# Patient Record
Sex: MALE | Race: WHITE | NOT HISPANIC OR LATINO | Employment: FULL TIME | URBAN - METROPOLITAN AREA
[De-identification: names, ages, dates, MRNs, and addresses within clinical notes are randomized per-mention and may not be internally consistent; named-entity substitution may affect disease eponyms.]

---

## 2017-10-09 ENCOUNTER — LAB VISIT (OUTPATIENT)
Dept: LAB | Facility: HOSPITAL | Age: 72
End: 2017-10-09
Attending: INTERNAL MEDICINE
Payer: MEDICARE

## 2017-10-09 ENCOUNTER — OFFICE VISIT (OUTPATIENT)
Dept: INTERNAL MEDICINE | Facility: CLINIC | Age: 72
End: 2017-10-09
Payer: MEDICARE

## 2017-10-09 VITALS
BODY MASS INDEX: 37.89 KG/M2 | HEIGHT: 72 IN | SYSTOLIC BLOOD PRESSURE: 122 MMHG | TEMPERATURE: 98 F | DIASTOLIC BLOOD PRESSURE: 70 MMHG | WEIGHT: 279.75 LBS | HEART RATE: 76 BPM

## 2017-10-09 DIAGNOSIS — E11.22 TYPE 2 DIABETES MELLITUS WITH STAGE 3 CHRONIC KIDNEY DISEASE, WITH LONG-TERM CURRENT USE OF INSULIN: ICD-10-CM

## 2017-10-09 DIAGNOSIS — Z12.5 ENCOUNTER FOR SCREENING FOR MALIGNANT NEOPLASM OF PROSTATE: ICD-10-CM

## 2017-10-09 DIAGNOSIS — I15.2 HYPERTENSION ASSOCIATED WITH DIABETES: ICD-10-CM

## 2017-10-09 DIAGNOSIS — E11.69 HYPERLIPIDEMIA ASSOCIATED WITH TYPE 2 DIABETES MELLITUS: ICD-10-CM

## 2017-10-09 DIAGNOSIS — N18.30 TYPE 2 DIABETES MELLITUS WITH STAGE 3 CHRONIC KIDNEY DISEASE, WITH LONG-TERM CURRENT USE OF INSULIN: ICD-10-CM

## 2017-10-09 DIAGNOSIS — Z12.11 COLON CANCER SCREENING: ICD-10-CM

## 2017-10-09 DIAGNOSIS — Z79.4 TYPE 2 DIABETES MELLITUS WITH STAGE 3 CHRONIC KIDNEY DISEASE, WITH LONG-TERM CURRENT USE OF INSULIN: ICD-10-CM

## 2017-10-09 DIAGNOSIS — E11.59 HYPERTENSION ASSOCIATED WITH DIABETES: ICD-10-CM

## 2017-10-09 DIAGNOSIS — N18.30 STAGE 3 CHRONIC KIDNEY DISEASE: ICD-10-CM

## 2017-10-09 DIAGNOSIS — B02.29 POST HERPETIC NEURALGIA: ICD-10-CM

## 2017-10-09 DIAGNOSIS — E78.5 HYPERLIPIDEMIA ASSOCIATED WITH TYPE 2 DIABETES MELLITUS: ICD-10-CM

## 2017-10-09 LAB
ALBUMIN SERPL BCP-MCNC: 3.5 G/DL
ALP SERPL-CCNC: 119 U/L
ALT SERPL W/O P-5'-P-CCNC: 36 U/L
ANION GAP SERPL CALC-SCNC: 9 MMOL/L
AST SERPL-CCNC: 22 U/L
BASOPHILS # BLD AUTO: 0.02 K/UL
BASOPHILS NFR BLD: 0.3 %
BILIRUB SERPL-MCNC: 0.7 MG/DL
BUN SERPL-MCNC: 26 MG/DL
C PEPTIDE SERPL-MCNC: 1.63 NG/ML
CALCIUM SERPL-MCNC: 9.6 MG/DL
CHLORIDE SERPL-SCNC: 102 MMOL/L
CHOLEST SERPL-MCNC: 126 MG/DL
CHOLEST/HDLC SERPL: 2.7 {RATIO}
CO2 SERPL-SCNC: 27 MMOL/L
COMPLEXED PSA SERPL-MCNC: 2.4 NG/ML
CREAT SERPL-MCNC: 1.8 MG/DL
CREAT UR-MCNC: 158 MG/DL
DIFFERENTIAL METHOD: ABNORMAL
EOSINOPHIL # BLD AUTO: 0.1 K/UL
EOSINOPHIL NFR BLD: 1.9 %
ERYTHROCYTE [DISTWIDTH] IN BLOOD BY AUTOMATED COUNT: 13 %
EST. GFR  (AFRICAN AMERICAN): 42.8 ML/MIN/1.73 M^2
EST. GFR  (NON AFRICAN AMERICAN): 37 ML/MIN/1.73 M^2
GLUCOSE SERPL-MCNC: 212 MG/DL
HCT VFR BLD AUTO: 40.9 %
HDLC SERPL-MCNC: 47 MG/DL
HDLC SERPL: 37.3 %
HGB BLD-MCNC: 13.2 G/DL
LDLC SERPL CALC-MCNC: 61.4 MG/DL
LYMPHOCYTES # BLD AUTO: 1 K/UL
LYMPHOCYTES NFR BLD: 14.9 %
MCH RBC QN AUTO: 29.8 PG
MCHC RBC AUTO-ENTMCNC: 32.3 G/DL
MCV RBC AUTO: 92 FL
MICROALBUMIN UR DL<=1MG/L-MCNC: 331 UG/ML
MICROALBUMIN/CREATININE RATIO: 209.5 UG/MG
MONOCYTES # BLD AUTO: 0.7 K/UL
MONOCYTES NFR BLD: 10 %
NEUTROPHILS # BLD AUTO: 5.1 K/UL
NEUTROPHILS NFR BLD: 72.6 %
NONHDLC SERPL-MCNC: 79 MG/DL
PLATELET # BLD AUTO: 222 K/UL
PMV BLD AUTO: 9.7 FL
POTASSIUM SERPL-SCNC: 4.1 MMOL/L
PROT SERPL-MCNC: 6.9 G/DL
RBC # BLD AUTO: 4.43 M/UL
SODIUM SERPL-SCNC: 138 MMOL/L
TRIGL SERPL-MCNC: 88 MG/DL
WBC # BLD AUTO: 6.99 K/UL

## 2017-10-09 PROCEDURE — 84153 ASSAY OF PSA TOTAL: CPT

## 2017-10-09 PROCEDURE — 99204 OFFICE O/P NEW MOD 45 MIN: CPT | Mod: 25,S$GLB,, | Performed by: INTERNAL MEDICINE

## 2017-10-09 PROCEDURE — 82570 ASSAY OF URINE CREATININE: CPT

## 2017-10-09 PROCEDURE — 36415 COLL VENOUS BLD VENIPUNCTURE: CPT | Mod: PO

## 2017-10-09 PROCEDURE — 80053 COMPREHEN METABOLIC PANEL: CPT

## 2017-10-09 PROCEDURE — 80061 LIPID PANEL: CPT

## 2017-10-09 PROCEDURE — 99999 PR PBB SHADOW E&M-NEW PATIENT-LVL III: CPT | Mod: PBBFAC,,, | Performed by: INTERNAL MEDICINE

## 2017-10-09 PROCEDURE — G0008 ADMIN INFLUENZA VIRUS VAC: HCPCS | Mod: S$GLB,,, | Performed by: INTERNAL MEDICINE

## 2017-10-09 PROCEDURE — 84681 ASSAY OF C-PEPTIDE: CPT

## 2017-10-09 PROCEDURE — 83036 HEMOGLOBIN GLYCOSYLATED A1C: CPT

## 2017-10-09 PROCEDURE — 90662 IIV NO PRSV INCREASED AG IM: CPT | Mod: S$GLB,,, | Performed by: INTERNAL MEDICINE

## 2017-10-09 PROCEDURE — 85025 COMPLETE CBC W/AUTO DIFF WBC: CPT

## 2017-10-09 RX ORDER — TRAMADOL HYDROCHLORIDE AND ACETAMINOPHEN 37.5; 325 MG/1; MG/1
TABLET, FILM COATED ORAL
COMMUNITY
Start: 2017-08-21 | End: 2018-08-08

## 2017-10-09 RX ORDER — INSULIN GLARGINE 100 [IU]/ML
35 INJECTION, SOLUTION SUBCUTANEOUS 2 TIMES DAILY
COMMUNITY
Start: 2017-09-18 | End: 2017-11-09 | Stop reason: SDUPTHER

## 2017-10-09 RX ORDER — TERAZOSIN 10 MG/1
CAPSULE ORAL
COMMUNITY
Start: 2017-08-02 | End: 2017-11-09 | Stop reason: SDUPTHER

## 2017-10-09 RX ORDER — VALSARTAN AND HYDROCHLOROTHIAZIDE 320; 25 MG/1; MG/1
1 TABLET, FILM COATED ORAL
COMMUNITY
End: 2017-11-09 | Stop reason: SDUPTHER

## 2017-10-09 RX ORDER — CLONIDINE HYDROCHLORIDE 0.2 MG/1
0.2 TABLET ORAL
COMMUNITY
End: 2017-10-09 | Stop reason: SDUPTHER

## 2017-10-09 RX ORDER — TESTOSTERONE CYPIONATE 1000 MG/10ML
100 INJECTION, SOLUTION INTRAMUSCULAR
COMMUNITY
End: 2017-11-09

## 2017-10-09 RX ORDER — METOPROLOL SUCCINATE 100 MG/1
TABLET, EXTENDED RELEASE ORAL
COMMUNITY
Start: 2017-08-17 | End: 2017-11-09 | Stop reason: SDUPTHER

## 2017-10-09 RX ORDER — CLONIDINE HYDROCHLORIDE 0.2 MG/1
0.2 TABLET ORAL 2 TIMES DAILY
Qty: 180 TABLET | Refills: 4 | Status: SHIPPED | OUTPATIENT
Start: 2017-10-09 | End: 2018-12-17 | Stop reason: SDUPTHER

## 2017-10-09 RX ORDER — GABAPENTIN 300 MG/1
CAPSULE ORAL
COMMUNITY
Start: 2017-09-09 | End: 2017-12-21 | Stop reason: ALTCHOICE

## 2017-10-09 RX ORDER — INSULIN ASPART 100 [IU]/ML
INJECTION, SOLUTION INTRAVENOUS; SUBCUTANEOUS
COMMUNITY
Start: 2017-07-20 | End: 2017-10-09

## 2017-10-09 RX ORDER — AMLODIPINE BESYLATE 10 MG/1
TABLET ORAL
COMMUNITY
Start: 2017-08-17 | End: 2017-11-09 | Stop reason: SDUPTHER

## 2017-10-09 RX ORDER — VALSARTAN 320 MG/1
TABLET ORAL
COMMUNITY
Start: 2017-09-26 | End: 2017-10-09

## 2017-10-09 RX ORDER — ATORVASTATIN CALCIUM 80 MG/1
TABLET, FILM COATED ORAL
COMMUNITY
Start: 2017-08-17 | End: 2017-11-09 | Stop reason: SDUPTHER

## 2017-10-09 NOTE — PROGRESS NOTES
Subjective:       Patient ID: Sherif Ragland is a 71 y.o. male.    Chief Complaint: Establish Care    HPI  Patient is a 71-year-old male presenting as a new patient to me.  He is previously followed by an outside physician.  He reports with a history of type 2 diabetes with stage III kidney disease, hyperlipidemia, hypertension, postherpetic neuralgia.  He states his been diabetic since roughly 1997.  He had been previously treated with metformin as his only treatment.  He states that his A1c is running in the 9 area and subsequently he was taken off the metformin by his last doctor and placed on insulin.  He is seeing some improvement in his sugars but the regimen he is following is not one that was originally prescribed.  The doctor did put him on 70 units of insulin split twice daily with Lantus.  He was getting hypoglycemic with this regimen so he is switched it to just taking it at nighttime and he takes somewhere between 60 and 65 units at night and he is seen better control the sugars with that.  His fasting sugars are running around 120 and his non-fastings of been typically around 140.  He would like to get off of insulin if possible.  He was not tried on a more aggressive oral therapy.  Were not aware if the previous physician tested him for insulin production.    Patient has stage III kidney disease associated with his diabetes and hypertension.  This is been stable over time.  He is followed by an outside nephrologist.  He reports no problem with his kidneys at this juncture.    He has hypertension and hyperlipidemia associated with his diabetes.  He is on medication for both these issues.  His blood pressure remains under good control he relates good control of his cholesterol as well.    He is under treatment for postherpetic neuralgia.  He has had problems with that for some time.  He notes he did have the shingles shot but at the postherpetic neuralgia nonetheless.  He is on gabapentin and he uses  occasional Ultracet for that.    Patient is also on testosterone replacement.  He is not sure what the dose was.  He was getting shot every 2 weeks.        Review of Systems   Constitutional: Negative for fever and unexpected weight change.   HENT: Negative for hearing loss, postnasal drip and rhinorrhea.    Eyes: Negative for pain and visual disturbance.   Respiratory: Negative for cough, shortness of breath and wheezing.    Cardiovascular: Negative for chest pain and palpitations.   Gastrointestinal: Negative for constipation, diarrhea, nausea and vomiting.   Genitourinary: Negative for dysuria and hematuria.   Musculoskeletal: Negative for arthralgias, back pain, myalgias and neck stiffness.   Skin: Negative for pallor and rash.   Neurological: Negative for seizures, syncope and headaches.   Hematological: Negative for adenopathy.   Psychiatric/Behavioral: Negative for dysphoric mood. The patient is not nervous/anxious.        Objective:   /70   Pulse 76   Temp 98.4 °F (36.9 °C) (Tympanic)   Ht 6' (1.829 m)   Wt 126.9 kg (279 lb 12.2 oz)   BMI 37.94 kg/m²      Physical Exam   Constitutional: He is oriented to person, place, and time. He appears well-developed and well-nourished. No distress.   HENT:   Head: Normocephalic and atraumatic.   Mouth/Throat: Oropharynx is clear and moist.   Eyes: EOM are normal. Pupils are equal, round, and reactive to light.   Neck: Normal range of motion. Neck supple. No JVD present. No thyromegaly present.   Cardiovascular: Normal rate, regular rhythm, normal heart sounds and intact distal pulses.  Exam reveals no gallop and no friction rub.    No murmur heard.  Pulmonary/Chest: Effort normal and breath sounds normal. He has no wheezes. He has no rales.   Abdominal: Soft. Bowel sounds are normal. He exhibits no distension. There is no tenderness. There is no rebound and no guarding.   Musculoskeletal: Normal range of motion. He exhibits no edema.   Lymphadenopathy:     He  has no cervical adenopathy.   Neurological: He is alert and oriented to person, place, and time. He has normal reflexes. No cranial nerve deficit.   Skin: Skin is warm and dry. No rash noted.   Psychiatric: He has a normal mood and affect. Judgment normal.   Vitals reviewed.      No visits with results within 2 Week(s) from this visit.   Latest known visit with results is:   No results found for any previous visit.       Assessment:       1. Type 2 diabetes mellitus with stage 3 chronic kidney disease, with long-term current use of insulin    2. Stage 3 chronic kidney disease    3. Hyperlipidemia associated with type 2 diabetes mellitus    4. Hypertension associated with diabetes    5. Post herpetic neuralgia    6. Encounter for screening for malignant neoplasm of prostate    7. Colon cancer screening        Plan:   Type 2 diabetes mellitus with chronic kidney disease, with long-term current use of insulin  Continue current regimen. Will update labs. Consider changing back to oral agents if possible.    Hypertension associated with diabetes  Blood pressure is under good control.  We will continue the current regimen.  Will work on regular aerobic exercise and a low salt diet.        Hyperlipidemia associated with type 2 diabetes mellitus  Update labs, continue atorvastatin.    Stage 3 chronic kidney disease  Following with Dr. Juan Whyte, will monitor.    Sherif was seen today for establish care.    Diagnoses and all orders for this visit:    Type 2 diabetes mellitus with stage 3 chronic kidney disease, with long-term current use of insulin  -     CBC auto differential; Future  -     Comprehensive metabolic panel; Future  -     Lipid panel; Future  -     Microalbumin/creatinine urine ratio  -     Hemoglobin A1c; Future  -     C-PEPTIDE; Future    Stage 3 chronic kidney disease    Hyperlipidemia associated with type 2 diabetes mellitus    Hypertension associated with diabetes    Post herpetic  neuralgia    Encounter for screening for malignant neoplasm of prostate  -     PSA, Screening; Future    Colon cancer screening  -     Case request GI: COLONOSCOPY    Other orders  -     cloNIDine (CATAPRES) 0.2 MG tablet; Take 1 tablet (0.2 mg total) by mouth 2 (two) times daily.  -     Influenza - High Dose (65+) (PF) (IM)        Return in about 4 weeks (around 11/6/2017).

## 2017-10-10 LAB
ESTIMATED AVG GLUCOSE: 203 MG/DL
HBA1C MFR BLD HPLC: 8.7 %

## 2017-10-11 RX ORDER — POLYETHYLENE GLYCOL 3350, SODIUM SULFATE ANHYDROUS, SODIUM BICARBONATE, SODIUM CHLORIDE, POTASSIUM CHLORIDE 236; 22.74; 6.74; 5.86; 2.97 G/4L; G/4L; G/4L; G/4L; G/4L
4 POWDER, FOR SOLUTION ORAL ONCE
Qty: 4000 ML | Refills: 0 | Status: SHIPPED | OUTPATIENT
Start: 2017-10-11 | End: 2017-10-11

## 2017-11-02 ENCOUNTER — SURGERY (OUTPATIENT)
Age: 72
End: 2017-11-02

## 2017-11-02 ENCOUNTER — ANESTHESIA (OUTPATIENT)
Dept: ENDOSCOPY | Facility: HOSPITAL | Age: 72
End: 2017-11-02
Payer: MEDICARE

## 2017-11-02 ENCOUNTER — ANESTHESIA EVENT (OUTPATIENT)
Dept: ENDOSCOPY | Facility: HOSPITAL | Age: 72
End: 2017-11-02
Payer: MEDICARE

## 2017-11-02 ENCOUNTER — HOSPITAL ENCOUNTER (OUTPATIENT)
Facility: HOSPITAL | Age: 72
Discharge: HOME | End: 2017-11-02
Attending: INTERNAL MEDICINE | Admitting: INTERNAL MEDICINE
Payer: MEDICARE

## 2017-11-02 VITALS
RESPIRATION RATE: 17 BRPM | DIASTOLIC BLOOD PRESSURE: 72 MMHG | HEIGHT: 72 IN | WEIGHT: 280 LBS | BODY MASS INDEX: 37.93 KG/M2 | RESPIRATION RATE: 18 BRPM | OXYGEN SATURATION: 96 % | HEART RATE: 64 BPM | TEMPERATURE: 98 F | SYSTOLIC BLOOD PRESSURE: 153 MMHG

## 2017-11-02 DIAGNOSIS — Z12.11 SCREENING FOR COLON CANCER: ICD-10-CM

## 2017-11-02 LAB — POCT GLUCOSE: 131 MG/DL (ref 70–110)

## 2017-11-02 PROCEDURE — 27201089 HC SNARE, DISP (ANY): Performed by: INTERNAL MEDICINE

## 2017-11-02 PROCEDURE — 45385 COLONOSCOPY W/LESION REMOVAL: CPT | Mod: PT,,, | Performed by: INTERNAL MEDICINE

## 2017-11-02 PROCEDURE — 25000003 PHARM REV CODE 250: Performed by: INTERNAL MEDICINE

## 2017-11-02 PROCEDURE — 45385 COLONOSCOPY W/LESION REMOVAL: CPT | Performed by: INTERNAL MEDICINE

## 2017-11-02 PROCEDURE — 88305 TISSUE EXAM BY PATHOLOGIST: CPT | Mod: 26,,, | Performed by: PATHOLOGY

## 2017-11-02 PROCEDURE — 37000009 HC ANESTHESIA EA ADD 15 MINS: Performed by: INTERNAL MEDICINE

## 2017-11-02 PROCEDURE — 63600175 PHARM REV CODE 636 W HCPCS: Performed by: NURSE ANESTHETIST, CERTIFIED REGISTERED

## 2017-11-02 PROCEDURE — 37000008 HC ANESTHESIA 1ST 15 MINUTES: Performed by: INTERNAL MEDICINE

## 2017-11-02 PROCEDURE — 25000003 PHARM REV CODE 250: Performed by: NURSE ANESTHETIST, CERTIFIED REGISTERED

## 2017-11-02 PROCEDURE — 88305 TISSUE EXAM BY PATHOLOGIST: CPT | Performed by: PATHOLOGY

## 2017-11-02 RX ORDER — PROPOFOL 10 MG/ML
VIAL (ML) INTRAVENOUS
Status: DISCONTINUED | OUTPATIENT
Start: 2017-11-02 | End: 2017-11-02

## 2017-11-02 RX ORDER — SODIUM CHLORIDE, SODIUM LACTATE, POTASSIUM CHLORIDE, CALCIUM CHLORIDE 600; 310; 30; 20 MG/100ML; MG/100ML; MG/100ML; MG/100ML
INJECTION, SOLUTION INTRAVENOUS CONTINUOUS PRN
Status: DISCONTINUED | OUTPATIENT
Start: 2017-11-02 | End: 2017-11-02

## 2017-11-02 RX ORDER — LIDOCAINE HYDROCHLORIDE 20 MG/ML
INJECTION, SOLUTION EPIDURAL; INFILTRATION; INTRACAUDAL; PERINEURAL
Status: DISCONTINUED | OUTPATIENT
Start: 2017-11-02 | End: 2017-11-02

## 2017-11-02 RX ORDER — SODIUM CHLORIDE, SODIUM LACTATE, POTASSIUM CHLORIDE, CALCIUM CHLORIDE 600; 310; 30; 20 MG/100ML; MG/100ML; MG/100ML; MG/100ML
INJECTION, SOLUTION INTRAVENOUS CONTINUOUS
Status: DISCONTINUED | OUTPATIENT
Start: 2017-11-02 | End: 2017-11-02 | Stop reason: HOSPADM

## 2017-11-02 RX ADMIN — PROPOFOL 70 MG: 10 INJECTION, EMULSION INTRAVENOUS at 07:11

## 2017-11-02 RX ADMIN — PROPOFOL 40 MG: 10 INJECTION, EMULSION INTRAVENOUS at 07:11

## 2017-11-02 RX ADMIN — PROPOFOL 30 MG: 10 INJECTION, EMULSION INTRAVENOUS at 07:11

## 2017-11-02 RX ADMIN — SODIUM CHLORIDE, SODIUM LACTATE, POTASSIUM CHLORIDE, AND CALCIUM CHLORIDE: .6; .31; .03; .02 INJECTION, SOLUTION INTRAVENOUS at 06:11

## 2017-11-02 RX ADMIN — LIDOCAINE HYDROCHLORIDE 40 MG: 20 INJECTION, SOLUTION EPIDURAL; INFILTRATION; INTRACAUDAL; PERINEURAL at 07:11

## 2017-11-02 RX ADMIN — SODIUM CHLORIDE, SODIUM LACTATE, POTASSIUM CHLORIDE, AND CALCIUM CHLORIDE: 600; 310; 30; 20 INJECTION, SOLUTION INTRAVENOUS at 07:11

## 2017-11-02 NOTE — DISCHARGE SUMMARY
[facility] -   Brief Operative Note     SUMMARY     Surgery Date: 11/2/2017     Surgeon(s) and Role:     * Alek Francois MD - Primary    Assisting Surgeon: None    Pre-op Diagnosis:  Colon cancer screening [Z12.11]    Post-op Diagnosis:  Post-Op Diagnosis Codes:     * Colon cancer screening [Z12.11]    Procedure(s) (LRB):  COLONOSCOPY (N/A)    Anesthesia: Choice    Description of the findings of the procedure: Procedure completed. See Procedure note for details.     Findings/Key Components: Procedure completed. See Procedure note for details.     Prosthesis/Implants: None    Estimated Blood Loss: less than 10         Specimens:   Specimen (12h ago through future)    Start     Ordered    11/02/17 0725  Specimen to Pathology - Surgery  Once     Comments:  1. Colon polyp      11/02/17 0730          Discharge Note    SUMMARY     Admit Date: 11/2/2017    Discharge Date and Time:  11/02/2017 7:32 AM    Hospital Course (synopsis of major diagnoses, care, treatment, and services provided during the course of the hospital stay): Procedure completed. See Procedure note for details.      Final Diagnosis: Post-Op Diagnosis Codes:     * Colon cancer screening [Z12.11]    Disposition: Home or Self Care    Follow Up/Patient Instructions:     Medications:  Reconciled Home Medications:   Current Discharge Medication List      CONTINUE these medications which have NOT CHANGED    Details   cloNIDine (CATAPRES) 0.2 MG tablet Take 1 tablet (0.2 mg total) by mouth 2 (two) times daily.  Qty: 180 tablet, Refills: 4      amlodipine (NORVASC) 10 MG tablet       atorvastatin (LIPITOR) 80 MG tablet       gabapentin (NEURONTIN) 300 MG capsule       LANTUS SOLOSTAR 100 unit/mL (3 mL) InPn pen Inject 35 Units into the skin 2 (two) times daily.      metoprolol succinate (TOPROL-XL) 100 MG 24 hr tablet       terazosin (HYTRIN) 10 MG capsule       testosterone cypionate (DEPOTESTOTERONE CYPIONATE) 100 mg/mL injection Inject 100  mg into the muscle every 14 (fourteen) days.      tramadol-acetaminophen 37.5-325 mg (ULTRACET) 37.5-325 mg Tab       valsartan-hydrochlorothiazide (DIOVAN-HCT) 320-25 mg per tablet Take 1 tablet by mouth.             Discharge Procedure Orders  Diet general     Activity as tolerated       Follow-up Information     Tobias oFx MD.    Specialty:  Internal Medicine  Contact information:  [id number] [facility] [id number] [phone]

## 2017-11-02 NOTE — PLAN OF CARE
Dr Francois came to bedside and discussed findings. NO N/V,  no abdominal pain, no GI bleeding, and vitals stable.  Pt discharged from unit.

## 2017-11-02 NOTE — ANESTHESIA POSTPROCEDURE EVALUATION
Anesthesia Post Evaluation    Patient: Sherif Ragland    Procedure(s) Performed: Procedure(s) (LRB):  COLONOSCOPY (N/A)    Final Anesthesia Type: MAC  Patient location during evaluation: GI PACU  Patient participation: Yes- Able to Participate  Level of consciousness: awake and alert and oriented  Post-procedure vital signs: reviewed and stable  Pain management: adequate  Airway patency: patent  PONV status at discharge: No PONV  Anesthetic complications: no      Cardiovascular status: hemodynamically stable  Respiratory status: unassisted, spontaneous ventilation and room air  Hydration status: euvolemic  Follow-up not needed.        Visit Vitals  BP (!) 154/68 (BP Location: Left arm, Patient Position: Lying)   Pulse 71   Temp 36.9 °C (98.4 °F) (Oral)   Resp 17   Ht 6' (1.829 m)   Wt 127 kg (280 lb)   SpO2 (!) 94%   BMI 37.97 kg/m²       Pain/Jessie Score: Pain Assessment Performed: Yes (11/2/2017  6:28 AM)  Presence of Pain: denies (11/2/2017  6:28 AM)

## 2017-11-02 NOTE — DISCHARGE INSTRUCTIONS

## 2017-11-02 NOTE — TRANSFER OF CARE
Anesthesia Transfer of Care Note    Patient: Sherif Ragland    Procedure(s) Performed: Procedure(s) (LRB):  COLONOSCOPY (N/A)    Patient location: GI    Anesthesia Type: MAC    Transport from OR: Transported from OR on room air with adequate spontaneous ventilation    Post pain: adequate analgesia    Post assessment: no apparent anesthetic complications and tolerated procedure well    Post vital signs: stable    Level of consciousness: awake, alert and oriented    Nausea/Vomiting: no nausea/vomiting    Complications: none    Transfer of care protocol was followed      Last vitals:   Visit Vitals  BP (!) 154/68 (BP Location: Left arm, Patient Position: Lying)   Pulse 71   Temp 36.9 °C (98.4 °F) (Oral)   Resp 17   Ht 6' (1.829 m)   Wt 127 kg (280 lb)   SpO2 (!) 94%   BMI 37.97 kg/m²

## 2017-11-02 NOTE — H&P
Short Stay Endoscopy History and Physical    PCP - Tobias Fox MD    Procedure - Colonoscopy  ASA - 2  Mallampati - per anesthesia  History of Anesthesia problems - no  Family history Anesthesia problems -  no     HPI:  This is a 72 y.o. male here for evaluation of :     Average Risk Screening: yes  High risk screening: No  History of polyps: No  Anemia: No  Blood in stools: No  Diarrhea: No  Abdominal Pain: No    Review of Systems:  CONSTITUTIONAL: Denies weight change,  fatigue, fevers, chills, night sweats.  CARDIOVASCULAR: Denies chest pain, shortness of breath, orthopnea and edema.  RESPIRATORY: Denies cough, hemoptysis, dyspnea, and wheezing.  GI: See HPI.    Medical History:  Past Medical History:   Diagnosis Date    Diabetes mellitus, type 2 1997    Hyperlipidemia     Hypertension     Renal insufficiency        Surgical History:   Past Surgical History:   Procedure Laterality Date    TIBIA FRACTURE SURGERY      right leg x2     TONSILLECTOMY         Family History:   Family History   Problem Relation Age of Onset    Stroke Mother     Heart disease Father     Stroke Brother        Social History:   Social History   Substance Use Topics    Smoking status: Never Smoker    Smokeless tobacco: Never Used    Alcohol use No       Allergies: Reviewed.    Medications:  No current facility-administered medications on file prior to encounter.      Current Outpatient Prescriptions on File Prior to Encounter   Medication Sig Dispense Refill    cloNIDine (CATAPRES) 0.2 MG tablet Take 1 tablet (0.2 mg total) by mouth 2 (two) times daily. 180 tablet 4    amlodipine (NORVASC) 10 MG tablet       atorvastatin (LIPITOR) 80 MG tablet       gabapentin (NEURONTIN) 300 MG capsule       LANTUS SOLOSTAR 100 unit/mL (3 mL) InPn pen Inject 35 Units into the skin 2 (two) times daily.      metoprolol succinate (TOPROL-XL) 100 MG 24 hr tablet       terazosin (HYTRIN) 10 MG capsule       testosterone cypionate  (DEPOTESTOTERONE CYPIONATE) 100 mg/mL injection Inject 100 mg into the muscle every 14 (fourteen) days.      tramadol-acetaminophen 37.5-325 mg (ULTRACET) 37.5-325 mg Tab       valsartan-hydrochlorothiazide (DIOVAN-HCT) 320-25 mg per tablet Take 1 tablet by mouth.         Physical Exam:  Vital Signs:   Vitals:    11/02/17 0638   BP: 134/62   Pulse: 76   Resp: 16   Temp: 98.4 °F (36.9 °C)     General Appearance: Well appearing in no acute distress  ENT: OP clear  Chest: CTA B  CV: RRR, no m/r/g  Abd: s/nt/nd/nabs  Ext: no edema    Labs:  Reviewed    Impression: Screening    Plan:  I have explained the risks and benefits of colonoscopy to the patient including but not limited to bleeding, perforation, infection, and death. The patient wishes to proceed with colonoscopy.

## 2017-11-02 NOTE — ANESTHESIA PREPROCEDURE EVALUATION
11/02/2017  Sherif Ragland is a 72 y.o., male.    Anesthesia Evaluation    I have reviewed the Patient Summary Reports.    I have reviewed the Nursing Notes.   I have reviewed the Medications.     Review of Systems  Anesthesia Hx:  No problems with previous Anesthesia    Social:  Non-Smoker, No Alcohol Use    Hematology/Oncology:  Hematology Normal   Oncology Normal     EENT/Dental:EENT/Dental Normal   Cardiovascular:   Exercise tolerance: good Hypertension, well controlled hyperlipidemia    Pulmonary:  Pulmonary Normal    Renal/:   Chronic Renal Disease, CRI    Hepatic/GI:  Hepatic/GI Normal    Musculoskeletal:   Arthritis     Neurological:   Tingling right hand   Endocrine:   Diabetes, well controlled, type 2, using insulin    Dermatological:  Skin Normal    Psych:  Psychiatric Normal           Physical Exam  General:  Well nourished, Morbid Obesity    Airway/Jaw/Neck:  Airway Findings: Mouth Opening: Normal Tongue: Normal  General Airway Assessment: Adult  Mallampati: II  Jaw/Neck Findings:  Neck ROM: Normal ROM      Dental:  Dental Findings: In tact   Chest/Lungs:  Chest/Lungs Findings: Clear to auscultation, Normal Respiratory Rate     Heart/Vascular:  Heart Findings: Rate: Normal  Rhythm: Regular Rhythm  Sounds: Normal     Abdomen:  Abdomen Findings:  Normal       Mental Status:  Mental Status Findings:  Cooperative, Alert and Oriented         Anesthesia Plan  Type of Anesthesia, risks & benefits discussed:  Anesthesia Type:  MAC  Patient's Preference:   Intra-op Monitoring Plan: standard ASA monitors  Intra-op Monitoring Plan Comments:   Post Op Pain Control Plan:   Post Op Pain Control Plan Comments:   Induction:   IV  Beta Blocker:  Patient is on a Beta-Blocker and has received one dose within the past 24 hours (No further documentation required).       Informed Consent: Patient understands risks  and agrees with Anesthesia plan.  Questions answered. Anesthesia consent signed with patient representative.  ASA Score: 3     Day of Surgery Review of History & Physical: I have interviewed and examined the patient. I have reviewed the patient's H&P dated: 11/02/2017. There are no significant changes.          Ready For Surgery From Anesthesia Perspective.

## 2017-11-09 ENCOUNTER — OFFICE VISIT (OUTPATIENT)
Dept: INTERNAL MEDICINE | Facility: CLINIC | Age: 72
End: 2017-11-09
Payer: MEDICARE

## 2017-11-09 VITALS
DIASTOLIC BLOOD PRESSURE: 62 MMHG | HEART RATE: 68 BPM | HEIGHT: 72 IN | TEMPERATURE: 98 F | SYSTOLIC BLOOD PRESSURE: 130 MMHG | WEIGHT: 284.38 LBS | BODY MASS INDEX: 38.52 KG/M2

## 2017-11-09 DIAGNOSIS — E78.5 HYPERLIPIDEMIA ASSOCIATED WITH TYPE 2 DIABETES MELLITUS: ICD-10-CM

## 2017-11-09 DIAGNOSIS — E11.59 HYPERTENSION ASSOCIATED WITH DIABETES: ICD-10-CM

## 2017-11-09 DIAGNOSIS — E11.69 HYPERLIPIDEMIA ASSOCIATED WITH TYPE 2 DIABETES MELLITUS: ICD-10-CM

## 2017-11-09 DIAGNOSIS — N18.30 STAGE 3 CHRONIC KIDNEY DISEASE: ICD-10-CM

## 2017-11-09 DIAGNOSIS — D12.6 TUBULAR ADENOMA OF COLON: ICD-10-CM

## 2017-11-09 DIAGNOSIS — N18.30 TYPE 2 DIABETES MELLITUS WITH STAGE 3 CHRONIC KIDNEY DISEASE, WITH LONG-TERM CURRENT USE OF INSULIN: ICD-10-CM

## 2017-11-09 DIAGNOSIS — Z79.4 TYPE 2 DIABETES MELLITUS WITH STAGE 3 CHRONIC KIDNEY DISEASE, WITH LONG-TERM CURRENT USE OF INSULIN: ICD-10-CM

## 2017-11-09 DIAGNOSIS — E11.22 TYPE 2 DIABETES MELLITUS WITH STAGE 3 CHRONIC KIDNEY DISEASE, WITH LONG-TERM CURRENT USE OF INSULIN: ICD-10-CM

## 2017-11-09 DIAGNOSIS — I15.2 HYPERTENSION ASSOCIATED WITH DIABETES: ICD-10-CM

## 2017-11-09 DIAGNOSIS — N52.9 VASCULOGENIC ERECTILE DYSFUNCTION, UNSPECIFIED VASCULOGENIC ERECTILE DYSFUNCTION TYPE: ICD-10-CM

## 2017-11-09 PROCEDURE — 99499 UNLISTED E&M SERVICE: CPT | Mod: S$GLB,,, | Performed by: INTERNAL MEDICINE

## 2017-11-09 PROCEDURE — 99214 OFFICE O/P EST MOD 30 MIN: CPT | Mod: 25,S$GLB,, | Performed by: INTERNAL MEDICINE

## 2017-11-09 PROCEDURE — 99999 PR PBB SHADOW E&M-EST. PATIENT-LVL IV: CPT | Mod: PBBFAC,,, | Performed by: INTERNAL MEDICINE

## 2017-11-09 PROCEDURE — 90670 PCV13 VACCINE IM: CPT | Mod: S$GLB,,, | Performed by: INTERNAL MEDICINE

## 2017-11-09 PROCEDURE — G0009 ADMIN PNEUMOCOCCAL VACCINE: HCPCS | Mod: S$GLB,,, | Performed by: INTERNAL MEDICINE

## 2017-11-09 RX ORDER — METOPROLOL SUCCINATE 100 MG/1
100 TABLET, EXTENDED RELEASE ORAL DAILY
Qty: 90 TABLET | Refills: 4 | Status: SHIPPED | OUTPATIENT
Start: 2017-11-09 | End: 2018-11-13 | Stop reason: SDUPTHER

## 2017-11-09 RX ORDER — INSULIN GLARGINE 100 [IU]/ML
35 INJECTION, SOLUTION SUBCUTANEOUS 2 TIMES DAILY
Qty: 21 ML | Refills: 4 | Status: SHIPPED | OUTPATIENT
Start: 2017-11-09 | End: 2017-11-14 | Stop reason: SDUPTHER

## 2017-11-09 RX ORDER — PIOGLITAZONEHYDROCHLORIDE 15 MG/1
15 TABLET ORAL DAILY
Qty: 90 TABLET | Refills: 3 | Status: SHIPPED | OUTPATIENT
Start: 2017-11-09 | End: 2017-12-22 | Stop reason: SDUPTHER

## 2017-11-09 RX ORDER — TERAZOSIN 10 MG/1
10 CAPSULE ORAL DAILY
Qty: 90 CAPSULE | Refills: 4 | Status: SHIPPED | OUTPATIENT
Start: 2017-11-09 | End: 2019-01-28 | Stop reason: SDUPTHER

## 2017-11-09 RX ORDER — AMLODIPINE BESYLATE 10 MG/1
10 TABLET ORAL DAILY
Qty: 90 TABLET | Refills: 4 | Status: SHIPPED | OUTPATIENT
Start: 2017-11-09 | End: 2017-12-21 | Stop reason: ALTCHOICE

## 2017-11-09 RX ORDER — VALSARTAN AND HYDROCHLOROTHIAZIDE 320; 25 MG/1; MG/1
1 TABLET, FILM COATED ORAL DAILY
Qty: 90 TABLET | Refills: 4 | Status: SHIPPED | OUTPATIENT
Start: 2017-11-09 | End: 2018-07-26 | Stop reason: ALTCHOICE

## 2017-11-09 RX ORDER — ATORVASTATIN CALCIUM 80 MG/1
80 TABLET, FILM COATED ORAL DAILY
Qty: 90 TABLET | Refills: 4 | Status: SHIPPED | OUTPATIENT
Start: 2017-11-09 | End: 2019-01-31 | Stop reason: ALTCHOICE

## 2017-11-09 NOTE — PROGRESS NOTES
Subjective:       Patient ID: Sherif Ragland is a 72 y.o. male.    Chief Complaint: Follow-up    HPI  Patient is a 72-year-old male presenting today following up on his diabetes, hypertension, hyperlipidemia, colonoscopy, kidney disease, ADD.  In regards to his diabetes his numbers are not at goal at this time.  This is probably multifactorial.  He readily admits he is not focused at all on his diet over the last few months.  He is using his insulin but not necessarily as prescribed.  He has some concerns about hyperglycemia associated with insulin use.  I educated him some on his regimen at this time.  We talked about additional medication and our goals.  He does appear to be making insulin although he may not be making quite as much as he should.    His blood pressures been under good control without adverse issues.  He will continue days medication there.  He has no signs or symptoms cardiac decompensation.    Hyperlipidemia stable.  He is taking his medications as prescribed.  No adverse effects are noted.    Colonoscopy revealed tubular adenoma.  I discussed this finding with him today.  We will refer for follow-up colonoscopy in about 3 years.    He has stage III chronic kidney disease.  He has microalbumin.  He is followed by an outside nephrologist.  He continues to see him.  He needs to continue to avoid anti-inflammatories.  This does give us some limitations in to which medications are available to use for his diabetes.    He complains of chronic erectile dysfunction.  He has tried the oral agents without success.  He has had good success in the past with the shots but was unable to continue getting them as the clinic he was seen when out of business.  He would like to pursue resuming that option.    Review of Systems   Constitutional: Negative for fever and unexpected weight change.   Respiratory: Negative for cough, shortness of breath and wheezing.    Cardiovascular: Negative for chest pain and  palpitations.   Gastrointestinal: Negative for constipation, diarrhea, nausea and vomiting.   Genitourinary: Negative for dysuria and hematuria.       Objective:   /62   Pulse 68   Temp 97.5 °F (36.4 °C) (Tympanic)   Ht 6' (1.829 m)   Wt 129 kg (284 lb 6.3 oz)   BMI 38.57 kg/m²      Physical Exam   Constitutional: He appears well-developed and well-nourished.   HENT:   Head: Normocephalic and atraumatic.   Eyes: Pupils are equal, round, and reactive to light.   Neck: Neck supple. No thyromegaly present.   Cardiovascular: Normal rate, regular rhythm and normal heart sounds.  Exam reveals no gallop and no friction rub.    No murmur heard.  Pulmonary/Chest: Breath sounds normal. He has no wheezes. He has no rales.   Abdominal: Soft. Bowel sounds are normal. He exhibits no distension. There is no tenderness.   Vitals reviewed.      Admission on 11/02/2017, Discharged on 11/02/2017   Component Date Value    POCT Glucose 11/02/2017 131*       Assessment:       1. Type 2 diabetes mellitus with stage 3 chronic kidney disease, with long-term current use of insulin    2. Hypertension associated with diabetes    3. Hyperlipidemia associated with type 2 diabetes mellitus    4. Tubular adenoma of colon    5. Stage 3 chronic kidney disease    6. Vasculogenic erectile dysfunction, unspecified vasculogenic erectile dysfunction type        Plan:   Hyperlipidemia associated with type 2 diabetes mellitus  Cholesterol numbers look good.  We will continue the current regimen at this time.  Remain focused on low fat diet and high dietary fiber intake.      Type 2 diabetes mellitus with chronic kidney disease, with long-term current use of insulin  Continue lantus at current dosing.  Add actos 15 mg once daily.  Follow up with a1c in 3 months    Hypertension associated with diabetes  Blood pressure is under good control.  We will continue the current regimen.  Will work on regular aerobic exercise and a low salt diet.         Stage 3 chronic kidney disease  Continue bp control.  No nsaids.    Sherif was seen today for follow-up.    Diagnoses and all orders for this visit:    Type 2 diabetes mellitus with stage 3 chronic kidney disease, with long-term current use of insulin  -     pioglitazone (ACTOS) 15 MG tablet; Take 1 tablet (15 mg total) by mouth once daily.  -     Hemoglobin A1c; Future    Hypertension associated with diabetes    Hyperlipidemia associated with type 2 diabetes mellitus    Tubular adenoma of colon    Stage 3 chronic kidney disease    Vasculogenic erectile dysfunction, unspecified vasculogenic erectile dysfunction type  -     Ambulatory referral to Urology    Other orders  -     amLODIPine (NORVASC) 10 MG tablet; Take 1 tablet (10 mg total) by mouth once daily.  -     atorvastatin (LIPITOR) 80 MG tablet; Take 1 tablet (80 mg total) by mouth once daily.  -     metoprolol succinate (TOPROL-XL) 100 MG 24 hr tablet; Take 1 tablet (100 mg total) by mouth once daily.  -     LANTUS SOLOSTAR 100 unit/mL (3 mL) InPn pen; Inject 35 Units into the skin 2 (two) times daily.  -     valsartan-hydrochlorothiazide (DIOVAN-HCT) 320-25 mg per tablet; Take 1 tablet by mouth once daily.  -     terazosin (HYTRIN) 10 MG capsule; Take 1 capsule (10 mg total) by mouth once daily.  -     blood sugar diagnostic Strp; Use twice daily to test sugars  -     (In Office Administered) Pneumococcal Conjugate Vaccine (13 Valent) (IM)        Return in about 6 weeks (around 12/21/2017) for dm.

## 2017-11-09 NOTE — PROGRESS NOTES
Patient, Sherif Ragland (MRN #[id number]), presented with a recorded BMI of 38.57 kg/m^2 and a documented comorbidity(s):  - Diabetes Mellitus Type 2  - Hypertension  - Hyperlipidemia  to which the severe obesity is a contributing factor. This is consistent with the definition of severe obesity (BMI 35.0-35.9) with comorbidity (ICD-10 E66.01, Z68.35). The patient's severe obesity was monitored, evaluated, addressed and/or treated. This addendum to the medical record is made on 11/09/2017.

## 2017-11-14 RX ORDER — INSULIN GLARGINE 100 [IU]/ML
35 INJECTION, SOLUTION SUBCUTANEOUS 2 TIMES DAILY
Qty: 21 ML | Refills: 4 | Status: SHIPPED | OUTPATIENT
Start: 2017-11-14 | End: 2018-02-13 | Stop reason: CLARIF

## 2017-11-17 RX ORDER — LANCETS
EACH MISCELLANEOUS
Qty: 100 EACH | Refills: 11 | Status: SHIPPED | OUTPATIENT
Start: 2017-11-17 | End: 2018-07-11 | Stop reason: SDUPTHER

## 2017-11-17 RX ORDER — INSULIN PUMP SYRINGE, 3 ML
EACH MISCELLANEOUS
Qty: 1 EACH | Refills: 0 | Status: SHIPPED | OUTPATIENT
Start: 2017-11-17 | End: 2018-07-11 | Stop reason: SDUPTHER

## 2017-12-21 ENCOUNTER — OFFICE VISIT (OUTPATIENT)
Dept: INTERNAL MEDICINE | Facility: CLINIC | Age: 72
End: 2017-12-21
Payer: MEDICARE

## 2017-12-21 ENCOUNTER — LAB VISIT (OUTPATIENT)
Dept: LAB | Facility: HOSPITAL | Age: 72
End: 2017-12-21
Attending: PHYSICIAN ASSISTANT
Payer: MEDICARE

## 2017-12-21 VITALS
WEIGHT: 279.75 LBS | DIASTOLIC BLOOD PRESSURE: 65 MMHG | HEART RATE: 70 BPM | HEIGHT: 72 IN | BODY MASS INDEX: 37.89 KG/M2 | TEMPERATURE: 98 F | SYSTOLIC BLOOD PRESSURE: 122 MMHG

## 2017-12-21 DIAGNOSIS — E11.22 TYPE 2 DIABETES MELLITUS WITH STAGE 3 CHRONIC KIDNEY DISEASE, WITH LONG-TERM CURRENT USE OF INSULIN: ICD-10-CM

## 2017-12-21 DIAGNOSIS — N18.30 TYPE 2 DIABETES MELLITUS WITH STAGE 3 CHRONIC KIDNEY DISEASE, WITH LONG-TERM CURRENT USE OF INSULIN: ICD-10-CM

## 2017-12-21 DIAGNOSIS — N18.30 STAGE 3 CHRONIC KIDNEY DISEASE: ICD-10-CM

## 2017-12-21 DIAGNOSIS — I15.2 HYPERTENSION ASSOCIATED WITH DIABETES: ICD-10-CM

## 2017-12-21 DIAGNOSIS — E11.59 HYPERTENSION ASSOCIATED WITH DIABETES: ICD-10-CM

## 2017-12-21 DIAGNOSIS — Z79.4 TYPE 2 DIABETES MELLITUS WITH STAGE 3 CHRONIC KIDNEY DISEASE, WITH LONG-TERM CURRENT USE OF INSULIN: ICD-10-CM

## 2017-12-21 LAB
ESTIMATED AVG GLUCOSE: 214 MG/DL
HBA1C MFR BLD HPLC: 9.1 %

## 2017-12-21 PROCEDURE — 99999 PR PBB SHADOW E&M-EST. PATIENT-LVL IV: CPT | Mod: PBBFAC,,, | Performed by: PHYSICIAN ASSISTANT

## 2017-12-21 PROCEDURE — 36415 COLL VENOUS BLD VENIPUNCTURE: CPT | Mod: PO

## 2017-12-21 PROCEDURE — 99214 OFFICE O/P EST MOD 30 MIN: CPT | Mod: S$GLB,,, | Performed by: PHYSICIAN ASSISTANT

## 2017-12-21 PROCEDURE — 83036 HEMOGLOBIN GLYCOSYLATED A1C: CPT

## 2017-12-21 NOTE — PROGRESS NOTES
Subjective:       Patient ID: Sherif Ragland is a 72 y.o. male.    Chief Complaint: DM    HPI  Patient comes in today for follow up DM   He is on insulin, A1c has not been very controlled as of late.   He readily admits he is not focused at all on his diet over the last few months.  He is using his insulin but not necessarily as prescribed.  He has some concerns about hyperglycemia associated with insulin use.  I educated him some on his regimen at this time.  We talked about additional medication and our goals.  He does appear to be making insulin although he may not be making quite as much as he should  He has CKD as well so we are limited to which medications we can use     Having low morning readings with some fatigue       Past Medical History:   Diagnosis Date    Diabetes mellitus, type 2 1997    Hyperlipidemia     Hypertension     Hypogonadism in male     Renal insufficiency     Tubular adenoma 10/2017       Current Outpatient Prescriptions   Medication Sig Dispense Refill    atorvastatin (LIPITOR) 80 MG tablet Take 1 tablet (80 mg total) by mouth once daily. 90 tablet 4    blood sugar diagnostic Strp To check BG 2 times daily, to use with insurance preferred meter 100 each 11    blood-glucose meter kit To check BG two times daily, to use with insurance preferred meter 1 each 0    cloNIDine (CATAPRES) 0.2 MG tablet Take 1 tablet (0.2 mg total) by mouth 2 (two) times daily. 180 tablet 4    lancets Misc To check BG 2 times daily, to use with insurance preferred meter 100 each 11    LANTUS SOLOSTAR 100 unit/mL (3 mL) InPn pen Inject 35 Units into the skin 2 (two) times daily. 21 mL 4    metoprolol succinate (TOPROL-XL) 100 MG 24 hr tablet Take 1 tablet (100 mg total) by mouth once daily. 90 tablet 4    terazosin (HYTRIN) 10 MG capsule Take 1 capsule (10 mg total) by mouth once daily. 90 capsule 4    tramadol-acetaminophen 37.5-325 mg (ULTRACET) 37.5-325 mg Tab       valsartan-hydrochlorothiazide  (DIOVAN-HCT) 320-25 mg per tablet Take 1 tablet by mouth once daily. 90 tablet 4    pioglitazone (ACTOS) 30 MG tablet Take 1 tablet (30 mg total) by mouth once daily. 90 tablet 3     No current facility-administered medications for this visit.        Review of Systems   Constitutional: Positive for diaphoresis. Negative for fever and unexpected weight change.   Respiratory: Negative for cough, shortness of breath and wheezing.    Cardiovascular: Negative for chest pain and palpitations.   Gastrointestinal: Negative for constipation, diarrhea, nausea and vomiting.        GERD, indigestion    Endocrine: Positive for heat intolerance.   Genitourinary: Negative for dysuria and hematuria.       Objective:   /65   Pulse 70   Temp 97.8 °F (36.6 °C) (Tympanic)   Ht 6' (1.829 m)   Wt 126.9 kg (279 lb 12.2 oz)   BMI 37.94 kg/m²      Physical Exam   Constitutional: He appears well-developed and well-nourished. No distress.   HENT:   Head: Normocephalic and atraumatic.   Right Ear: External ear normal.   Left Ear: External ear normal.   Mouth/Throat: Oropharynx is clear and moist.   Cardiovascular: Normal rate, regular rhythm and normal heart sounds.    Pulses:       Dorsalis pedis pulses are 2+ on the right side, and 2+ on the left side.        Posterior tibial pulses are 2+ on the right side, and 2+ on the left side.   Pulmonary/Chest: Effort normal and breath sounds normal.   Musculoskeletal:        Right foot: There is normal range of motion and no deformity.        Left foot: There is normal range of motion and no deformity.   Feet:   Right Foot:   Protective Sensation: 5 sites tested. 5 sites sensed.   Skin Integrity: Negative for ulcer, blister, skin breakdown or erythema.   Left Foot:   Protective Sensation: 5 sites tested. 5 sites sensed.   Skin Integrity: Negative for ulcer, blister or skin breakdown.         Lab Results   Component Value Date    WBC 6.99 10/09/2017    HGB 13.2 (L) 10/09/2017    HCT 40.9  10/09/2017     10/09/2017    CHOL 126 10/09/2017    TRIG 88 10/09/2017    HDL 47 10/09/2017    ALT 36 10/09/2017    AST 22 10/09/2017     10/09/2017    K 4.1 10/09/2017     10/09/2017    CREATININE 1.8 (H) 10/09/2017    BUN 26 (H) 10/09/2017    CO2 27 10/09/2017    PSA 2.4 10/09/2017    HGBA1C 9.1 (H) 12/21/2017       Assessment:       1. 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 Stage 3 chronic kidney disease    3. 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 Patient needs focus on weight loss and diet   Stage 3 chronic kidney disease- limited to [facility] we can add due to CKD and DM     Hypertension associated with diabetes- controlled at this imt     having issues with low morning glucose levels so decrease nightly dose to 25 units   Check A1c   Need eye exam   Foot exam done today

## 2017-12-22 ENCOUNTER — TELEPHONE (OUTPATIENT)
Dept: INTERNAL MEDICINE | Facility: CLINIC | Age: 72
End: 2017-12-22

## 2017-12-22 ENCOUNTER — PATIENT MESSAGE (OUTPATIENT)
Dept: INTERNAL MEDICINE | Facility: CLINIC | Age: 72
End: 2017-12-22

## 2017-12-22 DIAGNOSIS — E11.22 TYPE 2 DIABETES MELLITUS WITH STAGE 3 CHRONIC KIDNEY DISEASE, WITH LONG-TERM CURRENT USE OF INSULIN: ICD-10-CM

## 2017-12-22 DIAGNOSIS — N18.30 TYPE 2 DIABETES MELLITUS WITH STAGE 3 CHRONIC KIDNEY DISEASE, WITH LONG-TERM CURRENT USE OF INSULIN: ICD-10-CM

## 2017-12-22 DIAGNOSIS — Z79.4 TYPE 2 DIABETES MELLITUS WITH STAGE 3 CHRONIC KIDNEY DISEASE, WITH LONG-TERM CURRENT USE OF INSULIN: ICD-10-CM

## 2017-12-22 RX ORDER — PIOGLITAZONEHYDROCHLORIDE 30 MG/1
30 TABLET ORAL DAILY
Qty: 90 TABLET | Refills: 3 | Status: SHIPPED | OUTPATIENT
Start: 2017-12-22 | End: 2019-03-05 | Stop reason: SDUPTHER

## 2017-12-22 NOTE — TELEPHONE ENCOUNTER
----- Message from Jenny Kiser sent at 12/22/2017  9:19 AM CST -----  Contact: pt  Pt returning nurse call,, please call pt back at [phone]

## 2017-12-22 NOTE — TELEPHONE ENCOUNTER
Called pt, gave him information on changing the actos. On the lantus though at visit with Yael she made adjustment to it. When you say continue on current dose, do you mean the current dose Yael change him to or what he was on before she changed it?

## 2017-12-22 NOTE — TELEPHONE ENCOUNTER
The dose he is currently taking.  Make sure the medcard reflects the same thing he is currently administering.

## 2017-12-26 NOTE — TELEPHONE ENCOUNTER
Patient is taking Lantus 35 units in the morning and 25 at night.  Per Yael at his last visit.  He will continue on that dose.

## 2018-01-02 ENCOUNTER — OFFICE VISIT (OUTPATIENT)
Dept: UROLOGY | Facility: CLINIC | Age: 73
End: 2018-01-02
Payer: MEDICARE

## 2018-01-02 VITALS
HEIGHT: 72 IN | WEIGHT: 279.31 LBS | BODY MASS INDEX: 37.83 KG/M2 | SYSTOLIC BLOOD PRESSURE: 138 MMHG | DIASTOLIC BLOOD PRESSURE: 80 MMHG | HEART RATE: 94 BPM

## 2018-01-02 DIAGNOSIS — N52.9 ERECTILE DYSFUNCTION, UNSPECIFIED ERECTILE DYSFUNCTION TYPE: Primary | ICD-10-CM

## 2018-01-02 LAB
BILIRUB SERPL-MCNC: NORMAL MG/DL
BLOOD URINE, POC: NORMAL
COLOR, POC UA: YELLOW
GLUCOSE UR QL STRIP: 250
KETONES UR QL STRIP: NORMAL
LEUKOCYTE ESTERASE URINE, POC: NORMAL
NITRITE, POC UA: NORMAL
PH, POC UA: 6
PROTEIN, POC: 30
SPECIFIC GRAVITY, POC UA: 1.01
UROBILINOGEN, POC UA: NORMAL

## 2018-01-02 PROCEDURE — 99999 PR PBB SHADOW E&M-EST. PATIENT-LVL III: CPT | Mod: PBBFAC,,, | Performed by: UROLOGY

## 2018-01-02 PROCEDURE — 81002 URINALYSIS NONAUTO W/O SCOPE: CPT | Mod: S$GLB,,, | Performed by: UROLOGY

## 2018-01-02 PROCEDURE — 99204 OFFICE O/P NEW MOD 45 MIN: CPT | Mod: 25,S$GLB,, | Performed by: UROLOGY

## 2018-01-02 RX ORDER — PAPAVERINE HYDROCHLORIDE 30 MG/ML
INJECTION INTRAMUSCULAR; INTRAVENOUS
Qty: 10 ML | Refills: 5 | Status: SHIPPED | OUTPATIENT
Start: 2018-01-02 | End: 2018-11-07 | Stop reason: SDUPTHER

## 2018-01-02 NOTE — LETTER
January 2, 2018      Tobias Fox MD  [address] [facility]  [address]  [place] [address]           [facility]  [address] [place] [address]  Phone: [phone]  Fax: [phone]          Patient: Sherif Raglnad   MR Number: [id number]   YOB: 1945   Date of Visit: 1/2/2018       Dear Dr. Tobias Fox:    Thank you for referring Sherif Ragland to me for evaluation. Attached you will find relevant portions of my assessment and plan of care.    If you have questions, please do not hesitate to call me. I look forward to following Sherif Ragland along with you.    Sincerely,    Mir Hale IV, MD    Enclosure  CC:  No Recipients    If you would like to receive this communication electronically, please contact [email or web address] or [phone] to request more information on [employer] Link access.    For providers and/or their staff who would like to refer a patient to [facility], please contact us through our one-stop-shop provider referral line, [facility], at [phone].    If you feel you have received this communication in error or would no longer like to receive these types of communications, please e-mail [email or web address]

## 2018-01-02 NOTE — PROGRESS NOTES
Chief Complaint: ED    HPI:   1/2/18: 73 yo man has tried many PDE-5 inhibitors without success.  Then went to [facility] and was given trimix for about 5-6 years.  No abd/pelvic pain and no exac/rel factors.  No hematuria.  No urolithiasis.  No urinary bother.  No  history.  Normal sexual function.    Allergies:  Patient has no known allergies.    Medications:  has a current medication list which includes the following prescription(s): atorvastatin, blood sugar diagnostic, blood-glucose meter, clonidine, lancets, lantus solostar, metoprolol succinate, pioglitazone, terazosin, tramadol-acetaminophen 37.5-325 mg, and valsartan-hydrochlorothiazide.    Review of Systems:  General: No fever, chills, fatigability, or weight loss.  Skin: No rashes, itching, or changes in color or texture of skin.  Chest: Denies HORNE, cyanosis, wheezing, cough, and sputum production.  Abdomen: Appetite fine. No weight loss. Denies diarrhea, abdominal pain, hematemesis, or blood in stool.  Musculoskeletal: No joint stiffness or swelling. Denies back pain.  : As above.  All other review of systems negative.    PMH:   has a past medical history of Diabetes mellitus, type 2 (1997); Hyperlipidemia; Hypertension; Hypogonadism in male; Renal insufficiency; and Tubular adenoma (10/2017).    PSH:   has a past surgical history that includes Tibia fracture surgery; Tonsillectomy; and Colonoscopy (N/A, 11/2/2017).    FamHx: family history includes Heart disease in his father; Stroke in his brother and mother.    SocHx:  reports that he has never smoked. He has never used smokeless tobacco. He reports that he does not drink alcohol or use drugs.      Physical Exam:  Vitals:    01/02/18 1609   BP: 138/80   Pulse: 94     General: A&Ox3, no apparent distress, no deformities  Neck: No masses, normal thyroid  Lungs: normal inspiration, no use of accessory muscles  Heart: normal pulse, no arrhythmias  Abdomen: Soft, NT, ND, no masses, no hernias, no  hepatosplenomegaly  Lymphatic: Neck and groin nodes negative  Skin: The skin is warm and dry. No jaundice.  Ext: No c/c/e.  : Test desc aly, no abnormalities of epididymus. Penis normal, with normal penile and scrotal skin. Meatus normal. Pt declined TARUN.    Labs/Studies:   Urinalysis performed in clinic, summary: UA normal exc 250 glucose, 30 prot  PSA    10/17: 2.4    Impression/Plan:   1. Trimix rx.  RTC 1 year.

## 2018-01-03 ENCOUNTER — OFFICE VISIT (OUTPATIENT)
Dept: OPHTHALMOLOGY | Facility: CLINIC | Age: 73
End: 2018-01-03
Payer: MEDICARE

## 2018-01-03 DIAGNOSIS — H25.13 CATARACT, NUCLEAR SCLEROTIC SENILE, BILATERAL: ICD-10-CM

## 2018-01-03 DIAGNOSIS — H35.30 MACULAR DEGENERATION (SENILE) OF RETINA: ICD-10-CM

## 2018-01-03 DIAGNOSIS — E11.9 DIABETES MELLITUS TYPE 2 WITHOUT RETINOPATHY: Primary | ICD-10-CM

## 2018-01-03 DIAGNOSIS — H52.7 REFRACTIVE ERROR: ICD-10-CM

## 2018-01-03 DIAGNOSIS — H25.013 CATARACT CORTICAL, SENILE, BILATERAL: ICD-10-CM

## 2018-01-03 DIAGNOSIS — Z13.5 GLAUCOMA SCREENING: ICD-10-CM

## 2018-01-03 PROCEDURE — 92004 COMPRE OPH EXAM NEW PT 1/>: CPT | Mod: S$GLB,,, | Performed by: OPTOMETRIST

## 2018-01-03 PROCEDURE — 99999 PR PBB SHADOW E&M-EST. PATIENT-LVL I: CPT | Mod: PBBFAC,,, | Performed by: OPTOMETRIST

## 2018-01-03 PROCEDURE — 92015 DETERMINE REFRACTIVE STATE: CPT | Mod: S$GLB,,, | Performed by: OPTOMETRIST

## 2018-01-03 NOTE — PROGRESS NOTES
HPI     New Patient  Diabetic/IDDM   01/02/2018  Screening for glaucoma  RE  Uses OTC Readers +3.00  Did not bring to exam  Difficulty at near with low light    Last edited by Jorge A Garrett MA on 1/3/2018  8:33 AM. (History)            Assessment /Plan     For exam results, see Encounter Report.    Diabetes mellitus type 2 without retinopathy    Cataract, nuclear sclerotic senile, bilateral    Cataract cortical, senile, bilateral    Glaucoma screening    Refractive error      No diabetic retinopathy OU.  Moderate NS/cortical cataracts OU= symptomatic so I will send to [facility] for CE evaluation.  Mild ARMD OU = will follow.  OH OK OU otherwise.  Spec Rx given.  RTC one year.

## 2018-01-03 NOTE — LETTER
January 3, 2018      BONG Miller  [address] [facility] [place] [address]           [facility]  [address]  [place] [address]  Phone: [phone]  Fax: [phone]          Patient: Sherif Ragland   MR Number: [id number]   YOB: 1945   Date of Visit: 1/3/2018       Dear Yael Youssef:    Thank you for referring Sherif Ragland to me for evaluation. Attached you will find relevant portions of my assessment and plan of care.    If you have questions, please do not hesitate to call me. I look forward to following Sherif Ragland along with you.    Sincerely,    LACEY Martinez, OD    Enclosure  CC:  No Recipients    If you would like to receive this communication electronically, please contact [email or web address] or [phone] to request more information on [employer] Link access.    For providers and/or their staff who would like to refer a patient to [facility], please contact us through our one-stop-shop provider referral line, [facility], at [phone].    If you feel you have received this communication in error or would no longer like to receive these types of communications, please e-mail [email or web address]

## 2018-01-10 ENCOUNTER — OFFICE VISIT (OUTPATIENT)
Dept: OPHTHALMOLOGY | Facility: CLINIC | Age: 73
End: 2018-01-10
Payer: MEDICARE

## 2018-01-10 DIAGNOSIS — H35.30 AMD (AGE-RELATED MACULAR DEGENERATION), BILATERAL: ICD-10-CM

## 2018-01-10 DIAGNOSIS — H25.13 SENILE NUCLEAR CATARACT, BILATERAL: ICD-10-CM

## 2018-01-10 DIAGNOSIS — H52.7 REFRACTIVE ERROR: ICD-10-CM

## 2018-01-10 DIAGNOSIS — E11.9 DIABETES MELLITUS TYPE 2 WITHOUT RETINOPATHY: ICD-10-CM

## 2018-01-10 DIAGNOSIS — H18.529 MAP-DOT-FINGERPRINT CORNEAL DYSTROPHY: Primary | ICD-10-CM

## 2018-01-10 PROCEDURE — 92014 COMPRE OPH EXAM EST PT 1/>: CPT | Mod: S$GLB,,, | Performed by: OPHTHALMOLOGY

## 2018-01-10 PROCEDURE — 99999 PR PBB SHADOW E&M-EST. PATIENT-LVL III: CPT | Mod: PBBFAC,,, | Performed by: OPHTHALMOLOGY

## 2018-01-10 RX ORDER — KETOROLAC TROMETHAMINE 5 MG/ML
1 SOLUTION OPHTHALMIC 4 TIMES DAILY
Qty: 1 BOTTLE | Refills: 2 | Status: SHIPPED | OUTPATIENT
Start: 2018-01-10 | End: 2018-03-06 | Stop reason: ALTCHOICE

## 2018-01-10 RX ORDER — PREDNISOLONE ACETATE 10 MG/ML
1 SUSPENSION/ DROPS OPHTHALMIC 4 TIMES DAILY
Qty: 1 BOTTLE | Refills: 2 | Status: SHIPPED | OUTPATIENT
Start: 2018-01-10 | End: 2018-03-06 | Stop reason: ALTCHOICE

## 2018-01-10 RX ORDER — GATIFLOXACIN 5 MG/ML
1 SOLUTION/ DROPS OPHTHALMIC 2 TIMES DAILY
Qty: 1 BOTTLE | Refills: 2 | Status: SHIPPED | OUTPATIENT
Start: 2018-01-10 | End: 2018-03-06 | Stop reason: ALTCHOICE

## 2018-01-10 NOTE — PROGRESS NOTES
SUBJECTIVE:   Sherif Ragland is a 72 y.o. male   Uncorrected distance visual acuity was 20/60 +1 in the right eye and 20/50 in the left eye.   Chief Complaint   Patient presents with    Cataract     Cataract eval per [facility]        HPI:  HPI     Cataract    Additional comments: Cataract eval per [facility]           Comments   Pt states that he is having some trouble seeing. He says that his far   vision is fine, but his near and intermediate sight is problematic. If the   room he is in does not have a lot of light, then he has a lot of trouble   seeing. Pt denies halos around lights. Pt denies glare problems when   driving at night.     Referred by [facility]  1. DM  2 Cataract OU  3. Mild ARMD       Last edited by Nicholas Brandon, Patient Care Assistant on 1/10/2018  2:42   PM. (History)        Assessment /Plan :  1. Map-dot-fingerprint corneal dystrophy,Bilateral  Educated pt in full detail on Diagnosis and future procedures available,Informed pt that I recommend performing Cataract Surgery first   2. Senile nuclear cataract, bilateral  Visually Significant Cataract OD > OS:   Patient reports decreased vision consistent with the clinical amount of lenticular opacity,  which reaches the level of visual significance and affects activities of daily living such as  read. Risks, benefits, and alternatives to cataract surgery were  discussed.  IOL options were discussed, including Premium IOL'S and the associated  side effects and additional patient cost associated with them as well as patient's visual  goals. The pt expressed a desire to proceed with surgery with the potential for some  reasonable degree of visual improvement and was consented.  Risks of loss of vision and eye reviewed as well as possibility of need for spectacle correction after surgery even with premium implants. Verbal and written preop  instructions were provided to the pt.            Pt is interested in traditional monofocal IOL aiming for:       Distance OU and  understands that  glasses will be generally needed at all times for near vision and often for finer distance correction especially for higher degrees of astigmatism.            Final visual acuity may be limited by AMD, astigmatism, diabetes and cornea disease    Pt wishes to have Phaco/IOL  OD     Requests a Monofocal IOL.    Will aim for distance    Other considerations: CORNEAL PRECAUTIONS       3. Diabetes mellitus type 2 without retinopathy No diabetic retinopathy at this time. Reviewed diabetic eye precautions including avoiding tobacco use,  Good glucose control, and importance of regular follow up.      4. AMD (age-related macular degeneration), bilateral Exam consistent with mild age related macular degeneration OU. Discussed clinical condition and recommend avoidance of tobacco products. Patient advised to call immediately if any visual changes occur. Regular follow up recommended.      5. Refractive error

## 2018-01-22 ENCOUNTER — PATIENT MESSAGE (OUTPATIENT)
Dept: INTERNAL MEDICINE | Facility: CLINIC | Age: 73
End: 2018-01-22

## 2018-02-01 ENCOUNTER — OFFICE VISIT (OUTPATIENT)
Dept: OPHTHALMOLOGY | Facility: CLINIC | Age: 73
End: 2018-02-01
Payer: MEDICARE

## 2018-02-01 DIAGNOSIS — Z98.890 POST-OPERATIVE STATE: Primary | ICD-10-CM

## 2018-02-01 PROCEDURE — 99999 PR PBB SHADOW E&M-EST. PATIENT-LVL II: CPT | Mod: PBBFAC,,, | Performed by: OPHTHALMOLOGY

## 2018-02-01 PROCEDURE — 99024 POSTOP FOLLOW-UP VISIT: CPT | Mod: S$GLB,,, | Performed by: OPHTHALMOLOGY

## 2018-02-01 NOTE — PROGRESS NOTES
SUBJECTIVE:   Sherif Ragland is a 72 y.o. male   Uncorrected distance visual acuity was 20/40 in the right eye and not recorded in the left eye.   Chief Complaint   Patient presents with    Post-op Evaluation     1 day s/p phaco OD. doing well.         HPI:  HPI     Post-op Evaluation    Additional comments: 1 day s/p phaco OD. doing well.            Comments   1. DM  2 PCIOL OD +19.5 SN60WF (distance) 1-31-18  Cataract OS  3. Mild ARMD    OD: pred qid, ket qid, gat bid       Last edited by Bessy Churchill MA on 2/1/2018  7:58 AM. (History)        Assessment /Plan :  1. Post-operative state Exam:  SLE:  S/C: normal  K : trace k fold  AC: trace cell and flare  Iris: normal  Lens: PCIOL    IMP:  PO Day 1 S/P Phaco/IOL OD : Doing well.    Plan:  Continue gtts to operative eye:  Pred 1% QID  Ketorolac QID  Zymaxid BID  Reinstructed in importance of absolute compliance with Post-OP instructions including medications, shield at bedtime, and limitation of activities. Follow up appointments in approximately one and six weeks or call immediately for increased pain, redness or vision loss.

## 2018-02-08 ENCOUNTER — OFFICE VISIT (OUTPATIENT)
Dept: OPHTHALMOLOGY | Facility: CLINIC | Age: 73
End: 2018-02-08
Payer: MEDICARE

## 2018-02-08 DIAGNOSIS — Z98.41 CATARACT EXTRACTION STATUS OF EYE, RIGHT: ICD-10-CM

## 2018-02-08 DIAGNOSIS — H18.529 MAP-DOT-FINGERPRINT CORNEAL DYSTROPHY: ICD-10-CM

## 2018-02-08 DIAGNOSIS — Z98.890 POST-OPERATIVE STATE: Primary | ICD-10-CM

## 2018-02-08 DIAGNOSIS — H04.129 DRY EYE: ICD-10-CM

## 2018-02-08 PROCEDURE — 99999 PR PBB SHADOW E&M-EST. PATIENT-LVL II: CPT | Mod: PBBFAC,,, | Performed by: OPHTHALMOLOGY

## 2018-02-08 PROCEDURE — 99024 POSTOP FOLLOW-UP VISIT: CPT | Mod: S$GLB,,, | Performed by: OPHTHALMOLOGY

## 2018-02-08 NOTE — PROGRESS NOTES
SUBJECTIVE:   Sherif Ragland is a 72 y.o. male   Uncorrected distance visual acuity was 20/60 in the right eye and not recorded in the left eye.   Chief Complaint   Patient presents with    Post-op Evaluation     1 wk s/p phaco OD        HPI:  HPI     Post-op Evaluation    Additional comments: 1 wk s/p phaco OD           Comments   Pt chooses to wait until next visit.     1. DM  2 PCIOL OD +19.5 SN60WF (distance) 1-31-18  Cataract OS  3. Mild ARMD    OD: pred qid, ket qid, gat bid       Last edited by Bessy Churchill MA on 2/8/2018  8:24 AM. (History)        Assessment /Plan :  1. Post-operative state    2. Cataract extraction status of eye, right Slit lamp exam:  L/L: nl  K: 3+ map dot Fingerprint, wound sealed  AC: 0 cell and flare  Iris/Lens: IOL centered and stable      IMP:  PO Week 1 S/P Phaco/ IOL OD : Doing well with no evidence of infection or abnormal inflammation.     Plan:  Pt given and instructed in one week PO instructions. D/C zymaxid and start to taper off Ketorlac and Pred Forte 1% weekly. Can resume normal activitites and d/c eye shield. OTC reading glasses can be used until evaluated for final MR. Follow up in one month or PRN pain, redness, vision loss, or other concerns.     3. Map-dot-fingerprint corneal dystrophy will cont to use tears at this    4. Dry eye as above       RTC 1 month

## 2018-02-13 ENCOUNTER — OFFICE VISIT (OUTPATIENT)
Dept: INTERNAL MEDICINE | Facility: CLINIC | Age: 73
End: 2018-02-13
Payer: MEDICARE

## 2018-02-13 VITALS
SYSTOLIC BLOOD PRESSURE: 132 MMHG | HEART RATE: 70 BPM | BODY MASS INDEX: 38.64 KG/M2 | WEIGHT: 285.25 LBS | HEIGHT: 72 IN | DIASTOLIC BLOOD PRESSURE: 70 MMHG | TEMPERATURE: 98 F

## 2018-02-13 DIAGNOSIS — E11.59 HYPERTENSION ASSOCIATED WITH DIABETES: ICD-10-CM

## 2018-02-13 DIAGNOSIS — Z79.4 TYPE 2 DIABETES MELLITUS WITH STAGE 3 CHRONIC KIDNEY DISEASE, WITH LONG-TERM CURRENT USE OF INSULIN: Primary | ICD-10-CM

## 2018-02-13 DIAGNOSIS — K21.9 GASTROESOPHAGEAL REFLUX DISEASE, ESOPHAGITIS PRESENCE NOT SPECIFIED: ICD-10-CM

## 2018-02-13 DIAGNOSIS — I15.2 HYPERTENSION ASSOCIATED WITH DIABETES: ICD-10-CM

## 2018-02-13 DIAGNOSIS — N18.30 TYPE 2 DIABETES MELLITUS WITH STAGE 3 CHRONIC KIDNEY DISEASE, WITH LONG-TERM CURRENT USE OF INSULIN: Primary | ICD-10-CM

## 2018-02-13 DIAGNOSIS — E11.22 TYPE 2 DIABETES MELLITUS WITH STAGE 3 CHRONIC KIDNEY DISEASE, WITH LONG-TERM CURRENT USE OF INSULIN: Primary | ICD-10-CM

## 2018-02-13 PROBLEM — Z12.11 SCREENING FOR COLON CANCER: Status: RESOLVED | Noted: 2017-11-02 | Resolved: 2018-02-13

## 2018-02-13 PROCEDURE — 99214 OFFICE O/P EST MOD 30 MIN: CPT | Mod: S$GLB,,, | Performed by: INTERNAL MEDICINE

## 2018-02-13 PROCEDURE — 3008F BODY MASS INDEX DOCD: CPT | Mod: S$GLB,,, | Performed by: INTERNAL MEDICINE

## 2018-02-13 PROCEDURE — 99999 PR PBB SHADOW E&M-EST. PATIENT-LVL III: CPT | Mod: PBBFAC,,, | Performed by: INTERNAL MEDICINE

## 2018-02-13 PROCEDURE — 1126F AMNT PAIN NOTED NONE PRSNT: CPT | Mod: S$GLB,,, | Performed by: INTERNAL MEDICINE

## 2018-02-13 PROCEDURE — 1159F MED LIST DOCD IN RCRD: CPT | Mod: S$GLB,,, | Performed by: INTERNAL MEDICINE

## 2018-02-13 NOTE — PROGRESS NOTES
Subjective:       Patient ID: Sherif Ragland is a 72 y.o. male.    Chief Complaint: Follow-up    HPI Patient is a 72-year-old male with history type 2 diabetes with kidney involvement, hypertension and some recent reflux issues.  He comes in today following up on his diabetes.  His last A1c was in the late fall and was poorly controlled.  At that time we added Actos to his regimen.  He indicates at this time that he is seeing fasting numbers trending 90 and 120 typically.  Unfortunately though it sounds like later in the day he is continuing to run high.  He states he commonly sees numbers in the 200 range in the evenings.  He'll be due for an updated A1c in about a month.  He knowledge is not following a diet very closely.    He has history of hypertension associated with his diabetes which is stable at this point.  He is not having problems with any cardiac decompensation.  He notes no chest pain or palpitations.    He's been having some issues with reflux.  He states pre-regular basis now he is getting acid sour taste up into his mouth and burning in his esophagus.  Some foods seem to aggravate it more than others.  He is not taking anything for it at this point.    Review of Systems   Constitutional: Negative for fever and unexpected weight change.   Respiratory: Negative for cough, shortness of breath and wheezing.    Cardiovascular: Negative for chest pain and palpitations.   Gastrointestinal: Negative for constipation, diarrhea, nausea and vomiting.   Genitourinary: Negative for dysuria and hematuria.       Objective:   /70   Pulse 70   Temp 97.6 °F (36.4 °C) (Tympanic)   Ht 6' (1.829 m)   Wt 129.4 kg (285 lb 4.4 oz)   BMI 38.69 kg/m²      Physical Exam   Constitutional: He appears well-developed and well-nourished.   HENT:   Head: Normocephalic and atraumatic.   Eyes: Pupils are equal, round, and reactive to light.   Neck: Neck supple. No thyromegaly present.   Cardiovascular: Normal rate,  regular rhythm and normal heart sounds.  Exam reveals no gallop and no friction rub.    No murmur heard.  Pulmonary/Chest: Breath sounds normal. He has no wheezes. He has no rales.   Abdominal: Soft. Bowel sounds are normal. He exhibits no distension. There is no tenderness.   Vitals reviewed.          Assessment:       1. Type 2 diabetes mellitus with stage 3 chronic kidney disease, with long-term current use of insulin    2. Gastroesophageal reflux disease, esophagitis presence not specified    3. Hypertension associated with diabetes        Plan:   Hypertension associated with diabetes  Blood pressure is under good control.  We will continue the current regimen.  Will work on regular aerobic exercise and a low salt diet.        Sherif was seen today for follow-up.    Diagnoses and all orders for this visit:    Type 2 diabetes mellitus with stage 3 chronic kidney disease, with long-term current use of insulin  Comments:  Continue lantus 35 units twice daily, continue actos. update a1c in March.  Orders:  -     Hepatitis C antibody; Future  -     Hemoglobin A1c; Future  -     insulin detemir (LEVEMIR FLEXTOUCH) 100 unit/mL (3 mL) SubQ InPn pen; Inject 35 Units into the skin 2 (two) times daily.    Gastroesophageal reflux disease, esophagitis presence not specified  Comments:  Start ranitidine 300 mg once daily for 90 days.  If symptoms return after 90 day treatment will need  EGD  Orders:  -     ranitidine (ZANTAC) 300 MG tablet; Take 1 tablet (300 mg total) by mouth once daily.    Hypertension associated with diabetes     patient was impressed upon the importance of following a good diet and working on his diet.  His formulary change with changes Lantus to Levemir is made that change.  He will continue on his Actos and we'll do an A1c next month.  We will see him back after the A1c.  If he is continuing to run high and had to add short acting insulin with meals.  We'll treat them for 90 days with Zantac for his  reflux.  If he is does not have resolution of symptoms he will let me know if he is symptoms return after completing the course of therapy he will let me know.    Follow-up in about 5 weeks (around 3/20/2018).

## 2018-02-13 NOTE — PROGRESS NOTES
Patient, Sherif Ragland (MRN #[id number]), presented with a recorded BMI of 38.69 kg/m^2 and a documented comorbidity(s):  - Diabetes Mellitus Type 2  - Hypertension  to which the severe obesity is a contributing factor. This is consistent with the definition of severe obesity (BMI 35.0-35.9) with comorbidity (ICD-10 E66.01, Z68.35). The patient's severe obesity was monitored, evaluated, addressed and/or treated. This addendum to the medical record is made on 02/13/2018.

## 2018-03-06 ENCOUNTER — OFFICE VISIT (OUTPATIENT)
Dept: OPHTHALMOLOGY | Facility: CLINIC | Age: 73
End: 2018-03-06
Payer: MEDICARE

## 2018-03-06 DIAGNOSIS — Z98.890 POST-OPERATIVE STATE: Primary | ICD-10-CM

## 2018-03-06 PROCEDURE — 99024 POSTOP FOLLOW-UP VISIT: CPT | Mod: S$GLB,,, | Performed by: OPHTHALMOLOGY

## 2018-03-06 PROCEDURE — 99999 PR PBB SHADOW E&M-EST. PATIENT-LVL II: CPT | Mod: PBBFAC,,, | Performed by: OPHTHALMOLOGY

## 2018-03-06 NOTE — PROGRESS NOTES
SUBJECTIVE:   Sherif Ragland is a 72 y.o. male   Uncorrected distance visual acuity was 20/50 in the right eye and 20/40 +2 in the left eye.   Chief Complaint   Patient presents with    Post-op Evaluation     1m PCIOL OD        HPI:  HPI     Post-op Evaluation    Additional comments: 1m PCIOL OD           Comments   Pt states that his right eye is not doing so well. He says that he cannot   see well out of it. No pain or discomfort. 100% compliant with gtts.     Referred by [facility]  1. DM  2 PCIOL OD +19.5 SN60WF (distance) 1-31-18  Cataract OS  3. Mild ARMD       Last edited by Nicholas Brandon, Patient Care Assistant on 3/6/2018  8:35   AM. (History)        Assessment /Plan :  1. Post-operative state   Exam:    Slit lamp exam:  L/L: nl  S/C: quiet and uninflamed  K: clear, wound sealed  AC: no cell or flare  Iris/Lens: IOL centered and stable      Assessment:    PO Month 1 S/P Phaco/IOL OD : Doing Well and completing healing process. Final visual correction options discussed and appropriate prescriptions and/or OTC reading glass recommendations offered to patient. Pt desires OTC Readers. Pt instructed to follow up with Dr. Martinez in 6 months for next eye exam or PRN any pain, redness, vision changes or other concerns.

## 2018-03-08 ENCOUNTER — PATIENT OUTREACH (OUTPATIENT)
Dept: ADMINISTRATIVE | Facility: HOSPITAL | Age: 73
End: 2018-03-08

## 2018-03-12 ENCOUNTER — TELEPHONE (OUTPATIENT)
Dept: INTERNAL MEDICINE | Facility: CLINIC | Age: 73
End: 2018-03-12

## 2018-03-13 ENCOUNTER — LAB VISIT (OUTPATIENT)
Dept: LAB | Facility: HOSPITAL | Age: 73
End: 2018-03-13
Attending: INTERNAL MEDICINE
Payer: MEDICARE

## 2018-03-13 DIAGNOSIS — N18.30 TYPE 2 DIABETES MELLITUS WITH STAGE 3 CHRONIC KIDNEY DISEASE, WITH LONG-TERM CURRENT USE OF INSULIN: ICD-10-CM

## 2018-03-13 DIAGNOSIS — E11.22 TYPE 2 DIABETES MELLITUS WITH STAGE 3 CHRONIC KIDNEY DISEASE, WITH LONG-TERM CURRENT USE OF INSULIN: ICD-10-CM

## 2018-03-13 DIAGNOSIS — Z79.4 TYPE 2 DIABETES MELLITUS WITH STAGE 3 CHRONIC KIDNEY DISEASE, WITH LONG-TERM CURRENT USE OF INSULIN: ICD-10-CM

## 2018-03-13 LAB
ESTIMATED AVG GLUCOSE: 203 MG/DL
HBA1C MFR BLD HPLC: 8.7 %

## 2018-03-13 PROCEDURE — 86803 HEPATITIS C AB TEST: CPT

## 2018-03-13 PROCEDURE — 83036 HEMOGLOBIN GLYCOSYLATED A1C: CPT

## 2018-03-13 PROCEDURE — 36415 COLL VENOUS BLD VENIPUNCTURE: CPT | Mod: PO

## 2018-03-14 LAB — HCV AB SERPL QL IA: NEGATIVE

## 2018-03-14 NOTE — PROGRESS NOTES
Here are the results of your recent labs.  We will review them in detail at your follow up visit.    Sincerely,    Tobias Fox M.D.        If you would like to review your experience with Dr. Fox or [facility], please follow the link below:    [email or web address]

## 2018-03-18 ENCOUNTER — PATIENT MESSAGE (OUTPATIENT)
Dept: ADMINISTRATIVE | Facility: OTHER | Age: 73
End: 2018-03-18

## 2018-03-20 ENCOUNTER — OFFICE VISIT (OUTPATIENT)
Dept: INTERNAL MEDICINE | Facility: CLINIC | Age: 73
End: 2018-03-20
Payer: MEDICARE

## 2018-03-20 VITALS
SYSTOLIC BLOOD PRESSURE: 122 MMHG | HEIGHT: 72 IN | DIASTOLIC BLOOD PRESSURE: 78 MMHG | WEIGHT: 288.56 LBS | HEART RATE: 70 BPM | TEMPERATURE: 98 F | BODY MASS INDEX: 39.08 KG/M2

## 2018-03-20 DIAGNOSIS — K21.9 GASTROESOPHAGEAL REFLUX DISEASE WITHOUT ESOPHAGITIS: ICD-10-CM

## 2018-03-20 DIAGNOSIS — E11.59 HYPERTENSION ASSOCIATED WITH DIABETES: ICD-10-CM

## 2018-03-20 DIAGNOSIS — Z79.4 TYPE 2 DIABETES MELLITUS WITH STAGE 3 CHRONIC KIDNEY DISEASE, WITH LONG-TERM CURRENT USE OF INSULIN: Primary | ICD-10-CM

## 2018-03-20 DIAGNOSIS — E11.22 TYPE 2 DIABETES MELLITUS WITH STAGE 3 CHRONIC KIDNEY DISEASE, WITH LONG-TERM CURRENT USE OF INSULIN: Primary | ICD-10-CM

## 2018-03-20 DIAGNOSIS — I15.2 HYPERTENSION ASSOCIATED WITH DIABETES: ICD-10-CM

## 2018-03-20 DIAGNOSIS — N18.30 TYPE 2 DIABETES MELLITUS WITH STAGE 3 CHRONIC KIDNEY DISEASE, WITH LONG-TERM CURRENT USE OF INSULIN: Primary | ICD-10-CM

## 2018-03-20 PROCEDURE — 3074F SYST BP LT 130 MM HG: CPT | Mod: CPTII,S$GLB,, | Performed by: INTERNAL MEDICINE

## 2018-03-20 PROCEDURE — 99999 PR PBB SHADOW E&M-EST. PATIENT-LVL III: CPT | Mod: PBBFAC,,, | Performed by: INTERNAL MEDICINE

## 2018-03-20 PROCEDURE — 99214 OFFICE O/P EST MOD 30 MIN: CPT | Mod: S$GLB,,, | Performed by: INTERNAL MEDICINE

## 2018-03-20 PROCEDURE — 3078F DIAST BP <80 MM HG: CPT | Mod: CPTII,S$GLB,, | Performed by: INTERNAL MEDICINE

## 2018-03-20 PROCEDURE — 3045F PR MOST RECENT HEMOGLOBIN A1C LEVEL 7.0-9.0%: CPT | Mod: CPTII,S$GLB,, | Performed by: INTERNAL MEDICINE

## 2018-03-20 RX ORDER — PANTOPRAZOLE SODIUM 40 MG/1
40 TABLET, DELAYED RELEASE ORAL DAILY
Qty: 30 TABLET | Refills: 11 | Status: ON HOLD | OUTPATIENT
Start: 2018-03-20 | End: 2018-04-30

## 2018-03-20 NOTE — PROGRESS NOTES
Subjective:       Patient ID: Sherif Ragland is a 72 y.o. male.    Chief Complaint: Follow-up    HPI  Patient is a 72-year-old male presenting today following up on his diabetes.  His A1c is 8.7.  This is down from 9.6 but still clearly above goal.  He indicates that he started this week with a focused diet change.  He is actively working on limiting carbohydrates and eating healthier.  He has a goal of losing around 50 pounds.  He had not been following any sort of diet before he realized that he has to engage in the dietary piece to get success.  We discussed his numbers and medication changes as well as the dietary piece.    His blood pressure remains under good control.  He is tolerating his medications well without any adverse effects.  No issues of concern at this time.    He is continue to struggle some with the reflux.  He is taking the ranitidine 300 mg at nighttime and still having breakthrough symptoms.  His symptoms are most predominant at night.  He is not drinking caffeine.  He does note some reflux when he lays in bed at night.    Review of Systems   Constitutional: Negative for fever and unexpected weight change.   Respiratory: Negative for cough, shortness of breath and wheezing.    Cardiovascular: Negative for chest pain and palpitations.   Gastrointestinal: Negative for constipation, diarrhea, nausea and vomiting.   Genitourinary: Negative for dysuria and hematuria.       Objective:   /78   Pulse 70   Temp 98.1 °F (36.7 °C) (Tympanic)   Ht 6' (1.829 m)   Wt 130.9 kg (288 lb 9.3 oz)   BMI 39.14 kg/m²      Physical Exam   Constitutional: He appears well-developed and well-nourished.   HENT:   Head: Normocephalic and atraumatic.   Eyes: Pupils are equal, round, and reactive to light.   Neck: Neck supple. No thyromegaly present.   Cardiovascular: Normal rate, regular rhythm and normal heart sounds.  Exam reveals no gallop and no friction rub.    No murmur heard.  Pulmonary/Chest: Breath  sounds normal. He has no wheezes. He has no rales.   Abdominal: Soft. Bowel sounds are normal. He exhibits no distension. There is no tenderness.   Vitals reviewed.      Lab Visit on 03/13/2018   Component Date Value    Hepatitis C Ab 03/13/2018 Negative     Hemoglobin A1C 03/13/2018 8.7*    Estimated Avg Glucose 03/13/2018 203*       Assessment:       1. Type 2 diabetes mellitus with stage 3 chronic kidney disease, with long-term current use of insulin    2. Hypertension associated with diabetes    3. Gastroesophageal reflux disease without esophagitis        Plan:   Hypertension associated with diabetes  Blood pressure is under good control.  We will continue the current regimen.  Will work on regular aerobic exercise and a low salt diet.        Sherif was seen today for follow-up.    Diagnoses and all orders for this visit:    Type 2 diabetes mellitus with stage 3 chronic kidney disease, with long-term current use of insulin  Comments:  COntinue current insulin regirmen.  Focus on diet as planned/discussed.  repeat a1c in 3 months  Orders:  -     Hemoglobin A1c; Future    Hypertension associated with diabetes    Gastroesophageal reflux disease without esophagitis  Comments:  Not controlled with ranitidine.  D/c ranitidine.  start pantoprazole 40 mg once daily.  EGD to evaluate for esophagitis.  Orders:  -     pantoprazole (PROTONIX) 40 MG tablet; Take 1 tablet (40 mg total) by mouth once daily.  -     Case request GI: ESOPHAGOGASTRODUODENOSCOPY (EGD)     we will change him from ranitidine to pantoprazole.  We will get an EGD to evaluate for esophagitis or ulcer.  Will also recommend elevating the head of the bed.    Given his newfound focus on diet and being coming more engaged in his diabetes control I'm going to go ahead and continue his current regimen and we are and let him worked his diet piece.  I discussed with him that our next step would be to add more insulin short acting with meals.  His fasting  sugars are running around 100-120 at this point so he is dialed in on his long-acting.  He expresses understanding of the importance of being controlled on the diabetes and he does wish to really attacked this diet issued drip before adding more medication.    Follow-up in about 3 months (around 6/20/2018).

## 2018-03-26 ENCOUNTER — DOCUMENTATION ONLY (OUTPATIENT)
Dept: ENDOSCOPY | Facility: HOSPITAL | Age: 73
End: 2018-03-26

## 2018-04-09 ENCOUNTER — PATIENT MESSAGE (OUTPATIENT)
Dept: INTERNAL MEDICINE | Facility: CLINIC | Age: 73
End: 2018-04-09

## 2018-04-30 ENCOUNTER — ANESTHESIA (OUTPATIENT)
Dept: ENDOSCOPY | Facility: HOSPITAL | Age: 73
End: 2018-04-30
Payer: MEDICARE

## 2018-04-30 ENCOUNTER — SURGERY (OUTPATIENT)
Age: 73
End: 2018-04-30

## 2018-04-30 ENCOUNTER — HOSPITAL ENCOUNTER (OUTPATIENT)
Facility: HOSPITAL | Age: 73
Discharge: HOME | End: 2018-04-30
Attending: INTERNAL MEDICINE | Admitting: INTERNAL MEDICINE
Payer: MEDICARE

## 2018-04-30 ENCOUNTER — ANESTHESIA EVENT (OUTPATIENT)
Dept: ENDOSCOPY | Facility: HOSPITAL | Age: 73
End: 2018-04-30
Payer: MEDICARE

## 2018-04-30 VITALS
RESPIRATION RATE: 18 BRPM | HEART RATE: 68 BPM | HEIGHT: 72 IN | BODY MASS INDEX: 38.6 KG/M2 | OXYGEN SATURATION: 96 % | SYSTOLIC BLOOD PRESSURE: 146 MMHG | WEIGHT: 285 LBS | DIASTOLIC BLOOD PRESSURE: 70 MMHG | TEMPERATURE: 98 F

## 2018-04-30 DIAGNOSIS — K21.9 GERD (GASTROESOPHAGEAL REFLUX DISEASE): ICD-10-CM

## 2018-04-30 DIAGNOSIS — K21.9 GASTROESOPHAGEAL REFLUX DISEASE WITHOUT ESOPHAGITIS: ICD-10-CM

## 2018-04-30 LAB
GLUCOSE SERPL-MCNC: 159 MG/DL (ref 70–110)
POCT GLUCOSE: 159 MG/DL (ref 70–110)

## 2018-04-30 PROCEDURE — 63600175 PHARM REV CODE 636 W HCPCS: Performed by: NURSE ANESTHETIST, CERTIFIED REGISTERED

## 2018-04-30 PROCEDURE — 25000003 PHARM REV CODE 250: Performed by: INTERNAL MEDICINE

## 2018-04-30 PROCEDURE — 37000008 HC ANESTHESIA 1ST 15 MINUTES: Performed by: INTERNAL MEDICINE

## 2018-04-30 PROCEDURE — 37000009 HC ANESTHESIA EA ADD 15 MINS: Performed by: INTERNAL MEDICINE

## 2018-04-30 PROCEDURE — 43239 EGD BIOPSY SINGLE/MULTIPLE: CPT | Mod: ,,, | Performed by: INTERNAL MEDICINE

## 2018-04-30 PROCEDURE — 88305 TISSUE EXAM BY PATHOLOGIST: CPT | Mod: 26,,, | Performed by: PATHOLOGY

## 2018-04-30 PROCEDURE — 88305 TISSUE EXAM BY PATHOLOGIST: CPT | Performed by: PATHOLOGY

## 2018-04-30 PROCEDURE — 82962 GLUCOSE BLOOD TEST: CPT | Performed by: INTERNAL MEDICINE

## 2018-04-30 PROCEDURE — 25000003 PHARM REV CODE 250: Performed by: NURSE ANESTHETIST, CERTIFIED REGISTERED

## 2018-04-30 PROCEDURE — 43239 EGD BIOPSY SINGLE/MULTIPLE: CPT | Performed by: INTERNAL MEDICINE

## 2018-04-30 PROCEDURE — 27201012 HC FORCEPS, HOT/COLD, DISP: Performed by: INTERNAL MEDICINE

## 2018-04-30 RX ORDER — SODIUM CHLORIDE, SODIUM LACTATE, POTASSIUM CHLORIDE, CALCIUM CHLORIDE 600; 310; 30; 20 MG/100ML; MG/100ML; MG/100ML; MG/100ML
INJECTION, SOLUTION INTRAVENOUS CONTINUOUS PRN
Status: DISCONTINUED | OUTPATIENT
Start: 2018-04-30 | End: 2018-04-30

## 2018-04-30 RX ORDER — PROPOFOL 10 MG/ML
INJECTION, EMULSION INTRAVENOUS
Status: DISCONTINUED | OUTPATIENT
Start: 2018-04-30 | End: 2018-04-30

## 2018-04-30 RX ORDER — SODIUM CHLORIDE, SODIUM LACTATE, POTASSIUM CHLORIDE, CALCIUM CHLORIDE 600; 310; 30; 20 MG/100ML; MG/100ML; MG/100ML; MG/100ML
INJECTION, SOLUTION INTRAVENOUS CONTINUOUS
Status: DISCONTINUED | OUTPATIENT
Start: 2018-04-30 | End: 2018-04-30 | Stop reason: HOSPADM

## 2018-04-30 RX ORDER — LIDOCAINE HCL/PF 100 MG/5ML
SYRINGE (ML) INTRAVENOUS
Status: DISCONTINUED | OUTPATIENT
Start: 2018-04-30 | End: 2018-04-30

## 2018-04-30 RX ORDER — PANTOPRAZOLE SODIUM 40 MG/1
40 TABLET, DELAYED RELEASE ORAL
Qty: 60 TABLET | Refills: 2 | Status: SHIPPED | OUTPATIENT
Start: 2018-04-30 | End: 2019-09-19

## 2018-04-30 RX ADMIN — LIDOCAINE HYDROCHLORIDE 100 MG: 20 INJECTION, SOLUTION INTRAVENOUS at 09:04

## 2018-04-30 RX ADMIN — SODIUM CHLORIDE, SODIUM LACTATE, POTASSIUM CHLORIDE, AND CALCIUM CHLORIDE: 600; 310; 30; 20 INJECTION, SOLUTION INTRAVENOUS at 09:04

## 2018-04-30 RX ADMIN — PROPOFOL 40 MG: 10 INJECTION, EMULSION INTRAVENOUS at 09:04

## 2018-04-30 RX ADMIN — PROPOFOL 60 MG: 10 INJECTION, EMULSION INTRAVENOUS at 09:04

## 2018-04-30 RX ADMIN — PROPOFOL 140 MG: 10 INJECTION, EMULSION INTRAVENOUS at 09:04

## 2018-04-30 RX ADMIN — SODIUM CHLORIDE, SODIUM LACTATE, POTASSIUM CHLORIDE, AND CALCIUM CHLORIDE: .6; .31; .03; .02 INJECTION, SOLUTION INTRAVENOUS at 09:04

## 2018-04-30 NOTE — ANESTHESIA PREPROCEDURE EVALUATION
04/30/2018  Sherif Ragland is a 72 y.o., male.    Anesthesia Evaluation    I have reviewed the Patient Summary Reports.    I have reviewed the Nursing Notes.   I have reviewed the Medications.     Review of Systems  Anesthesia Hx:  No problems with previous Anesthesia  History of prior surgery of interest to airway management or planning: Previous anesthesia: General tibia/Cataract/tonsils with general anesthesia.  Denies Family Hx of Anesthesia complications.   Denies Personal Hx of Anesthesia complications.   Social:  Non-Smoker, No Alcohol Use    Hematology/Oncology:  Hematology Normal   Oncology Normal     EENT/Dental:EENT/Dental Normal   Cardiovascular:   Exercise tolerance: good Hypertension, well controlled hyperlipidemia  Hypertension    Pulmonary:  Pulmonary Normal    Renal/:   History of mild kidney dysfunction   Hepatic/GI:   GERD    Endocrine:   Diabetes, type 2  Diabetes, Type 2 Diabetes        Physical Exam  General:  Obesity    Airway/Jaw/Neck:  Airway Findings: Mouth Opening: Normal Tongue: Normal  General Airway Assessment: Adult  Mallampati: II  Improves to II with phonation.  TM Distance: Normal, at least 6 cm  Jaw/Neck Findings:  Neck ROM: Extension Decreased, Mild  Neck Findings:  Pt states that he has chronic neck pain  Does not get treatment  ROM mildly limited with pain      Dental:  Dental Findings: In tact        Mental Status:  Mental Status Findings:  Cooperative         Anesthesia Plan  Type of Anesthesia, risks & benefits discussed:  Anesthesia Type:  MAC  Patient's Preference:   Intra-op Monitoring Plan:   Intra-op Monitoring Plan Comments:   Post Op Pain Control Plan:   Post Op Pain Control Plan Comments:   Induction:   IV  Beta Blocker:  Patient is on a Beta-Blocker and has received one dose within the past 24 hours (No further documentation required).       Informed  Consent: Patient understands risks and agrees with Anesthesia plan.  Questions answered. Anesthesia consent signed with patient.  ASA Score: 2     Day of Surgery Review of History & Physical: I have interviewed and examined the patient. I have reviewed the patient's H&P dated:            Ready For Surgery From Anesthesia Perspective.

## 2018-04-30 NOTE — DISCHARGE INSTRUCTIONS
Gastritis (Adult)    Gastritis is inflammation and irritation of the stomach lining. It can be present for a short time (acute) or be long lasting (chronic). Gastritis is often caused by infection with bacteria called H pylori. More than a third of people in the [place] have this bacteria in their bodies. In many cases, H pylori causes no problems or symptoms. In some people, though, the infection irritates the stomach lining and causes gastritis. Other causes of stomach irritation include drinking alcohol or taking pain-relieving medicines called NSAIDs (such as aspirin or ibuprofen).   Symptoms of gastritis can include:  · Abdominal pain or bloating  · Loss of appetite  · Nausea or vomiting  · Vomiting blood or having black stools  · Feeling more tired than usual  An inflamed and irritated stomach lining is more likely to develop a sore called an ulcer. To help prevent this, gastritis should be treated.  Home care  If needed, medicines may be prescribed. If you have H pylori infection, treating it will likely relieve your symptoms. Other changes can help reduce stomach irritation and help it heal.  · If you have been prescribed medicines for H pylori infection, take them as directed. Take all of the medicine until it is finished or your healthcare provider tells you to stop, even if you feel better.  · Your healthcare provider may recommend avoiding NSAIDs. If you take daily aspirin for your heart or other medical reasons, do not stop without talking to your healthcare provider first.  · Avoid drinking alcohol.  · Stop smoking. Smoking can irritate the stomach and delay healing. As much as possible, stay away from second hand smoke.  Follow-up care  Follow up with your healthcare provider, or as advised by our staff. Testing may be needed to check for inflammation or an ulcer.  When to seek medical advice  Call your healthcare provider for any of the following:  · Stomach pain that gets worse or moves to the lower  right abdomen (appendix area)  · Chest pain that appears or gets worse, or spreads to the back, neck, shoulder, or arm  · Frequent vomiting (cant keep down liquids)  · Blood in the stool or vomit (red or black in color)  · Feeling weak or dizzy  · Fever of 100.4ºF (38ºC) or higher, or as directed by your healthcare provider  Date Last Reviewed: 6/22/2015  © 1243-8524 [employer]. [address] [place] [address]. All rights reserved. This information is not intended as a substitute for professional medical care. Always follow your healthcare professional's instructions.

## 2018-04-30 NOTE — TRANSFER OF CARE
Anesthesia Transfer of Care Note    Patient: Sherif Ragland    Procedure(s) Performed: Procedure(s) (LRB):  ESOPHAGOGASTRODUODENOSCOPY (EGD) (N/A)    Patient location: PACU    Anesthesia Type: MAC    Transport from OR: Transported from OR on room air with adequate spontaneous ventilation    Post pain: adequate analgesia    Post assessment: no apparent anesthetic complications    Post vital signs: stable    Level of consciousness: sedated    Nausea/Vomiting: no nausea/vomiting    Complications: none    Transfer of care protocol was followed      Last vitals:   Visit Vitals  BP (!) 157/69 (BP Location: Left arm, Patient Position: Lying)   Pulse 65   Temp 36.8 °C (98.2 °F) (Oral)   Resp 20   Ht 6' (1.829 m)   Wt 129.3 kg (285 lb)   SpO2 96%   BMI 38.65 kg/m²

## 2018-04-30 NOTE — ANESTHESIA RELEASE NOTE
Anesthesia Release from PACU Note    Patient: Sherif Ragland    Procedure(s) Performed: Procedure(s) (LRB):  ESOPHAGOGASTRODUODENOSCOPY (EGD) (N/A)    Anesthesia type: MAC    Post pain: Adequate analgesia    Post assessment: no apparent anesthetic complications, tolerated procedure well and no evidence of recall    Last Vitals:   Visit Vitals  BP (!) 157/69 (BP Location: Left arm, Patient Position: Lying)   Pulse 65   Temp 36.8 °C (98.2 °F) (Oral)   Resp 20   Ht 6' (1.829 m)   Wt 129.3 kg (285 lb)   SpO2 96%   BMI 38.65 kg/m²       Post vital signs: stable    Level of consciousness: awake    Nausea/Vomiting: no nausea/no vomiting    Complications: none    Airway Patency: patent    Respiratory: unassisted, spontaneous ventilation, room air    Cardiovascular: stable    Hydration: euvolemic

## 2018-04-30 NOTE — H&P
Short Stay Endoscopy History and Physical    PCP - Tobias Fox MD    Procedure - EGD  ASA - II  Mallampati - per anesthesia  History of Anesthesia problems - no  Family history Anesthesia problems -  no     HPI:    Reason for EGD:  Heartburn: yes  regurgitation and belching at night  Dysphagia: No  Follow up of ulcer: No  Anemia - no  GI bleeding - no  Abdominal pain: No  Diarrhea - no    ROS:  CONSTITUTIONAL: Denies weight change,  fatigue, fevers, chills, night sweats.  CARDIOVASCULAR: Denies chest pain, shortness of breath, orthopnea and edema.  RESPIRATORY: Denies cough, hemoptysis, dyspnea, and wheezing.  GI: See HPI.    Medical History:   Past Medical History:   Diagnosis Date    Diabetes mellitus, type 2 1997    Hyperlipidemia     Hypertension     Hypogonadism in male     Renal insufficiency     Tubular adenoma 10/2017       Surgical History:   Past Surgical History:   Procedure Laterality Date    CATARACT EXTRACTION Right 01/31/2018    COLONOSCOPY N/A 11/2/2017    Procedure: COLONOSCOPY;  Surgeon: Alek Francois MD;  Location: [facility];  Service: Endoscopy;  Laterality: N/A;    TIBIA FRACTURE SURGERY      right leg x2     TONSILLECTOMY         Family History:  Family History   Problem Relation Age of Onset    Stroke Mother     Heart disease Father     Stroke Brother        Social History:   Social History   Substance Use Topics    Smoking status: Never Smoker    Smokeless tobacco: Never Used    Alcohol use No       Allergies:   Review of patient's allergies indicates:  No Known Allergies    Medications:   No current facility-administered medications on file prior to encounter.      Current Outpatient Prescriptions on File Prior to Encounter   Medication Sig Dispense Refill    atorvastatin (LIPITOR) 80 MG tablet Take 1 tablet (80 mg total) by mouth once daily. 90 tablet 4    blood sugar diagnostic Strp To check BG 2 times daily, to use with insurance preferred meter 100 each  "11    blood-glucose meter kit To check BG two times daily, to use with insurance preferred meter 1 each 0    cloNIDine (CATAPRES) 0.2 MG tablet Take 1 tablet (0.2 mg total) by mouth 2 (two) times daily. 180 tablet 4    insulin detemir (LEVEMIR FLEXTOUCH) 100 unit/mL (3 mL) SubQ InPn pen Inject 35 Units into the skin 2 (two) times daily. 63 mL 3    lancets Misc To check BG 2 times daily, to use with insurance preferred meter 100 each 11    metoprolol succinate (TOPROL-XL) 100 MG 24 hr tablet Take 1 tablet (100 mg total) by mouth once daily. 90 tablet 4    pantoprazole (PROTONIX) 40 MG tablet Take 1 tablet (40 mg total) by mouth once daily. 30 tablet 11    pioglitazone (ACTOS) 30 MG tablet Take 1 tablet (30 mg total) by mouth once daily. 90 tablet 3    terazosin (HYTRIN) 10 MG capsule Take 1 capsule (10 mg total) by mouth once daily. 90 capsule 4    tramadol-acetaminophen 37.5-325 mg (ULTRACET) 37.5-325 mg Tab       valsartan-hydrochlorothiazide (DIOVAN-HCT) 320-25 mg per tablet Take 1 tablet by mouth once daily. 90 tablet 4    papaverine 30 mg/mL injection Inject 0.3 ml of trimix solution prn ED max once daily  Prepare 10ml of trimix solution containing:    Papaverine 30mg/ml    Phentolamine 1 mg/ml    Alprostadil 10mcg/ml  Dispense 10ml per refill  Qs syringes 1cc/30g/0.5" and alcohol swabs dispense as needed for Intracavernosal injection 10 mL 5       Physical Exam:  Vital Signs:   Vitals:    04/30/18 0908   BP: (!) 152/80   Pulse: 75   Resp: 18   Temp: 98.2 °F (36.8 °C)     General Appearance: Well appearing in no acute distress  ENT: OP clear  Chest: CTA B  CV: RRR, no m/r/g  Abd: s/nt/nd/nabs  Ext: no edema    Labs:  Lab Results   Component Value Date    WBC 6.99 10/09/2017    HGB 13.2 (L) 10/09/2017    HCT 40.9 10/09/2017    MCV 92 10/09/2017     10/09/2017     No results found for: INR, PROTIME  No results found for: IRON, TIBC, FERRITIN, SATURATEDIRO      Assessment:  Patient Active Problem " List   Diagnosis    Type 2 diabetes mellitus with chronic kidney disease, with long-term current use of insulin    Stage 3 chronic kidney disease    Hyperlipidemia associated with type 2 diabetes mellitus    Hypertension associated with diabetes    Post herpetic neuralgia    Tubular adenoma of colon    GERD (gastroesophageal reflux disease)         Plan:  I have explained the risks and benefits of endoscopy procedures to the patient including but not limited to bleeding, perforation, infection, and death. The patient wishes to proceed.

## 2018-04-30 NOTE — PLAN OF CARE
Dr Barreto came to bedside and discussed findings. NO N/V,  no abdominal pain, no GI bleeding, and vitals stable.  Pt discharged from unit.

## 2018-04-30 NOTE — ANESTHESIA POSTPROCEDURE EVALUATION
Anesthesia Post Evaluation    Patient: Sherif Ragland    Procedure(s) Performed: Procedure(s) (LRB):  ESOPHAGOGASTRODUODENOSCOPY (EGD) (N/A)    Final Anesthesia Type: MAC  Patient location during evaluation: PACU  Patient participation: Yes- Able to Participate  Level of consciousness: awake and alert and oriented  Post-procedure vital signs: reviewed and stable  Pain management: adequate  Airway patency: patent  PONV status at discharge: No PONV  Anesthetic complications: no      Cardiovascular status: blood pressure returned to baseline  Respiratory status: room air, spontaneous ventilation and unassisted  Hydration status: euvolemic  Follow-up not needed.        Visit Vitals  BP (!) 157/69 (BP Location: Left arm, Patient Position: Lying)   Pulse 65   Temp 36.8 °C (98.2 °F) (Oral)   Resp 20   Ht 6' (1.829 m)   Wt 129.3 kg (285 lb)   SpO2 96%   BMI 38.65 kg/m²       Pain/Jessie Score: Pain Assessment Performed: Yes (4/30/2018  9:06 AM)  Presence of Pain: denies (4/30/2018  9:06 AM)

## 2018-04-30 NOTE — PROVATION PATIENT INSTRUCTIONS
Discharge Summary/Instructions after an Endoscopic Procedure  Patient Name: Sherif Ragland  Patient MRN: [id number]  Patient YOB: 1945  Monday, April 30, 2018 Kyle Barreto MD  RESTRICTIONS:  During your procedure today, you received medications for sedation.  These   medications may affect your judgment, balance and coordination.  Therefore,   for 24 hours, you have the following restrictions:   - DO NOT drive a car, operate machinery, make legal/financial decisions,   sign important papers or drink alcohol.    ACTIVITY:  The following day: return to full activity including work, except no heavy   lifting, straining or running for 3 days if polyps were removed.  DIET:  Eat and drink normally unless instructed otherwise.     TREATMENT FOR COMMON SIDE EFFECTS:  - Mild abdominal pain, nausea, belching, bloating or excessive gas:  rest,   eat lightly and use a heating pad.  - Sore Throat: treat with throat lozenges and/or gargle with warm salt   water.  - Because air was used during the procedure, expelling large amounts of air   from your rectum or belching is normal.  - If a bowel prep was taken, you may not have a bowel movement for 1-3 days.    This is normal.  SYMPTOMS TO WATCH FOR AND REPORT TO YOUR PHYSICIAN:  1. Abdominal pain or bloating, other than gas cramps.  2. Chest pain.  3. Back pain.  4. Signs of infection such as: chills or fever occurring within 24 hours   after the procedure.  5. Rectal bleeding, which would show as bright red, maroon, or black stools.   (A tablespoon of blood from the rectum is not serious, especially if   hemorrhoids are present.)  6. Vomiting.  7. Weakness or dizziness.  GO DIRECTLY TO THE NEAREST EMERGENCY ROOM IF YOU HAVE ANY OF THE FOLLOWING:      Difficulty breathing              Chills and/or fever over 101 F   Persistent vomiting and/or vomiting blood   Severe abdominal pain   Severe chest pain   Black, tarry stools   Bleeding- more than one  tablespoon   Any other symptom or condition that you feel may need urgent attention  Your doctor recommends these additional instructions:  If any biopsies were taken, your doctors clinic will contact you in 1 to 2   weeks with any results.  - Patient has a contact number available for emergencies.  The signs and   symptoms of potential delayed complications were discussed with the   patient.  Return to normal activities tomorrow.  Written discharge   instructions were provided to the patient.   - Resume previous diet.   - Continue present medications.   - Await pathology results.   - Use Protonix (pantoprazole) 40 mg PO BID for 2 months.   - Follow an antireflux regimen indefinitely.   - Return to primary care physician as previously scheduled.   - Discharge patient to home (with escort).  For questions, problems or results please call your physician Kyle Barreto MD at Work:  [phone]  If you have any questions about the above instructions, call the GI   department at [phone] or call the endoscopy unit at [phone]   from 7am until 3 pm.  [facility] [place], EMERGENCY ROOM PHONE NUMBER:   [phone]  IF A COMPLICATION OR EMERGENCY SITUATION ARISES AND YOU ARE UNABLE TO REACH   YOUR PHYSICIAN - GO DIRECTLY TO THE EMERGENCY ROOM.  I have read or have had read to me these discharge instructions for my   procedure and have received a written copy.  I understand these   instructions and will follow-up with my physician if I have any questions.     __________________________________       _____________________________________  Nurse Signature                                          Patient/Designated   Responsible Party Signature  Kyle Barreto MD  4/30/2018 10:07:37 AM  This report has been verified and signed electronically.

## 2018-05-02 ENCOUNTER — PATIENT MESSAGE (OUTPATIENT)
Dept: INTERNAL MEDICINE | Facility: CLINIC | Age: 73
End: 2018-05-02

## 2018-06-12 ENCOUNTER — PATIENT OUTREACH (OUTPATIENT)
Dept: ADMINISTRATIVE | Facility: HOSPITAL | Age: 73
End: 2018-06-12

## 2018-06-19 ENCOUNTER — LAB VISIT (OUTPATIENT)
Dept: LAB | Facility: HOSPITAL | Age: 73
End: 2018-06-19
Attending: INTERNAL MEDICINE
Payer: MEDICARE

## 2018-06-19 DIAGNOSIS — E11.22 TYPE 2 DIABETES MELLITUS WITH STAGE 3 CHRONIC KIDNEY DISEASE, WITH LONG-TERM CURRENT USE OF INSULIN: ICD-10-CM

## 2018-06-19 DIAGNOSIS — N18.30 TYPE 2 DIABETES MELLITUS WITH STAGE 3 CHRONIC KIDNEY DISEASE, WITH LONG-TERM CURRENT USE OF INSULIN: ICD-10-CM

## 2018-06-19 DIAGNOSIS — Z79.4 TYPE 2 DIABETES MELLITUS WITH STAGE 3 CHRONIC KIDNEY DISEASE, WITH LONG-TERM CURRENT USE OF INSULIN: ICD-10-CM

## 2018-06-19 LAB
ESTIMATED AVG GLUCOSE: 235 MG/DL
HBA1C MFR BLD HPLC: 9.8 %

## 2018-06-19 PROCEDURE — 36415 COLL VENOUS BLD VENIPUNCTURE: CPT | Mod: PO

## 2018-06-19 PROCEDURE — 83036 HEMOGLOBIN GLYCOSYLATED A1C: CPT

## 2018-06-20 NOTE — PROGRESS NOTES
Here are the results of your recent labs.  We will review them in detail at your follow up visit.    Sincerely,    Tobias Fox M.D.        If you would like to review your experience with Dr. Fox or [facility], please follow the link below:    [email or web address]

## 2018-06-25 ENCOUNTER — OFFICE VISIT (OUTPATIENT)
Dept: INTERNAL MEDICINE | Facility: CLINIC | Age: 73
End: 2018-06-25
Payer: MEDICARE

## 2018-06-25 VITALS
WEIGHT: 283.5 LBS | HEIGHT: 72 IN | SYSTOLIC BLOOD PRESSURE: 122 MMHG | TEMPERATURE: 98 F | HEART RATE: 76 BPM | BODY MASS INDEX: 38.4 KG/M2 | DIASTOLIC BLOOD PRESSURE: 80 MMHG

## 2018-06-25 DIAGNOSIS — E11.59 HYPERTENSION ASSOCIATED WITH DIABETES: ICD-10-CM

## 2018-06-25 DIAGNOSIS — Z79.4 TYPE 2 DIABETES MELLITUS WITH STAGE 3 CHRONIC KIDNEY DISEASE, WITH LONG-TERM CURRENT USE OF INSULIN: Primary | ICD-10-CM

## 2018-06-25 DIAGNOSIS — I15.2 HYPERTENSION ASSOCIATED WITH DIABETES: ICD-10-CM

## 2018-06-25 DIAGNOSIS — N18.30 TYPE 2 DIABETES MELLITUS WITH STAGE 3 CHRONIC KIDNEY DISEASE, WITH LONG-TERM CURRENT USE OF INSULIN: Primary | ICD-10-CM

## 2018-06-25 DIAGNOSIS — E11.22 TYPE 2 DIABETES MELLITUS WITH STAGE 3 CHRONIC KIDNEY DISEASE, WITH LONG-TERM CURRENT USE OF INSULIN: Primary | ICD-10-CM

## 2018-06-25 PROCEDURE — 3074F SYST BP LT 130 MM HG: CPT | Mod: CPTII,S$GLB,, | Performed by: INTERNAL MEDICINE

## 2018-06-25 PROCEDURE — 99213 OFFICE O/P EST LOW 20 MIN: CPT | Mod: S$GLB,,, | Performed by: INTERNAL MEDICINE

## 2018-06-25 PROCEDURE — 3046F HEMOGLOBIN A1C LEVEL >9.0%: CPT | Mod: CPTII,S$GLB,, | Performed by: INTERNAL MEDICINE

## 2018-06-25 PROCEDURE — 99999 PR PBB SHADOW E&M-EST. PATIENT-LVL III: CPT | Mod: PBBFAC,,, | Performed by: INTERNAL MEDICINE

## 2018-06-25 PROCEDURE — 3079F DIAST BP 80-89 MM HG: CPT | Mod: CPTII,S$GLB,, | Performed by: INTERNAL MEDICINE

## 2018-06-25 NOTE — PROGRESS NOTES
Subjective:       Patient ID: Sherif Ragland is a 72 y.o. male.    Chief Complaint: Follow-up    HPI  Patient is a 72-year-old male presenting today following up on his diabetes hypertension.  He indicates he has been doing well.  His diabetes is not doing well at this time.  His A1c is increased to 9.8.  He is continuing to take Levemir 35 units twice daily and Actos as well.  He is inconsistent with checking his sugars.  He tried to check it every morning and acknowledges readings between 120 and 150 typically.  He does not check it at other times of the day.  If his that morning numbers are accurate we are likely dealing with stair stepping of the numbers throughout the day.  We talked to the about this today.  I would like to have more data as he may need short-acting product with meals if he is moving up throughout the day.  We talked about continuous glucose monitoring system verses more consistent measuring with his monitor.  He would like to try it with his monitor.    Review of Systems    Objective:   /80   Pulse 76   Temp 98.1 °F (36.7 °C) (Tympanic)   Ht 6' (1.829 m)   Wt 128.6 kg (283 lb 8.2 oz)   BMI 38.45 kg/m²      Physical Exam   Constitutional: He appears well-developed and well-nourished.   HENT:   Head: Normocephalic and atraumatic.   Eyes: Pupils are equal, round, and reactive to light.   Neck: Neck supple. No thyromegaly present.   Cardiovascular: Normal rate, regular rhythm and normal heart sounds.  Exam reveals no gallop and no friction rub.    No murmur heard.  Pulmonary/Chest: Breath sounds normal. He has no wheezes. He has no rales.   Abdominal: Soft. Bowel sounds are normal. He exhibits no distension. There is no tenderness.   Vitals reviewed.      Lab Visit on 06/19/2018   Component Date Value    Hemoglobin A1C 06/19/2018 9.8*    Estimated Avg Glucose 06/19/2018 235*       Assessment:       1. Type 2 diabetes mellitus with stage 3 chronic kidney disease, with long-term  current use of insulin    2. Hypertension associated with diabetes        Plan:   Type 2 diabetes mellitus with chronic kidney disease, with long-term current use of insulin  Diabetes is not under good control at this time.  Most recent a1c is 9.8.      We will make the following adjustments to the medications: Increase levemir to 40 units twice a day.  Consult diabetes for CGMS and review.      We need to see continued focus on low carbohydrate diet and consistent exercise.      Hypertension associated with diabetes  Blood pressure is under good control.  We will continue the current regimen.  Will work on regular aerobic exercise and a low salt diet.        Sherif was seen today for follow-up.    Diagnoses and all orders for this visit:    Type 2 diabetes mellitus with stage 3 chronic kidney disease, with long-term current use of insulin  -     insulin detemir U-100 (LEVEMIR FLEXTOUCH) 100 unit/mL (3 mL) SubQ InPn pen; Inject 40 Units into the skin 2 (two) times daily.  -     Cancel: Ambulatory Referral to Diabetes Education    Hypertension associated with diabetes        Follow-up in about 4 weeks (around 7/23/2018).

## 2018-06-25 NOTE — ASSESSMENT & PLAN NOTE
Diabetes is not under good control at this time.  Most recent a1c is 9.8.      We will make the following adjustments to the medications: Increase levemir to 40 units twice a day.  Consult diabetes for CGMS and review.      We need to see continued focus on low carbohydrate diet and consistent exercise.

## 2018-07-09 ENCOUNTER — PATIENT MESSAGE (OUTPATIENT)
Dept: INTERNAL MEDICINE | Facility: CLINIC | Age: 73
End: 2018-07-09

## 2018-07-11 RX ORDER — LANCETS
EACH MISCELLANEOUS
Qty: 100 EACH | Refills: 11 | Status: SHIPPED | OUTPATIENT
Start: 2018-07-11 | End: 2019-01-17 | Stop reason: SDUPTHER

## 2018-07-11 RX ORDER — INSULIN PUMP SYRINGE, 3 ML
EACH MISCELLANEOUS
Qty: 1 EACH | Refills: 0 | Status: SHIPPED | OUTPATIENT
Start: 2018-07-11 | End: 2020-04-13 | Stop reason: SDUPTHER

## 2018-07-16 ENCOUNTER — PATIENT OUTREACH (OUTPATIENT)
Dept: ADMINISTRATIVE | Facility: HOSPITAL | Age: 73
End: 2018-07-16

## 2018-07-26 ENCOUNTER — PATIENT MESSAGE (OUTPATIENT)
Dept: INTERNAL MEDICINE | Facility: CLINIC | Age: 73
End: 2018-07-26

## 2018-07-26 RX ORDER — LOSARTAN POTASSIUM AND HYDROCHLOROTHIAZIDE 25; 100 MG/1; MG/1
1 TABLET ORAL DAILY
Qty: 90 TABLET | Refills: 3 | Status: SHIPPED | OUTPATIENT
Start: 2018-07-26 | End: 2018-10-26

## 2018-07-31 ENCOUNTER — PATIENT MESSAGE (OUTPATIENT)
Dept: INTERNAL MEDICINE | Facility: CLINIC | Age: 73
End: 2018-07-31

## 2018-08-08 ENCOUNTER — HOSPITAL ENCOUNTER (EMERGENCY)
Facility: HOSPITAL | Age: 73
Discharge: HOME | End: 2018-08-08
Attending: SURGERY
Payer: MEDICARE

## 2018-08-08 VITALS
WEIGHT: 280 LBS | SYSTOLIC BLOOD PRESSURE: 154 MMHG | OXYGEN SATURATION: 97 % | HEART RATE: 73 BPM | DIASTOLIC BLOOD PRESSURE: 71 MMHG | RESPIRATION RATE: 20 BRPM | HEIGHT: 72 IN | BODY MASS INDEX: 37.93 KG/M2 | TEMPERATURE: 98 F

## 2018-08-08 DIAGNOSIS — R07.9 CHEST PAIN: ICD-10-CM
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
## 2018-08-08 LAB — POCT GLUCOSE: 177 MG/DL (ref 70–110)

## 2018-08-08 PROCEDURE — 99900035 HC TECH TIME PER 15 MIN (STAT)

## 2018-08-08 PROCEDURE — 94799 UNLISTED PULMONARY SVC/PX: CPT

## 2018-08-08 PROCEDURE — 99285 EMERGENCY DEPT VISIT HI MDM: CPT | Mod: 25

## 2018-08-08 PROCEDURE — 82962 GLUCOSE BLOOD TEST: CPT

## 2018-08-08 PROCEDURE — 93010 ELECTROCARDIOGRAM REPORT: CPT | Mod: ,,, | Performed by: INTERNAL MEDICINE

## 2018-08-08 PROCEDURE — 25000003 PHARM REV CODE 250: Performed by: PHYSICIAN ASSISTANT

## 2018-08-08 PROCEDURE — 93005 ELECTROCARDIOGRAM TRACING: CPT

## 2018-08-08 RX ORDER — TRIPROLIDINE/PSEUDOEPHEDRINE 2.5MG-60MG
200 TABLET ORAL
Status: DISCONTINUED | OUTPATIENT
Start: 2018-08-08 | End: 2018-08-08

## 2018-08-08 RX ORDER — OXYCODONE AND ACETAMINOPHEN 5; 325 MG/1; MG/1
2 TABLET ORAL
Status: COMPLETED | OUTPATIENT
Start: 2018-08-08 | End: 2018-08-08

## 2018-08-08 RX ORDER — CYCLOBENZAPRINE HCL 10 MG
10 TABLET ORAL 3 TIMES DAILY PRN
Qty: 15 TABLET | Refills: 0 | Status: SHIPPED | OUTPATIENT
Start: 2018-08-08 | End: 2018-08-13

## 2018-08-08 RX ORDER — ONDANSETRON 4 MG/1
4 TABLET, ORALLY DISINTEGRATING ORAL
Status: COMPLETED | OUTPATIENT
Start: 2018-08-08 | End: 2018-08-08

## 2018-08-08 RX ORDER — OXYCODONE AND ACETAMINOPHEN 5; 325 MG/1; MG/1
1 TABLET ORAL EVERY 8 HOURS PRN
Qty: 10 TABLET | Refills: 0 | Status: SHIPPED | OUTPATIENT
Start: 2018-08-08 | End: 2018-08-10

## 2018-08-08 RX ADMIN — ONDANSETRON 4 MG: 4 TABLET, ORALLY DISINTEGRATING ORAL at 02:08

## 2018-08-08 RX ADMIN — OXYCODONE HYDROCHLORIDE AND ACETAMINOPHEN 2 TABLET: 5; 325 TABLET ORAL at 02:08
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
## 2018-08-08 NOTE — DISCHARGE INSTRUCTIONS
The CT of your head, cervical spine and chest does not  reveal any evidence of acute fracture or hemorrhage. You are advised to follow up with your primary care provider for re-evaluation within 3 days.  You are instructed to return to the emergency department immediately for any new or worsening symptoms.

## 2018-08-09 ENCOUNTER — PATIENT MESSAGE (OUTPATIENT)
Dept: INTERNAL MEDICINE | Facility: CLINIC | Age: 73
End: 2018-08-09

## 2018-08-10 ENCOUNTER — OFFICE VISIT (OUTPATIENT)
Dept: INTERNAL MEDICINE | Facility: CLINIC | Age: 73
End: 2018-08-10
Payer: MEDICARE

## 2018-08-10 ENCOUNTER — HOSPITAL ENCOUNTER (OUTPATIENT)
Dept: RADIOLOGY | Facility: HOSPITAL | Age: 73
Discharge: HOME | End: 2018-08-10
Attending: PHYSICIAN ASSISTANT
Payer: MEDICARE

## 2018-08-10 VITALS
HEART RATE: 92 BPM | WEIGHT: 290.81 LBS | DIASTOLIC BLOOD PRESSURE: 80 MMHG | TEMPERATURE: 99 F | BODY MASS INDEX: 39.39 KG/M2 | SYSTOLIC BLOOD PRESSURE: 140 MMHG | HEIGHT: 72 IN

## 2018-08-10 DIAGNOSIS — T85.848A PAIN FROM IMPLANTED HARDWARE, INITIAL ENCOUNTER: ICD-10-CM

## 2018-08-10 DIAGNOSIS — Z79.4 TYPE 2 DIABETES MELLITUS WITH STAGE 3 CHRONIC KIDNEY DISEASE, WITH LONG-TERM CURRENT USE OF INSULIN: ICD-10-CM

## 2018-08-10 DIAGNOSIS — E11.22 TYPE 2 DIABETES MELLITUS WITH STAGE 3 CHRONIC KIDNEY DISEASE, WITH LONG-TERM CURRENT USE OF INSULIN: ICD-10-CM

## 2018-08-10 DIAGNOSIS — N18.30 TYPE 2 DIABETES MELLITUS WITH STAGE 3 CHRONIC KIDNEY DISEASE, WITH LONG-TERM CURRENT USE OF INSULIN: ICD-10-CM
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
## 2018-08-10 DIAGNOSIS — S20.211A CONTUSION OF RIGHT CHEST WALL, INITIAL ENCOUNTER: ICD-10-CM
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
## 2018-08-10 PROCEDURE — 99214 OFFICE O/P EST MOD 30 MIN: CPT | Mod: S$GLB,,, | Performed by: PHYSICIAN ASSISTANT

## 2018-08-10 PROCEDURE — 73590 X-RAY EXAM OF LOWER LEG: CPT | Mod: 26,RT,, | Performed by: RADIOLOGY

## 2018-08-10 PROCEDURE — 3046F HEMOGLOBIN A1C LEVEL >9.0%: CPT | Mod: CPTII,S$GLB,, | Performed by: PHYSICIAN ASSISTANT

## 2018-08-10 PROCEDURE — 99999 PR PBB SHADOW E&M-EST. PATIENT-LVL IV: CPT | Mod: PBBFAC,,, | Performed by: PHYSICIAN ASSISTANT

## 2018-08-10 PROCEDURE — 3079F DIAST BP 80-89 MM HG: CPT | Mod: CPTII,S$GLB,, | Performed by: PHYSICIAN ASSISTANT

## 2018-08-10 PROCEDURE — 73590 X-RAY EXAM OF LOWER LEG: CPT | Mod: TC,FY,PO,RT

## 2018-08-10 PROCEDURE — 3077F SYST BP >= 140 MM HG: CPT | Mod: CPTII,S$GLB,, | Performed by: PHYSICIAN ASSISTANT

## 2018-08-10 RX ORDER — HYDROCODONE BITARTRATE AND ACETAMINOPHEN 10; 325 MG/1; MG/1
1 TABLET ORAL EVERY 6 HOURS PRN
Qty: 15 TABLET | Refills: 0 | Status: SHIPPED | OUTPATIENT
Start: 2018-08-10 | End: 2018-08-15
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
## 2018-08-13 ENCOUNTER — PATIENT MESSAGE (OUTPATIENT)
Dept: INTERNAL MEDICINE | Facility: CLINIC | Age: 73
End: 2018-08-13

## 2018-09-05 ENCOUNTER — LAB VISIT (OUTPATIENT)
Dept: LAB | Facility: HOSPITAL | Age: 73
End: 2018-09-05
Attending: UROLOGY
Payer: MEDICARE

## 2018-09-05 ENCOUNTER — OFFICE VISIT (OUTPATIENT)
Dept: UROLOGY | Facility: CLINIC | Age: 73
End: 2018-09-05
Payer: MEDICARE

## 2018-09-05 VITALS
SYSTOLIC BLOOD PRESSURE: 138 MMHG | DIASTOLIC BLOOD PRESSURE: 72 MMHG | BODY MASS INDEX: 38.68 KG/M2 | WEIGHT: 285.19 LBS

## 2018-09-05 DIAGNOSIS — R97.20 ELEVATED PSA: ICD-10-CM

## 2018-09-05 DIAGNOSIS — E29.1 HYPOGONADISM IN MALE: ICD-10-CM

## 2018-09-05 DIAGNOSIS — Z12.5 ENCOUNTER FOR SCREENING FOR MALIGNANT NEOPLASM OF PROSTATE: ICD-10-CM

## 2018-09-05 DIAGNOSIS — E29.1 HYPOGONADISM IN MALE: Primary | ICD-10-CM

## 2018-09-05 LAB — COMPLEXED PSA SERPL-MCNC: 3.4 NG/ML

## 2018-09-05 PROCEDURE — 3075F SYST BP GE 130 - 139MM HG: CPT | Mod: CPTII,,, | Performed by: UROLOGY

## 2018-09-05 PROCEDURE — 36415 COLL VENOUS BLD VENIPUNCTURE: CPT

## 2018-09-05 PROCEDURE — 1101F PT FALLS ASSESS-DOCD LE1/YR: CPT | Mod: CPTII,,, | Performed by: UROLOGY

## 2018-09-05 PROCEDURE — 84153 ASSAY OF PSA TOTAL: CPT

## 2018-09-05 PROCEDURE — 99213 OFFICE O/P EST LOW 20 MIN: CPT | Mod: PBBFAC | Performed by: UROLOGY

## 2018-09-05 PROCEDURE — 99999 PR PBB SHADOW E&M-EST. PATIENT-LVL III: CPT | Mod: PBBFAC,,, | Performed by: UROLOGY

## 2018-09-05 PROCEDURE — 3078F DIAST BP <80 MM HG: CPT | Mod: CPTII,,, | Performed by: UROLOGY

## 2018-09-05 PROCEDURE — 99214 OFFICE O/P EST MOD 30 MIN: CPT | Mod: S$PBB,,, | Performed by: UROLOGY
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
## 2018-09-19 ENCOUNTER — LAB VISIT (OUTPATIENT)
Dept: LAB | Facility: HOSPITAL | Age: 73
End: 2018-09-19
Attending: UROLOGY
Payer: MEDICARE

## 2018-09-19 DIAGNOSIS — R97.20 ELEVATED PSA: ICD-10-CM

## 2018-09-19 DIAGNOSIS — E29.1 HYPOGONADISM IN MALE: ICD-10-CM

## 2018-09-19 DIAGNOSIS — Z12.5 ENCOUNTER FOR SCREENING FOR MALIGNANT NEOPLASM OF PROSTATE: ICD-10-CM

## 2018-09-19 LAB
COMPLEXED PSA SERPL-MCNC: 3.2 NG/ML
FSH SERPL-ACNC: 40.8 MIU/ML
LH SERPL-ACNC: 23.1 MIU/ML
PROLACTIN SERPL IA-MCNC: 5.7 NG/ML

## 2018-09-19 PROCEDURE — 84146 ASSAY OF PROLACTIN: CPT

## 2018-09-19 PROCEDURE — 84153 ASSAY OF PSA TOTAL: CPT

## 2018-09-19 PROCEDURE — 84270 ASSAY OF SEX HORMONE GLOBUL: CPT

## 2018-09-19 PROCEDURE — 36415 COLL VENOUS BLD VENIPUNCTURE: CPT | Mod: PO

## 2018-09-19 PROCEDURE — 83001 ASSAY OF GONADOTROPIN (FSH): CPT

## 2018-09-19 PROCEDURE — 83002 ASSAY OF GONADOTROPIN (LH): CPT

## 2018-09-23 LAB
ALBUMIN SERPL-MCNC: 3.9 G/DL (ref 3.6–5.1)
SHBG SERPL-SCNC: 35 NMOL/L (ref 22–77)
TESTOST FREE SERPL-MCNC: 17.1 PG/ML (ref 6–73)
TESTOST SERPL-MCNC: 144 NG/DL (ref 250–1100)
TESTOSTERONE.FREE+WB SERPL-MCNC: 30.7 NG/DL (ref 15–150)

## 2018-10-08 ENCOUNTER — OFFICE VISIT (OUTPATIENT)
Dept: UROLOGY | Facility: CLINIC | Age: 73
End: 2018-10-08
Payer: MEDICARE

## 2018-10-08 VITALS
BODY MASS INDEX: 39.12 KG/M2 | DIASTOLIC BLOOD PRESSURE: 72 MMHG | HEART RATE: 75 BPM | HEIGHT: 72 IN | WEIGHT: 288.81 LBS | SYSTOLIC BLOOD PRESSURE: 168 MMHG

## 2018-10-08 DIAGNOSIS — E29.1 HYPOGONADISM IN MALE: Primary | ICD-10-CM

## 2018-10-08 LAB
BILIRUB SERPL-MCNC: NORMAL MG/DL
BLOOD URINE, POC: NORMAL
COLOR, POC UA: YELLOW
GLUCOSE UR QL STRIP: NORMAL
KETONES UR QL STRIP: NORMAL
LEUKOCYTE ESTERASE URINE, POC: NORMAL
NITRITE, POC UA: NORMAL
PH, POC UA: 7
PROTEIN, POC: NORMAL
SPECIFIC GRAVITY, POC UA: 1.01
UROBILINOGEN, POC UA: NORMAL

## 2018-10-08 PROCEDURE — 3077F SYST BP >= 140 MM HG: CPT | Mod: CPTII,,, | Performed by: UROLOGY

## 2018-10-08 PROCEDURE — 96372 THER/PROPH/DIAG INJ SC/IM: CPT | Mod: PBBFAC

## 2018-10-08 PROCEDURE — 81002 URINALYSIS NONAUTO W/O SCOPE: CPT | Mod: PBBFAC | Performed by: UROLOGY

## 2018-10-08 PROCEDURE — 1101F PT FALLS ASSESS-DOCD LE1/YR: CPT | Mod: CPTII,,, | Performed by: UROLOGY

## 2018-10-08 PROCEDURE — 99213 OFFICE O/P EST LOW 20 MIN: CPT | Mod: PBBFAC | Performed by: UROLOGY

## 2018-10-08 PROCEDURE — 99999 PR PBB SHADOW E&M-EST. PATIENT-LVL III: CPT | Mod: PBBFAC,,, | Performed by: UROLOGY

## 2018-10-08 PROCEDURE — 99214 OFFICE O/P EST MOD 30 MIN: CPT | Mod: S$PBB,,, | Performed by: UROLOGY

## 2018-10-08 PROCEDURE — 3078F DIAST BP <80 MM HG: CPT | Mod: CPTII,,, | Performed by: UROLOGY

## 2018-10-08 RX ORDER — TESTOSTERONE CYPIONATE 200 MG/ML
200 INJECTION, SOLUTION INTRAMUSCULAR
Status: COMPLETED | OUTPATIENT
Start: 2018-10-08 | End: 2018-10-08

## 2018-10-08 RX ADMIN — TESTOSTERONE CYPIONATE 200 MG: 200 INJECTION, SOLUTION INTRAMUSCULAR at 08:10

## 2018-10-08 NOTE — PROGRESS NOTES
..explained to pt how to self administer depo testosterone by first gathering supplies, wash hands, clean cap of medicine vial, draw up medication, using aseptic technique administer medicine to post anterior thigh. Return demonstration noted. Advised pt to remain in clinic x 15 min to monitor for adverse reaction.
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
## 2018-10-17 ENCOUNTER — OFFICE VISIT (OUTPATIENT)
Dept: INTERNAL MEDICINE | Facility: CLINIC | Age: 73
End: 2018-10-17
Payer: MEDICARE

## 2018-10-17 VITALS
RESPIRATION RATE: 18 BRPM | TEMPERATURE: 97 F | BODY MASS INDEX: 39.59 KG/M2 | DIASTOLIC BLOOD PRESSURE: 66 MMHG | WEIGHT: 292.31 LBS | HEART RATE: 88 BPM | SYSTOLIC BLOOD PRESSURE: 138 MMHG | HEIGHT: 72 IN

## 2018-10-17 DIAGNOSIS — N18.30 STAGE 3 CHRONIC KIDNEY DISEASE: ICD-10-CM

## 2018-10-17 DIAGNOSIS — E11.22 TYPE 2 DIABETES MELLITUS WITH STAGE 3 CHRONIC KIDNEY DISEASE, WITH LONG-TERM CURRENT USE OF INSULIN: ICD-10-CM

## 2018-10-17 DIAGNOSIS — Z79.4 TYPE 2 DIABETES MELLITUS WITH STAGE 3 CHRONIC KIDNEY DISEASE, WITH LONG-TERM CURRENT USE OF INSULIN: ICD-10-CM

## 2018-10-17 DIAGNOSIS — E78.5 HYPERLIPIDEMIA ASSOCIATED WITH TYPE 2 DIABETES MELLITUS: ICD-10-CM

## 2018-10-17 DIAGNOSIS — G45.9 TIA (TRANSIENT ISCHEMIC ATTACK): Primary | ICD-10-CM

## 2018-10-17 DIAGNOSIS — I15.2 HYPERTENSION ASSOCIATED WITH DIABETES: ICD-10-CM

## 2018-10-17 DIAGNOSIS — E11.69 HYPERLIPIDEMIA ASSOCIATED WITH TYPE 2 DIABETES MELLITUS: ICD-10-CM

## 2018-10-17 DIAGNOSIS — E11.59 HYPERTENSION ASSOCIATED WITH DIABETES: ICD-10-CM

## 2018-10-17 DIAGNOSIS — N18.30 TYPE 2 DIABETES MELLITUS WITH STAGE 3 CHRONIC KIDNEY DISEASE, WITH LONG-TERM CURRENT USE OF INSULIN: ICD-10-CM

## 2018-10-17 LAB — GLUCOSE SERPL-MCNC: 148 MG/DL (ref 70–110)

## 2018-10-17 PROCEDURE — 3046F HEMOGLOBIN A1C LEVEL >9.0%: CPT | Mod: CPTII,,, | Performed by: NURSE PRACTITIONER

## 2018-10-17 PROCEDURE — 3075F SYST BP GE 130 - 139MM HG: CPT | Mod: CPTII,,, | Performed by: NURSE PRACTITIONER

## 2018-10-17 PROCEDURE — 3078F DIAST BP <80 MM HG: CPT | Mod: CPTII,,, | Performed by: NURSE PRACTITIONER

## 2018-10-17 PROCEDURE — 82043 UR ALBUMIN QUANTITATIVE: CPT

## 2018-10-17 PROCEDURE — 90662 IIV NO PRSV INCREASED AG IM: CPT | Mod: PBBFAC,PO

## 2018-10-17 PROCEDURE — 99214 OFFICE O/P EST MOD 30 MIN: CPT | Mod: S$PBB,25,, | Performed by: NURSE PRACTITIONER

## 2018-10-17 PROCEDURE — 99214 OFFICE O/P EST MOD 30 MIN: CPT | Mod: PBBFAC,PO | Performed by: NURSE PRACTITIONER

## 2018-10-17 PROCEDURE — 1101F PT FALLS ASSESS-DOCD LE1/YR: CPT | Mod: CPTII,,, | Performed by: NURSE PRACTITIONER

## 2018-10-17 PROCEDURE — 82948 REAGENT STRIP/BLOOD GLUCOSE: CPT | Mod: PBBFAC,PO | Performed by: NURSE PRACTITIONER

## 2018-10-17 PROCEDURE — 99999 PR PBB SHADOW E&M-EST. PATIENT-LVL IV: CPT | Mod: PBBFAC,,, | Performed by: NURSE PRACTITIONER

## 2018-10-17 RX ORDER — ASPIRIN 325 MG
325 TABLET, DELAYED RELEASE (ENTERIC COATED) ORAL DAILY
Refills: 0 | COMMUNITY
Start: 2018-10-17 | End: 2019-03-21

## 2018-10-17 NOTE — PROGRESS NOTES
Subjective:       Patient ID: Sherif Ragland is a 72 y.o. male.    Chief Complaint: Hypertension    Patient comes in today for further evaluation and management of feeling that his blood pressure is elevated at home.  He states that when he leans forward or stands up he gets a lot of head pressure.  He denies sinus or upper respiratory symptoms.  He does not have a blood pressure meter at home.  Last Saturday in [place], he had a mild headache and then had a 10 minute period where he tried to form words but couldn't. There was no unilateral weakness.  He continues to feelings that his blood pressure is running too high.  He admits to having pressure in his head.  Today, blood pressure is 138/66.  He has been compliant with blood pressure medications as prescribed.  He does admit that he is not taking the clonidine in the morning but is taking it at night.        /66 (BP Location: Right arm, Patient Position: Sitting, BP Method: Medium (Automatic))   Pulse 88   Temp 96.8 °F (36 °C) (Tympanic)   Resp 18   Ht 6' (1.829 m)   Wt 132.6 kg (292 lb 5.3 oz)   BMI 39.65 kg/m²     Review of Systems   Constitutional: Positive for activity change. Negative for appetite change, chills, diaphoresis, fatigue, fever and unexpected weight change.   HENT: Negative for congestion, ear pain, nosebleeds, postnasal drip, rhinorrhea, sinus pressure, sneezing, sore throat and trouble swallowing.    Eyes: Negative for photophobia, pain and visual disturbance.   Respiratory: Negative for apnea, cough, choking, chest tightness, shortness of breath and wheezing.    Cardiovascular: Negative for chest pain, palpitations and leg swelling.   Gastrointestinal: Negative for abdominal pain, blood in stool, constipation, diarrhea, nausea and vomiting.   Genitourinary: Negative for decreased urine volume, difficulty urinating, dysuria, hematuria and urgency.   Musculoskeletal: Negative for arthralgias, gait problem, joint swelling and  myalgias.   Skin: Negative for rash.   Neurological: Positive for dizziness, speech difficulty (last saturday, 10 min, resolved) and headaches. Negative for tremors, seizures, syncope, facial asymmetry, weakness, light-headedness and numbness.   Psychiatric/Behavioral: Negative for agitation, confusion, decreased concentration, hallucinations and sleep disturbance. The patient is not nervous/anxious.        Objective:      Physical Exam   Constitutional: He is oriented to person, place, and time. He appears well-developed and well-nourished. He is cooperative. No distress.   HENT:   Head: Normocephalic and atraumatic.   Eyes: Conjunctivae are normal. Right eye exhibits no discharge. Left eye exhibits no discharge.   Cardiovascular: Normal rate, regular rhythm and normal heart sounds.   No murmur heard.  Pulmonary/Chest: Effort normal and breath sounds normal. No respiratory distress. He has no wheezes. He has no rales. He exhibits no tenderness.   Abdominal: Soft. He exhibits no distension.   Musculoskeletal: Normal range of motion.   Neurological: He is alert and oriented to person, place, and time. He has normal strength. He is not disoriented. He displays no atrophy and no tremor. No cranial nerve deficit or sensory deficit. He exhibits normal muscle tone. He displays no seizure activity. Coordination and gait normal.   Skin: Skin is warm and dry. No rash noted. He is not diaphoretic.   Psychiatric: He has a normal mood and affect. His behavior is normal. Judgment and thought content normal.   Nursing note and vitals reviewed.      Assessment:       1. TIA (transient ischemic attack)    2. Hyperlipidemia associated with type 2 diabetes mellitus    3. Hypertension associated with diabetes    4. Stage 3 chronic kidney disease    5. Type 2 diabetes mellitus with stage 3 chronic kidney disease, with long-term current use of insulin        Plan:       Sherif was seen today for hypertension.    Diagnoses and all orders  for this visit:    TIA (transient ischemic attack)  Start  qd  He may take his clinodine 0.5 tab in AM to help bring systolic down a little better since he is not taking BID as prescribed  Labs today   poct glucose was 148 today  Consulted with Dr. Fox, who agrees this sounds like a TIA. Start ASA. No head imaging today.  Follow up with primary care team in 1 week for follow up  Emergency Room if any recurrence of symptoms.     Hyperlipidemia associated with type 2 diabetes mellitus  -     CBC auto differential; Future  -     Comprehensive metabolic panel; Future  -     Hemoglobin A1c; Future  -     TSH; Future  -     Lipid panel; Future  -     MICROALBUMIN / CREATININE RATIO URINE    Hypertension associated with diabetes  -     CBC auto differential; Future  -     Comprehensive metabolic panel; Future  -     Hemoglobin A1c; Future  -     TSH; Future  -     Lipid panel; Future  -     MICROALBUMIN / CREATININE RATIO URINE    Stage 3 chronic kidney disease  -     CBC auto differential; Future  -     Comprehensive metabolic panel; Future  -     Hemoglobin A1c; Future  -     TSH; Future  -     Lipid panel; Future  -     MICROALBUMIN / CREATININE RATIO URINE    Type 2 diabetes mellitus with stage 3 chronic kidney disease, with long-term current use of insulin  -     POCT glucose  -     CBC auto differential; Future  -     Comprehensive metabolic panel; Future  -     Hemoglobin A1c; Future  -     TSH; Future  -     Lipid panel; Future  -     MICROALBUMIN / CREATININE RATIO URINE    Other orders  -     Influenza - High Dose (65+) (PF) (IM)      Patient Instructions   Start aspirin 325 mg with food daily      Transient Ischemic Attack (TIA)     Your symptoms were caused by a TIA, or mini-stroke. Even though your symptoms have gone away, this condition is as serious as a full stroke. It means you are more likely to have a full stroke. About 1 in 3 people who have a TIA go on to have a full stroke. And 4% to 10% of  those people will have the stroke within 2 days.  A TIA is caused when something decreases or blocks blood flow to a part of your brain. A TIA often happens when a blood clot travels to a blood vessel in the brain. The clot reduces or blocks blood flow. This causes the symptoms you had. After a short while, the clot dissolves. Blood flows again, and the symptoms go away. People with hardening of the arteries (atherosclerosis) are at higher risk for a TIA. So are people who have an irregular heartbeat called atrial fibrillation.  A TIA causes symptoms similar to a stroke, but they last less than 24 hours. A full stroke causes symptoms that last more than 24 hours and may be permanent. But even if your symptoms only lasted a short time, the TIA may have damaged your brain tissue. Once you have had a TIA, you are at risk of having a full stroke. You will need tests to look at the blood flow to your brain. The tests can also rule out other causes of your symptoms. The tests may include an ultrasound of the arteries in your neck and an evaluation of your heart. They may also include a CT scan of your brain, an MRI scan of your brain, or both. If your healthcare provider finds problems, he or she will recommend treatment with medicines, procedures, or both.  Your provider may prescribe medicines to reduce your chance of having another TIA and stroke. These may include medicines that prevent blood clots, such as antiplatelet medicines and blood thinners (anticoagulants). Your doctor may recommend other treatment. This may include a procedure to open up a blocked artery in your neck.  Home care  The following guidelines will help you take care of yourself at home:  · Take any medicines your doctor has prescribed as directed. These may include antiplatelet medicines or medicines for other conditions, such as high blood pressure or high cholesterol.  · A TIA is a serious event that puts you at risk of having a full stroke.  Because of this, it is important to take steps to help prevent a stroke from happening. Your doctor will look at all of your risk factors when deciding on what other treatment you may need.  Ways to reduce your risk for stroke  High blood pressure, diabetes, high cholesterol, heavy drinking, and smoking are risk factors for stroke and heart disease. You can control these by taking medicines and making diet and lifestyle changes. One way to help prevent a stroke is to take aspirin or a similar medicine every day. But don't take daily aspirin unless your healthcare provider tells you to.  Your provider will work with you to make lifestyle changes to help prevent a stroke.  Diet  Your healthcare provider will give you information about changes you may need to make to your diet. You may need to see a registered dietitian for help with diet changes. Changes may include:  · Eating less fat and cholesterol  · Eating less salt (sodium). This is especially important if you have high blood pressure.  · Eating more fresh fruits and vegetables  · Eating lean proteins, such as fish, poultry, beans, and peas  · Eating less red meat and processed meats  · Using low-fat dairy products  · Using vegetable and nut oils in limited amounts  · Limiting how many sweets and processed foods such as chips, cookies, and baked goods you eat  · Limiting how much alcohol you drink  Physical activity  Your healthcare provider may recommend that you get more exercise if you have not been as active as possible. He or she may suggest that you get 40 minutes of moderate to vigorous physical activity each day. You should do this at least 3 to 4 days a week. A few examples of moderate to vigorous exercise are:  · Walking at a brisk pace, about 3 to 4 miles per hour  · Jogging or running  · Swimming or water aerobics  · Hiking  · Dancing  · Martial arts  · Tennis  · Riding a bike  Other ways to reduce your risk  · Weight management. If you are  overweight or obese, your healthcare provider will work with you to lose weight and lower your body mass index (BMI) to a normal or near-normal level. Making diet changes and increasing physical activity can help.  · Smoking. If you smoke, break the habit. Enroll in a stop smoking program to improve your chances of success.  · Stress. Learn how to manage your stress. This will help you deal with stress at home and at work.  Follow-up care  Call your doctor for an appointment in the next few days for another evaluation, or as advised. This is to make a plan for preventing another TIA or stroke. You may need to see a neurologist to follow up on your TIA. A neurologist is a doctor who specializes in treating brain and nervous system problems. You may need other tests or procedures.  If you had an X-ray, CT scan, MRI scan, or ECG (electrocardiogram), a specialist will review it. Youll be told of any new findings that will affect your care.  Call 911  Call 911 if any of these occur:  · Any of your TIA symptoms return  · New problems with speech, vision, walking, or weakness or numbness of the face or on one side of the body  · Severe headache, fainting spell, dizziness, or seizure  Date Last Reviewed: 10/1/2016  © 0972-6864 [employer]. [address] [place] [address], [place] [address]. All rights reserved. This information is not intended as a substitute for professional medical care. Always follow your healthcare professional's instructions.

## 2018-10-17 NOTE — PATIENT INSTRUCTIONS
Start aspirin 325 mg with food daily      Transient Ischemic Attack (TIA)     Your symptoms were caused by a TIA, or mini-stroke. Even though your symptoms have gone away, this condition is as serious as a full stroke. It means you are more likely to have a full stroke. About 1 in 3 people who have a TIA go on to have a full stroke. And 4% to 10% of those people will have the stroke within 2 days.  A TIA is caused when something decreases or blocks blood flow to a part of your brain. A TIA often happens when a blood clot travels to a blood vessel in the brain. The clot reduces or blocks blood flow. This causes the symptoms you had. After a short while, the clot dissolves. Blood flows again, and the symptoms go away. People with hardening of the arteries (atherosclerosis) are at higher risk for a TIA. So are people who have an irregular heartbeat called atrial fibrillation.  A TIA causes symptoms similar to a stroke, but they last less than 24 hours. A full stroke causes symptoms that last more than 24 hours and may be permanent. But even if your symptoms only lasted a short time, the TIA may have damaged your brain tissue. Once you have had a TIA, you are at risk of having a full stroke. You will need tests to look at the blood flow to your brain. The tests can also rule out other causes of your symptoms. The tests may include an ultrasound of the arteries in your neck and an evaluation of your heart. They may also include a CT scan of your brain, an MRI scan of your brain, or both. If your healthcare provider finds problems, he or she will recommend treatment with medicines, procedures, or both.  Your provider may prescribe medicines to reduce your chance of having another TIA and stroke. These may include medicines that prevent blood clots, such as antiplatelet medicines and blood thinners (anticoagulants). Your doctor may recommend other treatment. This may include a procedure to open up a blocked artery in your  neck.  Home care  The following guidelines will help you take care of yourself at home:  · Take any medicines your doctor has prescribed as directed. These may include antiplatelet medicines or medicines for other conditions, such as high blood pressure or high cholesterol.  · A TIA is a serious event that puts you at risk of having a full stroke. Because of this, it is important to take steps to help prevent a stroke from happening. Your doctor will look at all of your risk factors when deciding on what other treatment you may need.  Ways to reduce your risk for stroke  High blood pressure, diabetes, high cholesterol, heavy drinking, and smoking are risk factors for stroke and heart disease. You can control these by taking medicines and making diet and lifestyle changes. One way to help prevent a stroke is to take aspirin or a similar medicine every day. But don't take daily aspirin unless your healthcare provider tells you to.  Your provider will work with you to make lifestyle changes to help prevent a stroke.  Diet  Your healthcare provider will give you information about changes you may need to make to your diet. You may need to see a registered dietitian for help with diet changes. Changes may include:  · Eating less fat and cholesterol  · Eating less salt (sodium). This is especially important if you have high blood pressure.  · Eating more fresh fruits and vegetables  · Eating lean proteins, such as fish, poultry, beans, and peas  · Eating less red meat and processed meats  · Using low-fat dairy products  · Using vegetable and nut oils in limited amounts  · Limiting how many sweets and processed foods such as chips, cookies, and baked goods you eat  · Limiting how much alcohol you drink  Physical activity  Your healthcare provider may recommend that you get more exercise if you have not been as active as possible. He or she may suggest that you get 40 minutes of moderate to vigorous physical activity each  day. You should do this at least 3 to 4 days a week. A few examples of moderate to vigorous exercise are:  · Walking at a brisk pace, about 3 to 4 miles per hour  · Jogging or running  · Swimming or water aerobics  · Hiking  · Dancing  · Martial arts  · Tennis  · Riding a bike  Other ways to reduce your risk  · Weight management. If you are overweight or obese, your healthcare provider will work with you to lose weight and lower your body mass index (BMI) to a normal or near-normal level. Making diet changes and increasing physical activity can help.  · Smoking. If you smoke, break the habit. Enroll in a stop smoking program to improve your chances of success.  · Stress. Learn how to manage your stress. This will help you deal with stress at home and at work.  Follow-up care  Call your doctor for an appointment in the next few days for another evaluation, or as advised. This is to make a plan for preventing another TIA or stroke. You may need to see a neurologist to follow up on your TIA. A neurologist is a doctor who specializes in treating brain and nervous system problems. You may need other tests or procedures.  If you had an X-ray, CT scan, MRI scan, or ECG (electrocardiogram), a specialist will review it. Youll be told of any new findings that will affect your care.  Call 911  Call 911 if any of these occur:  · Any of your TIA symptoms return  · New problems with speech, vision, walking, or weakness or numbness of the face or on one side of the body  · Severe headache, fainting spell, dizziness, or seizure  Date Last Reviewed: 10/1/2016  © 7660-3994 [employer]. [address] [place] [address], [place] [address]. All rights reserved. This information is not intended as a substitute for professional medical care. Always follow your healthcare professional's instructions.

## 2018-10-18 ENCOUNTER — LAB VISIT (OUTPATIENT)
Dept: LAB | Facility: HOSPITAL | Age: 73
End: 2018-10-18
Attending: NURSE PRACTITIONER
Payer: MEDICARE

## 2018-10-18 DIAGNOSIS — Z79.4 TYPE 2 DIABETES MELLITUS WITH STAGE 3 CHRONIC KIDNEY DISEASE, WITH LONG-TERM CURRENT USE OF INSULIN: ICD-10-CM

## 2018-10-18 DIAGNOSIS — N18.30 TYPE 2 DIABETES MELLITUS WITH STAGE 3 CHRONIC KIDNEY DISEASE, WITH LONG-TERM CURRENT USE OF INSULIN: ICD-10-CM

## 2018-10-18 DIAGNOSIS — N18.30 STAGE 3 CHRONIC KIDNEY DISEASE: ICD-10-CM

## 2018-10-18 DIAGNOSIS — I15.2 HYPERTENSION ASSOCIATED WITH DIABETES: ICD-10-CM

## 2018-10-18 DIAGNOSIS — E78.5 HYPERLIPIDEMIA ASSOCIATED WITH TYPE 2 DIABETES MELLITUS: ICD-10-CM

## 2018-10-18 DIAGNOSIS — E11.59 HYPERTENSION ASSOCIATED WITH DIABETES: ICD-10-CM

## 2018-10-18 DIAGNOSIS — E11.22 TYPE 2 DIABETES MELLITUS WITH STAGE 3 CHRONIC KIDNEY DISEASE, WITH LONG-TERM CURRENT USE OF INSULIN: ICD-10-CM

## 2018-10-18 DIAGNOSIS — E11.69 HYPERLIPIDEMIA ASSOCIATED WITH TYPE 2 DIABETES MELLITUS: ICD-10-CM

## 2018-10-18 LAB
ALBUMIN SERPL BCP-MCNC: 3.2 G/DL
ALBUMIN/CREAT UR: 80.8 UG/MG
ALP SERPL-CCNC: 117 U/L
ALT SERPL W/O P-5'-P-CCNC: 21 U/L
ANION GAP SERPL CALC-SCNC: 6 MMOL/L
AST SERPL-CCNC: 21 U/L
BASOPHILS # BLD AUTO: 0.03 K/UL
BASOPHILS NFR BLD: 0.5 %
BILIRUB SERPL-MCNC: 0.6 MG/DL
BUN SERPL-MCNC: 24 MG/DL
CALCIUM SERPL-MCNC: 9.3 MG/DL
CHLORIDE SERPL-SCNC: 102 MMOL/L
CHOLEST SERPL-MCNC: 115 MG/DL
CHOLEST/HDLC SERPL: 2.8 {RATIO}
CO2 SERPL-SCNC: 33 MMOL/L
CREAT SERPL-MCNC: 1.7 MG/DL
CREAT UR-MCNC: 125 MG/DL
DIFFERENTIAL METHOD: ABNORMAL
EOSINOPHIL # BLD AUTO: 0.2 K/UL
EOSINOPHIL NFR BLD: 2.3 %
ERYTHROCYTE [DISTWIDTH] IN BLOOD BY AUTOMATED COUNT: 15.7 %
EST. GFR  (AFRICAN AMERICAN): 45.6 ML/MIN/1.73 M^2
EST. GFR  (NON AFRICAN AMERICAN): 39.4 ML/MIN/1.73 M^2
ESTIMATED AVG GLUCOSE: 197 MG/DL
GLUCOSE SERPL-MCNC: 172 MG/DL
HBA1C MFR BLD HPLC: 8.5 %
HCT VFR BLD AUTO: 39.1 %
HDLC SERPL-MCNC: 41 MG/DL
HDLC SERPL: 35.7 %
HGB BLD-MCNC: 11.8 G/DL
IMM GRANULOCYTES # BLD AUTO: 0.02 K/UL
IMM GRANULOCYTES NFR BLD AUTO: 0.3 %
LDLC SERPL CALC-MCNC: 55 MG/DL
LYMPHOCYTES # BLD AUTO: 0.8 K/UL
LYMPHOCYTES NFR BLD: 12.6 %
MCH RBC QN AUTO: 29 PG
MCHC RBC AUTO-ENTMCNC: 30.2 G/DL
MCV RBC AUTO: 96 FL
MICROALBUMIN UR DL<=1MG/L-MCNC: 101 UG/ML
MONOCYTES # BLD AUTO: 1 K/UL
MONOCYTES NFR BLD: 14.6 %
NEUTROPHILS # BLD AUTO: 4.6 K/UL
NEUTROPHILS NFR BLD: 69.7 %
NONHDLC SERPL-MCNC: 74 MG/DL
NRBC BLD-RTO: 0 /100 WBC
PLATELET # BLD AUTO: 209 K/UL
PMV BLD AUTO: 9.7 FL
POTASSIUM SERPL-SCNC: 4 MMOL/L
PROT SERPL-MCNC: 6.4 G/DL
RBC # BLD AUTO: 4.07 M/UL
SODIUM SERPL-SCNC: 141 MMOL/L
TRIGL SERPL-MCNC: 95 MG/DL
TSH SERPL DL<=0.005 MIU/L-ACNC: 2.59 UIU/ML
WBC # BLD AUTO: 6.58 K/UL

## 2018-10-18 PROCEDURE — 84443 ASSAY THYROID STIM HORMONE: CPT

## 2018-10-18 PROCEDURE — 85025 COMPLETE CBC W/AUTO DIFF WBC: CPT

## 2018-10-18 PROCEDURE — 80061 LIPID PANEL: CPT

## 2018-10-18 PROCEDURE — 80053 COMPREHEN METABOLIC PANEL: CPT

## 2018-10-18 PROCEDURE — 83036 HEMOGLOBIN GLYCOSYLATED A1C: CPT

## 2018-10-18 PROCEDURE — 36415 COLL VENOUS BLD VENIPUNCTURE: CPT | Mod: PO

## 2018-10-26 ENCOUNTER — OFFICE VISIT (OUTPATIENT)
Dept: INTERNAL MEDICINE | Facility: CLINIC | Age: 73
End: 2018-10-26
Payer: MEDICARE

## 2018-10-26 VITALS
BODY MASS INDEX: 40.28 KG/M2 | HEIGHT: 72 IN | TEMPERATURE: 98 F | WEIGHT: 297.38 LBS | SYSTOLIC BLOOD PRESSURE: 142 MMHG | HEART RATE: 80 BPM | DIASTOLIC BLOOD PRESSURE: 92 MMHG

## 2018-10-26 DIAGNOSIS — N18.30 TYPE 2 DIABETES MELLITUS WITH STAGE 3 CHRONIC KIDNEY DISEASE, WITH LONG-TERM CURRENT USE OF INSULIN: ICD-10-CM

## 2018-10-26 DIAGNOSIS — I15.2 HYPERTENSION ASSOCIATED WITH DIABETES: Primary | ICD-10-CM

## 2018-10-26 DIAGNOSIS — Z79.4 TYPE 2 DIABETES MELLITUS WITH STAGE 3 CHRONIC KIDNEY DISEASE, WITH LONG-TERM CURRENT USE OF INSULIN: ICD-10-CM

## 2018-10-26 DIAGNOSIS — R29.818 FOCAL NEUROLOGICAL DEFICIT: ICD-10-CM

## 2018-10-26 DIAGNOSIS — E11.59 HYPERTENSION ASSOCIATED WITH DIABETES: Primary | ICD-10-CM

## 2018-10-26 DIAGNOSIS — E11.22 TYPE 2 DIABETES MELLITUS WITH STAGE 3 CHRONIC KIDNEY DISEASE, WITH LONG-TERM CURRENT USE OF INSULIN: ICD-10-CM

## 2018-10-26 PROCEDURE — 1101F PT FALLS ASSESS-DOCD LE1/YR: CPT | Mod: CPTII,,, | Performed by: PHYSICIAN ASSISTANT

## 2018-10-26 PROCEDURE — 3080F DIAST BP >= 90 MM HG: CPT | Mod: CPTII,,, | Performed by: PHYSICIAN ASSISTANT

## 2018-10-26 PROCEDURE — 99214 OFFICE O/P EST MOD 30 MIN: CPT | Mod: S$PBB,,, | Performed by: PHYSICIAN ASSISTANT

## 2018-10-26 PROCEDURE — 3077F SYST BP >= 140 MM HG: CPT | Mod: CPTII,,, | Performed by: PHYSICIAN ASSISTANT

## 2018-10-26 PROCEDURE — 99999 PR PBB SHADOW E&M-EST. PATIENT-LVL IV: CPT | Mod: PBBFAC,,, | Performed by: PHYSICIAN ASSISTANT

## 2018-10-26 PROCEDURE — 3045F PR MOST RECENT HEMOGLOBIN A1C LEVEL 7.0-9.0%: CPT | Mod: CPTII,,, | Performed by: PHYSICIAN ASSISTANT

## 2018-10-26 PROCEDURE — 99214 OFFICE O/P EST MOD 30 MIN: CPT | Mod: PBBFAC,PO | Performed by: PHYSICIAN ASSISTANT

## 2018-10-26 RX ORDER — OLMESARTAN MEDOXOMIL AND HYDROCHLOROTHIAZIDE 40/25 40; 25 MG/1; MG/1
1 TABLET ORAL DAILY
Qty: 30 TABLET | Refills: 3 | Status: SHIPPED | OUTPATIENT
Start: 2018-10-26 | End: 2018-12-20 | Stop reason: ALTCHOICE

## 2018-10-26 NOTE — PROGRESS NOTES
Subjective:       Patient ID: Sherif Ragland is a 73 y.o. male.    Chief Complaint: Follow-up    HPI   Patient comes in today for follow up TIA like episode   Patient was at [employer] and he could not make works to speak to    This happened for a ccouple of minutes then resolved.   Patient and wife went on about their night with no episodes   He then followed up in clinc.  NP started patient on ASA which he is taking     Has not had further episodes but does complain of some pressure in head and he is worried about his BP       Health Maintenance Due   Topic Date Due    TETANUS VACCINE  10/28/1963    Foot Exam  12/26/2018       Past Medical History:   Diagnosis Date    Diabetes mellitus, type 2 1997    Hyperlipidemia     Hypertension     Hypogonadism in male     Renal insufficiency     Tubular adenoma 10/2017       Current Outpatient Medications   Medication Sig Dispense Refill    aspirin (ECOTRIN) 325 MG EC tablet Take 1 tablet (325 mg total) by mouth once daily.  0    atorvastatin (LIPITOR) 80 MG tablet Take 1 tablet (80 mg total) by mouth once daily. 90 tablet 4    blood sugar diagnostic Strp To check BG 2 times daily, to use with insurance preferred meter 100 each 11    blood-glucose meter kit To check BG two times daily, to use with insurance preferred meter 1 each 0    cloNIDine (CATAPRES) 0.2 MG tablet Take 1 tablet (0.2 mg total) by mouth 2 (two) times daily. (Patient taking differently: Take 0.2 mg by mouth 2 (two) times daily. ) 180 tablet 4    FREESTYLE LAURENCE SENSOR Kit USE AS DIRECTED FOR 1 DOSE 1 kit 0    insulin detemir U-100 (LEVEMIR FLEXTOUCH) 100 unit/mL (3 mL) SubQ InPn pen Inject 40 Units into the skin 2 (two) times daily. 72 mL 3    lancets Misc To check BG 2 times daily, to use with insurance preferred meter 100 each 11    metoprolol succinate (TOPROL-XL) 100 MG 24 hr tablet Take 1 tablet (100 mg total) by mouth once daily. 90 tablet 4    pantoprazole (PROTONIX) 40  "MG tablet Take 1 tablet (40 mg total) by mouth 2 (two) times daily before meals. 60 tablet 2    papaverine 30 mg/mL injection Inject 0.3 ml of trimix solution prn ED max once daily  Prepare 10ml of trimix solution containing:    Papaverine 30mg/ml    Phentolamine 1 mg/ml    Alprostadil 10mcg/ml  Dispense 10ml per refill  Qs syringes 1cc/30g/0.5" and alcohol swabs dispense as needed for Intracavernosal injection 10 mL 5    pioglitazone (ACTOS) 30 MG tablet Take 1 tablet (30 mg total) by mouth once daily. 90 tablet 3    terazosin (HYTRIN) 10 MG capsule Take 1 capsule (10 mg total) by mouth once daily. (Patient taking differently: Take 20 mg by mouth once daily. ) 90 capsule 4    olmesartan-hydrochlorothiazide (BENICAR HCT) 40-25 mg per tablet Take 1 tablet by mouth once daily. 30 tablet 3    ranitidine (ZANTAC) 150 MG tablet Take 1 tablet (150 mg total) by mouth 2 (two) times daily. 60 tablet 0     No current facility-administered medications for this visit.        Review of Systems   Constitutional: Positive for activity change. Negative for appetite change, chills, diaphoresis, fatigue, fever and unexpected weight change.   HENT: Negative for congestion, ear pain, nosebleeds, postnasal drip, rhinorrhea, sinus pressure, sneezing, sore throat and trouble swallowing.    Eyes: Negative for photophobia, pain and visual disturbance.   Respiratory: Negative for apnea, cough, choking, chest tightness, shortness of breath and wheezing.    Cardiovascular: Negative for chest pain, palpitations and leg swelling.   Gastrointestinal: Negative for abdominal pain, blood in stool, constipation, diarrhea, nausea and vomiting.   Genitourinary: Negative for decreased urine volume, difficulty urinating, dysuria, hematuria and urgency.   Musculoskeletal: Negative for arthralgias, gait problem, joint swelling and myalgias.   Skin: Negative for rash.   Neurological: Positive for dizziness, speech difficulty (last saturday, 10 min, " resolved) and headaches. Negative for tremors, seizures, syncope, facial asymmetry, weakness, light-headedness and numbness.   Psychiatric/Behavioral: Negative for agitation, confusion, decreased concentration, hallucinations and sleep disturbance. The patient is not nervous/anxious.        Objective:   BP (!) 142/92   Pulse 80   Temp 98.2 °F (36.8 °C) (Tympanic)   Ht 6' (1.829 m)   Wt 134.9 kg (297 lb 6.4 oz)   BMI 40.33 kg/m²      Physical Exam   Constitutional: He is oriented to person, place, and time. He appears well-developed and well-nourished. He is cooperative. No distress.   HENT:   Head: Normocephalic and atraumatic.   Eyes: Conjunctivae are normal. Right eye exhibits no discharge. Left eye exhibits no discharge.   Cardiovascular: Normal rate, regular rhythm and normal heart sounds.   No murmur heard.  Pulmonary/Chest: Effort normal and breath sounds normal. No respiratory distress. He has no wheezes. He has no rales. He exhibits no tenderness.   Abdominal: Soft. He exhibits no distension.   Musculoskeletal: Normal range of motion.   Neurological: He is alert and oriented to person, place, and time. He has normal strength. He is not disoriented. He displays no atrophy and no tremor. No cranial nerve deficit or sensory deficit. He exhibits normal muscle tone. He displays no seizure activity. Coordination and gait normal.   Skin: Skin is warm and dry. No rash noted. He is not diaphoretic.   Psychiatric: He has a normal mood and affect. His behavior is normal. Judgment and thought content normal.   Nursing note and vitals reviewed.        Lab Results   Component Value Date    WBC 6.58 10/18/2018    HGB 11.8 (L) 10/18/2018    HCT 39.1 (L) 10/18/2018     10/18/2018    CHOL 115 (L) 10/18/2018    TRIG 95 10/18/2018    HDL 41 10/18/2018    ALT 21 10/18/2018    AST 21 10/18/2018     10/18/2018    K 4.0 10/18/2018     10/18/2018    CREATININE 1.7 (H) 10/18/2018    BUN 24 (H) 10/18/2018     CO2 33 (H) 10/18/2018    TSH 2.593 10/18/2018    PSA 3.2 09/19/2018    HGBA1C 8.5 (H) 10/18/2018       Assessment:       1. Hypertension associated with diabetes    2. Focal neurological deficit    3. Type 2 diabetes mellitus with stage 3 chronic kidney disease, with long-term current use of insulin        Plan:       Sherif was seen today for follow-up.    Diagnoses and all orders for this visit:    Hypertension associated with diabetes  -     olmesartan-hydrochlorothiazide (BENICAR HCT) 40-25 mg per tablet; Take 1 tablet by mouth once daily.  -     Hypertension Digital Medicine (HDMP) Enrollment Order  -     Hypertension Digital Medicine ([facility]): Assign Onboarding Questionnaires    Focal neurological deficit  -     CT Head Without Contrast; Future    Type 2 diabetes mellitus with stage 3 chronic kidney disease, with long-term current use of insulin    Other orders  -     ranitidine (ZANTAC) 150 MG tablet; Take 1 tablet (150 mg total) by mouth 2 (two) times daily.      Will get CT as episdoe did seem to be a TIA   If negative, start plavix with ASA  Patient to start digital hypertension program     Will follow up in clinic after CT   No Follow-up on file.

## 2018-10-27 ENCOUNTER — PATIENT MESSAGE (OUTPATIENT)
Dept: ADMINISTRATIVE | Facility: OTHER | Age: 73
End: 2018-10-27

## 2018-10-31 ENCOUNTER — HOSPITAL ENCOUNTER (OUTPATIENT)
Dept: RADIOLOGY | Facility: HOSPITAL | Age: 73
Discharge: HOME | End: 2018-10-31
Attending: PHYSICIAN ASSISTANT
Payer: MEDICARE

## 2018-10-31 DIAGNOSIS — R29.818 FOCAL NEUROLOGICAL DEFICIT: ICD-10-CM

## 2018-10-31 PROCEDURE — 70450 CT HEAD/BRAIN W/O DYE: CPT | Mod: 26,,, | Performed by: RADIOLOGY

## 2018-10-31 PROCEDURE — 70450 CT HEAD/BRAIN W/O DYE: CPT | Mod: TC,PO

## 2018-11-01 ENCOUNTER — PATIENT MESSAGE (OUTPATIENT)
Dept: INTERNAL MEDICINE | Facility: CLINIC | Age: 73
End: 2018-11-01

## 2018-11-03 ENCOUNTER — PATIENT MESSAGE (OUTPATIENT)
Dept: INTERNAL MEDICINE | Facility: CLINIC | Age: 73
End: 2018-11-03

## 2018-11-05 ENCOUNTER — TELEPHONE (OUTPATIENT)
Dept: INTERNAL MEDICINE | Facility: CLINIC | Age: 73
End: 2018-11-05

## 2018-11-05 NOTE — TELEPHONE ENCOUNTER
Contact patietn regarding any symptoms.  Chest pain, vision changes, headache, stroke like symptoms?

## 2018-11-05 NOTE — TELEPHONE ENCOUNTER
Spoke with pt regarding elevated BP. Per pt the only symptom he is having is a headache and he is fatigue. Instructed pt to report to UC if he starts to have any symptoms such as blurred vision, chest pain, lightheadedness, or dizziness. Pt verbalized understanding after stating that he will keep his appt on tomorrow.

## 2018-11-05 NOTE — TELEPHONE ENCOUNTER
Thank you.  Please call him in the morning and see how he is doing.  We can move him up to a morning slot if we need to.

## 2018-11-06 ENCOUNTER — OFFICE VISIT (OUTPATIENT)
Dept: INTERNAL MEDICINE | Facility: CLINIC | Age: 73
End: 2018-11-06
Payer: MEDICARE

## 2018-11-06 ENCOUNTER — HOSPITAL ENCOUNTER (OUTPATIENT)
Dept: RADIOLOGY | Facility: HOSPITAL | Age: 73
Discharge: HOME | End: 2018-11-06
Attending: PHYSICIAN ASSISTANT
Payer: MEDICARE

## 2018-11-06 VITALS
HEART RATE: 70 BPM | SYSTOLIC BLOOD PRESSURE: 158 MMHG | WEIGHT: 294.75 LBS | BODY MASS INDEX: 39.92 KG/M2 | TEMPERATURE: 98 F | HEIGHT: 72 IN | DIASTOLIC BLOOD PRESSURE: 70 MMHG

## 2018-11-06 DIAGNOSIS — I15.2 HYPERTENSION ASSOCIATED WITH DIABETES: ICD-10-CM

## 2018-11-06 DIAGNOSIS — M75.42 SHOULDER IMPINGEMENT SYNDROME, LEFT: Primary | ICD-10-CM

## 2018-11-06 DIAGNOSIS — N18.30 STAGE 3 CHRONIC KIDNEY DISEASE: ICD-10-CM

## 2018-11-06 DIAGNOSIS — E11.59 HYPERTENSION ASSOCIATED WITH DIABETES: ICD-10-CM

## 2018-11-06 DIAGNOSIS — M75.42 SHOULDER IMPINGEMENT SYNDROME, LEFT: ICD-10-CM

## 2018-11-06 PROCEDURE — 3078F DIAST BP <80 MM HG: CPT | Mod: CPTII,S$GLB,, | Performed by: PHYSICIAN ASSISTANT

## 2018-11-06 PROCEDURE — 1101F PT FALLS ASSESS-DOCD LE1/YR: CPT | Mod: CPTII,S$GLB,, | Performed by: PHYSICIAN ASSISTANT

## 2018-11-06 PROCEDURE — 99999 PR PBB SHADOW E&M-EST. PATIENT-LVL V: CPT | Mod: PBBFAC,,, | Performed by: PHYSICIAN ASSISTANT

## 2018-11-06 PROCEDURE — 73030 X-RAY EXAM OF SHOULDER: CPT | Mod: TC,FY,PO,LT

## 2018-11-06 PROCEDURE — 99214 OFFICE O/P EST MOD 30 MIN: CPT | Mod: S$GLB,,, | Performed by: PHYSICIAN ASSISTANT

## 2018-11-06 PROCEDURE — 3045F PR MOST RECENT HEMOGLOBIN A1C LEVEL 7.0-9.0%: CPT | Mod: CPTII,S$GLB,, | Performed by: PHYSICIAN ASSISTANT

## 2018-11-06 PROCEDURE — 73030 X-RAY EXAM OF SHOULDER: CPT | Mod: 26,LT,, | Performed by: RADIOLOGY

## 2018-11-06 PROCEDURE — 3077F SYST BP >= 140 MM HG: CPT | Mod: CPTII,S$GLB,, | Performed by: PHYSICIAN ASSISTANT
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
## 2018-11-06 NOTE — PROGRESS NOTES
X-ray does not show any fractures but does show some loose bone fragments that could be from arthritis this possibly could be causing pain so if pain persist and is not improving with suggest to see 1 of our orthopedics

## 2018-11-07 ENCOUNTER — TELEPHONE (OUTPATIENT)
Dept: INTERNAL MEDICINE | Facility: CLINIC | Age: 73
End: 2018-11-07

## 2018-11-07 ENCOUNTER — OFFICE VISIT (OUTPATIENT)
Dept: UROLOGY | Facility: CLINIC | Age: 73
End: 2018-11-07
Payer: MEDICARE

## 2018-11-07 VITALS
DIASTOLIC BLOOD PRESSURE: 64 MMHG | HEIGHT: 72 IN | BODY MASS INDEX: 39.71 KG/M2 | HEART RATE: 70 BPM | WEIGHT: 293.19 LBS | SYSTOLIC BLOOD PRESSURE: 162 MMHG

## 2018-11-07 DIAGNOSIS — E29.1 HYPOGONADISM IN MALE: Primary | ICD-10-CM

## 2018-11-07 DIAGNOSIS — N52.9 ERECTILE DYSFUNCTION, UNSPECIFIED ERECTILE DYSFUNCTION TYPE: ICD-10-CM

## 2018-11-07 LAB
BILIRUB SERPL-MCNC: NORMAL MG/DL
BLOOD URINE, POC: NORMAL
COLOR, POC UA: NORMAL
GLUCOSE UR QL STRIP: NORMAL
KETONES UR QL STRIP: NORMAL
LEUKOCYTE ESTERASE URINE, POC: NORMAL
NITRITE, POC UA: NORMAL
PH, POC UA: 6
PROTEIN, POC: NORMAL
SPECIFIC GRAVITY, POC UA: 1
UROBILINOGEN, POC UA: NORMAL

## 2018-11-07 PROCEDURE — 1101F PT FALLS ASSESS-DOCD LE1/YR: CPT | Mod: CPTII,S$GLB,, | Performed by: UROLOGY

## 2018-11-07 PROCEDURE — 81002 URINALYSIS NONAUTO W/O SCOPE: CPT | Mod: S$GLB,,, | Performed by: UROLOGY

## 2018-11-07 PROCEDURE — 99999 PR PBB SHADOW E&M-EST. PATIENT-LVL III: CPT | Mod: PBBFAC,,, | Performed by: UROLOGY

## 2018-11-07 PROCEDURE — 3077F SYST BP >= 140 MM HG: CPT | Mod: CPTII,S$GLB,, | Performed by: UROLOGY

## 2018-11-07 PROCEDURE — 96372 THER/PROPH/DIAG INJ SC/IM: CPT | Mod: S$GLB,,, | Performed by: UROLOGY

## 2018-11-07 PROCEDURE — 99214 OFFICE O/P EST MOD 30 MIN: CPT | Mod: 25,S$GLB,, | Performed by: UROLOGY

## 2018-11-07 PROCEDURE — 3078F DIAST BP <80 MM HG: CPT | Mod: CPTII,S$GLB,, | Performed by: UROLOGY

## 2018-11-07 RX ORDER — PAPAVERINE HYDROCHLORIDE 30 MG/ML
INJECTION INTRAMUSCULAR; INTRAVENOUS
Qty: 10 ML | Refills: 5 | Status: SHIPPED | OUTPATIENT
Start: 2018-11-07 | End: 2019-03-28 | Stop reason: SDUPTHER

## 2018-11-07 RX ORDER — TESTOSTERONE CYPIONATE 200 MG/ML
200 INJECTION, SOLUTION INTRAMUSCULAR
Status: COMPLETED | OUTPATIENT
Start: 2018-11-07 | End: 2018-11-07

## 2018-11-07 RX ORDER — TESTOSTERONE CYPIONATE 200 MG/ML
200 INJECTION, SOLUTION INTRAMUSCULAR
Qty: 10 ML | Refills: 1 | Status: ON HOLD | OUTPATIENT
Start: 2018-11-07 | End: 2019-01-05 | Stop reason: HOSPADM

## 2018-11-07 RX ADMIN — TESTOSTERONE CYPIONATE 200 MG: 200 INJECTION, SOLUTION INTRAMUSCULAR at 09:11

## 2018-11-07 NOTE — TELEPHONE ENCOUNTER
----- Message from Nancie Singh sent at 11/7/2018 11:16 AM CST -----  Pt at [phone]//states he is returning your call//please call again//conchita/renan

## 2018-11-07 NOTE — PROGRESS NOTES
Taught patient to self-administer injection. 200mg Depo-Testosterone self administered IM via left anterior thigh. Aseptic technique maintained. Asked patient to remain in room for 15 mins. No adverse signs noted. Patient tolerated well.
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
## 2018-11-12 ENCOUNTER — PATIENT MESSAGE (OUTPATIENT)
Dept: INTERNAL MEDICINE | Facility: CLINIC | Age: 73
End: 2018-11-12

## 2018-11-13 ENCOUNTER — PATIENT MESSAGE (OUTPATIENT)
Dept: INTERNAL MEDICINE | Facility: CLINIC | Age: 73
End: 2018-11-13

## 2018-11-14 RX ORDER — METOPROLOL SUCCINATE 100 MG/1
TABLET, EXTENDED RELEASE ORAL
Qty: 90 TABLET | Refills: 4 | Status: SHIPPED | OUTPATIENT
Start: 2018-11-14 | End: 2018-11-14 | Stop reason: SDUPTHER

## 2018-11-14 RX ORDER — METOPROLOL SUCCINATE 100 MG/1
100 TABLET, EXTENDED RELEASE ORAL DAILY
Qty: 90 TABLET | Refills: 4 | Status: SHIPPED | OUTPATIENT
Start: 2018-11-14 | End: 2018-12-20

## 2018-11-15 ENCOUNTER — PATIENT MESSAGE (OUTPATIENT)
Dept: INTERNAL MEDICINE | Facility: CLINIC | Age: 73
End: 2018-11-15

## 2018-11-16 ENCOUNTER — PATIENT MESSAGE (OUTPATIENT)
Dept: UROLOGY | Facility: CLINIC | Age: 73
End: 2018-11-16

## 2018-11-16 ENCOUNTER — PATIENT MESSAGE (OUTPATIENT)
Dept: INTERNAL MEDICINE | Facility: CLINIC | Age: 73
End: 2018-11-16

## 2018-11-16 ENCOUNTER — PATIENT MESSAGE (OUTPATIENT)
Dept: OPHTHALMOLOGY | Facility: CLINIC | Age: 73
End: 2018-11-16

## 2018-11-17 ENCOUNTER — OFFICE VISIT (OUTPATIENT)
Dept: URGENT CARE | Facility: CLINIC | Age: 73
End: 2018-11-17
Payer: MEDICARE

## 2018-11-17 VITALS
DIASTOLIC BLOOD PRESSURE: 70 MMHG | OXYGEN SATURATION: 95 % | RESPIRATION RATE: 17 BRPM | HEIGHT: 72 IN | BODY MASS INDEX: 39.71 KG/M2 | TEMPERATURE: 96 F | SYSTOLIC BLOOD PRESSURE: 180 MMHG | WEIGHT: 293.19 LBS | HEART RATE: 65 BPM

## 2018-11-17 DIAGNOSIS — I15.2 HYPERTENSION ASSOCIATED WITH DIABETES: ICD-10-CM

## 2018-11-17 DIAGNOSIS — Z79.4 TYPE 2 DIABETES MELLITUS WITH STAGE 3 CHRONIC KIDNEY DISEASE, WITH LONG-TERM CURRENT USE OF INSULIN: ICD-10-CM

## 2018-11-17 DIAGNOSIS — E11.22 TYPE 2 DIABETES MELLITUS WITH STAGE 3 CHRONIC KIDNEY DISEASE, WITH LONG-TERM CURRENT USE OF INSULIN: ICD-10-CM

## 2018-11-17 DIAGNOSIS — E78.5 HYPERLIPIDEMIA ASSOCIATED WITH TYPE 2 DIABETES MELLITUS: ICD-10-CM

## 2018-11-17 DIAGNOSIS — N18.30 TYPE 2 DIABETES MELLITUS WITH STAGE 3 CHRONIC KIDNEY DISEASE, WITH LONG-TERM CURRENT USE OF INSULIN: ICD-10-CM

## 2018-11-17 DIAGNOSIS — E11.59 HYPERTENSION ASSOCIATED WITH DIABETES: ICD-10-CM

## 2018-11-17 DIAGNOSIS — I10 HYPERTENSION, UNCONTROLLED: Primary | ICD-10-CM

## 2018-11-17 DIAGNOSIS — E11.69 HYPERLIPIDEMIA ASSOCIATED WITH TYPE 2 DIABETES MELLITUS: ICD-10-CM

## 2018-11-17 PROCEDURE — 3078F DIAST BP <80 MM HG: CPT | Mod: CPTII,S$GLB,, | Performed by: NURSE PRACTITIONER

## 2018-11-17 PROCEDURE — 3077F SYST BP >= 140 MM HG: CPT | Mod: CPTII,S$GLB,, | Performed by: NURSE PRACTITIONER

## 2018-11-17 PROCEDURE — 99214 OFFICE O/P EST MOD 30 MIN: CPT | Mod: S$GLB,,, | Performed by: NURSE PRACTITIONER

## 2018-11-17 PROCEDURE — 1101F PT FALLS ASSESS-DOCD LE1/YR: CPT | Mod: CPTII,S$GLB,, | Performed by: NURSE PRACTITIONER

## 2018-11-17 PROCEDURE — 3045F PR MOST RECENT HEMOGLOBIN A1C LEVEL 7.0-9.0%: CPT | Mod: CPTII,S$GLB,, | Performed by: NURSE PRACTITIONER

## 2018-11-17 PROCEDURE — 99999 PR PBB SHADOW E&M-EST. PATIENT-LVL IV: CPT | Mod: PBBFAC,,, | Performed by: NURSE PRACTITIONER

## 2018-11-17 NOTE — PROGRESS NOTES
Subjective:       Patient ID: Sherif Ragland is a 73 y.o. male.    Chief Complaint: Hypertension    Hypertension   This is a chronic problem. The problem is unchanged. The problem is uncontrolled. Associated symptoms include anxiety. Pertinent negatives include no blurred vision, chest pain, headaches, malaise/fatigue, neck pain, orthopnea, palpitations, peripheral edema, PND, shortness of breath or sweats. There are no associated agents to hypertension. Risk factors for coronary artery disease include sedentary lifestyle, obesity, male gender and family history. Treatments tried: as per medication list. reports compliant with medication regimen, except clonidine 0.2 mg twice a day, since some sleepiness. Improvement on treatment: remains uncontrolled. Compliance problems include diet, exercise and psychosocial issues.  Hypertensive end-organ damage includes kidney disease.     Review of Systems   Constitutional: Negative for activity change, appetite change and malaise/fatigue.   HENT: Negative.    Eyes: Negative.  Negative for blurred vision.   Respiratory: Negative for shortness of breath.    Cardiovascular: Negative for chest pain, palpitations, orthopnea and PND.   Gastrointestinal: Negative.    Endocrine: Negative.    Genitourinary: Negative.    Musculoskeletal: Negative for neck pain.   Skin: Negative for color change.   Allergic/Immunologic: Negative for environmental allergies and food allergies.   Neurological: Negative for headaches.   Hematological: Negative for adenopathy.   Psychiatric/Behavioral: Negative for behavioral problems. The patient is nervous/anxious.    All other systems reviewed and are negative.      Objective:      Physical Exam   Constitutional: He appears well-developed and well-nourished. He is uncooperative.  Non-toxic appearance. He does not have a sickly appearance. He does not appear ill. No distress.   HENT:   Right Ear: External ear normal.   Left Ear: External ear normal.    Nose: Nose normal.   Eyes: Conjunctivae are normal.   Pulmonary/Chest: Effort normal.   Abdominal: Soft.   Skin: Skin is warm and dry. No erythema.   Psychiatric:   agitated mood    Vitals reviewed.      Assessment:       1. Hypertension, uncontrolled    2. Hypertension associated with diabetes    3. Type 2 diabetes mellitus with stage 3 chronic kidney disease, with long-term current use of insulin    4. Hyperlipidemia associated with type 2 diabetes mellitus        Plan:       Hypertension, uncontrolled    Hypertension associated with diabetes    Type 2 diabetes mellitus with stage 3 chronic kidney disease, with long-term current use of insulin    Hyperlipidemia associated with type 2 diabetes mellitus    Plan summary:  - As he had discussed with Bere WOODY, managing his blood pressure, increase Toprol XL to 200 mg daily. If pulse is below 55 then resume Toprol  mg and contact Bere WOODY for continued advice.   - He has clonidine 0.2 mg advised to take twice daily which he is not compliant with related to feeling sleep. Advised trial since while he is concerned about BP being unstable and he is afraid of stroke.   - Discussed underlying causes of uncontrolled HTN of obesity, improper diet, strict low sodium diet, lack of exercise, sleep apnea.  - Patient was very agitated and argumentative in clinic, when told bp of 180/70 no additional treatment in clinic, he stood up suddenly and said he was leaving and going to ER. He calmed and began talking loud about his fear of stroke with uncontrolled BP.  - When attempting to discuss other controls of diet with examples, stated not eating like that and he hardly eats and is leaving.   - When discussed possible at risk for sleep apnea with large neck, near morbid obesity, age over 50 , being a male, he stated chances of him having sleep apnea were near none.   - He is willing for trial of increased Toprol XL to 200mg daily and taking Clonidine for readings over  200 mg.     Cc: Bere WOODY

## 2018-11-19 ENCOUNTER — TELEPHONE (OUTPATIENT)
Dept: INTERNAL MEDICINE | Facility: CLINIC | Age: 73
End: 2018-11-19

## 2018-11-21 ENCOUNTER — OFFICE VISIT (OUTPATIENT)
Dept: INTERNAL MEDICINE | Facility: CLINIC | Age: 73
End: 2018-11-21
Payer: MEDICARE

## 2018-11-21 VITALS
HEART RATE: 72 BPM | SYSTOLIC BLOOD PRESSURE: 138 MMHG | DIASTOLIC BLOOD PRESSURE: 66 MMHG | TEMPERATURE: 97 F | RESPIRATION RATE: 16 BRPM | BODY MASS INDEX: 39.84 KG/M2 | WEIGHT: 294.13 LBS | HEIGHT: 72 IN

## 2018-11-21 DIAGNOSIS — E11.59 HYPERTENSION ASSOCIATED WITH DIABETES: Primary | ICD-10-CM

## 2018-11-21 DIAGNOSIS — I15.2 HYPERTENSION ASSOCIATED WITH DIABETES: Primary | ICD-10-CM

## 2018-11-21 PROCEDURE — 1101F PT FALLS ASSESS-DOCD LE1/YR: CPT | Mod: CPTII,S$GLB,, | Performed by: INTERNAL MEDICINE

## 2018-11-21 PROCEDURE — 3075F SYST BP GE 130 - 139MM HG: CPT | Mod: CPTII,S$GLB,, | Performed by: INTERNAL MEDICINE

## 2018-11-21 PROCEDURE — 99213 OFFICE O/P EST LOW 20 MIN: CPT | Mod: S$GLB,,, | Performed by: INTERNAL MEDICINE

## 2018-11-21 PROCEDURE — 3045F PR MOST RECENT HEMOGLOBIN A1C LEVEL 7.0-9.0%: CPT | Mod: CPTII,S$GLB,, | Performed by: INTERNAL MEDICINE

## 2018-11-21 PROCEDURE — 3078F DIAST BP <80 MM HG: CPT | Mod: CPTII,S$GLB,, | Performed by: INTERNAL MEDICINE

## 2018-11-21 PROCEDURE — 99999 PR PBB SHADOW E&M-EST. PATIENT-LVL III: CPT | Mod: PBBFAC,,, | Performed by: INTERNAL MEDICINE

## 2018-11-21 NOTE — PROGRESS NOTES
Subjective:       Patient ID: Sherif Ragland is a 73 y.o. male.    Chief Complaint: Follow-up    HPI  Patient is a 73-year-old male presenting today following up on his high blood pressure.  He has been having some recent issues with blood pressure readings and has been working with ELY Jeronimo to improve his numbers.  He has been on his current regimen for a couple weeks now.  His numbers seem to be settling down.  He is starting his enrollment in the digital hypertension program.  He is not fully enrolled at this time.  I discussed with him today the program and how it works and how he can move forward on his enrollment.  He is tolerating his medications well without any adverse effects.    Review of Systems    Objective:   /66 (BP Location: Left arm, Patient Position: Sitting, BP Method: Medium (Automatic))   Pulse 72   Temp 97 °F (36.1 °C) (Tympanic)   Resp 16   Ht 6' (1.829 m)   Wt 133.4 kg (294 lb 1.5 oz)   BMI 39.89 kg/m²      Physical Exam   Constitutional: He appears well-developed and well-nourished.   HENT:   Head: Normocephalic and atraumatic.   Eyes: Pupils are equal, round, and reactive to light.   Neck: Neck supple. No thyromegaly present.   Cardiovascular: Normal rate, regular rhythm and normal heart sounds. Exam reveals no gallop and no friction rub.   No murmur heard.  Pulmonary/Chest: Breath sounds normal. He has no wheezes. He has no rales.   Abdominal: Soft. Bowel sounds are normal. He exhibits no distension. There is no tenderness.   Vitals reviewed.          Assessment:       1. Hypertension associated with diabetes        Plan:   No problem-specific Assessment & Plan notes found for this encounter.    Sherif was seen today for follow-up.    Diagnoses and all orders for this visit:    Hypertension associated with diabetes  Comments:  Continue meds, monitor at home. Follow up in January          Follow-up in about 7 weeks (around 1/9/2019).

## 2018-12-05 ENCOUNTER — PATIENT MESSAGE (OUTPATIENT)
Dept: UROLOGY | Facility: CLINIC | Age: 73
End: 2018-12-05

## 2018-12-17 ENCOUNTER — PATIENT MESSAGE (OUTPATIENT)
Dept: UROLOGY | Facility: CLINIC | Age: 73
End: 2018-12-17

## 2018-12-18 RX ORDER — CLONIDINE HYDROCHLORIDE 0.2 MG/1
TABLET ORAL
Qty: 180 TABLET | Refills: 4 | Status: SHIPPED | OUTPATIENT
Start: 2018-12-18 | End: 2018-12-20 | Stop reason: ALTCHOICE

## 2018-12-19 ENCOUNTER — PATIENT MESSAGE (OUTPATIENT)
Dept: INTERNAL MEDICINE | Facility: CLINIC | Age: 73
End: 2018-12-19

## 2018-12-20 ENCOUNTER — LAB VISIT (OUTPATIENT)
Dept: LAB | Facility: HOSPITAL | Age: 73
End: 2018-12-20
Payer: MEDICARE

## 2018-12-20 ENCOUNTER — OFFICE VISIT (OUTPATIENT)
Dept: INTERNAL MEDICINE | Facility: CLINIC | Age: 73
End: 2018-12-20
Payer: MEDICARE

## 2018-12-20 ENCOUNTER — PATIENT MESSAGE (OUTPATIENT)
Dept: INTERNAL MEDICINE | Facility: CLINIC | Age: 73
End: 2018-12-20

## 2018-12-20 VITALS
BODY MASS INDEX: 41.8 KG/M2 | SYSTOLIC BLOOD PRESSURE: 160 MMHG | WEIGHT: 308.63 LBS | HEART RATE: 74 BPM | HEIGHT: 72 IN | DIASTOLIC BLOOD PRESSURE: 78 MMHG | TEMPERATURE: 97 F

## 2018-12-20 DIAGNOSIS — E11.59 HYPERTENSION ASSOCIATED WITH DIABETES: ICD-10-CM

## 2018-12-20 DIAGNOSIS — I15.2 HYPERTENSION ASSOCIATED WITH DIABETES: ICD-10-CM

## 2018-12-20 DIAGNOSIS — N18.30 TYPE 2 DIABETES MELLITUS WITH STAGE 3 CHRONIC KIDNEY DISEASE, WITH LONG-TERM CURRENT USE OF INSULIN: ICD-10-CM

## 2018-12-20 DIAGNOSIS — E11.22 TYPE 2 DIABETES MELLITUS WITH STAGE 3 CHRONIC KIDNEY DISEASE, WITH LONG-TERM CURRENT USE OF INSULIN: ICD-10-CM

## 2018-12-20 DIAGNOSIS — Z79.4 TYPE 2 DIABETES MELLITUS WITH STAGE 3 CHRONIC KIDNEY DISEASE, WITH LONG-TERM CURRENT USE OF INSULIN: ICD-10-CM

## 2018-12-20 LAB
ESTIMATED AVG GLUCOSE: 166 MG/DL
HBA1C MFR BLD HPLC: 7.4 %

## 2018-12-20 PROCEDURE — 99999 PR PBB SHADOW E&M-EST. PATIENT-LVL III: CPT | Mod: PBBFAC,,, | Performed by: INTERNAL MEDICINE

## 2018-12-20 PROCEDURE — 83036 HEMOGLOBIN GLYCOSYLATED A1C: CPT

## 2018-12-20 PROCEDURE — 3045F PR MOST RECENT HEMOGLOBIN A1C LEVEL 7.0-9.0%: CPT | Mod: CPTII,S$GLB,, | Performed by: INTERNAL MEDICINE

## 2018-12-20 PROCEDURE — 3077F SYST BP >= 140 MM HG: CPT | Mod: CPTII,S$GLB,, | Performed by: INTERNAL MEDICINE

## 2018-12-20 PROCEDURE — 99499 UNLISTED E&M SERVICE: CPT | Mod: S$GLB,,, | Performed by: INTERNAL MEDICINE

## 2018-12-20 PROCEDURE — 99213 OFFICE O/P EST LOW 20 MIN: CPT | Mod: S$GLB,,, | Performed by: INTERNAL MEDICINE

## 2018-12-20 PROCEDURE — 36415 COLL VENOUS BLD VENIPUNCTURE: CPT | Mod: PO

## 2018-12-20 PROCEDURE — 3078F DIAST BP <80 MM HG: CPT | Mod: CPTII,S$GLB,, | Performed by: INTERNAL MEDICINE

## 2018-12-20 PROCEDURE — 1101F PT FALLS ASSESS-DOCD LE1/YR: CPT | Mod: CPTII,S$GLB,, | Performed by: INTERNAL MEDICINE

## 2018-12-20 RX ORDER — AMLODIPINE BESYLATE 5 MG/1
10 TABLET ORAL DAILY
Qty: 60 TABLET | Refills: 11 | Status: SHIPPED | OUTPATIENT
Start: 2018-12-20 | End: 2019-01-10 | Stop reason: DRUGHIGH

## 2018-12-20 RX ORDER — METOPROLOL SUCCINATE 100 MG/1
100 TABLET, EXTENDED RELEASE ORAL 2 TIMES DAILY
Qty: 60 TABLET | Refills: 11 | Status: SHIPPED | OUTPATIENT
Start: 2018-12-20 | End: 2019-03-06 | Stop reason: SDUPTHER

## 2018-12-20 RX ORDER — VALSARTAN AND HYDROCHLOROTHIAZIDE 320; 25 MG/1; MG/1
1 TABLET, FILM COATED ORAL DAILY
Qty: 90 TABLET | Refills: 3 | Status: SHIPPED | OUTPATIENT
Start: 2018-12-20 | End: 2019-03-19 | Stop reason: ALTCHOICE

## 2018-12-20 NOTE — PROGRESS NOTES
Subjective:       Patient ID: Sherif Ragland is a 73 y.o. male.    Chief Complaint: Hypertension    HPI Patient is a 73-year-old male presenting today following up on his blood pressure.  He continues to be struggling with his blood pressure control over time.  He is now taking Benicar HCT 40/25 daily, metoprolol 100 mg twice daily.  He is also taking clonidine 0.2 mg b.i.d. and Hytrin.  He is not liking the clonidine.  It is making him feel very fatigued and tired.  Likely has too much alpha blockade with the combination of the Hytrin and the clonidine.  His blood pressure numbers continue to run high at this time.  He is little bit anxious about this issue at this time as well.  He is not having any chest pain or palpitations.    Review of Systems    Objective:   BP (!) 160/78   Pulse 74   Temp 97.2 °F (36.2 °C) (Tympanic)   Ht 6' (1.829 m)   Wt (!) 140 kg (308 lb 10.3 oz)   BMI 41.86 kg/m²      Physical Exam   Constitutional: He appears well-developed and well-nourished.   HENT:   Head: Normocephalic and atraumatic.   Eyes: Pupils are equal, round, and reactive to light.   Neck: Neck supple. No thyromegaly present.   Cardiovascular: Normal rate, regular rhythm and normal heart sounds. Exam reveals no gallop and no friction rub.   No murmur heard.  Pulmonary/Chest: Breath sounds normal. He has no wheezes. He has no rales.   Abdominal: Soft. Bowel sounds are normal. He exhibits no distension. There is no tenderness.   Vitals reviewed.          Assessment:       1. Hypertension associated with diabetes    2. Type 2 diabetes mellitus with stage 3 chronic kidney disease, with long-term current use of insulin        Plan:   No problem-specific Assessment & Plan notes found for this encounter.    Sherif was seen today for hypertension.    Diagnoses and all orders for this visit:    Hypertension associated with diabetes  Comments:  continue metoprolol and benicar hct.  stop clonidine. start amlodipine 10 mg once  daily.  Orders:  -     Hemoglobin A1c; Future  -     metoprolol succinate (TOPROL-XL) 100 MG 24 hr tablet; Take 1 tablet (100 mg total) by mouth 2 (two) times daily.    Type 2 diabetes mellitus with stage 3 chronic kidney disease, with long-term current use of insulin  -     amLODIPine (NORVASC) 5 MG tablet; Take 2 tablets (10 mg total) by mouth once daily.     We will discontinue the clonidine at this time.  Initiate amlodipine 10 mg daily.  Continue the metoprolol the Benicar.  We will update an A1c today.  We wanted to short-term follow-up on the blood pressure see how he is responding.  I will be out of the office next week.  He would like to come in regardless and see ELY Jeronimo to double check the blood pressures at that time.      Follow-up in about 7 days (around 12/27/2018) for HTN, with Yael Youssef PA-C.

## 2018-12-20 NOTE — PROGRESS NOTES
Patient, Sherif Ragland (MRN #[id number]), presented with a recorded BMI of 41.86 kg/m^2 consistent with the definition of morbid obesity (ICD-10 E66.01). The patient's morbid obesity was monitored, evaluated, addressed and/or treated. This addendum to the medical record is made on 12/20/2018.

## 2018-12-21 ENCOUNTER — PATIENT OUTREACH (OUTPATIENT)
Dept: OTHER | Facility: OTHER | Age: 73
End: 2018-12-21

## 2018-12-21 NOTE — PROGRESS NOTES
"Digital Medicine Enrollment Call    Introduced Mr. Sherif Ragland to Digital Medicine.     Discussed program expectations and requirements.    Introduced digital medicine care team.     Reviewed the importance of self-monitoring for digital medicine participation.    Discussed proper blood pressure technique.      Reviewed that the Digital Medicine team is not available for emergencies and instructed the patient to call 911 or [facility] ([phone] or [phone]) if one arises.    Pt reported BP was once controlled until his BP medication Valsartan was recalled and pt was instructed to stop taking it. Pt stated he now feels "it would be a miracle" to get his BP controlled.             Last 5 Patient Entered Readings                                      Current 30 Day Average: 162/86     Recent Readings 12/21/2018 12/20/2018    SBP (mmHg) 168 155    DBP (mmHg) 81 91    Pulse 71 61            "

## 2018-12-21 NOTE — LETTER
Isabell Jennings, PharmD  [address] [place] [address]     Dear Sherif Ragland,    Welcome to the [facility]!           My name is Isabell Jennings PharmD and I am your dedicated Digital Medicine clinician.  As an expert in medication management, I will help ensure that the medications you are taking continue to provide you with the intended benefits.        I am Ele Fisher and I will be your health  for the duration of the program.  My  job is to help you identify lifestyle changes to improve your blood pressure control.  We will talk about nutrition, exercise, and other ways that you may be able to adjust your current habits to better your health. Together, we will work to improve your overall health and encourage you to meet your goals for a healthier lifestyle.    What we expect from YOU:    You will need to take blood pressure readings multiple times a week and no less than one reading per week.   It is important that you take your measurements at different times during the day, when possible.     What you should expect from your Digital Medicine Care Team:   We will provide you with education about high blood pressure, including lifestyle changes that could help you to control your blood pressure.   We will review your weekly readings and provide you with monthly blood pressure progress reports after you have been in the program for more than 30 days.   We will send monthly progress reports on your blood pressure control to your physician so they can follow along with your progress as well.    You will be able to reach me by phone at [phone] or through your [facility] account by clicking my name under Care Team on the right side of the home screen.    I look forward to working with you to achieve your blood pressure goals!    Sincerely,  Isabell Jennings PharmD  Your personal clinician    Please visit  [email or web address] to learn more about high blood pressure and what you can do lower your blood pressure.                                                                                           Sherif Ragland  [id number] [id number]

## 2018-12-27 ENCOUNTER — OFFICE VISIT (OUTPATIENT)
Dept: INTERNAL MEDICINE | Facility: CLINIC | Age: 73
End: 2018-12-27
Payer: MEDICARE

## 2018-12-27 VITALS
SYSTOLIC BLOOD PRESSURE: 178 MMHG | BODY MASS INDEX: 41.17 KG/M2 | HEART RATE: 88 BPM | WEIGHT: 304 LBS | HEIGHT: 72 IN | TEMPERATURE: 98 F | DIASTOLIC BLOOD PRESSURE: 70 MMHG

## 2018-12-27 DIAGNOSIS — N18.30 STAGE 3 CHRONIC KIDNEY DISEASE: ICD-10-CM

## 2018-12-27 DIAGNOSIS — G47.00 INSOMNIA, UNSPECIFIED TYPE: ICD-10-CM

## 2018-12-27 DIAGNOSIS — E66.01 MORBID OBESITY DUE TO EXCESS CALORIES: ICD-10-CM

## 2018-12-27 DIAGNOSIS — I10 ESSENTIAL HYPERTENSION: Primary | ICD-10-CM

## 2018-12-27 PROCEDURE — 99999 PR PBB SHADOW E&M-EST. PATIENT-LVL IV: CPT | Mod: PBBFAC,,, | Performed by: PHYSICIAN ASSISTANT

## 2018-12-27 PROCEDURE — 3078F DIAST BP <80 MM HG: CPT | Mod: CPTII,S$GLB,, | Performed by: PHYSICIAN ASSISTANT

## 2018-12-27 PROCEDURE — 3077F SYST BP >= 140 MM HG: CPT | Mod: CPTII,S$GLB,, | Performed by: PHYSICIAN ASSISTANT

## 2018-12-27 PROCEDURE — 99213 OFFICE O/P EST LOW 20 MIN: CPT | Mod: S$GLB,,, | Performed by: PHYSICIAN ASSISTANT

## 2018-12-27 PROCEDURE — 1101F PT FALLS ASSESS-DOCD LE1/YR: CPT | Mod: CPTII,S$GLB,, | Performed by: PHYSICIAN ASSISTANT

## 2018-12-27 RX ORDER — TRAZODONE HYDROCHLORIDE 50 MG/1
25 TABLET ORAL NIGHTLY PRN
Qty: 30 TABLET | Refills: 3 | Status: SHIPPED | OUTPATIENT
Start: 2018-12-27 | End: 2019-09-19

## 2018-12-27 NOTE — PROGRESS NOTES
Subjective:       Patient ID: Sherif Ragland is a 73 y.o. male.    Chief Complaint: Hypertension    HPI  BP uncontrolled, Patient currently on 3 different agents which are all max ed out dosing     Diastolic remains in normal range.      Patient does admit to not sleeping well at all. Denies large amounts of stress.     Wt is up, currently over 300lbs     Patient also seeing outside Hospitals in [place], not managing BP   Patient ok with switching to internal nephrologist for further BP management     Health Maintenance Due   Topic Date Due    TETANUS VACCINE  10[birth year]28/1963    Foot Exam  12/26/2018    Eye Exam  01/10/2019       Past Medical History:   Diagnosis Date    Diabetes mellitus, type 2 1997    Hyperlipidemia     Hypertension     Hypogonadism in male     Renal insufficiency     Tubular adenoma 10/2017       Current Outpatient Medications   Medication Sig Dispense Refill    amLODIPine (NORVASC) 5 MG tablet Take 2 tablets (10 mg total) by mouth once daily. 60 tablet 11    aspirin (ECOTRIN) 325 MG EC tablet Take 1 tablet (325 mg total) by mouth once daily.  0    atorvastatin (LIPITOR) 80 MG tablet Take 1 tablet (80 mg total) by mouth once daily. 90 tablet 4    blood sugar diagnostic Strp To check BG 2 times daily, to use with insurance preferred meter 100 each 11    blood-glucose meter kit To check BG two times daily, to use with insurance preferred meter 1 each 0    FREESTYLE LAURENCE SENSOR Kit USE AS DIRECTED FOR 1 DOSE 1 kit 0    insulin detemir U-100 (LEVEMIR FLEXTOUCH) 100 unit/mL (3 mL) SubQ InPn pen Inject 40 Units into the skin 2 (two) times daily. 72 mL 3    lancets Misc To check BG 2 times daily, to use with insurance preferred meter 100 each 11    metoprolol succinate (TOPROL-XL) 100 MG 24 hr tablet Take 1 tablet (100 mg total) by mouth 2 (two) times daily. 60 tablet 11    papaverine 30 mg/mL injection Inject 0.3 ml of trimix solution prn ED max once daily  Prepare 10ml of trimix solution  "containing:    Papaverine 30mg/ml    Phentolamine 1 mg/ml    Alprostadil 10mcg/ml  Dispense 10ml per refill  Qs syringes 1cc/30g/0.5" and alcohol swabs dispense as needed for Intracavernosal injection 10 mL 5    ranitidine (ZANTAC) 150 MG tablet TAKE 1 TABLET BY MOUTH TWICE DAILY 60 tablet 0    terazosin (HYTRIN) 10 MG capsule Take 1 capsule (10 mg total) by mouth once daily. (Patient taking differently: Take 20 mg by mouth once daily. ) 90 capsule 4    testosterone cypionate (DEPOTESTOTERONE CYPIONATE) 200 mg/mL injection Inject 1 mL (200 mg total) into the muscle every 14 (fourteen) days. for 20 doses 10 mL 1    valsartan-hydrochlorothiazide (DIOVAN-HCT) 320-25 mg per tablet Take 1 tablet by mouth once daily. 90 tablet 3    pantoprazole (PROTONIX) 40 MG tablet Take 1 tablet (40 mg total) by mouth 2 (two) times daily before meals. 60 tablet 2    pioglitazone (ACTOS) 30 MG tablet Take 1 tablet (30 mg total) by mouth once daily. 90 tablet 3    traZODone (DESYREL) 50 MG tablet Take 0.5 tablets (25 mg total) by mouth nightly as needed for Insomnia. 30 tablet 3     No current facility-administered medications for this visit.        Review of Systems   Constitutional: Negative for activity change, fatigue, fever and unexpected weight change.   HENT: Negative for trouble swallowing and voice change.    Eyes: Negative for visual disturbance.   Respiratory: Negative for cough and shortness of breath.    Cardiovascular: Negative for chest pain and leg swelling.   Gastrointestinal: Negative for abdominal distention, abdominal pain and blood in stool.   Endocrine: Negative for polyuria.   Genitourinary: Negative for difficulty urinating and penile pain.   Musculoskeletal: Negative for arthralgias and back pain.   Skin: Negative for color change and rash.   Allergic/Immunologic: Negative.  Negative for immunocompromised state.   Neurological: Negative for dizziness and speech difficulty.   Hematological: Negative for " adenopathy. Does not bruise/bleed easily.   Psychiatric/Behavioral: Positive for sleep disturbance. Negative for agitation and suicidal ideas.       Objective:   BP (!) 178/70   Pulse 88   Temp 97.8 °F (36.6 °C) (Tympanic)   Ht 6' (1.829 m)   Wt (!) 137.9 kg (304 lb 0.2 oz)   BMI 41.23 kg/m²      Physical Exam   Constitutional: He is oriented to person, place, and time. He appears well-developed. No distress.   Obese    HENT:   Head: Normocephalic and atraumatic.   Eyes: EOM are normal. Pupils are equal, round, and reactive to light.   Neck: Normal range of motion. Neck supple.   Cardiovascular: Normal rate and regular rhythm.   Pulmonary/Chest: Effort normal.   Abdominal: Soft.   Musculoskeletal: He exhibits no edema.   Neurological: He is alert and oriented to person, place, and time.   Skin: Capillary refill takes less than 2 seconds.   Psychiatric: He has a normal mood and affect. His behavior is normal.         Lab Results   Component Value Date    WBC 6.58 10/18/2018    HGB 11.8 (L) 10/18/2018    HCT 39.1 (L) 10/18/2018     10/18/2018    CHOL 115 (L) 10/18/2018    TRIG 95 10/18/2018    HDL 41 10/18/2018    ALT 21 10/18/2018    AST 21 10/18/2018     10/18/2018    K 4.0 10/18/2018     10/18/2018    CREATININE 1.7 (H) 10/18/2018    BUN 24 (H) 10/18/2018    CO2 33 (H) 10/18/2018    TSH 2.593 10/18/2018    PSA 3.2 09/19/2018    HGBA1C 7.4 (H) 12/20/2018       Assessment:       1. Essential hypertension    2. Stage 3 chronic kidney disease    3. Insomnia, unspecified type    4. Morbid obesity due to excess calories        Plan:   Essential hypertension  -     Cancel: Renal function panel; Future; Expected date: 12/27/2018  -     Ambulatory referral to Nephrology    Stage 3 chronic kidney disease  -     Ambulatory referral to Nephrology    insomnia   -     traZODone (DESYREL) 50 MG tablet; Take 0.5 tablets (25 mg total) by mouth nightly as needed for Insomnia.  Dispense: 30 tablet; Refill:  3    Morbid obesity due to excess calories   Work on weight loss     Follow up with nephro

## 2019-01-04 ENCOUNTER — HOSPITAL ENCOUNTER (OUTPATIENT)
Facility: HOSPITAL | Age: 74
Discharge: HOME | End: 2019-01-05
Attending: EMERGENCY MEDICINE | Admitting: INTERNAL MEDICINE
Payer: MEDICARE

## 2019-01-04 ENCOUNTER — NURSE TRIAGE (OUTPATIENT)
Dept: ADMINISTRATIVE | Facility: CLINIC | Age: 74
End: 2019-01-04

## 2019-01-04 DIAGNOSIS — R55 NEAR SYNCOPE: ICD-10-CM

## 2019-01-04 DIAGNOSIS — E11.22 TYPE 2 DIABETES MELLITUS WITH STAGE 3 CHRONIC KIDNEY DISEASE, WITH LONG-TERM CURRENT USE OF INSULIN: ICD-10-CM

## 2019-01-04 DIAGNOSIS — B34.9 ACUTE VIRAL SYNDROME: ICD-10-CM

## 2019-01-04 DIAGNOSIS — T68.XXXA HYPOTHERMIA, INITIAL ENCOUNTER: Primary | ICD-10-CM

## 2019-01-04 DIAGNOSIS — E11.59 HYPERTENSION ASSOCIATED WITH DIABETES: ICD-10-CM

## 2019-01-04 DIAGNOSIS — I15.2 HYPERTENSION ASSOCIATED WITH DIABETES: ICD-10-CM

## 2019-01-04 DIAGNOSIS — N18.30 TYPE 2 DIABETES MELLITUS WITH STAGE 3 CHRONIC KIDNEY DISEASE, WITH LONG-TERM CURRENT USE OF INSULIN: ICD-10-CM

## 2019-01-04 DIAGNOSIS — R06.01 ORTHOPNEA: ICD-10-CM

## 2019-01-04 DIAGNOSIS — Z79.4 TYPE 2 DIABETES MELLITUS WITH STAGE 3 CHRONIC KIDNEY DISEASE, WITH LONG-TERM CURRENT USE OF INSULIN: ICD-10-CM

## 2019-01-04 LAB
ALBUMIN SERPL BCP-MCNC: 3.6 G/DL
ALP SERPL-CCNC: 102 U/L
ALT SERPL W/O P-5'-P-CCNC: 26 U/L
ANION GAP SERPL CALC-SCNC: 11 MMOL/L
APTT BLDCRRT: 29.9 SEC
AST SERPL-CCNC: 23 U/L
BASOPHILS # BLD AUTO: 0.01 K/UL
BASOPHILS NFR BLD: 0.1 %
BILIRUB SERPL-MCNC: 0.7 MG/DL
BILIRUB UR QL STRIP: NEGATIVE
BNP SERPL-MCNC: 121 PG/ML
BUN SERPL-MCNC: 39 MG/DL
CALCIUM SERPL-MCNC: 9.2 MG/DL
CHLORIDE SERPL-SCNC: 102 MMOL/L
CLARITY UR: CLEAR
CO2 SERPL-SCNC: 28 MMOL/L
COLOR UR: YELLOW
CREAT SERPL-MCNC: 2 MG/DL
DIFFERENTIAL METHOD: ABNORMAL
EOSINOPHIL # BLD AUTO: 0.1 K/UL
EOSINOPHIL NFR BLD: 0.7 %
ERYTHROCYTE [DISTWIDTH] IN BLOOD BY AUTOMATED COUNT: 14.7 %
EST. GFR  (AFRICAN AMERICAN): 37 ML/MIN/1.73 M^2
EST. GFR  (NON AFRICAN AMERICAN): 32 ML/MIN/1.73 M^2
GLUCOSE SERPL-MCNC: 120 MG/DL
GLUCOSE UR QL STRIP: NEGATIVE
HCT VFR BLD AUTO: 38.3 %
HGB BLD-MCNC: 12 G/DL
HGB UR QL STRIP: NEGATIVE
INFLUENZA A, MOLECULAR: NEGATIVE
INFLUENZA B, MOLECULAR: NEGATIVE
INR PPP: 1
KETONES UR QL STRIP: NEGATIVE
LACTATE SERPL-SCNC: 0.8 MMOL/L
LACTATE SERPL-SCNC: 1 MMOL/L
LEUKOCYTE ESTERASE UR QL STRIP: NEGATIVE
LYMPHOCYTES # BLD AUTO: 0.6 K/UL
LYMPHOCYTES NFR BLD: 9.1 %
MCH RBC QN AUTO: 30.3 PG
MCHC RBC AUTO-ENTMCNC: 31.3 G/DL
MCV RBC AUTO: 97 FL
MONOCYTES # BLD AUTO: 0.7 K/UL
MONOCYTES NFR BLD: 11.1 %
NEUTROPHILS # BLD AUTO: 5.3 K/UL
NEUTROPHILS NFR BLD: 79 %
NITRITE UR QL STRIP: NEGATIVE
PH UR STRIP: 6 [PH] (ref 5–8)
PLATELET # BLD AUTO: 206 K/UL
PMV BLD AUTO: 9.3 FL
POCT GLUCOSE: 110 MG/DL (ref 70–110)
POCT GLUCOSE: 197 MG/DL (ref 70–110)
POTASSIUM SERPL-SCNC: 4.4 MMOL/L
PROCALCITONIN SERPL IA-MCNC: 0.06 NG/ML
PROT SERPL-MCNC: 6.8 G/DL
PROT UR QL STRIP: NEGATIVE
PROTHROMBIN TIME: 10.6 SEC
RBC # BLD AUTO: 3.96 M/UL
SODIUM SERPL-SCNC: 141 MMOL/L
SP GR UR STRIP: 1.02 (ref 1–1.03)
SPECIMEN SOURCE: NORMAL
TROPONIN I SERPL DL<=0.01 NG/ML-MCNC: 0.01 NG/ML
TROPONIN I SERPL DL<=0.01 NG/ML-MCNC: <0.006 NG/ML
TSH SERPL DL<=0.005 MIU/L-ACNC: 2.3 UIU/ML
URN SPEC COLLECT METH UR: NORMAL
UROBILINOGEN UR STRIP-ACNC: NEGATIVE EU/DL
WBC # BLD AUTO: 6.67 K/UL

## 2019-01-04 PROCEDURE — 83880 ASSAY OF NATRIURETIC PEPTIDE: CPT

## 2019-01-04 PROCEDURE — 87040 BLOOD CULTURE FOR BACTERIA: CPT | Mod: 59

## 2019-01-04 PROCEDURE — 83605 ASSAY OF LACTIC ACID: CPT | Mod: 91

## 2019-01-04 PROCEDURE — 63600175 PHARM REV CODE 636 W HCPCS: Performed by: NURSE PRACTITIONER

## 2019-01-04 PROCEDURE — 84484 ASSAY OF TROPONIN QUANT: CPT | Mod: 91

## 2019-01-04 PROCEDURE — 96360 HYDRATION IV INFUSION INIT: CPT

## 2019-01-04 PROCEDURE — 85025 COMPLETE CBC W/AUTO DIFF WBC: CPT

## 2019-01-04 PROCEDURE — G0378 HOSPITAL OBSERVATION PER HR: HCPCS

## 2019-01-04 PROCEDURE — 96372 THER/PROPH/DIAG INJ SC/IM: CPT | Mod: 59

## 2019-01-04 PROCEDURE — 87502 INFLUENZA DNA AMP PROBE: CPT

## 2019-01-04 PROCEDURE — 25000003 PHARM REV CODE 250: Performed by: EMERGENCY MEDICINE

## 2019-01-04 PROCEDURE — 84443 ASSAY THYROID STIM HORMONE: CPT

## 2019-01-04 PROCEDURE — 93010 EKG 12-LEAD: ICD-10-PCS | Mod: ,,, | Performed by: INTERNAL MEDICINE

## 2019-01-04 PROCEDURE — 84145 PROCALCITONIN (PCT): CPT

## 2019-01-04 PROCEDURE — 84484 ASSAY OF TROPONIN QUANT: CPT

## 2019-01-04 PROCEDURE — S5571 INSULIN DISPOS PEN 3 ML: HCPCS | Performed by: NURSE PRACTITIONER

## 2019-01-04 PROCEDURE — 81003 URINALYSIS AUTO W/O SCOPE: CPT

## 2019-01-04 PROCEDURE — 82962 GLUCOSE BLOOD TEST: CPT

## 2019-01-04 PROCEDURE — 93005 ELECTROCARDIOGRAM TRACING: CPT

## 2019-01-04 PROCEDURE — 99285 EMERGENCY DEPT VISIT HI MDM: CPT | Mod: 25

## 2019-01-04 PROCEDURE — 96361 HYDRATE IV INFUSION ADD-ON: CPT

## 2019-01-04 PROCEDURE — 80053 COMPREHEN METABOLIC PANEL: CPT

## 2019-01-04 PROCEDURE — 85730 THROMBOPLASTIN TIME PARTIAL: CPT

## 2019-01-04 PROCEDURE — 36415 COLL VENOUS BLD VENIPUNCTURE: CPT

## 2019-01-04 PROCEDURE — 85610 PROTHROMBIN TIME: CPT

## 2019-01-04 PROCEDURE — 93010 ELECTROCARDIOGRAM REPORT: CPT | Mod: ,,, | Performed by: INTERNAL MEDICINE

## 2019-01-04 RX ORDER — CANDESARTAN 16 MG/1
16 TABLET ORAL 2 TIMES DAILY
Status: DISCONTINUED | OUTPATIENT
Start: 2019-01-04 | End: 2019-01-05 | Stop reason: HOSPADM

## 2019-01-04 RX ORDER — IBUPROFEN 200 MG
24 TABLET ORAL
Status: DISCONTINUED | OUTPATIENT
Start: 2019-01-04 | End: 2019-01-05 | Stop reason: HOSPADM

## 2019-01-04 RX ORDER — INSULIN ASPART 100 [IU]/ML
0-5 INJECTION, SOLUTION INTRAVENOUS; SUBCUTANEOUS
Status: DISCONTINUED | OUTPATIENT
Start: 2019-01-04 | End: 2019-01-05 | Stop reason: HOSPADM

## 2019-01-04 RX ORDER — TERAZOSIN 5 MG/1
20 CAPSULE ORAL DAILY
Status: CANCELLED | OUTPATIENT
Start: 2019-01-05

## 2019-01-04 RX ORDER — ASPIRIN 325 MG
325 TABLET, DELAYED RELEASE (ENTERIC COATED) ORAL DAILY
Status: CANCELLED | OUTPATIENT
Start: 2019-01-05

## 2019-01-04 RX ORDER — IBUPROFEN 200 MG
24 TABLET ORAL
Status: CANCELLED | OUTPATIENT
Start: 2019-01-04

## 2019-01-04 RX ORDER — IBUPROFEN 200 MG
16 TABLET ORAL
Status: CANCELLED | OUTPATIENT
Start: 2019-01-04

## 2019-01-04 RX ORDER — FAMOTIDINE 20 MG/1
20 TABLET, FILM COATED ORAL DAILY
Status: DISCONTINUED | OUTPATIENT
Start: 2019-01-05 | End: 2019-01-05 | Stop reason: HOSPADM

## 2019-01-04 RX ORDER — ACETAMINOPHEN 325 MG/1
650 TABLET ORAL EVERY 8 HOURS PRN
Status: CANCELLED | OUTPATIENT
Start: 2019-01-04

## 2019-01-04 RX ORDER — ENOXAPARIN SODIUM 100 MG/ML
40 INJECTION SUBCUTANEOUS EVERY 24 HOURS
Status: DISCONTINUED | OUTPATIENT
Start: 2019-01-04 | End: 2019-01-05 | Stop reason: HOSPADM

## 2019-01-04 RX ORDER — GLUCAGON 1 MG
1 KIT INJECTION
Status: DISCONTINUED | OUTPATIENT
Start: 2019-01-04 | End: 2019-01-05 | Stop reason: HOSPADM

## 2019-01-04 RX ORDER — HYDROCHLOROTHIAZIDE 25 MG/1
25 TABLET ORAL DAILY
Status: DISCONTINUED | OUTPATIENT
Start: 2019-01-05 | End: 2019-01-05 | Stop reason: HOSPADM

## 2019-01-04 RX ORDER — ONDANSETRON 2 MG/ML
4 INJECTION INTRAMUSCULAR; INTRAVENOUS EVERY 8 HOURS PRN
Status: CANCELLED | OUTPATIENT
Start: 2019-01-04

## 2019-01-04 RX ORDER — AMLODIPINE BESYLATE 5 MG/1
10 TABLET ORAL DAILY
Status: CANCELLED | OUTPATIENT
Start: 2019-01-05

## 2019-01-04 RX ORDER — METOPROLOL SUCCINATE 50 MG/1
100 TABLET, EXTENDED RELEASE ORAL 2 TIMES DAILY
Status: CANCELLED | OUTPATIENT
Start: 2019-01-04

## 2019-01-04 RX ORDER — ATORVASTATIN CALCIUM 40 MG/1
80 TABLET, FILM COATED ORAL DAILY
Status: CANCELLED | OUTPATIENT
Start: 2019-01-05

## 2019-01-04 RX ORDER — PANTOPRAZOLE SODIUM 40 MG/1
40 TABLET, DELAYED RELEASE ORAL
Status: CANCELLED | OUTPATIENT
Start: 2019-01-04

## 2019-01-04 RX ORDER — GLUCAGON 1 MG
1 KIT INJECTION
Status: CANCELLED | OUTPATIENT
Start: 2019-01-04

## 2019-01-04 RX ORDER — VALSARTAN AND HYDROCHLOROTHIAZIDE 320; 25 MG/1; MG/1
1 TABLET, FILM COATED ORAL DAILY
Status: DISCONTINUED | OUTPATIENT
Start: 2019-01-05 | End: 2019-01-04 | Stop reason: CLARIF

## 2019-01-04 RX ORDER — SODIUM CHLORIDE 0.9 % (FLUSH) 0.9 %
5 SYRINGE (ML) INJECTION
Status: CANCELLED | OUTPATIENT
Start: 2019-01-04

## 2019-01-04 RX ORDER — IBUPROFEN 200 MG
16 TABLET ORAL
Status: DISCONTINUED | OUTPATIENT
Start: 2019-01-04 | End: 2019-01-05 | Stop reason: HOSPADM

## 2019-01-04 RX ORDER — AMLODIPINE BESYLATE 5 MG/1
5 TABLET ORAL NIGHTLY
Status: DISCONTINUED | OUTPATIENT
Start: 2019-01-04 | End: 2019-01-04

## 2019-01-04 RX ORDER — INSULIN ASPART 100 [IU]/ML
1-10 INJECTION, SOLUTION INTRAVENOUS; SUBCUTANEOUS
Status: CANCELLED | OUTPATIENT
Start: 2019-01-04

## 2019-01-04 RX ADMIN — ENOXAPARIN SODIUM 40 MG: 100 INJECTION SUBCUTANEOUS at 07:01

## 2019-01-04 RX ADMIN — SODIUM CHLORIDE 4110 ML: 0.9 SOLUTION INTRAVENOUS at 02:01

## 2019-01-04 RX ADMIN — INSULIN DETEMIR 6 UNITS: 100 INJECTION, SOLUTION SUBCUTANEOUS at 10:01

## 2019-01-04 NOTE — PLAN OF CARE
01/04/19 163   GUERRERO Message   Medicare Outpatient and Observation Notification regarding financial responsibility Given to patient/caregiver;Explained to patient/caregiver;Signed/date by patient/caregiver   Date GUERRERO was signed 01/04/19   Time GUERRERO was signed [id number]

## 2019-01-04 NOTE — HPI
The pt is a 72 yo male with DM, HTN, HLD, CKD3 who presented to ED with near syncope. Pt reports he was driving his car and felt acute onset of tunnel vision, dizziness, numbness to mouth, severe fatigue. Pt reports he drove home because he felt like he was having a stroke. On arrival to home, his family reports slurred speech, slow to respond to questions, generalized weakness, and severe fatigue. The pt reports taking all his medications this am including his insulin. He ate a biscuit for breakfast. After arriving home, his wife gave him M&Ms just in case his blood sugar was low. His glucometer is not working. He was unable to check a blood sugar.   On arrival to ED, temp 94.5F, Glucose 110. WBC normal. Trop <0.06. EKG showed NSR- no acute ST or T wave abnormalities. CXR showed streaky opacities in the base of the right lung characteristic of subsegmental atelectasis or scarring.   Symptoms resolved except + fatigue. Observation for near syncope.

## 2019-01-04 NOTE — ED NOTES
Juan Miguel varela applied for hypothermia.  Pt notified of plan to re-eval temp q1hr until normalized.  NS 1L #1 and #2 up for bolus A/O.

## 2019-01-04 NOTE — ED PROVIDER NOTES
SCRIBE #1 NOTE: I, Corinne Mack, am scribing for, and in the presence of, Filipe Ewing MD. I have scribed the entire note.      History      Chief Complaint   Patient presents with    Fatigue     chills  in triage       Review of patient's allergies indicates:  No Known Allergies     HPI   HPI    1/4/2019, 1:20 PM   History obtained from the patient      History of Present Illness: Sherif Ragland is a 73 y.o. male patient with PMHx of DM, HTN, and CKD who presents to the Emergency Department for fatigue which onset gradually today. Pt states he felt weak this morning. He had an appointment in [place], [place] today, but was unable to make it there because he felt like he was going to pass out while getting lunch at [facility]. Symptoms are constant and moderate in severity. No mitigating or exacerbating factors reported. Associated sxs include chills, blurred vision, and generalized weakness. Patient denies any fever, congestion, rhinorrhea, CP, SOB, N/V/D, abd pain, back pain, neck pain, HA, dizziness, and all other sxs at this time. No prior Tx reported. No further complaints or concerns at this time.         Arrival mode: Personal vehicle    PCP: Tobias Fox MD       Past Medical History:  Past Medical History:   Diagnosis Date    Diabetes mellitus, type 2 1997    Hyperlipidemia     Hypertension     Hypogonadism in male     Renal insufficiency     Tubular adenoma 10/2017       Past Surgical History:  Past Surgical History:   Procedure Laterality Date    CATARACT EXTRACTION Right 01/31/2018    COLONOSCOPY N/A 11/2/2017    Performed [facility] at [facility]    ESOPHAGOGASTRODUODENOSCOPY (EGD) N/A 4/30/2018    Performed by Kyle Barreto MD at [facility] END[facility]    TIBIA FRACTURE SURGERY      right leg x2     TONSILLECTOMY           Family History:  Family History   Problem Relation Age of Onset    Stroke Mother     Heart disease Father     Stroke Brother        Social  History:  Social History     Tobacco Use    Smoking status: Never Smoker    Smokeless tobacco: Never Used   Substance and Sexual Activity    Alcohol use: No    Drug use: No    Sexual activity: Yes     Partners: Female       ROS   Review of Systems   Constitutional: Positive for chills and fatigue. Negative for fever.   HENT: Negative for congestion, rhinorrhea and sore throat.    Eyes: Positive for visual disturbance. Negative for photophobia.   Respiratory: Negative for cough and shortness of breath.    Cardiovascular: Negative for chest pain and leg swelling.   Gastrointestinal: Negative for abdominal pain, diarrhea, nausea and vomiting.   Musculoskeletal: Negative for back pain, neck pain and neck stiffness.   Skin: Negative for rash and wound.   Neurological: Positive for syncope (near) and weakness (generalized). Negative for dizziness, light-headedness, numbness and headaches.   All other systems reviewed and are negative.    Physical Exam      Initial Vitals [01/04/19 1256]   BP Pulse Resp Temp SpO2   (!) 154/69 87 18 (!) 94.5 °F (34.7 °C) 98 %      MAP       --          Physical Exam  Nursing Notes and Vital Signs Reviewed.  Constitutional: Patient is in no acute distress. Well-developed and well-nourished.  Head: Atraumatic. Normocephalic.  Eyes: PERRL. EOM intact. Conjunctivae are not pale. No scleral icterus.  ENT: Mucous membranes are moist. Oropharynx is clear and symmetric.    Neck: Supple. Full ROM. No lymphadenopathy.  Cardiovascular: Regular rate. Regular rhythm. No murmurs, rubs, or gallops. Distal pulses are 2+ and symmetric.  Pulmonary/Chest: No respiratory distress. Clear to auscultation bilaterally. No wheezing or rales.  Abdominal: Soft and non-distended.  There is no tenderness.  No rebound, guarding, or rigidity. Good bowel sounds.  Musculoskeletal: Moves all extremities. No obvious deformities. Trace BLE edema; R greater than L. No calf tenderness.  Skin: Warm and dry.  Neurological:   Alert, awake, and appropriate.  Normal speech.  Slight generalized tremor.  Psychiatric: Normal affect. Good eye contact. Appropriate in content.    ED Course    Procedures  ED Vital Signs:  Vitals:    01/04/19 1256 01/04/19 1320 01/04/19 1350 01/04/19 1430   BP: (!) 154/69   130/60   Pulse: 87 64 63 64   Resp: 18  (!) 21 16   Temp: (!) 94.5 °F (34.7 °C)  (!) 95.3 °F (35.2 °C)    TempSrc: Oral  Rectal    SpO2: 98%  97% 96%   Weight: (!) 137 kg (302 lb)      Height: 6' (1.829 m)          Abnormal Lab Results:  Labs Reviewed   CBC W/ AUTO DIFFERENTIAL - Abnormal; Notable for the following components:       Result Value    RBC 3.96 (*)     Hemoglobin 12.0 (*)     Hematocrit 38.3 (*)     MCHC 31.3 (*)     RDW 14.7 (*)     Lymph # 0.6 (*)     Gran% 79.0 (*)     Lymph% 9.1 (*)     All other components within normal limits   COMPREHENSIVE METABOLIC PANEL - Abnormal; Notable for the following components:    Glucose 120 (*)     BUN, Bld 39 (*)     Creatinine 2.0 (*)     eGFR if  37 (*)     eGFR if non  32 (*)     All other components within normal limits   B-TYPE NATRIURETIC PEPTIDE - Abnormal; Notable for the following components:     (*)     All other components within normal limits   INFLUENZA A & B BY MOLECULAR   CULTURE, BLOOD   CULTURE, BLOOD   LACTIC ACID, PLASMA   APTT   PROTIME-INR   TROPONIN I   PROCALCITONIN   TSH   URINALYSIS, REFLEX TO URINE CULTURE   POCT GLUCOSE        All Lab Results:  Results for orders placed or performed during the hospital encounter of 01/04/19   Influenza A & B by Molecular   Result Value Ref Range    Influenza A, Molecular Negative Negative    Influenza B, Molecular Negative Negative    Flu A & B Source NP    CBC auto differential   Result Value Ref Range    WBC 6.67 3.90 - 12.70 K/uL    RBC 3.96 (L) 4.60 - 6.20 M/uL    Hemoglobin 12.0 (L) 14.0 - 18.0 g/dL    Hematocrit 38.3 (L) 40.0 - 54.0 %    MCV 97 82 - 98 fL    MCH 30.3 27.0 - 31.0 pg    MCHC  31.3 (L) 32.0 - 36.0 g/dL    RDW 14.7 (H) 11.5 - 14.5 %    Platelets 206 150 - 350 K/uL    MPV 9.3 9.2 - 12.9 fL    Gran # (ANC) 5.3 1.8 - 7.7 K/uL    Lymph # 0.6 (L) 1.0 - 4.8 K/uL    Mono # 0.7 0.3 - 1.0 K/uL    Eos # 0.1 0.0 - 0.5 K/uL    Baso # 0.01 0.00 - 0.20 K/uL    Gran% 79.0 (H) 38.0 - 73.0 %    Lymph% 9.1 (L) 18.0 - 48.0 %    Mono% 11.1 4.0 - 15.0 %    Eosinophil% 0.7 0.0 - 8.0 %    Basophil% 0.1 0.0 - 1.9 %    Differential Method Automated    Comprehensive metabolic panel   Result Value Ref Range    Sodium 141 136 - 145 mmol/L    Potassium 4.4 3.5 - 5.1 mmol/L    Chloride 102 95 - 110 mmol/L    CO2 28 23 - 29 mmol/L    Glucose 120 (H) 70 - 110 mg/dL    BUN, Bld 39 (H) 8 - 23 mg/dL    Creatinine 2.0 (H) 0.5 - 1.4 mg/dL    Calcium 9.2 8.7 - 10.5 mg/dL    Total Protein 6.8 6.0 - 8.4 g/dL    Albumin 3.6 3.5 - 5.2 g/dL    Total Bilirubin 0.7 0.1 - 1.0 mg/dL    Alkaline Phosphatase 102 55 - 135 U/L    AST 23 10 - 40 U/L    ALT 26 10 - 44 U/L    Anion Gap 11 8 - 16 mmol/L    eGFR if African American 37 (A) >60 mL/min/1.73 m^2    eGFR if non African American 32 (A) >60 mL/min/1.73 m^2   Lactic acid, plasma #1   Result Value Ref Range    Lactate (Lactic Acid) 1.0 0.5 - 2.2 mmol/L   APTT   Result Value Ref Range    aPTT 29.9 21.0 - 32.0 sec   Protime-INR   Result Value Ref Range    Prothrombin Time 10.6 9.0 - 12.5 sec    INR 1.0 0.8 - 1.2   Brain natriuretic peptide   Result Value Ref Range     (H) 0 - 99 pg/mL   Troponin I   Result Value Ref Range    Troponin I <0.006 0.000 - 0.026 ng/mL   Procalcitonin   Result Value Ref Range    Procalcitonin 0.06 <0.25 ng/mL   TSH   Result Value Ref Range    TSH 2.302 0.400 - 4.000 uIU/mL   POCT glucose   Result Value Ref Range    POCT Glucose 110 70 - 110 mg/dL       Imaging Results:  Imaging Results          X-Ray Chest AP Portable (Final result)  Result time 01/04/19 14:09:47    Final result by YONAS Cooper Sr., MD (01/04/19 14:09:47)                 Impression:       1. There are streaky opacities in the base of the right lung.  This is characteristic of subsegmental atelectasis or scarring.  2. The size of the heart is prominent.  This may be secondary to magnification.  .      Electronically signed by: Juan Cooper MD  Date:    01/04/2019  Time:    14:09             Narrative:    EXAMINATION:  XR CHEST AP PORTABLE    CLINICAL HISTORY:  Sepsis;    COMPARISON:  None    FINDINGS:  The size of the heart is prominent.  There are streaky opacities in the base of the right lung.  The left lung is clear.  There is no pneumothorax.  The costophrenic angles are sharp.                                 The EKG was ordered, reviewed, and independently interpreted by the ED provider.  Interpretation time: 1334  Rate: 62 BPM  Rhythm: normal sinus rhythm  Interpretation: Baseline artifact. Poor R wave progression. Nonspecific ST and T wave abnormalities. No STEMI.           The Emergency Provider reviewed the vital signs and test results, which are outlined above.    ED Discussion     2:51 PM: Discussed case with BONG Uribe ([facility]). Dr. Gaitan agrees with current care and management of pt and accepts admission.   Admitting Service: Hospital medicine   Admitting Physician: Arnie  Admit to: Obs Tele    3:03 PM: Re-evaluated pt. I have discussed test results, shared treatment plan, and the need for admission with patient and family at bedside. Pt and family express understanding at this time and agree with all information. All questions answered. Pt and family have no further questions or concerns at this time. Pt is ready for admit.      ED Medication(s):  Medications   sodium chloride 0.9% bolus 4,110 mL (4,110 mLs Intravenous New Bag 1/4/19 8497)          Medication List      ASK your doctor about these medications    amLODIPine 5 MG tablet  Commonly known as:  NORVASC  Take 2 tablets (10 mg total) by mouth once daily.     aspirin 325 MG EC tablet  Commonly  "known as:  ECOTRIN  Take 1 tablet (325 mg total) by mouth once daily.     atorvastatin 80 MG tablet  Commonly known as:  LIPITOR  Take 1 tablet (80 mg total) by mouth once daily.     blood sugar diagnostic Strp  To check BG 2 times daily, to use with insurance preferred meter     blood-glucose meter kit  To check BG two times daily, to use with insurance preferred meter     FREESTYLE LAUERNCE 10 DAY SENSOR Kit  Generic drug:  flash glucose sensor  USE AS DIRECTED FOR 1 DOSE     insulin detemir U-100 100 unit/mL (3 mL) Inpn pen  Commonly known as:  LEVEMIR FLEXTOUCH  Inject 40 Units into the skin 2 (two) times daily.     lancets Misc  To check BG 2 times daily, to use with insurance preferred meter     metoprolol succinate 100 MG 24 hr tablet  Commonly known as:  TOPROL-XL  Take 1 tablet (100 mg total) by mouth 2 (two) times daily.     pantoprazole 40 MG tablet  Commonly known as:  PROTONIX  Take 1 tablet (40 mg total) by mouth 2 (two) times daily before meals.     papaverine 30 mg/mL injection  Inject 0.3 ml of trimix solution prn ED max once daily  Prepare 10ml of trimix solution containing:    Papaverine 30mg/ml    Phentolamine 1 mg/ml    Alprostadil 10mcg/ml  Dispense 10ml per refill  Qs syringes 1cc/30g/0.5" and alcohol swabs dispense as needed for Intracavernosal injection     pioglitazone 30 MG tablet  Commonly known as:  ACTOS  Take 1 tablet (30 mg total) by mouth once daily.     ranitidine 150 MG tablet  Commonly known as:  ZANTAC  TAKE 1 TABLET BY MOUTH TWICE DAILY     terazosin 10 MG capsule  Commonly known as:  HYTRIN  Take 1 capsule (10 mg total) by mouth once daily.     testosterone cypionate 200 mg/mL injection  Commonly known as:  DEPOTESTOTERONE CYPIONATE  Inject 1 mL (200 mg total) into the muscle every 14 (fourteen) days. for 20 doses     traZODone 50 MG tablet  Commonly known as:  DESYREL  Take 0.5 tablets (25 mg total) by mouth nightly as needed for Insomnia.     valsartan-hydrochlorothiazide 320-25 " mg per tablet  Commonly known as:  DIOVAN-HCT  Take 1 tablet by mouth once daily.                  Medical Decision Making    Medical Decision Making:   Clinical Tests:   Lab Tests: Ordered and Reviewed  Radiological Study: Ordered and Reviewed  Medical Tests: Ordered and Reviewed           Scribe Attestation:   Scribe #1: I performed the above scribed service and the documentation accurately describes the services I performed. I attest to the accuracy of the note 01/04/2019 3:03 PM.    Attending:   Physician Attestation Statement for Scribe #1: I, Filipe Ewing MD, personally performed the services described in this documentation, as scribed by Corinne Mack, in my presence, and it is both accurate and complete.          Clinical Impression       ICD-10-CM ICD-9-CM   1. Near syncope R55 780.2       ICD-10-CM ICD-9-CM   1. Hypothermia, initial encounter T68.XXXA 991.6   2. Near syncope R55 780.2   3. Orthopnea R06.01 786.02   4. Acute viral syndrome B34.9 079.99   5. Type 2 diabetes mellitus with stage 3 chronic kidney disease, with long-term current use of insulin E11.22 250.40    N18.3 585.3    Z79.4 V58.67   6. Hypertension associated with diabetes E11.59 250.80    I10 401.9       Disposition:   Disposition: Placed in Observation  Condition: Fair           Filipe Ewing MD  01/07/19 1932

## 2019-01-04 NOTE — SUBJECTIVE & OBJECTIVE
Past Medical History:   Diagnosis Date    Diabetes mellitus, type 2 1997    Hyperlipidemia     Hypertension     Hypogonadism in male     Renal insufficiency     Tubular adenoma 10/2017       Past Surgical History:   Procedure Laterality Date    CATARACT EXTRACTION Right 01/31/2018    COLONOSCOPY N/A 11/2/2017    [facility]formed by [facility] ENDO    ESOPHAGOGASTRODUODENOSCOPY (EGD) N/A 4/30/2018    Performed by [facility]reto MD at [facility] ENDO    TIBIA FRACTURE SURGERY      right leg x2     TONSILLECTOMY         Review of patient's allergies indicates:  No Known Allergies    No current facility-administered medications on file prior to encounter.      Current Outpatient Medications on File Prior to Encounter   Medication Sig    amLODIPine (NORVASC) 5 MG tablet Take 2 tablets (10 mg total) by mouth once daily.    aspirin (ECOTRIN) 325 MG EC tablet Take 1 tablet (325 mg total) by mouth once daily.    atorvastatin (LIPITOR) 80 MG tablet Take 1 tablet (80 mg total) by mouth once daily.    insulin detemir U-100 (LEVEMIR FLEXTOUCH) 100 unit/mL (3 mL) SubQ InPn pen Inject 40 Units into the skin 2 (two) times daily.    metoprolol succinate (TOPROL-XL) 100 MG 24 hr tablet Take 1 tablet (100 mg total) by mouth 2 (two) times daily.    ranitidine (ZANTAC) 150 MG tablet TAKE 1 TABLET BY MOUTH TWICE DAILY    terazosin (HYTRIN) 10 MG capsule Take 1 capsule (10 mg total) by mouth once daily. (Patient taking differently: Take 20 mg by mouth once daily. )    traZODone (DESYREL) 50 MG tablet Take 0.5 tablets (25 mg total) by mouth nightly as needed for Insomnia.    valsartan-hydrochlorothiazide (DIOVAN-HCT) 320-25 mg per tablet Take 1 tablet by mouth once daily.    blood sugar diagnostic Strp To check BG 2 times daily, to use with insurance preferred meter    blood-glucose meter kit To check BG two times daily, to use with insurance preferred meter    FREESTYLE LAURENCE SENSOR Kit USE AS DIRECTED  "FOR 1 DOSE    lancets [facility] To check BG 2 times daily, to use with insurance preferred meter    pantoprazole (PROTONIX) 40 MG tablet Take 1 tablet (40 mg total) by mouth 2 (two) times daily before meals.    papaverine 30 mg/mL injection Inject 0.3 ml of trimix solution prn ED max once daily  Prepare 10ml of trimix solution containing:    Papaverine 30mg/ml    Phentolamine 1 mg/ml    Alprostadil 10mcg/ml  Dispense 10ml per refill  Qs syringes 1cc/30g/0.5" and alcohol swabs dispense as needed for Intracavernosal injection    pioglitazone (ACTOS) 30 MG tablet Take 1 tablet (30 mg total) by mouth once daily.    testosterone cypionate (DEPOTESTOTERONE CYPIONATE) 200 mg/mL injection Inject 1 mL (200 mg total) into the muscle every 14 (fourteen) days. for 20 doses     Family History     Problem Relation (Age of Onset)    Heart disease Father    Stroke Mother, Brother        Tobacco Use    Smoking status: Never Smoker    Smokeless tobacco: Never Used   Substance and Sexual Activity    Alcohol use: No    Drug use: No    Sexual activity: Yes     Partners: Female     Review of Systems   Constitutional: Positive for activity change and fatigue. Negative for appetite change, chills, diaphoresis and fever.   HENT: Negative for congestion, nosebleeds, sore throat and trouble swallowing.    Eyes: Negative for pain, discharge and visual disturbance.   Respiratory: Negative for apnea, cough, chest tightness, shortness of breath, wheezing and stridor.    Cardiovascular: Negative for chest pain, palpitations and leg swelling.   Gastrointestinal: Negative for abdominal distention, abdominal pain, blood in stool, constipation, diarrhea, nausea and vomiting.   Endocrine: Negative for cold intolerance and heat intolerance.   Genitourinary: Negative for difficulty urinating, dysuria, flank pain, frequency and urgency.   Musculoskeletal: Negative for arthralgias, back pain, joint swelling, myalgias, neck pain and neck stiffness. "   Skin: Negative for rash and wound.   Allergic/Immunologic: Negative for food allergies and immunocompromised state.   Neurological: Positive for dizziness, syncope (near), weakness and numbness. Negative for seizures, facial asymmetry, light-headedness and headaches.   Hematological: Negative for adenopathy.   Psychiatric/Behavioral: Negative for agitation, behavioral problems and confusion. The patient is not nervous/anxious.      Objective:     Vital Signs (Most Recent):  Temp: 98.6 °F (37 °C) (01/04/19 1704)  Pulse: 67 (01/04/19 1704)  Resp: 20 (01/04/19 1704)  BP: (!) 175/76 (01/04/19 1704)  SpO2: 98 % (01/04/19 1704) Vital Signs (24h Range):  Temp:  [94.5 °F (34.7 °C)-98.6 °F (37 °C)] 98.6 °F (37 °C)  Pulse:  [53-87] 67  Resp:  [15-23] 20  SpO2:  [93 %-98 %] 98 %  BP: (130-175)/(60-76) 175/76     Weight: (!) 137 kg (302 lb)  Body mass index is 40.96 kg/m².    Physical Exam   Constitutional: He is oriented to person, place, and time. He appears well-developed and well-nourished. No distress.   HENT:   Head: Normocephalic and atraumatic.   Nose: Nose normal.   Mouth/Throat: Oropharynx is clear and moist.   Eyes: Conjunctivae and EOM are normal. No scleral icterus.   Neck: Normal range of motion. Neck supple.   Cardiovascular: Normal rate, regular rhythm, normal heart sounds and intact distal pulses. Exam reveals no gallop and no friction rub.   No murmur heard.  Pulmonary/Chest: Effort normal and breath sounds normal. No stridor. No respiratory distress. He has no wheezes. He has no rales. He exhibits no tenderness.   Abdominal: Soft. Bowel sounds are normal. He exhibits no distension. There is no tenderness. There is no rebound and no guarding.   Musculoskeletal: Normal range of motion. He exhibits no edema, tenderness or deformity.   Neurological: He is alert and oriented to person, place, and time. He has normal reflexes. No cranial nerve deficit. He exhibits normal muscle tone. Coordination normal.   Skin:  Skin is warm and dry. No rash noted. He is not diaphoretic. No erythema. No pallor.   Psychiatric: He has a normal mood and affect. His behavior is normal. Thought content normal.   Nursing note and vitals reviewed.        CRANIAL NERVES     CN III, IV, VI   Extraocular motions are normal.        Significant Labs:   BMP:   Recent Labs   Lab 01/04/19  1340   *      K 4.4      CO2 28   BUN 39*   CREATININE 2.0*   CALCIUM 9.2     CBC:   Recent Labs   Lab 01/04/19  1340   WBC 6.67   HGB 12.0*   HCT 38.3*        CMP:   Recent Labs   Lab 01/04/19  1340      K 4.4      CO2 28   *   BUN 39*   CREATININE 2.0*   CALCIUM 9.2   PROT 6.8   ALBUMIN 3.6   BILITOT 0.7   ALKPHOS 102   AST 23   ALT 26   ANIONGAP 11   EGFRNONAA 32*       Significant Imaging:  Imaging Results          X-Ray Chest AP Portable (Final result)  Result time 01/04/19 14:09:47    Final result by YONAS Cooper Sr., MD (01/04/19 14:09:47)                 Impression:      1. There are streaky opacities in the base of the right lung.  This is characteristic of subsegmental atelectasis or scarring.  2. The size of the heart is prominent.  This may be secondary to magnification.  .      Electronically signed by: Juan Cooper MD  Date:    01/04/2019  Time:    14:09             Narrative:    EXAMINATION:  XR CHEST AP PORTABLE    CLINICAL HISTORY:  Sepsis;    COMPARISON:  None    FINDINGS:  The size of the heart is prominent.  There are streaky opacities in the base of the right lung.  The left lung is clear.  There is no pneumothorax.  The costophrenic angles are sharp.

## 2019-01-04 NOTE — H&P
[facility] Hospital Medicine  History & Physical    Patient Name: Sherif Ragland  MRN: [id number]  Admission Date: 1/4/2019  Attending Physician: Dr. Gaitan   Primary Care Provider: Tobias Fox MD         Patient information was obtained from patient and ER records.     Subjective:     Principal Problem:Near syncope    Chief Complaint:   Chief Complaint   Patient presents with    Fatigue     chills  in triage        HPI: The pt is a 72 yo male with DM, HTN, HLD, CKD3 who presented to ED with near syncope. Pt reports he was driving his car and felt acute onset of tunnel vision, dizziness, numbness to mouth, severe fatigue. Pt reports he drove home because he felt like he was having a stroke. On arrival to home, his family reports slurred speech, slow to respond to questions, generalized weakness, and severe fatigue. The pt reports taking all his medications this am including his insulin. He ate a biscuit for breakfast. After arriving home, his wife gave him M&Ms just in case his blood sugar was low. His glucometer is not working. He was unable to check a blood sugar.   On arrival to ED, temp 94.5F, Glucose 110. WBC normal. Trop <0.06. EKG showed NSR- no acute ST or T wave abnormalities. CXR showed streaky opacities in the base of the right lung characteristic of subsegmental atelectasis or scarring.   Symptoms resolved except + fatigue     Past Medical History:   Diagnosis Date    Diabetes mellitus, type 2 1997    Hyperlipidemia     Hypertension     Hypogonadism in male     Renal insufficiency     Tubular adenoma 10/2017       Past Surgical History:   Procedure Laterality Date    CATARACT EXTRACTION Right 01/31/2018    COLONOSCOPY N/A 11/2/2017    Performed by [facility]k Francois [facility] at [facility]    ESOPHAGOGASTRODUODENOSCOPY (EGD) N/A 4/30/2018    Performed by Kyle Barreto MD at [facility] END[facility]    TIBIA FRACTURE SURGERY      right leg x2     TONSILLECTOMY         Review of  "patient's allergies indicates:  No Known Allergies    No current facility-administered medications on file prior to encounter.      Current Outpatient Medications on File Prior to Encounter   Medication Sig    amLODIPine (NORVASC) 5 MG tablet Take 2 tablets (10 mg total) by mouth once daily.    aspirin (ECOTRIN) 325 MG EC tablet Take 1 tablet (325 mg total) by mouth once daily.    atorvastatin (LIPITOR) 80 MG tablet Take 1 tablet (80 mg total) by mouth once daily.    insulin detemir U-100 (LEVEMIR FLEXTOUCH) 100 unit/mL (3 mL) SubQ InPn pen Inject 40 Units into the skin 2 (two) times daily.    metoprolol succinate (TOPROL-XL) 100 MG 24 hr tablet Take 1 tablet (100 mg total) by mouth 2 (two) times daily.    ranitidine (ZANTAC) 150 MG tablet TAKE 1 TABLET BY MOUTH TWICE DAILY    terazosin (HYTRIN) 10 MG capsule Take 1 capsule (10 mg total) by mouth once daily. (Patient taking differently: Take 20 mg by mouth once daily. )    traZODone (DESYREL) 50 MG tablet Take 0.5 tablets (25 mg total) by mouth nightly as needed for Insomnia.    valsartan-hydrochlorothiazide (DIOVAN-HCT) 320-25 mg per tablet Take 1 tablet by mouth once daily.    blood sugar diagnostic Strp To check BG 2 times daily, to use with insurance preferred meter    blood-glucose meter kit To check BG two times daily, to use with insurance preferred meter    FREESTYLE LAURENCE SENSOR Kit USE AS DIRECTED FOR 1 DOSE    lancets [facility] To check BG 2 times daily, to use with insurance preferred meter    pantoprazole (PROTONIX) 40 MG tablet Take 1 tablet (40 mg total) by mouth 2 (two) times daily before meals.    papaverine 30 mg/mL injection Inject 0.3 ml of trimix solution prn ED max once daily  Prepare 10ml of trimix solution containing:    Papaverine 30mg/ml    Phentolamine 1 mg/ml    Alprostadil 10mcg/ml  Dispense 10ml per refill  Qs syringes 1cc/30g/0.5" and alcohol swabs dispense as needed for Intracavernosal injection    pioglitazone (ACTOS) 30 " MG tablet Take 1 tablet (30 mg total) by mouth once daily.    testosterone cypionate (DEPOTESTOTERONE CYPIONATE) 200 mg/mL injection Inject 1 mL (200 mg total) into the muscle every 14 (fourteen) days. for 20 doses     Family History     Problem Relation (Age of Onset)    Heart disease Father    Stroke Mother, Brother        Tobacco Use    Smoking status: Never Smoker    Smokeless tobacco: Never Used   Substance and Sexual Activity    Alcohol use: No    Drug use: No    Sexual activity: Yes     Partners: Female     Review of Systems   Constitutional: Positive for activity change and fatigue. Negative for appetite change, chills, diaphoresis and fever.   HENT: Negative for congestion, nosebleeds, sore throat and trouble swallowing.    Eyes: Negative for pain, discharge and visual disturbance.   Respiratory: Negative for apnea, cough, chest tightness, shortness of breath, wheezing and stridor.    Cardiovascular: Negative for chest pain, palpitations and leg swelling.   Gastrointestinal: Negative for abdominal distention, abdominal pain, blood in stool, constipation, diarrhea, nausea and vomiting.   Endocrine: Negative for cold intolerance and heat intolerance.   Genitourinary: Negative for difficulty urinating, dysuria, flank pain, frequency and urgency.   Musculoskeletal: Negative for arthralgias, back pain, joint swelling, myalgias, neck pain and neck stiffness.   Skin: Negative for rash and wound.   Allergic/Immunologic: Negative for food allergies and immunocompromised state.   Neurological: Positive for dizziness, syncope (near), weakness and numbness. Negative for seizures, facial asymmetry, light-headedness and headaches.   Hematological: Negative for adenopathy.   Psychiatric/Behavioral: Negative for agitation, behavioral problems and confusion. The patient is not nervous/anxious.      Objective:     Vital Signs (Most Recent):  Temp: 98.6 °F (37 °C) (01/04/19 1704)  Pulse: 67 (01/04/19 1704)  Resp: 20  (01/04/19 1704)  BP: (!) 175/76 (01/04/19 1704)  SpO2: 98 % (01/04/19 1704) Vital Signs (24h Range):  Temp:  [94.5 °F (34.7 °C)-98.6 °F (37 °C)] 98.6 °F (37 °C)  Pulse:  [53-87] 67  Resp:  [15-23] 20  SpO2:  [93 %-98 %] 98 %  BP: (130-175)/(60-76) 175/76     Weight: (!) 137 kg (302 lb)  Body mass index is 40.96 kg/m².    Physical Exam   Constitutional: He is oriented to person, place, and time. He appears well-developed and well-nourished. No distress.   HENT:   Head: Normocephalic and atraumatic.   Nose: Nose normal.   Mouth/Throat: Oropharynx is clear and moist.   Eyes: Conjunctivae and EOM are normal. No scleral icterus.   Neck: Normal range of motion. Neck supple.   Cardiovascular: Normal rate, regular rhythm, normal heart sounds and intact distal pulses. Exam reveals no gallop and no friction rub.   No murmur heard.  Pulmonary/Chest: Effort normal and breath sounds normal. No stridor. No respiratory distress. He has no wheezes. He has no rales. He exhibits no tenderness.   Abdominal: Soft. Bowel sounds are normal. He exhibits no distension. There is no tenderness. There is no rebound and no guarding.   Musculoskeletal: Normal range of motion. He exhibits no edema, tenderness or deformity.   Neurological: He is alert and oriented to person, place, and time. He has normal reflexes. No cranial nerve deficit. He exhibits normal muscle tone. Coordination normal.   Skin: Skin is warm and dry. No rash noted. He is not diaphoretic. No erythema. No pallor.   Psychiatric: He has a normal mood and affect. His behavior is normal. Thought content normal.   Nursing note and vitals reviewed.        CRANIAL NERVES     CN III, IV, VI   Extraocular motions are normal.        Significant Labs:   BMP:   Recent Labs   Lab 01/04/19  1340   *      K 4.4      CO2 28   BUN 39*   CREATININE 2.0*   CALCIUM 9.2     CBC:   Recent Labs   Lab 01/04/19  1340   WBC 6.67   HGB 12.0*   HCT 38.3*        CMP:   Recent  Labs   Lab 01/04/19  1340      K 4.4      CO2 28   *   BUN 39*   CREATININE 2.0*   CALCIUM 9.2   PROT 6.8   ALBUMIN 3.6   BILITOT 0.7   ALKPHOS 102   AST 23   ALT 26   ANIONGAP 11   EGFRNONAA 32*       Significant Imaging:  Imaging Results          X-Ray Chest AP Portable (Final result)  Result time 01/04/19 14:09:47    Final result by YONAS Cooper Sr., MD (01/04/19 14:09:47)                 Impression:      1. There are streaky opacities in the base of the right lung.  This is characteristic of subsegmental atelectasis or scarring.  2. The size of the heart is prominent.  This may be secondary to magnification.  .      Electronically signed by: Juan Cooper MD  Date:    01/04/2019  Time:    14:09             Narrative:    EXAMINATION:  XR CHEST AP PORTABLE    CLINICAL HISTORY:  Sepsis;    COMPARISON:  None    FINDINGS:  The size of the heart is prominent.  There are streaky opacities in the base of the right lung.  The left lung is clear.  There is no pneumothorax.  The costophrenic angles are sharp.                              Assessment/Plan:     * Near syncope    CT head  Serial troponin  Cardiac echo  Carotid U/S  Orthostatics  Neuro checks  Pt also felt like he was having a stroke - likely a episode of hypoglycemia since glucose 110 on arrival after eating M&Ms but will obtain MRI brain since family concerned about a stroke        Hypothermia    etiology unclear   Blood sugar 110 on arrival, TSh normal   Monitor      Hypertension associated with diabetes    Cont home meds       Stage 3 chronic kidney disease    Monitor        Type 2 diabetes mellitus with chronic kidney disease, with long-term current use of insulin    Hold insulin  NISS prn          VTE Risk Mitigation (From admission, onward)    None             Mahnaz Brandon NP  Department of Hospital Medicine   [facility]

## 2019-01-04 NOTE — ASSESSMENT & PLAN NOTE
CT head  Serial troponin  Cardiac echo  Carotid U/S  Orthostatics  Neuro checks  Pt also felt like he was having a stroke - likely a episode of hypoglycemia since glucose 110 on arrival after eating M&Ms but will obtain MRI brain since family concerned about a stroke

## 2019-01-04 NOTE — TELEPHONE ENCOUNTER
Reason for Disposition   Nursing judgment, per information in Reference    Protocols used: ST NO GUIDELINE AVAILABLE - SICK ADULT CALL-A-AH   rec'd call form  re Change in vision. Pt left house , felt pale shaky, ate M&Ms , feels exhausted. Hx HBP. ? BS=  ? rec EMS. Lives 7 mins from after care. Reiterate EMS. Spoke with pt cant explain what happened with vision. Unable to check BS.  Family states that they will discuss what to do next. Call back with questions.

## 2019-01-05 VITALS
BODY MASS INDEX: 40.9 KG/M2 | HEIGHT: 72 IN | DIASTOLIC BLOOD PRESSURE: 66 MMHG | OXYGEN SATURATION: 97 % | WEIGHT: 302 LBS | HEART RATE: 70 BPM | RESPIRATION RATE: 17 BRPM | SYSTOLIC BLOOD PRESSURE: 142 MMHG | TEMPERATURE: 98 F

## 2019-01-05 LAB
DIASTOLIC DYSFUNCTION: NO
POCT GLUCOSE: 183 MG/DL (ref 70–110)
POCT GLUCOSE: 186 MG/DL (ref 70–110)
POCT GLUCOSE: 242 MG/DL (ref 70–110)
RETIRED EF AND QEF - SEE NOTES: 60 (ref 55–65)
TROPONIN I SERPL DL<=0.01 NG/ML-MCNC: 0.01 NG/ML
TROPONIN I SERPL DL<=0.01 NG/ML-MCNC: <0.006 NG/ML
TROPONIN I SERPL DL<=0.01 NG/ML-MCNC: <0.006 NG/ML

## 2019-01-05 PROCEDURE — 93307 TTE W/O DOPPLER COMPLETE: CPT

## 2019-01-05 PROCEDURE — 84484 ASSAY OF TROPONIN QUANT: CPT | Mod: 91

## 2019-01-05 PROCEDURE — 36415 COLL VENOUS BLD VENIPUNCTURE: CPT

## 2019-01-05 PROCEDURE — 25000003 PHARM REV CODE 250: Performed by: INTERNAL MEDICINE

## 2019-01-05 PROCEDURE — 25000003 PHARM REV CODE 250: Performed by: NURSE PRACTITIONER

## 2019-01-05 PROCEDURE — G0378 HOSPITAL OBSERVATION PER HR: HCPCS

## 2019-01-05 PROCEDURE — 96372 THER/PROPH/DIAG INJ SC/IM: CPT

## 2019-01-05 PROCEDURE — 93307 TTE W/O DOPPLER COMPLETE: CPT | Mod: 26,,, | Performed by: INTERNAL MEDICINE

## 2019-01-05 PROCEDURE — 93307 2D ECHO ONLY: ICD-10-PCS | Mod: 26,,, | Performed by: INTERNAL MEDICINE

## 2019-01-05 RX ADMIN — INSULIN DETEMIR 6 UNITS: 100 INJECTION, SOLUTION SUBCUTANEOUS at 09:01

## 2019-01-05 RX ADMIN — FAMOTIDINE 20 MG: 20 TABLET ORAL at 09:01

## 2019-01-05 RX ADMIN — CANDESARTAN CILEXETIL 16 MG: 16 TABLET ORAL at 09:01

## 2019-01-05 RX ADMIN — HYDROCHLOROTHIAZIDE 25 MG: 25 TABLET ORAL at 09:01

## 2019-01-05 NOTE — PROGRESS NOTES
"Patient informed on diet order per request and stated, "Oh, hell no! I am not eating that; I will get my wife to bring me something to eat! I eat whatever I want. I won't be restricted." his daughter at bedside shaking her head "yes" and stated, "Trust me, he is telling the truth."  "

## 2019-01-05 NOTE — SIGNIFICANT EVENT
Notified by nurse patient requesting to start BP and BS medications as taken at home. Reviewed HPI Hypertension problem by day team stated to resume home meds and eitology of presenting symptoms likley hypoglycemia.  Patient at home on significant dose of levimir BID.   BS tonight 190s. Will start reduced dose of levimir back, just at 6units bid for now and continue to monitor may need to adjust pending course.  Restarted norvasc but with further review of chart and orders MRI pending as concern of stroke. Will hold BP meds and allow for permissive HTN for now until MRI.

## 2019-01-05 NOTE — PLAN OF CARE
Discharge Planning:    Discharge orders reviewed by CM.  Patient ready for discharge from CM standpoint.      Patient discharged home with no needs

## 2019-01-05 NOTE — PLAN OF CARE
Problem: Adult Inpatient Plan of Care  Goal: Plan of Care Review  Outcome: Ongoing (interventions implemented as appropriate)  POC reviewed, verbalized understanding. Pt remained free from falls, fall precautions in place. Pt is SR  on monitor, . VSS. B/p a little elevated permissive htn until mri results are back levermir 6 units given patient concern about home meds being restarted educated pt on why he is complaint will follow up with dr in am. PIV intact. Call bell and personal belongings within reach. Hourly rounding complete. Reminded to call for assistance. Will continue to monitor.

## 2019-01-06 NOTE — HOSPITAL COURSE
"Patient admits to Not checking his blood sugars in over a month. He is very fatigued and doesn't want to walk or move around. He has been concentrating on B/P monitoring at home. He admits to gaining over 30# past year and over 56 after starting insulin. He reports being on Lantus 40 units BID. Yesterday, when he felt weak, he admits to eating a bag of M&M's. In ER, his glucose was 110. Nurse reports he said, He is sitting in High Fowlers position in bed, b/c he can't breathe to lay flat.  "Diabetic diet? Hell No! I eat what I want, when I want at home. My daughter can bring whatever food I want to the hospital." ECHO nml, MRI brain showed chronic microvascular ischemic changes without evidence of acute infarct. Counseled on needing to lose 50# which will help his sugar and B/P. Instructed to start checking blood sugars QID and reducing lantus to 20 units until seen by PCP 1/7/19. His wife is concerned about sleep apnea, b/c he snores and can't lie flat. He needs sleep study to r/o GERALD. Patient seen and examined and deemed stable for d/c.      "

## 2019-01-06 NOTE — DISCHARGE SUMMARY
"[facility] Hospital Medicine  Discharge Summary      Patient Name: Sherif Ragland  MRN: [id number]  Admission Date: 1/4/2019  Hospital Length of Stay: 0 days  Discharge Date and Time:  01/05/2019 6:24 PM  Attending Physician: Romel Weaver MD   Discharging Provider: Estelita Jung NP  Primary Care Provider: Tobias Fox MD    HPI:   The pt is a 74 yo male with DM, HTN, HLD, CKD3 who presented to ED with near syncope. Pt reports he was driving his car and felt acute onset of tunnel vision, dizziness, numbness to mouth, severe fatigue. Pt reports he drove home because he felt like he was having a stroke. On arrival to home, his family reports slurred speech, slow to respond to questions, generalized weakness, and severe fatigue. The pt reports taking all his medications this am including his insulin. He ate a biscuit for breakfast. After arriving home, his wife gave him M&Ms just in case his blood sugar was low. His glucometer is not working. He was unable to check a blood sugar.   On arrival to ED, temp 94.5F, Glucose 110. WBC normal. Trop <0.06. EKG showed NSR- no acute ST or T wave abnormalities. CXR showed streaky opacities in the base of the right lung characteristic of subsegmental atelectasis or scarring.   Symptoms resolved except + fatigue. Observation for near syncope.      * No surgery found *      Hospital Course:   Patient admits to Not checking his blood sugars in over a month. He is very fatigued and doesn't want to walk or move around. He has been concentrating on B/P monitoring at home. He admits to gaining over 30# past year and over 56 after starting insulin. He reports being on Lantus 40 units BID. Yesterday, when he felt weak, he admits to eating a bag of M&M's. In ER, his glucose was 110. Nurse reports he said, He is sitting in High Fowlers position in bed, b/c he can't breathe to lay flat.  "Diabetic diet? Hell No! I eat what I want, when I want at home. My daughter can " "bring whatever food I want to the hospital." ECHO nml, MRI brain showed chronic microvascular ischemic changes without evidence of acute infarct. Counseled on needing to lose 50# which will help his sugar and B/P. Instructed to start checking blood sugars QID and reducing lantus to 20 units until seen by PCP 1/7/19. His wife is concerned about sleep apnea, b/c he snores and can't lie flat. He needs sleep study to r/o GERALD. Patient seen and examined and deemed stable for d/c.           Consults:   Consults (From admission, onward)        Status Ordering Provider     Inpatient consult to Hospitalist  Once     Provider:  (Not yet assigned)    Acknowledged VARSHA AYALA          No new Assessment & Plan notes have been filed under this hospital service since the last note was generated.  Service: Hospital Medicine    Final Active Diagnoses:    Diagnosis Date Noted POA    PRINCIPAL PROBLEM:  Near syncope [R55] 01/04/2019 Yes    Hypothermia [T68.XXXA] 01/04/2019 Yes    Hypertension associated with diabetes [E11.59, I10] 10/09/2017 Yes    Stage 3 chronic kidney disease [N18.3] 10/09/2017 Yes    Type 2 diabetes mellitus with chronic kidney disease, with long-term current use of insulin [E11.22, Z79.4] 10/09/2017 Not Applicable      Problems Resolved During this Admission:       Discharged Condition: stable    Disposition: Home or Self Care    Follow Up:  Follow-up Information     Tobias Fox MD In 3 days.    Specialty:  Internal Medicine  Contact information:  [address] [facility]  [facility] [facility] [place] [address] [phone]             Schedule an appointment as soon as possible for a visit with Ramiro Victoria MD.    Specialty:  Pulmonary Disease  Why:  needs sleep study for GERALD  Contact information:  [address]  [place] [address] [phone]                 Patient Instructions:      Diet diabetic   Scheduling Instructions: Calorie 1800 calories     Activity as tolerated       Significant " Diagnostic Studies: Labs:   CMP   Recent Labs   Lab 01/04/19  1340      K 4.4      CO2 28   *   BUN 39*   CREATININE 2.0*   CALCIUM 9.2   PROT 6.8   ALBUMIN 3.6   BILITOT 0.7   ALKPHOS 102   AST 23   ALT 26   ANIONGAP 11   ESTGFRAFRICA 37*   EGFRNONAA 32*   , CBC   Recent Labs   Lab 01/04/19  1340   WBC 6.67   HGB 12.0*   HCT 38.3*      , INR   Lab Results   Component Value Date    INR 1.0 01/04/2019   , Lipid Panel   Lab Results   Component Value Date    CHOL 115 (L) 10/18/2018    HDL 41 10/18/2018    LDLCALC 55.0 (L) 10/18/2018    TRIG 95 10/18/2018    CHOLHDL 35.7 10/18/2018   , Troponin   Recent Labs   Lab 01/05/19  1130   TROPONINI 0.008    and A1C:   Recent Labs   Lab 10/18/18  0813 12/20/18  1050   HGBA1C 8.5* 7.4*       ECHO  CONCLUSIONS     1 - Concentric hypertrophy.     2 - No wall motion abnormalities.     3 - Normal left ventricular systolic function (EF 60-65%).     4 - Normal left ventricular diastolic function.     5 - Normal right ventricular systolic function .       Imaging Results          US Carotid Bilateral (Final result)  Result time 01/04/19 19:06:41    Final result by Gabriel Ovalles MD (01/04/19 19:06:41)                 Impression:      No hemodynamically significant stenosis.  Bilateral plaque formation is present.    Validated velocity measurements are extrapolated from diameter data as defined by the Society of Radiologists in Ultrasound Conference Radiology 2003; 229;340-346.      Electronically signed by: Diomedes Ovalles MD  Date:    01/04/2019  Time:    19:06             Narrative:    EXAMINATION:  US CAROTID BILATERAL    CLINICAL HISTORY:  syncope;    FINDINGS:  The right common carotid artery bifurcation reveals moderate calcified plaque in the external carotid artery and carotid bulb.  The right common carotid artery peak systolic velocity is 66.4cm/sec and the right internal carotid artery peak systolic velocity is 47.4cm/sec.  The right internal to  common carotid artery ratio is 0.9.    The left common carotid artery bifurcation reveals moderate calcified plaque formation greatest at the carotid bulb.  The left common carotid artery velocity is 93.5cm/sec and the left internal carotid artery velocity is 57.3cm/sec.  The left internal to common carotid artery ratio is 1.2.    There is normal antegrade vertebral artery flow bilaterally.                               CT Head Without Contrast (Final result)  Result time 01/04/19 18:38:17    Final result by Jesus Honeycutt Jr., MD (01/04/19 18:38:17)                 Impression:      No acute intracranial findings evident. No significant changes.    All CT scans at this facility are performed  using dose modulation techniques as appropriate to performed exam including the following:  automated exposure control; adjustment of mA and/or kV according to the patients size (this includes techniques or standardized protocols for targeted exams where dose is matched to indication/reason for exam: i.e. extremities or head);  iterative reconstruction technique.      Electronically signed by: Jesus Honeycutt MD  Date:    01/04/2019  Time:    18:38             Narrative:    EXAMINATION:  CT HEAD WITHOUT CONTRAST    CLINICAL HISTORY:  Dizziness;Syncope/fainting;    TECHNIQUE:  Noncontrast axial images of the head were obtained.    COMPARISON:  10/31/2018    FINDINGS:  Bilateral symmetric involutional changes.  Bilateral microvascular ischemic change.  Old lacunar infarct in the left basal ganglia, similar to before.    Ventricular system is normal.  No hydrocephalus.  No midline shift.  No abnormal density to indicate acute major vascular distribution ischemic infarction or hemorrhage.  No mass effect.  No extra-axial fluid collections.    Visualized paranasal sinuses and mastoid air cells appear clear.    No calvarial fracture.    Vascular calcifications.                               X-Ray Shoulder 2 or More Views Left  (Final result)  Result time 01/04/19 18:21:39    Final result by Jesus Honeycutt Jr., MD (01/04/19 18:21:39)                 Impression:      No acute findings.  Chronic bone and soft tissue findings as above      Electronically signed by: Jesus Honeycutt MD  Date:    01/04/2019  Time:    18:21             Narrative:    EXAMINATION:  XR SHOULDER COMPLETE 2 OR MORE VIEWS LEFT    CLINICAL HISTORY:  pt requests for left shoulder pain;    COMPARISON:  11/06/2018.    FINDINGS:  Bone alignment is satisfactory.  No fracture or dislocation.  Soft tissue calcifications at the level of the greater tuberosity, suggesting some calcific tendinitis, bursitis, loose bodies.  No detrimental change in these findings.  Moderate osteoarthritic changes at the acromioclavicular joint                               X-Ray Chest AP Portable (Final result)  Result time 01/04/19 14:09:47    Final result by YONAS Cooper Sr., MD (01/04/19 14:09:47)                 Impression:      1. There are streaky opacities in the base of the right lung.  This is characteristic of subsegmental atelectasis or scarring.  2. The size of the heart is prominent.  This may be secondary to magnification.  .      Electronically signed by: Juan Cooper MD  Date:    01/04/2019  Time:    14:09             Narrative:    EXAMINATION:  XR CHEST AP PORTABLE    CLINICAL HISTORY:  Sepsis;    COMPARISON:  None    FINDINGS:  The size of the heart is prominent.  There are streaky opacities in the base of the right lung.  The left lung is clear.  There is no pneumothorax.  The costophrenic angles are sharp.                                  Pending Diagnostic Studies:     None         Medications:  Reconciled Home Medications:      Medication List      CHANGE how you take these medications    insulin detemir U-100 100 unit/mL (3 mL) Inpn pen  Commonly known as:  LEVEMIR FLEXTOUCH  Inject 20 Units into the skin 2 (two) times daily.  What changed:  how much to take    "  terazosin 10 MG capsule  Commonly known as:  HYTRIN  Take 1 capsule (10 mg total) by mouth once daily.  What changed:  how much to take        CONTINUE taking these medications    amLODIPine 5 MG tablet  Commonly known as:  NORVASC  Take 2 tablets (10 mg total) by mouth once daily.     aspirin 325 MG EC tablet  Commonly known as:  ECOTRIN  Take 1 tablet (325 mg total) by mouth once daily.     atorvastatin 80 MG tablet  Commonly known as:  LIPITOR  Take 1 tablet (80 mg total) by mouth once daily.     blood sugar diagnostic Strp  To check BG 2 times daily, to use with insurance preferred meter     blood-glucose meter kit  To check BG two times daily, to use with insurance preferred meter     FREESTYLE LAURENCE 10 DAY SENSOR Kit  Generic drug:  flash glucose sensor  USE AS DIRECTED FOR 1 DOSE     lancets [facility]  To check BG 2 times daily, to use with insurance preferred meter     metoprolol succinate 100 MG 24 hr tablet  Commonly known as:  TOPROL-XL  Take 1 tablet (100 mg total) by mouth 2 (two) times daily.     pantoprazole 40 MG tablet  Commonly known as:  PROTONIX  Take 1 tablet (40 mg total) by mouth 2 (two) times daily before meals.     papaverine 30 mg/mL injection  Inject 0.3 ml of trimix solution prn ED max once daily  Prepare 10ml of trimix solution containing:    Papaverine 30mg/ml    Phentolamine 1 mg/ml    Alprostadil 10mcg/ml  Dispense 10ml per refill  Qs syringes 1cc/30g/0.5" and alcohol swabs dispense as needed for Intracavernosal injection     pioglitazone 30 MG tablet  Commonly known as:  ACTOS  Take 1 tablet (30 mg total) by mouth once daily.     ranitidine 150 MG tablet  Commonly known as:  ZANTAC  TAKE 1 TABLET BY MOUTH TWICE DAILY     traZODone 50 MG tablet  Commonly known as:  DESYREL  Take 0.5 tablets (25 mg total) by mouth nightly as needed for Insomnia.     valsartan-hydrochlorothiazide 320-25 mg per tablet  Commonly known as:  DIOVAN-HCT  Take 1 tablet by mouth once daily.        STOP taking " these medications    testosterone cypionate 200 mg/mL injection  Commonly known as:  DEPOTESTOTERONE CYPIONATE        Indwelling Lines/Drains at time of discharge:   Lines/Drains/Airways          None      Time spent on the discharge of patient: 45 minutes  Patient was seen and examined on the date of discharge and determined to be suitable for discharge.    Estelita Jung NP  Department of Hospital Medicine  [facility]

## 2019-01-07 ENCOUNTER — PATIENT MESSAGE (OUTPATIENT)
Dept: INTERNAL MEDICINE | Facility: CLINIC | Age: 74
End: 2019-01-07

## 2019-01-07 ENCOUNTER — PATIENT OUTREACH (OUTPATIENT)
Dept: OTHER | Facility: OTHER | Age: 74
End: 2019-01-07

## 2019-01-07 DIAGNOSIS — E11.59 HYPERTENSION ASSOCIATED WITH DIABETES: Primary | ICD-10-CM

## 2019-01-07 DIAGNOSIS — I15.2 HYPERTENSION ASSOCIATED WITH DIABETES: Primary | ICD-10-CM

## 2019-01-07 LAB
BACTERIA BLD CULT: NORMAL

## 2019-01-07 NOTE — PROGRESS NOTES
Last 5 Patient Entered Readings                                      Current 30 Day Average: 163/75     Recent Readings 1/7/2019 1/7/2019 1/6/2019 1/6/2019 1/6/2019    SBP (mmHg) 157 174 158 165 163    DBP (mmHg) 66 76 67 76 70    Pulse 74 70 74 75 72      1/7: JANES to do his initial pharmacist call.     1/10:   Mr. Sherif Ragland is a 73 y.o. male who is newly enrolled in the [facility].   Past Medical History:   Diagnosis Date    Diabetes mellitus, type 2 1997    Hyperlipidemia     Hypertension     Hypogonadism in male     Renal insufficiency     Tubular adenoma 10/2017     The following information was reviewed/updated:    Review of patient's allergies indicates:  No Known Allergies  Current Outpatient Medications on File Prior to Visit   Medication Sig Dispense Refill    amLODIPine (NORVASC) 5 MG tablet Take 2 tablets (10 mg total) by mouth once daily. 60 tablet 11    aspirin (ECOTRIN) 325 MG EC tablet Take 1 tablet (325 mg total) by mouth once daily.  0    atorvastatin (LIPITOR) 80 MG tablet Take 1 tablet (80 mg total) by mouth once daily. 90 tablet 4    blood sugar diagnostic Strp To check BG 2 times daily, to use with insurance preferred meter 100 each 11    blood-glucose meter kit To check BG two times daily, to use with insurance preferred meter 1 each 0    FREESTYLE LAURENCE SENSOR Kit USE AS DIRECTED FOR 1 DOSE 1 kit 0    insulin detemir U-100 (LEVEMIR FLEXTOUCH) 100 unit/mL (3 mL) SubQ InPn pen Inject 20 Units into the skin 2 (two) times daily. 72 mL 3    lancets Misc To check BG 2 times daily, to use with insurance preferred meter 100 each 11    metoprolol succinate (TOPROL-XL) 100 MG 24 hr tablet Take 1 tablet (100 mg total) by mouth 2 (two) times daily. 60 tablet 11    pantoprazole (PROTONIX) 40 MG tablet Take 1 tablet (40 mg total) by mouth 2 (two) times daily before meals. 60 tablet 2    papaverine 30 mg/mL injection Inject 0.3 ml of trimix solution prn ED  "max once daily  Prepare 10ml of trimix solution containing:    Papaverine 30mg/ml    Phentolamine 1 mg/ml    Alprostadil 10mcg/ml  Dispense 10ml per refill  Qs syringes 1cc/30g/0.5" and alcohol swabs dispense as needed for Intracavernosal injection 10 mL 5    pioglitazone (ACTOS) 30 MG tablet Take 1 tablet (30 mg total) by mouth once daily. 90 tablet 3    ranitidine (ZANTAC) 150 MG tablet TAKE 1 TABLET BY MOUTH TWICE DAILY 60 tablet 0    terazosin (HYTRIN) 10 MG capsule Take 1 capsule (10 mg total) by mouth once daily. (Patient taking differently: Take 20 mg by mouth once daily. ) 90 capsule 4    traZODone (DESYREL) 50 MG tablet Take 0.5 tablets (25 mg total) by mouth nightly as needed for Insomnia. 30 tablet 3    valsartan-hydrochlorothiazide (DIOVAN-HCT) 320-25 mg per tablet Take 1 tablet by mouth once daily. 90 tablet 3     No current facility-administered medications on file prior to visit.      Depression and GERALD screening isn't completed with his onboarding questionnaires.     HYPERTENSION  Reviewed non-pharmacologic therapies and impact on BP:  (Defer to HC)  1. Low-sodium diet- 11 mmHg reduction 2-4 weeks. I have reviewed [facility].A[facility] diet sodium restrictions (<2000mg/day)  2. Exercise- 7 mmHg reduction 4-12 weeks. I have recommended patient engage in exercise as tolerated at least 30 minutes 5x per week to improve cardiovascular health.    3. Alcohol intake- 3 mmHg reduction 4-12 weeks. I have discussed with patient the maximum recommended number of 2 drink(s) per day for men/women.    Explained that we expect patient to obtain several blood pressures per week at random times of day.   Our goal is to get  BP to consistently below 130/80mmHg and make the process convenient so patient can avoid extra trips to the office. Getting your blood pressure below 130/80mmHg (definition of control) will reduce your risk for heart attack, kidney failure, stroke and death (as well as kidney failure, eye disease, & " dementia).     Patient is not meeting the goal already.     Discussed appropriate BP measuring technique: (Defer to HC)  Before taking your blood pressure  Find a quiet place. You will need to listen for your heartbeat.  Roll up the sleeve on your left arm or remove any tight-sleeved clothing, if needed. (It's best to take your blood pressure from your left arm if you are right-handed.You can use the other arm if you have been told by your health care provider to do so.)  Rest in a chair next to a table for 5 to 10 minutes. (Your left arm should rest comfortably at heart level.)  Sit up straight with your back against the chair, legs uncrossed and on the ground.  Rest your forearm on the table with the palm of your hand facing up.  You should not talk, read the newspaper, or watch television during this process.    Last 5 Patient Entered Readings                                      Current 30 Day Average: 163/75     Recent Readings 1/10/2019 1/9/2019 1/8/2019 1/8/2019 1/7/2019    SBP (mmHg) 167 159 159 161 169    DBP (mmHg) 82 74 70 73 76    Pulse 74 72 74 71 77        Patient complained of fluid retention in his ankles and feet. This is most likely related to amlodipine. He is most concerned about his blood pressure. He will adjust amlodipine 10mg daily to nifedipine 60mg daily for better blood pressure control. If this doesn't reduce his blood pressure, we discussed changing metoprolol to carvedilol. His kidney function is declining and slightly above 30. If his kidney function declines any further, I will adjust HCTZ to a loop diuretic.    Instructed patient not to allow anyone else to use phone and BP cuff.   I'm not available for emergencies. Patient will call [facility] on-call ([phone] or [phone]) or [phone] if needed.     Patient and I agreed that he will continue to monitor blood pressure and sodium intake and continue to remain adherent to medications.     I will plan to follow-up with the patient in  2 weeks.   Emailed patient link to [email or web address] webpage and my contact information in case he has any questions.

## 2019-01-08 ENCOUNTER — PATIENT MESSAGE (OUTPATIENT)
Dept: INTERNAL MEDICINE | Facility: CLINIC | Age: 74
End: 2019-01-08

## 2019-01-08 DIAGNOSIS — M25.519 ACUTE SHOULDER PAIN, UNSPECIFIED LATERALITY: Primary | ICD-10-CM

## 2019-01-09 ENCOUNTER — PATIENT MESSAGE (OUTPATIENT)
Dept: INTERNAL MEDICINE | Facility: CLINIC | Age: 74
End: 2019-01-09

## 2019-01-09 LAB — BACTERIA BLD CULT: NORMAL

## 2019-01-10 RX ORDER — NIFEDIPINE 60 MG/1
60 TABLET, EXTENDED RELEASE ORAL DAILY
Qty: 30 TABLET | Refills: 3 | Status: SHIPPED | OUTPATIENT
Start: 2019-01-10 | End: 2019-03-26

## 2019-01-11 RX ORDER — FUROSEMIDE 20 MG/1
20 TABLET ORAL DAILY
Qty: 3 TABLET | Refills: 0 | Status: SHIPPED | OUTPATIENT
Start: 2019-01-11 | End: 2019-03-26 | Stop reason: ALTCHOICE

## 2019-01-11 NOTE — TELEPHONE ENCOUNTER
Try lasix once daily for 3 days.  rx sent to pharmacy.  Will need to follow up with Yael or I tomorrow.

## 2019-01-14 ENCOUNTER — PATIENT MESSAGE (OUTPATIENT)
Dept: INTERNAL MEDICINE | Facility: CLINIC | Age: 74
End: 2019-01-14

## 2019-01-16 ENCOUNTER — PATIENT MESSAGE (OUTPATIENT)
Dept: INTERNAL MEDICINE | Facility: CLINIC | Age: 74
End: 2019-01-16

## 2019-01-16 ENCOUNTER — OFFICE VISIT (OUTPATIENT)
Dept: ORTHOPEDICS | Facility: CLINIC | Age: 74
End: 2019-01-16
Payer: MEDICARE

## 2019-01-16 VITALS — BODY MASS INDEX: 40.9 KG/M2 | WEIGHT: 302 LBS | HEIGHT: 72 IN | RESPIRATION RATE: 18 BRPM

## 2019-01-16 DIAGNOSIS — M25.512 LEFT SHOULDER PAIN, UNSPECIFIED CHRONICITY: Primary | ICD-10-CM

## 2019-01-16 DIAGNOSIS — N18.30 TYPE 2 DIABETES MELLITUS WITH STAGE 3 CHRONIC KIDNEY DISEASE, WITH LONG-TERM CURRENT USE OF INSULIN: ICD-10-CM

## 2019-01-16 DIAGNOSIS — K21.9 GASTROESOPHAGEAL REFLUX DISEASE, ESOPHAGITIS PRESENCE NOT SPECIFIED: ICD-10-CM

## 2019-01-16 DIAGNOSIS — E11.22 TYPE 2 DIABETES MELLITUS WITH STAGE 3 CHRONIC KIDNEY DISEASE, WITH LONG-TERM CURRENT USE OF INSULIN: ICD-10-CM

## 2019-01-16 DIAGNOSIS — Z79.4 TYPE 2 DIABETES MELLITUS WITH STAGE 3 CHRONIC KIDNEY DISEASE, WITH LONG-TERM CURRENT USE OF INSULIN: ICD-10-CM

## 2019-01-16 PROCEDURE — 99999 PR PBB SHADOW E&M-EST. PATIENT-LVL III: ICD-10-PCS | Mod: PBBFAC,,, | Performed by: FAMILY MEDICINE

## 2019-01-16 PROCEDURE — 3045F PR MOST RECENT HEMOGLOBIN A1C LEVEL 7.0-9.0%: ICD-10-PCS | Mod: CPTII,S$GLB,, | Performed by: FAMILY MEDICINE

## 2019-01-16 PROCEDURE — 99999 PR PBB SHADOW E&M-EST. PATIENT-LVL III: CPT | Mod: PBBFAC,,, | Performed by: FAMILY MEDICINE

## 2019-01-16 PROCEDURE — 1101F PR PT FALLS ASSESS DOC 0-1 FALLS W/OUT INJ PAST YR: ICD-10-PCS | Mod: CPTII,S$GLB,, | Performed by: FAMILY MEDICINE

## 2019-01-16 PROCEDURE — 99213 OFFICE O/P EST LOW 20 MIN: CPT | Mod: S$GLB,,, | Performed by: FAMILY MEDICINE

## 2019-01-16 PROCEDURE — 3045F PR MOST RECENT HEMOGLOBIN A1C LEVEL 7.0-9.0%: CPT | Mod: CPTII,S$GLB,, | Performed by: FAMILY MEDICINE

## 2019-01-16 PROCEDURE — 99213 PR OFFICE/OUTPT VISIT, EST, LEVL III, 20-29 MIN: ICD-10-PCS | Mod: S$GLB,,, | Performed by: FAMILY MEDICINE

## 2019-01-16 PROCEDURE — 99499 UNLISTED E&M SERVICE: CPT | Mod: S$GLB,,, | Performed by: FAMILY MEDICINE

## 2019-01-16 PROCEDURE — 1101F PT FALLS ASSESS-DOCD LE1/YR: CPT | Mod: CPTII,S$GLB,, | Performed by: FAMILY MEDICINE

## 2019-01-16 PROCEDURE — 99499 RISK ADDL DX/OHS AUDIT: ICD-10-PCS | Mod: S$GLB,,, | Performed by: FAMILY MEDICINE

## 2019-01-16 RX ORDER — BETAMETHASONE SODIUM PHOSPHATE AND BETAMETHASONE ACETATE 3; 3 MG/ML; MG/ML
6 INJECTION, SUSPENSION INTRA-ARTICULAR; INTRALESIONAL; INTRAMUSCULAR; SOFT TISSUE
Status: DISCONTINUED | OUTPATIENT
Start: 2019-01-16 | End: 2019-06-24

## 2019-01-16 RX ORDER — KETOROLAC TROMETHAMINE 30 MG/ML
15 INJECTION, SOLUTION INTRAMUSCULAR; INTRAVENOUS
Status: ACTIVE | OUTPATIENT
Start: 2019-01-16 | End: 2019-01-19

## 2019-01-16 NOTE — LETTER
January 16, 2019      BONG Miller  [address] [facility]  [place] [address]           [facility]  [id number] [place] [address]  [place] [address]  Phone: [phone]  Fax: [phone]          Patient: Sherif Ragland   MR Number: [id number]   YOB: 1945   Date of Visit: 1/16/2019       Dear Yael Youssef:    Thank you for referring Sherif Ragland to me for evaluation. Attached you will find relevant portions of my assessment and plan of care.    If you have questions, please do not hesitate to call me. I look forward to following Sherif Ragland along with you.    Sincerely,    Gabriel Paul MD    Enclosure  CC:  No Recipients    If you would like to receive this communication electronically, please contact [email or web address] or [phone] to request more information on [facility] access.    For providers and/or their staff who would like to refer a patient to [facility], please contact us through our one-stop-shop provider referral line, [facility], at [phone].    If you feel you have received this communication in error or would no longer like to receive these types of communications, please e-mail [email or web address]
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
## 2019-01-17 ENCOUNTER — PATIENT OUTREACH (OUTPATIENT)
Dept: OTHER | Facility: OTHER | Age: 74
End: 2019-01-17

## 2019-01-17 RX ORDER — INSULIN PUMP SYRINGE, 3 ML
EACH MISCELLANEOUS
Qty: 1 EACH | Refills: 0 | Status: CANCELLED | OUTPATIENT
Start: 2019-01-17 | End: 2020-01-16

## 2019-01-17 RX ORDER — LANCETS
EACH MISCELLANEOUS
Qty: 100 EACH | Refills: 11 | Status: SHIPPED | OUTPATIENT
Start: 2019-01-17 | End: 2019-05-21 | Stop reason: SDUPTHER

## 2019-01-17 NOTE — PROGRESS NOTES
"Last 5 Patient Entered Readings                                      Current 30 Day Average: 158/73     Recent Readings 1/17/2019 1/16/2019 1/16/2019 1/16/2019 1/15/2019    SBP (mmHg) 159 141 149 132 155    DBP (mmHg) 72 64 66 81 76    Pulse 79 86 83 73 70        Digital Medicine: Health  Introduction    Introduced Mr. Sherif Ragland to Digital Medicine. Discussed health  role and recommended lifestyle modifications.    Lifestyle Assessment:  Current Dietary Habits(i.e. low sodium, food labels, dining out): Pt reports reducing portions and has been "eating healthier". Pt sates that wife does all the cooking and that she uses mostly fresh ingredients and that he does not ad salt at the table, but that wife does cook with sea salt. Discussed AHA sodium recs and importance of decreased sodium intake on BP control and recommended use of salt free/salt alternative seasonings in cooking. Pt was agreeable to this and would like suggestions sent to email so that he can share them with his wife.    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     Alcohol/Tobacco: Pt denies tobacco use and reports drinking 1-2 drinks monthly at most.     Medication Adherence: has been compliant with the medicaiton regimen     Reviewed AHA/AACE recommendations:  Limit sodium intake to <2000mg/day  Recommended CHO intake, 45-65% of daily caloric intake  Perform 150 minutes of physical activity per week    Reviewed the importance of self-monitoring, medication adherence, and that the health  can be used as a resource for lifestyle modifications to help reduce or maintain a healthy lifestyle.  Reviewed that the Digital Medicine team is not available for emergencies and instructed the patient to call 911 or [facility] ([phone] or [phone]) if one arises.    "

## 2019-01-18 NOTE — PROGRESS NOTES
Patient, Sherif Ragland (MRN #[id number]), presented with a recorded BMI of 40.96 kg/m^2 consistent with the definition of morbid obesity (ICD-10 E66.01). The patient's morbid obesity was monitored, evaluated, addressed and/or treated. This addendum to the medical record is made on 01/18/2019.

## 2019-01-24 ENCOUNTER — PATIENT OUTREACH (OUTPATIENT)
Dept: OTHER | Facility: OTHER | Age: 74
End: 2019-01-24

## 2019-01-24 DIAGNOSIS — I15.2 HYPERTENSION ASSOCIATED WITH DIABETES: Primary | ICD-10-CM

## 2019-01-24 DIAGNOSIS — E11.59 HYPERTENSION ASSOCIATED WITH DIABETES: Primary | ICD-10-CM

## 2019-01-24 NOTE — LETTER
March 12, 2019     Sherif Ragland  [address]  [place] [address]       Dear Sherif,    Thank you for enrolling in [facility]. To participate, we ask that you submit information at least once weekly through your [facility] account and maintain regular contact with your Care Team. We have not received any data or heard from you in some time.     The Digital Medicine Care Team has attempted to reach you on multiple occasions to determine if you would like to continue participating in the program. While we encourage you to continue participating fully, we understand that circumstances may change.     To continue participating in the program, please contact me at [phone]. If we do not hear back, you will be un-enrolled, and your physician will be notified of your decision.    If you have submitted data and believe you are receiving this letter in error, please call the Digital Medicine Patient Support Line at [phone] for troubleshooting.      We look forward to hearing from you soon.    Sincerely,     Ele Fisher  [facility]

## 2019-01-24 NOTE — PROGRESS NOTES
Last 5 Patient Entered Readings                                      Current 30 Day Average: 155/70     Recent Readings 1/22/2019 1/21/2019 1/18/2019 1/17/2019 1/16/2019    SBP (mmHg) 155 150 135 159 141    DBP (mmHg) 67 71 62 72 64    Pulse 66 67 63 79 86      LMFCB to discuss his elevated BP and increase nifedipine to 90mg daily if he is tolerating 60mg daily.    1/31: Patient will be seen by Dr. Fox today. He is experiencing edema from nifedipine 60mg daily. I would not recommend increasing to 90mg daily. With his latest GFR, I would recommend changing valsartan-HCTZ to valsartan only and increasing furosemide from 20mg daily to 20mg BID. I would also recommend changing metoprolol to carvedilol. I will follow-up after Dr. Fox's appointment today.     2/1: LMFCB to discuss his appointment with Dr. Fox and his at goal reading in the office.    2/8: Patient is waiting to see Dr. Fox 2/21 and is having a stress test next week. He will compare is cuff against the readings at these two appointments.    2/22: Patient went to Dr. Fox's appointment yesterday and had an at goal reading. Dr. Fox recommended that he bring the cuff back to the [facility]. He plans to go today.    2/25: LMFCB to assess if he went to the [facility] for a new cuff.    2/26: Patient hasn't had the opportunity to go to the [facility]. He plans to go today.    3/12: Patient answered and said he is quite busy then hung up. Sending non-compliance letter today.

## 2019-01-28 RX ORDER — TERAZOSIN 10 MG/1
10 CAPSULE ORAL DAILY
Qty: 90 CAPSULE | Refills: 4 | Status: SHIPPED | OUTPATIENT
Start: 2019-01-28 | End: 2019-06-24 | Stop reason: SDUPTHER

## 2019-01-31 ENCOUNTER — LAB VISIT (OUTPATIENT)
Dept: LAB | Facility: HOSPITAL | Age: 74
End: 2019-01-31
Attending: INTERNAL MEDICINE
Payer: MEDICARE

## 2019-01-31 ENCOUNTER — TELEPHONE (OUTPATIENT)
Dept: OPHTHALMOLOGY | Facility: CLINIC | Age: 74
End: 2019-01-31

## 2019-01-31 ENCOUNTER — OFFICE VISIT (OUTPATIENT)
Dept: INTERNAL MEDICINE | Facility: CLINIC | Age: 74
End: 2019-01-31
Payer: MEDICARE

## 2019-01-31 VITALS
SYSTOLIC BLOOD PRESSURE: 130 MMHG | HEIGHT: 72 IN | BODY MASS INDEX: 40.67 KG/M2 | WEIGHT: 300.25 LBS | HEART RATE: 74 BPM | DIASTOLIC BLOOD PRESSURE: 70 MMHG | TEMPERATURE: 98 F

## 2019-01-31 DIAGNOSIS — N18.30 TYPE 2 DIABETES MELLITUS WITH STAGE 3 CHRONIC KIDNEY DISEASE, WITH LONG-TERM CURRENT USE OF INSULIN: Primary | ICD-10-CM

## 2019-01-31 DIAGNOSIS — I15.2 HYPERTENSION ASSOCIATED WITH DIABETES: ICD-10-CM

## 2019-01-31 DIAGNOSIS — M79.10 MYALGIA: ICD-10-CM

## 2019-01-31 DIAGNOSIS — E11.59 HYPERTENSION ASSOCIATED WITH DIABETES: ICD-10-CM

## 2019-01-31 DIAGNOSIS — E11.22 TYPE 2 DIABETES MELLITUS WITH STAGE 3 CHRONIC KIDNEY DISEASE, WITH LONG-TERM CURRENT USE OF INSULIN: Primary | ICD-10-CM

## 2019-01-31 DIAGNOSIS — Z79.4 TYPE 2 DIABETES MELLITUS WITH STAGE 3 CHRONIC KIDNEY DISEASE, WITH LONG-TERM CURRENT USE OF INSULIN: Primary | ICD-10-CM

## 2019-01-31 LAB — CK SERPL-CCNC: 65 U/L

## 2019-01-31 PROCEDURE — 99214 OFFICE O/P EST MOD 30 MIN: CPT | Mod: S$GLB,,, | Performed by: INTERNAL MEDICINE

## 2019-01-31 PROCEDURE — 99499 RISK ADDL DX/OHS AUDIT: ICD-10-PCS | Mod: S$GLB,,, | Performed by: INTERNAL MEDICINE

## 2019-01-31 PROCEDURE — 82085 ASSAY OF ALDOLASE: CPT

## 2019-01-31 PROCEDURE — 99499 UNLISTED E&M SERVICE: CPT | Mod: S$GLB,,, | Performed by: INTERNAL MEDICINE

## 2019-01-31 PROCEDURE — 82550 ASSAY OF CK (CPK): CPT

## 2019-01-31 PROCEDURE — 3075F PR MOST RECENT SYSTOLIC BLOOD PRESS GE 130-139MM HG: ICD-10-PCS | Mod: CPTII,S$GLB,, | Performed by: INTERNAL MEDICINE

## 2019-01-31 PROCEDURE — 3078F PR MOST RECENT DIASTOLIC BLOOD PRESSURE < 80 MM HG: ICD-10-PCS | Mod: CPTII,S$GLB,, | Performed by: INTERNAL MEDICINE

## 2019-01-31 PROCEDURE — 99214 PR OFFICE/OUTPT VISIT, EST, LEVL IV, 30-39 MIN: ICD-10-PCS | Mod: S$GLB,,, | Performed by: INTERNAL MEDICINE

## 2019-01-31 PROCEDURE — 99999 PR PBB SHADOW E&M-EST. PATIENT-LVL III: ICD-10-PCS | Mod: PBBFAC,,, | Performed by: INTERNAL MEDICINE

## 2019-01-31 PROCEDURE — 3045F PR MOST RECENT HEMOGLOBIN A1C LEVEL 7.0-9.0%: ICD-10-PCS | Mod: CPTII,S$GLB,, | Performed by: INTERNAL MEDICINE

## 2019-01-31 PROCEDURE — 3078F DIAST BP <80 MM HG: CPT | Mod: CPTII,S$GLB,, | Performed by: INTERNAL MEDICINE

## 2019-01-31 PROCEDURE — 36415 COLL VENOUS BLD VENIPUNCTURE: CPT | Mod: PO

## 2019-01-31 PROCEDURE — 3045F PR MOST RECENT HEMOGLOBIN A1C LEVEL 7.0-9.0%: CPT | Mod: CPTII,S$GLB,, | Performed by: INTERNAL MEDICINE

## 2019-01-31 PROCEDURE — 1101F PR PT FALLS ASSESS DOC 0-1 FALLS W/OUT INJ PAST YR: ICD-10-PCS | Mod: CPTII,S$GLB,, | Performed by: INTERNAL MEDICINE

## 2019-01-31 PROCEDURE — 3075F SYST BP GE 130 - 139MM HG: CPT | Mod: CPTII,S$GLB,, | Performed by: INTERNAL MEDICINE

## 2019-01-31 PROCEDURE — 99999 PR PBB SHADOW E&M-EST. PATIENT-LVL III: CPT | Mod: PBBFAC,,, | Performed by: INTERNAL MEDICINE

## 2019-01-31 PROCEDURE — 1101F PT FALLS ASSESS-DOCD LE1/YR: CPT | Mod: CPTII,S$GLB,, | Performed by: INTERNAL MEDICINE

## 2019-01-31 NOTE — TELEPHONE ENCOUNTER
Attempted to call pt. to [facility]. an appt. no answer so a V/M was left  ----- Message from Ml Oswald sent at 1/31/2019  2:01 PM CST -----  Contact: Pt   Please call patient to schedule..[phone] (home)

## 2019-01-31 NOTE — PROGRESS NOTES
Subjective:       Patient ID: Sherif Ragland is a 73 y.o. male.    Chief Complaint: No chief complaint on file.    HPI Patient is a 73-year-old gentleman presenting today following up on his a recent hospital visit and is diabetes and hypertension.  He states he recently went to the ER with feelings of fatigue and muscle pain and near syncope could feeling.  He was evaluated in the hospital he was not shown to not have ACS.  He had negative workup overnight and was feeling better the next day.  He subsequently was discharged home.  A    He describes that his symptoms are best her describe his myalgias.  He states when he walks any significant distance or even climb stairs he has had muscles feel extremely fatigued.  He relates it to the feeling of running when sprints when he was in high school playing football.  He does not feel short of breath.  He does not have any chest pain.  There are no palpitations.  There is no syncopal type action.  It is mainly at just a feeling of her redness tiredness in the legs and then muscles.  He does not describe claudication type symptoms.    Patient's diabetes has been under control.  He is tolerating his medications well he is not having any problems with it at this point.  No hypoglycemia.    Blood pressure also remains under good control at this time.  He has been having some trouble getting the blood pressure down and but is now looking good at this point.    Review of Systems   Constitutional: Negative for fever and unexpected weight change.   Respiratory: Negative for cough, shortness of breath and wheezing.    Cardiovascular: Negative for chest pain and palpitations.   Gastrointestinal: Negative for constipation, diarrhea, nausea and vomiting.   Genitourinary: Negative for dysuria and hematuria.   Musculoskeletal: Positive for myalgias.       Objective:   /70   Pulse 74   Temp 97.6 °F (36.4 °C) (Tympanic)   Ht 6' (1.829 m)   Wt (!) 136.2 kg (300 lb 4.3 oz)    BMI 40.72 kg/m²      Physical Exam   Constitutional: He appears well-developed and well-nourished.   HENT:   Head: Normocephalic and atraumatic.   Eyes: Pupils are equal, round, and reactive to light.   Neck: Neck supple. No thyromegaly present.   Cardiovascular: Normal rate, regular rhythm and normal heart sounds. Exam reveals no gallop and no friction rub.   No murmur heard.  Pulmonary/Chest: Breath sounds normal. He has no wheezes. He has no rales.   Abdominal: Soft. Bowel sounds are normal. He exhibits no distension. There is no tenderness.   Vitals reviewed.          Assessment:       1. Type 2 diabetes mellitus with stage 3 chronic kidney disease, with long-term current use of insulin    2. Myalgia    3. Hypertension associated with diabetes        Plan:   Hypertension associated with diabetes  Blood pressure is under good control.  We will continue the current regimen.  Will work on regular aerobic exercise and a low salt diet.        Type 2 diabetes mellitus with stage 3 chronic kidney disease, with long-term current use of insulin  Comments:  COntinue current meds, update a1c in March  Orders:  -     Hemoglobin A1c; Future; Expected date: 03/17/2019  -     Ambulatory referral to Optometry    Myalgia  Comments:  stop lipitor.  check cpk, aldolase  Orders:  -     CK; Future; Expected date: 01/31/2019  -     Aldolase; Future; Expected date: 01/31/2019    Hypertension associated with diabetes     Follow-up as previously planned in a couple weeks.      No Follow-up on file.

## 2019-02-01 LAB — ALDOLASE SERPL-CCNC: 2.1 U/L

## 2019-02-05 ENCOUNTER — TELEPHONE (OUTPATIENT)
Dept: OPHTHALMOLOGY | Facility: CLINIC | Age: 74
End: 2019-02-05

## 2019-02-05 ENCOUNTER — TELEPHONE (OUTPATIENT)
Dept: INTERNAL MEDICINE | Facility: CLINIC | Age: 74
End: 2019-02-05

## 2019-02-07 ENCOUNTER — PATIENT MESSAGE (OUTPATIENT)
Dept: INTERNAL MEDICINE | Facility: CLINIC | Age: 74
End: 2019-02-07

## 2019-02-07 DIAGNOSIS — R06.09 DOE (DYSPNEA ON EXERTION): Primary | ICD-10-CM

## 2019-02-11 ENCOUNTER — PATIENT MESSAGE (OUTPATIENT)
Dept: INTERNAL MEDICINE | Facility: CLINIC | Age: 74
End: 2019-02-11

## 2019-02-12 ENCOUNTER — TELEPHONE (OUTPATIENT)
Dept: CARDIOLOGY | Facility: CLINIC | Age: 74
End: 2019-02-12

## 2019-02-12 DIAGNOSIS — R07.9 CHEST PAIN, UNSPECIFIED TYPE: Primary | ICD-10-CM

## 2019-02-12 NOTE — TELEPHONE ENCOUNTER
----- Message from JANI Brooks sent at 2/12/2019  9:42 AM CST -----  Contact: pt       ----- Message -----  From: Mirna Garrett  Sent: 2/11/2019   9:59 AM  To: [facility] Proc Clinical Support    Call pt regarding stress test that he has schedule for tomorrow. Pt states that he don't think he can do the test and want to speak to nurse.    [phone] (home)

## 2019-02-12 NOTE — TELEPHONE ENCOUNTER
Spoke to patient regarding Stress echo.  He stated that he felt like he could not walk on the treadmill.  He stated that he just had an echo while he was in the hospital and did not want to spend money on a test that may not be valid due to his exercise.  He requested that he have a nuc.  I explained that I could not just order that for him but I would let his ordering DrAtif Know.  Will CC Dr. Fox.

## 2019-02-12 NOTE — TELEPHONE ENCOUNTER
Patient was informed of the new orders Dr. Fox placed.  Patient was scheduled for a Nuclear medicine stress test.

## 2019-02-14 NOTE — PROGRESS NOTES
Last 5 Patient Entered Readings                                      Current 30 Day Average: 148/68     Recent Readings 2/8/2019 2/7/2019 2/6/2019 2/1/2019 1/26/2019    SBP (mmHg) 136 154 142 152 154    DBP (mmHg) 57 68 68 69 66    Pulse 75 68 66 67 70          PharmD attempting to reach pt. Plan to f/u once PharmD completes f/u.

## 2019-02-15 DIAGNOSIS — Z79.4 TYPE 2 DIABETES MELLITUS WITH STAGE 3 CHRONIC KIDNEY DISEASE, WITH LONG-TERM CURRENT USE OF INSULIN: ICD-10-CM

## 2019-02-15 DIAGNOSIS — N18.30 TYPE 2 DIABETES MELLITUS WITH STAGE 3 CHRONIC KIDNEY DISEASE, WITH LONG-TERM CURRENT USE OF INSULIN: ICD-10-CM

## 2019-02-15 DIAGNOSIS — E11.22 TYPE 2 DIABETES MELLITUS WITH STAGE 3 CHRONIC KIDNEY DISEASE, WITH LONG-TERM CURRENT USE OF INSULIN: ICD-10-CM

## 2019-02-15 RX ORDER — INSULIN DETEMIR 100 [IU]/ML
INJECTION, SOLUTION SUBCUTANEOUS
Qty: 6 BOX | Refills: 11 | Status: SHIPPED | OUTPATIENT
Start: 2019-02-15 | End: 2019-02-25 | Stop reason: DRUGHIGH

## 2019-02-19 ENCOUNTER — HOSPITAL ENCOUNTER (OUTPATIENT)
Dept: RADIOLOGY | Facility: HOSPITAL | Age: 74
Discharge: HOME | End: 2019-02-19
Attending: INTERNAL MEDICINE
Payer: MEDICARE

## 2019-02-19 ENCOUNTER — CLINICAL SUPPORT (OUTPATIENT)
Dept: CARDIOLOGY | Facility: CLINIC | Age: 74
End: 2019-02-19
Attending: INTERNAL MEDICINE
Payer: MEDICARE

## 2019-02-19 DIAGNOSIS — R07.9 CHEST PAIN, UNSPECIFIED TYPE: ICD-10-CM

## 2019-02-19 LAB — DIASTOLIC DYSFUNCTION: NO

## 2019-02-19 PROCEDURE — 78452 HT MUSCLE IMAGE SPECT MULT: CPT | Mod: 26,,, | Performed by: INTERNAL MEDICINE

## 2019-02-19 PROCEDURE — 78452 NM MULTI PHARM STRESS CARDIAC COMPONENT: ICD-10-PCS | Mod: 26,,, | Performed by: INTERNAL MEDICINE

## 2019-02-19 PROCEDURE — 93015 NM MULTI PHARM STRESS CARDIAC COMPONENT: ICD-10-PCS | Mod: S$GLB,,, | Performed by: INTERNAL MEDICINE

## 2019-02-19 PROCEDURE — A9502 TC99M TETROFOSMIN: HCPCS

## 2019-02-19 PROCEDURE — 93015 CV STRESS TEST SUPVJ I&R: CPT | Mod: S$GLB,,, | Performed by: INTERNAL MEDICINE

## 2019-02-21 ENCOUNTER — OFFICE VISIT (OUTPATIENT)
Dept: INTERNAL MEDICINE | Facility: CLINIC | Age: 74
End: 2019-02-21
Payer: MEDICARE

## 2019-02-21 VITALS
SYSTOLIC BLOOD PRESSURE: 120 MMHG | WEIGHT: 302.94 LBS | BODY MASS INDEX: 41.03 KG/M2 | HEART RATE: 70 BPM | HEIGHT: 72 IN | DIASTOLIC BLOOD PRESSURE: 62 MMHG | TEMPERATURE: 98 F

## 2019-02-21 DIAGNOSIS — M79.10 MYALGIA: Primary | ICD-10-CM

## 2019-02-21 DIAGNOSIS — N18.30 TYPE 2 DIABETES MELLITUS WITH STAGE 3 CHRONIC KIDNEY DISEASE, WITH LONG-TERM CURRENT USE OF INSULIN: ICD-10-CM

## 2019-02-21 DIAGNOSIS — E11.22 TYPE 2 DIABETES MELLITUS WITH STAGE 3 CHRONIC KIDNEY DISEASE, WITH LONG-TERM CURRENT USE OF INSULIN: ICD-10-CM

## 2019-02-21 DIAGNOSIS — Z79.4 TYPE 2 DIABETES MELLITUS WITH STAGE 3 CHRONIC KIDNEY DISEASE, WITH LONG-TERM CURRENT USE OF INSULIN: ICD-10-CM

## 2019-02-21 PROCEDURE — 99213 PR OFFICE/OUTPT VISIT, EST, LEVL III, 20-29 MIN: ICD-10-PCS | Mod: S$GLB,,, | Performed by: INTERNAL MEDICINE

## 2019-02-21 PROCEDURE — 1101F PR PT FALLS ASSESS DOC 0-1 FALLS W/OUT INJ PAST YR: ICD-10-PCS | Mod: CPTII,S$GLB,, | Performed by: INTERNAL MEDICINE

## 2019-02-21 PROCEDURE — 99999 PR PBB SHADOW E&M-EST. PATIENT-LVL III: CPT | Mod: PBBFAC,,, | Performed by: INTERNAL MEDICINE

## 2019-02-21 PROCEDURE — 99213 OFFICE O/P EST LOW 20 MIN: CPT | Mod: S$GLB,,, | Performed by: INTERNAL MEDICINE

## 2019-02-21 PROCEDURE — 99499 RISK ADDL DX/OHS AUDIT: ICD-10-PCS | Mod: S$GLB,,, | Performed by: INTERNAL MEDICINE

## 2019-02-21 PROCEDURE — 3045F PR MOST RECENT HEMOGLOBIN A1C LEVEL 7.0-9.0%: ICD-10-PCS | Mod: CPTII,S$GLB,, | Performed by: INTERNAL MEDICINE

## 2019-02-21 PROCEDURE — 99999 PR PBB SHADOW E&M-EST. PATIENT-LVL III: ICD-10-PCS | Mod: PBBFAC,,, | Performed by: INTERNAL MEDICINE

## 2019-02-21 PROCEDURE — 3074F PR MOST RECENT SYSTOLIC BLOOD PRESSURE < 130 MM HG: ICD-10-PCS | Mod: CPTII,S$GLB,, | Performed by: INTERNAL MEDICINE

## 2019-02-21 PROCEDURE — 3045F PR MOST RECENT HEMOGLOBIN A1C LEVEL 7.0-9.0%: CPT | Mod: CPTII,S$GLB,, | Performed by: INTERNAL MEDICINE

## 2019-02-21 PROCEDURE — 3078F DIAST BP <80 MM HG: CPT | Mod: CPTII,S$GLB,, | Performed by: INTERNAL MEDICINE

## 2019-02-21 PROCEDURE — 1101F PT FALLS ASSESS-DOCD LE1/YR: CPT | Mod: CPTII,S$GLB,, | Performed by: INTERNAL MEDICINE

## 2019-02-21 PROCEDURE — 3074F SYST BP LT 130 MM HG: CPT | Mod: CPTII,S$GLB,, | Performed by: INTERNAL MEDICINE

## 2019-02-21 PROCEDURE — 99499 UNLISTED E&M SERVICE: CPT | Mod: S$GLB,,, | Performed by: INTERNAL MEDICINE

## 2019-02-21 PROCEDURE — 3078F PR MOST RECENT DIASTOLIC BLOOD PRESSURE < 80 MM HG: ICD-10-PCS | Mod: CPTII,S$GLB,, | Performed by: INTERNAL MEDICINE

## 2019-02-21 RX ORDER — ROSUVASTATIN CALCIUM 5 MG/1
5 TABLET, COATED ORAL DAILY
Qty: 90 TABLET | Refills: 3 | Status: SHIPPED | OUTPATIENT
Start: 2019-02-21 | End: 2019-05-08 | Stop reason: ALTCHOICE

## 2019-02-22 NOTE — PROGRESS NOTES
Subjective:       Patient ID: Sherif Ragland is a 73 y.o. male.    Chief Complaint: Follow-up    HPI  Patient is a 73-year-old male coming in today following up on his myalgias and weakness symptoms.  Since his last visit we did get his stress test done which was normal.  He indicates that since coming off the Lipitor he has seen improvement in the myalgias weakness in the generally feeling a whole lot better.  It would appear at this time that he has had a adverse response to the Lipitor.  Talked about this as a side effect of the medications.  We also talked about the possibility of trying another statin in its place to see if that might be tolerated better and he has expressed understanding.    He has history of type 2 diabetes and he is having been having good control.  He will need to if we can get back on a statin medication due to his increased cardiac risk associated with diabetes.    Review of Systems    Objective:   /62   Pulse 70   Temp 98 °F (36.7 °C) (Tympanic)   Ht 6' (1.829 m)   Wt (!) 137.4 kg (302 lb 14.6 oz)   BMI 41.08 kg/m²      Physical Exam   Constitutional: He appears well-developed and well-nourished.   HENT:   Head: Normocephalic and atraumatic.   Eyes: Pupils are equal, round, and reactive to light.   Neck: Neck supple. No thyromegaly present.   Cardiovascular: Normal rate, regular rhythm and normal heart sounds. Exam reveals no gallop and no friction rub.   No murmur heard.  Pulmonary/Chest: Breath sounds normal. He has no wheezes. He has no rales.   Abdominal: Soft. Bowel sounds are normal. He exhibits no distension. There is no tenderness.   Vitals reviewed.      Clinical Support on 02/19/2019   Component Date Value    Diastolic Dysfunction 02/19/2019 No        Assessment:       1. Myalgia    2. Type 2 diabetes mellitus with stage 3 chronic kidney disease, with long-term current use of insulin        Plan:   No problem-specific Assessment & Plan notes found for this  encounter.    Myalgia  Comments:  Improved with discontinuation of the statin.  Stay off statin for next month then start crestor 5 mg once daily  Orders:  -     rosuvastatin (CRESTOR) 5 MG tablet; Take 1 tablet (5 mg total) by mouth once daily.  Dispense: 90 tablet; Refill: 3    Type 2 diabetes mellitus with stage 3 chronic kidney disease, with long-term current use of insulin  Comments:  continue current regimen.  follow up in June.  Orders:  -     Hemoglobin A1c; Future; Expected date: 06/21/2019  -     Lipid panel; Future; Expected date: 06/21/2019          Follow-up in about 6 days (around 2/27/2019) for DM, HTN, HLP, with Yael Youssef PA-C.

## 2019-02-25 ENCOUNTER — OFFICE VISIT (OUTPATIENT)
Dept: OPHTHALMOLOGY | Facility: CLINIC | Age: 74
End: 2019-02-25
Payer: MEDICARE

## 2019-02-25 DIAGNOSIS — H18.529 MAP-DOT-FINGERPRINT CORNEAL DYSTROPHY: ICD-10-CM

## 2019-02-25 DIAGNOSIS — Z98.41 CATARACT EXTRACTION STATUS OF EYE, RIGHT: ICD-10-CM

## 2019-02-25 DIAGNOSIS — H25.12 SENILE NUCLEAR SCLEROSIS, LEFT: Primary | ICD-10-CM

## 2019-02-25 DIAGNOSIS — H43.22 ASTEROID HYALOSIS OF LEFT EYE: ICD-10-CM

## 2019-02-25 PROCEDURE — 99999 PR PBB SHADOW E&M-EST. PATIENT-LVL II: ICD-10-PCS | Mod: PBBFAC,,, | Performed by: OPHTHALMOLOGY

## 2019-02-25 PROCEDURE — 92014 COMPRE OPH EXAM EST PT 1/>: CPT | Mod: S$GLB,,, | Performed by: OPHTHALMOLOGY

## 2019-02-25 PROCEDURE — 92014 PR EYE EXAM, EST PATIENT,COMPREHESV: ICD-10-PCS | Mod: S$GLB,,, | Performed by: OPHTHALMOLOGY

## 2019-02-25 PROCEDURE — 92136 OPHTHALMIC BIOMETRY: CPT | Mod: LT,S$GLB,, | Performed by: OPHTHALMOLOGY

## 2019-02-25 PROCEDURE — 99999 PR PBB SHADOW E&M-EST. PATIENT-LVL II: CPT | Mod: PBBFAC,,, | Performed by: OPHTHALMOLOGY

## 2019-02-25 PROCEDURE — 92136 IOL MASTER - OS - LEFT EYE: ICD-10-PCS | Mod: LT,S$GLB,, | Performed by: OPHTHALMOLOGY

## 2019-02-25 RX ORDER — KETOROLAC TROMETHAMINE 5 MG/ML
1 SOLUTION OPHTHALMIC 4 TIMES DAILY
Qty: 1 BOTTLE | Refills: 2 | Status: SHIPPED | OUTPATIENT
Start: 2019-02-25 | End: 2019-05-01 | Stop reason: ALTCHOICE

## 2019-02-25 RX ORDER — PREDNISOLONE ACETATE 10 MG/ML
1 SUSPENSION/ DROPS OPHTHALMIC 4 TIMES DAILY
Qty: 1 BOTTLE | Refills: 2 | Status: SHIPPED | OUTPATIENT
Start: 2019-02-25 | End: 2019-05-01 | Stop reason: ALTCHOICE

## 2019-02-25 RX ORDER — GATIFLOXACIN 5 MG/ML
1 SOLUTION/ DROPS OPHTHALMIC 2 TIMES DAILY
Qty: 1 BOTTLE | Refills: 2 | Status: SHIPPED | OUTPATIENT
Start: 2019-02-25 | End: 2019-05-01 | Stop reason: ALTCHOICE

## 2019-02-25 NOTE — LETTER
February 25, 2019      Tobias Fox MD  [address]  [address]  [place] [address]           [facility]  [address] [address]  Phone: [phone]  Fax: [phone]          Patient: Sherif Ragland   MR Number: [id number]   YOB: 1945   Date of Visit: 2/25/2019       Dear Dr. Tobias Fox:    Thank you for referring Sherif Ragland to me for evaluation. Attached you will find relevant portions of my assessment and plan of care.    If you have questions, please do not hesitate to call me. I look forward to following Sherif Ragland along with you.    Sincerely,    Denys Ramirez MD    Enclosure  CC:  No Recipients    If you would like to receive this communication electronically, please contact [email or web address] or [phone] to request more information on [employer] access.    For providers and/or their staff who would like to refer a patient to [facility], please contact us through our one-stop-shop provider referral line, [facility], at [phone].    If you feel you have received this communication in error or would no longer like to receive these types of communications, please e-mail [email or web address]

## 2019-02-25 NOTE — PROGRESS NOTES
SUBJECTIVE:   Sherif Ragland is a 73 y.o. male   Uncorrected distance visual acuity was 20/40 +1 in the right eye and 20/30 -2 in the left eye.   Chief Complaint   Patient presents with    Cataract     recheck cataract left eye        HPI:  HPI     Cataract      Additional comments: recheck cataract left eye              Comments     Pt states since his last visit he's had problems with his left eye with   glare. Hard to see to drive at night. Has glasses bur rarely use them. Not   putting any drops. No pain or irritation.     Referred by [facility]  1. DM  2 PCIOL OD +19.5 SN60WF (distance) 1-31-18  Cataract OS  3. Mild ARMD          Last edited by Yoni Barreto on 2/25/2019  2:47 PM. (History)        Assessment /Plan :  1. Senile nuclear sclerosis, left - Visually Significant Cataract OS:   Patient reports decreased vision consistent with the clinical amount of lenticular opacity,  which reaches the level of visual significance and affects activities of daily living such as  drive. Risks, benefits, and alternatives to cataract surgery were  discussed.  IOL options were discussed, including Premium IOL'S and the associated  side effects and additional patient cost associated with them as well as patient's visual  goals. The pt expressed a desire to proceed with surgery with the potential for some  reasonable degree of visual improvement and was consented.  Risks of loss of vision and eye reviewed as well as possibility of need for spectacle correction after surgery even with premium implants. Verbal and written preop  instructions were provided to the patient.       Pt is interested in traditional monofocal IOL aiming for:    Distance OU and understands that glasses will be generally needed at all times for near vision and often for finer distance correction especially for higher degrees of astigmatism.       Final visual acuity may be limited by AMD and corneal disease    Pt wishes to have Phaco/IOL  OS     Requests  a Monofocal IOL.    Will aim for distance    Other considerations: Corneal Precautions                 2. Cataract extraction status of eye, right -stable    3. Map-dot-fingerprint corneal dystrophy -stable, follow    4. Asteroid hyalosis of left eye -stable, follow

## 2019-03-01 NOTE — PROGRESS NOTES
Last 5 Patient Entered Readings                                      Current 30 Day Average: 154/68     Recent Readings 2/20/2019 2/19/2019 2/18/2019 2/8/2019 2/7/2019    SBP (mmHg) 155 175 166 136 154    DBP (mmHg) 74 74 64 57 68    Pulse 62 69 72 75 68          PharmD has not been able to reach pt to discuss med adjustment. Pt last reported needing to go to the [facility] to have cuff accuracy assessed. Still plan to f/u once PharmD has completed med adjustment.

## 2019-03-05 DIAGNOSIS — E11.59 HYPERTENSION ASSOCIATED WITH DIABETES: ICD-10-CM

## 2019-03-05 DIAGNOSIS — Z79.4 TYPE 2 DIABETES MELLITUS WITH STAGE 3 CHRONIC KIDNEY DISEASE, WITH LONG-TERM CURRENT USE OF INSULIN: ICD-10-CM

## 2019-03-05 DIAGNOSIS — E11.22 TYPE 2 DIABETES MELLITUS WITH STAGE 3 CHRONIC KIDNEY DISEASE, WITH LONG-TERM CURRENT USE OF INSULIN: ICD-10-CM

## 2019-03-05 DIAGNOSIS — N18.30 TYPE 2 DIABETES MELLITUS WITH STAGE 3 CHRONIC KIDNEY DISEASE, WITH LONG-TERM CURRENT USE OF INSULIN: ICD-10-CM

## 2019-03-05 DIAGNOSIS — I15.2 HYPERTENSION ASSOCIATED WITH DIABETES: ICD-10-CM

## 2019-03-05 RX ORDER — PIOGLITAZONEHYDROCHLORIDE 30 MG/1
TABLET ORAL
Qty: 90 TABLET | Refills: 3 | Status: SHIPPED | OUTPATIENT
Start: 2019-03-05 | End: 2019-03-06 | Stop reason: SDUPTHER

## 2019-03-06 RX ORDER — METOPROLOL SUCCINATE 100 MG/1
100 TABLET, EXTENDED RELEASE ORAL 2 TIMES DAILY
Qty: 60 TABLET | Refills: 11 | Status: SHIPPED | OUTPATIENT
Start: 2019-03-06 | End: 2020-03-06

## 2019-03-06 RX ORDER — PIOGLITAZONEHYDROCHLORIDE 30 MG/1
30 TABLET ORAL DAILY
Qty: 90 TABLET | Refills: 3 | Status: SHIPPED | OUTPATIENT
Start: 2019-03-06 | End: 2019-05-16 | Stop reason: ALTCHOICE

## 2019-03-11 DIAGNOSIS — E11.22 TYPE 2 DIABETES MELLITUS WITH STAGE 3 CHRONIC KIDNEY DISEASE, WITH LONG-TERM CURRENT USE OF INSULIN: ICD-10-CM

## 2019-03-11 DIAGNOSIS — N18.30 TYPE 2 DIABETES MELLITUS WITH STAGE 3 CHRONIC KIDNEY DISEASE, WITH LONG-TERM CURRENT USE OF INSULIN: ICD-10-CM

## 2019-03-11 DIAGNOSIS — Z79.4 TYPE 2 DIABETES MELLITUS WITH STAGE 3 CHRONIC KIDNEY DISEASE, WITH LONG-TERM CURRENT USE OF INSULIN: ICD-10-CM

## 2019-03-11 RX ORDER — INSULIN DETEMIR 100 [IU]/ML
INJECTION, SOLUTION SUBCUTANEOUS
Qty: 6 BOX | Refills: 11 | Status: SHIPPED | OUTPATIENT
Start: 2019-03-11 | End: 2019-03-19

## 2019-03-13 ENCOUNTER — TELEPHONE (OUTPATIENT)
Dept: OPHTHALMOLOGY | Facility: CLINIC | Age: 74
End: 2019-03-13

## 2019-03-13 ENCOUNTER — OUTSIDE PLACE OF SERVICE (OUTPATIENT)
Dept: ADMINISTRATIVE | Facility: OTHER | Age: 74
End: 2019-03-13
Payer: MEDICARE

## 2019-03-13 PROCEDURE — 66984 XCAPSL CTRC RMVL W/O ECP: CPT | Mod: LT,,, | Performed by: OPHTHALMOLOGY

## 2019-03-13 PROCEDURE — 66984 PR REMOVAL, CATARACT, W/INSRT INTRAOC LENS, W/O ENDO CYCLO: ICD-10-PCS | Mod: LT,,, | Performed by: OPHTHALMOLOGY

## 2019-03-13 NOTE — TELEPHONE ENCOUNTER
Spoke with pt.  He has a bad headache in the back of the head.  Reports no eye pain.  Eye does feel a little scratchy.  Advised not likely to be eye-related.  Offered appt today with Dr. Ramirez, but pt declined.

## 2019-03-13 NOTE — TELEPHONE ENCOUNTER
----- Message from Caitlyn Whitley sent at 3/13/2019  1:16 PM CDT -----  Contact: Patients wife  Type:  Needs Medical Advice    Who Called: Wife  Symptoms (please be specific): headach   How long has patient had these symptoms:  Since surgery  Pharmacy name and phone #:    Would the patient rather a call back or a response via [facility]? Call [facility]  Best Call Back Number: [phone]  Additional Information: states that patient has had a headache ever since surgery, patient would like to be advised.

## 2019-03-14 ENCOUNTER — OFFICE VISIT (OUTPATIENT)
Dept: OPHTHALMOLOGY | Facility: CLINIC | Age: 74
End: 2019-03-14
Payer: MEDICARE

## 2019-03-14 DIAGNOSIS — Z98.890 POST-OPERATIVE STATE: Primary | ICD-10-CM

## 2019-03-14 PROCEDURE — 99024 PR POST-OP FOLLOW-UP VISIT: ICD-10-PCS | Mod: S$GLB,,, | Performed by: OPHTHALMOLOGY

## 2019-03-14 PROCEDURE — 99999 PR PBB SHADOW E&M-EST. PATIENT-LVL II: CPT | Mod: PBBFAC,,, | Performed by: OPHTHALMOLOGY

## 2019-03-14 PROCEDURE — 99999 PR PBB SHADOW E&M-EST. PATIENT-LVL II: ICD-10-PCS | Mod: PBBFAC,,, | Performed by: OPHTHALMOLOGY

## 2019-03-14 PROCEDURE — 99024 POSTOP FOLLOW-UP VISIT: CPT | Mod: S$GLB,,, | Performed by: OPHTHALMOLOGY

## 2019-03-14 NOTE — PROGRESS NOTES
SUBJECTIVE:   Sherif Ragland is a 73 y.o. male   Uncorrected distance visual acuity was not recorded in the right eye and 20/50 in the left eye.   Chief Complaint   Patient presents with    Post-op Evaluation     1 day s/p phaco OS. headache yesterday, but has cleared         HPI:  HPI     Post-op Evaluation      Additional comments: 1 day s/p phaco OS. headache yesterday, but has   cleared               Comments     1. DM  2 PCIOL OD +19.5 SN60WF (distance) 1-31-18  PCIOL OS +20.0 SN60WF (distance) 3-13-19  3. Mild ARMD    OS: gat bid, pred qid, ket qid          Last edited by Bessy Churchill MA on 3/14/2019  7:54 AM. (History)        Assessment /Plan :  1. Post-operative state Exam:  SLE:  S/C: normal  K : trace k fold  AC: trace cell and flare  Iris: normal  Lens: PCIOL    IMP:  PO Day 1 S/P Phaco/IOL OS : Doing well.    Plan:  Continue gtts to operative eye:  Pred 1% QID  Ketorolac QID  Zymaxid BID  Reinstructed in importance of absolute compliance with Post-OP instructions including medications, shield at bedtime, and limitation of activities. Follow up appointments in approximately one and six weeks or call immediately for increased pain, redness or vision loss.

## 2019-03-15 ENCOUNTER — LAB VISIT (OUTPATIENT)
Dept: LAB | Facility: HOSPITAL | Age: 74
End: 2019-03-15
Attending: INTERNAL MEDICINE
Payer: MEDICARE

## 2019-03-15 DIAGNOSIS — N18.30 TYPE 2 DIABETES MELLITUS WITH STAGE 3 CHRONIC KIDNEY DISEASE, WITH LONG-TERM CURRENT USE OF INSULIN: ICD-10-CM

## 2019-03-15 DIAGNOSIS — E11.22 TYPE 2 DIABETES MELLITUS WITH STAGE 3 CHRONIC KIDNEY DISEASE, WITH LONG-TERM CURRENT USE OF INSULIN: ICD-10-CM

## 2019-03-15 DIAGNOSIS — Z79.4 TYPE 2 DIABETES MELLITUS WITH STAGE 3 CHRONIC KIDNEY DISEASE, WITH LONG-TERM CURRENT USE OF INSULIN: ICD-10-CM

## 2019-03-15 LAB
ESTIMATED AVG GLUCOSE: 186 MG/DL
HBA1C MFR BLD HPLC: 8.1 %

## 2019-03-15 PROCEDURE — 36415 COLL VENOUS BLD VENIPUNCTURE: CPT | Mod: PO

## 2019-03-15 PROCEDURE — 83036 HEMOGLOBIN GLYCOSYLATED A1C: CPT

## 2019-03-18 ENCOUNTER — PATIENT MESSAGE (OUTPATIENT)
Dept: INTERNAL MEDICINE | Facility: CLINIC | Age: 74
End: 2019-03-18

## 2019-03-19 ENCOUNTER — PATIENT MESSAGE (OUTPATIENT)
Dept: INTERNAL MEDICINE | Facility: CLINIC | Age: 74
End: 2019-03-19

## 2019-03-19 DIAGNOSIS — Z79.4 TYPE 2 DIABETES MELLITUS WITH STAGE 3 CHRONIC KIDNEY DISEASE, WITH LONG-TERM CURRENT USE OF INSULIN: ICD-10-CM

## 2019-03-19 DIAGNOSIS — N18.30 TYPE 2 DIABETES MELLITUS WITH STAGE 3 CHRONIC KIDNEY DISEASE, WITH LONG-TERM CURRENT USE OF INSULIN: ICD-10-CM

## 2019-03-19 DIAGNOSIS — E11.22 TYPE 2 DIABETES MELLITUS WITH STAGE 3 CHRONIC KIDNEY DISEASE, WITH LONG-TERM CURRENT USE OF INSULIN: ICD-10-CM

## 2019-03-19 RX ORDER — TELMISARTAN AND HYDROCHLORTHIAZIDE 80; 25 MG/1; MG/1
1 TABLET ORAL DAILY
Qty: 90 TABLET | Refills: 3 | Status: SHIPPED | OUTPATIENT
Start: 2019-03-19 | End: 2019-03-26 | Stop reason: DRUGHIGH

## 2019-03-19 NOTE — PROGRESS NOTES
Last 5 Patient Entered Readings                                      Current 30 Day Average: 161/69     Recent Readings 3/19/2019 2/20/2019 2/19/2019 2/18/2019 2/8/2019    SBP (mmHg) 146 155 175 166 136    DBP (mmHg) 64 74 74 64 57    Pulse 69 62 69 72 75          PharmD started non-compliance on 3/12. Will f/u with pt if he remains in the program.

## 2019-03-21 ENCOUNTER — OFFICE VISIT (OUTPATIENT)
Dept: OPHTHALMOLOGY | Facility: CLINIC | Age: 74
End: 2019-03-21
Payer: MEDICARE

## 2019-03-21 DIAGNOSIS — Z98.890 POST-OPERATIVE STATE: Primary | ICD-10-CM

## 2019-03-21 PROCEDURE — 99999 PR PBB SHADOW E&M-EST. PATIENT-LVL II: ICD-10-PCS | Mod: PBBFAC,,, | Performed by: OPHTHALMOLOGY

## 2019-03-21 PROCEDURE — 99024 PR POST-OP FOLLOW-UP VISIT: ICD-10-PCS | Mod: S$GLB,,, | Performed by: OPHTHALMOLOGY

## 2019-03-21 PROCEDURE — 99999 PR PBB SHADOW E&M-EST. PATIENT-LVL II: CPT | Mod: PBBFAC,,, | Performed by: OPHTHALMOLOGY

## 2019-03-21 PROCEDURE — 99024 POSTOP FOLLOW-UP VISIT: CPT | Mod: S$GLB,,, | Performed by: OPHTHALMOLOGY

## 2019-03-21 NOTE — PROGRESS NOTES
SUBJECTIVE:   Sherif Ragland is a 73 y.o. male   Uncorrected distance visual acuity was not recorded in the right eye and 20/30 -2 in the left eye.   Chief Complaint   Patient presents with    Post-op Evaluation     1 wk s/p phaco OS.         HPI:  HPI     Post-op Evaluation      Additional comments: 1 wk s/p phaco OS.               Comments     1. DM  2 PCIOL OD +19.5 SN60WF (distance) 1-31-18  PCIOL OS +20.0 SN60WF (distance) 3-13-19  3. Mild ARMD    OS: gat bid, pred qid, ket qid          Last edited by Bessy Churchill MA on 3/21/2019  8:46 AM. (History)        Assessment /Plan :  1. Post-operative state Slit lamp exam:  L/L: nl  K: clear, wound sealed  AC: 0 cell and flare  Iris/Lens: IOL centered and stable      IMP:  PO Week 1 S/P Phaco/ IOL OS : Doing well with no evidence of infection or abnormal inflammation.     Plan:  Pt given and instructed in one week PO instructions. D/C zymaxid and start to taper off Ketorlac and Pred Forte 1% weekly. Can resume normal activitites and d/c eye shield. OTC reading glasses can be used until evaluated for final MR. Follow up in one month or PRN pain, redness, vision loss, or other concerns.

## 2019-03-26 RX ORDER — AMLODIPINE BESYLATE 5 MG/1
5 TABLET ORAL 2 TIMES DAILY
COMMUNITY
End: 2019-12-30 | Stop reason: SDUPTHER

## 2019-03-26 RX ORDER — VALSARTAN AND HYDROCHLOROTHIAZIDE 320; 25 MG/1; MG/1
1 TABLET, FILM COATED ORAL DAILY
Qty: 90 TABLET | Refills: 3 | Status: SHIPPED | OUTPATIENT
Start: 2019-03-26 | End: 2019-09-19

## 2019-03-26 NOTE — PROGRESS NOTES
Last 5 Patient Entered Readings                                      Current 30 Day Average: 151/66     Recent Readings 3/25/2019 3/24/2019 3/24/2019 3/23/2019 3/23/2019    SBP (mmHg) 156 149 143 152 158    DBP (mmHg) 70 64 65 62 72    Pulse 68 79 68 68 69        HPI:  Called patient to follow up about remaining within the program. He began retaking his BP readings and stated he would like to remain within the program.    Patient stopped nifedipine over muscle cramps. Patient isn't tolerating telmisartan-HCTZ and requested to resume valsartan-HCTZ.    Patient denies hypotensive s/sx (lightheadedness, dizziness, nausea, fatigue); patient denies hypertensive s/sx (SOB, CP, severe headaches, changes in vision). Instructed patient to seek medical care if BP > 180/110 and is accompanied by hypertensive s/sx associated, patient confirms understanding.     Assessment:  Reviewed recent readings. Per 2017 ACC/ AHA HTN guidelines (goal of BP < 130/80), current 30-day average needs to be addressed more thoroughly today.     Plan:  Resume amlodipine 5mg twice daily. Stop telmisartan-HCTZ and resume valsartan-HCTZ 320-25mg daily. [facility] not [facility] Patient notified to  the prescription from [facility].  I will continue to monitor regularly and will follow-up in 2 to 3 weeks, sooner if blood pressure begins to trend upward or downward.     Current medication regimen:  Hypertension Medications             furosemide (LASIX) 20 MG tablet Take 1 tablet (20 mg total) by mouth once daily. for 3 days    metoprolol succinate (TOPROL-XL) 100 MG 24 hr tablet Take 1 tablet (100 mg total) by mouth 2 (two) times daily.    NIFEdipine (PROCARDIA-XL) 60 MG (OSM) 24 hr tablet Take 1 tablet (60 mg total) by mouth once daily.    telmisartan-hydrochlorothiazide (MICARDIS HCT) 80-25 mg per tablet Take 1 tablet by mouth once daily.    terazosin (HYTRIN) 10 MG capsule Take 1 capsule (10 mg total)  by mouth once daily.          Patient denies having questions or concerns. Patient has my contact information and knows to call with any concerns or clinical changes.    3:33PM: Patient called back to verify his medications.

## 2019-03-28 RX ORDER — PAPAVERINE HYDROCHLORIDE 30 MG/ML
INJECTION INTRAMUSCULAR; INTRAVENOUS
Qty: 10 ML | Refills: 5 | Status: SHIPPED | OUTPATIENT
Start: 2019-03-28 | End: 2019-08-13 | Stop reason: SDUPTHER

## 2019-04-09 ENCOUNTER — PATIENT OUTREACH (OUTPATIENT)
Dept: OTHER | Facility: OTHER | Age: 74
End: 2019-04-09

## 2019-04-09 NOTE — PROGRESS NOTES
"Last 5 Patient Entered Readings                                      Current 30 Day Average: 152/71     Recent Readings 4/8/2019 4/7/2019 4/5/2019 4/3/2019 4/3/2019    SBP (mmHg) 143 154 145 168 161    DBP (mmHg) 60 71 72 74 72    Pulse 62 70 66 66 66          Digital Medicine: Health  Follow Up    Lifestyle Modifications:    1.Dietary Modifications (Sodium intake <2,000mg/day, food labels, dining out): Pt states that his diet has not changed. His wife does all of the cooking and she is "health conscious". She cooks with sea salt, but uses mostly fresh ingredients and they do not add salt at the table. Reviewed AHA sodium recs and amount of sodium in sea salt and recommended use of salt free/salt alternative seasonings to further help reduce sodium intake. Pt was agreeable with this and would for me to send some resources to his email.    2.Physical Activity: Pt states that he remains active with his job and does not see how he would be able to incorporate anything else at this time. Encouraged to continue.    3.Medication Therapy: Patient has been compliant with the medication regimen.    4.Patient has the following medication side effects/concerns: None.  (Frequency/Alleviating factors/Precipitating factors, etc.)     Follow up with Mr. Sherif BECKHAM Ellyn completed. No further questions or concerns. Will continue to follow up to achieve health goals.  "

## 2019-04-10 ENCOUNTER — PATIENT OUTREACH (OUTPATIENT)
Dept: OTHER | Facility: OTHER | Age: 74
End: 2019-04-10

## 2019-04-10 NOTE — PROGRESS NOTES
Last 5 Patient Entered Readings                                      Current 30 Day Average: 153/71     Recent Readings 4/10/2019 4/9/2019 4/8/2019 4/7/2019 4/5/2019    SBP (mmHg) 159 158 143 154 145    DBP (mmHg) 76 73 60 71 72    Pulse 61 63 62 70 66      HPI:  Called patient to follow up since resuming amlodipine 5mg twice daily and changing from olmesartan/HCTZ to valsartan/HCTZ.   Patient endorses adherence to medication regimen and is tolerating these changes with difficulty.  Patient denies hypotensive s/sx (lightheadedness, dizziness, nausea, fatigue); patient denies hypertensive s/sx (SOB, CP, severe headaches, changes in vision). Instructed patient to seek medical care if BP > 180/110 and is accompanied by hypertensive s/sx associated, patient confirms understanding.     Assessment:  Reviewed recent readings. Per 2017 ACC/ AHA HTN guidelines (goal of BP < 130/80), current 30-day average needs to be addressed more thoroughly today.     Plan:  Continue current medication regimen.  Patient will be going to the [facility] since he feels something is wrong with his cuff. He does not feel these readings are accurate.  I will continue to monitor regularly and will follow-up in 2 to 3 weeks, sooner if blood pressure begins to trend upward or downward.     Current medication regimen:  Hypertension Medications             amLODIPine (NORVASC) 5 MG tablet Take 5 mg by mouth 2 (two) times daily.    metoprolol succinate (TOPROL-XL) 100 MG 24 hr tablet Take 1 tablet (100 mg total) by mouth 2 (two) times daily.    terazosin (HYTRIN) 10 MG capsule Take 1 capsule (10 mg total) by mouth once daily.    valsartan-hydrochlorothiazide (DIOVAN-HCT) 320-25 mg per tablet Take 1 tablet by mouth once daily.          Patient denies having questions or concerns. Patient has my contact information and knows to call with any concerns or clinical changes.

## 2019-04-24 ENCOUNTER — OFFICE VISIT (OUTPATIENT)
Dept: OPHTHALMOLOGY | Facility: CLINIC | Age: 74
End: 2019-04-24
Payer: MEDICARE

## 2019-04-24 ENCOUNTER — PATIENT OUTREACH (OUTPATIENT)
Dept: OTHER | Facility: OTHER | Age: 74
End: 2019-04-24

## 2019-04-24 ENCOUNTER — PATIENT MESSAGE (OUTPATIENT)
Dept: INTERNAL MEDICINE | Facility: CLINIC | Age: 74
End: 2019-04-24

## 2019-04-24 DIAGNOSIS — Z98.890 POST-OPERATIVE STATE: Primary | ICD-10-CM

## 2019-04-24 DIAGNOSIS — Z98.41 CATARACT EXTRACTION STATUS OF EYE, RIGHT: ICD-10-CM

## 2019-04-24 PROCEDURE — 99999 PR PBB SHADOW E&M-EST. PATIENT-LVL II: ICD-10-PCS | Mod: PBBFAC,,, | Performed by: OPTOMETRIST

## 2019-04-24 PROCEDURE — 99024 POSTOP FOLLOW-UP VISIT: CPT | Mod: S$GLB,,, | Performed by: OPTOMETRIST

## 2019-04-24 PROCEDURE — 99999 PR PBB SHADOW E&M-EST. PATIENT-LVL II: CPT | Mod: PBBFAC,,, | Performed by: OPTOMETRIST

## 2019-04-24 PROCEDURE — 99024 PR POST-OP FOLLOW-UP VISIT: ICD-10-PCS | Mod: S$GLB,,, | Performed by: OPTOMETRIST

## 2019-04-24 NOTE — PROGRESS NOTES
HPI     Last CPG exam 03/21/2019  5 week post op   PCIOL OD 01/31/2018  PCIOL OS 03/13/2019  Mild ARMD   No visual complaints       Last edited by Jorge A Garrett MA on 4/24/2019  8:26 AM. (History)            Assessment /Plan     For exam results, see Encounter Report.    Post-operative state    Cataract extraction status of eye, right      Stable PIOL OU.  Distance specs given.  RTC 6 months.

## 2019-04-24 NOTE — PROGRESS NOTES
Last 5 Patient Entered Readings                                      Current 30 Day Average: 153/72     Recent Readings 4/22/2019 4/22/2019 4/18/2019 4/18/2019 4/17/2019    SBP (mmHg) 127 137 159 167 152    DBP (mmHg) 57 78 69 78 69    Pulse 62 65 67 65 62        Not available to speak today to discuss if he was able to go to the [facility] since his readings are coming down.

## 2019-05-01 ENCOUNTER — LAB VISIT (OUTPATIENT)
Dept: LAB | Facility: HOSPITAL | Age: 74
End: 2019-05-01
Attending: INTERNAL MEDICINE
Payer: MEDICARE

## 2019-05-01 ENCOUNTER — OFFICE VISIT (OUTPATIENT)
Dept: INTERNAL MEDICINE | Facility: CLINIC | Age: 74
End: 2019-05-01
Payer: MEDICARE

## 2019-05-01 VITALS
DIASTOLIC BLOOD PRESSURE: 50 MMHG | BODY MASS INDEX: 41.95 KG/M2 | TEMPERATURE: 98 F | SYSTOLIC BLOOD PRESSURE: 130 MMHG | HEIGHT: 72 IN | HEART RATE: 62 BPM | WEIGHT: 309.75 LBS

## 2019-05-01 DIAGNOSIS — R60.0 PERIPHERAL EDEMA: ICD-10-CM

## 2019-05-01 DIAGNOSIS — R60.0 PERIPHERAL EDEMA: Primary | ICD-10-CM

## 2019-05-01 DIAGNOSIS — R60.9 FLUID RETENTION: ICD-10-CM

## 2019-05-01 LAB
ANION GAP SERPL CALC-SCNC: 8 MMOL/L (ref 8–16)
BUN SERPL-MCNC: 33 MG/DL (ref 8–23)
CALCIUM SERPL-MCNC: 9.7 MG/DL (ref 8.7–10.5)
CHLORIDE SERPL-SCNC: 101 MMOL/L (ref 95–110)
CO2 SERPL-SCNC: 31 MMOL/L (ref 23–29)
CREAT SERPL-MCNC: 1.7 MG/DL (ref 0.5–1.4)
EST. GFR  (AFRICAN AMERICAN): 45.2 ML/MIN/1.73 M^2
EST. GFR  (NON AFRICAN AMERICAN): 39.1 ML/MIN/1.73 M^2
GLUCOSE SERPL-MCNC: 141 MG/DL (ref 70–110)
POTASSIUM SERPL-SCNC: 4 MMOL/L (ref 3.5–5.1)
SODIUM SERPL-SCNC: 140 MMOL/L (ref 136–145)

## 2019-05-01 PROCEDURE — 3078F DIAST BP <80 MM HG: CPT | Mod: CPTII,S$GLB,, | Performed by: INTERNAL MEDICINE

## 2019-05-01 PROCEDURE — 3078F PR MOST RECENT DIASTOLIC BLOOD PRESSURE < 80 MM HG: ICD-10-PCS | Mod: CPTII,S$GLB,, | Performed by: INTERNAL MEDICINE

## 2019-05-01 PROCEDURE — 1101F PR PT FALLS ASSESS DOC 0-1 FALLS W/OUT INJ PAST YR: ICD-10-PCS | Mod: CPTII,S$GLB,, | Performed by: INTERNAL MEDICINE

## 2019-05-01 PROCEDURE — 3075F SYST BP GE 130 - 139MM HG: CPT | Mod: CPTII,S$GLB,, | Performed by: INTERNAL MEDICINE

## 2019-05-01 PROCEDURE — 99213 PR OFFICE/OUTPT VISIT, EST, LEVL III, 20-29 MIN: ICD-10-PCS | Mod: S$GLB,,, | Performed by: INTERNAL MEDICINE

## 2019-05-01 PROCEDURE — 3075F PR MOST RECENT SYSTOLIC BLOOD PRESS GE 130-139MM HG: ICD-10-PCS | Mod: CPTII,S$GLB,, | Performed by: INTERNAL MEDICINE

## 2019-05-01 PROCEDURE — 99213 OFFICE O/P EST LOW 20 MIN: CPT | Mod: S$GLB,,, | Performed by: INTERNAL MEDICINE

## 2019-05-01 PROCEDURE — 36415 COLL VENOUS BLD VENIPUNCTURE: CPT | Mod: PO

## 2019-05-01 PROCEDURE — 1101F PT FALLS ASSESS-DOCD LE1/YR: CPT | Mod: CPTII,S$GLB,, | Performed by: INTERNAL MEDICINE

## 2019-05-01 PROCEDURE — 99999 PR PBB SHADOW E&M-EST. PATIENT-LVL III: CPT | Mod: PBBFAC,,, | Performed by: INTERNAL MEDICINE

## 2019-05-01 PROCEDURE — 80048 BASIC METABOLIC PNL TOTAL CA: CPT

## 2019-05-01 PROCEDURE — 99999 PR PBB SHADOW E&M-EST. PATIENT-LVL III: ICD-10-PCS | Mod: PBBFAC,,, | Performed by: INTERNAL MEDICINE

## 2019-05-01 RX ORDER — FUROSEMIDE 40 MG/1
40 TABLET ORAL DAILY
Qty: 30 TABLET | Refills: 11 | Status: SHIPPED | OUTPATIENT
Start: 2019-05-01 | End: 2019-05-08

## 2019-05-01 RX ORDER — POTASSIUM CHLORIDE 20 MEQ/1
20 TABLET, EXTENDED RELEASE ORAL 2 TIMES DAILY
Qty: 30 TABLET | Refills: 11 | Status: SHIPPED | OUTPATIENT
Start: 2019-05-01 | End: 2020-09-03 | Stop reason: SDUPTHER

## 2019-05-01 NOTE — PROGRESS NOTES
Subjective:       Patient ID: Sherif Ragland is a 73 y.o. male.    Chief Complaint: Edema and Weight Gain    HPI Patient is a 73-year-old male presenting today with concerns about peripheral edema and fluid retention.  He states over the last few weeks he has been holding on to fluid.  He states he estimates he has gained about 10-15 lb of fluid in the last few weeks.  He is having some exertional dyspnea associated with it.  He does not describe orthopnea.  He had a rather extensive cardiac workup in February this year which showed normal pump function.  He denies any chest pain.  He denies any sudden change in functional status.  He does note that he has decreased exercise tolerance at this point.  He is not experiencing other cardiac symptoms at this juncture.  He has been limiting salt but that has not been making much impact.    Review of Systems   Constitutional: Negative for fever and unexpected weight change.   Respiratory: Negative for cough, shortness of breath and wheezing.    Cardiovascular: Negative for chest pain and palpitations.   Gastrointestinal: Negative for constipation, diarrhea, nausea and vomiting.   Genitourinary: Negative for dysuria and hematuria.       Objective:   BP (!) 130/50 (BP Location: Left arm, Patient Position: Sitting, BP Method: X-Large (Manual))   Pulse 62   Temp 97.5 °F (36.4 °C) (Oral)   Ht 6' (1.829 m)   Wt (!) 140.5 kg (309 lb 11.9 oz)   BMI 42.01 kg/m²      Physical Exam   Constitutional: He appears well-developed and well-nourished.   HENT:   Head: Normocephalic and atraumatic.   Eyes: Pupils are equal, round, and reactive to light.   Neck: Neck supple. No thyromegaly present.   Cardiovascular: Normal rate, regular rhythm and normal heart sounds. Exam reveals no gallop and no friction rub.   No murmur heard.  Pulmonary/Chest: Effort normal and breath sounds normal. No respiratory distress. He has no wheezes. He has no rales.   Abdominal: Soft. Bowel sounds are  normal. He exhibits no distension. There is no tenderness.   Musculoskeletal:   2+ edema to the midcalf bilaterally.   Vitals reviewed.      No visits with results within 2 Week(s) from this visit.   Latest known visit with results is:   Lab Visit on 03/15/2019   Component Date Value    Hemoglobin A1C 03/15/2019 8.1*    Estimated Avg Glucose 03/15/2019 186*       Assessment:       1. Peripheral edema    2. Fluid retention        Plan:   No problem-specific Assessment & Plan notes found for this encounter.    Peripheral edema  Comments:  lasix 40 mg once daily, potassium 20 meq daily, follow up in 7 days  Orders:  -     furosemide (LASIX) 40 MG tablet; Take 1 tablet (40 mg total) by mouth once daily.  Dispense: 30 tablet; Refill: 11  -     potassium chloride SA (K-DUR,KLOR-CON) 20 MEQ tablet; Take 1 tablet (20 mEq total) by mouth 2 (two) times daily.  Dispense: 30 tablet; Refill: 11  -     Basic metabolic panel; Future; Expected date: 05/01/2019    Fluid retention  -     furosemide (LASIX) 40 MG tablet; Take 1 tablet (40 mg total) by mouth once daily.  Dispense: 30 tablet; Refill: 11  -     potassium chloride SA (K-DUR,KLOR-CON) 20 MEQ tablet; Take 1 tablet (20 mEq total) by mouth 2 (two) times daily.  Dispense: 30 tablet; Refill: 11  -     Basic metabolic panel; Future; Expected date: 05/01/2019       We will initiate some diuresis with Lasix 40 mg once daily.  Lopid once potassium while he is on that.  We will see him back in a week to reassess.  He has had significant diuresis and improvement of symptoms in that time frame we will likely continue some level of diuretic and also pursue some reasoning behind the fluid retention.  If he has not had any improvement in symptoms we will pursue further workup at that time.    Follow up in about 1 week (around 5/8/2019).

## 2019-05-08 ENCOUNTER — OFFICE VISIT (OUTPATIENT)
Dept: INTERNAL MEDICINE | Facility: CLINIC | Age: 74
End: 2019-05-08
Payer: MEDICARE

## 2019-05-08 VITALS
DIASTOLIC BLOOD PRESSURE: 54 MMHG | BODY MASS INDEX: 41.35 KG/M2 | WEIGHT: 305.31 LBS | SYSTOLIC BLOOD PRESSURE: 122 MMHG | TEMPERATURE: 98 F | HEIGHT: 72 IN | HEART RATE: 80 BPM

## 2019-05-08 DIAGNOSIS — M79.10 MYALGIA: ICD-10-CM

## 2019-05-08 DIAGNOSIS — R60.0 PERIPHERAL EDEMA: Primary | ICD-10-CM

## 2019-05-08 PROCEDURE — 99214 PR OFFICE/OUTPT VISIT, EST, LEVL IV, 30-39 MIN: ICD-10-PCS | Mod: S$GLB,,, | Performed by: INTERNAL MEDICINE

## 2019-05-08 PROCEDURE — 99999 PR PBB SHADOW E&M-EST. PATIENT-LVL III: ICD-10-PCS | Mod: PBBFAC,,, | Performed by: INTERNAL MEDICINE

## 2019-05-08 PROCEDURE — 99499 RISK ADDL DX/OHS AUDIT: ICD-10-PCS | Mod: S$GLB,,, | Performed by: INTERNAL MEDICINE

## 2019-05-08 PROCEDURE — 3078F PR MOST RECENT DIASTOLIC BLOOD PRESSURE < 80 MM HG: ICD-10-PCS | Mod: CPTII,S$GLB,, | Performed by: INTERNAL MEDICINE

## 2019-05-08 PROCEDURE — 3078F DIAST BP <80 MM HG: CPT | Mod: CPTII,S$GLB,, | Performed by: INTERNAL MEDICINE

## 2019-05-08 PROCEDURE — 3074F SYST BP LT 130 MM HG: CPT | Mod: CPTII,S$GLB,, | Performed by: INTERNAL MEDICINE

## 2019-05-08 PROCEDURE — 99214 OFFICE O/P EST MOD 30 MIN: CPT | Mod: S$GLB,,, | Performed by: INTERNAL MEDICINE

## 2019-05-08 PROCEDURE — 3074F PR MOST RECENT SYSTOLIC BLOOD PRESSURE < 130 MM HG: ICD-10-PCS | Mod: CPTII,S$GLB,, | Performed by: INTERNAL MEDICINE

## 2019-05-08 PROCEDURE — 1101F PR PT FALLS ASSESS DOC 0-1 FALLS W/OUT INJ PAST YR: ICD-10-PCS | Mod: CPTII,S$GLB,, | Performed by: INTERNAL MEDICINE

## 2019-05-08 PROCEDURE — 99499 UNLISTED E&M SERVICE: CPT | Mod: S$GLB,,, | Performed by: INTERNAL MEDICINE

## 2019-05-08 PROCEDURE — 99999 PR PBB SHADOW E&M-EST. PATIENT-LVL III: CPT | Mod: PBBFAC,,, | Performed by: INTERNAL MEDICINE

## 2019-05-08 PROCEDURE — 1101F PT FALLS ASSESS-DOCD LE1/YR: CPT | Mod: CPTII,S$GLB,, | Performed by: INTERNAL MEDICINE

## 2019-05-08 RX ORDER — FUROSEMIDE 40 MG/1
80 TABLET ORAL DAILY
Qty: 60 TABLET | Refills: 11 | Status: SHIPPED | OUTPATIENT
Start: 2019-05-08 | End: 2019-05-16 | Stop reason: ALTCHOICE

## 2019-05-08 NOTE — PROGRESS NOTES
Subjective:       Patient ID: Sherif Ragland is a 73 y.o. male.    Chief Complaint: Follow-up (1 week )    HPI Patient is a 73-year-old male presenting today following up on his peripheral edema and other complaints at this time.  We saw him about a week ago and he was complaining of some fluid retention and weight gain.  We tried some Lasix 40 mg daily and we did not see significant diuresis he did see some improvement with some diuresis initially but it did not continuing did not remain affective.  He continues to feel like he is holding on to a lot of fluid.  He states his weight is higher than it has ever been in his life it is not due to changes in diet or activity.    He has also had complaints of muscle weakness and muscle pain.  This has been going on for several months as well.  He is no longer taking his hyperlipidemia medication he was on Crestor but he is no longer taking it.  That does not seem to have had any impact on the symptoms.  He describes feelings of muscle weakness and fatigue with activity.  He states he climbs a flight of stairs he feels exhausted at the top.  He denies chest pain or shortness of breath but feels muscle weakness and muscle fatigue.  He states that it is even a struggle to climb into his truck.    Patient has had an extensive cardiac workup in the beginning of this year which include echocardiogram which was normal as well as a nuclear stress test which was normal.  He relates no issues with any chest pain or sudden change in exercise capacity or activity level.  He describes symptoms which could be consistent with congestive heart failure although there is no evidence of that by his prior workup and no occur evidence of an event which would of change that.  He does have some fluid retention but it is not dramatic and did not respond well to low-dose Lasix.  He is followed by outside Nephrology.  He had a recent appointment which for reasons that were little bit cloudy did  not take place.  His creatinine has been stable around 1.7 with a GFR around 40 for some time.  He seems to have tolerated the Lasix well.  His mild renal insufficiency does not seem to be reasonable explanation for his feelings of swelling.    Review of Systems   Constitutional: Negative for fever and unexpected weight change.   Respiratory: Negative for cough, shortness of breath and wheezing.    Cardiovascular: Positive for leg swelling. Negative for chest pain and palpitations.   Gastrointestinal: Negative for constipation, diarrhea, nausea and vomiting.   Genitourinary: Negative for dysuria and hematuria.   Musculoskeletal: Positive for myalgias.       Objective:   BP (!) 122/54 (BP Location: Left arm, Patient Position: Sitting, BP Method: X-Large (Manual))   Pulse 80   Temp 97.7 °F (36.5 °C) (Oral)   Ht 6' (1.829 m)   Wt (!) 138.5 kg (305 lb 5.4 oz)   BMI 41.41 kg/m²      Physical Exam   Constitutional: He appears well-developed and well-nourished.   HENT:   Head: Normocephalic and atraumatic.   Eyes: Pupils are equal, round, and reactive to light.   Neck: Neck supple. No thyromegaly present.   Cardiovascular: Normal rate, regular rhythm and normal heart sounds. Exam reveals no gallop and no friction rub.   No murmur heard.  Pulmonary/Chest: Breath sounds normal. He has no wheezes. He has no rales.   Abdominal: Soft. Bowel sounds are normal. He exhibits no distension. There is no tenderness.   Musculoskeletal:   1+ edema at the ankles   Vitals reviewed.      Lab Visit on 05/01/2019   Component Date Value    Sodium 05/01/2019 140     Potassium 05/01/2019 4.0     Chloride 05/01/2019 101     CO2 05/01/2019 31*    Glucose 05/01/2019 141*    BUN, Bld 05/01/2019 33*    Creatinine 05/01/2019 1.7*    Calcium 05/01/2019 9.7     Anion Gap 05/01/2019 8     eGFR if  05/01/2019 45.2*    eGFR if non  Amer* 05/01/2019 39.1*       Assessment:       1. Peripheral edema    2. Myalgia         Plan:   No problem-specific Assessment & Plan notes found for this encounter.    Peripheral edema  Comments:  increase lasix to 80 mg daily. bmp in 10 days.  Echocardiogram  Orders:  -     2D Echo w/ Color Flow Doppler; Future  -     furosemide (LASIX) 40 MG tablet; Take 2 tablets (80 mg total) by mouth once daily.  Dispense: 60 tablet; Refill: 11  -     Basic metabolic panel; Future; Expected date: 05/15/2019  -     X-Ray Chest PA And Lateral; Future; Expected date: 05/08/2019    Myalgia  Comments:  stop crestor.  check esr, aldolase  Orders:  -     Sedimentation rate; Future; Expected date: 05/08/2019  -     Aldolase; Future; Expected date: 05/08/2019  -     Rheumatoid factor; Future; Expected date: 05/08/2019  -     JENNY Screen w/Reflex; Future; Expected date: 05/08/2019     At this time it is not really clear exactly what the definitive diagnosis is or dealing with here.  He does finds issues with some edema and weight gain but also has these myalgias.  The edema does not seem as pronounced as on exam as he feels like it is historically.  He had really poor response to low-dose Lasix.  We will try slightly higher dose of Lasix to see if we get a better diuresis out of him.  I am also going to go ahead and repeat the echocardiogram to see if there is any evidence of a change from the 1 earlier this year.  Certainly it is possible he had a silent events and he has now got some heart failure that has gone undiagnosed.  He needs to continue to follow up with Nephrology as well.  We will get a chest x-ray today an updated BMP in a week to make sure that his kidneys are tolerating the higher dose of the Lasix.    In regards to the myalgias he is off the statin and none of the other medications he is on her jumped off the pages possible causes for this.  Theoretically there is the possibility of polymyalgia rheumatica or polymyositis.  He does sort of described proximal muscle issues but he has just generalized muscle  aches.  We will get a sedimentation rate and how to lays rheumatoid factor and JENNY to see if we can't find any red flags here.  He will follow up in about a week to assess his response to the Lasix as well as to review the labs      Follow up in about 8 days (around 5/16/2019) for Edema, myalgia, with Yael Youssef PA-C.

## 2019-05-13 ENCOUNTER — TELEPHONE (OUTPATIENT)
Dept: INTERNAL MEDICINE | Facility: CLINIC | Age: 74
End: 2019-05-13

## 2019-05-13 NOTE — TELEPHONE ENCOUNTER
----- Message from Jessica Wagoner sent at 5/13/2019 11:10 AM CDT -----  Contact: pt   Pt needs new orders to reschedule appointment that were for 05/10 he missed them due to power being out on 05/10, he can be reached at  [phone]

## 2019-05-14 ENCOUNTER — HOSPITAL ENCOUNTER (OUTPATIENT)
Dept: RADIOLOGY | Facility: HOSPITAL | Age: 74
Discharge: HOME | End: 2019-05-14
Attending: INTERNAL MEDICINE
Payer: MEDICARE

## 2019-05-14 DIAGNOSIS — R60.0 PERIPHERAL EDEMA: ICD-10-CM

## 2019-05-14 PROCEDURE — 71046 XR CHEST PA AND LATERAL: ICD-10-PCS | Mod: 26,,, | Performed by: RADIOLOGY

## 2019-05-14 PROCEDURE — 71046 X-RAY EXAM CHEST 2 VIEWS: CPT | Mod: 26,,, | Performed by: RADIOLOGY

## 2019-05-14 PROCEDURE — 71046 X-RAY EXAM CHEST 2 VIEWS: CPT | Mod: TC,FY,PO

## 2019-05-15 ENCOUNTER — PATIENT MESSAGE (OUTPATIENT)
Dept: INTERNAL MEDICINE | Facility: CLINIC | Age: 74
End: 2019-05-15

## 2019-05-15 DIAGNOSIS — R60.9 FLUID RETENTION: ICD-10-CM

## 2019-05-15 DIAGNOSIS — M79.10 MYALGIA: Primary | ICD-10-CM

## 2019-05-15 NOTE — PROGRESS NOTES
Last 5 Patient Entered Readings                                      Current 30 Day Average: 148/69     Recent Readings 5/6/2019 5/2/2019 5/1/2019 4/22/2019 4/22/2019    SBP (mmHg) 146 149 156 127 137    DBP (mmHg) 68 66 66 57 78    Pulse 68 67 74 62 65        HPI:  Called patient to follow up on his labs showing a worsened kidney function and need to stop HCTZ.   Patient endorses adherence to medication regimen.   Patient denies hypotensive s/sx (lightheadedness, dizziness, nausea, fatigue); patient denies hypertensive s/sx (SOB, CP, severe headaches, changes in vision). Instructed patient to seek medical care if BP > 180/110 and is accompanied by hypertensive s/sx associated, patient confirms understanding.     Assessment:  Reviewed recent readings. Per 2017 ACC/ AHA HTN guidelines (goal of BP < 130/80), current 30-day average needs to be addressed more thoroughly today.     Plan:  Continue current medication regimen since he has an appointment with nephrology tomorrow. I would recommend stopping HCTZ since his GFR is now <30. I will continue to monitor regularly and will follow-up in 2 days after his nephrology appointment.    Current medication regimen:  Hypertension Medications             amLODIPine (NORVASC) 5 MG tablet Take 5 mg by mouth 2 (two) times daily.    furosemide (LASIX) 40 MG tablet Take 2 tablets (80 mg total) by mouth once daily.    metoprolol succinate (TOPROL-XL) 100 MG 24 hr tablet Take 1 tablet (100 mg total) by mouth 2 (two) times daily.    terazosin (HYTRIN) 10 MG capsule Take 1 capsule (10 mg total) by mouth once daily.    valsartan-hydrochlorothiazide (DIOVAN-HCT) 320-25 mg per tablet Take 1 tablet by mouth once daily.          Patient denies having questions or concerns. Patient has my contact information and knows to call with any concerns or clinical changes.

## 2019-05-15 NOTE — TELEPHONE ENCOUNTER
His kidneys are not happy with the higher dose of lasix.  Decrease lasix back to 40 mg daily.  Consult nephro.  Repeat bmp in one week.    Sed rate is elevated. I am not sure if it is high enough to explain his muscle symptoms.  Consult rheumatology

## 2019-05-16 ENCOUNTER — PATIENT MESSAGE (OUTPATIENT)
Dept: INTERNAL MEDICINE | Facility: CLINIC | Age: 74
End: 2019-05-16

## 2019-05-16 ENCOUNTER — PATIENT OUTREACH (OUTPATIENT)
Dept: ADMINISTRATIVE | Facility: HOSPITAL | Age: 74
End: 2019-05-16

## 2019-05-16 DIAGNOSIS — R60.0 PERIPHERAL EDEMA: ICD-10-CM

## 2019-05-16 RX ORDER — FUROSEMIDE 40 MG/1
40 TABLET ORAL DAILY
Qty: 60 TABLET | Refills: 11
Start: 2019-05-16 | End: 2020-05-15 | Stop reason: SDUPTHER

## 2019-05-16 NOTE — PROGRESS NOTES
Last 5 Patient Entered Readings                                      Current 30 Day Average: 148/69     Recent Readings 5/6/2019 5/2/2019 5/1/2019 4/22/2019 4/22/2019    SBP (mmHg) 146 149 156 127 137    DBP (mmHg) 68 66 66 57 78    Pulse 68 67 74 62 65        Patient seen by nephrology today who stated to stop Actos causing his DEEJAY with repeated labs in one week. If his labs improve in one week, I won't change HCTZ.

## 2019-05-16 NOTE — TELEPHONE ENCOUNTER
Stop actos as recommended.    Increase lantus to 30 units twice daily.  Will need to monitor sugars and increase the lantus insulin dose by 5 units twice daily every 2-3 days until fasting sugars are less than 130

## 2019-05-21 RX ORDER — LANCETS
EACH MISCELLANEOUS
Qty: 100 EACH | Refills: 11 | Status: SHIPPED | OUTPATIENT
Start: 2019-05-21 | End: 2020-09-08

## 2019-05-23 ENCOUNTER — PATIENT OUTREACH (OUTPATIENT)
Dept: OTHER | Facility: OTHER | Age: 74
End: 2019-05-23

## 2019-05-23 NOTE — PROGRESS NOTES
Last 5 Patient Entered Readings                                      Current 30 Day Average: 154/67     Recent Readings 5/23/2019 5/22/2019 5/17/2019 5/17/2019 5/6/2019    SBP (mmHg) 148 164 162 162 146    DBP (mmHg) 52 77 72 68 68    Pulse 66 66 71 71 68          Digital Medicine: Health  Follow Up    Pt reports no change in diet and exercise. Wife continues to prepare all meals and pt feels he stays active enough with his job. Pt did note a significant difference between digital readings and clinic readings. He has not charged his device since he purchased it 5 months ago. Instructed to charge cuff overnight and to check BP tomorrow. Recommended to bring device to the [facility] if readings are in the same range as they have been. Also instructed to charge device at least once a month. Pt verbalized understanding.    Follow up with Mr. Sherif Ragland completed. No further questions or concerns. Will continue to follow up to achieve health goals.

## 2019-05-23 NOTE — PROGRESS NOTES
Last 5 Patient Entered Readings                                      Current 30 Day Average: 154/67     Recent Readings 5/23/2019 5/22/2019 5/17/2019 5/17/2019 5/6/2019    SBP (mmHg) 148 164 162 162 146    DBP (mmHg) 52 77 72 68 68    Pulse 66 66 71 71 68        Patient instructed to charge cuff overnight per his healthcoach. I will follow-up after the weekend to assess if his readings remain elevated. He did not go for another renal panel this week therefore I will need to change HCTZ to a loop.

## 2019-05-27 ENCOUNTER — PATIENT OUTREACH (OUTPATIENT)
Dept: OTHER | Facility: OTHER | Age: 74
End: 2019-05-27

## 2019-05-27 NOTE — PROGRESS NOTES
Last 5 Patient Entered Readings                                      Current 30 Day Average: 156/68     Recent Readings 5/25/2019 5/23/2019 5/22/2019 5/17/2019 5/17/2019    SBP (mmHg) 165 148 164 162 162    DBP (mmHg) 75 52 77 72 68    Pulse 66 66 66 71 71      [facility] to fill out his on-boarding questionnaires and recommend changing metoprolol to carvedilol.

## 2019-05-30 NOTE — PROGRESS NOTES
Last 5 Patient Entered Readings                                      Current 30 Day Average: 156/68     Recent Readings 5/25/2019 5/23/2019 5/22/2019 5/17/2019 5/17/2019    SBP (mmHg) 165 148 164 162 162    DBP (mmHg) 75 52 77 72 68    Pulse 66 66 66 71 71        HPI:  Called patient to follow up on his upward trend and consider changing metoprolol to carvedilol.   Patient endorses adherence to medication regimen.   Patient denies hypotensive s/sx (lightheadedness, dizziness, nausea, fatigue); patient denies hypertensive s/sx (SOB, CP, severe headaches, changes in vision). Instructed patient to seek medical care if BP > 180/110 and is accompanied by hypertensive s/sx associated, patient confirms understanding.     Assessment:  Reviewed recent readings. Per 2017 ACC/ AHA HTN guidelines (goal of BP < 130/80), current 30-day average needs to be addressed more thoroughly today.   His readings in the MD office are low and high with his machine. He is frustrated over this.    Plan:  Continue current medication regimen.  Charge your BP cuff.   Bring your BP cuff to your next appointment to assess accuracy of the machine.  I will continue to monitor regularly and will follow-up in 2 to 3 weeks, sooner if blood pressure begins to trend upward or downward.     Current medication regimen:  Hypertension Medications             amLODIPine (NORVASC) 5 MG tablet Take 5 mg by mouth 2 (two) times daily.    furosemide (LASIX) 40 MG tablet Take 1 tablet (40 mg total) by mouth once daily.    metoprolol succinate (TOPROL-XL) 100 MG 24 hr tablet Take 1 tablet (100 mg total) by mouth 2 (two) times daily.    terazosin (HYTRIN) 10 MG capsule Take 1 capsule (10 mg total) by mouth once daily.    valsartan-hydrochlorothiazide (DIOVAN-HCT) 320-25 mg per tablet Take 1 tablet by mouth once daily.          Patient denies having questions or concerns. Patient has my contact information and knows to call with any concerns or clinical changes.

## 2019-06-04 ENCOUNTER — TELEPHONE (OUTPATIENT)
Dept: ORTHOPEDICS | Facility: CLINIC | Age: 74
End: 2019-06-04

## 2019-06-13 ENCOUNTER — INITIAL CONSULT (OUTPATIENT)
Dept: RHEUMATOLOGY | Facility: CLINIC | Age: 74
End: 2019-06-13
Payer: MEDICARE

## 2019-06-13 VITALS
WEIGHT: 299.19 LBS | HEART RATE: 65 BPM | SYSTOLIC BLOOD PRESSURE: 126 MMHG | DIASTOLIC BLOOD PRESSURE: 65 MMHG | BODY MASS INDEX: 40.57 KG/M2

## 2019-06-13 DIAGNOSIS — M81.0 OSTEOPOROSIS WITHOUT CURRENT PATHOLOGICAL FRACTURE, UNSPECIFIED OSTEOPOROSIS TYPE: ICD-10-CM

## 2019-06-13 DIAGNOSIS — R53.83 FATIGUE, UNSPECIFIED TYPE: ICD-10-CM

## 2019-06-13 DIAGNOSIS — M35.3 PMR (POLYMYALGIA RHEUMATICA): Primary | ICD-10-CM

## 2019-06-13 PROCEDURE — 1101F PT FALLS ASSESS-DOCD LE1/YR: CPT | Mod: CPTII,S$GLB,, | Performed by: INTERNAL MEDICINE

## 2019-06-13 PROCEDURE — 3078F DIAST BP <80 MM HG: CPT | Mod: CPTII,S$GLB,, | Performed by: INTERNAL MEDICINE

## 2019-06-13 PROCEDURE — 3078F PR MOST RECENT DIASTOLIC BLOOD PRESSURE < 80 MM HG: ICD-10-PCS | Mod: CPTII,S$GLB,, | Performed by: INTERNAL MEDICINE

## 2019-06-13 PROCEDURE — 99999 PR PBB SHADOW E&M-EST. PATIENT-LVL III: ICD-10-PCS | Mod: PBBFAC,,, | Performed by: INTERNAL MEDICINE

## 2019-06-13 PROCEDURE — 1101F PR PT FALLS ASSESS DOC 0-1 FALLS W/OUT INJ PAST YR: ICD-10-PCS | Mod: CPTII,S$GLB,, | Performed by: INTERNAL MEDICINE

## 2019-06-13 PROCEDURE — 99999 PR PBB SHADOW E&M-EST. PATIENT-LVL III: CPT | Mod: PBBFAC,,, | Performed by: INTERNAL MEDICINE

## 2019-06-13 PROCEDURE — 99499 UNLISTED E&M SERVICE: CPT | Mod: S$GLB,,, | Performed by: INTERNAL MEDICINE

## 2019-06-13 PROCEDURE — 3074F PR MOST RECENT SYSTOLIC BLOOD PRESSURE < 130 MM HG: ICD-10-PCS | Mod: CPTII,S$GLB,, | Performed by: INTERNAL MEDICINE

## 2019-06-13 PROCEDURE — 99204 OFFICE O/P NEW MOD 45 MIN: CPT | Mod: S$GLB,,, | Performed by: INTERNAL MEDICINE

## 2019-06-13 PROCEDURE — 99204 PR OFFICE/OUTPT VISIT, NEW, LEVL IV, 45-59 MIN: ICD-10-PCS | Mod: S$GLB,,, | Performed by: INTERNAL MEDICINE

## 2019-06-13 PROCEDURE — 3074F SYST BP LT 130 MM HG: CPT | Mod: CPTII,S$GLB,, | Performed by: INTERNAL MEDICINE

## 2019-06-13 PROCEDURE — 99499 RISK ADDL DX/OHS AUDIT: ICD-10-PCS | Mod: S$GLB,,, | Performed by: INTERNAL MEDICINE

## 2019-06-13 RX ORDER — PREDNISONE 5 MG/1
10 TABLET ORAL DAILY
Qty: 50 TABLET | Refills: 1 | Status: SHIPPED | OUTPATIENT
Start: 2019-06-13 | End: 2019-06-24 | Stop reason: SDUPTHER
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
## 2019-06-13 NOTE — LETTER
June 13, 2019      Tobias Fox MD  [address]  [address]  [place] [address]           [facility]  [address] [address]  Phone: [phone]  Fax: [phone]          Patient: Sherif Ragland   MR Number: [id number]   YOB: 1945   Date of Visit: 6/13/2019       Dear Dr. Tobias Fox:    Thank you for referring Sherif Ragland to me for evaluation. Attached you will find relevant portions of my assessment and plan of care.    If you have questions, please do not hesitate to call me. I look forward to following Sherif Ragland along with you.    Sincerely,    Farrah Obrien MD    Enclosure  CC:  No Recipients    If you would like to receive this communication electronically, please contact [email or web address] or [phone] to request more information on [employer] access.    For providers and/or their staff who would like to refer a patient to [facility], please contact us through our one-stop-shop provider referral line, [facility], at [phone].    If you feel you have received this communication in error or would no longer like to receive these types of communications, please e-mail [email or web address]

## 2019-06-14 ENCOUNTER — PATIENT OUTREACH (OUTPATIENT)
Dept: ADMINISTRATIVE | Facility: HOSPITAL | Age: 74
End: 2019-06-14

## 2019-06-17 ENCOUNTER — PATIENT OUTREACH (OUTPATIENT)
Dept: OTHER | Facility: OTHER | Age: 74
End: 2019-06-17

## 2019-06-17 ENCOUNTER — CLINICAL SUPPORT (OUTPATIENT)
Dept: CARDIOLOGY | Facility: CLINIC | Age: 74
End: 2019-06-17
Attending: INTERNAL MEDICINE
Payer: MEDICARE

## 2019-06-17 DIAGNOSIS — R60.0 PERIPHERAL EDEMA: ICD-10-CM

## 2019-06-17 LAB — RETIRED EF AND QEF - SEE NOTES: 60 (ref 55–65)

## 2019-06-17 PROCEDURE — 93306 TTE W/DOPPLER COMPLETE: CPT | Mod: S$GLB,,, | Performed by: NUCLEAR MEDICINE

## 2019-06-17 PROCEDURE — 93306 2D ECHO WITH COLOR FLOW DOPPLER: ICD-10-PCS | Mod: S$GLB,,, | Performed by: NUCLEAR MEDICINE

## 2019-06-17 NOTE — PROGRESS NOTES
Last 5 Patient Entered Readings                                      Current 30 Day Average: 153/70     Recent Readings 6/13/2019 6/6/2019 6/5/2019 5/31/2019 5/25/2019    SBP (mmHg) 162 161 151 121 165    DBP (mmHg) 77 76 69 63 75    Pulse 65 72 73 65 66        LM[facility]B to change amlodipine to nifedipine or change metoprolol to carvedilol.

## 2019-06-21 ENCOUNTER — PATIENT MESSAGE (OUTPATIENT)
Dept: INTERNAL MEDICINE | Facility: CLINIC | Age: 74
End: 2019-06-21

## 2019-06-24 ENCOUNTER — PATIENT MESSAGE (OUTPATIENT)
Dept: INTERNAL MEDICINE | Facility: CLINIC | Age: 74
End: 2019-06-24

## 2019-06-24 ENCOUNTER — APPOINTMENT (OUTPATIENT)
Dept: RADIOLOGY | Facility: HOSPITAL | Age: 74
End: 2019-06-24
Attending: INTERNAL MEDICINE
Payer: MEDICARE

## 2019-06-24 ENCOUNTER — OFFICE VISIT (OUTPATIENT)
Dept: RHEUMATOLOGY | Facility: CLINIC | Age: 74
End: 2019-06-24
Payer: MEDICARE

## 2019-06-24 VITALS
WEIGHT: 291.69 LBS | BODY MASS INDEX: 39.56 KG/M2 | DIASTOLIC BLOOD PRESSURE: 78 MMHG | HEART RATE: 54 BPM | SYSTOLIC BLOOD PRESSURE: 174 MMHG

## 2019-06-24 DIAGNOSIS — M81.0 OSTEOPOROSIS WITHOUT CURRENT PATHOLOGICAL FRACTURE, UNSPECIFIED OSTEOPOROSIS TYPE: ICD-10-CM

## 2019-06-24 DIAGNOSIS — M35.3 PMR (POLYMYALGIA RHEUMATICA): ICD-10-CM

## 2019-06-24 PROCEDURE — 99499 UNLISTED E&M SERVICE: CPT | Mod: S$GLB,,, | Performed by: INTERNAL MEDICINE

## 2019-06-24 PROCEDURE — 99999 PR PBB SHADOW E&M-EST. PATIENT-LVL III: CPT | Mod: PBBFAC,,, | Performed by: INTERNAL MEDICINE

## 2019-06-24 PROCEDURE — 3077F SYST BP >= 140 MM HG: CPT | Mod: CPTII,S$GLB,, | Performed by: INTERNAL MEDICINE

## 2019-06-24 PROCEDURE — 3078F PR MOST RECENT DIASTOLIC BLOOD PRESSURE < 80 MM HG: ICD-10-PCS | Mod: CPTII,S$GLB,, | Performed by: INTERNAL MEDICINE

## 2019-06-24 PROCEDURE — 3078F DIAST BP <80 MM HG: CPT | Mod: CPTII,S$GLB,, | Performed by: INTERNAL MEDICINE

## 2019-06-24 PROCEDURE — 3077F PR MOST RECENT SYSTOLIC BLOOD PRESSURE >= 140 MM HG: ICD-10-PCS | Mod: CPTII,S$GLB,, | Performed by: INTERNAL MEDICINE

## 2019-06-24 PROCEDURE — 99999 PR PBB SHADOW E&M-EST. PATIENT-LVL III: ICD-10-PCS | Mod: PBBFAC,,, | Performed by: INTERNAL MEDICINE

## 2019-06-24 PROCEDURE — 99214 PR OFFICE/OUTPT VISIT, EST, LEVL IV, 30-39 MIN: ICD-10-PCS | Mod: S$GLB,,, | Performed by: INTERNAL MEDICINE

## 2019-06-24 PROCEDURE — 99214 OFFICE O/P EST MOD 30 MIN: CPT | Mod: S$GLB,,, | Performed by: INTERNAL MEDICINE

## 2019-06-24 PROCEDURE — 1101F PR PT FALLS ASSESS DOC 0-1 FALLS W/OUT INJ PAST YR: ICD-10-PCS | Mod: CPTII,S$GLB,, | Performed by: INTERNAL MEDICINE

## 2019-06-24 PROCEDURE — 77080 DEXA BONE DENSITY SPINE HIP: ICD-10-PCS | Mod: 26,,, | Performed by: RADIOLOGY

## 2019-06-24 PROCEDURE — 77080 DXA BONE DENSITY AXIAL: CPT | Mod: 26,,, | Performed by: RADIOLOGY

## 2019-06-24 PROCEDURE — 1101F PT FALLS ASSESS-DOCD LE1/YR: CPT | Mod: CPTII,S$GLB,, | Performed by: INTERNAL MEDICINE

## 2019-06-24 PROCEDURE — 77080 DXA BONE DENSITY AXIAL: CPT | Mod: TC

## 2019-06-24 PROCEDURE — 99499 RISK ADDL DX/OHS AUDIT: ICD-10-PCS | Mod: S$GLB,,, | Performed by: INTERNAL MEDICINE

## 2019-06-24 RX ORDER — PREDNISONE 1 MG/1
4 TABLET ORAL DAILY
Qty: 150 TABLET | Refills: 3 | Status: SHIPPED | OUTPATIENT
Start: 2019-06-24 | End: 2019-09-19

## 2019-06-24 RX ORDER — TERAZOSIN 10 MG/1
10 CAPSULE ORAL DAILY
Qty: 90 CAPSULE | Refills: 4 | Status: SHIPPED | OUTPATIENT
Start: 2019-06-24 | End: 2019-07-06 | Stop reason: SINTOL

## 2019-06-24 RX ORDER — PREDNISONE 5 MG/1
5 TABLET ORAL DAILY
Qty: 100 TABLET | Refills: 1 | Status: SHIPPED | OUTPATIENT
Start: 2019-06-24 | End: 2019-09-19
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
## 2019-06-24 NOTE — PATIENT INSTRUCTIONS
Prednisone 10 mg/ day - month, then 9 mg / day x month , 8 mg / day x month , 7 mg / day x month , until seen

## 2019-06-25 ENCOUNTER — PATIENT MESSAGE (OUTPATIENT)
Dept: RHEUMATOLOGY | Facility: CLINIC | Age: 74
End: 2019-06-25

## 2019-06-25 NOTE — TELEPHONE ENCOUNTER
Mr. Ragland sent Rheumatology a message regarding a reaction to his Terazosin but Dr. Obrien was not the prescriber. Please make Dr. Fox aware of this reaction noted in Mr. Ragland's Patient E-mail/Advice.     Thank you,     Lc MCCORD Supervisor
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
## 2019-07-02 ENCOUNTER — PATIENT OUTREACH (OUTPATIENT)
Dept: OTHER | Facility: OTHER | Age: 74
End: 2019-07-02

## 2019-07-02 NOTE — PROGRESS NOTES
Last 5 Patient Entered Readings                                      Current 30 Day Average: 159/75     Recent Readings 6/26/2019 6/26/2019 6/24/2019 6/13/2019 6/6/2019    SBP (mmHg) 160 170 163 162 161    DBP (mmHg) 72 79 77 77 76    Pulse 61 60 54 65 72          Pt not available to complete HC f/u. States that he will call back later. If no return call in 1 week, WCB.

## 2019-07-03 ENCOUNTER — LAB VISIT (OUTPATIENT)
Dept: LAB | Facility: HOSPITAL | Age: 74
End: 2019-07-03
Attending: INTERNAL MEDICINE
Payer: MEDICARE

## 2019-07-03 DIAGNOSIS — E11.22 TYPE 2 DIABETES MELLITUS WITH STAGE 3 CHRONIC KIDNEY DISEASE, WITH LONG-TERM CURRENT USE OF INSULIN: ICD-10-CM

## 2019-07-03 DIAGNOSIS — N18.30 TYPE 2 DIABETES MELLITUS WITH STAGE 3 CHRONIC KIDNEY DISEASE, WITH LONG-TERM CURRENT USE OF INSULIN: ICD-10-CM

## 2019-07-03 DIAGNOSIS — Z79.4 TYPE 2 DIABETES MELLITUS WITH STAGE 3 CHRONIC KIDNEY DISEASE, WITH LONG-TERM CURRENT USE OF INSULIN: ICD-10-CM

## 2019-07-03 DIAGNOSIS — R60.9 FLUID RETENTION: ICD-10-CM

## 2019-07-03 LAB
ANION GAP SERPL CALC-SCNC: 13 MMOL/L (ref 8–16)
BUN SERPL-MCNC: 46 MG/DL (ref 8–23)
CALCIUM SERPL-MCNC: 10 MG/DL (ref 8.7–10.5)
CHLORIDE SERPL-SCNC: 101 MMOL/L (ref 95–110)
CHOLEST SERPL-MCNC: 219 MG/DL (ref 120–199)
CHOLEST/HDLC SERPL: 4.2 {RATIO} (ref 2–5)
CO2 SERPL-SCNC: 28 MMOL/L (ref 23–29)
CREAT SERPL-MCNC: 1.9 MG/DL (ref 0.5–1.4)
EST. GFR  (AFRICAN AMERICAN): 39.6 ML/MIN/1.73 M^2
EST. GFR  (NON AFRICAN AMERICAN): 34.2 ML/MIN/1.73 M^2
ESTIMATED AVG GLUCOSE: 209 MG/DL (ref 68–131)
GLUCOSE SERPL-MCNC: 108 MG/DL (ref 70–110)
HBA1C MFR BLD HPLC: 8.9 % (ref 4–5.6)
HDLC SERPL-MCNC: 52 MG/DL (ref 40–75)
HDLC SERPL: 23.7 % (ref 20–50)
LDLC SERPL CALC-MCNC: 146 MG/DL (ref 63–159)
NONHDLC SERPL-MCNC: 167 MG/DL
POTASSIUM SERPL-SCNC: 3.8 MMOL/L (ref 3.5–5.1)
SODIUM SERPL-SCNC: 142 MMOL/L (ref 136–145)
TRIGL SERPL-MCNC: 105 MG/DL (ref 30–150)

## 2019-07-03 PROCEDURE — 80061 LIPID PANEL: CPT

## 2019-07-03 PROCEDURE — 36415 COLL VENOUS BLD VENIPUNCTURE: CPT | Mod: PO

## 2019-07-03 PROCEDURE — 83036 HEMOGLOBIN GLYCOSYLATED A1C: CPT

## 2019-07-03 PROCEDURE — 80048 BASIC METABOLIC PNL TOTAL CA: CPT

## 2019-07-05 NOTE — PROGRESS NOTES
Here are the results of your recent labs.  We will review them in detail at your follow up visit.    Sincerely,    Tobias Fox M.D.        If you would like to review your experience with Dr. Fox or [facility], please follow the link below:    [email or web address]

## 2019-07-06 ENCOUNTER — OFFICE VISIT (OUTPATIENT)
Dept: INTERNAL MEDICINE | Facility: CLINIC | Age: 74
End: 2019-07-06
Payer: MEDICARE

## 2019-07-06 VITALS
HEART RATE: 75 BPM | DIASTOLIC BLOOD PRESSURE: 62 MMHG | TEMPERATURE: 97 F | HEIGHT: 72 IN | OXYGEN SATURATION: 96 % | SYSTOLIC BLOOD PRESSURE: 108 MMHG | BODY MASS INDEX: 36.76 KG/M2 | RESPIRATION RATE: 18 BRPM | WEIGHT: 271.38 LBS

## 2019-07-06 DIAGNOSIS — R35.0 BENIGN PROSTATIC HYPERPLASIA WITH URINARY FREQUENCY: ICD-10-CM

## 2019-07-06 DIAGNOSIS — N40.1 BENIGN PROSTATIC HYPERPLASIA WITH URINARY FREQUENCY: ICD-10-CM

## 2019-07-06 DIAGNOSIS — Z79.4 TYPE 2 DIABETES MELLITUS WITH STAGE 3 CHRONIC KIDNEY DISEASE, WITH LONG-TERM CURRENT USE OF INSULIN: ICD-10-CM

## 2019-07-06 DIAGNOSIS — N18.30 TYPE 2 DIABETES MELLITUS WITH STAGE 3 CHRONIC KIDNEY DISEASE, WITH LONG-TERM CURRENT USE OF INSULIN: ICD-10-CM

## 2019-07-06 DIAGNOSIS — M35.3 PMR (POLYMYALGIA RHEUMATICA): Primary | ICD-10-CM

## 2019-07-06 DIAGNOSIS — E11.22 TYPE 2 DIABETES MELLITUS WITH STAGE 3 CHRONIC KIDNEY DISEASE, WITH LONG-TERM CURRENT USE OF INSULIN: ICD-10-CM

## 2019-07-06 PROCEDURE — 99214 PR OFFICE/OUTPT VISIT, EST, LEVL IV, 30-39 MIN: ICD-10-PCS | Mod: S$GLB,,, | Performed by: INTERNAL MEDICINE

## 2019-07-06 PROCEDURE — 3045F PR MOST RECENT HEMOGLOBIN A1C LEVEL 7.0-9.0%: ICD-10-PCS | Mod: CPTII,S$GLB,, | Performed by: INTERNAL MEDICINE

## 2019-07-06 PROCEDURE — 1101F PT FALLS ASSESS-DOCD LE1/YR: CPT | Mod: CPTII,S$GLB,, | Performed by: INTERNAL MEDICINE

## 2019-07-06 PROCEDURE — 99999 PR PBB SHADOW E&M-EST. PATIENT-LVL III: ICD-10-PCS | Mod: PBBFAC,,, | Performed by: INTERNAL MEDICINE

## 2019-07-06 PROCEDURE — 1101F PR PT FALLS ASSESS DOC 0-1 FALLS W/OUT INJ PAST YR: ICD-10-PCS | Mod: CPTII,S$GLB,, | Performed by: INTERNAL MEDICINE

## 2019-07-06 PROCEDURE — 3078F DIAST BP <80 MM HG: CPT | Mod: CPTII,S$GLB,, | Performed by: INTERNAL MEDICINE

## 2019-07-06 PROCEDURE — 99214 OFFICE O/P EST MOD 30 MIN: CPT | Mod: S$GLB,,, | Performed by: INTERNAL MEDICINE

## 2019-07-06 PROCEDURE — 3074F PR MOST RECENT SYSTOLIC BLOOD PRESSURE < 130 MM HG: ICD-10-PCS | Mod: CPTII,S$GLB,, | Performed by: INTERNAL MEDICINE

## 2019-07-06 PROCEDURE — 3045F PR MOST RECENT HEMOGLOBIN A1C LEVEL 7.0-9.0%: CPT | Mod: CPTII,S$GLB,, | Performed by: INTERNAL MEDICINE

## 2019-07-06 PROCEDURE — 3074F SYST BP LT 130 MM HG: CPT | Mod: CPTII,S$GLB,, | Performed by: INTERNAL MEDICINE

## 2019-07-06 PROCEDURE — 3078F PR MOST RECENT DIASTOLIC BLOOD PRESSURE < 80 MM HG: ICD-10-PCS | Mod: CPTII,S$GLB,, | Performed by: INTERNAL MEDICINE

## 2019-07-06 PROCEDURE — 99999 PR PBB SHADOW E&M-EST. PATIENT-LVL III: CPT | Mod: PBBFAC,,, | Performed by: INTERNAL MEDICINE

## 2019-07-06 RX ORDER — TAMSULOSIN HYDROCHLORIDE 0.4 MG/1
0.4 CAPSULE ORAL DAILY
Qty: 30 CAPSULE | Refills: 11 | Status: SHIPPED | OUTPATIENT
Start: 2019-07-06 | End: 2019-09-16

## 2019-07-06 NOTE — PROGRESS NOTES
Subjective:       Patient ID: Sherif Ragland is a 73 y.o. male.    Chief Complaint: No chief complaint on file.    HPI Patient is a 73-year-old male presenting today following up on his polymyalgia rheumatica, diabetes, BPH.  In regards to his PMR he is following with Rheumatology.  His myalgias are markedly improved.  They are working on tapering down his steroids at this time.  He acknowledges that the steroids have given him some problems with sugars in so they are hopeful there real the minimize the steroid possibly switch to another agent as things improve continue to improve.    He is continuing to struggle with issues with BPH.  He has had treatment with terazosin.  The terazosin is getting side effects at this time.  He states just makes him feel bad when he takes it.  He is wanting to see if we can do something different.  We discussed other medications as well as possibility of laser TURP.  He is very interested in proceeding with the fan noted treatment with the TURP at this point.    His diabetes is running a bit high as result of the steroids.  His A1c has gone up to 8.9.  He has increased his Lantus to 40 units twice daily and his fasting sugars are running typically below 130 but he is getting up to around 200 in the evenings.  Discussed the addition of Trulicity and he is interested in giving that a try as well.    Review of Systems   Constitutional: Negative for fever and unexpected weight change.   Respiratory: Negative for cough, shortness of breath and wheezing.    Cardiovascular: Negative for chest pain and palpitations.   Gastrointestinal: Negative for constipation, diarrhea, nausea and vomiting.   Genitourinary: Negative for dysuria and hematuria.   Musculoskeletal: Positive for myalgias.       Objective:   /62 (BP Location: Right arm, Patient Position: Sitting, BP Method: Large (Manual))   Pulse 75   Temp 97.1 °F (36.2 °C) (Tympanic)   Resp 18   Ht 6' (1.829 m)   Wt 123.1 kg (271  lb 6.2 oz)   SpO2 96%   BMI 36.81 kg/m²      Physical Exam   Constitutional: He appears well-developed and well-nourished.   HENT:   Head: Normocephalic and atraumatic.   Eyes: Pupils are equal, round, and reactive to light.   Neck: Neck supple. No thyromegaly present.   Cardiovascular: Normal rate, regular rhythm and normal heart sounds. Exam reveals no gallop and no friction rub.   No murmur heard.  Pulmonary/Chest: Breath sounds normal. He has no wheezes. He has no rales.   Abdominal: Soft. Bowel sounds are normal. He exhibits no distension. There is no tenderness.   Vitals reviewed.      Lab Visit on 07/03/2019   Component Date Value    Hemoglobin A1C 07/03/2019 8.9*    Estimated Avg Glucose 07/03/2019 209*    Cholesterol 07/03/2019 219*    Triglycerides 07/03/2019 105     HDL 07/03/2019 52     LDL Cholesterol 07/03/2019 146.0     Hdl/Cholesterol Ratio 07/03/2019 23.7     Total Cholesterol/HDL Ra* 07/03/2019 4.2     Non-HDL Cholesterol 07/03/2019 167     Sodium 07/03/2019 142     Potassium 07/03/2019 3.8     Chloride 07/03/2019 101     CO2 07/03/2019 28     Glucose 07/03/2019 108     BUN, Bld 07/03/2019 46*    Creatinine 07/03/2019 1.9*    Calcium 07/03/2019 10.0     Anion Gap 07/03/2019 13     eGFR if  07/03/2019 39.6*    eGFR if non African Amer* 07/03/2019 34.2*   Lab Visit on 06/24/2019   Component Date Value    CCP Antibodies 06/24/2019 <0.5     CPK 06/24/2019 36     CRP 06/24/2019 4.3     Sed Rate 06/24/2019 31*    Hepatitis B Surface Ag 06/24/2019 Negative     Hep B C IgM 06/24/2019 Negative     Hep A IgM 06/24/2019 Negative     Hepatitis C Ab 06/24/2019 Negative     Vit D, 25-Hydroxy 06/24/2019 33     HMGCR Antibody 06/24/2019 <3        Assessment:       1. PMR (polymyalgia rheumatica)    2. Benign prostatic hyperplasia with urinary frequency    3. Type 2 diabetes mellitus with stage 3 chronic kidney disease, with long-term current use of insulin         Plan:   No problem-specific Assessment & Plan notes found for this encounter.    PMR (polymyalgia rheumatica)  Comments:  Following with Dr. Obrien.  Myalgias improved.  Continue with Dr. Obrien.  Hopefully, we can decrease the steroid load.    Benign prostatic hyperplasia with urinary frequency  Comments:  Urology consult regarding turp. trial of flomax in place of the terazosin.  Orders:  -     tamsulosin (FLOMAX) 0.4 mg Cap; Take 1 capsule (0.4 mg total) by mouth once daily.  Dispense: 30 capsule; Refill: 11  -     Ambulatory consult to Urology    Type 2 diabetes mellitus with stage 3 chronic kidney disease, with long-term current use of insulin  Comments:  Continue insulin.  add trulicity 0.75mg once weekly. follow up with Yael in 2 weeks.  Orders:  -     insulin detemir U-100 (LEVEMIR FLEXTOUCH U-100 INSULN) 100 unit/mL (3 mL) SubQ InPn pen; Inject 40 Units into the skin 2 (two) times daily.  Dispense: 72 mL; Refill: 3  -     dulaglutide (TRULICITY) 0.75 mg/0.5 mL PnIj; Inject 0.5 mLs (0.75 mg total) into the skin every 7 days.  Dispense: 2 mL; Refill: 11      Patient will be seen in follow-up in 3 weeks to assess how he has responded to the Trulicity to see if he is tolerating the Flomax better.    Follow up in about 3 weeks (around 7/29/2019) for DM, with Yael Youssef PA-C.

## 2019-07-11 NOTE — PROGRESS NOTES
Last 5 Patient Entered Readings                                      Current 30 Day Average: 160/76     Recent Readings 7/9/2019 7/9/2019 6/26/2019 6/26/2019 6/24/2019    SBP (mmHg) 156 156 160 170 163    DBP (mmHg) 71 77 72 79 77    Pulse 76 70 61 60 54            Digital Medicine: Health  Follow Up    Unable to LVM to follow up with  Sherif BECKHAM Ellyn.  Current BP average 160/76 mmHg is not at goal, <130/80 mmHg.  WCB in 1 week.

## 2019-07-18 ENCOUNTER — TELEPHONE (OUTPATIENT)
Dept: UROLOGY | Facility: CLINIC | Age: 74
End: 2019-07-18

## 2019-07-19 NOTE — PROGRESS NOTES
Last 5 Patient Entered Readings                                      Current 30 Day Average: 154/74     Recent Readings 7/16/2019 7/15/2019 7/9/2019 7/9/2019 6/26/2019    SBP (mmHg) 148 142 156 156 160    DBP (mmHg) 70 69 71 77 72    Pulse 81 72 76 70 61          Digital Medicine: Health  Follow Up    Called pt for f/u. Pt reports charging device and continues to note a significant difference in digital readings and clinic readings. Pt states that he has been having other health issues that are causing a significant amount of stress. No change in diet or exercise reported at this time. Recommended that pt bring digital device to next office visit and to [facility] if readings continue to be >10-15 mmHg different. Pt verbalized understanding.     Follow up with Mr. Sherif Ragland completed. No further questions or concerns. Will continue to follow up to achieve health goals.

## 2019-07-26 NOTE — PROGRESS NOTES
Last 5 Patient Entered Readings                                      Current 30 Day Average: 150/75     Recent Readings 7/25/2019 7/25/2019 7/25/2019 7/25/2019 7/25/2019    SBP (mmHg) 143 160 143 160 153    DBP (mmHg) 78 87 59 81 96    Pulse 90 89 79 81 80        HPI:  Called patient to follow up on his elevated BP. Patient does not trust the cuff and feels these readings aren't accurate.  Patient endorses adherence to medication regimen.   Patient denies hypotensive s/sx (lightheadedness, dizziness, nausea, fatigue); patient denies hypertensive s/sx (SOB, CP, severe headaches, changes in vision). Instructed patient to seek medical care if BP > 180/110 and is accompanied by hypertensive s/sx associated, patient confirms understanding.     Assessment:  Reviewed recent readings. Per 2017 ACC/ AHA HTN guidelines (goal of BP < 130/80), current 30-day average needs to be addressed more thoroughly today.     Plan:  Continue current medication regimen.   Patient is going on Tuesday for a BP follow-up appointment and will bring your blood pressure cuff with him for comparison.  I will continue to monitor regularly and will follow-up in 1 week, sooner if blood pressure begins to trend upward or downward.     Current medication regimen:  Hypertension Medications             amLODIPine (NORVASC) 5 MG tablet Take 5 mg by mouth 2 (two) times daily.    furosemide (LASIX) 40 MG tablet Take 1 tablet (40 mg total) by mouth once daily.    metoprolol succinate (TOPROL-XL) 100 MG 24 hr tablet Take 1 tablet (100 mg total) by mouth 2 (two) times daily.    valsartan-hydrochlorothiazide (DIOVAN-HCT) 320-25 mg per tablet Take 1 tablet by mouth once daily.          Patient denies having questions or concerns. Patient has my contact information and knows to call with any concerns or clinical changes.

## 2019-07-30 ENCOUNTER — OFFICE VISIT (OUTPATIENT)
Dept: INTERNAL MEDICINE | Facility: CLINIC | Age: 74
End: 2019-07-30
Payer: MEDICARE

## 2019-07-30 VITALS
BODY MASS INDEX: 39.26 KG/M2 | TEMPERATURE: 98 F | HEART RATE: 76 BPM | SYSTOLIC BLOOD PRESSURE: 128 MMHG | WEIGHT: 289.88 LBS | HEIGHT: 72 IN | DIASTOLIC BLOOD PRESSURE: 78 MMHG

## 2019-07-30 DIAGNOSIS — N18.30 STAGE 3 CHRONIC KIDNEY DISEASE: Primary | ICD-10-CM

## 2019-07-30 DIAGNOSIS — Z79.4 TYPE 2 DIABETES MELLITUS WITH STAGE 3 CHRONIC KIDNEY DISEASE, WITH LONG-TERM CURRENT USE OF INSULIN: ICD-10-CM

## 2019-07-30 DIAGNOSIS — T38.0X5S STEROID SIDE EFFECTS, SEQUELA: ICD-10-CM

## 2019-07-30 DIAGNOSIS — E11.22 TYPE 2 DIABETES MELLITUS WITH STAGE 3 CHRONIC KIDNEY DISEASE, WITH LONG-TERM CURRENT USE OF INSULIN: ICD-10-CM

## 2019-07-30 DIAGNOSIS — N18.30 TYPE 2 DIABETES MELLITUS WITH STAGE 3 CHRONIC KIDNEY DISEASE, WITH LONG-TERM CURRENT USE OF INSULIN: ICD-10-CM

## 2019-07-30 PROCEDURE — 1101F PR PT FALLS ASSESS DOC 0-1 FALLS W/OUT INJ PAST YR: ICD-10-PCS | Mod: CPTII,S$GLB,, | Performed by: PHYSICIAN ASSISTANT

## 2019-07-30 PROCEDURE — 3045F PR MOST RECENT HEMOGLOBIN A1C LEVEL 7.0-9.0%: CPT | Mod: CPTII,S$GLB,, | Performed by: PHYSICIAN ASSISTANT

## 2019-07-30 PROCEDURE — 99999 PR PBB SHADOW E&M-EST. PATIENT-LVL IV: ICD-10-PCS | Mod: PBBFAC,,, | Performed by: PHYSICIAN ASSISTANT

## 2019-07-30 PROCEDURE — 3045F PR MOST RECENT HEMOGLOBIN A1C LEVEL 7.0-9.0%: ICD-10-PCS | Mod: CPTII,S$GLB,, | Performed by: PHYSICIAN ASSISTANT

## 2019-07-30 PROCEDURE — 1101F PT FALLS ASSESS-DOCD LE1/YR: CPT | Mod: CPTII,S$GLB,, | Performed by: PHYSICIAN ASSISTANT

## 2019-07-30 PROCEDURE — 3078F DIAST BP <80 MM HG: CPT | Mod: CPTII,S$GLB,, | Performed by: PHYSICIAN ASSISTANT

## 2019-07-30 PROCEDURE — 3074F PR MOST RECENT SYSTOLIC BLOOD PRESSURE < 130 MM HG: ICD-10-PCS | Mod: CPTII,S$GLB,, | Performed by: PHYSICIAN ASSISTANT

## 2019-07-30 PROCEDURE — 99999 PR PBB SHADOW E&M-EST. PATIENT-LVL IV: CPT | Mod: PBBFAC,,, | Performed by: PHYSICIAN ASSISTANT

## 2019-07-30 PROCEDURE — 99213 OFFICE O/P EST LOW 20 MIN: CPT | Mod: S$GLB,,, | Performed by: PHYSICIAN ASSISTANT

## 2019-07-30 PROCEDURE — 3078F PR MOST RECENT DIASTOLIC BLOOD PRESSURE < 80 MM HG: ICD-10-PCS | Mod: CPTII,S$GLB,, | Performed by: PHYSICIAN ASSISTANT

## 2019-07-30 PROCEDURE — 99213 PR OFFICE/OUTPT VISIT, EST, LEVL III, 20-29 MIN: ICD-10-PCS | Mod: S$GLB,,, | Performed by: PHYSICIAN ASSISTANT

## 2019-07-30 PROCEDURE — 3074F SYST BP LT 130 MM HG: CPT | Mod: CPTII,S$GLB,, | Performed by: PHYSICIAN ASSISTANT

## 2019-07-30 NOTE — PROGRESS NOTES
"Subjective:       Patient ID: Sherif Ragland is a 73 y.o. male.    Chief Complaint: Follow-up    HPI  Patient comes today for follow up on his DM  On steroids, tapering down   Was on for PMR    arthralgias have improved.     Brings sugar numbers and they are somewhat improved since his medication were adjusted       Health Maintenance Due   Topic Date Due    TETANUS VACCINE  10/28/1963    Foot Exam  12/26/2018       Past Medical History:   Diagnosis Date    Diabetes mellitus, type 2 1997    Hyperlipidemia     Hypertension     Hypogonadism in male     Renal insufficiency     Tubular adenoma 10/2017       Current Outpatient Medications   Medication Sig Dispense Refill    amLODIPine (NORVASC) 5 MG tablet Take 5 mg by mouth 2 (two) times daily.      blood sugar diagnostic Strp To check BG 2 times daily, to use with insurance preferred meter 100 each 11    dulaglutide (TRULICITY) 0.75 mg/0.5 mL PnIj Inject 0.5 mLs (0.75 mg total) into the skin every 7 days. 2 mL 11    furosemide (LASIX) 40 MG tablet Take 1 tablet (40 mg total) by mouth once daily. 60 tablet 11    insulin detemir U-100 (LEVEMIR FLEXTOUCH U-100 INSULN) 100 unit/mL (3 mL) SubQ InPn pen Inject 40 Units into the skin 2 (two) times daily. 72 mL 3    lancets Misc To check BG 2 times daily, to use with insurance preferred meter 100 each 11    metoprolol succinate (TOPROL-XL) 100 MG 24 hr tablet Take 1 tablet (100 mg total) by mouth 2 (two) times daily. 60 tablet 11    papaverine 30 mg/mL injection Inject 0.3 ml of trimix solution prn ED max once daily  Prepare 10ml of trimix solution containing:    Papaverine 30mg/ml    Phentolamine 1 mg/ml    Alprostadil 10mcg/ml  Dispense 10ml per refill  Qs syringes 1cc/30g/0.5" and alcohol swabs dispense as needed for Intracavernosal injection 10 mL 5    potassium chloride SA (K-DUR,KLOR-CON) 20 MEQ tablet Take 1 tablet (20 mEq total) by mouth 2 (two) times daily. 30 tablet 11    predniSONE (DELTASONE) 1 " MG tablet Take 4 tablets (4 mg total) by mouth once daily. 150 tablet 3    predniSONE (DELTASONE) 5 MG tablet Take 1 tablet (5 mg total) by mouth once daily. 100 tablet 1    ranitidine (ZANTAC) 150 MG tablet TAKE 1 TABLET BY MOUTH TWICE DAILY 60 tablet 0    tamsulosin (FLOMAX) 0.4 mg Cap Take 1 capsule (0.4 mg total) by mouth once daily. 30 capsule 11    traZODone (DESYREL) 50 MG tablet Take 0.5 tablets (25 mg total) by mouth nightly as needed for Insomnia. 30 tablet 3    valsartan-hydrochlorothiazide (DIOVAN-HCT) 320-25 mg per tablet Take 1 tablet by mouth once daily. 90 tablet 3    blood-glucose meter kit To check BG two times daily, to use with insurance preferred meter 1 each 0    pantoprazole (PROTONIX) 40 MG tablet Take 1 tablet (40 mg total) by mouth 2 (two) times daily before meals. 60 tablet 2    silodosin (RAPAFLO) 8 mg Cap capsule Take 1 capsule (8 mg total) by mouth once daily. 30 capsule 11     No current facility-administered medications for this visit.        Review of Systems   Constitutional: Negative for fatigue, fever and unexpected weight change.   HENT: Negative for sore throat and trouble swallowing.    Respiratory: Negative for cough and shortness of breath.    Cardiovascular: Negative for chest pain.   Gastrointestinal: Negative for abdominal pain.   Musculoskeletal: Positive for arthralgias.   Skin: Negative for color change, pallor and rash.   Hematological: Negative for adenopathy. Does not bruise/bleed easily.       Objective:   /78   Pulse 76   Temp 97.7 °F (36.5 °C) (Oral)   Ht 6' (1.829 m)   Wt 131.5 kg (289 lb 14.5 oz)   BMI 39.32 kg/m²      Physical Exam   Constitutional: He is oriented to person, place, and time. He appears well-developed and well-nourished. No distress.   HENT:   Head: Normocephalic and atraumatic.   Right Ear: External ear normal.   Left Ear: External ear normal.   Pulmonary/Chest: Effort normal and breath sounds normal.   Musculoskeletal:  Normal range of motion.   Neurological: He is alert and oriented to person, place, and time.         Lab Results   Component Value Date    WBC 6.67 01/04/2019    HGB 12.0 (L) 01/04/2019    HCT 35.9 (L) 05/14/2019     01/04/2019    CHOL 219 (H) 07/03/2019    TRIG 105 07/03/2019    HDL 52 07/03/2019    ALT 15 05/14/2019    AST 17 05/14/2019     07/03/2019    K 3.8 07/03/2019     07/03/2019    CREATININE 1.9 (H) 07/03/2019    BUN 46 (H) 07/03/2019    CO2 28 07/03/2019    TSH 2.302 01/04/2019    PSA 3.2 09/19/2018    INR 1.0 01/04/2019    HGBA1C 8.9 (H) 07/03/2019       Assessment:       1. Stage 3 chronic kidney disease    2. Type 2 diabetes mellitus with stage 3 chronic kidney disease, with long-term current use of insulin    3. Steroid side effects, sequela        Plan:   Stage 3 chronic kidney disease  -     Basic metabolic panel; Future; Expected date: 08/30/2019    Type 2 diabetes mellitus with stage 3 chronic kidney disease, with long-term current use of insulin    Steroid side effects, sequela      Recheck renal function 1 month   Sugars improving   Should be off of steroid soon

## 2019-07-31 ENCOUNTER — OFFICE VISIT (OUTPATIENT)
Dept: UROLOGY | Facility: CLINIC | Age: 74
End: 2019-07-31
Payer: MEDICARE

## 2019-07-31 VITALS
HEIGHT: 72 IN | SYSTOLIC BLOOD PRESSURE: 142 MMHG | BODY MASS INDEX: 39.46 KG/M2 | DIASTOLIC BLOOD PRESSURE: 84 MMHG | WEIGHT: 291.31 LBS

## 2019-07-31 DIAGNOSIS — N52.9 ERECTILE DYSFUNCTION, UNSPECIFIED ERECTILE DYSFUNCTION TYPE: ICD-10-CM

## 2019-07-31 DIAGNOSIS — N40.0 BENIGN PROSTATIC HYPERPLASIA, UNSPECIFIED WHETHER LOWER URINARY TRACT SYMPTOMS PRESENT: Primary | ICD-10-CM

## 2019-07-31 LAB
BILIRUB SERPL-MCNC: NORMAL MG/DL
BLOOD URINE, POC: NORMAL
COLOR, POC UA: YELLOW
GLUCOSE UR QL STRIP: NORMAL
KETONES UR QL STRIP: NORMAL
LEUKOCYTE ESTERASE URINE, POC: NORMAL
NITRITE, POC UA: NORMAL
PH, POC UA: 5
PROTEIN, POC: NORMAL
SPECIFIC GRAVITY, POC UA: 1.01
UROBILINOGEN, POC UA: NORMAL

## 2019-07-31 PROCEDURE — 3077F SYST BP >= 140 MM HG: CPT | Mod: CPTII,S$GLB,, | Performed by: UROLOGY

## 2019-07-31 PROCEDURE — 81002 POCT URINE DIPSTICK WITHOUT MICROSCOPE: ICD-10-PCS | Mod: S$GLB,,, | Performed by: UROLOGY

## 2019-07-31 PROCEDURE — 99999 PR PBB SHADOW E&M-EST. PATIENT-LVL III: CPT | Mod: PBBFAC,,, | Performed by: UROLOGY

## 2019-07-31 PROCEDURE — 81002 URINALYSIS NONAUTO W/O SCOPE: CPT | Mod: S$GLB,,, | Performed by: UROLOGY

## 2019-07-31 PROCEDURE — 3079F DIAST BP 80-89 MM HG: CPT | Mod: CPTII,S$GLB,, | Performed by: UROLOGY

## 2019-07-31 PROCEDURE — 99214 PR OFFICE/OUTPT VISIT, EST, LEVL IV, 30-39 MIN: ICD-10-PCS | Mod: 25,S$GLB,, | Performed by: UROLOGY

## 2019-07-31 PROCEDURE — 1101F PT FALLS ASSESS-DOCD LE1/YR: CPT | Mod: CPTII,S$GLB,, | Performed by: UROLOGY

## 2019-07-31 PROCEDURE — 99214 OFFICE O/P EST MOD 30 MIN: CPT | Mod: 25,S$GLB,, | Performed by: UROLOGY

## 2019-07-31 PROCEDURE — 1101F PR PT FALLS ASSESS DOC 0-1 FALLS W/OUT INJ PAST YR: ICD-10-PCS | Mod: CPTII,S$GLB,, | Performed by: UROLOGY

## 2019-07-31 PROCEDURE — 3079F PR MOST RECENT DIASTOLIC BLOOD PRESSURE 80-89 MM HG: ICD-10-PCS | Mod: CPTII,S$GLB,, | Performed by: UROLOGY

## 2019-07-31 PROCEDURE — 3077F PR MOST RECENT SYSTOLIC BLOOD PRESSURE >= 140 MM HG: ICD-10-PCS | Mod: CPTII,S$GLB,, | Performed by: UROLOGY

## 2019-07-31 PROCEDURE — 99999 PR PBB SHADOW E&M-EST. PATIENT-LVL III: ICD-10-PCS | Mod: PBBFAC,,, | Performed by: UROLOGY

## 2019-07-31 RX ORDER — SILODOSIN 8 MG/1
8 CAPSULE ORAL DAILY
Qty: 30 CAPSULE | Refills: 11 | Status: SHIPPED | OUTPATIENT
Start: 2019-07-31 | End: 2019-09-16
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
## 2019-07-31 NOTE — LETTER
July 31, 2019      Tobias Fox MD  [address] [facility]  [address]  [place] [address]           [facility]  [address] [place] [id number]  Phone: [phone]  Fax: [phone]          Patient: Sherif Ragland   MR Number: [id number]   YOB: 1945   Date of Visit: 7/31/2019       Dear Dr. Tobias Fox:    Thank you for referring Sherif Ragland to me for evaluation. Attached you will find relevant portions of my assessment and plan of care.    If you have questions, please do not hesitate to call me. I look forward to following Sherif Ragland along with you.    Sincerely,    Mir Hale IV, MD    Enclosure  CC:  No Recipients    If you would like to receive this communication electronically, please contact [email or web address] or [phone] to request more information on [employer] access.    For providers and/or their staff who would like to refer a patient to [facility], please contact us through our one-stop-shop provider referral line, [facility] [place], at [phone].    If you feel you have received this communication in error or would no longer like to receive these types of communications, please e-mail [email or web address]

## 2019-08-02 ENCOUNTER — PATIENT OUTREACH (OUTPATIENT)
Dept: OTHER | Facility: OTHER | Age: 74
End: 2019-08-02

## 2019-08-02 NOTE — PROGRESS NOTES
Last 5 Patient Entered Readings                                      Current 30 Day Average: 152/76     Recent Readings 7/30/2019 7/29/2019 7/25/2019 7/25/2019 7/25/2019    SBP (mmHg) 164 161 143 160 143    DBP (mmHg) 84 80 78 87 59    Pulse 81 77 90 89 79      LMFCB to discuss his upward trend in BP and need to assess how his BP cuff compared to the office visit. Recent labs showed hypokalemia.

## 2019-08-12 ENCOUNTER — PATIENT MESSAGE (OUTPATIENT)
Dept: UROLOGY | Facility: CLINIC | Age: 74
End: 2019-08-12

## 2019-08-13 RX ORDER — PAPAVERINE HYDROCHLORIDE 30 MG/ML
INJECTION INTRAMUSCULAR; INTRAVENOUS
Qty: 10 ML | Refills: 5 | Status: SHIPPED | OUTPATIENT
Start: 2019-08-13 | End: 2019-08-15 | Stop reason: SDUPTHER

## 2019-08-14 ENCOUNTER — PATIENT MESSAGE (OUTPATIENT)
Dept: UROLOGY | Facility: CLINIC | Age: 74
End: 2019-08-14

## 2019-08-15 RX ORDER — PAPAVERINE HYDROCHLORIDE 30 MG/ML
INJECTION INTRAMUSCULAR; INTRAVENOUS
Qty: 10 ML | Refills: 5 | Status: SHIPPED | OUTPATIENT
Start: 2019-08-15 | End: 2020-06-30

## 2019-08-15 RX ORDER — PAPAVERINE HYDROCHLORIDE 30 MG/ML
INJECTION INTRAMUSCULAR; INTRAVENOUS
Qty: 10 ML | Refills: 5 | Status: CANCELLED | OUTPATIENT
Start: 2019-08-15

## 2019-08-16 ENCOUNTER — PATIENT OUTREACH (OUTPATIENT)
Dept: OTHER | Facility: OTHER | Age: 74
End: 2019-08-16

## 2019-08-16 ENCOUNTER — PATIENT MESSAGE (OUTPATIENT)
Dept: UROLOGY | Facility: CLINIC | Age: 74
End: 2019-08-16

## 2019-08-16 NOTE — PROGRESS NOTES
Last 5 Patient Entered Readings                                      Current 30 Day Average: 153/77     Recent Readings 8/12/2019 8/3/2019 7/30/2019 7/29/2019 7/25/2019    SBP (mmHg) 163 145 164 161 143    DBP (mmHg) 72 76 84 80 78    Pulse 72 76 81 77 90            Digital Medicine: Health  Follow Up    Left voicemail to follow up with Mr. Sherif BHAVANI Ragland.  Current BP average 153/77 mmHg is not at goal, <130/80 mmHg.  WCB in 2 weeks.    9/3 - Pt seen in urgent care today. Plan to f/u later this week.

## 2019-08-27 ENCOUNTER — TELEPHONE (OUTPATIENT)
Dept: UROLOGY | Facility: CLINIC | Age: 74
End: 2019-08-27

## 2019-08-27 ENCOUNTER — PATIENT MESSAGE (OUTPATIENT)
Dept: UROLOGY | Facility: CLINIC | Age: 74
End: 2019-08-27

## 2019-08-27 DIAGNOSIS — N40.0 BENIGN PROSTATIC HYPERPLASIA, UNSPECIFIED WHETHER LOWER URINARY TRACT SYMPTOMS PRESENT: Primary | ICD-10-CM

## 2019-08-27 NOTE — TELEPHONE ENCOUNTER
Received msg from pt regarding sleep study; I reached out to the sleep lab and was told that once preservice receives an authorization, it will then go into a work que and pt will be contacted.  Contacted pt and informed him of such.

## 2019-08-30 ENCOUNTER — LAB VISIT (OUTPATIENT)
Dept: LAB | Facility: HOSPITAL | Age: 74
End: 2019-08-30
Attending: PHYSICIAN ASSISTANT
Payer: MEDICARE

## 2019-08-30 DIAGNOSIS — N18.30 STAGE 3 CHRONIC KIDNEY DISEASE: ICD-10-CM

## 2019-08-30 LAB
ANION GAP SERPL CALC-SCNC: 15 MMOL/L (ref 8–16)
BUN SERPL-MCNC: 31 MG/DL (ref 8–23)
CALCIUM SERPL-MCNC: 10 MG/DL (ref 8.7–10.5)
CHLORIDE SERPL-SCNC: 99 MMOL/L (ref 95–110)
CO2 SERPL-SCNC: 30 MMOL/L (ref 23–29)
CREAT SERPL-MCNC: 1.9 MG/DL (ref 0.5–1.4)
EST. GFR  (AFRICAN AMERICAN): 39.6 ML/MIN/1.73 M^2
EST. GFR  (NON AFRICAN AMERICAN): 34.2 ML/MIN/1.73 M^2
GLUCOSE SERPL-MCNC: 208 MG/DL (ref 70–110)
POTASSIUM SERPL-SCNC: 3.3 MMOL/L (ref 3.5–5.1)
SODIUM SERPL-SCNC: 144 MMOL/L (ref 136–145)

## 2019-08-30 PROCEDURE — 36415 COLL VENOUS BLD VENIPUNCTURE: CPT | Mod: PO

## 2019-08-30 PROCEDURE — 80048 BASIC METABOLIC PNL TOTAL CA: CPT

## 2019-09-03 ENCOUNTER — OFFICE VISIT (OUTPATIENT)
Dept: URGENT CARE | Facility: CLINIC | Age: 74
End: 2019-09-03
Payer: MEDICARE

## 2019-09-03 ENCOUNTER — HOSPITAL ENCOUNTER (OUTPATIENT)
Dept: RADIOLOGY | Facility: HOSPITAL | Age: 74
Discharge: HOME | End: 2019-09-03
Attending: NURSE PRACTITIONER
Payer: MEDICARE

## 2019-09-03 VITALS
OXYGEN SATURATION: 95 % | TEMPERATURE: 99 F | BODY MASS INDEX: 39.56 KG/M2 | DIASTOLIC BLOOD PRESSURE: 60 MMHG | SYSTOLIC BLOOD PRESSURE: 110 MMHG | HEART RATE: 86 BPM | WEIGHT: 291.69 LBS

## 2019-09-03 DIAGNOSIS — M25.512 ACUTE PAIN OF LEFT SHOULDER: ICD-10-CM

## 2019-09-03 DIAGNOSIS — M25.512 ACUTE PAIN OF LEFT SHOULDER: Primary | ICD-10-CM

## 2019-09-03 PROCEDURE — 96372 PR INJECTION,THERAP/PROPH/DIAG2ST, IM OR SUBCUT: ICD-10-PCS | Mod: S$GLB,,, | Performed by: NURSE PRACTITIONER

## 2019-09-03 PROCEDURE — 3074F SYST BP LT 130 MM HG: CPT | Mod: CPTII,S$GLB,, | Performed by: NURSE PRACTITIONER

## 2019-09-03 PROCEDURE — 73030 XR SHOULDER COMPLETE 2 OR MORE VIEWS LEFT: ICD-10-PCS | Mod: 26,LT,, | Performed by: RADIOLOGY

## 2019-09-03 PROCEDURE — 3074F PR MOST RECENT SYSTOLIC BLOOD PRESSURE < 130 MM HG: ICD-10-PCS | Mod: CPTII,S$GLB,, | Performed by: NURSE PRACTITIONER

## 2019-09-03 PROCEDURE — 3078F PR MOST RECENT DIASTOLIC BLOOD PRESSURE < 80 MM HG: ICD-10-PCS | Mod: CPTII,S$GLB,, | Performed by: NURSE PRACTITIONER

## 2019-09-03 PROCEDURE — 99214 PR OFFICE/OUTPT VISIT, EST, LEVL IV, 30-39 MIN: ICD-10-PCS | Mod: 25,S$GLB,, | Performed by: NURSE PRACTITIONER

## 2019-09-03 PROCEDURE — 99214 OFFICE O/P EST MOD 30 MIN: CPT | Mod: 25,S$GLB,, | Performed by: NURSE PRACTITIONER

## 2019-09-03 PROCEDURE — 1101F PT FALLS ASSESS-DOCD LE1/YR: CPT | Mod: CPTII,S$GLB,, | Performed by: NURSE PRACTITIONER

## 2019-09-03 PROCEDURE — 3078F DIAST BP <80 MM HG: CPT | Mod: CPTII,S$GLB,, | Performed by: NURSE PRACTITIONER

## 2019-09-03 PROCEDURE — 73030 X-RAY EXAM OF SHOULDER: CPT | Mod: 26,LT,, | Performed by: RADIOLOGY

## 2019-09-03 PROCEDURE — 99999 PR PBB SHADOW E&M-EST. PATIENT-LVL V: CPT | Mod: PBBFAC,,, | Performed by: NURSE PRACTITIONER

## 2019-09-03 PROCEDURE — 73030 X-RAY EXAM OF SHOULDER: CPT | Mod: TC,FY,PO,LT

## 2019-09-03 PROCEDURE — 96372 THER/PROPH/DIAG INJ SC/IM: CPT | Mod: S$GLB,,, | Performed by: NURSE PRACTITIONER

## 2019-09-03 PROCEDURE — 99999 PR PBB SHADOW E&M-EST. PATIENT-LVL V: ICD-10-PCS | Mod: PBBFAC,,, | Performed by: NURSE PRACTITIONER

## 2019-09-03 PROCEDURE — 1101F PR PT FALLS ASSESS DOC 0-1 FALLS W/OUT INJ PAST YR: ICD-10-PCS | Mod: CPTII,S$GLB,, | Performed by: NURSE PRACTITIONER

## 2019-09-03 RX ORDER — KETOROLAC TROMETHAMINE 30 MG/ML
30 INJECTION, SOLUTION INTRAMUSCULAR; INTRAVENOUS
Status: COMPLETED | OUTPATIENT
Start: 2019-09-03 | End: 2019-09-03

## 2019-09-03 RX ADMIN — KETOROLAC TROMETHAMINE 30 MG: 30 INJECTION, SOLUTION INTRAMUSCULAR; INTRAVENOUS at 10:09

## 2019-09-03 NOTE — PATIENT INSTRUCTIONS
Arthralgia    Arthralgia is the term for pain in or around the joint. It is a symptom, not a disease. This pain may involve one or more joints. In some cases, the pain moves from joint to joint.  There are many causes for joint pain. These include:  · Injury  · Osteoarthritis (wearing out of the joint surface)  · Gout (inflammation of the joint due to crystals in the joint fluid)  · Infection inside the joint    · Bursitis (inflammation of the fluid-filled sacs around the joint)  · Autoimmune disorders such as rheumatoid arthritis or lupus  · Tendonitis (inflammation of chords that attach muscle to bone)  Home care  · Rest the involved joint(s) until your symptoms improve.   · You may be prescribed pain medicine. If none is prescribed, you may use acetaminophen or ibuprofen to control pain and inflammation.  Follow-up care  Follow up with your healthcare provider or as advised.  When to seek medical advice  Contact your healthcare provider right away if any of the following occurs:  · Pain, swelling, or redness of joint increases  · Pain worsens or recurs after a period of improvement  · Pain moves to other joints  · You cannot bear weight on the affected joint   · You cannot move the affected joint  · Joint appears deformed  · New rash appears  · Fever of 100.4ºF (38ºC) or higher, or as directed by your healthcare provider  Date Last Reviewed: 3/1/2017  © 4096-4004 [employer]. [address] [place], [place] [address]. All rights reserved. This information is not intended as a substitute for professional medical care. Always follow your healthcare professional's instructions.        RICE     Rest an injury, elevate it, and use ice and compression as directed.   RICE stands for rest, ice, compression, and elevation. These can limit pain and swelling after an injury. RICE may be recommended to help treat fractures, sprains, strains, and bruises or bumps.   Home care  The following explain the details  of RICE:  · Rest. Limit the use of the injured body part. This helps prevent further damage to the body part and gives it time to heal. In some cases, you may need a sling, brace, splint, or cast to help keep the body part still until it has healed.  · Ice. Applying ice right after an injury helps relieve pain and swelling. Wrap a bag of ice in a thin towel. Then, place it over the injured area. Do this for 10 to 15 minutes every 3 to 4 hours. Continue for the next 1 to 3 days or until your symptoms improve. Never put ice directly on your skin or ice an area longer than 15 minutes at a time.  · Compression. Putting pressure on an injury helps reduce swelling and provides support. Wrap the injured area firmly with an elastic bandage/wrap. Make sure not to wrap the bandage too tightly or you will cut off blood flow to the injured area. If your bandage loosens, rewrap it.  · Elevation. Keeping an injury raised above the level of your heart reduces swelling, pain, and throbbing. For instance, if you have a broken leg, it may help to rest your leg on several pillows when sitting or lying down. Try to keep the injured area elevated for at least 2 to 3 hours per day.  Follow-up care  Follow up with your healthcare provider, or as advised.  When to seek medical advice  Call your healthcare provider right away if any of these occur:  · Fever of 100.4°F (38°C) or higher, or as directed by your healthcare provider  · Increased pain or swelling in the injured body part  · Injured body part becomes cold, blue, numb, or tingly  · Signs of infection. These include warmth in the skin, redness, drainage, or bad smell coming from the injured body part.  Date Last Reviewed: 1/18/2016  © 4224-1999 [employer]. [address] [place] [address], [place] [address]. All rights reserved. This information is not intended as a substitute for professional medical care. Always follow your healthcare professional's  instructions.        Shoulder Pain with Uncertain Cause  Shoulder pain can have many causes. Pain often comes from the structures that surround the shoulder joint. These are the joint capsule, ligaments, tendons, muscles, and bursa. Pain can also come from cartilage in the joint. Cartilage can become worn out or injured. Its important to know whats causing your pain so the healthcare provider can use the correct treatment. But sometimes its difficult to find the exact cause of shoulder pain. You may need to see a specialist (orthopedist). You may also need special tests such as a CT scan or MRI. The provider may need to use special tools to look inside the joint (arthroscopy).  Shoulder pain can be treated with a sling or a device that keeps your shoulder from moving. You can take an anti-inflammatory medicine such as ibuprofen to ease pain. You may need to do special shoulder exercises. Follow up with a specialist if the pain is severe or doesnt go away after a few weeks.  Home care  Follow these tips when caring for yourself at home:  · If a sling was given to you, leave it in place for the time advised by your healthcare provider. If you arent sure how long to wear it, ask for advice. If the sling becomes loose, adjust it so that your forearm is level with the ground. Your shoulder should feel well supported.  · Put an ice pack on the injured area for 20 minutes every 1 to 2 hours the first day. You can make your own ice pack by putting ice cubes in a plastic bag. Wrap the bag in a thin towel. Continue with ice packs 3 to 4 times a day for the next 2 days. Then use the pack as needed to ease pain and swelling.  · You may use acetaminophen or ibuprofen to control pain, unless another pain medicine was prescribed. If you have chronic liver or kidney disease, talk with your healthcare provider before using these medicines. Also talk with your provider if youve ever had a stomach ulcer or GI  bleeding.  · Shoulder pain may seem worse at night, when there is less to distract you from the pain. If you sleep on your side, try to keep weight off your painful shoulder. Propping pillows behind you may stop you from rolling over onto that shoulder during sleep.   · Shoulder and elbow joints can become stiff if left in a sling for too long. You should start range of motion exercises about 7 to 10 days after the injury. Talk with your provider to find out what type of exercises to do and how soon to start.  · You can take the sling off to shower or bathe.  Follow-up care  Follow up with your healthcare provider if you dont start to get better in the next 5 days.  When to seek medical advice  Call your healthcare provider right away if any of these occur:  · Pain or swelling gets worse or continues for more than a few days  · Your hand or fingers become cold, blue, numb, or tingly  · Large amount of bruising on your shoulder or upper arm  · Difficulty moving your hand or fingers  · Weakness in your hand or fingers  · Your shoulder becomes stiff  · It feels like your shoulder is popping out  · You are less able to do your daily activities  Date Last Reviewed: 10/1/2016  © 1026-1140 [employer]. [address] [place] [address], [place] [address]. All rights reserved. This information is not intended as a substitute for professional medical care. Always follow your healthcare professional's instructions.        Preventing Repetitive Motion Injury: Shoulder    Making changes in how you use your shoulder can lessen your chances of repetitive motion injuries (RMIs). Use your shoulder wisely by following the tips below.  Position yourself well  ?Here are suggestions to prevent RMIs:  · Avoid raising your arms when working above shoulder level.  · Use a stool or stepladder when needed to prevent awkward reaching.  · Keep your work within easy reach. This helps you avoid stretching or twisting your arms and  shoulders.  Vary your tasks  Avoid repetition in the following ways:  · Vary your on-the-job activity to help reduce the risk of RMIs.  · Give your shoulder enough time to rest and recover from stressful tasks.  · If one task causes you to feel pain, stop. Rest your shoulder. Go to another task if possible.  Limit force  Here are tips to reduce strain:  · Ask for help when you need it.  · Limit activities that could strain shoulder muscles and tendons. These include heavy lifting, pushing, and pulling especially overhead.. Get help when needed, or use dollies and wheelbarrows.  · Find the best tools for each activity. The best tool lets you use the least force.  · Rest before repeating a task that requires a lot of force. The more frequent the force, the greater the risk of RMIs.  Date Last Reviewed: 10/14/2015  © 4046-3202 [employer]. [address] [place] [address], [place] [address]. All rights reserved. This information is not intended as a substitute for professional medical care. Always follow your healthcare professional's instructions.

## 2019-09-03 NOTE — PROGRESS NOTES
Subjective:       Patient ID: Sherif Ragland is a 73 y.o. male.    Vitals:  weight is 132.3 kg (291 lb 10.7 oz). His oral temperature is 98.5 °F (36.9 °C). His blood pressure is 110/60 and his pulse is 86. His oxygen saturation is 95%.     Chief Complaint: Shoulder Pain    Shoulder Pain    The pain is present in the left shoulder. This is a new problem. Episode onset: 2 days. There has been no history of extremity trauma. The problem occurs constantly. The problem has been unchanged. Quality: excruciating. The pain is at a severity of 2/10 (10/10 with movement). Associated symptoms include a limited range of motion and stiffness. Pertinent negatives include no joint locking, joint swelling, numbness or tingling. Exacerbated by: movement. He has tried nothing for the symptoms. Family history does not include arthritis. His past medical history is significant for diabetes. There is no history of Injuries to Extremity or migraines.       Musculoskeletal: Positive for pain (left shoulder), joint pain (left shoulder) and abnormal ROM of joint. Negative for trauma and joint swelling.   Neurological: Negative for numbness.       Objective:      Physical Exam   Constitutional: He is oriented to person, place, and time. Vital signs are normal. He appears well-developed and well-nourished. He is cooperative. No distress.   HENT:   Head: Normocephalic.   Right Ear: Hearing and external ear normal.   Left Ear: Hearing and external ear normal.   Neck: Neck supple.   Cardiovascular: Normal rate.   Pulmonary/Chest: Effort normal. No respiratory distress.   Musculoskeletal: He exhibits tenderness (left shoulder).        Left shoulder: He exhibits decreased range of motion, tenderness, bony tenderness and pain. He exhibits no swelling, no effusion, no crepitus, no deformity, no laceration, no spasm, normal pulse and normal strength.   Neurological: He is alert and oriented to person, place, and time.   Skin: Skin is warm and dry.  Capillary refill takes less than 2 seconds. No rash noted.   Nursing note and vitals reviewed.      Assessment:       1. Acute pain of left shoulder        Plan:         Acute pain of left shoulder  -     X-Ray Shoulder 2 or More Views Left; Future; Expected date: 09/03/2019  -     ketorolac injection 30 mg  -     Ambulatory Referral to Orthopedics          PRICE (Protect, Rest, Ice, Compression and Elevate) affected area.  X-ray findings, Degenerative changes, no fracture or dislocation  Alternate heat and cold packs   Take medications as directed.  Follow up with Primary Care Provider/Ortho

## 2019-09-04 ENCOUNTER — TELEPHONE (OUTPATIENT)
Dept: ORTHOPEDICS | Facility: CLINIC | Age: 74
End: 2019-09-04

## 2019-09-05 ENCOUNTER — TELEPHONE (OUTPATIENT)
Dept: ORTHOPEDICS | Facility: CLINIC | Age: 74
End: 2019-09-05

## 2019-09-05 NOTE — PROGRESS NOTES
Last 5 Patient Entered Readings                                      Current 30 Day Average: 163/72     Recent Readings 8/12/2019 8/3/2019 7/30/2019 7/29/2019 7/25/2019    SBP (mmHg) 163 145 164 161 143    DBP (mmHg) 72 76 84 80 78    Pulse 72 76 81 77 90            Digital Medicine: Health  Follow Up    Left voicemail to follow up with Mr. Sherif Ragland.  Current BP average 163/72 mmHg is not at goal, <130/80 mmHg.  WCB in 2 weeks.

## 2019-09-05 NOTE — TELEPHONE ENCOUNTER
Spoke with the patient about their appointment tomorrow with Dr. Paul. Informed the patient that we have to reschedule their appointment for another day due to the fact that Dr. Paul will be out of the office on that day. I got the patient reschedule to see Dr. Paul on 9/11/19 at 8:30. Patient verbalized understanding. Patient was thankful for the call.ASTRID

## 2019-09-10 ENCOUNTER — TELEPHONE (OUTPATIENT)
Dept: ORTHOPEDICS | Facility: CLINIC | Age: 74
End: 2019-09-10

## 2019-09-11 ENCOUNTER — PATIENT MESSAGE (OUTPATIENT)
Dept: INTERNAL MEDICINE | Facility: CLINIC | Age: 74
End: 2019-09-11

## 2019-09-11 ENCOUNTER — PATIENT MESSAGE (OUTPATIENT)
Dept: RHEUMATOLOGY | Facility: CLINIC | Age: 74
End: 2019-09-11

## 2019-09-11 ENCOUNTER — PATIENT MESSAGE (OUTPATIENT)
Dept: UROLOGY | Facility: CLINIC | Age: 74
End: 2019-09-11

## 2019-09-12 ENCOUNTER — TELEPHONE (OUTPATIENT)
Dept: UROLOGY | Facility: CLINIC | Age: 74
End: 2019-09-12

## 2019-09-12 NOTE — TELEPHONE ENCOUNTER
Spoke with pt, explained upcoming Cysto procedure this scheduled for 9/16. Pt verbalized understanding.

## 2019-09-12 NOTE — TELEPHONE ENCOUNTER
----- Message from Joe Falcon sent at 9/12/2019  9:32 AM CDT -----  Contact: pt   Pt would like to go over procedure scheduled for 9/16, pls return call.           ..[phone]

## 2019-09-16 ENCOUNTER — OFFICE VISIT (OUTPATIENT)
Dept: UROLOGY | Facility: CLINIC | Age: 74
End: 2019-09-16
Payer: MEDICARE

## 2019-09-16 VITALS
SYSTOLIC BLOOD PRESSURE: 118 MMHG | DIASTOLIC BLOOD PRESSURE: 52 MMHG | HEIGHT: 72 IN | HEART RATE: 88 BPM | WEIGHT: 291.69 LBS | BODY MASS INDEX: 39.51 KG/M2

## 2019-09-16 DIAGNOSIS — N40.0 BENIGN PROSTATIC HYPERPLASIA, UNSPECIFIED WHETHER LOWER URINARY TRACT SYMPTOMS PRESENT: Primary | ICD-10-CM

## 2019-09-16 LAB
BILIRUB SERPL-MCNC: NORMAL MG/DL
BLOOD URINE, POC: NORMAL
COLOR, POC UA: YELLOW
GLUCOSE UR QL STRIP: 500
KETONES UR QL STRIP: NORMAL
LEUKOCYTE ESTERASE URINE, POC: NORMAL
NITRITE, POC UA: NORMAL
PH, POC UA: 5
PROTEIN, POC: NORMAL
SPECIFIC GRAVITY, POC UA: 1.01
UROBILINOGEN, POC UA: NORMAL

## 2019-09-16 PROCEDURE — 99999 PR PBB SHADOW E&M-EST. PATIENT-LVL III: CPT | Mod: PBBFAC,,, | Performed by: UROLOGY

## 2019-09-16 PROCEDURE — 99999 PR PBB SHADOW E&M-EST. PATIENT-LVL III: ICD-10-PCS | Mod: PBBFAC,,, | Performed by: UROLOGY

## 2019-09-16 PROCEDURE — 81002 POCT URINE DIPSTICK WITHOUT MICROSCOPE: ICD-10-PCS | Mod: S$GLB,,, | Performed by: UROLOGY

## 2019-09-16 PROCEDURE — 99499 NO LOS: ICD-10-PCS | Mod: S$GLB,,, | Performed by: UROLOGY

## 2019-09-16 PROCEDURE — 81002 URINALYSIS NONAUTO W/O SCOPE: CPT | Mod: S$GLB,,, | Performed by: UROLOGY

## 2019-09-16 PROCEDURE — 52000 CYSTOURETHROSCOPY: CPT | Mod: S$GLB,,, | Performed by: UROLOGY

## 2019-09-16 PROCEDURE — 52000 PR CYSTOURETHROSCOPY: ICD-10-PCS | Mod: S$GLB,,, | Performed by: UROLOGY

## 2019-09-16 PROCEDURE — 99499 UNLISTED E&M SERVICE: CPT | Mod: S$GLB,,, | Performed by: UROLOGY
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
## 2019-09-19 ENCOUNTER — OFFICE VISIT (OUTPATIENT)
Dept: INTERNAL MEDICINE | Facility: CLINIC | Age: 74
End: 2019-09-19
Payer: MEDICARE

## 2019-09-19 VITALS
WEIGHT: 293.19 LBS | TEMPERATURE: 98 F | BODY MASS INDEX: 39.71 KG/M2 | HEIGHT: 72 IN | HEART RATE: 88 BPM | SYSTOLIC BLOOD PRESSURE: 134 MMHG | DIASTOLIC BLOOD PRESSURE: 64 MMHG

## 2019-09-19 DIAGNOSIS — N18.30 TYPE 2 DIABETES MELLITUS WITH STAGE 3 CHRONIC KIDNEY DISEASE, WITH LONG-TERM CURRENT USE OF INSULIN: ICD-10-CM

## 2019-09-19 DIAGNOSIS — E11.21 TYPE 2 DIABETES MELLITUS WITH DIABETIC NEPHROPATHY: ICD-10-CM

## 2019-09-19 DIAGNOSIS — R53.83 OTHER FATIGUE: Primary | ICD-10-CM

## 2019-09-19 DIAGNOSIS — E11.22 TYPE 2 DIABETES MELLITUS WITH STAGE 3 CHRONIC KIDNEY DISEASE, WITH LONG-TERM CURRENT USE OF INSULIN: ICD-10-CM

## 2019-09-19 DIAGNOSIS — N18.30 STAGE 3 CHRONIC KIDNEY DISEASE: ICD-10-CM

## 2019-09-19 DIAGNOSIS — Z79.4 TYPE 2 DIABETES MELLITUS WITH STAGE 3 CHRONIC KIDNEY DISEASE, WITH LONG-TERM CURRENT USE OF INSULIN: ICD-10-CM

## 2019-09-19 PROCEDURE — 99213 OFFICE O/P EST LOW 20 MIN: CPT | Mod: S$GLB,,, | Performed by: PHYSICIAN ASSISTANT

## 2019-09-19 PROCEDURE — 99999 PR PBB SHADOW E&M-EST. PATIENT-LVL III: ICD-10-PCS | Mod: PBBFAC,,, | Performed by: PHYSICIAN ASSISTANT

## 2019-09-19 PROCEDURE — 99499 UNLISTED E&M SERVICE: CPT | Mod: S$GLB,,, | Performed by: PHYSICIAN ASSISTANT

## 2019-09-19 PROCEDURE — 99499 RISK ADDL DX/OHS AUDIT: ICD-10-PCS | Mod: S$GLB,,, | Performed by: PHYSICIAN ASSISTANT

## 2019-09-19 PROCEDURE — 99999 PR PBB SHADOW E&M-EST. PATIENT-LVL III: CPT | Mod: PBBFAC,,, | Performed by: PHYSICIAN ASSISTANT

## 2019-09-19 PROCEDURE — 99213 PR OFFICE/OUTPT VISIT, EST, LEVL III, 20-29 MIN: ICD-10-PCS | Mod: S$GLB,,, | Performed by: PHYSICIAN ASSISTANT

## 2019-09-19 RX ORDER — VALSARTAN AND HYDROCHLOROTHIAZIDE 320; 12.5 MG/1; MG/1
1 TABLET, FILM COATED ORAL DAILY
Qty: 90 TABLET | Refills: 3 | Status: SHIPPED | OUTPATIENT
Start: 2019-09-19 | End: 2020-03-19 | Stop reason: SDUPTHER

## 2019-09-19 RX ORDER — INSULIN GLARGINE 100 [IU]/ML
25 INJECTION, SOLUTION SUBCUTANEOUS
COMMUNITY
End: 2020-08-13

## 2019-09-19 NOTE — PROGRESS NOTES
Subjective:       Patient ID: Sherif Ragland is a 73 y.o. male.    Chief Complaint: Diabetes    HPI     Patient comes in today for follow up     He has had a rough go recently with his health.   Since the summer, he has had pain and faitgue.  He was dx with PMR    He also has type II DM, his sugars has been raised since he has been on steroids for PMR      He is now off steroids and has more pain and fatigue     This is affecting his job, feeling exhausted and generally not well    He does have a follow up with RHEUM in a few days   Health Maintenance Due   Topic Date Due    TETANUS VACCINE  10/28/1963    Foot Exam  12/26/2018       Past Medical History:   Diagnosis Date    Diabetes mellitus, type 2 1997    Hyperlipidemia     Hypertension     Hypogonadism in male     Renal insufficiency     Tubular adenoma 10/2017       Current Outpatient Medications   Medication Sig Dispense Refill    amLODIPine (NORVASC) 5 MG tablet Take 5 mg by mouth 2 (two) times daily.      blood sugar diagnostic Strp To check BG 2 times daily, to use with insurance preferred meter 100 each 11    dulaglutide (TRULICITY) 0.75 mg/0.5 mL PnIj Inject 0.5 mLs (0.75 mg total) into the skin every 7 days. 2 mL 11    furosemide (LASIX) 40 MG tablet Take 1 tablet (40 mg total) by mouth once daily. 60 tablet 11    insulin (LANTUS SOLOSTAR U-100 INSULIN) glargine 100 units/mL (3mL) SubQ pen Inject 25 Units into the skin.      insulin detemir U-100 (LEVEMIR FLEXTOUCH U-100 INSULN) 100 unit/mL (3 mL) SubQ InPn pen Inject 40 Units into the skin 2 (two) times daily. 72 mL 3    lancets Misc To check BG 2 times daily, to use with insurance preferred meter 100 each 11    metoprolol succinate (TOPROL-XL) 100 MG 24 hr tablet Take 1 tablet (100 mg total) by mouth 2 (two) times daily. 60 tablet 11    papaverine 30 mg/mL injection Inject 0.3 ml of trimix solution prn ED max once daily  Prepare 10ml of trimix solution containing:    Papaverine  "30mg/ml    Phentolamine 1 mg/ml    Alprostadil 10mcg/ml  Dispense 10ml per refill  Qs syringes 1cc/30g/0.5" and alcohol swabs dispense as needed for Intracavernosal injection 10 mL 5    potassium chloride SA (K-DUR,KLOR-CON) 20 MEQ tablet Take 1 tablet (20 mEq total) by mouth 2 (two) times daily. 30 tablet 11    ranitidine (ZANTAC) 150 MG tablet TAKE 1 TABLET BY MOUTH TWICE DAILY 60 tablet 0    blood-glucose meter kit To check BG two times daily, to use with insurance preferred meter 1 each 0    docusate sodium (COLACE) 100 MG capsule Take 1 capsule (100 mg total) by mouth 2 (two) times daily. 60 capsule 0    oxyCODONE-acetaminophen (PERCOCET) 5-325 mg per tablet Take 1-2 tablets by mouth every 4 (four) hours as needed for Pain. 30 tablet 0    valsartan-hydrochlorothiazide (DIOVAN-HCT) 320-12.5 mg per tablet Take 1 tablet by mouth once daily. 90 tablet 3     No current facility-administered medications for this visit.        Review of Systems   Constitutional: Positive for fatigue. Negative for fever and unexpected weight change.   HENT: Negative for sore throat and trouble swallowing.    Respiratory: Negative for cough and shortness of breath.    Cardiovascular: Negative for chest pain.   Gastrointestinal: Negative for abdominal pain.   Musculoskeletal: Positive for arthralgias and myalgias.   Neurological: Positive for weakness.   Hematological: Negative for adenopathy. Does not bruise/bleed easily.   All other systems reviewed and are negative.      Objective:   /64   Pulse 88   Temp 97.8 °F (36.6 °C) (Oral)   Ht 6' (1.829 m)   Wt 133 kg (293 lb 3.4 oz)   BMI 39.77 kg/m²      Physical Exam   Constitutional: He is oriented to person, place, and time. He appears well-developed. No distress.   Obesity    HENT:   Head: Normocephalic and atraumatic.   Eyes: Pupils are equal, round, and reactive to light. EOM are normal.   Neck: Normal range of motion. Neck supple.   Cardiovascular: Normal rate and " regular rhythm.   Pulmonary/Chest: Effort normal.   Abdominal: Soft.   Musculoskeletal: He exhibits no edema.   Neurological: He is alert and oriented to person, place, and time.   Skin: Capillary refill takes less than 2 seconds.   Psychiatric: He has a normal mood and affect. His behavior is normal.         Lab Results   Component Value Date    WBC 8.63 10/02/2019    HGB 12.8 (L) 10/02/2019    HCT 39.2 (L) 10/02/2019     10/02/2019    CHOL 219 (H) 07/03/2019    TRIG 105 07/03/2019    HDL 52 07/03/2019    ALT 15 05/14/2019    AST 17 05/14/2019     10/02/2019    K 3.9 10/02/2019     10/02/2019    CREATININE 1.8 (H) 10/02/2019    BUN 20 10/02/2019    CO2 30 (H) 10/02/2019    TSH 2.302 01/04/2019    PSA 3.2 09/19/2018    INR 1.0 01/04/2019    HGBA1C 8.9 (H) 07/03/2019       Assessment:       1. Other fatigue    2. Type 2 diabetes mellitus with stage 3 chronic kidney disease, with long-term current use of insulin    3. Type 2 diabetes mellitus with diabetic nephropathy     4. Stage 3 chronic kidney disease        Plan:   Other fatigue  -     Hemoglobin A1c; Future; Expected date: 09/19/2019    Type 2 diabetes mellitus with stage 3 chronic kidney disease, with long-term current use of insulin    Type 2 diabetes mellitus with diabetic nephropathy   -     Hemoglobin A1c; Future; Expected date: 09/19/2019    Stage 3 chronic kidney disease    Other orders  -     valsartan-hydrochlorothiazide (DIOVAN-HCT) 320-12.5 mg per tablet; Take 1 tablet by mouth once daily.  Dispense: 90 tablet; Refill: 3        Patient on 2 duiretics   Stop lasix since has CKD     See RHEUM     And follow up in 2 weeks

## 2019-09-26 ENCOUNTER — TELEPHONE (OUTPATIENT)
Dept: PULMONOLOGY | Facility: CLINIC | Age: 74
End: 2019-09-26

## 2019-10-02 ENCOUNTER — CLINICAL SUPPORT (OUTPATIENT)
Dept: CARDIOLOGY | Facility: CLINIC | Age: 74
End: 2019-10-02
Payer: MEDICARE

## 2019-10-02 ENCOUNTER — LAB VISIT (OUTPATIENT)
Dept: LAB | Facility: HOSPITAL | Age: 74
End: 2019-10-02
Attending: UROLOGY
Payer: MEDICARE

## 2019-10-02 ENCOUNTER — OFFICE VISIT (OUTPATIENT)
Dept: UROLOGY | Facility: CLINIC | Age: 74
End: 2019-10-02
Payer: MEDICARE

## 2019-10-02 VITALS — WEIGHT: 293 LBS | BODY MASS INDEX: 39.74 KG/M2

## 2019-10-02 DIAGNOSIS — Z01.818 PREOP EXAMINATION: ICD-10-CM

## 2019-10-02 DIAGNOSIS — N40.0 BENIGN PROSTATIC HYPERPLASIA, UNSPECIFIED WHETHER LOWER URINARY TRACT SYMPTOMS PRESENT: Primary | ICD-10-CM

## 2019-10-02 DIAGNOSIS — N40.0 BENIGN PROSTATIC HYPERPLASIA, UNSPECIFIED WHETHER LOWER URINARY TRACT SYMPTOMS PRESENT: ICD-10-CM

## 2019-10-02 LAB
ANION GAP SERPL CALC-SCNC: 10 MMOL/L (ref 8–16)
BASOPHILS # BLD AUTO: 0.05 K/UL (ref 0–0.2)
BASOPHILS NFR BLD: 0.6 % (ref 0–1.9)
BUN SERPL-MCNC: 20 MG/DL (ref 8–23)
CALCIUM SERPL-MCNC: 9.8 MG/DL (ref 8.7–10.5)
CHLORIDE SERPL-SCNC: 100 MMOL/L (ref 95–110)
CO2 SERPL-SCNC: 30 MMOL/L (ref 23–29)
CREAT SERPL-MCNC: 1.8 MG/DL (ref 0.5–1.4)
DIFFERENTIAL METHOD: ABNORMAL
EOSINOPHIL # BLD AUTO: 0.2 K/UL (ref 0–0.5)
EOSINOPHIL NFR BLD: 2.7 % (ref 0–8)
ERYTHROCYTE [DISTWIDTH] IN BLOOD BY AUTOMATED COUNT: 13.2 % (ref 11.5–14.5)
EST. GFR  (AFRICAN AMERICAN): 42.2 ML/MIN/1.73 M^2
EST. GFR  (NON AFRICAN AMERICAN): 36.5 ML/MIN/1.73 M^2
GLUCOSE SERPL-MCNC: 263 MG/DL (ref 70–110)
HCT VFR BLD AUTO: 39.2 % (ref 40–54)
HGB BLD-MCNC: 12.8 G/DL (ref 14–18)
IMM GRANULOCYTES # BLD AUTO: 0.06 K/UL (ref 0–0.04)
IMM GRANULOCYTES NFR BLD AUTO: 0.7 % (ref 0–0.5)
LYMPHOCYTES # BLD AUTO: 1.1 K/UL (ref 1–4.8)
LYMPHOCYTES NFR BLD: 12.9 % (ref 18–48)
MCH RBC QN AUTO: 30 PG (ref 27–31)
MCHC RBC AUTO-ENTMCNC: 32.7 G/DL (ref 32–36)
MCV RBC AUTO: 92 FL (ref 82–98)
MONOCYTES # BLD AUTO: 0.9 K/UL (ref 0.3–1)
MONOCYTES NFR BLD: 10.4 % (ref 4–15)
NEUTROPHILS # BLD AUTO: 6.3 K/UL (ref 1.8–7.7)
NEUTROPHILS NFR BLD: 72.7 % (ref 38–73)
NRBC BLD-RTO: 0 /100 WBC
PLATELET # BLD AUTO: 261 K/UL (ref 150–350)
PMV BLD AUTO: 9.7 FL (ref 9.2–12.9)
POTASSIUM SERPL-SCNC: 3.9 MMOL/L (ref 3.5–5.1)
RBC # BLD AUTO: 4.26 M/UL (ref 4.6–6.2)
SODIUM SERPL-SCNC: 140 MMOL/L (ref 136–145)
WBC # BLD AUTO: 8.63 K/UL (ref 3.9–12.7)

## 2019-10-02 PROCEDURE — 85025 COMPLETE CBC W/AUTO DIFF WBC: CPT

## 2019-10-02 PROCEDURE — 36415 COLL VENOUS BLD VENIPUNCTURE: CPT

## 2019-10-02 PROCEDURE — 99214 OFFICE O/P EST MOD 30 MIN: CPT | Mod: 57,S$GLB,, | Performed by: UROLOGY

## 2019-10-02 PROCEDURE — 93010 EKG 12-LEAD: ICD-10-PCS | Mod: S$GLB,,, | Performed by: INTERNAL MEDICINE

## 2019-10-02 PROCEDURE — 1101F PR PT FALLS ASSESS DOC 0-1 FALLS W/OUT INJ PAST YR: ICD-10-PCS | Mod: CPTII,S$GLB,, | Performed by: UROLOGY

## 2019-10-02 PROCEDURE — 93005 ELECTROCARDIOGRAM TRACING: CPT | Mod: S$GLB,,, | Performed by: UROLOGY

## 2019-10-02 PROCEDURE — 93005 EKG 12-LEAD: ICD-10-PCS | Mod: S$GLB,,, | Performed by: UROLOGY

## 2019-10-02 PROCEDURE — 99214 PR OFFICE/OUTPT VISIT, EST, LEVL IV, 30-39 MIN: ICD-10-PCS | Mod: 57,S$GLB,, | Performed by: UROLOGY

## 2019-10-02 PROCEDURE — 99999 PR PBB SHADOW E&M-EST. PATIENT-LVL IV: ICD-10-PCS | Mod: PBBFAC,,, | Performed by: UROLOGY

## 2019-10-02 PROCEDURE — 80048 BASIC METABOLIC PNL TOTAL CA: CPT

## 2019-10-02 PROCEDURE — 93010 ELECTROCARDIOGRAM REPORT: CPT | Mod: S$GLB,,, | Performed by: INTERNAL MEDICINE

## 2019-10-02 PROCEDURE — 1101F PT FALLS ASSESS-DOCD LE1/YR: CPT | Mod: CPTII,S$GLB,, | Performed by: UROLOGY

## 2019-10-02 PROCEDURE — 99999 PR PBB SHADOW E&M-EST. PATIENT-LVL IV: CPT | Mod: PBBFAC,,, | Performed by: UROLOGY
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
## 2019-10-03 ENCOUNTER — OFFICE VISIT (OUTPATIENT)
Dept: PULMONOLOGY | Facility: CLINIC | Age: 74
End: 2019-10-03
Payer: MEDICARE

## 2019-10-03 VITALS
DIASTOLIC BLOOD PRESSURE: 71 MMHG | BODY MASS INDEX: 39.96 KG/M2 | SYSTOLIC BLOOD PRESSURE: 130 MMHG | HEART RATE: 83 BPM | HEIGHT: 72 IN | OXYGEN SATURATION: 93 % | RESPIRATION RATE: 18 BRPM | WEIGHT: 295 LBS

## 2019-10-03 DIAGNOSIS — E66.01 MORBID OBESITY WITH BMI OF 40.0-44.9, ADULT: ICD-10-CM

## 2019-10-03 DIAGNOSIS — R35.1 NOCTURIA ASSOCIATED WITH BENIGN PROSTATIC HYPERPLASIA: ICD-10-CM

## 2019-10-03 DIAGNOSIS — G47.33 OSA (OBSTRUCTIVE SLEEP APNEA): Primary | ICD-10-CM

## 2019-10-03 DIAGNOSIS — N40.1 NOCTURIA ASSOCIATED WITH BENIGN PROSTATIC HYPERPLASIA: ICD-10-CM

## 2019-10-03 PROCEDURE — 99214 PR OFFICE/OUTPT VISIT, EST, LEVL IV, 30-39 MIN: ICD-10-PCS | Mod: S$GLB,,, | Performed by: NURSE PRACTITIONER

## 2019-10-03 PROCEDURE — 3075F PR MOST RECENT SYSTOLIC BLOOD PRESS GE 130-139MM HG: ICD-10-PCS | Mod: CPTII,S$GLB,, | Performed by: NURSE PRACTITIONER

## 2019-10-03 PROCEDURE — 99499 UNLISTED E&M SERVICE: CPT | Mod: S$GLB,,, | Performed by: NURSE PRACTITIONER

## 2019-10-03 PROCEDURE — 99214 OFFICE O/P EST MOD 30 MIN: CPT | Mod: S$GLB,,, | Performed by: NURSE PRACTITIONER

## 2019-10-03 PROCEDURE — 3075F SYST BP GE 130 - 139MM HG: CPT | Mod: CPTII,S$GLB,, | Performed by: NURSE PRACTITIONER

## 2019-10-03 PROCEDURE — 99999 PR PBB SHADOW E&M-EST. PATIENT-LVL IV: CPT | Mod: PBBFAC,,, | Performed by: NURSE PRACTITIONER

## 2019-10-03 PROCEDURE — 99999 PR PBB SHADOW E&M-EST. PATIENT-LVL IV: ICD-10-PCS | Mod: PBBFAC,,, | Performed by: NURSE PRACTITIONER

## 2019-10-03 PROCEDURE — 1101F PT FALLS ASSESS-DOCD LE1/YR: CPT | Mod: CPTII,S$GLB,, | Performed by: NURSE PRACTITIONER

## 2019-10-03 PROCEDURE — 3078F PR MOST RECENT DIASTOLIC BLOOD PRESSURE < 80 MM HG: ICD-10-PCS | Mod: CPTII,S$GLB,, | Performed by: NURSE PRACTITIONER

## 2019-10-03 PROCEDURE — 99499 RISK ADDL DX/OHS AUDIT: ICD-10-PCS | Mod: S$GLB,,, | Performed by: NURSE PRACTITIONER

## 2019-10-03 PROCEDURE — 3078F DIAST BP <80 MM HG: CPT | Mod: CPTII,S$GLB,, | Performed by: NURSE PRACTITIONER

## 2019-10-03 PROCEDURE — 1101F PR PT FALLS ASSESS DOC 0-1 FALLS W/OUT INJ PAST YR: ICD-10-PCS | Mod: CPTII,S$GLB,, | Performed by: NURSE PRACTITIONER

## 2019-10-03 NOTE — LETTER
October 3, 2019      Mir Hale IV, MD  [address] [place] [address] [address] [place] [id number]           [facility]  [address] [facility]  [place] [address]  Phone: [phone]  Fax: [phone]          Patient: Sherif Ragland   MR Number: [id number]   YOB: 1945   Date of Visit: 10/3/2019       Dear Dr. Mir Hale IV:    Thank you for referring Sherif Ragland to me for evaluation. Attached you will find relevant portions of my assessment and plan of care.    If you have questions, please do not hesitate to call me. I look forward to following Sherif Ragland along with you.    Sincerely,    Elizabeth Lejeune, STEPHANIE    Enclosure  CC:  No Recipients    If you would like to receive this communication electronically, please contact [email or web address] or [phone] to request more information on [employer] access.    For providers and/or their staff who would like to refer a patient to [facility], please contact us through our one-stop-shop provider referral line, [facility], at [phone].    If you feel you have received this communication in error or would no longer like to receive these types of communications, please e-mail [email or web address]

## 2019-10-03 NOTE — PATIENT INSTRUCTIONS
What Are Snoring and Obstructive Sleep Apnea?  If youve ever had a stuffed-up nose, you know the feeling of trying to breathe through a very narrow passageway. This is what happens in your throat when you snore. While you sleep, structures in your throat partially block your air passage, making the passage narrow and hard to breathe through. If the entire passage becomes blocked and you cant breathe at all, you have sleep apnea.      Snoring Obstructive sleep apnea   Snoring  If your throat structures are too large or the muscles relax too much during sleep, the air passage may be partially blocked. As air from the nose or mouth passes around this blockage, the throat structures vibrate, causing the familiar sound of snoring. At times, this sound can be so loud that snorers wake up others, or even themselves, during the night. Snoring gets worse as more and more of the air passage is blocked.  Obstructive sleep apnea  If the structures completely block the throat, air cant flow to the lungs at all. This is called apnea (meaning no breathing). Since the lungs arent getting fresh air, the brain tells the body to wake up just enough to tighten the muscles and unblock the air passage. With a loud gasp, breathing begins again. This process may be repeated over and over again throughout the night, making your sleep fragmented with a lighter stage of sleep. Even though you do not remember waking up many times during the night to a lighter sleep, you feel tired the next day. The lack of sleep and fresh air can also strain your lungs, heart, and other organs, leading to problems such as high blood pressure, heart attack, or stroke.  Problems in the nose and jaw  Problems in the structure of the nose may obstruct breathing. A crooked (deviated) septum or swollen turbinates can make snoring worse or lead to apnea. Also, a receding jaw may make the tongue sit too far back, so its more likely to block the airway when  youre asleep.        Date Last Reviewed: 7/18/2015  © 7646-1784 [employer]. [address] [place] [address], [place] [address]. All rights reserved. This information is not intended as a substitute for professional medical care. Always follow your healthcare professional's instructions.

## 2019-10-03 NOTE — PROGRESS NOTES
Subjective:      Patient ID: Sherif Ragland is a 73 y.o. male.    Chief Complaint: Sleep Apnea    HPI    Patient presents to the office today for evaluation of sleep apnea.  Patient with hypersomnia. Patient not having problems falling asleep, but wakes up frequently throughout the night. Positive nocturia. He is no longer able to sleep flat in bed, he sleeps in reclining chair. Patient does not wake up feeling refreshed in the morning.  [place] Sleepiness Scale score 15.  Comorbidities include DM, HTN, BMI 40  Bedtime: 11PM  Wake time: 6AM  He does not know if he snores    STOP - BANG Questionnaire:     1. Snoring : Do you snore loudly ?    No    2. Tired : Do you often feel tired, fatigued, or sleepy during daytime?   Yes    3. Observed: Has anyone observed you stop breathing during your sleep?   No    4. Blood pressure : Do you have or are you being treated for high blood pressure?   Yes    5. BMI :BMI more than 35 kg/m2?   Yes    6. Age : Age over 50 yr old?   Yes    7. Neck circumference: Neck circumference greater than 40 cm?   Yes    8. Gender: Gender male?   Yes    High risk of GERALD: Yes 5 - 8  Intermediate risk of GERALD: Yes 3 - 4  Low risk of GERALD: Yes 0 - 2      References:   STOP Questionnaire   A Tool to Screen Patients for Obstructive Sleep Apnea: BONNIE Pearce.C.P.C., JOEL Odom.B.B.S., Devante Long M.D.,Kaylie Zaidi, Ph.D., Timo Narvaez M.B.B.S.,_ JOEL Meier.Sc.,_ Abraham Garcia M.D., Karthik Davidson F.R.C.P.C.; Anesthesiology 2008; 108:812-21 Copyright © 2008, the American Society of Anesthesiologists, Inc. [employer]    Patient Active Problem List   Diagnosis    Type 2 diabetes mellitus with chronic kidney disease, with long-term current use of insulin    Stage 3 chronic kidney disease    Hyperlipidemia associated with type 2 diabetes mellitus    Hypertension associated with diabetes    Post herpetic neuralgia    Tubular adenoma of  colon    GERD (gastroesophageal reflux disease)    Hypothermia    Near syncope         /71   Pulse 83   Resp 18   Ht 6' (1.829 m)   Wt 133.8 kg (294 lb 15.6 oz)   SpO2 (!) 93%   BMI 40.01 kg/m²   Body mass index is 40.01 kg/m².    Review of Systems   Constitutional: Positive for fatigue.   Respiratory: Positive for somnolence.    Genitourinary:        Nocturia   Musculoskeletal: Positive for arthralgias.   Psychiatric/Behavioral: Positive for sleep disturbance.   All other systems reviewed and are negative.        Objective:      Physical Exam   Constitutional: He is oriented to person, place, and time. He appears well-developed and well-nourished.   Obese   HENT:   Head: Normocephalic and atraumatic.   Mouth/Throat: Oropharynx is clear and moist.   Eyes: Pupils are equal, round, and reactive to light. EOM are normal.   Neck: Normal range of motion. Neck supple.   Cardiovascular: Normal rate and regular rhythm. Exam reveals no gallop and no friction rub.   No murmur heard.  Pulmonary/Chest: Effort normal and breath sounds normal.   Abdominal: Soft. Bowel sounds are normal. He exhibits no distension. There is no tenderness.   Musculoskeletal: Normal range of motion.   Neurological: He is alert and oriented to person, place, and time.   Skin: Skin is warm and dry.   Psychiatric: He has a normal mood and affect.         Assessment:     1. GERALD (obstructive sleep apnea)    2. Morbid obesity with BMI of 40.0-44.9, adult    3. Nocturia associated with benign prostatic hyperplasia       Outpatient Encounter Medications as of 10/3/2019   Medication Sig Dispense Refill    amLODIPine (NORVASC) 5 MG tablet Take 5 mg by mouth 2 (two) times daily.      blood sugar diagnostic Strp To check BG 2 times daily, to use with insurance preferred meter 100 each 11    blood-glucose meter kit To check BG two times daily, to use with insurance preferred meter 1 each 0    dulaglutide (TRULICITY) 0.75 mg/0.5 mL PnIj Inject  "0.5 mLs (0.75 mg total) into the skin every 7 days. 2 mL 11    furosemide (LASIX) 40 MG tablet Take 1 tablet (40 mg total) by mouth once daily. 60 tablet 11    insulin (LANTUS SOLOSTAR U-100 INSULIN) glargine 100 units/mL (3mL) SubQ pen Inject 25 Units into the skin.      insulin detemir U-100 (LEVEMIR FLEXTOUCH U-100 INSULN) 100 unit/mL (3 mL) SubQ InPn pen Inject 40 Units into the skin 2 (two) times daily. 72 mL 3    lancets Misc To check BG 2 times daily, to use with insurance preferred meter 100 each 11    metoprolol succinate (TOPROL-XL) 100 MG 24 hr tablet Take 1 tablet (100 mg total) by mouth 2 (two) times daily. 60 tablet 11    papaverine 30 mg/mL injection Inject 0.3 ml of trimix solution prn ED max once daily  Prepare 10ml of trimix solution containing:    Papaverine 30mg/ml    Phentolamine 1 mg/ml    Alprostadil 10mcg/ml  Dispense 10ml per refill  Qs syringes 1cc/30g/0.5" and alcohol swabs dispense as needed for Intracavernosal injection 10 mL 5    potassium chloride SA (K-DUR,KLOR-CON) 20 MEQ tablet Take 1 tablet (20 mEq total) by mouth 2 (two) times daily. 30 tablet 11    ranitidine (ZANTAC) 150 MG tablet TAKE 1 TABLET BY MOUTH TWICE DAILY 60 tablet 0    valsartan-hydrochlorothiazide (DIOVAN-HCT) 320-12.5 mg per tablet Take 1 tablet by mouth once daily. 90 tablet 3     No facility-administered encounter medications on file as of 10/3/2019.      Orders Placed This Encounter   Procedures    Polysomnogram (CPAP will be added if patient meets diagnostic criteria.)     Standing Status:   Future     Standing Expiration Date:   10/2/2020     Plan:     Problem List Items Addressed This Visit     None      Visit Diagnoses     GERALD (obstructive sleep apnea)    -  Primary    Relevant Orders    Polysomnogram (CPAP will be added if patient meets diagnostic criteria.)    Morbid obesity with BMI of 40.0-44.9, adult        Nocturia associated with benign prostatic hyperplasia          Weight loss and exercise " to improve overall health.

## 2019-10-16 ENCOUNTER — TELEPHONE (OUTPATIENT)
Dept: UROLOGY | Facility: CLINIC | Age: 74
End: 2019-10-16

## 2019-10-16 NOTE — TELEPHONE ENCOUNTER
Called all numbers listed in pts chart to notify pt of his surgery arrival time for tomorrow; no answer.  Msgs left x3.

## 2019-10-16 NOTE — TELEPHONE ENCOUNTER
Attempted to contact pt 3 times t to inform him of his 9:30am surgery arrival time for tomorrow. No answer.  Msg left on voice mail.

## 2019-10-17 ENCOUNTER — ANESTHESIA EVENT (OUTPATIENT)
Dept: SURGERY | Facility: HOSPITAL | Age: 74
End: 2019-10-17
Payer: MEDICARE

## 2019-10-17 ENCOUNTER — HOSPITAL ENCOUNTER (OUTPATIENT)
Facility: HOSPITAL | Age: 74
Discharge: HOME | End: 2019-10-17
Attending: UROLOGY | Admitting: UROLOGY
Payer: MEDICARE

## 2019-10-17 ENCOUNTER — ANESTHESIA (OUTPATIENT)
Dept: SURGERY | Facility: HOSPITAL | Age: 74
End: 2019-10-17
Payer: MEDICARE

## 2019-10-17 DIAGNOSIS — N40.0 BENIGN PROSTATIC HYPERPLASIA, UNSPECIFIED WHETHER LOWER URINARY TRACT SYMPTOMS PRESENT: ICD-10-CM

## 2019-10-17 LAB
POCT GLUCOSE: 215 MG/DL (ref 70–110)
POCT GLUCOSE: 215 MG/DL (ref 70–110)

## 2019-10-17 PROCEDURE — 71000015 HC POSTOP RECOV 1ST HR: Performed by: UROLOGY

## 2019-10-17 PROCEDURE — 27200651 HC AIRWAY, LMA: Performed by: NURSE ANESTHETIST, CERTIFIED REGISTERED

## 2019-10-17 PROCEDURE — 36000707: Performed by: UROLOGY

## 2019-10-17 PROCEDURE — 71000033 HC RECOVERY, INTIAL HOUR: Performed by: UROLOGY

## 2019-10-17 PROCEDURE — 37000009 HC ANESTHESIA EA ADD 15 MINS: Performed by: UROLOGY

## 2019-10-17 PROCEDURE — 63600175 PHARM REV CODE 636 W HCPCS: Performed by: UROLOGY

## 2019-10-17 PROCEDURE — 63600175 PHARM REV CODE 636 W HCPCS: Performed by: NURSE ANESTHETIST, CERTIFIED REGISTERED

## 2019-10-17 PROCEDURE — 25000003 PHARM REV CODE 250: Performed by: NURSE ANESTHETIST, CERTIFIED REGISTERED

## 2019-10-17 PROCEDURE — 52648 PR LASER VAPORIZATION SURGERY PROSTATE, COMPLETE: ICD-10-PCS | Mod: ,,, | Performed by: UROLOGY

## 2019-10-17 PROCEDURE — 52648 LASER SURGERY OF PROSTATE: CPT | Mod: ,,, | Performed by: UROLOGY

## 2019-10-17 PROCEDURE — 27201423 OPTIME MED/SURG SUP & DEVICES STERILE SUPPLY: Performed by: UROLOGY

## 2019-10-17 PROCEDURE — 37000008 HC ANESTHESIA 1ST 15 MINUTES: Performed by: UROLOGY

## 2019-10-17 PROCEDURE — 36000706: Performed by: UROLOGY

## 2019-10-17 RX ORDER — FUROSEMIDE 10 MG/ML
INJECTION INTRAMUSCULAR; INTRAVENOUS
Status: DISCONTINUED | OUTPATIENT
Start: 2019-10-17 | End: 2019-10-17

## 2019-10-17 RX ORDER — FENTANYL CITRATE 50 UG/ML
INJECTION, SOLUTION INTRAMUSCULAR; INTRAVENOUS
Status: DISCONTINUED | OUTPATIENT
Start: 2019-10-17 | End: 2019-10-17

## 2019-10-17 RX ORDER — HYDROCODONE BITARTRATE AND ACETAMINOPHEN 5; 325 MG/1; MG/1
1 TABLET ORAL EVERY 4 HOURS PRN
Status: DISCONTINUED | OUTPATIENT
Start: 2019-10-17 | End: 2019-10-17 | Stop reason: HOSPADM

## 2019-10-17 RX ORDER — SODIUM CHLORIDE, SODIUM LACTATE, POTASSIUM CHLORIDE, CALCIUM CHLORIDE 600; 310; 30; 20 MG/100ML; MG/100ML; MG/100ML; MG/100ML
INJECTION, SOLUTION INTRAVENOUS CONTINUOUS PRN
Status: DISCONTINUED | OUTPATIENT
Start: 2019-10-17 | End: 2019-10-17

## 2019-10-17 RX ORDER — KETOROLAC TROMETHAMINE 30 MG/ML
15 INJECTION, SOLUTION INTRAMUSCULAR; INTRAVENOUS EVERY 8 HOURS PRN
Status: DISCONTINUED | OUTPATIENT
Start: 2019-10-17 | End: 2019-10-17 | Stop reason: HOSPADM

## 2019-10-17 RX ORDER — ONDANSETRON 8 MG/1
8 TABLET, ORALLY DISINTEGRATING ORAL EVERY 8 HOURS PRN
Status: DISCONTINUED | OUTPATIENT
Start: 2019-10-17 | End: 2019-10-17 | Stop reason: HOSPADM

## 2019-10-17 RX ORDER — HYDROCODONE BITARTRATE AND ACETAMINOPHEN 10; 325 MG/1; MG/1
1 TABLET ORAL EVERY 4 HOURS PRN
Status: DISCONTINUED | OUTPATIENT
Start: 2019-10-17 | End: 2019-10-17 | Stop reason: HOSPADM

## 2019-10-17 RX ORDER — CEFAZOLIN SODIUM 2 G/50ML
2 SOLUTION INTRAVENOUS
Status: DISCONTINUED | OUTPATIENT
Start: 2019-10-17 | End: 2019-10-17 | Stop reason: HOSPADM

## 2019-10-17 RX ORDER — LIDOCAINE HYDROCHLORIDE 10 MG/ML
INJECTION, SOLUTION EPIDURAL; INFILTRATION; INTRACAUDAL; PERINEURAL
Status: DISCONTINUED | OUTPATIENT
Start: 2019-10-17 | End: 2019-10-17

## 2019-10-17 RX ORDER — DOCUSATE SODIUM 100 MG/1
100 CAPSULE, LIQUID FILLED ORAL 2 TIMES DAILY
Qty: 60 CAPSULE | Refills: 0 | Status: SHIPPED | OUTPATIENT
Start: 2019-10-17 | End: 2020-03-23 | Stop reason: SDUPTHER

## 2019-10-17 RX ORDER — FENTANYL CITRATE 50 UG/ML
25 INJECTION, SOLUTION INTRAMUSCULAR; INTRAVENOUS EVERY 5 MIN PRN
Status: DISCONTINUED | OUTPATIENT
Start: 2019-10-17 | End: 2019-10-17 | Stop reason: HOSPADM

## 2019-10-17 RX ORDER — SODIUM CHLORIDE 0.9 % (FLUSH) 0.9 %
10 SYRINGE (ML) INJECTION
Status: DISCONTINUED | OUTPATIENT
Start: 2019-10-17 | End: 2019-10-17 | Stop reason: HOSPADM

## 2019-10-17 RX ORDER — ONDANSETRON 2 MG/ML
INJECTION INTRAMUSCULAR; INTRAVENOUS
Status: DISCONTINUED | OUTPATIENT
Start: 2019-10-17 | End: 2019-10-17

## 2019-10-17 RX ORDER — PROPOFOL 10 MG/ML
VIAL (ML) INTRAVENOUS
Status: DISCONTINUED | OUTPATIENT
Start: 2019-10-17 | End: 2019-10-17

## 2019-10-17 RX ORDER — OXYCODONE AND ACETAMINOPHEN 5; 325 MG/1; MG/1
1-2 TABLET ORAL EVERY 4 HOURS PRN
Qty: 30 TABLET | Refills: 0 | Status: SHIPPED | OUTPATIENT
Start: 2019-10-17 | End: 2019-11-18

## 2019-10-17 RX ORDER — CIPROFLOXACIN 500 MG/1
500 TABLET ORAL EVERY 12 HOURS
Qty: 6 TABLET | Refills: 0 | Status: SHIPPED | OUTPATIENT
Start: 2019-10-17 | End: 2019-10-20

## 2019-10-17 RX ORDER — ONDANSETRON 2 MG/ML
4 INJECTION INTRAMUSCULAR; INTRAVENOUS DAILY PRN
Status: DISCONTINUED | OUTPATIENT
Start: 2019-10-17 | End: 2019-10-17 | Stop reason: HOSPADM

## 2019-10-17 RX ADMIN — ONDANSETRON 4 MG: 2 INJECTION, SOLUTION INTRAMUSCULAR; INTRAVENOUS at 12:10

## 2019-10-17 RX ADMIN — FENTANYL CITRATE 100 MCG: 50 INJECTION, SOLUTION INTRAMUSCULAR; INTRAVENOUS at 11:10

## 2019-10-17 RX ADMIN — PROPOFOL 150 MG: 10 INJECTION, EMULSION INTRAVENOUS at 11:10

## 2019-10-17 RX ADMIN — LIDOCAINE HYDROCHLORIDE 50 MG: 10 INJECTION, SOLUTION EPIDURAL; INFILTRATION; INTRACAUDAL; PERINEURAL at 11:10

## 2019-10-17 RX ADMIN — CEFAZOLIN SODIUM 2 G: 2 SOLUTION INTRAVENOUS at 11:10

## 2019-10-17 RX ADMIN — SODIUM CHLORIDE, SODIUM LACTATE, POTASSIUM CHLORIDE, AND CALCIUM CHLORIDE: 600; 310; 30; 20 INJECTION, SOLUTION INTRAVENOUS at 11:10

## 2019-10-17 RX ADMIN — FUROSEMIDE 10 MG: 10 INJECTION, SOLUTION INTRAMUSCULAR; INTRAVENOUS at 12:10

## 2019-10-17 NOTE — ANESTHESIA PREPROCEDURE EVALUATION
10/17/2019  Sherif Ragland is a 73 y.o., male.    Anesthesia Evaluation    I have reviewed the Patient Summary Reports.    I have reviewed the Nursing Notes.   I have reviewed the Medications.     Review of Systems  Anesthesia Hx:  No problems with previous Anesthesia    Social:  Non-Smoker, Social Alcohol Use    Hematology/Oncology:  Hematology Normal        Cardiovascular:   Hypertension Denies MI.  Denies CAD.       Pulmonary:  Pulmonary Normal    Renal/:   Chronic Renal Disease, CRI    Hepatic/GI:   GERD, well controlled    Neurological:  Neurology Normal    Endocrine:   Diabetes        Physical Exam  General:  Well nourished    Airway/Jaw/Neck:  Airway Findings: Mouth Opening: Normal Tongue: Normal  General Airway Assessment: Adult  Mallampati: II  TM Distance: 4 - 6 cm  Jaw/Neck Findings:  Neck ROM: Normal ROM      Dental:  Dental Findings: In tact, Upper front caps, Lower front caps, Molar caps   Chest/Lungs:  Chest/Lungs Findings: Clear to auscultation, Normal Respiratory Rate     Heart/Vascular:  Heart Findings: Rate: Normal  Rhythm: Regular Rhythm  Sounds: Normal        Mental Status:  Mental Status Findings:  Cooperative, Alert and Oriented         Anesthesia Plan  Type of Anesthesia, risks & benefits discussed:  Anesthesia Type:  general  Patient's Preference:   Intra-op Monitoring Plan: standard ASA monitors  Intra-op Monitoring Plan Comments:   Post Op Pain Control Plan: multimodal analgesia and per primary service following discharge from PACU  Post Op Pain Control Plan Comments:   Induction:   IV  Beta Blocker:  Patient is on a Beta-Blocker and has received one dose within the past 24 hours (No further documentation required).       Informed Consent: Patient understands risks and agrees with Anesthesia plan.  Questions answered. Anesthesia consent signed with patient.  ASA Score: 2     Day  of Surgery Review of History & Physical:  There are no significant changes.          Ready For Surgery From Anesthesia Perspective.     Patient Active Problem List   Diagnosis    Type 2 diabetes mellitus with chronic kidney disease, with long-term current use of insulin    Stage 3 chronic kidney disease    Hyperlipidemia associated with type 2 diabetes mellitus    Hypertension associated with diabetes    Post herpetic neuralgia    Tubular adenoma of colon    GERD (gastroesophageal reflux disease)    Hypothermia    Near syncope    Benign prostatic hyperplasia       Chemistry        Component Value Date/Time     10/02/2019 1045    K 3.9 10/02/2019 1045     10/02/2019 1045    CO2 30 (H) 10/02/2019 1045    BUN 20 10/02/2019 1045    CREATININE 1.8 (H) 10/02/2019 1045     (H) 10/02/2019 1045        Component Value Date/Time    CALCIUM 9.8 10/02/2019 1045    ALKPHOS 101 05/14/2019 0720    AST 17 05/14/2019 0720    ALT 15 05/14/2019 0720    BILITOT 0.4 05/14/2019 0720    ESTGFRAFRICA 42.2 (A) 10/02/2019 1045    EGFRNONAA 36.5 (A) 10/02/2019 1045        Lab Results   Component Value Date    WBC 8.63 10/02/2019    HGB 12.8 (L) 10/02/2019    HCT 39.2 (L) 10/02/2019    MCV 92 10/02/2019     10/02/2019

## 2019-10-17 NOTE — INTERVAL H&P NOTE
The patient has been examined and the H&P has been reviewed:    I concur with the findings and no changes have occurred since H&P was written.    Anesthesia/Surgery risks, benefits and alternative options discussed and understood by patient/family.          Active Hospital Problems    Diagnosis  POA    Benign prostatic hyperplasia [N40.0]  Yes      Resolved Hospital Problems   No resolved problems to display.

## 2019-10-17 NOTE — OP NOTE
Date of Procedure: 10/17/2019    PREOP DIAGNOSIS:  BPH with obstruction.     POSTOP DIAGNOSIS:  BPH with obstruction.    PROCEDURES:      1. Laser TURP (47570)    SURGEON:   Franki Hale IV, M.D.    Assistants: None    Specimen: None    Prosthetics, Devices, Grafts: None    ANES: General endotracheal.       FINDINGS:  The patient had a prostate that was obstructing urine flow and laser TURP was performed without difficulty.  Ureteral orifices were  preserved and the verumontanum was the distal limit of resection with no  injury to the verumontanum or any distal structures.  .         PROCEDURE IN DETAIL:  After proper consents were obtained, the patient  brought to the Operating Room where he was prepped and draped in normal sterile fashion and provided general endotracheal anesthesia in dorsal  lithotomy position.  Serial dilation to 28Fr was required at the meatus to permit the scope into the urethra, was about 14Fr at start of procedure. The laser resectoscope was assembled and introduced per urethra and the boundaries of resection were appreciated including the lateral sides of the median lobe, ureteral orifices, which were uninvolved in the resection area, and verumontanum.  After appreciation of resection boundaries, resection was commenced first at the 7 o'clock position  laying a groove from the right bladder neck down to the veru.  This was repeated on the left.  Median lobe was thus raised and resected along the base of the prostate.   Resection was then performed at 6 o'clock position followed by the right lateral lobe followed by the left lateral lobe.  After resection with excellent hemostasis, the bladder was rinsed and drained.  The laser was set for cauterizing energy and a short time was spent assuring excellent hemostasis and trimming down some high spots that were observed after draining the bladder. After this was done, there was an excellent channel through the prostate.  The prostate chips  were retrieved from the bladder.  The resectoscope was withdrawn and a good flow of urine was observed at the penis.  A 20-Czech Cheng catheter was then placed in the bladder with 30 mL sterile water in its balloon.  This was sent to gravity drainage and was irrigated and seen to be clear.  The patient tolerated this well and there were no complications. He was then awakened and transferred to Recovery in stable condition.                                                                                    BLOOD LOSS:  None.                                                                                                                                     BLOOD GIVEN:  None.                                                                                                                                    URINE OUTPUT:  None.                                                                                                                                   DRAINS:  20-Czech Cheng catheter.                                                                                                                     DISPOSITION:  Home.                                                                                                                         PLAN:  The patient was provided with pain medicines, stool softeners, prophylactic antibiotics.  He is provided with instructions for care at home and will return in 3 days for removal of his Cheng catheter.   He will call if any difficulties arise.

## 2019-10-17 NOTE — ANESTHESIA POSTPROCEDURE EVALUATION
Anesthesia Post Evaluation    Patient: Sherif Ragland    Procedure(s) Performed: Procedure(s) (LRB):  TURP (TRANSURETHRAL RESECTION OF PROSTATE) (N/A)    Final Anesthesia Type: general  Patient location during evaluation: PACU  Patient participation: Yes- Able to Participate  Level of consciousness: awake and alert  Post-procedure vital signs: reviewed and stable  Pain management: adequate  Airway patency: patent  PONV status at discharge: No PONV  Anesthetic complications: no      Cardiovascular status: hemodynamically stable  Respiratory status: spontaneous ventilation  Hydration status: euvolemic  Follow-up not needed.          Vitals Value Taken Time   /79 10/17/2019  1:01 PM   Temp 36.3 °C (97.4 °F) 10/17/2019 12:25 PM   Pulse 64 10/17/2019  1:02 PM   Resp 18 10/17/2019  1:00 PM   SpO2 92 % 10/17/2019  1:02 PM   Vitals shown include unvalidated device data.      Event Time     Out of Recovery 13:02:29          Pain/Jessie Score: Jessie Score: 10 (10/17/2019  1:00 PM)

## 2019-10-17 NOTE — TRANSFER OF CARE
Anesthesia Transfer of Care Note    Patient: Sherif Ragland    Procedure(s) Performed: Procedure(s) (LRB):  TURP (TRANSURETHRAL RESECTION OF PROSTATE) (N/A)    Patient location: PACU    Anesthesia Type: general    Transport from OR: Transported from OR on room air with adequate spontaneous ventilation    Post pain: adequate analgesia    Post assessment: no apparent anesthetic complications and tolerated procedure well    Post vital signs: stable    Level of consciousness: responds to stimulation    Nausea/Vomiting: no nausea/vomiting    Complications: none    Transfer of care protocol was followed      Last vitals:   Visit Vitals  BP (!) 181/78   Pulse 67   Temp 36.3 °C (97.4 °F) (Temporal)   Resp 18   Ht 6' (1.829 m)   Wt 133.9 kg (295 lb 3.1 oz)   SpO2 100%   BMI 40.04 kg/m²

## 2019-10-17 NOTE — DISCHARGE SUMMARY
Date of Discharge: 10/17/2019     Principle Diagnosis: BPH    Secondary Diagnosis:  has a past medical history of Diabetes mellitus, type 2 (1997), Hyperlipidemia, Hypertension, Hypogonadism in male, Renal insufficiency, and Tubular adenoma (10/2017).    History of Present Illness: Pt was worked up in clinic and today's procedure was scheduled.  Please see H&P for full details.    Hospital Course: Pt presented on the day of surgery and after proper consents were obtained he was brought to the OR where his procedure was performed without difficulty.    Discharge Disposition: Home    Followup Plan:  1. Pt is provided with medications for pain and others as indicated.  2. RTC in 2 weeks

## 2019-10-17 NOTE — PLAN OF CARE
Patient  ready for surgery. Family at bedside. Belongings given to family to secure. Patient instructed on Preop Process verbalizes understanding.

## 2019-10-21 ENCOUNTER — CLINICAL SUPPORT (OUTPATIENT)
Dept: UROLOGY | Facility: CLINIC | Age: 74
End: 2019-10-21
Payer: MEDICARE

## 2019-10-21 VITALS — BODY MASS INDEX: 40.01 KG/M2 | WEIGHT: 295 LBS

## 2019-10-21 DIAGNOSIS — N40.0 BENIGN PROSTATIC HYPERPLASIA, UNSPECIFIED WHETHER LOWER URINARY TRACT SYMPTOMS PRESENT: Primary | ICD-10-CM

## 2019-10-21 PROCEDURE — 99999 PR PBB SHADOW E&M-EST. PATIENT-LVL III: ICD-10-PCS | Mod: PBBFAC,,,

## 2019-10-21 PROCEDURE — 99999 PR PBB SHADOW E&M-EST. PATIENT-LVL III: CPT | Mod: PBBFAC,,,

## 2019-10-21 NOTE — PROGRESS NOTES
..Pt in today to have marie discontinued.  10cc balloon deflated and #20 Fr marie successfully removed.  120 cc dark colored urine noted to leg bag.  Pt tolerated procedure well and left clinic ambulatory per self without difficulty.

## 2019-10-22 ENCOUNTER — HOSPITAL ENCOUNTER (EMERGENCY)
Facility: HOSPITAL | Age: 74
Discharge: HOME | End: 2019-10-22
Attending: EMERGENCY MEDICINE
Payer: MEDICARE

## 2019-10-22 VITALS
OXYGEN SATURATION: 99 % | SYSTOLIC BLOOD PRESSURE: 130 MMHG | WEIGHT: 290.88 LBS | BODY MASS INDEX: 39.4 KG/M2 | RESPIRATION RATE: 18 BRPM | DIASTOLIC BLOOD PRESSURE: 70 MMHG | HEIGHT: 72 IN | TEMPERATURE: 98 F | HEART RATE: 80 BPM

## 2019-10-22 DIAGNOSIS — R33.8 ACUTE URINARY RETENTION: Primary | ICD-10-CM

## 2019-10-22 PROCEDURE — 99281 EMR DPT VST MAYX REQ PHY/QHP: CPT

## 2019-10-22 NOTE — ED PROVIDER NOTES
SCRIBE #1 NOTE: I, Lakeshia Saldana, am scribing for, and in the presence of, Joe Pool MD. I have scribed the entire note.       History     Chief Complaint   Patient presents with    Urinary Retention     Arrives to ER reports urinary retention x1 day, recently had marie removed post prostate surgery. -n/v/d, complaining of pain 8/10. No fever.      Review of patient's allergies indicates:  No Known Allergies      History of Present Illness     HPI    10/22/2019, 4:57 AM  History obtained from the patient      History of Present Illness: Sherif Ragland is a 73 y.o. male patient with a PMHx of DM, HLD, HTN who presents to the Emergency Department for evaluation of difficulty urinating which onset gradually yesterday after getting marie removed. He last urinated at 10 PM. Symptoms are constant and 8/10 in severity.  No mitigating or exacerbating factors reported. Associated sxs include abd discomfort. Patient denies any fever, chills, N/V/D, hematuria, hematochezia, constipation, and all other sxs at this time. No further complaints or concerns at this time.         Arrival mode: Personal vehicle      PCP: Tobias Fox MD        Past Medical History:  Past Medical History:   Diagnosis Date    Diabetes mellitus, type 2 1997    Hyperlipidemia     Hypertension     Hypogonadism in male     Renal insufficiency     Tubular adenoma 10/2017       Past Surgical History:  Past Surgical History:   Procedure Laterality Date    CATARACT EXTRACTION Right 01/31/2018    CATARACT EXTRACTION W/  INTRAOCULAR LENS IMPLANT Left 03/13/2019    COLONOSCOPY N/A 11/2/2017    Procedure: COLONOSCOPY;  Surgeon: Alek Francois MD;  Location: [facility];  Service: Endoscopy;  Laterality: N/A;    TIBIA FRACTURE SURGERY      right leg x2     TONSILLECTOMY      TRANSURETHRAL RESECTION OF PROSTATE N/A 10/17/2019    Procedure: TURP (TRANSURETHRAL RESECTION OF PROSTATE);  Surgeon: Mir Hale IV, MD;  Location: [facility] OR;   Service: Urology;  Laterality: N/A;         Family History:  Family History   Problem Relation Age of Onset    Stroke Mother     Heart disease Father     Stroke Brother        Social History:  Social History     Tobacco Use    Smoking status: Never Smoker    Smokeless tobacco: Never Used   Substance and Sexual Activity    Alcohol use: No    Drug use: No    Sexual activity: Yes     Partners: Female        Review of Systems     Review of Systems   Constitutional: Negative for fever.   HENT: Negative for sore throat.    Respiratory: Negative for shortness of breath.    Cardiovascular: Negative for chest pain.   Gastrointestinal: Positive for abdominal pain (discomfort). Negative for blood in stool, constipation, diarrhea, nausea and vomiting.   Genitourinary: Positive for difficulty urinating. Negative for dysuria and hematuria.   Musculoskeletal: Negative for back pain.   Skin: Negative for rash.   Neurological: Negative for weakness.   Hematological: Does not bruise/bleed easily.   All other systems reviewed and are negative.       Physical Exam     Initial Vitals [10/22/19 0449]   BP Pulse Resp Temp SpO2   (!) 148/66 78 20 98.5 °F (36.9 °C) 95 %      MAP       --          Physical Exam  Nursing Notes and Vital Signs Reviewed.  Constitutional: Patient is in no acute distress. Well-developed and well-nourished.  Head: Atraumatic. Normocephalic.  Eyes: PERRL. EOM intact. Conjunctivae are not pale. No scleral icterus.  ENT: Mucous membranes are moist. Oropharynx is clear and symmetric.    Neck: Supple. Full ROM. No lymphadenopathy.  Cardiovascular: Regular rate. Regular rhythm. No murmurs, rubs, or gallops. Distal pulses are 2+ and symmetric.  Pulmonary/Chest: No respiratory distress. Clear to auscultation bilaterally. No wheezing or rales.  Abdominal: Soft and non-distended.  There is suprapubic tenderness.  No rebound, guarding, or rigidity. Good bowel sounds.  Genitourinary: No CVA  tenderness  Musculoskeletal: Moves all extremities. No obvious deformities. No edema. No calf tenderness.  Skin: Warm and dry.  Neurological:  Alert, awake, and appropriate.  Normal speech.  No acute focal neurological deficits are appreciated.  Psychiatric: Normal affect. Good eye contact. Appropriate in content.     ED Course   Procedures  ED Vital Signs:  Vitals:    10/22/19 0449 10/22/19 0612   BP: (!) 148/66 130/70   Pulse: 78 80   Resp: 20 18   Temp: 98.5 °F (36.9 °C) 98.4 °F (36.9 °C)   TempSrc: Oral Oral   SpO2: 95% 99%   Weight: 131.9 kg (290 lb 14.4 oz)    Height: 6' (1.829 m)        Abnormal Lab Results:  Labs Reviewed - No data to display     All Lab Results:  none    Imaging Results:  Imaging Results    None                 The Emergency Provider reviewed the vital signs and test results, which are outlined above.     ED Discussion       5:41 AM: Reassessed pt at this time. Bladder scan showed 359 cc of urine. Pt states his condition has improved at this time. Discussed with pt all pertinent ED information. Discussed pt dx and plan of tx. Gave pt all f/u and return to the ED instructions. All questions and concerns were addressed at this time. Pt expresses understanding of information and instructions, and is comfortable with plan to discharge. Pt is stable for discharge.    I discussed with patient and/or family/caretaker that evaluation in the ED does not suggest any emergent or life threatening medical conditions requiring immediate intervention beyond what was provided in the ED, and I believe patient is safe for discharge.  Regardless, an unremarkable evaluation in the ED does not preclude the development or presence of a serious of life threatening condition. As such, patient was instructed to return immediately for any worsening or change in current symptoms.                   ED Medication(s):  Medications - No data to display    Discharge Medication List as of 10/22/2019  5:41 AM           Follow-up Information     Mir Hale IV, MD In 2 days.    Specialty:  Urology  Contact information:  [address] [place] [address] [phone]             [facility] - .    Specialty:  Emergency Medicine  Why:  As needed, If symptoms worsen  Contact information:  [address]  [facility] [address] [phone]                     Scribe Attestation:   Scribe #1: I performed the above scribed service and the documentation accurately describes the services I performed. I attest to the accuracy of the note.     Attending:   Physician Attestation Statement for Scribe #1: I, Joe Pool MD, personally performed the services described in this documentation, as scribed by Lakeshia Saldana, in my presence, and it is both accurate and complete.           Clinical Impression       ICD-10-CM ICD-9-CM   1. Acute urinary retention R33.8 788.29       Disposition:   Disposition: Discharged  Condition: Stable         Joe Pool MD  10/23/19 0555

## 2019-10-22 NOTE — ED TRIAGE NOTES
"Arrives to ER complaining of urinary retention, post prostate surgery. States "they took the marie out and I have been dribbling since. Told me if I couldn't pee to come to the ER." Reports last void was yesterday.   "

## 2019-10-22 NOTE — ED NOTES
Pt reports urinary retention since removing catheter 1 day ago post prostate surgery. Pt states he has only been able to dribble and feels pressure.

## 2019-10-22 NOTE — ED NOTES
Pt states he had a BM and now feels relief. Pt feels comfortable going home w/o catheter. Pt states he will return if problems persist. Pt will be d/c at this time.

## 2019-10-23 ENCOUNTER — PATIENT MESSAGE (OUTPATIENT)
Dept: UROLOGY | Facility: CLINIC | Age: 74
End: 2019-10-23

## 2019-10-23 ENCOUNTER — TELEPHONE (OUTPATIENT)
Dept: UROLOGY | Facility: CLINIC | Age: 74
End: 2019-10-23

## 2019-10-23 VITALS
TEMPERATURE: 98 F | RESPIRATION RATE: 18 BRPM | OXYGEN SATURATION: 95 % | HEART RATE: 63 BPM | SYSTOLIC BLOOD PRESSURE: 174 MMHG | HEIGHT: 72 IN | BODY MASS INDEX: 39.98 KG/M2 | WEIGHT: 295.19 LBS | DIASTOLIC BLOOD PRESSURE: 76 MMHG

## 2019-10-23 NOTE — TELEPHONE ENCOUNTER
----- Message from Claude Lackey sent at 10/23/2019  3:10 PM CDT -----  Contact: Pt  Pt called in regards to speaking with the staff in regards to pt not being able to urinate. Pt can be reached at [phone] ([email or web address]).

## 2019-10-23 NOTE — TELEPHONE ENCOUNTER
Pt called in with c/o not being able to empty bladder completely since procedure. Scheduled pt for PVR on tomorrow. He verbalized understanding.

## 2019-10-24 ENCOUNTER — TELEPHONE (OUTPATIENT)
Dept: UROLOGY | Facility: CLINIC | Age: 74
End: 2019-10-24

## 2019-10-24 ENCOUNTER — CLINICAL SUPPORT (OUTPATIENT)
Dept: UROLOGY | Facility: CLINIC | Age: 74
End: 2019-10-24
Payer: MEDICARE

## 2019-10-24 DIAGNOSIS — N40.0 BENIGN PROSTATIC HYPERPLASIA, UNSPECIFIED WHETHER LOWER URINARY TRACT SYMPTOMS PRESENT: Primary | ICD-10-CM

## 2019-10-24 LAB — POC RESIDUAL URINE VOLUME: 213 ML (ref 0–100)

## 2019-10-24 PROCEDURE — 51798 US URINE CAPACITY MEASURE: CPT | Mod: S$GLB,,, | Performed by: UROLOGY

## 2019-10-24 PROCEDURE — 51798 POCT BLADDER SCAN: ICD-10-PCS | Mod: S$GLB,,, | Performed by: UROLOGY

## 2019-10-24 NOTE — PROGRESS NOTES
Pt in for NIF=316 ml. He had TURP 10/07. He states he is getting up frequently to void. Better last night some. States he there is fiery sensation up until he actually voids, sensation goes away when done. He did go to ER on 10/22 for urinary retention, but was released without a catheter. Please advise

## 2019-10-24 NOTE — TELEPHONE ENCOUNTER
Advised pt per Dr. Hale. States he is not taking anything with Benadryl. States he will call back if symptoms worsen or fail to improve.

## 2019-10-28 ENCOUNTER — PATIENT MESSAGE (OUTPATIENT)
Dept: UROLOGY | Facility: CLINIC | Age: 74
End: 2019-10-28

## 2019-10-29 ENCOUNTER — PATIENT OUTREACH (OUTPATIENT)
Dept: OTHER | Facility: OTHER | Age: 74
End: 2019-10-29

## 2019-10-29 RX ORDER — PHENAZOPYRIDINE HYDROCHLORIDE 100 MG/1
100 TABLET, FILM COATED ORAL 3 TIMES DAILY PRN
Qty: 30 TABLET | Refills: 1 | Status: SHIPPED | OUTPATIENT
Start: 2019-10-29 | End: 2019-11-08

## 2019-10-29 NOTE — TELEPHONE ENCOUNTER
Patient requesting a prescription for Pyridium until his appt on 10/31/19 at 2:00 pm due to painful urination.

## 2019-10-31 ENCOUNTER — OFFICE VISIT (OUTPATIENT)
Dept: UROLOGY | Facility: CLINIC | Age: 74
End: 2019-10-31
Payer: MEDICARE

## 2019-10-31 VITALS
DIASTOLIC BLOOD PRESSURE: 68 MMHG | SYSTOLIC BLOOD PRESSURE: 170 MMHG | BODY MASS INDEX: 39.98 KG/M2 | WEIGHT: 294.75 LBS

## 2019-10-31 DIAGNOSIS — N32.81 OAB (OVERACTIVE BLADDER): ICD-10-CM

## 2019-10-31 DIAGNOSIS — N40.0 BENIGN PROSTATIC HYPERPLASIA, UNSPECIFIED WHETHER LOWER URINARY TRACT SYMPTOMS PRESENT: Primary | ICD-10-CM

## 2019-10-31 LAB
BILIRUB SERPL-MCNC: 3 MG/DL
BLOOD URINE, POC: 500
COLOR, POC UA: YELLOW
GLUCOSE UR QL STRIP: 1000
KETONES UR QL STRIP: 1
LEUKOCYTE ESTERASE URINE, POC: NORMAL
NITRITE, POC UA: NORMAL
PH, POC UA: 6
PROTEIN, POC: NORMAL
SPECIFIC GRAVITY, POC UA: 1.01
UROBILINOGEN, POC UA: 8

## 2019-10-31 PROCEDURE — 99999 PR PBB SHADOW E&M-EST. PATIENT-LVL III: CPT | Mod: PBBFAC,,, | Performed by: UROLOGY

## 2019-10-31 PROCEDURE — 99024 PR POST-OP FOLLOW-UP VISIT: ICD-10-PCS | Mod: S$GLB,,, | Performed by: UROLOGY

## 2019-10-31 PROCEDURE — 99999 PR PBB SHADOW E&M-EST. PATIENT-LVL III: ICD-10-PCS | Mod: PBBFAC,,, | Performed by: UROLOGY

## 2019-10-31 PROCEDURE — 81002 POCT URINE DIPSTICK WITHOUT MICROSCOPE: ICD-10-PCS | Mod: S$GLB,,, | Performed by: UROLOGY

## 2019-10-31 PROCEDURE — 81002 URINALYSIS NONAUTO W/O SCOPE: CPT | Mod: S$GLB,,, | Performed by: UROLOGY

## 2019-10-31 PROCEDURE — 99024 POSTOP FOLLOW-UP VISIT: CPT | Mod: S$GLB,,, | Performed by: UROLOGY

## 2019-10-31 RX ORDER — OXYBUTYNIN CHLORIDE 5 MG/1
5 TABLET ORAL 3 TIMES DAILY
Qty: 90 TABLET | Refills: 11 | Status: SHIPPED | OUTPATIENT
Start: 2019-10-31 | End: 2019-11-18
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
## 2019-11-07 ENCOUNTER — OFFICE VISIT (OUTPATIENT)
Dept: UROLOGY | Facility: CLINIC | Age: 74
End: 2019-11-07
Payer: MEDICARE

## 2019-11-07 VITALS
WEIGHT: 294.56 LBS | SYSTOLIC BLOOD PRESSURE: 146 MMHG | DIASTOLIC BLOOD PRESSURE: 70 MMHG | BODY MASS INDEX: 39.95 KG/M2

## 2019-11-07 DIAGNOSIS — N40.0 BENIGN PROSTATIC HYPERPLASIA, UNSPECIFIED WHETHER LOWER URINARY TRACT SYMPTOMS PRESENT: ICD-10-CM

## 2019-11-07 DIAGNOSIS — N32.81 OAB (OVERACTIVE BLADDER): ICD-10-CM

## 2019-11-07 LAB
BACTERIA #/AREA URNS AUTO: ABNORMAL /HPF
BILIRUB SERPL-MCNC: NORMAL MG/DL
BLOOD URINE, POC: 250
COLOR, POC UA: NORMAL
GLUCOSE UR QL STRIP: 1000
KETONES UR QL STRIP: NORMAL
LEUKOCYTE ESTERASE URINE, POC: NORMAL
MICROSCOPIC COMMENT: ABNORMAL
NITRITE, POC UA: NORMAL
PH, POC UA: 5
PROTEIN, POC: NORMAL
RBC #/AREA URNS AUTO: 66 /HPF (ref 0–4)
SPECIFIC GRAVITY, POC UA: 1.01
SQUAMOUS #/AREA URNS AUTO: 1 /HPF
UROBILINOGEN, POC UA: 4
WBC #/AREA URNS AUTO: >100 /HPF (ref 0–5)

## 2019-11-07 PROCEDURE — 87086 URINE CULTURE/COLONY COUNT: CPT

## 2019-11-07 PROCEDURE — 81002 URINALYSIS NONAUTO W/O SCOPE: CPT | Mod: S$GLB,,, | Performed by: UROLOGY

## 2019-11-07 PROCEDURE — 87186 SC STD MICRODIL/AGAR DIL: CPT

## 2019-11-07 PROCEDURE — 99024 PR POST-OP FOLLOW-UP VISIT: ICD-10-PCS | Mod: S$GLB,,, | Performed by: UROLOGY

## 2019-11-07 PROCEDURE — 87088 URINE BACTERIA CULTURE: CPT

## 2019-11-07 PROCEDURE — 99999 PR PBB SHADOW E&M-EST. PATIENT-LVL III: ICD-10-PCS | Mod: PBBFAC,,, | Performed by: UROLOGY

## 2019-11-07 PROCEDURE — 81002 POCT URINE DIPSTICK WITHOUT MICROSCOPE: ICD-10-PCS | Mod: S$GLB,,, | Performed by: UROLOGY

## 2019-11-07 PROCEDURE — 81001 URINALYSIS AUTO W/SCOPE: CPT

## 2019-11-07 PROCEDURE — 99024 POSTOP FOLLOW-UP VISIT: CPT | Mod: S$GLB,,, | Performed by: UROLOGY

## 2019-11-07 PROCEDURE — 87077 CULTURE AEROBIC IDENTIFY: CPT

## 2019-11-07 PROCEDURE — 99999 PR PBB SHADOW E&M-EST. PATIENT-LVL III: CPT | Mod: PBBFAC,,, | Performed by: UROLOGY
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
## 2019-11-10 LAB — BACTERIA UR CULT: ABNORMAL

## 2019-11-11 ENCOUNTER — PATIENT MESSAGE (OUTPATIENT)
Dept: UROLOGY | Facility: CLINIC | Age: 74
End: 2019-11-11

## 2019-11-11 ENCOUNTER — TELEPHONE (OUTPATIENT)
Dept: UROLOGY | Facility: CLINIC | Age: 74
End: 2019-11-11

## 2019-11-11 ENCOUNTER — OFFICE VISIT (OUTPATIENT)
Dept: INTERNAL MEDICINE | Facility: CLINIC | Age: 74
End: 2019-11-11
Payer: MEDICARE

## 2019-11-11 ENCOUNTER — HOSPITAL ENCOUNTER (OUTPATIENT)
Dept: RADIOLOGY | Facility: HOSPITAL | Age: 74
Discharge: HOME | End: 2019-11-11
Attending: PHYSICIAN ASSISTANT
Payer: MEDICARE

## 2019-11-11 VITALS
SYSTOLIC BLOOD PRESSURE: 132 MMHG | OXYGEN SATURATION: 93 % | HEIGHT: 72 IN | DIASTOLIC BLOOD PRESSURE: 81 MMHG | SYSTOLIC BLOOD PRESSURE: 148 MMHG | TEMPERATURE: 98 F | WEIGHT: 295.63 LBS | RESPIRATION RATE: 17 BRPM | BODY MASS INDEX: 40.02 KG/M2 | BODY MASS INDEX: 40.04 KG/M2 | WEIGHT: 295.44 LBS | HEART RATE: 80 BPM | DIASTOLIC BLOOD PRESSURE: 70 MMHG | TEMPERATURE: 98 F | HEIGHT: 72 IN | HEART RATE: 86 BPM | OXYGEN SATURATION: 92 %

## 2019-11-11 DIAGNOSIS — R05.9 COUGH: ICD-10-CM

## 2019-11-11 DIAGNOSIS — J06.9 ACUTE URI: ICD-10-CM

## 2019-11-11 DIAGNOSIS — R09.89 RHONCHI: ICD-10-CM

## 2019-11-11 DIAGNOSIS — R05.9 COUGH: Primary | ICD-10-CM

## 2019-11-11 DIAGNOSIS — E66.01 MORBID OBESITY WITH BMI OF 40.0-44.9, ADULT: ICD-10-CM

## 2019-11-11 LAB
CTP QC/QA: YES
POC MOLECULAR INFLUENZA A AGN: NEGATIVE
POC MOLECULAR INFLUENZA B AGN: NEGATIVE

## 2019-11-11 PROCEDURE — 3075F SYST BP GE 130 - 139MM HG: CPT | Mod: CPTII,S$GLB,, | Performed by: PHYSICIAN ASSISTANT

## 2019-11-11 PROCEDURE — 99283 EMERGENCY DEPT VISIT LOW MDM: CPT | Mod: 25

## 2019-11-11 PROCEDURE — 87502 POCT INFLUENZA A/B MOLECULAR: ICD-10-PCS | Mod: QW,S$GLB,, | Performed by: PHYSICIAN ASSISTANT

## 2019-11-11 PROCEDURE — 1101F PR PT FALLS ASSESS DOC 0-1 FALLS W/OUT INJ PAST YR: ICD-10-PCS | Mod: CPTII,S$GLB,, | Performed by: PHYSICIAN ASSISTANT

## 2019-11-11 PROCEDURE — 99214 PR OFFICE/OUTPT VISIT, EST, LEVL IV, 30-39 MIN: ICD-10-PCS | Mod: 25,S$GLB,, | Performed by: PHYSICIAN ASSISTANT

## 2019-11-11 PROCEDURE — 99214 OFFICE O/P EST MOD 30 MIN: CPT | Mod: 25,S$GLB,, | Performed by: PHYSICIAN ASSISTANT

## 2019-11-11 PROCEDURE — 99999 PR PBB SHADOW E&M-EST. PATIENT-LVL IV: CPT | Mod: PBBFAC,,, | Performed by: PHYSICIAN ASSISTANT

## 2019-11-11 PROCEDURE — 3075F PR MOST RECENT SYSTOLIC BLOOD PRESS GE 130-139MM HG: ICD-10-PCS | Mod: CPTII,S$GLB,, | Performed by: PHYSICIAN ASSISTANT

## 2019-11-11 PROCEDURE — 71046 X-RAY EXAM CHEST 2 VIEWS: CPT | Mod: TC,FY,PO

## 2019-11-11 PROCEDURE — 87502 INFLUENZA DNA AMP PROBE: CPT | Mod: QW,S$GLB,, | Performed by: PHYSICIAN ASSISTANT

## 2019-11-11 PROCEDURE — 71046 XR CHEST PA AND LATERAL: ICD-10-PCS | Mod: 26,,, | Performed by: RADIOLOGY

## 2019-11-11 PROCEDURE — 1101F PT FALLS ASSESS-DOCD LE1/YR: CPT | Mod: CPTII,S$GLB,, | Performed by: PHYSICIAN ASSISTANT

## 2019-11-11 PROCEDURE — 71046 X-RAY EXAM CHEST 2 VIEWS: CPT | Mod: 26,,, | Performed by: RADIOLOGY

## 2019-11-11 PROCEDURE — 3078F PR MOST RECENT DIASTOLIC BLOOD PRESSURE < 80 MM HG: ICD-10-PCS | Mod: CPTII,S$GLB,, | Performed by: PHYSICIAN ASSISTANT

## 2019-11-11 PROCEDURE — 51798 US URINE CAPACITY MEASURE: CPT

## 2019-11-11 PROCEDURE — 99999 PR PBB SHADOW E&M-EST. PATIENT-LVL IV: ICD-10-PCS | Mod: PBBFAC,,, | Performed by: PHYSICIAN ASSISTANT

## 2019-11-11 PROCEDURE — 3078F DIAST BP <80 MM HG: CPT | Mod: CPTII,S$GLB,, | Performed by: PHYSICIAN ASSISTANT

## 2019-11-11 RX ORDER — BENZONATATE 200 MG/1
200 CAPSULE ORAL 3 TIMES DAILY PRN
Qty: 30 CAPSULE | Refills: 0 | Status: SHIPPED | OUTPATIENT
Start: 2019-11-11 | End: 2019-11-21

## 2019-11-11 RX ORDER — AMOXICILLIN AND CLAVULANATE POTASSIUM 875; 125 MG/1; MG/1
1 TABLET, FILM COATED ORAL 2 TIMES DAILY
Qty: 14 TABLET | Refills: 0 | Status: SHIPPED | OUTPATIENT
Start: 2019-11-11 | End: 2019-11-18

## 2019-11-11 NOTE — TELEPHONE ENCOUNTER
Attempted to contact pt to inform him of his UTI and that antibiotics were sent to his pharmacy. No answer; msg left on pts voice mail as well as his wife's voice mail.

## 2019-11-11 NOTE — PROGRESS NOTES
Subjective:       Patient ID: Sherif Ragland is a 74 y.o. male.    Chief Complaint: cough  Cough   This is a new problem. The current episode started yesterday. The problem has been unchanged. The problem occurs every few hours. The cough is productive of sputum. Associated symptoms include chills and myalgias. Pertinent negatives include no chest pain, ear congestion, ear pain, fever, headaches, heartburn, hemoptysis, nasal congestion, postnasal drip, rash, rhinorrhea, sore throat, shortness of breath, sweats, weight loss or wheezing. Associated symptoms comments: Recently had TURP. He has tried nothing for the symptoms. The treatment provided mild relief.     pertinent medical history: GERALD, DM II, hypoxia due to morbid obesity   Health Maintenance Due   Topic Date Due    TETANUS VACCINE  10/28/1963    Foot Exam  12/26/2018       Past Medical History:   Diagnosis Date    Diabetes mellitus, type 2 1997    Hyperlipidemia     Hypertension     Hypogonadism in male     Renal insufficiency     Tubular adenoma 10/2017       Current Outpatient Medications   Medication Sig Dispense Refill    amLODIPine (NORVASC) 5 MG tablet Take 5 mg by mouth 2 (two) times daily.      amoxicillin-clavulanate 875-125mg (AUGMENTIN) 875-125 mg per tablet Take 1 tablet by mouth 2 (two) times daily. for 7 days 14 tablet 0    blood sugar diagnostic Strp To check BG 2 times daily, to use with insurance preferred meter 100 each 11    docusate sodium (COLACE) 100 MG capsule Take 1 capsule (100 mg total) by mouth 2 (two) times daily. 60 capsule 0    dulaglutide (TRULICITY) 0.75 mg/0.5 mL PnIj Inject 0.5 mLs (0.75 mg total) into the skin every 7 days. 2 mL 11    insulin (LANTUS SOLOSTAR U-100 INSULIN) glargine 100 units/mL (3mL) SubQ pen Inject 25 Units into the skin.      insulin detemir U-100 (LEVEMIR FLEXTOUCH U-100 INSULN) 100 unit/mL (3 mL) SubQ InPn pen Inject 40 Units into the skin 2 (two) times daily. 72 mL 3    lancets Misc  "To check BG 2 times daily, to use with insurance preferred meter 100 each 11    metoprolol succinate (TOPROL-XL) 100 MG 24 hr tablet Take 1 tablet (100 mg total) by mouth 2 (two) times daily. 60 tablet 11    mirabegron (MYRBETRIQ) 50 mg Tb24 Take 1 tablet (50 mg total) by mouth once daily. 30 tablet 11    oxybutynin (DITROPAN) 5 MG Tab Take 1 tablet (5 mg total) by mouth 3 (three) times daily. 90 tablet 11    oxyCODONE-acetaminophen (PERCOCET) 5-325 mg per tablet Take 1-2 tablets by mouth every 4 (four) hours as needed for Pain. 30 tablet 0    papaverine 30 mg/mL injection Inject 0.3 ml of trimix solution prn ED max once daily  Prepare 10ml of trimix solution containing:    Papaverine 30mg/ml    Phentolamine 1 mg/ml    Alprostadil 10mcg/ml  Dispense 10ml per refill  Qs syringes 1cc/30g/0.5" and alcohol swabs dispense as needed for Intracavernosal injection 10 mL 5    potassium chloride SA (K-DUR,KLOR-CON) 20 MEQ tablet Take 1 tablet (20 mEq total) by mouth 2 (two) times daily. 30 tablet 11    ranitidine (ZANTAC) 150 MG tablet TAKE 1 TABLET BY MOUTH TWICE DAILY 60 tablet 0    valsartan-hydrochlorothiazide (DIOVAN-HCT) 320-12.5 mg per tablet Take 1 tablet by mouth once daily. 90 tablet 3    benzonatate (TESSALON) 200 MG capsule Take 1 capsule (200 mg total) by mouth 3 (three) times daily as needed. 30 capsule 0    blood-glucose meter kit To check BG two times daily, to use with insurance preferred meter 1 each 0    furosemide (LASIX) 40 MG tablet Take 1 tablet (40 mg total) by mouth once daily. (Patient not taking: Reported on 11/11/2019) 60 tablet 11     No current facility-administered medications for this visit.        Review of Systems   Constitutional: Positive for chills. Negative for fatigue, fever, unexpected weight change and weight loss.   HENT: Negative for ear pain, postnasal drip, rhinorrhea, sore throat and trouble swallowing.    Respiratory: Negative for cough, hemoptysis, shortness of breath " and wheezing.    Cardiovascular: Negative for chest pain.   Gastrointestinal: Negative for abdominal pain and heartburn.   Genitourinary: Positive for decreased urine volume, difficulty urinating and dysuria.   Musculoskeletal: Positive for myalgias.   Skin: Negative for rash.   Neurological: Negative for headaches.   Hematological: Negative for adenopathy. Does not bruise/bleed easily.   All other systems reviewed and are negative.      Objective:   /70   Pulse 80   Temp 98.2 °F (36.8 °C) (Oral)   Ht 6' (1.829 m)   Wt 134.1 kg (295 lb 10.2 oz)   SpO2 (!) 93%   BMI 40.10 kg/m²      Physical Exam   Constitutional: He is oriented to person, place, and time. He appears well-developed. No distress.   Morbid obesity    HENT:   Head: Normocephalic and atraumatic.   Right Ear: External ear normal.   Left Ear: External ear normal.   Mouth/Throat: Oropharynx is clear and moist.   Eyes: Pupils are equal, round, and reactive to light. EOM are normal.   Neck: Normal range of motion. Neck supple.   Cardiovascular: Normal rate, regular rhythm, normal heart sounds and intact distal pulses.   Pulmonary/Chest: Effort normal and breath sounds normal.   Abdominal: Soft.   Musculoskeletal: He exhibits no edema.   Neurological: He is alert and oriented to person, place, and time.   Skin: Capillary refill takes less than 2 seconds.   Psychiatric: He has a normal mood and affect. His behavior is normal.         Lab Results   Component Value Date    WBC 10.97 11/12/2019    HGB 12.8 (L) 11/12/2019    HCT 40.3 11/12/2019     11/12/2019    CHOL 219 (H) 07/03/2019    TRIG 105 07/03/2019    HDL 52 07/03/2019    ALT 15 11/12/2019    AST 15 11/12/2019     11/12/2019    K 4.1 11/12/2019    CL 96 11/12/2019    CREATININE 2.0 (H) 11/12/2019    BUN 29 (H) 11/12/2019    CO2 28 11/12/2019    TSH 2.302 01/04/2019    PSA 3.2 09/19/2018    INR 0.9 11/12/2019    HGBA1C 8.9 (H) 07/03/2019       Assessment:       1. Cough    2. Acute  URI    3. Rhonchi    4. Morbid obesity with BMI of 40.0-44.9, adult        Plan:   Cough  -     POCT Influenza A/B Molecular  -     X-Ray Chest PA And Lateral; Future; Expected date: 11/11/2019    Acute URI  -     POCT Influenza A/B Molecular  -     X-Ray Chest PA And Lateral; Future; Expected date: 11/11/2019    Rhonchi  -     X-Ray Chest PA And Lateral; Future; Expected date: 11/11/2019    Morbid obesity with BMI of 40.0-44.9, adult    Other orders  -     benzonatate (TESSALON) 200 MG capsule; Take 1 capsule (200 mg total) by mouth 3 (three) times daily as needed.  Dispense: 30 capsule; Refill: 0      Xray shows no acute process   Patient has rx from urology of Augmentin due to UTI  Start this abx today   Negative flu     No follow-ups on file.

## 2019-11-12 ENCOUNTER — HOSPITAL ENCOUNTER (EMERGENCY)
Facility: HOSPITAL | Age: 74
Discharge: HOME | End: 2019-11-12
Attending: FAMILY MEDICINE
Payer: MEDICARE

## 2019-11-12 ENCOUNTER — PATIENT MESSAGE (OUTPATIENT)
Dept: UROLOGY | Facility: CLINIC | Age: 74
End: 2019-11-12

## 2019-11-12 ENCOUNTER — TELEPHONE (OUTPATIENT)
Dept: UROLOGY | Facility: CLINIC | Age: 74
End: 2019-11-12

## 2019-11-12 DIAGNOSIS — R33.9 URINARY RETENTION: Primary | ICD-10-CM

## 2019-11-12 PROBLEM — E66.01 MORBID OBESITY WITH BMI OF 40.0-44.9, ADULT: Status: ACTIVE | Noted: 2019-11-12

## 2019-11-12 LAB
ALBUMIN SERPL BCP-MCNC: 3.6 G/DL (ref 3.5–5.2)
ALP SERPL-CCNC: 192 U/L (ref 55–135)
ALT SERPL W/O P-5'-P-CCNC: 15 U/L (ref 10–44)
ANION GAP SERPL CALC-SCNC: 12 MMOL/L (ref 8–16)
AST SERPL-CCNC: 15 U/L (ref 10–40)
BACTERIA #/AREA URNS HPF: ABNORMAL /HPF
BASOPHILS # BLD AUTO: 0.05 K/UL (ref 0–0.2)
BASOPHILS NFR BLD: 0.5 % (ref 0–1.9)
BILIRUB SERPL-MCNC: 0.5 MG/DL (ref 0.1–1)
BILIRUB UR QL STRIP: NEGATIVE
BUN SERPL-MCNC: 29 MG/DL (ref 8–23)
CALCIUM SERPL-MCNC: 10 MG/DL (ref 8.7–10.5)
CHLORIDE SERPL-SCNC: 96 MMOL/L (ref 95–110)
CLARITY UR: CLEAR
CO2 SERPL-SCNC: 28 MMOL/L (ref 23–29)
COLOR UR: YELLOW
CREAT SERPL-MCNC: 2 MG/DL (ref 0.5–1.4)
DIFFERENTIAL METHOD: ABNORMAL
EOSINOPHIL # BLD AUTO: 0.3 K/UL (ref 0–0.5)
EOSINOPHIL NFR BLD: 2.9 % (ref 0–8)
ERYTHROCYTE [DISTWIDTH] IN BLOOD BY AUTOMATED COUNT: 12.3 % (ref 11.5–14.5)
EST. GFR  (AFRICAN AMERICAN): 37 ML/MIN/1.73 M^2
EST. GFR  (NON AFRICAN AMERICAN): 32 ML/MIN/1.73 M^2
GLUCOSE SERPL-MCNC: 364 MG/DL (ref 70–110)
GLUCOSE UR QL STRIP: ABNORMAL
HCT VFR BLD AUTO: 40.3 % (ref 40–54)
HGB BLD-MCNC: 12.8 G/DL (ref 14–18)
HGB UR QL STRIP: ABNORMAL
HYALINE CASTS #/AREA URNS LPF: 0 /LPF
IMM GRANULOCYTES # BLD AUTO: 0.05 K/UL (ref 0–0.04)
IMM GRANULOCYTES NFR BLD AUTO: 0.5 % (ref 0–0.5)
INR PPP: 0.9 (ref 0.8–1.2)
KETONES UR QL STRIP: NEGATIVE
LEUKOCYTE ESTERASE UR QL STRIP: NEGATIVE
LYMPHOCYTES # BLD AUTO: 0.8 K/UL (ref 1–4.8)
LYMPHOCYTES NFR BLD: 7.5 % (ref 18–48)
MCH RBC QN AUTO: 29.4 PG (ref 27–31)
MCHC RBC AUTO-ENTMCNC: 31.8 G/DL (ref 32–36)
MCV RBC AUTO: 93 FL (ref 82–98)
MICROSCOPIC COMMENT: ABNORMAL
MONOCYTES # BLD AUTO: 1.3 K/UL (ref 0.3–1)
MONOCYTES NFR BLD: 11.5 % (ref 4–15)
NEUTROPHILS # BLD AUTO: 8.5 K/UL (ref 1.8–7.7)
NEUTROPHILS NFR BLD: 77.1 % (ref 38–73)
NITRITE UR QL STRIP: NEGATIVE
NRBC BLD-RTO: 0 /100 WBC
PH UR STRIP: 6 [PH] (ref 5–8)
PLATELET # BLD AUTO: 299 K/UL (ref 150–350)
PMV BLD AUTO: 9.2 FL (ref 9.2–12.9)
POTASSIUM SERPL-SCNC: 4.1 MMOL/L (ref 3.5–5.1)
PROT SERPL-MCNC: 7.5 G/DL (ref 6–8.4)
PROT UR QL STRIP: ABNORMAL
PROTHROMBIN TIME: 9.9 SEC (ref 9–12.5)
RBC # BLD AUTO: 4.35 M/UL (ref 4.6–6.2)
RBC #/AREA URNS HPF: 10 /HPF (ref 0–4)
SODIUM SERPL-SCNC: 136 MMOL/L (ref 136–145)
SP GR UR STRIP: 1.02 (ref 1–1.03)
URN SPEC COLLECT METH UR: ABNORMAL
UROBILINOGEN UR STRIP-ACNC: NEGATIVE EU/DL
WBC # BLD AUTO: 10.97 K/UL (ref 3.9–12.7)
WBC #/AREA URNS HPF: 20 /HPF (ref 0–5)
YEAST URNS QL MICRO: ABNORMAL

## 2019-11-12 PROCEDURE — 51798 US URINE CAPACITY MEASURE: CPT

## 2019-11-12 PROCEDURE — 80053 COMPREHEN METABOLIC PANEL: CPT

## 2019-11-12 PROCEDURE — 81000 URINALYSIS NONAUTO W/SCOPE: CPT

## 2019-11-12 PROCEDURE — 85025 COMPLETE CBC W/AUTO DIFF WBC: CPT

## 2019-11-12 PROCEDURE — 36415 COLL VENOUS BLD VENIPUNCTURE: CPT

## 2019-11-12 PROCEDURE — 85610 PROTHROMBIN TIME: CPT

## 2019-11-12 NOTE — ED PROVIDER NOTES
SCRIBE #1 NOTE: I, Jessica Daniels, am scribing for, and in the presence of, Sindi Howard MD. I have scribed the entire note.      History      Chief Complaint   Patient presents with    Urinary Retention     increased urinary retention following prostate sx two weeks ago       Review of patient's allergies indicates:  No Known Allergies     HPI   HPI    11/12/2019, 1:40 AM   History obtained from the patient      History of Present Illness: Sherif Ragland is a 74 y.o. male patient who presents to the Emergency Department for evaluation of Urinary Retention. Patient reports having Prostate Surgery on 10/17 and has had difficulty urinating since initial marie catheter was removed post surgery. Symptoms are worsening and moderate in severity. No mitigating or exacerbating factors reported. No associated sxs. Patient denies any fever, N/V/D, dysuria, hematuria, flank pain, and all other sxs at this time. Patient was started on abx today due to recent UTI diagnosis. No further complaints or concerns at this time.         Arrival mode: Personal vehicle      PCP: Tobias Fox MD       Past Medical History:  Past Medical History:   Diagnosis Date    Diabetes mellitus, type 2 1997    Hyperlipidemia     Hypertension     Hypogonadism in male     Renal insufficiency     Tubular adenoma 10/2017       Past Surgical History:  Past Surgical History:   Procedure Laterality Date    CATARACT EXTRACTION Right 01/31/2018    CATARACT EXTRACTION W/  INTRAOCULAR LENS IMPLANT Left 03/13/2019    COLONOSCOPY N/A 11/2/2017    Procedure: COLONOSCOPY;  Surgeon: Alek Francois MD;  Location: [facility] ENDO;  Service: Endoscopy;  Laterality: N/A;    TIBIA FRACTURE SURGERY      right leg x2     TONSILLECTOMY      TRANSURETHRAL RESECTION OF PROSTATE N/A 10/17/2019    Procedure: TURP (TRANSURETHRAL RESECTION OF PROSTATE);  Surgeon: Mir Hale IV, MD;  Location: [facility] OR;  Service: Urology;  Laterality: N/A;         Family  History:  Family History   Problem Relation Age of Onset    Stroke Mother     Heart disease Father     Stroke Brother        Social History:  Social History     Tobacco Use    Smoking status: Never Smoker    Smokeless tobacco: Never Used   Substance and Sexual Activity    Alcohol use: No    Drug use: No    Sexual activity: Yes     Partners: Female       ROS   Review of Systems   Constitutional: Negative for fever.   HENT: Negative for sore throat.    Respiratory: Negative for shortness of breath.    Cardiovascular: Negative for chest pain.   Gastrointestinal: Negative for diarrhea, nausea and vomiting.   Genitourinary: Positive for difficulty urinating. Negative for dysuria, flank pain and hematuria.   Musculoskeletal: Negative for back pain.   Skin: Negative for rash.   Neurological: Negative for weakness.   Hematological: Does not bruise/bleed easily.   All other systems reviewed and are negative.    Physical Exam      Initial Vitals [11/11/19 2356]   BP Pulse Resp Temp SpO2   (!) 148/81 86 17 98.2 °F (36.8 °C) (!) 92 %      MAP       --          Physical Exam  Nursing Notes and Vital Signs Reviewed.  Constitutional: Patient is in no acute distress. Well-developed and well-nourished.  Head: Atraumatic. Normocephalic.  Eyes: PERRL. EOM intact. Conjunctivae are not pale. No scleral icterus.  ENT: Mucous membranes are moist.  Neck: Supple. Full ROM. No lymphadenopathy.  Cardiovascular: Regular rate. Regular rhythm. No murmurs, rubs, or gallops. Distal pulses are 2+ and symmetric.  Pulmonary/Chest: No respiratory distress. Clear to auscultation bilaterally. No wheezing or rales.  Abdominal: Soft and non-distended.  There is suprapubic tenderness.  Genitourinary: No CVA tenderness.  Musculoskeletal: Moves all extremities. No obvious deformities.  Skin: Warm and dry.  Neurological:  Alert, awake, and appropriate.  Normal speech.  No acute focal neurological deficits are appreciated.  Psychiatric: Normal affect.  Good eye contact. Appropriate in content.    ED Course    Procedures  ED Vital Signs:  Vitals:    11/11/19 2356   BP: (!) 148/81   Pulse: 86   Resp: 17   Temp: 98.2 °F (36.8 °C)   TempSrc: Oral   SpO2: (!) 92%   Weight: 134 kg (295 lb 6.7 oz)   Height: 6' (1.829 m)       Abnormal Lab Results:  Labs Reviewed   CBC W/ AUTO DIFFERENTIAL - Abnormal; Notable for the following components:       Result Value    RBC 4.35 (*)     Hemoglobin 12.8 (*)     Mean Corpuscular Hemoglobin Conc 31.8 (*)     Gran # (ANC) 8.5 (*)     Immature Grans (Abs) 0.05 (*)     Lymph # 0.8 (*)     Mono # 1.3 (*)     Gran% 77.1 (*)     Lymph% 7.5 (*)     All other components within normal limits   COMPREHENSIVE METABOLIC PANEL - Abnormal; Notable for the following components:    Glucose 364 (*)     BUN, Bld 29 (*)     Creatinine 2.0 (*)     Alkaline Phosphatase 192 (*)     eGFR if  37 (*)     eGFR if non  32 (*)     All other components within normal limits   URINALYSIS - Abnormal; Notable for the following components:    Protein, UA 1+ (*)     Glucose, UA 3+ (*)     Occult Blood UA 2+ (*)     All other components within normal limits   URINALYSIS MICROSCOPIC - Abnormal; Notable for the following components:    RBC, UA 10 (*)     WBC, UA 20 (*)     All other components within normal limits   PROTIME-INR        All Lab Results:  Results for orders placed or performed during the hospital encounter of 11/12/19   CBC auto differential   Result Value Ref Range    WBC 10.97 3.90 - 12.70 K/uL    RBC 4.35 (L) 4.60 - 6.20 M/uL    Hemoglobin 12.8 (L) 14.0 - 18.0 g/dL    Hematocrit 40.3 40.0 - 54.0 %    Mean Corpuscular Volume 93 82 - 98 fL    Mean Corpuscular Hemoglobin 29.4 27.0 - 31.0 pg    Mean Corpuscular Hemoglobin Conc 31.8 (L) 32.0 - 36.0 g/dL    RDW 12.3 11.5 - 14.5 %    Platelets 299 150 - 350 K/uL    MPV 9.2 9.2 - 12.9 fL    Immature Granulocytes 0.5 0.0 - 0.5 %    Gran # (ANC) 8.5 (H) 1.8 - 7.7 K/uL    Immature  Grans (Abs) 0.05 (H) 0.00 - 0.04 K/uL    Lymph # 0.8 (L) 1.0 - 4.8 K/uL    Mono # 1.3 (H) 0.3 - 1.0 K/uL    Eos # 0.3 0.0 - 0.5 K/uL    Baso # 0.05 0.00 - 0.20 K/uL    nRBC 0 0 /100 WBC    Gran% 77.1 (H) 38.0 - 73.0 %    Lymph% 7.5 (L) 18.0 - 48.0 %    Mono% 11.5 4.0 - 15.0 %    Eosinophil% 2.9 0.0 - 8.0 %    Basophil% 0.5 0.0 - 1.9 %    Differential Method Automated    Comprehensive metabolic panel   Result Value Ref Range    Sodium 136 136 - 145 mmol/L    Potassium 4.1 3.5 - 5.1 mmol/L    Chloride 96 95 - 110 mmol/L    CO2 28 23 - 29 mmol/L    Glucose 364 (H) 70 - 110 mg/dL    BUN, Bld 29 (H) 8 - 23 mg/dL    Creatinine 2.0 (H) 0.5 - 1.4 mg/dL    Calcium 10.0 8.7 - 10.5 mg/dL    Total Protein 7.5 6.0 - 8.4 g/dL    Albumin 3.6 3.5 - 5.2 g/dL    Total Bilirubin 0.5 0.1 - 1.0 mg/dL    Alkaline Phosphatase 192 (H) 55 - 135 U/L    AST 15 10 - 40 U/L    ALT 15 10 - 44 U/L    Anion Gap 12 8 - 16 mmol/L    eGFR if African American 37 (A) >60 mL/min/1.73 m^2    eGFR if non African American 32 (A) >60 mL/min/1.73 m^2   Urinalysis - Clean Catch   Result Value Ref Range    Specimen UA Urine, Clean Catch     Color, UA Yellow Yellow, Straw, Bea    Appearance, UA Clear Clear    pH, UA 6.0 5.0 - 8.0    Specific Gravity, UA 1.020 1.005 - 1.030    Protein, UA 1+ (A) Negative    Glucose, UA 3+ (A) Negative    Ketones, UA Negative Negative    Bilirubin (UA) Negative Negative    Occult Blood UA 2+ (A) Negative    Nitrite, UA Negative Negative    Urobilinogen, UA Negative <2.0 EU/dL    Leukocytes, UA Negative Negative   Protime-INR   Result Value Ref Range    Prothrombin Time 9.9 9.0 - 12.5 sec    INR 0.9 0.8 - 1.2   Urinalysis Microscopic   Result Value Ref Range    RBC, UA 10 (H) 0 - 4 /hpf    WBC, UA 20 (H) 0 - 5 /hpf    Bacteria Rare None-Occ /hpf    Yeast, UA None None    Hyaline Casts, UA 0 0-1/lpf /lpf    Microscopic Comment SEE COMMENT          Imaging Results:  Imaging Results    None                 The Emergency Provider  reviewed the vital signs and test results, which are outlined above.    ED Discussion     2:05 AM: Reassessed pt at this time.  Pt states his condition has improved at this time. Discussed with pt all pertinent ED information and results. Discussed pt dx and plan of tx. Gave pt all f/u and return to the ED instructions. All questions and concerns were addressed at this time. Pt expresses understanding of information and instructions, and is comfortable with plan to discharge. Pt is stable for discharge.    I discussed with patient and/or family/caretaker that evaluation in the ED does not suggest any emergent or life threatening medical conditions requiring immediate intervention beyond what was provided in the ED, and I believe patient is safe for discharge.  Regardless, an unremarkable evaluation in the ED does not preclude the development or presence of a serious of life threatening condition. As such, patient was instructed to return immediately for any worsening or change in current symptoms.      ED Medication(s):  Medications - No data to display    Current Discharge Medication List          Follow-up Information     Mir Hale IV, MD. Schedule an appointment as soon as possible for a visit in 1 day.    Specialty:  Urology  Contact information:  [address] [place] [address] DR Carole DONALDSON [id number] [phone]                     Medical Decision Making    Medical Decision Making:   Clinical Tests:   Lab Tests: Ordered and Reviewed           Scribe Attestation:   Scribe #1: I performed the above scribed service and the documentation accurately describes the services I performed. I attest to the accuracy of the note.    Attending:   Physician Attestation Statement for Scribe #1: I, Sindi Howard MD, personally performed the services described in this documentation, as scribed by Jessica Daniels, in my presence, and it is both accurate and complete.          Clinical Impression       ICD-10-CM ICD-9-CM   1.  Urinary retention R33.9 788.20       Disposition:   Disposition: Discharged  Condition: Stable         Sindi Howard MD  11/14/19 3957

## 2019-11-12 NOTE — TELEPHONE ENCOUNTER
Spoke to Atif Ellyn and informed him that we received his message this morning, I messaged him back with Dr. Hale's response, but I saw that he hadn't read it. Dr. Hale wants him in the clinic for a Cysto soon, I saw that he already had an appt for 11/21/19, but I moved it up to Monday 11/18/19 at 1:30 pm. He verbally understood and will leave the catheter in until then.

## 2019-11-18 ENCOUNTER — OFFICE VISIT (OUTPATIENT)
Dept: UROLOGY | Facility: CLINIC | Age: 74
End: 2019-11-18
Payer: MEDICARE

## 2019-11-18 VITALS
DIASTOLIC BLOOD PRESSURE: 62 MMHG | HEIGHT: 72 IN | BODY MASS INDEX: 40.02 KG/M2 | WEIGHT: 295.44 LBS | SYSTOLIC BLOOD PRESSURE: 146 MMHG | HEART RATE: 78 BPM

## 2019-11-18 DIAGNOSIS — N40.0 BENIGN PROSTATIC HYPERPLASIA, UNSPECIFIED WHETHER LOWER URINARY TRACT SYMPTOMS PRESENT: Primary | ICD-10-CM

## 2019-11-18 PROCEDURE — 99999 PR PBB SHADOW E&M-EST. PATIENT-LVL III: ICD-10-PCS | Mod: PBBFAC,,, | Performed by: UROLOGY

## 2019-11-18 PROCEDURE — 52000 CYSTOURETHROSCOPY: CPT | Mod: 78,S$GLB,, | Performed by: UROLOGY

## 2019-11-18 PROCEDURE — 96372 PR INJECTION,THERAP/PROPH/DIAG2ST, IM OR SUBCUT: ICD-10-PCS | Mod: 59,S$GLB,, | Performed by: UROLOGY

## 2019-11-18 PROCEDURE — 52000 PR CYSTOURETHROSCOPY: ICD-10-PCS | Mod: 78,S$GLB,, | Performed by: UROLOGY

## 2019-11-18 PROCEDURE — 99999 PR PBB SHADOW E&M-EST. PATIENT-LVL III: CPT | Mod: PBBFAC,,, | Performed by: UROLOGY

## 2019-11-18 PROCEDURE — 99024 POSTOP FOLLOW-UP VISIT: CPT | Mod: S$GLB,,, | Performed by: UROLOGY

## 2019-11-18 PROCEDURE — 96372 THER/PROPH/DIAG INJ SC/IM: CPT | Mod: 59,S$GLB,, | Performed by: UROLOGY

## 2019-11-18 PROCEDURE — 99024 PR POST-OP FOLLOW-UP VISIT: ICD-10-PCS | Mod: S$GLB,,, | Performed by: UROLOGY

## 2019-11-18 RX ORDER — CEFTRIAXONE 1 G/1
1 INJECTION, POWDER, FOR SOLUTION INTRAMUSCULAR; INTRAVENOUS ONCE
Status: COMPLETED | OUTPATIENT
Start: 2019-11-18 | End: 2019-11-18

## 2019-11-18 RX ADMIN — CEFTRIAXONE 1 G: 1 INJECTION, POWDER, FOR SOLUTION INTRAMUSCULAR; INTRAVENOUS at 02:11

## 2019-11-18 NOTE — PROGRESS NOTES
Pt had a 16fr indwelling catheter with 10cc balloon.  Balloon deflated and catheter removed before cysto.

## 2019-11-18 NOTE — PROGRESS NOTES
Catheter insertion ordered per Dr Hale.  Using sterile technique, I inserted a 16fr indwelling marie catheter with 10ml bulb into patient's bladder.  Noticed 215ml of clear, yellow urine return. Inflated 10ml bulb and attached leg bag to patient's leg. Patient tolerated well.  1 gram Rocephin given IM via left ventrogluteal. Aseptic technique maintained. Advised patient to remain in room for 15 minutes. Patient tolerated well with no signs of adverse reactions.
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
## 2019-11-20 ENCOUNTER — CLINICAL SUPPORT (OUTPATIENT)
Dept: UROLOGY | Facility: CLINIC | Age: 74
End: 2019-11-20
Payer: MEDICARE

## 2019-11-20 NOTE — PROGRESS NOTES
Pt here in clinic today for voiding trial.  240cc sterile water inserted into the bladder via 16f indwelling catheter. I then removed the 10cc of water in the balloon of the catheter and removed the catheter.  Pt voided 75cc.  PVR done and pt had 105cc urine in his bladder.  I informed Dr Hale of the results.  Orders to leave catheter out.  Have pt return tomorrow for PVR.  Instructed pt if he was unable to void during the night he would need to go to the ER for evaluation.  Pt voices understanding.  D/C'ed to home.

## 2019-11-20 NOTE — PROGRESS NOTES
Pushing out his call since he has a lack of readings. His recent labs show his hypokalemia has corrected.

## 2019-11-21 ENCOUNTER — PATIENT OUTREACH (OUTPATIENT)
Dept: ADMINISTRATIVE | Facility: HOSPITAL | Age: 74
End: 2019-11-21

## 2019-11-29 ENCOUNTER — PATIENT MESSAGE (OUTPATIENT)
Dept: PULMONOLOGY | Facility: CLINIC | Age: 74
End: 2019-11-29

## 2019-12-28 ENCOUNTER — PATIENT MESSAGE (OUTPATIENT)
Dept: INTERNAL MEDICINE | Facility: CLINIC | Age: 74
End: 2019-12-28

## 2019-12-30 RX ORDER — AMLODIPINE BESYLATE 5 MG/1
5 TABLET ORAL 2 TIMES DAILY
Qty: 60 TABLET | Refills: 11 | Status: SHIPPED | OUTPATIENT
Start: 2019-12-30 | End: 2020-02-17

## 2019-12-30 RX ORDER — AMLODIPINE BESYLATE 5 MG/1
TABLET ORAL
Qty: 60 TABLET | Refills: 11 | Status: SHIPPED | OUTPATIENT
Start: 2019-12-30 | End: 2021-01-07 | Stop reason: SDUPTHER

## 2020-02-10 ENCOUNTER — LAB VISIT (OUTPATIENT)
Dept: LAB | Facility: HOSPITAL | Age: 75
End: 2020-02-10
Attending: UROLOGY
Payer: MEDICARE

## 2020-02-10 ENCOUNTER — OFFICE VISIT (OUTPATIENT)
Dept: UROLOGY | Facility: CLINIC | Age: 75
End: 2020-02-10
Payer: MEDICARE

## 2020-02-10 VITALS
HEART RATE: 60 BPM | SYSTOLIC BLOOD PRESSURE: 138 MMHG | DIASTOLIC BLOOD PRESSURE: 82 MMHG | WEIGHT: 286.63 LBS | HEIGHT: 72 IN | BODY MASS INDEX: 38.82 KG/M2

## 2020-02-10 DIAGNOSIS — E29.1 HYPOGONADISM IN MALE: ICD-10-CM

## 2020-02-10 DIAGNOSIS — Z12.5 PROSTATE CANCER SCREENING: ICD-10-CM

## 2020-02-10 DIAGNOSIS — I10 HYPERTENSION, UNSPECIFIED TYPE: ICD-10-CM

## 2020-02-10 DIAGNOSIS — N40.0 BENIGN PROSTATIC HYPERPLASIA, UNSPECIFIED WHETHER LOWER URINARY TRACT SYMPTOMS PRESENT: ICD-10-CM

## 2020-02-10 DIAGNOSIS — N52.9 ERECTILE DYSFUNCTION, UNSPECIFIED ERECTILE DYSFUNCTION TYPE: ICD-10-CM

## 2020-02-10 DIAGNOSIS — N40.0 BENIGN PROSTATIC HYPERPLASIA, UNSPECIFIED WHETHER LOWER URINARY TRACT SYMPTOMS PRESENT: Primary | ICD-10-CM

## 2020-02-10 LAB
BILIRUB SERPL-MCNC: ABNORMAL MG/DL
BLOOD URINE, POC: 50
COLOR, POC UA: YELLOW
GLUCOSE UR QL STRIP: 500
KETONES UR QL STRIP: ABNORMAL
LEUKOCYTE ESTERASE URINE, POC: ABNORMAL
NITRITE, POC UA: ABNORMAL
PH, POC UA: 6
PROTEIN, POC: ABNORMAL
SPECIFIC GRAVITY, POC UA: 1.01
UROBILINOGEN, POC UA: ABNORMAL

## 2020-02-10 PROCEDURE — 36415 COLL VENOUS BLD VENIPUNCTURE: CPT

## 2020-02-10 PROCEDURE — 81002 POCT URINE DIPSTICK WITHOUT MICROSCOPE: ICD-10-PCS | Mod: S$GLB,,, | Performed by: UROLOGY

## 2020-02-10 PROCEDURE — 81002 URINALYSIS NONAUTO W/O SCOPE: CPT | Mod: S$GLB,,, | Performed by: UROLOGY

## 2020-02-10 PROCEDURE — 3079F DIAST BP 80-89 MM HG: CPT | Mod: CPTII,S$GLB,, | Performed by: UROLOGY

## 2020-02-10 PROCEDURE — 99214 PR OFFICE/OUTPT VISIT, EST, LEVL IV, 30-39 MIN: ICD-10-PCS | Mod: 25,S$GLB,, | Performed by: UROLOGY

## 2020-02-10 PROCEDURE — 1159F MED LIST DOCD IN RCRD: CPT | Mod: S$GLB,,, | Performed by: UROLOGY

## 2020-02-10 PROCEDURE — 99999 PR PBB SHADOW E&M-EST. PATIENT-LVL III: ICD-10-PCS | Mod: PBBFAC,,, | Performed by: UROLOGY

## 2020-02-10 PROCEDURE — 3075F PR MOST RECENT SYSTOLIC BLOOD PRESS GE 130-139MM HG: ICD-10-PCS | Mod: CPTII,S$GLB,, | Performed by: UROLOGY

## 2020-02-10 PROCEDURE — 3075F SYST BP GE 130 - 139MM HG: CPT | Mod: CPTII,S$GLB,, | Performed by: UROLOGY

## 2020-02-10 PROCEDURE — 1125F PR PAIN SEVERITY QUANTIFIED, PAIN PRESENT: ICD-10-PCS | Mod: S$GLB,,, | Performed by: UROLOGY

## 2020-02-10 PROCEDURE — 1101F PR PT FALLS ASSESS DOC 0-1 FALLS W/OUT INJ PAST YR: ICD-10-PCS | Mod: CPTII,S$GLB,, | Performed by: UROLOGY

## 2020-02-10 PROCEDURE — 96372 PR INJECTION,THERAP/PROPH/DIAG2ST, IM OR SUBCUT: ICD-10-PCS | Mod: S$GLB,,, | Performed by: UROLOGY

## 2020-02-10 PROCEDURE — 99214 OFFICE O/P EST MOD 30 MIN: CPT | Mod: 25,S$GLB,, | Performed by: UROLOGY

## 2020-02-10 PROCEDURE — 1101F PT FALLS ASSESS-DOCD LE1/YR: CPT | Mod: CPTII,S$GLB,, | Performed by: UROLOGY

## 2020-02-10 PROCEDURE — 84153 ASSAY OF PSA TOTAL: CPT

## 2020-02-10 PROCEDURE — 3079F PR MOST RECENT DIASTOLIC BLOOD PRESSURE 80-89 MM HG: ICD-10-PCS | Mod: CPTII,S$GLB,, | Performed by: UROLOGY

## 2020-02-10 PROCEDURE — 99999 PR PBB SHADOW E&M-EST. PATIENT-LVL III: CPT | Mod: PBBFAC,,, | Performed by: UROLOGY

## 2020-02-10 PROCEDURE — 1159F PR MEDICATION LIST DOCUMENTED IN MEDICAL RECORD: ICD-10-PCS | Mod: S$GLB,,, | Performed by: UROLOGY

## 2020-02-10 PROCEDURE — 1125F AMNT PAIN NOTED PAIN PRSNT: CPT | Mod: S$GLB,,, | Performed by: UROLOGY

## 2020-02-10 PROCEDURE — 96372 THER/PROPH/DIAG INJ SC/IM: CPT | Mod: S$GLB,,, | Performed by: UROLOGY

## 2020-02-10 RX ORDER — TESTOSTERONE CYPIONATE 200 MG/ML
200 INJECTION, SOLUTION INTRAMUSCULAR
Status: COMPLETED | OUTPATIENT
Start: 2020-02-10 | End: 2020-02-10

## 2020-02-10 RX ADMIN — TESTOSTERONE CYPIONATE 200 MG: 200 INJECTION, SOLUTION INTRAMUSCULAR at 12:02
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
## 2020-02-10 NOTE — PROGRESS NOTES
At 12:06 pm:  Patient in today for Depo injection.  Site cleaned with alcohol wipe, 200mg Depotestosterone adm IM to Right ventrogluteal.  Pt tolerated procedure well.  Pt was asked to remain in room for 15 minutes for observation.

## 2020-02-11 ENCOUNTER — TELEPHONE (OUTPATIENT)
Dept: UROLOGY | Facility: CLINIC | Age: 75
End: 2020-02-11

## 2020-02-11 DIAGNOSIS — R97.20 ELEVATED PSA: Primary | ICD-10-CM

## 2020-02-11 DIAGNOSIS — Z12.5 ENCOUNTER FOR SCREENING FOR MALIGNANT NEOPLASM OF PROSTATE: ICD-10-CM

## 2020-02-11 LAB — COMPLEXED PSA SERPL-MCNC: 24 NG/ML (ref 0–4)

## 2020-02-13 ENCOUNTER — HOSPITAL ENCOUNTER (EMERGENCY)
Facility: HOSPITAL | Age: 75
Discharge: HOME | End: 2020-02-13
Attending: EMERGENCY MEDICINE
Payer: MEDICARE

## 2020-02-13 VITALS
BODY MASS INDEX: 38.98 KG/M2 | OXYGEN SATURATION: 98 % | SYSTOLIC BLOOD PRESSURE: 146 MMHG | HEIGHT: 72 IN | DIASTOLIC BLOOD PRESSURE: 74 MMHG | TEMPERATURE: 99 F | HEART RATE: 87 BPM | WEIGHT: 287.81 LBS | RESPIRATION RATE: 20 BRPM

## 2020-02-13 DIAGNOSIS — R20.0 NUMBNESS OF FACE: ICD-10-CM

## 2020-02-13 DIAGNOSIS — M79.10 MYALGIA: Primary | ICD-10-CM

## 2020-02-13 LAB
ALBUMIN SERPL BCP-MCNC: 3.3 G/DL (ref 3.5–5.2)
ALP SERPL-CCNC: 1447 U/L (ref 55–135)
ALT SERPL W/O P-5'-P-CCNC: 17 U/L (ref 10–44)
ANION GAP SERPL CALC-SCNC: 11 MMOL/L (ref 8–16)
APTT BLDCRRT: 26.6 SEC (ref 21–32)
AST SERPL-CCNC: 31 U/L (ref 10–40)
BACTERIA #/AREA URNS HPF: ABNORMAL /HPF
BASOPHILS # BLD AUTO: 0.04 K/UL (ref 0–0.2)
BASOPHILS NFR BLD: 0.4 % (ref 0–1.9)
BILIRUB SERPL-MCNC: 0.7 MG/DL (ref 0.1–1)
BILIRUB UR QL STRIP: NEGATIVE
BUN SERPL-MCNC: 24 MG/DL (ref 8–23)
CALCIUM SERPL-MCNC: 9.3 MG/DL (ref 8.7–10.5)
CHLORIDE SERPL-SCNC: 98 MMOL/L (ref 95–110)
CK SERPL-CCNC: 111 U/L (ref 20–200)
CLARITY UR: CLEAR
CO2 SERPL-SCNC: 29 MMOL/L (ref 23–29)
COLOR UR: YELLOW
CREAT SERPL-MCNC: 1.7 MG/DL (ref 0.5–1.4)
DIFFERENTIAL METHOD: ABNORMAL
EOSINOPHIL # BLD AUTO: 0.1 K/UL (ref 0–0.5)
EOSINOPHIL NFR BLD: 1.2 % (ref 0–8)
ERYTHROCYTE [DISTWIDTH] IN BLOOD BY AUTOMATED COUNT: 13.5 % (ref 11.5–14.5)
ERYTHROCYTE [SEDIMENTATION RATE] IN BLOOD BY WESTERGREN METHOD: 44 MM/HR (ref 0–10)
EST. GFR  (AFRICAN AMERICAN): 45 ML/MIN/1.73 M^2
EST. GFR  (NON AFRICAN AMERICAN): 39 ML/MIN/1.73 M^2
GLUCOSE SERPL-MCNC: 398 MG/DL (ref 70–110)
GLUCOSE UR QL STRIP: ABNORMAL
HCT VFR BLD AUTO: 41.8 % (ref 40–54)
HGB BLD-MCNC: 13.2 G/DL (ref 14–18)
HGB UR QL STRIP: ABNORMAL
HYALINE CASTS #/AREA URNS LPF: 0 /LPF
IMM GRANULOCYTES # BLD AUTO: 0.12 K/UL (ref 0–0.04)
IMM GRANULOCYTES NFR BLD AUTO: 1.1 % (ref 0–0.5)
INFLUENZA A, MOLECULAR: NEGATIVE
INFLUENZA B, MOLECULAR: NEGATIVE
INR PPP: 1 (ref 0.8–1.2)
KETONES UR QL STRIP: NEGATIVE
LEUKOCYTE ESTERASE UR QL STRIP: NEGATIVE
LYMPHOCYTES # BLD AUTO: 0.8 K/UL (ref 1–4.8)
LYMPHOCYTES NFR BLD: 7.9 % (ref 18–48)
MCH RBC QN AUTO: 28.4 PG (ref 27–31)
MCHC RBC AUTO-ENTMCNC: 31.6 G/DL (ref 32–36)
MCV RBC AUTO: 90 FL (ref 82–98)
MICROSCOPIC COMMENT: ABNORMAL
MONOCYTES # BLD AUTO: 1.1 K/UL (ref 0.3–1)
MONOCYTES NFR BLD: 10.3 % (ref 4–15)
NEUTROPHILS # BLD AUTO: 8.3 K/UL (ref 1.8–7.7)
NEUTROPHILS NFR BLD: 79.1 % (ref 38–73)
NITRITE UR QL STRIP: NEGATIVE
NRBC BLD-RTO: 0 /100 WBC
PH UR STRIP: 7 [PH] (ref 5–8)
PLATELET # BLD AUTO: 280 K/UL (ref 150–350)
PMV BLD AUTO: 9.4 FL (ref 9.2–12.9)
POTASSIUM SERPL-SCNC: 4 MMOL/L (ref 3.5–5.1)
PROT SERPL-MCNC: 7.2 G/DL (ref 6–8.4)
PROT UR QL STRIP: ABNORMAL
PROTHROMBIN TIME: 10.2 SEC (ref 9–12.5)
RBC # BLD AUTO: 4.65 M/UL (ref 4.6–6.2)
RBC #/AREA URNS HPF: 5 /HPF (ref 0–4)
SODIUM SERPL-SCNC: 138 MMOL/L (ref 136–145)
SP GR UR STRIP: 1.02 (ref 1–1.03)
SPECIMEN SOURCE: NORMAL
TROPONIN I SERPL DL<=0.01 NG/ML-MCNC: <0.006 NG/ML (ref 0–0.03)
URN SPEC COLLECT METH UR: ABNORMAL
UROBILINOGEN UR STRIP-ACNC: NEGATIVE EU/DL
WBC # BLD AUTO: 10.47 K/UL (ref 3.9–12.7)
WBC #/AREA URNS HPF: 50 /HPF (ref 0–5)
YEAST URNS QL MICRO: ABNORMAL

## 2020-02-13 PROCEDURE — 87502 INFLUENZA DNA AMP PROBE: CPT

## 2020-02-13 PROCEDURE — 81000 URINALYSIS NONAUTO W/SCOPE: CPT

## 2020-02-13 PROCEDURE — 84153 ASSAY OF PSA TOTAL: CPT

## 2020-02-13 PROCEDURE — 93005 ELECTROCARDIOGRAM TRACING: CPT

## 2020-02-13 PROCEDURE — 80053 COMPREHEN METABOLIC PANEL: CPT

## 2020-02-13 PROCEDURE — 93010 ELECTROCARDIOGRAM REPORT: CPT | Mod: ,,, | Performed by: INTERNAL MEDICINE

## 2020-02-13 PROCEDURE — 63600175 PHARM REV CODE 636 W HCPCS: Performed by: EMERGENCY MEDICINE

## 2020-02-13 PROCEDURE — 96361 HYDRATE IV INFUSION ADD-ON: CPT

## 2020-02-13 PROCEDURE — 85025 COMPLETE CBC W/AUTO DIFF WBC: CPT

## 2020-02-13 PROCEDURE — 25000003 PHARM REV CODE 250: Performed by: EMERGENCY MEDICINE

## 2020-02-13 PROCEDURE — 96360 HYDRATION IV INFUSION INIT: CPT

## 2020-02-13 PROCEDURE — 82550 ASSAY OF CK (CPK): CPT

## 2020-02-13 PROCEDURE — 85610 PROTHROMBIN TIME: CPT

## 2020-02-13 PROCEDURE — 84484 ASSAY OF TROPONIN QUANT: CPT

## 2020-02-13 PROCEDURE — 99285 EMERGENCY DEPT VISIT HI MDM: CPT | Mod: 25

## 2020-02-13 PROCEDURE — 85730 THROMBOPLASTIN TIME PARTIAL: CPT

## 2020-02-13 PROCEDURE — 85651 RBC SED RATE NONAUTOMATED: CPT

## 2020-02-13 PROCEDURE — 87086 URINE CULTURE/COLONY COUNT: CPT

## 2020-02-13 PROCEDURE — 36415 COLL VENOUS BLD VENIPUNCTURE: CPT

## 2020-02-13 PROCEDURE — 93010 EKG 12-LEAD: ICD-10-PCS | Mod: ,,, | Performed by: INTERNAL MEDICINE

## 2020-02-13 RX ORDER — SULFAMETHOXAZOLE AND TRIMETHOPRIM 800; 160 MG/1; MG/1
1 TABLET ORAL 2 TIMES DAILY
Qty: 14 TABLET | Refills: 0 | Status: SHIPPED | OUTPATIENT
Start: 2020-02-13 | End: 2020-02-20

## 2020-02-13 RX ORDER — HYDROCODONE BITARTRATE AND ACETAMINOPHEN 10; 325 MG/1; MG/1
1 TABLET ORAL EVERY 4 HOURS PRN
Qty: 18 TABLET | Refills: 0 | Status: SHIPPED | OUTPATIENT
Start: 2020-02-13 | End: 2020-02-27 | Stop reason: SDUPTHER

## 2020-02-13 RX ORDER — SULFAMETHOXAZOLE AND TRIMETHOPRIM 800; 160 MG/1; MG/1
1 TABLET ORAL 2 TIMES DAILY
Qty: 14 TABLET | Refills: 0 | Status: SHIPPED | OUTPATIENT
Start: 2020-02-13 | End: 2020-02-13 | Stop reason: SDUPTHER

## 2020-02-13 RX ORDER — HYDROCODONE BITARTRATE AND ACETAMINOPHEN 10; 325 MG/1; MG/1
1 TABLET ORAL
Status: COMPLETED | OUTPATIENT
Start: 2020-02-13 | End: 2020-02-13

## 2020-02-13 RX ADMIN — HYDROCODONE BITARTRATE AND ACETAMINOPHEN 1 TABLET: 10; 325 TABLET ORAL at 03:02

## 2020-02-13 RX ADMIN — SODIUM CHLORIDE 1000 ML: 0.9 INJECTION, SOLUTION INTRAVENOUS at 03:02

## 2020-02-13 NOTE — ED NOTES
Patient placed in gown on continuous cardiac monitor, automatic blood pressure cuff and continuous pulse oximeter.

## 2020-02-13 NOTE — ED PROVIDER NOTES
2/13/2020, 1:03 PM   History obtained from the patient      History of Present Illness: Sherif Ragland is a 74 y.o. male patient who presents to the Emergency Department for numbness to face which onset this morning at 3:30 AM.    1:06 PM: Pt understands they will be seen and dispositioned by the next available physician. Pt voices understanding and agrees with plan. Pt also understands the longer than normal wait time. I am removing myself from the care of pt and pt will now be assigned to the next available physician.     LEWIS Prajapati FNP-C    SCRIBE #1 NOTE: I, Burt Lewis, am scribing for, and in the presence of, Shonda Ramirez DO. I have scribed the HPI, ROS, and PEx.    SCRIBE #2 NOTE: I, Irais Rosales, am scribing for, and in the presence of,  Sindi Howard MD. I have scribed the remaining portions of the note not scribed by Scribe #1.      History     Chief Complaint   Patient presents with    Numbness     Numbness to lips and chin beginning at 0330; generalized joint pain beginning 2 days ago     Review of patient's allergies indicates:  No Known Allergies      History of Present Illness     HPI    2/13/2020, 2:48 PM  History obtained from the patient      History of Present Illness: Sherif Ragland is a 74 y.o. male patient with a PMHx of DM2, HLD, HTN, hypogonadism, tubular adenoma, and renal insufficiency who presents to the Emergency Department for evaluation of numbness to chin area which onset suddenly 13 hours ago. Pt had a transurethral resection of of prostate done in October 2019.  Pt reports going for a testosterone shot on Monday. The following day, he received a call about his PSA level being elevated. That day is when pt had a sudden onset of generalized arthralgias and myalgias that have progressively worsened. Symptoms are constant and moderate in severity. No mitigating or exacerbating factors reported. Patient denies any fever, chills, abd pain, n/v/d, dizziness, HA,  weakness, cough, dysuria, sore throat, congestion, eye pain, hematochezia, melena, SOB, CP, and all other sxs at this time. Prior Tx includes Norco with no relief. No further complaints or concerns at this time.       Arrival mode: Personal vehicle     PCP: Tobias Fox MD        Past Medical History:  Past Medical History:   Diagnosis Date    Diabetes mellitus, type 2 1997    Hyperlipidemia     Hypertension     Hypogonadism in male     Renal insufficiency     Tubular adenoma 10/2017       Past Surgical History:  Past Surgical History:   Procedure Laterality Date    CATARACT EXTRACTION Right 01/31/2018    CATARACT EXTRACTION W/  INTRAOCULAR LENS IMPLANT Left 03/13/2019    COLONOSCOPY N/A 11/2/2017    Procedure: COLONOSCOPY;  Surgeon: Alek Francois MD;  Location: [facility] ENDO;  Service: Endoscopy;  Laterality: N/A;    TIBIA FRACTURE SURGERY      right leg x2     TONSILLECTOMY      TRANSURETHRAL RESECTION OF PROSTATE N/A 10/17/2019    Procedure: TURP (TRANSURETHRAL RESECTION OF PROSTATE);  Surgeon: Mir Hale IV, MD;  Location: [facility] OR;  Service: Urology;  Laterality: N/A;         Family History:  Family History   Problem Relation Age of Onset    Stroke Mother     Heart disease Father     Stroke Brother        Social History:  Social History     Tobacco Use    Smoking status: Never Smoker    Smokeless tobacco: Never Used   Substance and Sexual Activity    Alcohol use: No    Drug use: No    Sexual activity: Yes     Partners: Female        Review of Systems     Review of Systems   Constitutional: Negative for chills and fever.   HENT: Negative for congestion and sore throat.    Eyes: Negative for pain.   Respiratory: Negative for cough and shortness of breath.    Cardiovascular: Negative for chest pain.   Gastrointestinal: Negative for abdominal pain, blood in stool, diarrhea, nausea and vomiting.        (-) melena   Genitourinary: Negative for dysuria.   Musculoskeletal: Positive for  arthralgias (generalized) and myalgias (generalized). Negative for back pain.   Skin: Negative for rash.   Neurological: Positive for numbness (chin area). Negative for dizziness, weakness and headaches.   Hematological: Does not bruise/bleed easily.   All other systems reviewed and are negative.     Physical Exam     Initial Vitals [02/13/20 1255]   BP Pulse Resp Temp SpO2   (!) 190/88 79 20 98.5 °F (36.9 °C) 96 %      MAP       --          Physical Exam  Nursing Notes and Vital Signs Reviewed.  Constitutional: Patient is in no apparent distress. Well-developed and well-nourished.  Head: Atraumatic. Normocephalic.  Eyes: PERRL. EOM intact. Conjunctivae are not pale. No scleral icterus.  ENT: Mucous membranes are moist. Oropharynx is clear and symmetric.    Neck: Supple. Full ROM. No lymphadenopathy.  Cardiovascular: Regular rate. Regular rhythm. No murmurs, rubs, or gallops. Distal pulses are 2+ and symmetric.  Pulmonary/Chest: No respiratory distress. Clear to auscultation bilaterally. No wheezing or rales.  Abdominal: Soft and non-distended.  There is no tenderness.  No rebound, guarding, or rigidity. Good bowel sounds.  Musculoskeletal: Moves all extremities. No obvious deformities. No edema.  Skin: Warm and dry.  Neurological:  Alert, awake, and appropriate.  Normal speech.  No acute focal neurological deficits are appreciated.  Psychiatric: Normal affect. Good eye contact. Appropriate in content.     ED Course   Procedures  ED Vital Signs:  Vitals:    02/13/20 1255 02/13/20 1728 02/13/20 1809   BP: (!) 190/88 (!) 146/74 (!) 146/74   Pulse: 79 75 87   Resp: 20 (!) 30 20   Temp: 98.5 °F (36.9 °C)  98.7 °F (37.1 °C)   TempSrc: Oral  Oral   SpO2: 96% (!) 92% 98%   Weight: 130.6 kg (287 lb 13 oz)     Height: 6' (1.829 m)         Abnormal Lab Results:  Labs Reviewed   CBC W/ AUTO DIFFERENTIAL - Abnormal; Notable for the following components:       Result Value    Hemoglobin 13.2 (*)     Mean Corpuscular Hemoglobin  Conc 31.6 (*)     Immature Granulocytes 1.1 (*)     Gran # (ANC) 8.3 (*)     Immature Grans (Abs) 0.12 (*)     Lymph # 0.8 (*)     Mono # 1.1 (*)     Gran% 79.1 (*)     Lymph% 7.9 (*)     All other components within normal limits   COMPREHENSIVE METABOLIC PANEL - Abnormal; Notable for the following components:    Glucose 398 (*)     BUN, Bld 24 (*)     Creatinine 1.7 (*)     Albumin 3.3 (*)     Alkaline Phosphatase 1,447 (*)     eGFR if  45 (*)     eGFR if non  39 (*)     All other components within normal limits   PSA, SCREENING - Abnormal; Notable for the following components:    PSA, SCREEN 28.9 (*)     All other components within normal limits   URINALYSIS, REFLEX TO URINE CULTURE - Abnormal; Notable for the following components:    Protein, UA 2+ (*)     Glucose, UA 3+ (*)     Occult Blood UA 2+ (*)     All other components within normal limits    Narrative:     Preferred Collection Type->Urine, Clean Catch   URINALYSIS MICROSCOPIC - Abnormal; Notable for the following components:    RBC, UA 5 (*)     WBC, UA 50 (*)     All other components within normal limits    Narrative:     Preferred Collection Type->Urine, Clean Catch   SEDIMENTATION RATE - Abnormal; Notable for the following components:    Sed Rate 44 (*)     All other components within normal limits   INFLUENZA A & B BY MOLECULAR   CULTURE, URINE    Narrative:     Preferred Collection Type->Urine, Clean Catch   PROTIME-INR   APTT   TROPONIN I   CK   SEDIMENTATION RATE   CK        All Lab Results:  Results for orders placed or performed during the hospital encounter of 02/13/20   Influenza A & B by Molecular   Result Value Ref Range    Influenza A, Molecular Negative Negative    Influenza B, Molecular Negative Negative    Flu A & B Source Nasal swab    Urine culture   Result Value Ref Range    Urine Culture, Routine No growth    CBC auto differential   Result Value Ref Range    WBC 10.47 3.90 - 12.70 K/uL    RBC 4.65 4.60  - 6.20 M/uL    Hemoglobin 13.2 (L) 14.0 - 18.0 g/dL    Hematocrit 41.8 40.0 - 54.0 %    Mean Corpuscular Volume 90 82 - 98 fL    Mean Corpuscular Hemoglobin 28.4 27.0 - 31.0 pg    Mean Corpuscular Hemoglobin Conc 31.6 (L) 32.0 - 36.0 g/dL    RDW 13.5 11.5 - 14.5 %    Platelets 280 150 - 350 K/uL    MPV 9.4 9.2 - 12.9 fL    Immature Granulocytes 1.1 (H) 0.0 - 0.5 %    Gran # (ANC) 8.3 (H) 1.8 - 7.7 K/uL    Immature Grans (Abs) 0.12 (H) 0.00 - 0.04 K/uL    Lymph # 0.8 (L) 1.0 - 4.8 K/uL    Mono # 1.1 (H) 0.3 - 1.0 K/uL    Eos # 0.1 0.0 - 0.5 K/uL    Baso # 0.04 0.00 - 0.20 K/uL    nRBC 0 0 /100 WBC    Gran% 79.1 (H) 38.0 - 73.0 %    Lymph% 7.9 (L) 18.0 - 48.0 %    Mono% 10.3 4.0 - 15.0 %    Eosinophil% 1.2 0.0 - 8.0 %    Basophil% 0.4 0.0 - 1.9 %    Differential Method Automated    Comprehensive metabolic panel   Result Value Ref Range    Sodium 138 136 - 145 mmol/L    Potassium 4.0 3.5 - 5.1 mmol/L    Chloride 98 95 - 110 mmol/L    CO2 29 23 - 29 mmol/L    Glucose 398 (H) 70 - 110 mg/dL    BUN, Bld 24 (H) 8 - 23 mg/dL    Creatinine 1.7 (H) 0.5 - 1.4 mg/dL    Calcium 9.3 8.7 - 10.5 mg/dL    Total Protein 7.2 6.0 - 8.4 g/dL    Albumin 3.3 (L) 3.5 - 5.2 g/dL    Total Bilirubin 0.7 0.1 - 1.0 mg/dL    Alkaline Phosphatase 1,447 (H) 55 - 135 U/L    AST 31 10 - 40 U/L    ALT 17 10 - 44 U/L    Anion Gap 11 8 - 16 mmol/L    eGFR if African American 45 (A) >60 mL/min/1.73 m^2    eGFR if non African American 39 (A) >60 mL/min/1.73 m^2   Protime-INR   Result Value Ref Range    Prothrombin Time 10.2 9.0 - 12.5 sec    INR 1.0 0.8 - 1.2   APTT   Result Value Ref Range    aPTT 26.6 21.0 - 32.0 sec   Troponin I   Result Value Ref Range    Troponin I <0.006 0.000 - 0.026 ng/mL   PSA, Screening   Result Value Ref Range    PSA, SCREEN 28.9 (H) 0.00 - 4.00 ng/mL   Urinalysis, Reflex to Urine Culture Urine, Clean Catch   Result Value Ref Range    Specimen UA Urine, Clean Catch     Color, UA Yellow Yellow, Straw, Bea    Appearance, UA  Clear Clear    pH, UA 7.0 5.0 - 8.0    Specific Gravity, UA 1.020 1.005 - 1.030    Protein, UA 2+ (A) Negative    Glucose, UA 3+ (A) Negative    Ketones, UA Negative Negative    Bilirubin (UA) Negative Negative    Occult Blood UA 2+ (A) Negative    Nitrite, UA Negative Negative    Urobilinogen, UA Negative <2.0 EU/dL    Leukocytes, UA Negative Negative   Urinalysis Microscopic   Result Value Ref Range    RBC, UA 5 (H) 0 - 4 /hpf    WBC, UA 50 (H) 0 - 5 /hpf    Bacteria Rare None-Occ /hpf    Yeast, UA None None    Hyaline Casts, UA 0 0-1/lpf /lpf    Microscopic Comment SEE COMMENT    Sedimentation rate   Result Value Ref Range    Sed Rate 44 (H) 0 - 10 mm/Hr   CK   Result Value Ref Range     20 - 200 U/L         Imaging Results:  Imaging Results          X-Ray Chest PA And Lateral (Final result)  Result time 02/13/20 15:33:43    Final result by Barry De La Torre Jr., MD (02/13/20 15:33:43)                 Impression:      New infiltrate or atelectasis within the right lower lobe adjacent to an elevated right hemidiaphragm.      Electronically signed by: Barry De La Torre Jr., MD  Date:    02/13/2020  Time:    15:33             Narrative:    EXAMINATION:  XR CHEST PA AND LATERAL    CLINICAL HISTORY:  Myalgia, unspecified site    TECHNIQUE:  PA and lateral views of the chest were performed.    COMPARISON:  Prior radiograph from November 11, 2019.    FINDINGS:  Slightly elevated right hemidiaphragm.There is new overlying bandlike opacity with blunting of the right costophrenic angle.  The remaining lungs are clear.    The cardiac silhouette is mildly enlarged, similar to prior..  The hilar and mediastinal contours are unremarkable.    Bones are intact.                               CT Head Without Contrast (Final result)  Result time 02/13/20 13:30:08    Final result by Barry De La Torre Jr., MD (02/13/20 13:30:08)                 Impression:      1. No acute findings.  2. Chronic lacunar infarct left basal ganglia with  white matter changes consistent with moderate chronic microvascular ischemia.  All CT scans at this facility use dose modulation, iterative reconstruction, and/or weight base dosing when appropriate to reduce radiation dose to as low as reasonably achievable.      Electronically signed by: Barry De La Torre Jr., MD  Date:    02/13/2020  Time:    13:30             Narrative:    EXAMINATION:  CT HEAD WITHOUT CONTRAST    CLINICAL HISTORY:  Stroke;    TECHNIQUE:  Contiguous axial images were obtained from the skull base through the vertex without intravenous contrast.    COMPARISON:  Prior MRI of the brain from January 5, 2019.    FINDINGS:  No intracranial hemorrhage. No mass effect or midline shift. Moderate patchy low-density within the periventricular and deep white matter.  Chronic lacunar infarct within the left lentiform nucleus.  The ventricles and sulci are normal in size and configuration. The paranasal sinuses and mastoid air cells are clear.  No concerning osseous findings.                                 The EKG was ordered, reviewed, and independently interpreted by the ED provider.  Interpretation time: 13:19  Rate: 80 BPM  Rhythm: sinus rhythm with premature supraventricular complexes  Interpretation: Minimal voltage criteria for LVH, may be normal variant. Cannot rule out inferior infarct, age undetermined. Cannot rule out anterior infarct, age undetermined. No STEMI.             The Emergency Provider reviewed the vital signs and test results, which are outlined above.     ED Discussion   4:00 PM: Dr. Ramirez transfers care of pt to Dr. Howard pending lab results.    5:38 PM: Reassessed pt at this time.  Pt states his condition has improved at this time. Discussed with pt all pertinent ED information and results. Discussed pt dx and plan of tx. Gave pt all f/u and return to the ED instructions. All questions and concerns were addressed at this time. Pt expresses understanding of information and  instructions, and is comfortable with plan to discharge. Pt is stable for discharge.    I discussed with patient and/or family/caretaker that evaluation in the ED does not suggest any emergent or life threatening medical conditions requiring immediate intervention beyond what was provided in the ED, and I believe patient is safe for discharge.  Regardless, an unremarkable evaluation in the ED does not preclude the development or presence of a serious of life threatening condition. As such, patient was instructed to return immediately for any worsening or change in current symptoms.         Medical Decision Making:   Clinical Tests:   Lab Tests: Ordered and Reviewed  Radiological Study: Ordered and Reviewed  Medical Tests: Ordered and Reviewed     Additional MDM:     NIH Stroke Scale:   Level of consciousness = 0 - alert  LOC questions = 0 - answers both correctly  LOC commands = 0 - performs both correctly  Best gaze = 0 - normal  Visual = 0 - no visual loss  Facial palsy = 0 - normal  Motor left arm =  0 - no drift  Motor right arm =  0 - no drift  Motor left leg = 0 - no drift  Motor right leg =  0 - no drift  Limb ataxia = 0 - absent  Sensory = 0 - normal  Best language = 0 - no aphasia  Dysarthria = 0 - normal articulation  Extinction and inattention = 0 - no neglect  NIH Stroke Scale Total = 0       ED Medication(s):  Medications   sodium chloride 0.9% bolus 1,000 mL (0 mLs Intravenous Stopped 2/13/20 7463)   HYDROcodone-acetaminophen  mg per tablet 1 tablet (1 tablet Oral Given 2/13/20 3687)       Discharge Medication List as of 2/13/2020  5:39 PM      START taking these medications    Details   HYDROcodone-acetaminophen (NORCO)  mg per tablet Take 1 tablet by mouth every 4 (four) hours as needed., Starting Thu 2/13/2020, Print           Follow-up Information     Tobias Fox MD. Schedule an appointment as soon as possible for a visit in 1 week.    Specialty:  Internal Medicine  Contact  information:  [address]  [address]  [place] [address]  [phone]                   Scribe Attestation:   Scribe #1: I performed the above scribed service and the documentation accurately describes the services I performed. I attest to the accuracy of the note.     Attending:   Physician Attestation Statement for Scribe #1: I, Shonda Ramirez DO, personally performed the services described in this documentation, as scribed by Burt Lewis, in my presence, and it is both accurate and complete.       Scribe Attestation:   Scribe #2: I performed the above scribed service and the documentation accurately describes the services I performed. I attest to the accuracy of the note.    Attending Attestation:           Physician Attestation for Scribe:    Physician Attestation Statement for Scribe #2: I, Sindi Howard MD, reviewed documentation, as scribed by Irais Rosales in my presence, and it is both accurate and complete. I also acknowledge and confirm the content of the note done by Scribe #1.           Clinical Impression       ICD-10-CM ICD-9-CM   1. Myalgia M79.10 729.1   2. Numbness of face R20.0 782.0       Disposition:   Disposition: Discharged  Condition: Stable         Sindi Howard MD  02/16/20 2015

## 2020-02-14 LAB
BACTERIA UR CULT: NO GROWTH
COMPLEXED PSA SERPL-MCNC: 28.9 NG/ML (ref 0–4)

## 2020-02-17 ENCOUNTER — OFFICE VISIT (OUTPATIENT)
Dept: INTERNAL MEDICINE | Facility: CLINIC | Age: 75
End: 2020-02-17
Payer: MEDICARE

## 2020-02-17 ENCOUNTER — LAB VISIT (OUTPATIENT)
Dept: LAB | Facility: HOSPITAL | Age: 75
End: 2020-02-17
Attending: PHYSICIAN ASSISTANT
Payer: MEDICARE

## 2020-02-17 VITALS
TEMPERATURE: 98 F | HEIGHT: 72 IN | HEART RATE: 78 BPM | DIASTOLIC BLOOD PRESSURE: 70 MMHG | WEIGHT: 289 LBS | SYSTOLIC BLOOD PRESSURE: 128 MMHG | BODY MASS INDEX: 39.14 KG/M2

## 2020-02-17 DIAGNOSIS — N13.8 BENIGN PROSTATIC HYPERPLASIA WITH URINARY OBSTRUCTION: ICD-10-CM

## 2020-02-17 DIAGNOSIS — N18.30 STAGE 3 CHRONIC KIDNEY DISEASE: ICD-10-CM

## 2020-02-17 DIAGNOSIS — R74.8 ALKALINE PHOSPHATASE ELEVATION: ICD-10-CM

## 2020-02-17 DIAGNOSIS — N40.1 BENIGN PROSTATIC HYPERPLASIA WITH URINARY OBSTRUCTION: ICD-10-CM

## 2020-02-17 DIAGNOSIS — R53.83 OTHER FATIGUE: ICD-10-CM

## 2020-02-17 DIAGNOSIS — N18.30 STAGE 3 CHRONIC KIDNEY DISEASE: Primary | ICD-10-CM

## 2020-02-17 DIAGNOSIS — M35.3 PMR (POLYMYALGIA RHEUMATICA): ICD-10-CM

## 2020-02-17 LAB
ALBUMIN SERPL BCP-MCNC: 2.9 G/DL (ref 3.5–5.2)
ALBUMIN SERPL BCP-MCNC: 2.9 G/DL (ref 3.5–5.2)
ALP SERPL-CCNC: 943 U/L (ref 55–135)
ALP SERPL-CCNC: 943 U/L (ref 55–135)
ALT SERPL W/O P-5'-P-CCNC: 31 U/L (ref 10–44)
ALT SERPL W/O P-5'-P-CCNC: 31 U/L (ref 10–44)
ANION GAP SERPL CALC-SCNC: 10 MMOL/L (ref 8–16)
AST SERPL-CCNC: 26 U/L (ref 10–40)
AST SERPL-CCNC: 26 U/L (ref 10–40)
BILIRUB DIRECT SERPL-MCNC: 0.2 MG/DL (ref 0.1–0.3)
BILIRUB SERPL-MCNC: 0.4 MG/DL (ref 0.1–1)
BILIRUB SERPL-MCNC: 0.4 MG/DL (ref 0.1–1)
BUN SERPL-MCNC: 21 MG/DL (ref 8–23)
CALCIUM SERPL-MCNC: 9.1 MG/DL (ref 8.7–10.5)
CHLORIDE SERPL-SCNC: 96 MMOL/L (ref 95–110)
CO2 SERPL-SCNC: 28 MMOL/L (ref 23–29)
CREAT SERPL-MCNC: 2 MG/DL (ref 0.5–1.4)
EST. GFR  (AFRICAN AMERICAN): 36.9 ML/MIN/1.73 M^2
EST. GFR  (NON AFRICAN AMERICAN): 31.9 ML/MIN/1.73 M^2
GGT SERPL-CCNC: 38 U/L (ref 8–55)
GLUCOSE SERPL-MCNC: 170 MG/DL (ref 70–110)
LIPASE SERPL-CCNC: 11 U/L (ref 4–60)
POTASSIUM SERPL-SCNC: 3.9 MMOL/L (ref 3.5–5.1)
PROT SERPL-MCNC: 7.3 G/DL (ref 6–8.4)
PROT SERPL-MCNC: 7.3 G/DL (ref 6–8.4)
SODIUM SERPL-SCNC: 134 MMOL/L (ref 136–145)

## 2020-02-17 PROCEDURE — 3078F PR MOST RECENT DIASTOLIC BLOOD PRESSURE < 80 MM HG: ICD-10-PCS | Mod: CPTII,S$GLB,, | Performed by: PHYSICIAN ASSISTANT

## 2020-02-17 PROCEDURE — 1101F PT FALLS ASSESS-DOCD LE1/YR: CPT | Mod: CPTII,S$GLB,, | Performed by: PHYSICIAN ASSISTANT

## 2020-02-17 PROCEDURE — 99214 OFFICE O/P EST MOD 30 MIN: CPT | Mod: S$GLB,,, | Performed by: PHYSICIAN ASSISTANT

## 2020-02-17 PROCEDURE — 3078F DIAST BP <80 MM HG: CPT | Mod: CPTII,S$GLB,, | Performed by: PHYSICIAN ASSISTANT

## 2020-02-17 PROCEDURE — 83690 ASSAY OF LIPASE: CPT

## 2020-02-17 PROCEDURE — 1126F PR PAIN SEVERITY QUANTIFIED, NO PAIN PRESENT: ICD-10-PCS | Mod: S$GLB,,, | Performed by: PHYSICIAN ASSISTANT

## 2020-02-17 PROCEDURE — 99499 RISK ADDL DX/OHS AUDIT: ICD-10-PCS | Mod: S$GLB,,, | Performed by: PHYSICIAN ASSISTANT

## 2020-02-17 PROCEDURE — 99214 PR OFFICE/OUTPT VISIT, EST, LEVL IV, 30-39 MIN: ICD-10-PCS | Mod: S$GLB,,, | Performed by: PHYSICIAN ASSISTANT

## 2020-02-17 PROCEDURE — 80053 COMPREHEN METABOLIC PANEL: CPT

## 2020-02-17 PROCEDURE — 82977 ASSAY OF GGT: CPT

## 2020-02-17 PROCEDURE — 36415 COLL VENOUS BLD VENIPUNCTURE: CPT | Mod: PO

## 2020-02-17 PROCEDURE — 1126F AMNT PAIN NOTED NONE PRSNT: CPT | Mod: S$GLB,,, | Performed by: PHYSICIAN ASSISTANT

## 2020-02-17 PROCEDURE — 3074F PR MOST RECENT SYSTOLIC BLOOD PRESSURE < 130 MM HG: ICD-10-PCS | Mod: CPTII,S$GLB,, | Performed by: PHYSICIAN ASSISTANT

## 2020-02-17 PROCEDURE — 99999 PR PBB SHADOW E&M-EST. PATIENT-LVL IV: CPT | Mod: PBBFAC,,, | Performed by: PHYSICIAN ASSISTANT

## 2020-02-17 PROCEDURE — 1101F PR PT FALLS ASSESS DOC 0-1 FALLS W/OUT INJ PAST YR: ICD-10-PCS | Mod: CPTII,S$GLB,, | Performed by: PHYSICIAN ASSISTANT

## 2020-02-17 PROCEDURE — 99499 UNLISTED E&M SERVICE: CPT | Mod: S$GLB,,, | Performed by: PHYSICIAN ASSISTANT

## 2020-02-17 PROCEDURE — 1159F MED LIST DOCD IN RCRD: CPT | Mod: S$GLB,,, | Performed by: PHYSICIAN ASSISTANT

## 2020-02-17 PROCEDURE — 1159F PR MEDICATION LIST DOCUMENTED IN MEDICAL RECORD: ICD-10-PCS | Mod: S$GLB,,, | Performed by: PHYSICIAN ASSISTANT

## 2020-02-17 PROCEDURE — 3074F SYST BP LT 130 MM HG: CPT | Mod: CPTII,S$GLB,, | Performed by: PHYSICIAN ASSISTANT

## 2020-02-17 PROCEDURE — 99999 PR PBB SHADOW E&M-EST. PATIENT-LVL IV: ICD-10-PCS | Mod: PBBFAC,,, | Performed by: PHYSICIAN ASSISTANT

## 2020-02-17 RX ORDER — CEFUROXIME AXETIL 250 MG/1
250 TABLET ORAL 2 TIMES DAILY
Refills: 0 | COMMUNITY
Start: 2019-11-14 | End: 2020-06-30

## 2020-02-18 ENCOUNTER — TELEPHONE (OUTPATIENT)
Dept: INTERNAL MEDICINE | Facility: CLINIC | Age: 75
End: 2020-02-18

## 2020-02-18 ENCOUNTER — HOSPITAL ENCOUNTER (OUTPATIENT)
Dept: RADIOLOGY | Facility: HOSPITAL | Age: 75
Discharge: HOME | End: 2020-02-18
Attending: PHYSICIAN ASSISTANT
Payer: MEDICARE

## 2020-02-18 DIAGNOSIS — M35.3 PMR (POLYMYALGIA RHEUMATICA): ICD-10-CM

## 2020-02-18 DIAGNOSIS — R74.8 ALKALINE PHOSPHATASE ELEVATION: Primary | ICD-10-CM

## 2020-02-18 DIAGNOSIS — R74.8 ALKALINE PHOSPHATASE ELEVATION: ICD-10-CM

## 2020-02-18 PROCEDURE — 76700 US EXAM ABDOM COMPLETE: CPT | Mod: TC

## 2020-02-18 PROCEDURE — 76700 US ABDOMEN COMPLETE: ICD-10-PCS | Mod: 26,,, | Performed by: RADIOLOGY

## 2020-02-18 PROCEDURE — 76700 US EXAM ABDOM COMPLETE: CPT | Mod: 26,,, | Performed by: RADIOLOGY

## 2020-02-18 NOTE — PROGRESS NOTES
Subjective:       Patient ID: Sherif Ragland is a 74 y.o. male.    Chief Complaint: Hospital Follow Up    HPI  Patient comes in today for hosp follow up     He went to ER for having significant myalgias, dizziness.  CT head and xray chest done   Chest X ray did show PNA so he is on abx     Labs showed several things. First, glucose was very elevated.    Secondly his Alk PHOS was markedly elevated, had no GI symptoms    No abd scans were done     Has been seeing urology for urinary issues and BPH. Recently PSA was drawn and it was in the 20s so this is being followed by urology. Has follow up appt in 1 month     He has no n/v   Besides PMR, myalgias,and hyperglycemia, he has no there complaints today      Health Maintenance Due   Topic Date Due    TETANUS VACCINE  10/28/1963    Foot Exam  12/26/2018    Hemoglobin A1c  01/30/2020    Eye Exam  02/25/2020       Past Medical History:   Diagnosis Date    Diabetes mellitus, type 2 1997    Hyperlipidemia     Hypertension     Hypogonadism in male     Renal insufficiency     Tubular adenoma 10/2017       Current Outpatient Medications   Medication Sig Dispense Refill    amLODIPine (NORVASC) 5 MG tablet TAKE 2 TABLETS BY MOUTH ONCE DAILY 60 tablet 11    cefUROXime (CEFTIN) 250 MG tablet Take 250 mg by mouth 2 (two) times daily.  0    insulin detemir U-100 (LEVEMIR FLEXTOUCH U-100 INSULN) 100 unit/mL (3 mL) SubQ InPn pen Inject 40 Units into the skin 2 (two) times daily. 72 mL 3    valsartan-hydrochlorothiazide (DIOVAN-HCT) 320-12.5 mg per tablet Take 1 tablet by mouth once daily. 90 tablet 3    blood sugar diagnostic Strp To check BG 2 times daily, to use with insurance preferred meter (Patient not taking: Reported on 2/17/2020) 100 each 11    blood-glucose meter kit To check BG two times daily, to use with insurance preferred meter 1 each 0    docusate sodium (COLACE) 100 MG capsule Take 1 capsule (100 mg total) by mouth 2 (two) times daily. (Patient not  "taking: Reported on 2/17/2020) 60 capsule 0    dulaglutide (TRULICITY) 0.75 mg/0.5 mL PnIj Inject 0.5 mLs (0.75 mg total) into the skin every 7 days. (Patient not taking: Reported on 2/17/2020) 2 mL 11    furosemide (LASIX) 40 MG tablet Take 1 tablet (40 mg total) by mouth once daily. (Patient not taking: Reported on 2/17/2020) 60 tablet 11    HYDROcodone-acetaminophen (NORCO)  mg per tablet Take 1 tablet by mouth every 4 (four) hours as needed. (Patient not taking: Reported on 2/17/2020) 18 tablet 0    insulin (LANTUS SOLOSTAR U-100 INSULIN) glargine 100 units/mL (3mL) SubQ pen Inject 25 Units into the skin.      lancets Misc To check BG 2 times daily, to use with insurance preferred meter (Patient not taking: Reported on 2/17/2020) 100 each 11    metoprolol succinate (TOPROL-XL) 100 MG 24 hr tablet Take 1 tablet (100 mg total) by mouth 2 (two) times daily. (Patient not taking: Reported on 2/17/2020) 60 tablet 11    papaverine 30 mg/mL injection Inject 0.3 ml of trimix solution prn ED max once daily  Prepare 10ml of trimix solution containing:    Papaverine 30mg/ml    Phentolamine 1 mg/ml    Alprostadil 10mcg/ml  Dispense 10ml per refill  Qs syringes 1cc/30g/0.5" and alcohol swabs dispense as needed for Intracavernosal injection (Patient not taking: Reported on 2/17/2020) 10 mL 5    potassium chloride SA (K-DUR,KLOR-CON) 20 MEQ tablet Take 1 tablet (20 mEq total) by mouth 2 (two) times daily. (Patient not taking: Reported on 2/17/2020) 30 tablet 11    ranitidine (ZANTAC) 150 MG tablet TAKE 1 TABLET BY MOUTH TWICE DAILY (Patient not taking: Reported on 2/17/2020) 60 tablet 0    sulfamethoxazole-trimethoprim 800-160mg (BACTRIM DS) 800-160 mg Tab Take 1 tablet by mouth 2 (two) times daily. for 7 days (Patient not taking: Reported on 2/17/2020) 14 tablet 0     No current facility-administered medications for this visit.        Review of Systems   Constitutional: Negative for fatigue, fever and " unexpected weight change.   HENT: Negative for sore throat and trouble swallowing.    Respiratory: Positive for cough. Negative for shortness of breath.    Cardiovascular: Negative for chest pain, palpitations and leg swelling.   Gastrointestinal: Negative for abdominal pain.   Genitourinary: Positive for difficulty urinating and urgency. Negative for scrotal swelling.   Musculoskeletal: Positive for arthralgias and myalgias.   Skin: Negative for color change, pallor, rash and wound.   Hematological: Negative for adenopathy. Does not bruise/bleed easily.   All other systems reviewed and are negative.      Objective:   /70 (BP Location: Left arm, Patient Position: Sitting, BP Method: Large (Manual))   Pulse 78   Temp 98.1 °F (36.7 °C) (Oral)   Ht 6' (1.829 m)   Wt 131.1 kg (289 lb 0.4 oz)   BMI 39.20 kg/m²      Physical Exam   Constitutional: He is oriented to person, place, and time. He appears well-developed. No distress.   Obesity    HENT:   Head: Normocephalic and atraumatic.   Eyes: Pupils are equal, round, and reactive to light. EOM are normal.   Neck: Normal range of motion. Neck supple.   Cardiovascular: Normal rate, regular rhythm and normal heart sounds.   Pulmonary/Chest: Effort normal and breath sounds normal.   Abdominal: Soft.   Musculoskeletal: He exhibits no edema.   Neurological: He is alert and oriented to person, place, and time.   Skin: Capillary refill takes less than 2 seconds.   Psychiatric: He has a normal mood and affect. His behavior is normal.         Lab Results   Component Value Date    WBC 10.47 02/13/2020    HGB 13.2 (L) 02/13/2020    HCT 41.8 02/13/2020     02/13/2020    CHOL 219 (H) 07/03/2019    TRIG 105 07/03/2019    HDL 52 07/03/2019    ALT 31 02/17/2020    ALT 31 02/17/2020    AST 26 02/17/2020    AST 26 02/17/2020     (L) 02/17/2020    K 3.9 02/17/2020    CL 96 02/17/2020    CREATININE 2.0 (H) 02/17/2020    BUN 21 02/17/2020    CO2 28 02/17/2020    TSH  2.302 01/04/2019    PSA 28.9 (H) 02/13/2020    INR 1.0 02/13/2020    HGBA1C 8.9 (H) 07/03/2019       Assessment:       1. Stage 3 chronic kidney disease    2. Alkaline phosphatase elevation    3. Benign prostatic hyperplasia with urinary obstruction    4. PMR (polymyalgia rheumatica)    5. Other fatigue        Plan:   Stage 3 chronic kidney disease  -     Hepatic function panel; Future; Expected date: 02/17/2020  -     Comprehensive metabolic panel; Future; Expected date: 02/17/2020    Alkaline phosphatase elevation  -     Hepatic function panel; Future; Expected date: 02/17/2020  -     Comprehensive metabolic panel; Future; Expected date: 02/17/2020  -     Lipase; Future; Expected date: 02/17/2020  -     US Abdomen Complete; Future; Expected date: 02/17/2020  -     Gamma GT; Future; Expected date: 02/17/2020    Benign prostatic hyperplasia with urinary obstruction    PMR (polymyalgia rheumatica)    Other fatigue      Needs further workup   Alk PHOS extremely elevated     Cont abx   Will follow up with results once received

## 2020-02-20 ENCOUNTER — HOSPITAL ENCOUNTER (OUTPATIENT)
Dept: RADIOLOGY | Facility: HOSPITAL | Age: 75
Discharge: HOME | End: 2020-02-20
Attending: PHYSICIAN ASSISTANT
Payer: MEDICARE

## 2020-02-20 DIAGNOSIS — M35.3 PMR (POLYMYALGIA RHEUMATICA): ICD-10-CM

## 2020-02-20 DIAGNOSIS — R74.8 ALKALINE PHOSPHATASE ELEVATION: ICD-10-CM

## 2020-02-20 PROCEDURE — A9503 TC99M MEDRONATE: HCPCS

## 2020-02-20 PROCEDURE — 78306 BONE IMAGING WHOLE BODY: CPT | Mod: 26,,, | Performed by: RADIOLOGY

## 2020-02-20 PROCEDURE — 78306 NM BONE SCAN WHOLE BODY: ICD-10-PCS | Mod: 26,,, | Performed by: RADIOLOGY

## 2020-02-21 ENCOUNTER — TELEPHONE (OUTPATIENT)
Dept: HEMATOLOGY/ONCOLOGY | Facility: CLINIC | Age: 75
End: 2020-02-21

## 2020-02-21 ENCOUNTER — TELEPHONE (OUTPATIENT)
Dept: INTERNAL MEDICINE | Facility: CLINIC | Age: 75
End: 2020-02-21

## 2020-02-21 DIAGNOSIS — C41.9 MALIGNANT NEOPLASM OF BONE AND ARTICULAR CARTILAGE, UNSPECIFIED: ICD-10-CM

## 2020-02-21 DIAGNOSIS — C79.51 SPINE METASTASIS: ICD-10-CM

## 2020-02-21 NOTE — TELEPHONE ENCOUNTER
After leaving several messages pt called me back regarding work que  referral to oncology from Yael Youssef.  I have arranged for pt to see Dr Irvin the same day as his PET scan per pt request, although it will be at two different locations.  Pt stated he definitely wanted to see the MD the same day and did not mind going to two locations.  He expressed concern over finding out what this was going on with him and was reassured that Dr. Irvin would review PET scan and proceed with diagnosis and treatment plan from there.  PET scan prep instructions reieiwed with patient and he stated understanding and repeated correctly back to me.  He has my direct contact number should he have any further concerns.

## 2020-02-21 NOTE — TELEPHONE ENCOUNTER
LM regarding referral to oncology.  My direct contact number left in message for pt to call back to discuss appt. Schedule

## 2020-02-21 NOTE — TELEPHONE ENCOUNTER
PET scan is scheduled , discussed with Yael that we will get him in sooner for Oncology after PET scan results

## 2020-02-21 NOTE — TELEPHONE ENCOUNTER
Called Patient to discuss scan results   dicussed bone scan highly suspicious for cancer in bone, need PET scan and onc appt.   Patient verbalized understanding

## 2020-02-24 ENCOUNTER — TELEPHONE (OUTPATIENT)
Dept: INTERNAL MEDICINE | Facility: CLINIC | Age: 75
End: 2020-02-24

## 2020-02-24 ENCOUNTER — TELEPHONE (OUTPATIENT)
Dept: RADIOLOGY | Facility: HOSPITAL | Age: 75
End: 2020-02-24

## 2020-02-24 NOTE — TELEPHONE ENCOUNTER
Yes it is urgent   Bone cancer is the likely cause of the abnl in the bone density scan there for we need a PET

## 2020-02-24 NOTE — TELEPHONE ENCOUNTER
----- Message from Jenny Kiser RT sent at 2/24/2020  8:55 AM CST -----  Contact: RADIOLOGY  Good Morning, patient is scheduled for a PET Scan, and insurance is still pending approval, and we need to allow time for insurance approval, is the test medically urgent? if not medically urgent we may need to re-schedule to allow Insurance time to approve test. If you have any questions call ext [phone].  Thanks  [facility]

## 2020-02-26 NOTE — PROGRESS NOTES
Digital Medicine: Health  Follow-Up      Follow Up  Follow-up reason(s): reading review      Readings are missing. Pt was recently dx with bone cancer and attributes this to a lack of BP readings. He states that readings are being taken often at clinic visits and that his main focus is his cancer right now.     INTERVENTION(S)  encouragement/support    PLAN  continue monitoring    Provided sympathy and offered hiatus to allow pt time to process his new diagnosis. Pt states that he is going to try to enter clinic readings into [employer] manually and will remain the program at this time. Will space out calls, but encouraged pt to call with any questions or concerns.         Topic    Hemoglobin A1C     Eye Exam        Last 5 Patient Entered Readings                                      Current 30 Day Average:      Recent Readings 8/12/2019 8/3/2019 7/30/2019 7/29/2019 7/25/2019    SBP (mmHg) 163 145 164 161 143    DBP (mmHg) 72 76 84 80 78    Pulse 72 76 81 77 90

## 2020-02-27 ENCOUNTER — LAB VISIT (OUTPATIENT)
Dept: LAB | Facility: HOSPITAL | Age: 75
End: 2020-02-27
Attending: INTERNAL MEDICINE
Payer: MEDICARE

## 2020-02-27 ENCOUNTER — DOCUMENTATION ONLY (OUTPATIENT)
Dept: HEMATOLOGY/ONCOLOGY | Facility: CLINIC | Age: 75
End: 2020-02-27

## 2020-02-27 ENCOUNTER — OFFICE VISIT (OUTPATIENT)
Dept: HEMATOLOGY/ONCOLOGY | Facility: CLINIC | Age: 75
End: 2020-02-27
Payer: MEDICARE

## 2020-02-27 ENCOUNTER — HOSPITAL ENCOUNTER (OUTPATIENT)
Dept: RADIOLOGY | Facility: HOSPITAL | Age: 75
Discharge: HOME | End: 2020-02-27
Attending: PHYSICIAN ASSISTANT
Payer: MEDICARE

## 2020-02-27 VITALS
HEIGHT: 72 IN | OXYGEN SATURATION: 91 % | SYSTOLIC BLOOD PRESSURE: 159 MMHG | TEMPERATURE: 98 F | BODY MASS INDEX: 39.06 KG/M2 | DIASTOLIC BLOOD PRESSURE: 80 MMHG | HEART RATE: 75 BPM | RESPIRATION RATE: 18 BRPM | WEIGHT: 288.38 LBS

## 2020-02-27 DIAGNOSIS — C41.9 MALIGNANT NEOPLASM OF BONE AND ARTICULAR CARTILAGE, UNSPECIFIED: ICD-10-CM

## 2020-02-27 DIAGNOSIS — D49.89 NEOPLASM OF HEAD: ICD-10-CM

## 2020-02-27 DIAGNOSIS — N40.0 BENIGN PROSTATIC HYPERPLASIA WITHOUT LOWER URINARY TRACT SYMPTOMS: ICD-10-CM

## 2020-02-27 DIAGNOSIS — Z71.89 ACP (ADVANCE CARE PLANNING): ICD-10-CM

## 2020-02-27 DIAGNOSIS — N18.30 STAGE 3 CHRONIC KIDNEY DISEASE: Primary | ICD-10-CM

## 2020-02-27 DIAGNOSIS — C79.51 SPINE METASTASIS: ICD-10-CM

## 2020-02-27 PROCEDURE — 85097 PR  BONE MARROW,SMEAR INTERPRETATION: ICD-10-PCS | Mod: ,,, | Performed by: PATHOLOGY

## 2020-02-27 PROCEDURE — 1125F AMNT PAIN NOTED PAIN PRSNT: CPT | Mod: S$GLB,,, | Performed by: INTERNAL MEDICINE

## 2020-02-27 PROCEDURE — 78815 PET IMAGE W/CT SKULL-THIGH: CPT | Mod: 26,PS,, | Performed by: RADIOLOGY

## 2020-02-27 PROCEDURE — 88313 SPECIAL STAINS GROUP 2: CPT | Performed by: PATHOLOGY

## 2020-02-27 PROCEDURE — 84165 PROTEIN E-PHORESIS SERUM: CPT | Mod: 26,,, | Performed by: PATHOLOGY

## 2020-02-27 PROCEDURE — 1101F PR PT FALLS ASSESS DOC 0-1 FALLS W/OUT INJ PAST YR: ICD-10-PCS | Mod: CPTII,S$GLB,, | Performed by: INTERNAL MEDICINE

## 2020-02-27 PROCEDURE — 88313 SPECIAL STAINS GROUP 2: CPT | Mod: 26,,, | Performed by: PATHOLOGY

## 2020-02-27 PROCEDURE — 78815 PET IMAGE W/CT SKULL-THIGH: CPT | Mod: TC

## 2020-02-27 PROCEDURE — 88305 TISSUE EXAM BY PATHOLOGIST: CPT | Mod: 26,,, | Performed by: PATHOLOGY

## 2020-02-27 PROCEDURE — 1159F MED LIST DOCD IN RCRD: CPT | Mod: S$GLB,,, | Performed by: INTERNAL MEDICINE

## 2020-02-27 PROCEDURE — 1101F PT FALLS ASSESS-DOCD LE1/YR: CPT | Mod: CPTII,S$GLB,, | Performed by: INTERNAL MEDICINE

## 2020-02-27 PROCEDURE — 3079F DIAST BP 80-89 MM HG: CPT | Mod: CPTII,S$GLB,, | Performed by: INTERNAL MEDICINE

## 2020-02-27 PROCEDURE — 3077F SYST BP >= 140 MM HG: CPT | Mod: CPTII,S$GLB,, | Performed by: INTERNAL MEDICINE

## 2020-02-27 PROCEDURE — 36415 COLL VENOUS BLD VENIPUNCTURE: CPT

## 2020-02-27 PROCEDURE — 78815 NM PET CT ROUTINE: ICD-10-PCS | Mod: 26,PS,, | Performed by: RADIOLOGY

## 2020-02-27 PROCEDURE — 88341 PR IHC OR ICC EACH ADD'L SINGLE ANTIBODY  STAINPR: ICD-10-PCS | Mod: 26,,, | Performed by: PATHOLOGY

## 2020-02-27 PROCEDURE — 1125F PR PAIN SEVERITY QUANTIFIED, PAIN PRESENT: ICD-10-PCS | Mod: S$GLB,,, | Performed by: INTERNAL MEDICINE

## 2020-02-27 PROCEDURE — 1159F PR MEDICATION LIST DOCUMENTED IN MEDICAL RECORD: ICD-10-PCS | Mod: S$GLB,,, | Performed by: INTERNAL MEDICINE

## 2020-02-27 PROCEDURE — 38221 DX BONE MARROW BIOPSIES: CPT | Mod: LT,S$GLB,, | Performed by: INTERNAL MEDICINE

## 2020-02-27 PROCEDURE — 99499 RISK ADDL DX/OHS AUDIT: ICD-10-PCS | Mod: S$GLB,,, | Performed by: INTERNAL MEDICINE

## 2020-02-27 PROCEDURE — A9552 F18 FDG: HCPCS

## 2020-02-27 PROCEDURE — 88342 CHG IMMUNOCYTOCHEMISTRY: ICD-10-PCS | Mod: 26,,, | Performed by: PATHOLOGY

## 2020-02-27 PROCEDURE — 3077F PR MOST RECENT SYSTOLIC BLOOD PRESSURE >= 140 MM HG: ICD-10-PCS | Mod: CPTII,S$GLB,, | Performed by: INTERNAL MEDICINE

## 2020-02-27 PROCEDURE — 88341 IMHCHEM/IMCYTCHM EA ADD ANTB: CPT | Mod: 26,,, | Performed by: PATHOLOGY

## 2020-02-27 PROCEDURE — 88311 DECALCIFY TISSUE: CPT | Mod: 26,,, | Performed by: PATHOLOGY

## 2020-02-27 PROCEDURE — 88311 PR  DECALCIFY TISSUE: ICD-10-PCS | Mod: 26,,, | Performed by: PATHOLOGY

## 2020-02-27 PROCEDURE — 99205 OFFICE O/P NEW HI 60 MIN: CPT | Mod: 25,S$GLB,, | Performed by: INTERNAL MEDICINE

## 2020-02-27 PROCEDURE — 99499 UNLISTED E&M SERVICE: CPT | Mod: S$GLB,,, | Performed by: INTERNAL MEDICINE

## 2020-02-27 PROCEDURE — 83520 IMMUNOASSAY QUANT NOS NONAB: CPT | Mod: 59

## 2020-02-27 PROCEDURE — 38221 PR BONE MARROW BIOPSY(IES); DIAGNOSTIC: ICD-10-PCS | Mod: LT,S$GLB,, | Performed by: INTERNAL MEDICINE

## 2020-02-27 PROCEDURE — 99205 PR OFFICE/OUTPT VISIT, NEW, LEVL V, 60-74 MIN: ICD-10-PCS | Mod: 25,S$GLB,, | Performed by: INTERNAL MEDICINE

## 2020-02-27 PROCEDURE — 88305 TISSUE EXAM BY PATHOLOGIST: CPT | Performed by: PATHOLOGY

## 2020-02-27 PROCEDURE — 84165 PATHOLOGIST INTERPRETATION SPE: ICD-10-PCS | Mod: 26,,, | Performed by: PATHOLOGY

## 2020-02-27 PROCEDURE — 88342 IMHCHEM/IMCYTCHM 1ST ANTB: CPT | Mod: 26,,, | Performed by: PATHOLOGY

## 2020-02-27 PROCEDURE — 88342 IMHCHEM/IMCYTCHM 1ST ANTB: CPT | Performed by: PATHOLOGY

## 2020-02-27 PROCEDURE — 88341 IMHCHEM/IMCYTCHM EA ADD ANTB: CPT | Performed by: PATHOLOGY

## 2020-02-27 PROCEDURE — 84165 PROTEIN E-PHORESIS SERUM: CPT

## 2020-02-27 PROCEDURE — 85097 BONE MARROW INTERPRETATION: CPT | Mod: ,,, | Performed by: PATHOLOGY

## 2020-02-27 PROCEDURE — 88305 TISSUE EXAM BY PATHOLOGIST: ICD-10-PCS | Mod: 26,,, | Performed by: PATHOLOGY

## 2020-02-27 PROCEDURE — 99999 PR PBB SHADOW E&M-EST. PATIENT-LVL IV: CPT | Mod: PBBFAC,,, | Performed by: INTERNAL MEDICINE

## 2020-02-27 PROCEDURE — 99999 PR PBB SHADOW E&M-EST. PATIENT-LVL IV: ICD-10-PCS | Mod: PBBFAC,,, | Performed by: INTERNAL MEDICINE

## 2020-02-27 PROCEDURE — 88313 PR  SPECIAL STAINS,GROUP II: ICD-10-PCS | Mod: 26,,, | Performed by: PATHOLOGY

## 2020-02-27 PROCEDURE — 3079F PR MOST RECENT DIASTOLIC BLOOD PRESSURE 80-89 MM HG: ICD-10-PCS | Mod: CPTII,S$GLB,, | Performed by: INTERNAL MEDICINE

## 2020-02-27 RX ORDER — HYDROCODONE BITARTRATE AND ACETAMINOPHEN 10; 325 MG/1; MG/1
1 TABLET ORAL EVERY 4 HOURS PRN
Qty: 60 TABLET | Refills: 0 | Status: SHIPPED | OUTPATIENT
Start: 2020-02-27 | End: 2020-03-18 | Stop reason: SDUPTHER

## 2020-02-27 NOTE — LETTER
February 27, 2020      BONG Miller  [address] [facility]  [place] [address]           [facility]  [address] [facility]  [place] [address]  Phone: [phone]  Fax: [phone]          Patient: Sherif Ragland   MR Number: [id number]   YOB: 1945   Date of Visit: 2/27/2020       Dear Yael Youssef:    Thank you for referring Sherif Ragland to me for evaluation. Attached you will find relevant portions of my assessment and plan of care.    If you have questions, please do not hesitate to call me. I look forward to following Sherif Ragland along with you.    Sincerely,    Felix Irvin MD    Enclosure  CC:  No Recipients    If you would like to receive this communication electronically, please contact [email or web address] or [phone] to request more information on [email or web address] Link access.    For providers and/or their staff who would like to refer a patient to [facility], please contact us through our one-stop-shop provider referral line, [facility] , at [phone].    If you feel you have received this communication in error or would no longer like to receive these types of communications, please e-mail [email or web address]

## 2020-02-27 NOTE — PROGRESS NOTES
Subjective:       Patient ID: Sherif Ragland is a 74 y.o. male.    Chief Complaint: Consult; Results; and Cancer    HPI  74-year-old male referred for evaluation of abnormal bone scan after being found to have a markedly elevated alkaline phosphatases PET scan done today was ordered as well.  He is accompanied by his wife and son..  Patient had TURP last year and subsequently was found to have markedly elevated PSA as well as alkaline phosphatases with abnormal bone scan patient is referred for presumptive diagnosis of metastatic cancer ECOG status 1    Past Medical History:   Diagnosis Date    Diabetes mellitus, type 2 1997    Hyperlipidemia     Hypertension     Hypogonadism in male     Renal insufficiency     Tubular adenoma 10/2017     Family History   Problem Relation Age of Onset    Stroke Mother     Heart disease Father     Stroke Brother      Social History     Socioeconomic History    Marital status:      Spouse name: Not on file    Number of children: 4    Years of education: Not on file    Highest education level: Not on file   Occupational History    Occupation: [employer]   Social Needs    Financial resource strain: Not on file    Food insecurity:     Worry: Not on file     Inability: Not on file    Transportation needs:     Medical: Not on file     Non-medical: Not on file   Tobacco Use    Smoking status: Never Smoker    Smokeless tobacco: Never Used   Substance and Sexual Activity    Alcohol use: No    Drug use: No    Sexual activity: Yes     Partners: Female   Lifestyle    Physical activity:     Days per week: Not on file     Minutes per session: Not on file    Stress: Not on file   Relationships    Social connections:     Talks on phone: Not on file     Gets together: Not on file     Attends Sabianism service: Not on file     Active member of club or organization: Not on file     Attends meetings of clubs or organizations: Not on file     Relationship status: Not on  file   Other Topics Concern    Not on file   Social History Narrative    Surrogate decision maker, Wife, Mita Ragland [phone]     Past Surgical History:   Procedure Laterality Date    CATARACT EXTRACTION Right 01/31/2018    CATARACT EXTRACTION W/  INTRAOCULAR LENS IMPLANT Left 03/13/2019    COLONOSCOPY N/A 11/2/2017    Procedure: COLONOSCOPY;  Surgeon: Alek Francois MD;  Location: [facility] ENDO;  Service: Endoscopy;  Laterality: N/A;    TIBIA FRACTURE SURGERY      right leg x2     TONSILLECTOMY      TRANSURETHRAL RESECTION OF PROSTATE N/A 10/17/2019    Procedure: TURP (TRANSURETHRAL RESECTION OF PROSTATE);  Surgeon: Mir Hale IV, MD;  Location: [facility] OR;  Service: Urology;  Laterality: N/A;       Labs:  Lab Results   Component Value Date    WBC 10.47 02/13/2020    HGB 13.2 (L) 02/13/2020    HCT 41.8 02/13/2020    MCV 90 02/13/2020     02/13/2020     BMP  Lab Results   Component Value Date     (L) 02/17/2020    K 3.9 02/17/2020    CL 96 02/17/2020    CO2 28 02/17/2020    BUN 21 02/17/2020    CREATININE 2.0 (H) 02/17/2020    CALCIUM 9.1 02/17/2020    ANIONGAP 10 02/17/2020    ESTGFRAFRICA 36.9 (A) 02/17/2020    EGFRNONAA 31.9 (A) 02/17/2020     Lab Results   Component Value Date    ALT 31 02/17/2020    ALT 31 02/17/2020    AST 26 02/17/2020    AST 26 02/17/2020    GGT 38 02/17/2020    ALKPHOS 943 (H) 02/17/2020    ALKPHOS 943 (H) 02/17/2020    BILITOT 0.4 02/17/2020    BILITOT 0.4 02/17/2020       No results found for: IRON, TIBC, FERRITIN, SATURATEDIRO  No results found for: [id number]  No results found for: FOLATE  Lab Results   Component Value Date    TSH 2.302 01/04/2019         Review of Systems   Constitutional: Positive for activity change, appetite change, fatigue and unexpected weight change. Negative for chills, diaphoresis and fever.   HENT: Negative for congestion, dental problem, drooling, ear discharge, ear pain, facial swelling, hearing loss, mouth sores, nosebleeds,  postnasal drip, rhinorrhea, sinus pressure, sneezing, sore throat, tinnitus, trouble swallowing and voice change.    Eyes: Negative for photophobia, pain, discharge, redness, itching and visual disturbance.   Respiratory: Negative for apnea, cough, choking, chest tightness, shortness of breath, wheezing and stridor.    Cardiovascular: Negative for chest pain, palpitations and leg swelling.   Gastrointestinal: Negative for abdominal distention, abdominal pain, anal bleeding, blood in stool, constipation, diarrhea, nausea, rectal pain and vomiting.   Endocrine: Negative for cold intolerance, heat intolerance, polydipsia, polyphagia and polyuria.   Genitourinary: Negative for decreased urine volume, difficulty urinating, discharge, dysuria, enuresis, flank pain, frequency, genital sores, hematuria, penile pain, penile swelling, scrotal swelling, testicular pain and urgency.   Musculoskeletal: Positive for arthralgias, gait problem and myalgias. Negative for back pain, joint swelling, neck pain and neck stiffness.   Skin: Negative for color change, pallor, rash and wound.   Allergic/Immunologic: Negative for environmental allergies, food allergies and immunocompromised state.   Neurological: Positive for weakness. Negative for dizziness, tremors, seizures, syncope, facial asymmetry, speech difficulty, light-headedness, numbness and headaches.   Hematological: Negative for adenopathy. Does not bruise/bleed easily.   Psychiatric/Behavioral: Positive for dysphoric mood. Negative for agitation, behavioral problems, confusion, decreased concentration, hallucinations, self-injury, sleep disturbance and suicidal ideas. The patient is nervous/anxious. The patient is not hyperactive.        Objective:      Physical Exam   Constitutional: He is oriented to person, place, and time. He appears well-developed and well-nourished. He appears distressed.   HENT:   Head: Normocephalic.   Right Ear: Tympanic membrane and external ear  normal.   Left Ear: Tympanic membrane and external ear normal.   Nose: Nose normal. Right sinus exhibits no maxillary sinus tenderness and no frontal sinus tenderness. Left sinus exhibits no maxillary sinus tenderness and no frontal sinus tenderness.   Mouth/Throat: Oropharynx is clear and moist. No oropharyngeal exudate.   Eyes: Pupils are equal, round, and reactive to light. EOM and lids are normal. Right eye exhibits no discharge. Left eye exhibits no discharge. Right conjunctiva is not injected. Right conjunctiva has no hemorrhage. Left conjunctiva is not injected. Left conjunctiva has no hemorrhage. No scleral icterus. Right eye exhibits normal extraocular motion. Left eye exhibits normal extraocular motion.   Neck: Normal range of motion. Neck supple. No JVD present. No tracheal deviation present. No thyromegaly present.   Cardiovascular: Normal rate, regular rhythm and normal heart sounds.   Pulmonary/Chest: Effort normal and breath sounds normal. No stridor. No respiratory distress.   Abdominal: Soft. Bowel sounds are normal. He exhibits no mass. There is no hepatosplenomegaly, splenomegaly or hepatomegaly. There is no tenderness.   Musculoskeletal: Normal range of motion. He exhibits no edema or tenderness.   Lymphadenopathy:        Head (right side): No posterior auricular and no occipital adenopathy present.        Head (left side): No posterior auricular and no occipital adenopathy present.     He has no cervical adenopathy.        Right cervical: No superficial cervical, no deep cervical and no posterior cervical adenopathy present.       Left cervical: No superficial cervical, no deep cervical and no posterior cervical adenopathy present.     He has no axillary adenopathy.        Right: No supraclavicular adenopathy present.        Left: No supraclavicular adenopathy present.   Neurological: He is alert and oriented to person, place, and time. He has normal strength. No cranial nerve deficit.  Coordination normal.   Skin: Skin is dry. No rash noted. He is not diaphoretic. No cyanosis or erythema. Nails show no clubbing.   Psychiatric: He has a normal mood and affect. His behavior is normal. Judgment and thought content normal. Cognition and memory are normal.   Vitals reviewed.          Assessment:      1. Stage 3 chronic kidney disease    2. 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 Neoplasm of head    4. Benign prostatic hyperplasia without lower urinary tract symptoms    5. ACP (advance care planning)           Plan:     Patient has renal insufficiency.  With subsequent abnormal bone scan at this point will proceed with SPEP free light chain to make sure no evidence of multiple myeloma although pattern is not consistent.  Markedly increase in PSA will proceed with bone marrow aspirate biopsy in the office of unsuccessful ask for CT-directed biopsy results of laboratory studies pending.  Concerned about possibility of small cell carcinoma of prostate. In addition referral has been made to palliative care for advanced care planning. Advance Care Planning  extensive conversation with family over the possibility of metastatic prostate carcinoma article for all today followed to them for their review.  I told him until a tissue diagnosis is obtained I do not exactly know if the patient has a malignancy and what type in terms of prognosis or treatment recommendations although high likelihood with elevation of PSA that this could be the case .  Procedure note after informed consent was obtained patient placed in supine position left iliac crest was prepped with alcohol 1% xylocaine infiltrated bone marrow performed small material obtained.  Very hard bone attempt to aspirate but no material.  He will be scheduled for a bone marrow and bone biopsy on 03/06/2020 hopefully will have results from this specimen prior to that to see whether not to proceed and to expedite workup              Felix Irvin Jr, MD FACP

## 2020-02-27 NOTE — NURSING
Met with patient and family in person today with dr Irvin appt..  Together we scheduled lab for today on his way out following his bone marrow biopsy per dr Irvin today in this appt.    We set up a CT guided bone biopsy with IR dept. For 3/6/2020 which we will cancel if the sample from today's BMBX is sufficient for diagnosis per DR. Irvin request.   MRI of brain will be done 3/3/20 in [place] per pt request as this is first available.   I have sent a referral message to Sybil Hester in palliative care to reach out to pt to set that appt up   .[email or web address] with patient regarding Advanced Care planning and provided him with the written materials regarding living will and medical POA .  He will take the materials home and review and bring back signed and witnessed upon his follow up [email or web address]    I will follow the path report on todays BMBX and will set up follow up with Dr. Irvin for review.  Will cancel CT guided biopsy appt if necessary.    My role as nurse navigator and my direct contact information including after hours # reviewed  'Pt will notify me of any further concerns.   Oncology Navigation   Intake  MD Assigned: luh  Initial Nurse Navigator Contact: 20  Contact Method: In person  Date Worked: 20  Multiple appointments: Yes  Reason if booked > 7 days after scheduling: Waiting for surgery / transplant / biopsy  Reason for Treatment Delay: Further work-up  Further Work-Up: BMBX , lab today 20 MRI BRAin and Ct bone biopsy if BMBX is not sufficient      Treatment               Acuity  Treatment Tolerability: Has not started treatment yet/treatment fully completed and side effects resolved  ECO   Needed: 0  Support: 0  Transportation: 0  Verbalizes the need for more education: 1  Navigation Acuity: 3     Follow Up  Follow up in about 5 days (around 3/3/2020).

## 2020-02-27 NOTE — H&P (VIEW-ONLY)
Subjective:       Patient ID: Sherif Ragland is a 74 y.o. male.    Chief Complaint: Consult; Results; and Cancer    HPI  74-year-old male referred for evaluation of abnormal bone scan after being found to have a markedly elevated alkaline phosphatases PET scan done today was ordered as well.  He is accompanied by his wife and son..  Patient had TURP last year and subsequently was found to have markedly elevated PSA as well as alkaline phosphatases with abnormal bone scan patient is referred for presumptive diagnosis of metastatic cancer ECOG status 1    Past Medical History:   Diagnosis Date    Diabetes mellitus, type 2 1997    Hyperlipidemia     Hypertension     Hypogonadism in male     Renal insufficiency     Tubular adenoma 10/2017     Family History   Problem Relation Age of Onset    Stroke Mother     Heart disease Father     Stroke Brother      Social History     Socioeconomic History    Marital status:      Spouse name: Not on file    Number of children: 4    Years of education: Not on file    Highest education level: Not on file   Occupational History    Occupation: [employer]   Social Needs    Financial resource strain: Not on file    Food insecurity:     Worry: Not on file     Inability: Not on file    Transportation needs:     Medical: Not on file     Non-medical: Not on file   Tobacco Use    Smoking status: Never Smoker    Smokeless tobacco: Never Used   Substance and Sexual Activity    Alcohol use: No    Drug use: No    Sexual activity: Yes     Partners: Female   Lifestyle    Physical activity:     Days per week: Not on file     Minutes per session: Not on file    Stress: Not on file   Relationships    Social connections:     Talks on phone: Not on file     Gets together: Not on file     Attends Scientologist service: Not on file     Active member of club or organization: Not on file     Attends meetings of clubs or organizations: Not on file     Relationship status: Not on  file   Other Topics Concern    Not on file   Social History Narrative    Surrogate decision maker, Wife, Mita Ragland [phone]     Past Surgical History:   Procedure Laterality Date    CATARACT EXTRACTION Right 01/31/2018    CATARACT EXTRACTION W/  INTRAOCULAR LENS IMPLANT Left 03/13/2019    COLONOSCOPY N/A 11/2/2017    Procedure: COLONOSCOPY;  Surgeon: Alek Francois MD;  Location: [facility] ENDO;  Service: Endoscopy;  Laterality: N/A;    TIBIA FRACTURE SURGERY      right leg x2     TONSILLECTOMY      TRANSURETHRAL RESECTION OF PROSTATE N/A 10/17/2019    Procedure: TURP (TRANSURETHRAL RESECTION OF PROSTATE);  Surgeon: Mir Hale IV, MD;  Location: [facility] OR;  Service: Urology;  Laterality: N/A;       Labs:  Lab Results   Component Value Date    WBC 10.47 02/13/2020    HGB 13.2 (L) 02/13/2020    HCT 41.8 02/13/2020    MCV 90 02/13/2020     02/13/2020     BMP  Lab Results   Component Value Date     (L) 02/17/2020    K 3.9 02/17/2020    CL 96 02/17/2020    CO2 28 02/17/2020    BUN 21 02/17/2020    CREATININE 2.0 (H) 02/17/2020    CALCIUM 9.1 02/17/2020    ANIONGAP 10 02/17/2020    ESTGFRAFRICA 36.9 (A) 02/17/2020    EGFRNONAA 31.9 (A) 02/17/2020     Lab Results   Component Value Date    ALT 31 02/17/2020    ALT 31 02/17/2020    AST 26 02/17/2020    AST 26 02/17/2020    GGT 38 02/17/2020    ALKPHOS 943 (H) 02/17/2020    ALKPHOS 943 (H) 02/17/2020    BILITOT 0.4 02/17/2020    BILITOT 0.4 02/17/2020       No results found for: IRON, TIBC, FERRITIN, SATURATEDIRO  No results found for: [id number]  No results found for: FOLATE  Lab Results   Component Value Date    TSH 2.302 01/04/2019         Review of Systems   Constitutional: Positive for activity change, appetite change, fatigue and unexpected weight change. Negative for chills, diaphoresis and fever.   HENT: Negative for congestion, dental problem, drooling, ear discharge, ear pain, facial swelling, hearing loss, mouth sores, nosebleeds,  postnasal drip, rhinorrhea, sinus pressure, sneezing, sore throat, tinnitus, trouble swallowing and voice change.    Eyes: Negative for photophobia, pain, discharge, redness, itching and visual disturbance.   Respiratory: Negative for apnea, cough, choking, chest tightness, shortness of breath, wheezing and stridor.    Cardiovascular: Negative for chest pain, palpitations and leg swelling.   Gastrointestinal: Negative for abdominal distention, abdominal pain, anal bleeding, blood in stool, constipation, diarrhea, nausea, rectal pain and vomiting.   Endocrine: Negative for cold intolerance, heat intolerance, polydipsia, polyphagia and polyuria.   Genitourinary: Negative for decreased urine volume, difficulty urinating, discharge, dysuria, enuresis, flank pain, frequency, genital sores, hematuria, penile pain, penile swelling, scrotal swelling, testicular pain and urgency.   Musculoskeletal: Positive for arthralgias, gait problem and myalgias. Negative for back pain, joint swelling, neck pain and neck stiffness.   Skin: Negative for color change, pallor, rash and wound.   Allergic/Immunologic: Negative for environmental allergies, food allergies and immunocompromised state.   Neurological: Positive for weakness. Negative for dizziness, tremors, seizures, syncope, facial asymmetry, speech difficulty, light-headedness, numbness and headaches.   Hematological: Negative for adenopathy. Does not bruise/bleed easily.   Psychiatric/Behavioral: Positive for dysphoric mood. Negative for agitation, behavioral problems, confusion, decreased concentration, hallucinations, self-injury, sleep disturbance and suicidal ideas. The patient is nervous/anxious. The patient is not hyperactive.        Objective:      Physical Exam   Constitutional: He is oriented to person, place, and time. He appears well-developed and well-nourished. He appears distressed.   HENT:   Head: Normocephalic.   Right Ear: Tympanic membrane and external ear  normal.   Left Ear: Tympanic membrane and external ear normal.   Nose: Nose normal. Right sinus exhibits no maxillary sinus tenderness and no frontal sinus tenderness. Left sinus exhibits no maxillary sinus tenderness and no frontal sinus tenderness.   Mouth/Throat: Oropharynx is clear and moist. No oropharyngeal exudate.   Eyes: Pupils are equal, round, and reactive to light. EOM and lids are normal. Right eye exhibits no discharge. Left eye exhibits no discharge. Right conjunctiva is not injected. Right conjunctiva has no hemorrhage. Left conjunctiva is not injected. Left conjunctiva has no hemorrhage. No scleral icterus. Right eye exhibits normal extraocular motion. Left eye exhibits normal extraocular motion.   Neck: Normal range of motion. Neck supple. No JVD present. No tracheal deviation present. No thyromegaly present.   Cardiovascular: Normal rate, regular rhythm and normal heart sounds.   Pulmonary/Chest: Effort normal and breath sounds normal. No stridor. No respiratory distress.   Abdominal: Soft. Bowel sounds are normal. He exhibits no mass. There is no hepatosplenomegaly, splenomegaly or hepatomegaly. There is no tenderness.   Musculoskeletal: Normal range of motion. He exhibits no edema or tenderness.   Lymphadenopathy:        Head (right side): No posterior auricular and no occipital adenopathy present.        Head (left side): No posterior auricular and no occipital adenopathy present.     He has no cervical adenopathy.        Right cervical: No superficial cervical, no deep cervical and no posterior cervical adenopathy present.       Left cervical: No superficial cervical, no deep cervical and no posterior cervical adenopathy present.     He has no axillary adenopathy.        Right: No supraclavicular adenopathy present.        Left: No supraclavicular adenopathy present.   Neurological: He is alert and oriented to person, place, and time. He has normal strength. No cranial nerve deficit.  Coordination normal.   Skin: Skin is dry. No rash noted. He is not diaphoretic. No cyanosis or erythema. Nails show no clubbing.   Psychiatric: He has a normal mood and affect. His behavior is normal. Judgment and thought content normal. Cognition and memory are normal.   Vitals reviewed.          Assessment:      1. Stage 3 chronic kidney disease    2. 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 Neoplasm of head    4. Benign prostatic hyperplasia without lower urinary tract symptoms    5. ACP (advance care planning)           Plan:     Patient has renal insufficiency.  With subsequent abnormal bone scan at this point will proceed with SPEP free light chain to make sure no evidence of multiple myeloma although pattern is not consistent.  Markedly increase in PSA will proceed with bone marrow aspirate biopsy in the office of unsuccessful ask for CT-directed biopsy results of laboratory studies pending.  Concerned about possibility of small cell carcinoma of prostate. In addition referral has been made to palliative care for advanced care planning. Advance Care Planning  extensive conversation with family over the possibility of metastatic prostate carcinoma article for all today followed to them for their review.  I told him until a tissue diagnosis is obtained I do not exactly know if the patient has a malignancy and what type in terms of prognosis or treatment recommendations although high likelihood with elevation of PSA that this could be the case .  Procedure note after informed consent was obtained patient placed in supine position left iliac crest was prepped with alcohol 1% xylocaine infiltrated bone marrow performed small material obtained.  Very hard bone attempt to aspirate but no material.  He will be scheduled for a bone marrow and bone biopsy on 03/06/2020 hopefully will have results from this specimen prior to that to see whether not to proceed and to expedite workup              Felix Irvin Jr, MD FACP

## 2020-02-28 LAB
ALBUMIN SERPL ELPH-MCNC: 3.67 G/DL (ref 3.35–5.55)
ALPHA1 GLOB SERPL ELPH-MCNC: 0.37 G/DL (ref 0.17–0.41)
ALPHA2 GLOB SERPL ELPH-MCNC: 1.23 G/DL (ref 0.43–0.99)
B-GLOBULIN SERPL ELPH-MCNC: 0.97 G/DL (ref 0.5–1.1)
GAMMA GLOB SERPL ELPH-MCNC: 0.67 G/DL (ref 0.67–1.58)
KAPPA LC SER QL IA: 3.08 MG/DL (ref 0.33–1.94)
KAPPA LC/LAMBDA SER IA: 1.14 (ref 0.26–1.65)
LAMBDA LC SER QL IA: 2.69 MG/DL (ref 0.57–2.63)
PATHOLOGIST INTERPRETATION SPE: NORMAL
PROT SERPL-MCNC: 6.9 G/DL (ref 6–8.4)

## 2020-02-28 NOTE — ACP (ADVANCE CARE PLANNING)
Spoke with patient yesterday regarding ACP . Reviewed paperwork and provided written information. Pt will review at home with family and bring back to next visit.

## 2020-03-03 ENCOUNTER — TELEPHONE (OUTPATIENT)
Dept: PALLIATIVE MEDICINE | Facility: CLINIC | Age: 75
End: 2020-03-03

## 2020-03-03 ENCOUNTER — TELEPHONE (OUTPATIENT)
Dept: HEMATOLOGY/ONCOLOGY | Facility: CLINIC | Age: 75
End: 2020-03-03

## 2020-03-03 ENCOUNTER — PATIENT MESSAGE (OUTPATIENT)
Dept: HEMATOLOGY/ONCOLOGY | Facility: CLINIC | Age: 75
End: 2020-03-03

## 2020-03-03 ENCOUNTER — DOCUMENTATION ONLY (OUTPATIENT)
Dept: HEMATOLOGY/ONCOLOGY | Facility: CLINIC | Age: 75
End: 2020-03-03

## 2020-03-03 ENCOUNTER — HOSPITAL ENCOUNTER (OUTPATIENT)
Dept: RADIOLOGY | Facility: HOSPITAL | Age: 75
Discharge: HOME | End: 2020-03-03
Attending: INTERNAL MEDICINE
Payer: MEDICARE

## 2020-03-03 DIAGNOSIS — C41.9 MALIGNANT NEOPLASM OF BONE AND ARTICULAR CARTILAGE, UNSPECIFIED: ICD-10-CM

## 2020-03-03 DIAGNOSIS — C41.9 MALIGNANT NEOPLASM OF BONE AND ARTICULAR CARTILAGE, UNSPECIFIED: Primary | ICD-10-CM

## 2020-03-03 DIAGNOSIS — D49.89 NEOPLASM OF HEAD: ICD-10-CM

## 2020-03-03 LAB
COMMENT: NORMAL
FINAL PATHOLOGIC DIAGNOSIS: NORMAL
GROSS: NORMAL
MICROSCOPIC EXAM: NORMAL

## 2020-03-03 RX ORDER — DIAZEPAM 10 MG/1
10 TABLET ORAL ONCE
Qty: 1 TABLET | Refills: 0 | Status: SHIPPED | OUTPATIENT
Start: 2020-03-03 | End: 2020-03-12

## 2020-03-03 NOTE — NURSING
"Called pathology [facility] [phone] hot line regarding BMBX done on 20  "[employer]" reports that the case has been distributed for review and will call me back with report . My direct contact number left with her.     The pt also called in with complaint of not being able to complete MRI today due to the "tight fit " of the machine.  He has been rescheduled to the [place] location for tomorrow , but is asking for something for anxiety to take prior to the test tomorrow. I will forward request to DR. Irvin and let pt know. He requested something be sent to [facility] at [address]   I will send message to Dr. Irvin regarding this and get back with the patient as soon as I hear back.   Oncology Navigation   Intake  MD Assigned: luh  Initial Nurse Navigator Contact: 20  Contact Method: In person  Date Worked: 20  Multiple appointments: Yes  Reason if booked > 7 days after scheduling: Waiting for surgery / transplant / biopsy  Reason for Treatment Delay: Further work-up  Further Work-Up: BMBX , lab today 20 MRI BRAin and Ct bone biopsy if BMBX is not sufficient      Treatment                  Acuity  Treatment Tolerability: Has not started treatment yet/treatment fully completed and side effects resolved  ECO   Needed: 0  Support: 0  Transportation: 0  Verbalizes the need for more education: 1  Navigation Acuity: 3     Follow Up  No follow-ups on file.     "

## 2020-03-03 NOTE — NURSING
Rx for Valium sent to requested pharmacy per Dr. Irvin.  Pt notified .  Pt also notified that he will need to keep the CT biopsy appt for this Friday as the BMBX performed in clinic did not give us a diagnosis.  Pt stated understanding and will attend appt as set.  I will set up a follow up appt with Dr. Irvin to review pathology from 3/6/20 biopsy.  Pt request this appt be sent to his portal . He has my direct contact information should he need anything further.   Oncology Navigation   Intake  MD Assigned: luh  Initial Nurse Navigator Contact: 20  Contact Method: In person  Date Worked: 20  Multiple appointments: Yes  Reason if booked > 7 days after scheduling: Waiting for surgery / transplant / biopsy  Reason for Treatment Delay: Further work-up  Further Work-Up: BMBX , lab today 20 MRI BRAin and Ct bone biopsy if BMBX is not sufficient      Treatment                  Acuity  Treatment Tolerability: Has not started treatment yet/treatment fully completed and side effects resolved  ECO   Needed: 0  Support: 0  Transportation: 0  Verbalizes the need for more education: 1  Navigation Acuity: 3     Follow Up  No follow-ups on file.

## 2020-03-03 NOTE — TELEPHONE ENCOUNTER
[facility]  Medical Specialty       Patient Name: Sherif Ragland  MRN: [id number]  Primary Care Physician: Tobias Fox MD    Contacted patient to schedule palliative care appt. Scheduled for 3/12. Also provided support to patient as he expressed this has been a difficult time and copays have been catching up to him. His wife, who is a teacher, has also been taking off work to be at his appointments and she does not have any more sick/vacation days. Encouraged patient to request to speak to hem/onc social workers at next visit to discuss support/assistance that may be available.        Louis Damian, MSW, LCSW  [phone]

## 2020-03-03 NOTE — TELEPHONE ENCOUNTER
SW contacted pt to discuss DS of 8. SW provided brief introduction to oncology social work. Pt explained that his only need was financial assistance. Pt explained that his wife works as a teacher and has been taking off to bring him to the medical appointments Pt stated that he has not worked in the last 6 months which is not helping the bills that are increasing. SW inquired if pt had been screened for Medicaid. Pt stated that he has not. SW explained that he will need to speak with a FC so that he can be screened for financial assistance as well. SW explained that she would have a FC contact pt to go over insurance coverage. SW explained that she would send SW contact information through pt portal.     F/u as treatment is planned.

## 2020-03-04 ENCOUNTER — HOSPITAL ENCOUNTER (OUTPATIENT)
Dept: RADIOLOGY | Facility: HOSPITAL | Age: 75
Discharge: HOME | End: 2020-03-04
Attending: INTERNAL MEDICINE
Payer: MEDICARE

## 2020-03-04 PROCEDURE — 25500020 PHARM REV CODE 255: Performed by: INTERNAL MEDICINE

## 2020-03-04 PROCEDURE — 70553 MRI BRAIN STEM W/O & W/DYE: CPT | Mod: 26,,, | Performed by: RADIOLOGY

## 2020-03-04 PROCEDURE — 70553 MRI BRAIN W WO CONTRAST: ICD-10-PCS | Mod: 26,,, | Performed by: RADIOLOGY

## 2020-03-04 PROCEDURE — A9585 GADOBUTROL INJECTION: HCPCS | Performed by: INTERNAL MEDICINE

## 2020-03-04 PROCEDURE — 70553 MRI BRAIN STEM W/O & W/DYE: CPT | Mod: TC

## 2020-03-04 RX ORDER — GADOBUTROL 604.72 MG/ML
10 INJECTION INTRAVENOUS
Status: COMPLETED | OUTPATIENT
Start: 2020-03-04 | End: 2020-03-04

## 2020-03-04 RX ADMIN — GADOBUTROL 10 ML: 604.72 INJECTION INTRAVENOUS at 09:03

## 2020-03-05 ENCOUNTER — TELEPHONE (OUTPATIENT)
Dept: RADIOLOGY | Facility: HOSPITAL | Age: 75
End: 2020-03-05

## 2020-03-05 NOTE — TELEPHONE ENCOUNTER
Interventional Radiology:  LM confirming patient appt for tomorrow - needs to be here at 0745, NPO after midnight tonight, must have a ride.

## 2020-03-06 ENCOUNTER — HOSPITAL ENCOUNTER (OUTPATIENT)
Dept: RADIOLOGY | Facility: HOSPITAL | Age: 75
Discharge: HOME | End: 2020-03-06
Attending: INTERNAL MEDICINE
Payer: MEDICARE

## 2020-03-06 VITALS
OXYGEN SATURATION: 96 % | HEIGHT: 72 IN | DIASTOLIC BLOOD PRESSURE: 82 MMHG | SYSTOLIC BLOOD PRESSURE: 183 MMHG | WEIGHT: 283 LBS | RESPIRATION RATE: 18 BRPM | BODY MASS INDEX: 38.33 KG/M2 | HEART RATE: 67 BPM

## 2020-03-06 DIAGNOSIS — E11.59 HYPERTENSION ASSOCIATED WITH DIABETES: ICD-10-CM

## 2020-03-06 DIAGNOSIS — C41.9 MALIGNANT NEOPLASM OF BONE AND ARTICULAR CARTILAGE, UNSPECIFIED: ICD-10-CM

## 2020-03-06 DIAGNOSIS — I15.2 HYPERTENSION ASSOCIATED WITH DIABETES: ICD-10-CM

## 2020-03-06 PROCEDURE — 88341 PR IHC OR ICC EACH ADD'L SINGLE ANTIBODY  STAINPR: ICD-10-PCS | Mod: 26,,, | Performed by: PATHOLOGY

## 2020-03-06 PROCEDURE — 77012 CT SCAN FOR NEEDLE BIOPSY: CPT | Mod: TC

## 2020-03-06 PROCEDURE — 63600175 PHARM REV CODE 636 W HCPCS: Performed by: RADIOLOGY

## 2020-03-06 PROCEDURE — 88305 TISSUE EXAM BY PATHOLOGIST: CPT | Mod: 26,,, | Performed by: PATHOLOGY

## 2020-03-06 PROCEDURE — 88305 TISSUE EXAM BY PATHOLOGIST: CPT | Performed by: PATHOLOGY

## 2020-03-06 PROCEDURE — 88305 TISSUE EXAM BY PATHOLOGIST: ICD-10-PCS | Mod: 26,,, | Performed by: PATHOLOGY

## 2020-03-06 PROCEDURE — 88342 CHG IMMUNOCYTOCHEMISTRY: ICD-10-PCS | Mod: 26,,, | Performed by: PATHOLOGY

## 2020-03-06 PROCEDURE — 88342 IMHCHEM/IMCYTCHM 1ST ANTB: CPT | Mod: 26,,, | Performed by: PATHOLOGY

## 2020-03-06 PROCEDURE — 88341 IMHCHEM/IMCYTCHM EA ADD ANTB: CPT | Mod: 26,,, | Performed by: PATHOLOGY

## 2020-03-06 RX ORDER — MIDAZOLAM HYDROCHLORIDE 1 MG/ML
INJECTION INTRAMUSCULAR; INTRAVENOUS CODE/TRAUMA/SEDATION MEDICATION
Status: COMPLETED | OUTPATIENT
Start: 2020-03-06 | End: 2020-03-06

## 2020-03-06 RX ORDER — FENTANYL CITRATE 50 UG/ML
INJECTION, SOLUTION INTRAMUSCULAR; INTRAVENOUS CODE/TRAUMA/SEDATION MEDICATION
Status: COMPLETED | OUTPATIENT
Start: 2020-03-06 | End: 2020-03-06

## 2020-03-06 RX ORDER — METOPROLOL SUCCINATE 100 MG/1
TABLET, EXTENDED RELEASE ORAL
Qty: 60 TABLET | Refills: 11 | Status: SHIPPED | OUTPATIENT
Start: 2020-03-06 | End: 2021-03-14 | Stop reason: SDUPTHER

## 2020-03-06 RX ADMIN — MIDAZOLAM HYDROCHLORIDE 1 MG: 1 INJECTION, SOLUTION INTRAMUSCULAR; INTRAVENOUS at 08:03

## 2020-03-06 RX ADMIN — FENTANYL CITRATE 50 MCG: 50 INJECTION, SOLUTION INTRAMUSCULAR; INTRAVENOUS at 08:03

## 2020-03-06 NOTE — SEDATION DOCUMENTATION
Pt in ct on table with bilateral arms above head, cm in place, vss.  Pt verbalized understanding of procedure.

## 2020-03-06 NOTE — SEDATION DOCUMENTATION
Procedure complete, pt tolerated well.  Vss.  Bone lesion biopsy only performed per Dr. Fernando.  Band aid placed to left iliac crest bone biopsy site.  Site WDL.  Pt denied pain or discomfort, pt awake and alert and able to follow simple commands. Transferred to stretcher supine and transported to  Radiology recovery.

## 2020-03-06 NOTE — PLAN OF CARE
VSS. No acute distress noted. Band aid to left iliac crest biopsy site clean/dry/intact with no drainage/redness/swelling noted to site. D/C instructions given to and reviewed with pt and pt's daughter and both verbalized understanding of all. Also, instructed to follow up with ordering physician for results. Pt discharged to home, taken out via wheelchair and driven home by daughter.

## 2020-03-06 NOTE — DISCHARGE INSTRUCTIONS
Recovery After Procedural Sedation (Adult)  You have been given medicine by vein to make you sleep during your surgery. This may have included both a pain medicine and sleeping medicine. Most of the effects have worn off. But you may still have some drowsiness for the next 6 to 8 hours.  Home care  Follow these guidelines when you get home:  · For the next 8 hours, you should be watched by a responsible adult. This person should make sure your condition is not getting worse.  · Don't drink any alcohol for the next 24 hours.  · Don't drive, operate dangerous machinery, or make important business or personal decisions during the next 24 hours.  Note: Your healthcare provider may tell you not to take any medicine by mouth for pain or sleep in the next 4 hours. These medicines may react with the medicines you were given in the hospital. This could cause a much stronger response than usual.  Follow-up care  Follow up with your healthcare provider if you are not alert and back to your usual level of activity within 12 hours.  When to seek medical advice  Call your healthcare provider right away if any of these occur:  · Drowsiness gets worse  · Weakness or dizziness gets worse  · Repeated vomiting  · You can't be awakened   Date Last Reviewed: 10/18/2016  © 5508-1127 [employer]. [address] [place] [address], [place] [address]. All rights reserved. This information is not intended as a substitute for professional medical care. Always follow your healthcare professional's instructions.  Bone Marrow Aspiration and Biopsy  Does this test have other names?  Bone marrow exam  What is this test?  This is a two-part test that looks at the blood cells in a sample of bone marrow, the spongy tissue within certain bones. This test may help your healthcare provider diagnose or monitor a blood disease or health condition affecting your marrow.  Your bone marrow has a liquid part and a solid part. Aspiration uses a needle to  remove a sample of the liquid part of bone marrow. Biopsy uses a larger needle to remove a small amount of bone with its marrow.  Part of the job of bone marrow is to make blood cells. This test can find out how well your bone marrow is working. This test is also done to find some types of cancer.  Why do I need this test?  You might have this test if your healthcare provider wants to find out the health of your bone marrow or to check on how well your marrow is making blood cells.  You may have an aspiration to check for:  · The health of your bone marrow for a transplant  · Acute leukemia  · Multiple myeloma  In some cases, bone marrow aspiration is used to confirm chromosome disorders in newborns.  You may have an aspiration followed by a biopsy if you could have:  · Bacterial, fungal, or parasitic infection  · Unexplained anemia, leucopenia, or thrombocytopenia  · Metastatic cancer or many other diseases  What other tests might I have along with this test?  Your healthcare provider may also order these tests:  · Complete blood count, or CBC  · Reticulocyte count to find out your red blood cell survival rate  What do my test results mean?  Many things may affect your lab test results. These include the method each lab uses to do the test. Even if your test results are different from the normal value, you may not have a problem. To learn what the results mean for you, talk with your healthcare provider.  The lab will look at different aspects of your bone marrow to help find certain diseases or conditions. These aspects include:  · Type and number of blood cells  · Any abnormalities in the size, shape, or look of cells  · Level of iron in the bone marrow  · Abnormal amount of young white blood cells, called blasts  · Any chromosomal abnormalities  Depending on what is seen, your results may mean you have an infection, a blood disease, leukemia, or cancer that has spread to the bone marrow from another site.  Your  healthcare provider will take your results and combine this information with information from your physical exam, health history, and other types of tests to make a diagnosis.   If your results are negative, your provider may order other tests to diagnose your condition.   How is this test done?  These tests require a sample of bone marrow. A number of sites on your body can be used for marrow aspiration, but the hip bone is a common spot. You will likely lie on your side or stomach on an exam table. Your healthcare provider will numb the area of the test. You may feel a slight prick from the needle that the provider uses to give the numbing agent.  Does this test pose any risks?  It's not possible to numb the bone, so you may feel slight pain during the procedure. But you shouldn't feel any pain afterward. Risks from a bone marrow test are rare, but you could have bleeding or an infection.  What might affect my test results?  Other factors aren't likely to affect your results.  How do I get ready for this test?  Tell your healthcare provider if you take aspirin or have any allergies. Also tell your provider if you are pregnant, take any blood-thinner medicines, or have a history of bleeding problems.  Be sure your provider knows about all other medicines, herbs, vitamins, and supplements you are taking. This includes medicines that don't need a prescription and any illicit drugs you may use.     © 8670-0966 [employer]. [address] [place] [address], [place] [address]. All rights reserved. This information is not intended as a substitute for professional medical care. Always follow your healthcare professional's instructions.

## 2020-03-06 NOTE — DISCHARGE SUMMARY
Pre Op Diagnosis: Bone lesion     Post Op Diagnosis: same     Procedure:  Bone lesion biopsy     Procedure performed by: Kassie SILVEIRA, Lazaro SNOWDEN     Written Informed Consent Obtained: Yes     Specimen Removed:  yes     Estimated Blood Loss:  minimal     Findings: Local anesthesia and moderate sedation were used.     The patient tolerated the procedure well and there were no complications.      Sterile technique was performed in the left posterior iliac, lidocaine was used as a local anesthetic.  Multiple samples taken from the left iliac lesion.  Pt tolerated the procedure well without immediate complications.  Please see radiologist report for details. F/u with PCP and/or ordering physician.

## 2020-03-12 ENCOUNTER — INITIAL CONSULT (OUTPATIENT)
Dept: PALLIATIVE MEDICINE | Facility: CLINIC | Age: 75
End: 2020-03-12
Payer: MEDICARE

## 2020-03-12 ENCOUNTER — DOCUMENTATION ONLY (OUTPATIENT)
Dept: HEMATOLOGY/ONCOLOGY | Facility: CLINIC | Age: 75
End: 2020-03-12

## 2020-03-12 VITALS
HEIGHT: 72 IN | OXYGEN SATURATION: 96 % | HEART RATE: 69 BPM | TEMPERATURE: 98 F | WEIGHT: 283.31 LBS | BODY MASS INDEX: 38.37 KG/M2 | SYSTOLIC BLOOD PRESSURE: 170 MMHG | DIASTOLIC BLOOD PRESSURE: 78 MMHG

## 2020-03-12 DIAGNOSIS — C41.9 MALIGNANT NEOPLASM OF BONE AND ARTICULAR CARTILAGE, UNSPECIFIED: ICD-10-CM

## 2020-03-12 DIAGNOSIS — F41.0 PANIC ATTACK: Primary | ICD-10-CM

## 2020-03-12 DIAGNOSIS — Z71.89 ACP (ADVANCE CARE PLANNING): ICD-10-CM

## 2020-03-12 PROCEDURE — 99999 PR PBB SHADOW E&M-EST. PATIENT-LVL III: CPT | Mod: PBBFAC,,, | Performed by: FAMILY MEDICINE

## 2020-03-12 PROCEDURE — 99214 OFFICE O/P EST MOD 30 MIN: CPT | Mod: S$GLB,,, | Performed by: FAMILY MEDICINE

## 2020-03-12 PROCEDURE — 1159F MED LIST DOCD IN RCRD: CPT | Mod: S$GLB,,, | Performed by: FAMILY MEDICINE

## 2020-03-12 PROCEDURE — 1126F AMNT PAIN NOTED NONE PRSNT: CPT | Mod: S$GLB,,, | Performed by: FAMILY MEDICINE

## 2020-03-12 PROCEDURE — 1126F PR PAIN SEVERITY QUANTIFIED, NO PAIN PRESENT: ICD-10-PCS | Mod: S$GLB,,, | Performed by: FAMILY MEDICINE

## 2020-03-12 PROCEDURE — 99499 RISK ADDL DX/OHS AUDIT: ICD-10-PCS | Mod: S$GLB,,, | Performed by: FAMILY MEDICINE

## 2020-03-12 PROCEDURE — 1101F PR PT FALLS ASSESS DOC 0-1 FALLS W/OUT INJ PAST YR: ICD-10-PCS | Mod: CPTII,S$GLB,, | Performed by: FAMILY MEDICINE

## 2020-03-12 PROCEDURE — 99999 PR PBB SHADOW E&M-EST. PATIENT-LVL III: ICD-10-PCS | Mod: PBBFAC,,, | Performed by: FAMILY MEDICINE

## 2020-03-12 PROCEDURE — 3078F DIAST BP <80 MM HG: CPT | Mod: CPTII,S$GLB,, | Performed by: FAMILY MEDICINE

## 2020-03-12 PROCEDURE — 3078F PR MOST RECENT DIASTOLIC BLOOD PRESSURE < 80 MM HG: ICD-10-PCS | Mod: CPTII,S$GLB,, | Performed by: FAMILY MEDICINE

## 2020-03-12 PROCEDURE — 99214 PR OFFICE/OUTPT VISIT, EST, LEVL IV, 30-39 MIN: ICD-10-PCS | Mod: S$GLB,,, | Performed by: FAMILY MEDICINE

## 2020-03-12 PROCEDURE — 1101F PT FALLS ASSESS-DOCD LE1/YR: CPT | Mod: CPTII,S$GLB,, | Performed by: FAMILY MEDICINE

## 2020-03-12 PROCEDURE — 1159F PR MEDICATION LIST DOCUMENTED IN MEDICAL RECORD: ICD-10-PCS | Mod: S$GLB,,, | Performed by: FAMILY MEDICINE

## 2020-03-12 PROCEDURE — 3077F SYST BP >= 140 MM HG: CPT | Mod: CPTII,S$GLB,, | Performed by: FAMILY MEDICINE

## 2020-03-12 PROCEDURE — 99499 UNLISTED E&M SERVICE: CPT | Mod: S$GLB,,, | Performed by: FAMILY MEDICINE

## 2020-03-12 PROCEDURE — 3077F PR MOST RECENT SYSTOLIC BLOOD PRESSURE >= 140 MM HG: ICD-10-PCS | Mod: CPTII,S$GLB,, | Performed by: FAMILY MEDICINE

## 2020-03-12 RX ORDER — ALPRAZOLAM 0.25 MG/1
.25-.5 TABLET ORAL DAILY PRN
Qty: 30 TABLET | Refills: 1 | Status: SHIPPED | OUTPATIENT
Start: 2020-03-12 | End: 2020-04-27 | Stop reason: SDUPTHER

## 2020-03-12 NOTE — NURSING
After researching with Diann in [facility] , the pts path was sent to reference lab [facility] today and will not be ready for the previously scheduled appt with Dr. Irvin tomorrow.  I have spoken with the patient and rescheduled him to next week 3/18/20 with his agreement.  I have explained to him the need to reschedule this appt so that we could have the necessary information to conduct the appt with results. appt date time and location agreeable to pt.  Dr irvin notified of this via in basket message.   Oncology Navigation   Intake  MD Assigned: luh  Initial Nurse Navigator Contact: 20  Contact Method: In person  Date Worked: 20  Multiple appointments: Yes  Reason if booked > 7 days after scheduling: Waiting for surgery / transplant / biopsy  Reason for Treatment Delay: Further work-up  Further Work-Up: BMBX , lab today 20 MRI BRAin and Ct bone biopsy if BMBX is not sufficient      Treatment                  Acuity  Treatment Tolerability: Has not started treatment yet/treatment fully completed and side effects resolved  ECO   Needed: 0  Support: 0  Transportation: 0  Verbalizes the need for more education: 1  Navigation Acuity: 3     Follow Up  No follow-ups on file.

## 2020-03-12 NOTE — PROGRESS NOTES
Subjective:       Patient ID: Sherif Ragland is a 74 y.o. male.    Chief Complaint: Consult    73 yo male here for palliative medicine consultation. He has had 2 year history of muscle pain and joint pain and has recently been diagnosed with suspected metastatic bone disease, awaiting biopsy results. He has had intense anxiety related to waiting on results and treatment to begin. He is accompanied by his wife today. They would like to complete ACP documents at today's visit. He denies any need for pain medication at this time.     Advance Care Planning    Power of   I initiated the process of advance care planning today and explained the importance of this process to the patient.  I introduced the concept of advance directives to the patient, as well. Then the patient received detailed information about the importance of designating a Health Care Power of  (HCPOA). He was also instructed to communicate with this person about their wishes for future healthcare, should he become sick and lose decision-making capacity. The patient has not previously appointed a HCPOA. After our discussion, the patient has decided to complete a HCPOA and has appointed his wife I spent a total time of 10 minutes discussing this issue with the patient.     Code Status  In light of the patients advanced and life limiting illness,I engaged the the patient in a conversation about the patient's preferences for care  at the very end of life. The patient wishes to have a natural, peaceful death.  Along those lines, the patient does not wish to have CPR or other invasive treatments performed when his heart and/or breathing stops. I communicated to the patient that a LaPOST form was completed to reflect other EOL preferences of the patient such as hopes to be home at EOL; does not want heroic measures of any kind if diagnosis is considered terminal.  I spent a total of 20 minutes engaging the patient in this advance care  planning discussion.                    does not have any pertinent problems on file.  Past Medical History:   Diagnosis Date    Diabetes mellitus, type 2 1997    Hyperlipidemia     Hypertension     Hypogonadism in male     Renal insufficiency     Tubular adenoma 10/2017     Past Surgical History:   Procedure Laterality Date    CATARACT EXTRACTION Right 01/31/2018    CATARACT EXTRACTION W/  INTRAOCULAR LENS IMPLANT Left 03/13/2019    COLONOSCOPY N/A 11/2/2017    Procedure: COLONOSCOPY;  Surgeon: Alek Francois MD;  Location: [facility] ENDO;  Service: Endoscopy;  Laterality: N/A;    TIBIA FRACTURE SURGERY      right leg x2     TONSILLECTOMY      TRANSURETHRAL RESECTION OF PROSTATE N/A 10/17/2019    Procedure: TURP (TRANSURETHRAL RESECTION OF PROSTATE);  Surgeon: Mir Hale IV, MD;  Location: [facility] OR;  Service: Urology;  Laterality: N/A;     Family History   Problem Relation Age of Onset    Stroke Mother     Heart disease Father     Stroke Brother      Social History     Socioeconomic History    Marital status:      Spouse name: Not on file    Number of children: 4    Years of education: Not on file    Highest education level: Not on file   Occupational History    Occupation: [employer]   Social Needs    Financial resource strain: Not on file    Food insecurity:     Worry: Not on file     Inability: Not on file    Transportation needs:     Medical: Not on file     Non-medical: Not on file   Tobacco Use    Smoking status: Never Smoker    Smokeless tobacco: Never Used   Substance and Sexual Activity    Alcohol use: No    Drug use: No    Sexual activity: Yes     Partners: Female   Lifestyle    Physical activity:     Days per week: Not on file     Minutes per session: Not on file    Stress: Not on file   Relationships    Social connections:     Talks on phone: Not on file     Gets together: Not on file     Attends Yarsani service: Not on file     Active member of club or  organization: Not on file     Attends meetings of clubs or organizations: Not on file     Relationship status: Not on file   Other Topics Concern    Not on file   Social History Narrative    Surrogate decision maker, Wife, Mita Ragland [phone]     Review of Systems   A comprehensive 14-point review of systems was reviewed with patient and was negative other than as specified above.     Objective:     Vitals:    03/12/20 1452   BP: (!) 170/78   Pulse: 69   Temp: 97.5 °F (36.4 °C)        Physical Exam   Constitutional: He is oriented to person, place, and time. He appears well-developed and well-nourished.   HENT:   Head: Normocephalic and atraumatic.   Eyes: Pupils are equal, round, and reactive to light. EOM are normal.   Neck: Normal range of motion. Neck supple.   Cardiovascular: Normal rate and regular rhythm.   Pulmonary/Chest: Effort normal and breath sounds normal.   Abdominal: Soft. Bowel sounds are normal.   Musculoskeletal: Normal range of motion. He exhibits no deformity.   Neurological: He is alert and oriented to person, place, and time.   Skin: Skin is warm and dry.   Psychiatric: He has a normal mood and affect. His behavior is normal.   Nursing note and vitals reviewed.      Assessment:       1. Panic attack    2. 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 Wife who is surrogate decision maker present for visit and aware of patient's wishes.            Other Visit Diagnoses     Panic attack    -  Primary    Relevant Medications    ALPRAZolam (XANAX) 0.25 MG tablet

## 2020-03-12 NOTE — LETTER
March 18, 2020      Felix Irvin MD  [id number]  [place] [address]           [facility]  [address]  [place] [address]  Phone: [phone]  Fax: [phone]          Patient: Sherif Ragland   MR Number: [id number]   YOB: 1945   Date of Visit: 3/12/2020       Dear Dr. Felix Irvin:    Thank you for referring Sherif Ragland to me for evaluation. Attached you will find relevant portions of my assessment and plan of care.    If you have questions, please do not hesitate to call me. I look forward to following Sherif Ragland along with you.    Sincerely,    Vianey Garcia MD    Enclosure  CC:  No Recipients    If you would like to receive this communication electronically, please contact [email or web address] or [phone] to request more information on Only[facility]ap[facility] Link access.    For providers and/or their staff who would like to refer a patient to [facility], please contact us through our one-stop-shop provider referral line, [facility], at [phone].    If you feel you have received this communication in error or would no longer like to receive these types of communications, please e-mail [email or web address]

## 2020-03-13 NOTE — PROGRESS NOTES
Patient spoke with his health  and reported a new diagnosis of bone cancer causing his lack of readings. He did not want hiatus and stated he will resume readings. Will not call until readings are entered.

## 2020-03-18 ENCOUNTER — OFFICE VISIT (OUTPATIENT)
Dept: HEMATOLOGY/ONCOLOGY | Facility: CLINIC | Age: 75
End: 2020-03-18
Payer: MEDICARE

## 2020-03-18 ENCOUNTER — PATIENT MESSAGE (OUTPATIENT)
Dept: HEMATOLOGY/ONCOLOGY | Facility: CLINIC | Age: 75
End: 2020-03-18

## 2020-03-18 VITALS
WEIGHT: 288.13 LBS | DIASTOLIC BLOOD PRESSURE: 84 MMHG | BODY MASS INDEX: 39.02 KG/M2 | OXYGEN SATURATION: 93 % | SYSTOLIC BLOOD PRESSURE: 173 MMHG | HEART RATE: 72 BPM | TEMPERATURE: 97 F | HEIGHT: 72 IN

## 2020-03-18 DIAGNOSIS — Z79.4 TYPE 2 DIABETES MELLITUS WITH STAGE 3 CHRONIC KIDNEY DISEASE, WITH LONG-TERM CURRENT USE OF INSULIN: ICD-10-CM

## 2020-03-18 DIAGNOSIS — C61 PROSTATE CANCER METASTATIC TO MULTIPLE SITES: Primary | ICD-10-CM

## 2020-03-18 DIAGNOSIS — E11.22 TYPE 2 DIABETES MELLITUS WITH STAGE 3 CHRONIC KIDNEY DISEASE, WITH LONG-TERM CURRENT USE OF INSULIN: ICD-10-CM

## 2020-03-18 DIAGNOSIS — E66.01 MORBID OBESITY WITH BMI OF 40.0-44.9, ADULT: ICD-10-CM

## 2020-03-18 DIAGNOSIS — N18.30 TYPE 2 DIABETES MELLITUS WITH STAGE 3 CHRONIC KIDNEY DISEASE, WITH LONG-TERM CURRENT USE OF INSULIN: ICD-10-CM

## 2020-03-18 DIAGNOSIS — C41.9 MALIGNANT NEOPLASM OF BONE AND ARTICULAR CARTILAGE, UNSPECIFIED: ICD-10-CM

## 2020-03-18 PROCEDURE — 99499 RISK ADDL DX/OHS AUDIT: ICD-10-PCS | Mod: S$GLB,,, | Performed by: INTERNAL MEDICINE

## 2020-03-18 PROCEDURE — 3079F DIAST BP 80-89 MM HG: CPT | Mod: CPTII,S$GLB,, | Performed by: INTERNAL MEDICINE

## 2020-03-18 PROCEDURE — 3052F PR MOST RECENT HEMOGLOBIN A1C LEVEL 8.0 - < 9.0%: ICD-10-PCS | Mod: CPTII,S$GLB,, | Performed by: INTERNAL MEDICINE

## 2020-03-18 PROCEDURE — 3077F PR MOST RECENT SYSTOLIC BLOOD PRESSURE >= 140 MM HG: ICD-10-PCS | Mod: CPTII,S$GLB,, | Performed by: INTERNAL MEDICINE

## 2020-03-18 PROCEDURE — 99999 PR PBB SHADOW E&M-EST. PATIENT-LVL III: CPT | Mod: PBBFAC,,, | Performed by: INTERNAL MEDICINE

## 2020-03-18 PROCEDURE — 99499 UNLISTED E&M SERVICE: CPT | Mod: S$GLB,,, | Performed by: INTERNAL MEDICINE

## 2020-03-18 PROCEDURE — 1159F PR MEDICATION LIST DOCUMENTED IN MEDICAL RECORD: ICD-10-PCS | Mod: S$GLB,,, | Performed by: INTERNAL MEDICINE

## 2020-03-18 PROCEDURE — 1101F PR PT FALLS ASSESS DOC 0-1 FALLS W/OUT INJ PAST YR: ICD-10-PCS | Mod: CPTII,S$GLB,, | Performed by: INTERNAL MEDICINE

## 2020-03-18 PROCEDURE — 1126F PR PAIN SEVERITY QUANTIFIED, NO PAIN PRESENT: ICD-10-PCS | Mod: S$GLB,,, | Performed by: INTERNAL MEDICINE

## 2020-03-18 PROCEDURE — 1101F PT FALLS ASSESS-DOCD LE1/YR: CPT | Mod: CPTII,S$GLB,, | Performed by: INTERNAL MEDICINE

## 2020-03-18 PROCEDURE — 99999 PR PBB SHADOW E&M-EST. PATIENT-LVL III: ICD-10-PCS | Mod: PBBFAC,,, | Performed by: INTERNAL MEDICINE

## 2020-03-18 PROCEDURE — 1159F MED LIST DOCD IN RCRD: CPT | Mod: S$GLB,,, | Performed by: INTERNAL MEDICINE

## 2020-03-18 PROCEDURE — 3077F SYST BP >= 140 MM HG: CPT | Mod: CPTII,S$GLB,, | Performed by: INTERNAL MEDICINE

## 2020-03-18 PROCEDURE — 1126F AMNT PAIN NOTED NONE PRSNT: CPT | Mod: S$GLB,,, | Performed by: INTERNAL MEDICINE

## 2020-03-18 PROCEDURE — 99215 OFFICE O/P EST HI 40 MIN: CPT | Mod: S$GLB,,, | Performed by: INTERNAL MEDICINE

## 2020-03-18 PROCEDURE — 3052F HG A1C>EQUAL 8.0%<EQUAL 9.0%: CPT | Mod: CPTII,S$GLB,, | Performed by: INTERNAL MEDICINE

## 2020-03-18 PROCEDURE — 99215 PR OFFICE/OUTPT VISIT, EST, LEVL V, 40-54 MIN: ICD-10-PCS | Mod: S$GLB,,, | Performed by: INTERNAL MEDICINE

## 2020-03-18 PROCEDURE — 3079F PR MOST RECENT DIASTOLIC BLOOD PRESSURE 80-89 MM HG: ICD-10-PCS | Mod: CPTII,S$GLB,, | Performed by: INTERNAL MEDICINE

## 2020-03-18 RX ORDER — ABIRATERONE 500 MG/1
500 TABLET ORAL 2 TIMES DAILY
Qty: 60 TABLET | Refills: 11 | Status: SHIPPED | OUTPATIENT
Start: 2020-03-18 | End: 2020-03-20

## 2020-03-18 RX ORDER — PREDNISONE 5 MG/1
5 TABLET ORAL DAILY
Qty: 60 TABLET | Refills: 11 | Status: SHIPPED | OUTPATIENT
Start: 2020-03-18 | End: 2020-12-03

## 2020-03-18 RX ORDER — SODIUM CHLORIDE 0.9 % (FLUSH) 0.9 %
10 SYRINGE (ML) INJECTION
Status: CANCELLED | OUTPATIENT
Start: 2020-03-18

## 2020-03-18 RX ORDER — BICALUTAMIDE 50 MG/1
50 TABLET, FILM COATED ORAL DAILY
Qty: 30 TABLET | Refills: 0 | Status: SHIPPED | OUTPATIENT
Start: 2020-03-18 | End: 2020-04-21 | Stop reason: SDUPTHER

## 2020-03-18 RX ORDER — HEPARIN 100 UNIT/ML
500 SYRINGE INTRAVENOUS
Status: CANCELLED | OUTPATIENT
Start: 2020-03-18

## 2020-03-18 RX ORDER — HYDROCODONE BITARTRATE AND ACETAMINOPHEN 10; 325 MG/1; MG/1
1 TABLET ORAL EVERY 4 HOURS PRN
Qty: 60 TABLET | Refills: 0 | Status: SHIPPED | OUTPATIENT
Start: 2020-03-18 | End: 2020-03-27 | Stop reason: SDUPTHER

## 2020-03-18 NOTE — PROGRESS NOTES
Subjective:       Patient ID: Sherif Ragland is a 74 y.o. male.    Chief Complaint: Results and Prostate Cancer    HPI  74-year-old male history elevated alkaline phosphatases.  Found on imaging studies to have widespread presumptive metastatic disease in bone.  CT-directed bone biopsy returns today consistent with adenocarcinoma of the prostate patient is here with wife for review ECOG status 1    Past Medical History:   Diagnosis Date    Diabetes mellitus, type 2 1997    Hyperlipidemia     Hypertension     Hypogonadism in male     Renal insufficiency     Tubular adenoma 10/2017     Family History   Problem Relation Age of Onset    Stroke Mother     Heart disease Father     Stroke Brother      Social History     Socioeconomic History    Marital status:      Spouse name: Not on file    Number of children: 4    Years of education: Not on file    Highest education level: Not on file   Occupational History    Occupation: [employer]   Social Needs    Financial resource strain: Not on file    Food insecurity:     Worry: Not on file     Inability: Not on file    Transportation needs:     Medical: Not on file     Non-medical: Not on file   Tobacco Use    Smoking status: Never Smoker    Smokeless tobacco: Never Used   Substance and Sexual Activity    Alcohol use: No    Drug use: No    Sexual activity: Yes     Partners: Female   Lifestyle    Physical activity:     Days per week: Not on file     Minutes per session: Not on file    Stress: Not on file   Relationships    Social connections:     Talks on phone: Not on file     Gets together: Not on file     Attends Worship service: Not on file     Active member of club or organization: Not on file     Attends meetings of clubs or organizations: Not on file     Relationship status: Not on file   Other Topics Concern    Not on file   Social History Narrative    Surrogate decision maker, Wife, Mita Ragland [phone]     Past Surgical  History:   Procedure Laterality Date    CATARACT EXTRACTION Right 01/31/2018    CATARACT EXTRACTION W/  INTRAOCULAR LENS IMPLANT Left 03/13/2019    COLONOSCOPY N/A 11/2/2017    Procedure: COLONOSCOPY;  Surgeon: Alek Francois MD;  Location: [facility];  Service: Endoscopy;  Laterality: N/A;    TIBIA FRACTURE SURGERY      right leg x2     TONSILLECTOMY      TRANSURETHRAL RESECTION OF PROSTATE N/A 10/17/2019    Procedure: TURP (TRANSURETHRAL RESECTION OF PROSTATE);  Surgeon: Mir Hale IV, MD;  Location: [facility] OR;  Service: Urology;  Laterality: N/A;       Labs:  Lab Results   Component Value Date    WBC 8.14 03/04/2020    HGB 13.6 (L) 03/04/2020    HCT 42.8 03/04/2020    MCV 90 03/04/2020     03/04/2020     BMP  Lab Results   Component Value Date     (L) 02/17/2020    K 3.9 02/17/2020    CL 96 02/17/2020    CO2 28 02/17/2020    BUN 21 02/17/2020    CREATININE 1.8 (H) 03/04/2020    CALCIUM 9.1 02/17/2020    ANIONGAP 10 02/17/2020    ESTGFRAFRICA 42 (A) 03/04/2020    EGFRNONAA 36 (A) 03/04/2020     Lab Results   Component Value Date    ALT 31 02/17/2020    ALT 31 02/17/2020    AST 26 02/17/2020    AST 26 02/17/2020    GGT 38 02/17/2020    ALKPHOS 943 (H) 02/17/2020    ALKPHOS 943 (H) 02/17/2020    BILITOT 0.4 02/17/2020    BILITOT 0.4 02/17/2020       No results found for: IRON, TIBC, FERRITIN, SATURATEDIRO  No results found for: PDMKKLBU22  No results found for: FOLATE  Lab Results   Component Value Date    TSH 2.302 01/04/2019         Review of Systems   Constitutional: Negative for activity change, appetite change, chills, diaphoresis, fatigue, fever and unexpected weight change.   HENT: Negative for congestion, dental problem, drooling, ear discharge, ear pain, facial swelling, hearing loss, mouth sores, nosebleeds, postnasal drip, rhinorrhea, sinus pressure, sneezing, sore throat, tinnitus, trouble swallowing and voice change.    Eyes: Negative for photophobia, pain, discharge,  redness, itching and visual disturbance.   Respiratory: Negative for apnea, cough, choking, chest tightness, shortness of breath, wheezing and stridor.    Cardiovascular: Negative for chest pain, palpitations and leg swelling.   Gastrointestinal: Negative for abdominal distention, abdominal pain, anal bleeding, blood in stool, constipation, diarrhea, nausea, rectal pain and vomiting.   Endocrine: Negative for cold intolerance, heat intolerance, polydipsia, polyphagia and polyuria.   Genitourinary: Negative for decreased urine volume, difficulty urinating, discharge, dysuria, enuresis, flank pain, frequency, genital sores, hematuria, penile pain, penile swelling, scrotal swelling, testicular pain and urgency.   Musculoskeletal: Negative for arthralgias, back pain, gait problem, joint swelling, myalgias, neck pain and neck stiffness.   Skin: Negative for color change, pallor, rash and wound.   Allergic/Immunologic: Negative for environmental allergies, food allergies and immunocompromised state.   Neurological: Negative for dizziness, tremors, seizures, syncope, facial asymmetry, speech difficulty, weakness, light-headedness, numbness and headaches.   Hematological: Negative for adenopathy. Does not bruise/bleed easily.   Psychiatric/Behavioral: Positive for dysphoric mood. Negative for agitation, behavioral problems, confusion, decreased concentration, hallucinations, self-injury, sleep disturbance and suicidal ideas. The patient is nervous/anxious. The patient is not hyperactive.        Objective:      Physical Exam   Constitutional: He is oriented to person, place, and time. He appears well-developed and well-nourished. He appears distressed.   HENT:   Head: Normocephalic.   Right Ear: External ear normal.   Left Ear: External ear normal.   Nose: Nose normal. Right sinus exhibits no maxillary sinus tenderness and no frontal sinus tenderness. Left sinus exhibits no maxillary sinus tenderness and no frontal sinus  tenderness.   Mouth/Throat: Oropharynx is clear and moist. No oropharyngeal exudate.   Eyes: Pupils are equal, round, and reactive to light. EOM and lids are normal. Right eye exhibits no discharge. Left eye exhibits no discharge. Right conjunctiva is not injected. Right conjunctiva has no hemorrhage. Left conjunctiva is not injected. Left conjunctiva has no hemorrhage. No scleral icterus. Right eye exhibits normal extraocular motion. Left eye exhibits normal extraocular motion.   Neck: Normal range of motion. Neck supple. No JVD present. No tracheal deviation present. No thyromegaly present.   Cardiovascular: Normal rate and regular rhythm.   Pulmonary/Chest: Effort normal. No stridor. No respiratory distress.   Abdominal: Soft. He exhibits no mass. There is no hepatosplenomegaly, splenomegaly or hepatomegaly. There is no tenderness.   Musculoskeletal: Normal range of motion. He exhibits no edema or tenderness.   Lymphadenopathy:        Head (right side): No posterior auricular and no occipital adenopathy present.        Head (left side): No posterior auricular and no occipital adenopathy present.     He has no cervical adenopathy.        Right cervical: No superficial cervical, no deep cervical and no posterior cervical adenopathy present.       Left cervical: No superficial cervical, no deep cervical and no posterior cervical adenopathy present.     He has no axillary adenopathy.        Right: No supraclavicular adenopathy present.        Left: No supraclavicular adenopathy present.   Neurological: He is alert and oriented to person, place, and time. He has normal strength. No cranial nerve deficit. Coordination normal.   Skin: Skin is dry. No rash noted. He is not diaphoretic. No cyanosis or erythema. Nails show no clubbing.   Psychiatric: His behavior is normal. Judgment and thought content normal. His mood appears anxious. Cognition and memory are normal. He exhibits a depressed mood.   Vitals reviewed.           Assessment:      1. Prostate cancer metastatic to multiple sites    2. 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 Morbid obesity with BMI of 40.0-44.9, adult    4. Type 2 diabetes mellitus with stage 3 chronic kidney disease, with long-term current use of insulin           Plan:     Results of CT-directed biopsy is consistent with metastatic adenocarcinoma the prostate.  I have talked to the patient concerning this is a treatable but not curable malignancy.  80% response rates.  Will start patient on Lupron Zytiga prednisone.  Article up-to-date followed to them case is been placed through the Oncology pathways concurring with treatment recommendations.  There is no life-threatening visceral of involvement bone only disease from recent PET scan.  Will also use Zometa at reduced dose for bone strengthening.  No dental work done recently.  Family drawn by phone to concurrent ask questions consent for treatment site 40 min face-to-face time coordination of care greater than 50% time face-to-face with patient and family returns 6 weeks with laboratory studies patient has been given Casodex for the 1st 4 weeks to start prior to injection to prevent bone flare        Felix Irvin Jr, MD FACP

## 2020-03-18 NOTE — PLAN OF CARE
START ON PATHWAY REGIMEN - Prostate    POS86        Leuprolide acetate depot (Lupron(R))     **Always confirm dose/schedule in your pharmacy ordering system**        Abiraterone acetate (Zytiga(R))       Prednisone           Additional Orders: For baseline moderate hepatic impairment reduce   abiraterone dose to 250 mg once daily. Ref: Mir VOGEL et al. N Engl J Med   2017;377:338-51(STAMPEDE), Luigi FLORES et al. N Engl J Med 2017;377:352-60   (LATITUDE).    **Always confirm dose/schedule in your pharmacy ordering system**    Patient Characteristics:  Adenocarcinoma, Distant Metastases, Hormone Naive  Histology: Adenocarcinoma  Therapeutic Status: Distant Metastases    Intent of Therapy:  Non-Curative / Palliative Intent, Discussed with Patient

## 2020-03-19 ENCOUNTER — TELEPHONE (OUTPATIENT)
Dept: HEMATOLOGY/ONCOLOGY | Facility: CLINIC | Age: 75
End: 2020-03-19

## 2020-03-19 ENCOUNTER — DOCUMENTATION ONLY (OUTPATIENT)
Dept: HEMATOLOGY/ONCOLOGY | Facility: CLINIC | Age: 75
End: 2020-03-19

## 2020-03-19 ENCOUNTER — TELEPHONE (OUTPATIENT)
Dept: PHARMACY | Facility: CLINIC | Age: 75
End: 2020-03-19

## 2020-03-19 LAB
FINAL PATHOLOGIC DIAGNOSIS: ABNORMAL
GROSS: ABNORMAL

## 2020-03-19 RX ORDER — VALSARTAN AND HYDROCHLOROTHIAZIDE 320; 12.5 MG/1; MG/1
1 TABLET, FILM COATED ORAL DAILY
Qty: 90 TABLET | Refills: 3 | Status: SHIPPED | OUTPATIENT
Start: 2020-03-19 | End: 2020-03-26 | Stop reason: SDUPTHER

## 2020-03-19 NOTE — TELEPHONE ENCOUNTER
LM for pt to call so that we can set up appts for chemo education and lab md and Zofran and Lupron injection.  We will also need to discuss oral chemo process with specialty pharmacy information. Direct number left in message.

## 2020-03-19 NOTE — TELEPHONE ENCOUNTER
Informed Patient  that [facility] received prescription for Zytiga & Prednisone and benefits investigation is required.  [facility] will be back in touch once insurance determination is received.

## 2020-03-19 NOTE — NURSING
Spoke with patient and wife over the phone together today.  Explained the need for chemotherapy education to be performed by NP.  The pt has chosen video visit for this education session. I helped with CoderBuddyt jenn instructions to be prepared for the video appt. We will prepare a chemo binder to give to the pt upon first treatment day since he will not be in the clinic for his teaching appt.  Informed consent was obtained per Dr. Irvin and scanned in per Mirna Lizarraga RN on 3/18/20. Rx for Zytiga sent to [facility] 3/18/20 as well and I have explained the [facility] process to the pt and he will be expecting a call from [facility] to review financial and shipment of medication. Spreadsheet for orals updated. The patient expresses financial concerns and I have informed him that [facility] will work with him on this as well as I had Ruth Mendiola our  reach out to pt by phone today to discuss his concerns. Social work has also been included to help pt with this issue. He also stated that he wishes to follow dr irvin for his care. Together we set up lab, MD and chemo infusion appt for Lupron and Zometa . He is to start his CAsodex the day before his lupron injection , but will wait for my instruction to make sure he has insurance approval for lupron prior to taking Casodex. He states understanding.  I will follow ins. Auth. I have also explained the no visitor policy that is now in effect due to covid19 virus. The patient will let me know when he receives his Zytiga also. He has my direct number should he have any questions or concerns going forward.   Oncology Navigation   Intake  Cancer Type:  (metastatic prostate cancer)  MD Assigned: luh  Initial Nurse Navigator Contact: 02/27/20  Contact Method: In person  Date Worked: 03/19/20  First Appointment Available: 03/27/20  Multiple appointments: Yes  Reason if booked > 7 days after scheduling: Other  Other reason booked > 7 days: waiting for zytiga to arrive and ins  approval  Start of Treatment: 20 (lupron and zometa )  Reason for Treatment Delay: Further work-up  Further Work-Up: BMBX , lab today 20 MRI BRAin and Ct bone biopsy if BMBX is not sufficient      Treatment  Treatment Type(s): Chemotherapy;Oral therapy;Other (Comment)  Other Treatment: Zytiga, Casodex, Lupron and Zometa   Chemotherapy Regimen: Zytiga  Lupron Zometa  Oral Therapy: Casodex   Start Date: 20          General Referrals: Social work    Support Systems: Spouse/significant other  Barriers of Care: Financial concerns  Financial Concerns: Financial hardship;Other  Concerns: 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

## 2020-03-20 DIAGNOSIS — C61 PROSTATE CANCER METASTATIC TO MULTIPLE SITES: Primary | ICD-10-CM

## 2020-03-20 RX ORDER — ABIRATERONE 500 MG/1
1000 TABLET ORAL DAILY
Qty: 120 TABLET | Refills: 11 | Status: SHIPPED | OUTPATIENT
Start: 2020-03-20 | End: 2020-03-24

## 2020-03-23 ENCOUNTER — OFFICE VISIT (OUTPATIENT)
Dept: HEMATOLOGY/ONCOLOGY | Facility: CLINIC | Age: 75
End: 2020-03-23
Payer: MEDICARE

## 2020-03-23 ENCOUNTER — PATIENT OUTREACH (OUTPATIENT)
Dept: OTHER | Facility: OTHER | Age: 75
End: 2020-03-23

## 2020-03-23 ENCOUNTER — TELEPHONE (OUTPATIENT)
Dept: HEMATOLOGY/ONCOLOGY | Facility: CLINIC | Age: 75
End: 2020-03-23

## 2020-03-23 ENCOUNTER — DOCUMENTATION ONLY (OUTPATIENT)
Dept: PHARMACY | Facility: HOSPITAL | Age: 75
End: 2020-03-23

## 2020-03-23 DIAGNOSIS — C61 PROSTATE CANCER METASTATIC TO MULTIPLE SITES: ICD-10-CM

## 2020-03-23 DIAGNOSIS — C41.9 MALIGNANT NEOPLASM OF BONE AND ARTICULAR CARTILAGE, UNSPECIFIED: ICD-10-CM

## 2020-03-23 DIAGNOSIS — K59.00 CONSTIPATION, UNSPECIFIED CONSTIPATION TYPE: ICD-10-CM

## 2020-03-23 DIAGNOSIS — R11.0 CHEMOTHERAPY-INDUCED NAUSEA: Primary | ICD-10-CM

## 2020-03-23 DIAGNOSIS — T45.1X5A CHEMOTHERAPY-INDUCED NAUSEA: Primary | ICD-10-CM

## 2020-03-23 DIAGNOSIS — Z71.9 ENCOUNTER FOR EDUCATION: ICD-10-CM

## 2020-03-23 PROCEDURE — 1101F PT FALLS ASSESS-DOCD LE1/YR: CPT | Mod: CPTII,,, | Performed by: NURSE PRACTITIONER

## 2020-03-23 PROCEDURE — 1101F PR PT FALLS ASSESS DOC 0-1 FALLS W/OUT INJ PAST YR: ICD-10-PCS | Mod: CPTII,,, | Performed by: NURSE PRACTITIONER

## 2020-03-23 PROCEDURE — 1159F MED LIST DOCD IN RCRD: CPT | Mod: ,,, | Performed by: NURSE PRACTITIONER

## 2020-03-23 PROCEDURE — 99215 OFFICE O/P EST HI 40 MIN: CPT | Mod: 95,,, | Performed by: NURSE PRACTITIONER

## 2020-03-23 PROCEDURE — 99215 PR OFFICE/OUTPT VISIT, EST, LEVL V, 40-54 MIN: ICD-10-PCS | Mod: 95,,, | Performed by: NURSE PRACTITIONER

## 2020-03-23 PROCEDURE — 1159F PR MEDICATION LIST DOCUMENTED IN MEDICAL RECORD: ICD-10-PCS | Mod: ,,, | Performed by: NURSE PRACTITIONER

## 2020-03-23 RX ORDER — DOCUSATE SODIUM 100 MG/1
100 CAPSULE, LIQUID FILLED ORAL 2 TIMES DAILY
Qty: 60 CAPSULE | Refills: 0 | Status: SHIPPED | OUTPATIENT
Start: 2020-03-23 | End: 2020-04-29 | Stop reason: SDUPTHER

## 2020-03-23 RX ORDER — ONDANSETRON HYDROCHLORIDE 8 MG/1
8 TABLET, FILM COATED ORAL EVERY 12 HOURS PRN
Qty: 30 TABLET | Refills: 2 | Status: SHIPPED | OUTPATIENT
Start: 2020-03-23 | End: 2020-12-03

## 2020-03-23 NOTE — TELEPHONE ENCOUNTER
Pt called in needing assistance with video visit. I have helped him get connected with the NP for this visit.

## 2020-03-24 ENCOUNTER — DOCUMENTATION ONLY (OUTPATIENT)
Dept: HEMATOLOGY/ONCOLOGY | Facility: CLINIC | Age: 75
End: 2020-03-24

## 2020-03-24 DIAGNOSIS — C61 PROSTATE CANCER METASTATIC TO MULTIPLE SITES: Primary | ICD-10-CM

## 2020-03-24 RX ORDER — ABIRATERONE 500 MG/1
1000 TABLET ORAL DAILY
Qty: 120 TABLET | Refills: 11 | Status: SHIPPED | OUTPATIENT
Start: 2020-03-24 | End: 2020-03-26

## 2020-03-24 NOTE — PROGRESS NOTES
The patient location is: Home  The chief complaint leading to consultation is: Chemo education  Visit type: Virtual visit with synchronous audio and video  Total time spent with patient: 45 minutes  Each patient to whom he or she provides medical services by telemedicine is:  (1) informed of the relationship between the physician and patient and the respective role of any other health care provider with respect to management of the patient; and (2) notified that he or she may decline to receive medical services by telemedicine and may withdraw from such care at any time.    75 y/o male for chemotherapy teaching. Patient given the Navigation Notebook. Explained the contents of the chemotherapy binder. Discussed with patient the rationale for chemotherapy, how it works, the process of treatment, potential side effects and symptoms to report.  Patient accompanied by wife at home who has been instructed on how to find his chemo drugs through paymio site so that she and her  can review as we go over the medication.  Chemo binder will be provided at his first infusion.     Reviewed the specific medication and gave them a handout describing the potential side effects of Zytiga and prednisone, Casodex, Lupron, and Zometa.  Discussed proper storage and handling of oral drugs as well as proper administration.       Discussed potential side effects such as:  Fatigue  Peripheral edema  Hot flashes  Constipation  Breast tenderness or increased breast tissue  Changes in mood - depression  Weakening of the bones  Electrolyte abnormalities  High blood pressure and associated s/s   Liver toxicity  Adrenocortical insufficiency  Tumor flare  Muscle, back or joint pain/aches  Injection site reaction  Abnormal heart rhythm  Heart problems, heart attack, stroke  Seizures  Ureteral Obstruction  Spinal cord compression  No dental procedure three months prior to or three months after Zometa; increased risk of osteonecrosis of  jaw  Anemia  Hypocalcemia  Acute kidney issues and associated symptoms  Breathing difficulties  Constipation  Nausea and vomitting  Importance of monitoring blood sugars if diabetic and reporting elevations or lows  Sexual and reproductive concerns - loss of sex drive    Phone numbers to contact healthcare team provided to patient.  Patient verbalized understanding plan of care and how to contact healthcare team if needed.

## 2020-03-24 NOTE — NURSING
"Spoke with patient and wife over the phone today regarding auth for lupron and zometa which is approved for this Friday's appt.  Pt and wife stated they "were not really impressed with the video teaching" that took place  yesterday and that they did not have any questions, but just felt like they were being" read to" .  The pt wife stated that she is teacher and "did not appreciate being read to off of a web site , a task I could have done myself" I expressed understanding and offered support for their feelings.  The pt will be presented a binder at his infusion appt. I  explained due to the covid19 pandemic the process which we follow for chemo education has changed and therefore the pt had a video education session and was not given a binder prior to the appt. As processes have changed since he was last in the clinic. I explained this to them and they stated understanding and will be looking forward to receiving their binder at the next in person appt for infusion.     Pt will begin his Casodex Rx on 3/26/20 prior to his lupron and Zometa as per Dr Irvin instructions at his last visit.     We discussed the [facility] Zytiga Rx and the patient is awaiting a call from [facility] to discuss financial status and will await shipment to start this medication.     The pt stated he is going to be positive going forward with this treatment and verified intent to call me with any further concerns.   Oncology Navigation   Intake  Cancer Type:  (metastatic prostate cancer)  MD Assigned: luh  Initial Nurse Navigator Contact: 02/27/20  Contact Method: In person  Date Worked: 03/24/20  First Appointment Available: 03/27/20  Multiple appointments: Yes  Reason if booked > 7 days after scheduling: Other  Other reason booked > 7 days: waiting for zytiga to arrive and ins approval  Start of Treatment: 03/27/20 (lupron and zometa )  Reason for Treatment Delay: Further work-up  Further Work-Up: BMBX , lab today 2/27/20 MRI BRAin and Ct bone " biopsy if BMBX is not sufficient      Treatment  Treatment Type(s): Chemotherapy;Oral therapy;Other (Comment)  Other Treatment: Zytiga, Casodex, Lupron and Zometa   Chemotherapy Regimen: Zytiga  Lupron Zometa  Oral Therapy: Casodex   Start Date: 20          General Referrals: Social work    Support Systems: Spouse/significant other  Barriers of Care: Financial concerns  Financial Concerns: Financial hardship;Other  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

## 2020-03-26 ENCOUNTER — DOCUMENTATION ONLY (OUTPATIENT)
Dept: HEMATOLOGY/ONCOLOGY | Facility: CLINIC | Age: 75
End: 2020-03-26

## 2020-03-26 DIAGNOSIS — C61 PROSTATE CANCER METASTATIC TO MULTIPLE SITES: Primary | ICD-10-CM

## 2020-03-26 RX ORDER — VALSARTAN AND HYDROCHLOROTHIAZIDE 320; 12.5 MG/1; MG/1
1 TABLET, FILM COATED ORAL DAILY
Qty: 90 TABLET | Refills: 3 | Status: SHIPPED | OUTPATIENT
Start: 2020-03-26 | End: 2020-03-27 | Stop reason: SDUPTHER

## 2020-03-26 RX ORDER — ABIRATERONE 500 MG/1
1000 TABLET ORAL DAILY
Qty: 120 TABLET | Refills: 11 | Status: SHIPPED | OUTPATIENT
Start: 2020-03-26 | End: 2020-03-30

## 2020-03-26 NOTE — NURSING
Pt called in today with questions regarding Casodex administration.  Instructions to take on an empty bladder, with or without food, and at the same time ever day reviewed with pt and wife.  They stated understanding. He will begin this today and attend appt tomorrow for Lupron and Zometa. He is still awaiting call from [facility] regarding Zytiga shipping. Insurance pending with [facility] .   Pt is to attend MD appt where he will bring in forms for NP to complete.   I have asked Susu Wu RN in the chemo infusion room to provide a chemotherapy binder to the pt when he arrives for his zometa infusion, as he had video teaching and did not receive a binder. Pt wife states she already printed off drug information sheets for zytiga, Casodex, Lupron and Zometa so they do not need those.    Pt has my direct number and will call with any further concerns.   Oncology Navigation   Intake  Cancer Type:  (metastatic prostate cancer)  MD Assigned: luh  Initial Nurse Navigator Contact: 02/27/20  Contact Method: In person  Date Worked: 03/26/20  First Appointment Available: 03/27/20  Multiple appointments: Yes  Reason if booked > 7 days after scheduling: Other  Other reason booked > 7 days: waiting for zytiga to arrive and ins approval  Start of Treatment: 03/27/20 (lupron and zometa )  Reason for Treatment Delay: Further work-up  Further Work-Up: BMBX , lab today 2/27/20 MRI BRAin and Ct bone biopsy if BMBX is not sufficient      Treatment  Treatment Type(s): Chemotherapy;Oral therapy;Other (Comment)  Other Treatment: Zytiga, Casodex, Lupron and Zometa   Chemotherapy Regimen: Zytiga  Lupron Zometa  Oral Therapy: Casodex   Start Date: 03/26/20          General Referrals: Social work    Support Systems: Spouse/significant other  Barriers of Care: Financial concerns  Financial Concerns: Financial hardship;Other  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

## 2020-03-27 ENCOUNTER — SOCIAL WORK (OUTPATIENT)
Dept: HEMATOLOGY/ONCOLOGY | Facility: CLINIC | Age: 75
End: 2020-03-27

## 2020-03-27 ENCOUNTER — OFFICE VISIT (OUTPATIENT)
Dept: HEMATOLOGY/ONCOLOGY | Facility: CLINIC | Age: 75
End: 2020-03-27
Payer: MEDICARE

## 2020-03-27 ENCOUNTER — TELEPHONE (OUTPATIENT)
Dept: HEMATOLOGY/ONCOLOGY | Facility: CLINIC | Age: 75
End: 2020-03-27

## 2020-03-27 ENCOUNTER — INFUSION (OUTPATIENT)
Dept: INFUSION THERAPY | Facility: HOSPITAL | Age: 75
End: 2020-03-27
Attending: INTERNAL MEDICINE
Payer: MEDICARE

## 2020-03-27 VITALS
RESPIRATION RATE: 18 BRPM | BODY MASS INDEX: 38.91 KG/M2 | TEMPERATURE: 99 F | WEIGHT: 287 LBS | HEART RATE: 78 BPM | SYSTOLIC BLOOD PRESSURE: 135 MMHG | DIASTOLIC BLOOD PRESSURE: 70 MMHG | WEIGHT: 287.25 LBS | HEIGHT: 72 IN | OXYGEN SATURATION: 91 % | BODY MASS INDEX: 38.87 KG/M2 | HEIGHT: 72 IN

## 2020-03-27 DIAGNOSIS — C41.9 MALIGNANT NEOPLASM OF BONE AND ARTICULAR CARTILAGE, UNSPECIFIED: ICD-10-CM

## 2020-03-27 DIAGNOSIS — Z71.89 ACP (ADVANCE CARE PLANNING): ICD-10-CM

## 2020-03-27 DIAGNOSIS — C61 PROSTATE CANCER METASTATIC TO MULTIPLE SITES: Primary | ICD-10-CM

## 2020-03-27 DIAGNOSIS — E66.01 MORBID OBESITY WITH BMI OF 40.0-44.9, ADULT: ICD-10-CM

## 2020-03-27 DIAGNOSIS — C41.9 MALIGNANT NEOPLASM OF BONE AND ARTICULAR CARTILAGE, UNSPECIFIED: Primary | ICD-10-CM

## 2020-03-27 DIAGNOSIS — C61 PROSTATE CANCER METASTATIC TO MULTIPLE SITES: ICD-10-CM

## 2020-03-27 PROCEDURE — 1101F PT FALLS ASSESS-DOCD LE1/YR: CPT | Mod: CPTII,S$GLB,, | Performed by: INTERNAL MEDICINE

## 2020-03-27 PROCEDURE — 1125F PR PAIN SEVERITY QUANTIFIED, PAIN PRESENT: ICD-10-PCS | Mod: S$GLB,,, | Performed by: INTERNAL MEDICINE

## 2020-03-27 PROCEDURE — 99999 PR PBB SHADOW E&M-EST. PATIENT-LVL III: ICD-10-PCS | Mod: PBBFAC,,, | Performed by: INTERNAL MEDICINE

## 2020-03-27 PROCEDURE — 3078F DIAST BP <80 MM HG: CPT | Mod: CPTII,S$GLB,, | Performed by: INTERNAL MEDICINE

## 2020-03-27 PROCEDURE — 1101F PR PT FALLS ASSESS DOC 0-1 FALLS W/OUT INJ PAST YR: ICD-10-PCS | Mod: CPTII,S$GLB,, | Performed by: INTERNAL MEDICINE

## 2020-03-27 PROCEDURE — 99999 PR PBB SHADOW E&M-EST. PATIENT-LVL III: CPT | Mod: PBBFAC,,, | Performed by: INTERNAL MEDICINE

## 2020-03-27 PROCEDURE — 99215 PR OFFICE/OUTPT VISIT, EST, LEVL V, 40-54 MIN: ICD-10-PCS | Mod: S$GLB,,, | Performed by: INTERNAL MEDICINE

## 2020-03-27 PROCEDURE — 3075F PR MOST RECENT SYSTOLIC BLOOD PRESS GE 130-139MM HG: ICD-10-PCS | Mod: CPTII,S$GLB,, | Performed by: INTERNAL MEDICINE

## 2020-03-27 PROCEDURE — 96365 THER/PROPH/DIAG IV INF INIT: CPT

## 2020-03-27 PROCEDURE — 1159F PR MEDICATION LIST DOCUMENTED IN MEDICAL RECORD: ICD-10-PCS | Mod: S$GLB,,, | Performed by: INTERNAL MEDICINE

## 2020-03-27 PROCEDURE — 63600175 PHARM REV CODE 636 W HCPCS: Mod: JG | Performed by: INTERNAL MEDICINE

## 2020-03-27 PROCEDURE — 99499 RISK ADDL DX/OHS AUDIT: ICD-10-PCS | Mod: S$GLB,,, | Performed by: INTERNAL MEDICINE

## 2020-03-27 PROCEDURE — 96402 CHEMO HORMON ANTINEOPL SQ/IM: CPT

## 2020-03-27 PROCEDURE — 1159F MED LIST DOCD IN RCRD: CPT | Mod: S$GLB,,, | Performed by: INTERNAL MEDICINE

## 2020-03-27 PROCEDURE — 1125F AMNT PAIN NOTED PAIN PRSNT: CPT | Mod: S$GLB,,, | Performed by: INTERNAL MEDICINE

## 2020-03-27 PROCEDURE — 3075F SYST BP GE 130 - 139MM HG: CPT | Mod: CPTII,S$GLB,, | Performed by: INTERNAL MEDICINE

## 2020-03-27 PROCEDURE — 3078F PR MOST RECENT DIASTOLIC BLOOD PRESSURE < 80 MM HG: ICD-10-PCS | Mod: CPTII,S$GLB,, | Performed by: INTERNAL MEDICINE

## 2020-03-27 PROCEDURE — 99215 OFFICE O/P EST HI 40 MIN: CPT | Mod: S$GLB,,, | Performed by: INTERNAL MEDICINE

## 2020-03-27 PROCEDURE — 99499 UNLISTED E&M SERVICE: CPT | Mod: S$GLB,,, | Performed by: INTERNAL MEDICINE

## 2020-03-27 RX ORDER — HYDROCODONE BITARTRATE AND ACETAMINOPHEN 10; 325 MG/1; MG/1
1 TABLET ORAL EVERY 4 HOURS PRN
Qty: 60 TABLET | Refills: 0 | Status: SHIPPED | OUTPATIENT
Start: 2020-03-27 | End: 2020-05-04 | Stop reason: SDUPTHER

## 2020-03-27 RX ORDER — VALSARTAN AND HYDROCHLOROTHIAZIDE 320; 12.5 MG/1; MG/1
1 TABLET, FILM COATED ORAL DAILY
Qty: 90 TABLET | Refills: 3 | Status: SHIPPED | OUTPATIENT
Start: 2020-03-27 | End: 2020-09-09

## 2020-03-27 RX ADMIN — LEUPROLIDE ACETATE 22.5 MG: KIT at 02:03

## 2020-03-27 RX ADMIN — ZOLEDRONIC ACID 3 MG: 4 INJECTION, SOLUTION, CONCENTRATE INTRAVENOUS at 02:03

## 2020-03-27 NOTE — DISCHARGE INSTRUCTIONS
.[facility]  [address] [address]  [address] [address]  [phone] phone     [phone] fax  Hours of Operation: Monday- Friday 8:00am- 5:00pm  After hours phone  [phone]  Hematology / Oncology Physicians on call      Dr. Albaro Alaniz, STEPHANIE Vance NP Tyesha Taylor, NP    Please call with any concerns regarding your appointment today.  .FALL PREVENTION   Falls often occur due to slipping, tripping or losing your balance. Here are ways to reduce your risk of falling again.   Was there anything that caused your fall that can be fixed, removed or replaced?   Make your home safe by keeping walkways clear of objects you may trip over.   Use non-slip pads under rugs.   Do not walk in poorly lit areas.   Do not stand on chairs or wobbly ladders.   Use caution when reaching overhead or looking upward. This position can cause a loss of balance.   Be sure your shoes fit properly, have non-slip bottoms and are in good condition.   Be cautious when going up and down stairs, curbs, and when walking on uneven sidewalks.   If your balance is poor, consider using a cane or walker.   If your fall was related to alcohol use, stop or limit alcohol intake.   If your fall was related to use of sleeping medicines, talk to your doctor about this. You may need to reduce your dosage at bedtime if you awaken during the night to go to the bathroom.   To reduce the need for nighttime bathroom trips:   Avoid drinking fluids for several hours before going to bed   Empty your bladder before going to bed   Men can keep a urinal at the bedside   © 7765-9108 [facility], [address] [place] [address], [place] [address]. All rights reserved. This information is not intended as a substitute for professional medical care. Always follow your healthcare professional's instructions.    .WAYS TO HELP PREVENT  INFECTION         WASH YOUR HANDS OFTEN DURING THE DAY, ESPECIALLY BEFORE YOU EAT, AFTER USING THE BATHROOM, AND AFTER TOUCHING ANIMALS     STAY AWAY FROM PEOPLE WHO HAVE ILLNESSES YOU CAN CATCH; SUCH AS COLDS, FLU, CHICKEN POX     TRY TO AVOID CROWDS     STAY AWAY FROM CHILDREN WHO RECENTLY HAVE RECEIVED LIVE VIRUS VACCINES     MAINTAIN GOOD MOUTH CARE     DO NOT SQUEEZE OR SCRATCH PIMPLES     CLEAN CUTS & SCRAPES RIGHT AWAY AND DAILY UNTIL HEALED WITH WARM WATER, SOAP & AN ANTISEPTIC     AVOID CONTACT WITH LITTER BOXES, BIRD CAGES, & FISH TANKS     AVOID STANDING WATER, IE., BIRD BATHS, FLOWER POTS/VASES, OR HUMIDIFIERS     WEAR GLOVES WHEN GARDENING OR CLEANING UP AFTER OTHERS, ESPECIALLY BABIES & SMALL CHILDREN     DO NOT EAT RAW FISH, SEAFOOD, MEAT, OR EGGS  .HOME CARE AFTER CHEMOTHERAPY   Meals   Many patients feel sick and lose their appetites during treatment. Eat small meals several times a day. Choose bland foods with little taste or smell if you have problems with nausea. Be sure to cook all food thoroughly. This kills bacteria and helps you avoid intestinal infection. Soft foods are easier to swallow and digest.   Activity   Exercise keeps you strong and keeps your heart and lungs active. Talk to your doctor about an appropriate exercise program for you.   Skin Care   To prevent a skin infection, bathe or shower once a day. Use a moisturizing soap and wash with warm water. Avoid very hot or cold water. Chemotherapy can make your skin dry . Apply moisturizing lotion to help relieve dry skin. Some drugs used in high doses can cause slight burns to appear (like sunburn). Ask for a special cream to help relieve the burn and protect your skin.   Prevent Mouth Sores   During chemotherapy, many people get mouth sores. Do the following to help prevent mouth sores or to ease discomfort.   Brush your teeth with a soft-bristle toothbrush after every meal.  Don't use dental floss if your platelet count  is below 50,000. Your doctor or nurse will tell you if this is the case.  Use an oral swab or special soft toothbrush if your gums bleed during regular brushing.  Use mouthwash as directed. If you can't tolerate commercial mouthwash, use salt and baking soda to clean your mouth. Mix 1 teaspoon of salt and 1 teaspoon of baking soda into a glass of water. Swish and spit.  Call your doctor or return to this facility if you develop any of the following:   Sore throat   White patches in the mouth or throat   Fever of 100.4ºF (38ºC) or higher, or as directed by your healthcare provider  © 6919-3198 [facility], [address] [place] [address], [place] [address]. All rights reserved. This information is not intended as a substitute for professional medical care. Always follow your healthcare professional's

## 2020-03-27 NOTE — PROGRESS NOTES
Subjective:       Patient ID: Sherif Ragland is a 74 y.o. male.    Chief Complaint: Follow-up; Results; Chemotherapy; and Prostate Cancer    HPI 74-year-old male history of widespread metastatic prostate carcinoma patient is to start the sole med as well as Lupron today awaiting approval by his insurance company of Zytiga patient is also started yesterday Casodex in preparation potential bone flare ECOG status 1 patient seen during the Coronavirus event    Past Medical History:   Diagnosis Date    Diabetes mellitus, type 2 1997    Hyperlipidemia     Hypertension     Hypogonadism in male     Renal insufficiency     Tubular adenoma 10/2017     Family History   Problem Relation Age of Onset    Stroke Mother     Heart disease Father     Stroke Brother      Social History     Socioeconomic History    Marital status:      Spouse name: Not on file    Number of children: 4    Years of education: Not on file    Highest education level: Not on file   Occupational History    Occupation: [employer]   Social Needs    Financial resource strain: Not on file    Food insecurity:     Worry: Not on file     Inability: Not on file    Transportation needs:     Medical: Not on file     Non-medical: Not on file   Tobacco Use    Smoking status: Never Smoker    Smokeless tobacco: Never Used   Substance and Sexual Activity    Alcohol use: No    Drug use: No    Sexual activity: Yes     Partners: Female   Lifestyle    Physical activity:     Days per week: Not on file     Minutes per session: Not on file    Stress: Not on file   Relationships    Social connections:     Talks on phone: Not on file     Gets together: Not on file     Attends Christian service: Not on file     Active member of club or organization: Not on file     Attends meetings of clubs or organizations: Not on file     Relationship status: Not on file   Other Topics Concern    Not on file   Social History Narrative    Surrogate decision  maker, Wife, Mita Ragland [phone]     Past Surgical History:   Procedure Laterality Date    CATARACT EXTRACTION Right 01/31/2018    CATARACT EXTRACTION W/  INTRAOCULAR LENS IMPLANT Left 03/13/2019    COLONOSCOPY N/A 11/2/2017    Procedure: COLONOSCOPY;  Surgeon: Alek Francois MD;  Location: [facility] ENDO;  Service: Endoscopy;  Laterality: N/A;    TIBIA FRACTURE SURGERY      right leg x2     TONSILLECTOMY      TRANSURETHRAL RESECTION OF PROSTATE N/A 10/17/2019    Procedure: TURP (TRANSURETHRAL RESECTION OF PROSTATE);  Surgeon: Mir Hale IV, MD;  Location: [facility] OR;  Service: Urology;  Laterality: N/A;       Labs:  Lab Results   Component Value Date    WBC 8.14 03/04/2020    HGB 13.6 (L) 03/04/2020    HCT 42.8 03/04/2020    MCV 90 03/04/2020     03/04/2020     BMP  Lab Results   Component Value Date     (L) 02/17/2020    K 3.9 02/17/2020    CL 96 02/17/2020    CO2 28 02/17/2020    BUN 21 02/17/2020    CREATININE 1.8 (H) 03/04/2020    CALCIUM 9.1 02/17/2020    ANIONGAP 10 02/17/2020    ESTGFRAFRICA 42 (A) 03/04/2020    EGFRNONAA 36 (A) 03/04/2020     Lab Results   Component Value Date    ALT 31 02/17/2020    ALT 31 02/17/2020    AST 26 02/17/2020    AST 26 02/17/2020    GGT 38 02/17/2020    ALKPHOS 943 (H) 02/17/2020    ALKPHOS 943 (H) 02/17/2020    BILITOT 0.4 02/17/2020    BILITOT 0.4 02/17/2020       No results found for: IRON, TIBC, FERRITIN, SATURATEDIRO  No results found for: NKGEIONX40  No results found for: FOLATE  Lab Results   Component Value Date    TSH 2.302 01/04/2019         Review of Systems   Constitutional: Negative for activity change, appetite change, chills, diaphoresis, fatigue, fever and unexpected weight change.   HENT: Negative for congestion, dental problem, drooling, ear discharge, ear pain, facial swelling, hearing loss, mouth sores, nosebleeds, postnasal drip, rhinorrhea, sinus pressure, sneezing, sore throat, tinnitus, trouble swallowing and voice change.     Eyes: Negative for photophobia, pain, discharge, redness, itching and visual disturbance.   Respiratory: Negative for apnea, cough, choking, chest tightness, shortness of breath, wheezing and stridor.    Cardiovascular: Negative for chest pain, palpitations and leg swelling.   Gastrointestinal: Negative for abdominal distention, abdominal pain, anal bleeding, blood in stool, constipation, diarrhea, nausea, rectal pain and vomiting.   Endocrine: Negative for cold intolerance, heat intolerance, polydipsia, polyphagia and polyuria.   Genitourinary: Negative for decreased urine volume, difficulty urinating, discharge, dysuria, enuresis, flank pain, frequency, genital sores, hematuria, penile pain, penile swelling, scrotal swelling, testicular pain and urgency.   Musculoskeletal: Negative for arthralgias, back pain, gait problem, joint swelling, myalgias, neck pain and neck stiffness.   Skin: Negative for color change, pallor, rash and wound.   Allergic/Immunologic: Negative for environmental allergies, food allergies and immunocompromised state.   Neurological: Negative for dizziness, tremors, seizures, syncope, facial asymmetry, speech difficulty, weakness, light-headedness, numbness and headaches.   Hematological: Negative for adenopathy. Does not bruise/bleed easily.   Psychiatric/Behavioral: Positive for dysphoric mood. Negative for agitation, behavioral problems, confusion, decreased concentration, hallucinations, self-injury, sleep disturbance and suicidal ideas. The patient is nervous/anxious. The patient is not hyperactive.        Objective:      Physical Exam   Constitutional: He is oriented to person, place, and time. He appears well-developed and well-nourished. He appears distressed.   HENT:   Head: Normocephalic.   Right Ear: External ear normal.   Left Ear: External ear normal.   Nose: Nose normal. Right sinus exhibits no maxillary sinus tenderness and no frontal sinus tenderness. Left sinus exhibits no  maxillary sinus tenderness and no frontal sinus tenderness.   Mouth/Throat: Oropharynx is clear and moist. No oropharyngeal exudate.   Eyes: Pupils are equal, round, and reactive to light. EOM and lids are normal. Right eye exhibits no discharge. Left eye exhibits no discharge. Right conjunctiva is not injected. Right conjunctiva has no hemorrhage. Left conjunctiva is not injected. Left conjunctiva has no hemorrhage. No scleral icterus. Right eye exhibits normal extraocular motion. Left eye exhibits normal extraocular motion.   Neck: Normal range of motion. Neck supple. No JVD present. No tracheal deviation present. No thyromegaly present.   Cardiovascular: Normal rate and regular rhythm.   Pulmonary/Chest: Effort normal. No stridor. No respiratory distress.   Abdominal: Soft. He exhibits no mass. There is no hepatosplenomegaly, splenomegaly or hepatomegaly. There is no tenderness.   Musculoskeletal: Normal range of motion. He exhibits no edema or tenderness.   Lymphadenopathy:        Head (right side): No posterior auricular and no occipital adenopathy present.        Head (left side): No posterior auricular and no occipital adenopathy present.     He has no cervical adenopathy.        Right cervical: No superficial cervical, no deep cervical and no posterior cervical adenopathy present.       Left cervical: No superficial cervical, no deep cervical and no posterior cervical adenopathy present.     He has no axillary adenopathy.        Right: No supraclavicular adenopathy present.        Left: No supraclavicular adenopathy present.   Neurological: He is alert and oriented to person, place, and time. He has normal strength. No cranial nerve deficit. Coordination normal.   Skin: Skin is dry. No rash noted. He is not diaphoretic. No cyanosis or erythema. Nails show no clubbing.   Psychiatric: He has a normal mood and affect. His behavior is normal. Judgment and thought content normal. Cognition and memory are normal.    Vitals reviewed.          Assessment:      1. 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 Prostate cancer metastatic to multiple sites    3. Morbid obesity with BMI of 40.0-44.9, adult    4. ACP (advance care planning)           Plan:     Extensive conversation consent form signed.  Will initiate treatment with primary hormonal deprivation with Lupron Zometa.  Awaiting Zytiga.  Casodex given for bone flap patient is return for video visit in 6 weeks with laboratory studies prior to video visit.  Answered questions.  Patient seen during the Coronavirus event.   to see and discuss disability form which I filled out for him 40 min face-to-face time coordination care greater 50% time face-to-face with patient      Felix Irvin Jr, MD FACP

## 2020-03-27 NOTE — TELEPHONE ENCOUNTER
Spoke with Soraya at [facility] to verify that Zytiga Rx benefits investigation is still pending.  Will follow closely for pt to achieve approval .  Dr. Irvin informed.

## 2020-03-30 ENCOUNTER — DOCUMENTATION ONLY (OUTPATIENT)
Dept: HEMATOLOGY/ONCOLOGY | Facility: CLINIC | Age: 75
End: 2020-03-30

## 2020-03-30 DIAGNOSIS — C61 PROSTATE CANCER METASTATIC TO MULTIPLE SITES: Primary | ICD-10-CM

## 2020-03-30 RX ORDER — ABIRATERONE ACETATE 250 MG/1
1000 TABLET ORAL DAILY
Qty: 120 TABLET | Refills: 11 | Status: SHIPPED | OUTPATIENT
Start: 2020-03-30 | End: 2020-06-15 | Stop reason: SDUPTHER

## 2020-03-30 RX ORDER — PREDNISONE 5 MG/1
5 TABLET ORAL DAILY
Qty: 60 TABLET | Refills: 11 | Status: SHIPPED | OUTPATIENT
Start: 2020-03-30 | End: 2020-12-03

## 2020-03-30 NOTE — TELEPHONE ENCOUNTER
DOCUMENTATION ONLY:  Abiraterone 250 mg Tablet #120/30 does not require a prior authorization through the patient's insurance.   Case ID: [id number]    Co-pay: $722.36    Patient Assistance IS required. Sending to the financial assistance team to investigate assistance options. Paulino MCDONALD

## 2020-03-30 NOTE — NURSING
"Pt returned my call from this am and states understanding of generic Zytiga rx being sent in today.  He will await to hear from [facility].  pts wife got on the phone call as we were talking and stated that the pt had a rough weekend from the Zometa infusion on Friday pm.  She states that his anxiety was really increased to the point that he became "paranoid at one point about his urination"  And that he took his BP medication twice because he could not remember if he had taken it or not.   She has since placed all of his medication in a daily dose container  out so that he can know what he has taken. She also stated that he did run a fever of 100.2 F on Saturday.  She stated he had itching at one point over the weekend but all above s/s have resolved as of yesterday afternoon.  She stated that she just wanted to give us an FYI , but that the pt is doing well today with no s/s.  He did begin his anxiety medication over the weekend as well.  They tried to call the after hours number [phone] at 455am Sunday morning but that number was not answered.  I have reported this to my supervisor for hem/onc. The pt refused to go to the ER over the weekend for his s/s due to the covid 19 outbreak.  He is doing well this am with no c/o or any s/s that he had over the weekend. They did not want to come in for a visit or have a video visit for today.   Oncology Navigation   Intake  Cancer Type:  (metastatic prostate cancer)  MD Assigned: luh  Initial Nurse Navigator Contact: 02/27/20  Contact Method: In person  Date Worked: 03/30/20  First Appointment Available: 03/27/20  Multiple appointments: Yes  Reason if booked > 7 days after scheduling: Other  Other reason booked > 7 days: waiting for zytiga to arrive and ins approval  Start of Treatment: 03/27/20 (lupron and zometa )  Reason for Treatment Delay: Further work-up  Further Work-Up: BMBX , lab today 2/27/20 MRI BRAin and Ct bone biopsy if BMBX is not sufficient    "   Treatment  Treatment Type(s): Chemotherapy;Oral therapy;Other (Comment)  Other Treatment: Zytiga, Casodex, Lupron and Zometa   Chemotherapy Regimen: Zytiga  Lupron Zometa  Oral Therapy: Casodex   Start Date: 20          General Referrals: Social work    Support Systems: Spouse/significant other  Barriers of Care: Financial concerns  Financial Concerns: Financial hardship;Other  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   They have my direct number should he have any further concerns.

## 2020-03-30 NOTE — TELEPHONE ENCOUNTER
Abiraterone is approved by the patient's insurance withb $0.00. We will reach out to the patient to notify of the approval, offer consultation, and schedule a delivery of the medication. Sending a staff message to Dr. Irvin regarding Abiraterone approval.

## 2020-03-30 NOTE — NURSING
LM for pt regarding the need to have generic Zytiga filled per [facility].  Explained that [facility] will be contacting him regarding this and to fill rx and set up shipment.  My direct number left in message   Oncology Navigation   Intake  Cancer Type:  (metastatic prostate cancer)  MD Assigned: luh Cabrera Nurse Navigator Contact: 20  Contact Method: In person  Date Worked: 20  First Appointment Available: 20  Multiple appointments: Yes  Reason if booked > 7 days after scheduling: Other  Other reason booked > 7 days: waiting for zytiga to arrive and ins approval  Start of Treatment: 20 (lupron and zometa )  Reason for Treatment Delay: Further work-up  Further Work-Up: BMBX , lab today 20 MRI BRAin and Ct bone biopsy if BMBX is not sufficient      Treatment  Treatment Type(s): Chemotherapy;Oral therapy;Other (Comment)  Other Treatment: Zytiga, Casodex, Lupron and Zometa   Chemotherapy Regimen: Zytiga  Lupron Zometa  Oral Therapy: Casodex   Start Date: 20          General Referrals: Social work    Support Systems: Spouse/significant other  Barriers of Care: Financial concerns  Financial Concerns: Financial hardship;Other  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

## 2020-03-30 NOTE — TELEPHONE ENCOUNTER
FOR DOCUMENTATION ONLY:  Financial Assistance for Abiraterone is approved from 12/31/19 to 03/29/21  Source: [employer]  BIN: [id number]  FRANKIE: KALE  Id: [id number]  GRP: [id number]  Assistance Amount: $7300

## 2020-03-31 ENCOUNTER — DOCUMENTATION ONLY (OUTPATIENT)
Dept: HEMATOLOGY/ONCOLOGY | Facility: CLINIC | Age: 75
End: 2020-03-31

## 2020-03-31 ENCOUNTER — TELEPHONE (OUTPATIENT)
Dept: PHARMACY | Facility: CLINIC | Age: 75
End: 2020-03-31

## 2020-03-31 NOTE — TELEPHONE ENCOUNTER
Initial Zytiga consult completed on 3/31 . Zytiga (abiraterone acetate) will be shipped on  to arrive at patient's home on  via [employer]. $ 0.00 copay. Patient will start Zytiga on . Address confirmed. Confirmed 2 patient identifiers - name and . Therapy Appropriate.     Patient was counseled on the administration directions:  Zytiga  -Zytiga 250mg -Take 4 tablets (1000mg) by mouth once daily. Take on an empty stomach. Take 1 hour before or 2 hours after meals. Take with a full glass of water.  -Do not chew, crush, or break the tablets.   If possible, patient was instructed tip the tablets from the RX bottle to the cap, and take directly from the cap to the mouth.  Patient may handle the medication with their hands if they wear with a latex or nitrile glove and wash their hands before and after handling the tablets.    Prednisone  -Take 1 tablet (5mg) by mouth once daily, with food.    Patient was counseled on the following possible side effects, which include, but are not limited to:  Edema, hypertension, fatigue, hyperglycemia, constipation, diarrhea, joint swelling, and myalgia.     DDIs: medication list reviewed  -  Cat D: Abiraterone Acetate may increase the serum concentration of CYP2D6 Substrates (metoprolol). Recommendations: Avoid concurrent use of abiraterone with CYP2D6 substrates that have a narrow therapeutic index whenever possible. When concurrent use is not avoidable, monitor patients closely for signs/symptoms of toxicity    Patient was given 2 patient education handouts on how to handle oral chemotherapy and specific recommendations- do's and don'ts. Instructed the patient that if they have any remaining oral chemotherapy, not to flush down the toilet or throw away in the trash; The patient or caregiver should return the unused oral chemotherapy to either the clinic or to myself in the Pharmacy where the oral chemotherapy can be disposed of properly.     Patient confirmed understanding.  Patient did not have additional questions. Consultation included: indication; goals of treatment; administration; storage and handling; side effects; how to handle side effects; the importance of compliance; how to handle missed doses; the importance of laboratory monitoring; the importance of keeping all follow up appointments.  Patient understands to report any medication changes to [facility] and provider. All questions answered and addressed to patients satisfaction. I will f/u with her in 1 week from start, [facility] to contact patient in 3 weeks for refills.

## 2020-03-31 NOTE — TELEPHONE ENCOUNTER
Went to reach out for initial consultation for Zytiga and PA again required. Having team call insurance company to resolve issue.

## 2020-03-31 NOTE — NURSING
"Pt called in today stating that he heard from [facility] and that there is a financial hank investigation ongoing for him now and that [facility] will be in touch with him once this is all settled to arrange a shipment date for his Zytiga poss generic form.      He also stated that he "has been smelling fresh cut grass" since he started his Casodex, Lupron and Zometa.  I explained to him that it may be the taste and smell changes that can occur with his medication.  I also passed this information along to DR. Irvin in an inCoty message.  Will await any further advice to share with pt.  Pt and wife stated understanding and I will call them with any further advice from Dr. Irvin.   Pt states is feeling fine otherwise. They will call with any further concerns     After review with dr irvin I called the pt back to make sure he is not having any further symptoms such as fever or cough.  The pt denies any other symptoms except for anxiety and feeling very nervous "like I'm on speed"  He is only taking one Xanax at this time twice daily.  He will try two tablets as directed on Rx and see if that helps his anxiety.  He knows to call back with any further symptoms.   Oncology Navigation   Intake  Cancer Type:  (metastatic prostate cancer)  MD Assigned: luh  Initial Nurse Navigator Contact: 02/27/20  Contact Method: In person  Date Worked: 03/31/20  First Appointment Available: 03/27/20  Multiple appointments: Yes  Reason if booked > 7 days after scheduling: Other  Other reason booked > 7 days: waiting for zytiga to arrive and ins approval  Start of Treatment: 03/27/20 (lupron and zometa )  Reason for Treatment Delay: Further work-up  Further Work-Up: BMBX , lab today 2/27/20 MRI BRAin and Ct bone biopsy if BMBX is not sufficient      Treatment  Treatment Type(s): Chemotherapy;Oral therapy;Other (Comment)  Other Treatment: Zytiga, Casodex, Lupron and Zometa   Chemotherapy Regimen: Zytiga  Lupron Zometa  Oral Therapy: Casodex "   Start Date: 20          General Referrals: Social work    Support Systems: Spouse/significant other  Barriers of Care: Financial concerns  Financial Concerns: Financial hardship;Other  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

## 2020-03-31 NOTE — PROGRESS NOTES
3/27/2020    SW met with pt because of it being pt's first chemotherapy day. SW provided pt with SW supportive care package. Pt explained that he would review everything over with his wife. Pt provided Sw with unemployment letter requesting information. SW completed the document, made a copy, and delivered the original to pt. Pt explained his biggest issue was financial because he is unable to work and his oral chemo, Zytiga, is expensive. Sw explained that [facility] will be handling the cost of the chemo drug but SW could reach out to make sure his portion is affordable.    F/u as treatment continues.

## 2020-04-03 ENCOUNTER — DOCUMENTATION ONLY (OUTPATIENT)
Dept: HEMATOLOGY/ONCOLOGY | Facility: CLINIC | Age: 75
End: 2020-04-03

## 2020-04-03 NOTE — NURSING
Pt called in today stating that he received his Zytiga.  He had questions about how to take it .  I reviewed instructions in the Rx with pt to take on an empty stomach .  He asked whether he could take his other medication at the same time.  I instructed him to take the Zytiga on an empty stomach and take his other medications as directed as some of them needed to be taken with food.,  He should wait one hour after taking  Zytiga on an empty stomach to eat. He stated understanding and repeated back to me correctly.  He will call  Oncology Navigation   Intake  Cancer Type:  (metastatic prostate cancer)  MD Assigned: luh  Initial Nurse Navigator Contact: 20  Contact Method: In person  Date Worked: 20  First Appointment Available: 20  Multiple appointments: Yes  Reason if booked > 7 days after scheduling: Other  Other reason booked > 7 days: waiting for zytiga to arrive and ins approval  Start of Treatment: 20 (lupron and zometa )  Reason for Treatment Delay: Further work-up  Further Work-Up: BMBX , lab today 20 MRI BRAin and Ct bone biopsy if BMBX is not sufficient      Treatment  Treatment Type(s): Chemotherapy;Oral therapy;Other (Comment)  Other Treatment: Zytiga, Casodex, Lupron and Zometa   Chemotherapy Regimen: Zytiga  Lupron Zometa  Oral Therapy: Casodex   Start Date: 20          General Referrals: Social work    Support Systems: Spouse/significant other  Barriers of Care: Financial concerns  Financial Concerns: Financial hardship;Other  Concerns: 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    with any further concerns. He also reviewed special handling information regarding Zytiga , sandra,.

## 2020-04-13 RX ORDER — INSULIN PUMP SYRINGE, 3 ML
EACH MISCELLANEOUS
Qty: 1 EACH | Refills: 0 | Status: SHIPPED | OUTPATIENT
Start: 2020-04-13 | End: 2021-01-01

## 2020-04-13 NOTE — TELEPHONE ENCOUNTER
Started on 4/3/20  Dosing, how taking Zytiga: Takes 4 tablets (1000mg) by mouth every morning. Takes prednisone an hour later with food. Denies any missed doses.   Storage: room temperature  Handling: aware of proper handling precautions and does not touch tablets directly   Side effects: no side effects. BP has been normal.   No questions or concerns at this time. Aware to reach out to [facility] if needed.

## 2020-04-14 ENCOUNTER — DOCUMENTATION ONLY (OUTPATIENT)
Dept: HEMATOLOGY/ONCOLOGY | Facility: CLINIC | Age: 75
End: 2020-04-14

## 2020-04-14 DIAGNOSIS — F51.01 PRIMARY INSOMNIA: Primary | ICD-10-CM

## 2020-04-14 RX ORDER — ZOLPIDEM TARTRATE 5 MG/1
5 TABLET ORAL NIGHTLY PRN
Qty: 30 TABLET | Refills: 3 | Status: SHIPPED | OUTPATIENT
Start: 2020-04-14 | End: 2020-09-15 | Stop reason: SDUPTHER

## 2020-04-14 NOTE — NURSING
Pt call in today stating that he was feeling good except for not being able to sleep at night.  Dr. Irvin notified and Rx for Ambien was sent per DR. Irvin to pts requested pharmacy.  Pt notified and will  rx today. He has my direct number should he need anything further.   Oncology Navigation   Intake  Cancer Type:  (metastatic prostate cancer)  MD Assigned: luh  Initial Nurse Navigator Contact: 20  Contact Method: In person  Date Worked: 20  First Appointment Available: 20  Multiple appointments: Yes  Reason if booked > 7 days after scheduling: Other  Other reason booked > 7 days: waiting for zytiga to arrive and ins approval  Start of Treatment: 20 (lupron and zometa )  Reason for Treatment Delay: Further work-up  Further Work-Up: BMBX , lab today 20 MRI BRAin and Ct bone biopsy if BMBX is not sufficient      Treatment  Treatment Type(s): Chemotherapy;Oral therapy;Other (Comment)  Other Treatment: Zytiga, Casodex, Lupron and Zometa   Chemotherapy Regimen: Zytiga  Lupron Zometa  Oral Therapy: Casodex   Start Date: 20          General Referrals: Social work    Support Systems: Spouse/significant other  Barriers of Care: Financial concerns  Financial Concerns: Financial hardship;Other  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

## 2020-04-21 ENCOUNTER — PATIENT MESSAGE (OUTPATIENT)
Dept: HEMATOLOGY/ONCOLOGY | Facility: CLINIC | Age: 75
End: 2020-04-21

## 2020-04-21 DIAGNOSIS — C61 PROSTATE CANCER METASTATIC TO MULTIPLE SITES: ICD-10-CM

## 2020-04-21 RX ORDER — BICALUTAMIDE 50 MG/1
50 TABLET, FILM COATED ORAL DAILY
Qty: 30 TABLET | Refills: 0 | Status: SHIPPED | OUTPATIENT
Start: 2020-04-21 | End: 2020-05-21

## 2020-04-23 ENCOUNTER — PATIENT MESSAGE (OUTPATIENT)
Dept: PALLIATIVE MEDICINE | Facility: CLINIC | Age: 75
End: 2020-04-23

## 2020-04-24 ENCOUNTER — TELEPHONE (OUTPATIENT)
Dept: PHARMACY | Facility: CLINIC | Age: 75
End: 2020-04-24

## 2020-04-24 ENCOUNTER — PATIENT MESSAGE (OUTPATIENT)
Dept: PALLIATIVE MEDICINE | Facility: CLINIC | Age: 75
End: 2020-04-24

## 2020-04-24 DIAGNOSIS — F41.0 PANIC ATTACK: ICD-10-CM

## 2020-04-24 NOTE — TELEPHONE ENCOUNTER
Refill call regarding Zytiga and Prednisone at [facility]. Will prepare for shipment with consent of patient on  to arrive . Copay 0.00. Patient has not started any new medications including OTC drugs. Patient has not had any medication/ dose or instruction changes. No new allergies or side effects reported with this shipment. Medication is being taken as prescribed by physician and properly stored. Two patient identifiers:  and Address verified. Patient has 7 days on hand.

## 2020-04-26 ENCOUNTER — PATIENT MESSAGE (OUTPATIENT)
Dept: PALLIATIVE MEDICINE | Facility: CLINIC | Age: 75
End: 2020-04-26

## 2020-04-26 DIAGNOSIS — F41.0 PANIC ATTACK: ICD-10-CM

## 2020-04-27 ENCOUNTER — DOCUMENTATION ONLY (OUTPATIENT)
Dept: HEMATOLOGY/ONCOLOGY | Facility: CLINIC | Age: 75
End: 2020-04-27

## 2020-04-27 RX ORDER — ALPRAZOLAM 0.25 MG/1
TABLET ORAL
Qty: 30 TABLET | Refills: 0 | OUTPATIENT
Start: 2020-04-27

## 2020-04-27 RX ORDER — ALPRAZOLAM 0.25 MG/1
.25-.5 TABLET ORAL DAILY PRN
Qty: 30 TABLET | Refills: 3 | Status: SHIPPED | OUTPATIENT
Start: 2020-04-27 | End: 2020-07-04

## 2020-04-27 NOTE — NURSING
Pt wife left two voicemails over the weekend stating that the pt needed a refill on his anxiety medication .  And also to report that when she tried to call the after hours number of [phone] all it did was ring and ring or give a phone tree of messages that were never answered.  I have reported the after hours number complaint to my supervisor and Dr. Garcia has already refilled the pts request. They have my direct number to contact me for anything further.   Oncology Navigation   Intake  Cancer Type:  (metastatic prostate cancer)  MD Assigned: luh  Initial Nurse Navigator Contact: 20  Contact Method: In person  Date Worked: 20  First Appointment Available: 20  Multiple appointments: Yes  Reason if booked > 7 days after scheduling: Other  Other reason booked > 7 days: waiting for zytiga to arrive and ins approval  Start of Treatment: 20 (lupron and zometa )  Reason for Treatment Delay: Further work-up  Further Work-Up: BMBX , lab today 20 MRI BRAin and Ct bone biopsy if BMBX is not sufficient      Treatment  Treatment Type(s): Chemotherapy;Oral therapy;Other (Comment)  Other Treatment: Zytiga, Casodex, Lupron and Zometa   Chemotherapy Regimen: Zytiga  Lupron Zometa  Oral Therapy: Casodex   Start Date: 20          General Referrals: Social work    Support Systems: Spouse/significant other  Barriers of Care: Financial concerns  Financial Concerns: Financial hardship;Other  Concerns: 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

## 2020-04-28 ENCOUNTER — PATIENT OUTREACH (OUTPATIENT)
Dept: OTHER | Facility: OTHER | Age: 75
End: 2020-04-28

## 2020-04-29 ENCOUNTER — PATIENT MESSAGE (OUTPATIENT)
Dept: HEMATOLOGY/ONCOLOGY | Facility: CLINIC | Age: 75
End: 2020-04-29

## 2020-04-29 DIAGNOSIS — K59.00 CONSTIPATION, UNSPECIFIED CONSTIPATION TYPE: ICD-10-CM

## 2020-04-29 RX ORDER — DOCUSATE SODIUM 100 MG/1
100 CAPSULE, LIQUID FILLED ORAL 2 TIMES DAILY
Qty: 60 CAPSULE | Refills: 0 | Status: SHIPPED | OUTPATIENT
Start: 2020-04-29 | End: 2020-12-03

## 2020-04-29 NOTE — PROGRESS NOTES
Digital Medicine: Health  Follow-Up      Follow Up  Follow-up reason(s): reading review and goal follow-up    Pt continues to undergo treatment for bone cancer and reports increased stress and discomfort. He was not willing to discuss his health and wellness goals at this time. He has no questions or concerns. He plans to take a BP reading this evening.     INTERVENTION(S)  encouragement/support    PLAN  continue monitoring    Offered sympathy and encouraged weekly BP monitoring.         Topic    Hemoglobin A1C     Eye Exam        Last 5 Patient Entered Readings                                      Current 30 Day Average: 166/87     Recent Readings 4/15/2020 3/29/2020 3/29/2020 3/29/2020 3/29/2020    SBP (mmHg) 166 149 165 163 159    DBP (mmHg) 87 74 82 77 69    Pulse 65 71 72 68 68        SDOH: Deferred.

## 2020-05-04 ENCOUNTER — PATIENT MESSAGE (OUTPATIENT)
Dept: HEMATOLOGY/ONCOLOGY | Facility: CLINIC | Age: 75
End: 2020-05-04

## 2020-05-04 ENCOUNTER — TELEPHONE (OUTPATIENT)
Dept: HEMATOLOGY/ONCOLOGY | Facility: CLINIC | Age: 75
End: 2020-05-04

## 2020-05-04 DIAGNOSIS — C41.9 MALIGNANT NEOPLASM OF BONE AND ARTICULAR CARTILAGE, UNSPECIFIED: ICD-10-CM

## 2020-05-04 RX ORDER — HYDROCODONE BITARTRATE AND ACETAMINOPHEN 10; 325 MG/1; MG/1
1 TABLET ORAL EVERY 4 HOURS PRN
Qty: 60 TABLET | Refills: 0 | Status: SHIPPED | OUTPATIENT
Start: 2020-05-04 | End: 2020-06-19 | Stop reason: SDUPTHER

## 2020-05-04 NOTE — TELEPHONE ENCOUNTER
Called patient back, instructed to take 2 dulcolax tonight and call us in the morning if no improvement. Spelled brand and generic name of medication for patient and wife, explained they can get it at any drug store. Verbalized understanding.

## 2020-05-04 NOTE — TELEPHONE ENCOUNTER
----- Message from Felix Irvin MD sent at 5/4/2020  2:55 PM CDT -----  Regarding: RE: constipation recommendation  Take 2 ducolax   ----- Message -----  From: Corrine Cornell RN  Sent: 5/4/2020   2:36 PM CDT  To: Felix Irvin MD  Subject: constipation recommendation                      Patient reports no bowel movement in 4 days, taking Colace as prescribed but no relief. What should I advise him for next steps? [place]ot protocol?

## 2020-05-06 ENCOUNTER — LAB VISIT (OUTPATIENT)
Dept: LAB | Facility: HOSPITAL | Age: 75
End: 2020-05-06
Attending: INTERNAL MEDICINE
Payer: MEDICARE

## 2020-05-06 DIAGNOSIS — C61 PROSTATE CANCER METASTATIC TO MULTIPLE SITES: ICD-10-CM

## 2020-05-06 LAB
ALBUMIN SERPL BCP-MCNC: 3.3 G/DL (ref 3.5–5.2)
ALP SERPL-CCNC: 1337 U/L (ref 55–135)
ALT SERPL W/O P-5'-P-CCNC: 15 U/L (ref 10–44)
ANION GAP SERPL CALC-SCNC: 14 MMOL/L (ref 8–16)
AST SERPL-CCNC: 13 U/L (ref 10–40)
BASOPHILS # BLD AUTO: 0.05 K/UL (ref 0–0.2)
BASOPHILS NFR BLD: 0.7 % (ref 0–1.9)
BILIRUB SERPL-MCNC: 0.4 MG/DL (ref 0.1–1)
BUN SERPL-MCNC: 29 MG/DL (ref 8–23)
CALCIUM SERPL-MCNC: 8.3 MG/DL (ref 8.7–10.5)
CHLORIDE SERPL-SCNC: 98 MMOL/L (ref 95–110)
CO2 SERPL-SCNC: 28 MMOL/L (ref 23–29)
COMPLEXED PSA SERPL-MCNC: 0.46 NG/ML (ref 0–4)
CREAT SERPL-MCNC: 1.7 MG/DL (ref 0.5–1.4)
DIFFERENTIAL METHOD: ABNORMAL
EOSINOPHIL # BLD AUTO: 0.2 K/UL (ref 0–0.5)
EOSINOPHIL NFR BLD: 3 % (ref 0–8)
ERYTHROCYTE [DISTWIDTH] IN BLOOD BY AUTOMATED COUNT: 13.7 % (ref 11.5–14.5)
EST. GFR  (AFRICAN AMERICAN): 45 ML/MIN/1.73 M^2
EST. GFR  (NON AFRICAN AMERICAN): 39 ML/MIN/1.73 M^2
GLUCOSE SERPL-MCNC: 167 MG/DL (ref 70–110)
HCT VFR BLD AUTO: 41.7 % (ref 40–54)
HGB BLD-MCNC: 13 G/DL (ref 14–18)
IMM GRANULOCYTES # BLD AUTO: 0.04 K/UL (ref 0–0.04)
IMM GRANULOCYTES NFR BLD AUTO: 0.5 % (ref 0–0.5)
LYMPHOCYTES # BLD AUTO: 1.2 K/UL (ref 1–4.8)
LYMPHOCYTES NFR BLD: 15.4 % (ref 18–48)
MCH RBC QN AUTO: 28 PG (ref 27–31)
MCHC RBC AUTO-ENTMCNC: 31.2 G/DL (ref 32–36)
MCV RBC AUTO: 90 FL (ref 82–98)
MONOCYTES # BLD AUTO: 1 K/UL (ref 0.3–1)
MONOCYTES NFR BLD: 13.2 % (ref 4–15)
NEUTROPHILS # BLD AUTO: 5.2 K/UL (ref 1.8–7.7)
NEUTROPHILS NFR BLD: 67.2 % (ref 38–73)
NRBC BLD-RTO: 0 /100 WBC
PLATELET # BLD AUTO: 285 K/UL (ref 150–350)
PMV BLD AUTO: 9.1 FL (ref 9.2–12.9)
POTASSIUM SERPL-SCNC: 3.3 MMOL/L (ref 3.5–5.1)
PROT SERPL-MCNC: 7 G/DL (ref 6–8.4)
RBC # BLD AUTO: 4.64 M/UL (ref 4.6–6.2)
SODIUM SERPL-SCNC: 140 MMOL/L (ref 136–145)
TESTOST SERPL-MCNC: <4 NG/DL (ref 304–1227)
WBC # BLD AUTO: 7.67 K/UL (ref 3.9–12.7)

## 2020-05-06 PROCEDURE — 85025 COMPLETE CBC W/AUTO DIFF WBC: CPT

## 2020-05-06 PROCEDURE — 36415 COLL VENOUS BLD VENIPUNCTURE: CPT | Mod: PO

## 2020-05-06 PROCEDURE — 84403 ASSAY OF TOTAL TESTOSTERONE: CPT

## 2020-05-06 PROCEDURE — 80053 COMPREHEN METABOLIC PANEL: CPT

## 2020-05-06 PROCEDURE — 84153 ASSAY OF PSA TOTAL: CPT

## 2020-05-08 ENCOUNTER — OFFICE VISIT (OUTPATIENT)
Dept: HEMATOLOGY/ONCOLOGY | Facility: CLINIC | Age: 75
End: 2020-05-08
Payer: MEDICARE

## 2020-05-08 DIAGNOSIS — C61 PROSTATE CANCER METASTATIC TO MULTIPLE SITES: Primary | ICD-10-CM

## 2020-05-08 DIAGNOSIS — Z51.11 ENCOUNTER FOR ANTINEOPLASTIC CHEMOTHERAPY: ICD-10-CM

## 2020-05-08 DIAGNOSIS — C41.9 MALIGNANT NEOPLASM OF BONE AND ARTICULAR CARTILAGE, UNSPECIFIED: ICD-10-CM

## 2020-05-08 DIAGNOSIS — C79.51 SECONDARY CANCER OF BONE: ICD-10-CM

## 2020-05-08 DIAGNOSIS — E66.01 MORBID OBESITY WITH BMI OF 40.0-44.9, ADULT: ICD-10-CM

## 2020-05-08 PROCEDURE — 1159F PR MEDICATION LIST DOCUMENTED IN MEDICAL RECORD: ICD-10-PCS | Mod: 95,,, | Performed by: INTERNAL MEDICINE

## 2020-05-08 PROCEDURE — 1101F PR PT FALLS ASSESS DOC 0-1 FALLS W/OUT INJ PAST YR: ICD-10-PCS | Mod: CPTII,95,, | Performed by: INTERNAL MEDICINE

## 2020-05-08 PROCEDURE — 99499 RISK ADDL DX/OHS AUDIT: ICD-10-PCS | Mod: 95,,, | Performed by: INTERNAL MEDICINE

## 2020-05-08 PROCEDURE — 99213 PR OFFICE/OUTPT VISIT, EST, LEVL III, 20-29 MIN: ICD-10-PCS | Mod: 95,,, | Performed by: INTERNAL MEDICINE

## 2020-05-08 PROCEDURE — 1101F PT FALLS ASSESS-DOCD LE1/YR: CPT | Mod: CPTII,95,, | Performed by: INTERNAL MEDICINE

## 2020-05-08 PROCEDURE — 99499 UNLISTED E&M SERVICE: CPT | Mod: 95,,, | Performed by: INTERNAL MEDICINE

## 2020-05-08 PROCEDURE — 99213 OFFICE O/P EST LOW 20 MIN: CPT | Mod: 95,,, | Performed by: INTERNAL MEDICINE

## 2020-05-08 PROCEDURE — 1159F MED LIST DOCD IN RCRD: CPT | Mod: 95,,, | Performed by: INTERNAL MEDICINE

## 2020-05-08 NOTE — PROGRESS NOTES
Subjective:       Patient ID: Sherif Ragland is a 74 y.o. male.    Chief Complaint: Results and Prostate Cancer    HPI 74-year-old male history of metastatic prostate carcinoma receiving Zytiga 1 g per day Lupron.  Patient returns for interim review un response to therapy ECOG status 1 video visit    Past Medical History:   Diagnosis Date    Diabetes mellitus, type 2 1997    Hyperlipidemia     Hypertension     Hypogonadism in male     Renal insufficiency     Tubular adenoma 10/2017     Family History   Problem Relation Age of Onset    Stroke Mother     Heart disease Father     Stroke Brother      Social History     Socioeconomic History    Marital status:      Spouse name: Not on file    Number of children: 4    Years of education: Not on file    Highest education level: Not on file   Occupational History    Occupation: [employer]   Social Needs    Financial resource strain: Not on file    Food insecurity:     Worry: Not on file     Inability: Not on file    Transportation needs:     Medical: Not on file     Non-medical: Not on file   Tobacco Use    Smoking status: Never Smoker    Smokeless tobacco: Never Used   Substance and Sexual Activity    Alcohol use: No    Drug use: No    Sexual activity: Yes     Partners: Female   Lifestyle    Physical activity:     Days per week: Not on file     Minutes per session: Not on file    Stress: Not on file   Relationships    Social connections:     Talks on phone: Not on file     Gets together: Not on file     Attends Synagogue service: Not on file     Active member of club or organization: Not on file     Attends meetings of clubs or organizations: Not on file     Relationship status: Not on file   Other Topics Concern    Not on file   Social History Narrative    Surrogate decision maker, Wife, Mita Ragland [phone]     Past Surgical History:   Procedure Laterality Date    CATARACT EXTRACTION Right 01/31/2018    CATARACT EXTRACTION W/   INTRAOCULAR LENS IMPLANT Left 03/13/2019    COLONOSCOPY N/A 11/2/2017    Procedure: COLONOSCOPY;  Surgeon: Alek Francois MD;  Location: [facility];  Service: Endoscopy;  Laterality: N/A;    TIBIA FRACTURE SURGERY      right leg x2     TONSILLECTOMY      TRANSURETHRAL RESECTION OF PROSTATE N/A 10/17/2019    Procedure: TURP (TRANSURETHRAL RESECTION OF PROSTATE);  Surgeon: Mir Hale IV, MD;  Location[facility];  Service: Urology;  Laterality: N/A;       Labs:  Lab Results   Component Value Date    WBC 7.67 05/06/2020    HGB 13.0 (L) 05/06/2020    HCT 41.7 05/06/2020    MCV 90 05/06/2020     05/06/2020     BMP  Lab Results   Component Value Date     05/06/2020    K 3.3 (L) 05/06/2020    CL 98 05/06/2020    CO2 28 05/06/2020    BUN 29 (H) 05/06/2020    CREATININE 1.7 (H) 05/06/2020    CALCIUM 8.3 (L) 05/06/2020    ANIONGAP 14 05/06/2020    ESTGFRAFRICA 45 (A) 05/06/2020    EGFRNONAA 39 (A) 05/06/2020     Lab Results   Component Value Date    ALT 15 05/06/2020    AST 13 05/06/2020    GGT 38 02/17/2020    ALKPHOS 1,337 (H) 05/06/2020    BILITOT 0.4 05/06/2020       No results found for: IRON, TIBC, FERRITIN, SATURATEDIRO  No results found for: EYBXYTID17  No results found for: FOLATE  Lab Results   Component Value Date    TSH 2.302 01/04/2019         Review of Systems   Psychiatric/Behavioral: Positive for dysphoric mood. The patient is nervous/anxious.        Objective:      Physical Exam   Constitutional: He appears distressed.   Psychiatric: His mood appears anxious.           Assessment:      1. Prostate cancer metastatic to multiple sites    2. 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 Morbid obesity with BMI of 40.0-44.9, adult    4. Encounter for antineoplastic chemotherapy    5. Secondary cancer of bone           Plan:     The patient location is: HOME  The chief complaint leading to consultation is:  Metastatic prostate cancer  Visit type:  Synchronous  audio and video  Total time spent with patient: 15 mins  Each patient to whom he or she provides medical services by telemedicine is:  (1) informed of the relationship between the physician and patient and the respective role of any other health care provider with respect to management of the patient; and (2) notified that he or she may decline to receive medical services by telemedicine and may withdraw from such care at any time.    Notes: History of metastatic prostate carcinoma to bone with excellent response with falling PSA.  Test testosterone undetectable continue with current treatment recommendations will follow-up in 6 weeks for next dosing of Lupron Zometa.  Answered questions        Felix Irvin Jr, MD FACP

## 2020-05-11 NOTE — PROGRESS NOTES
Digital Medicine: Clinician Follow-Up    I spoke with the patient today to f/u on his elevated BP and hypokalemia. 5/6 lab revealed potasium 3.3.  Two months prior the potassium was normal. He is undergoing chemo for metastatic prostate cancer which may be contributing to this finding. He says he is experiencing swelling in multiple body parts. He says that Dr. Irvin did not mention his low potassium level at his recent appointment.    He does not think he charged his BP device prior to starting to take readings.   His last reading on file was from 4/15/2020.    The history is provided by the patient. No  was used.     Follow Up  Follow-up reason(s): reading review      Readings are trending up       INTERVENTION(S)  reviewed appropriate dose schedule and encouragement/support    PLAN  patient amenable to changes, additional monitoring needed and await MD intervention    Avr /87    I am sending Dr. Fox and Dr Irvin a message to approve changing to HCTZ-triamterene to increase his potassium level. Will have him charge his device and take additional readings          Topic    Hemoglobin A1C     Eye Exam        Last 5 Patient Entered Readings                                      Current 30 Day Average: 166/87     Recent Readings 4/15/2020 3/29/2020 3/29/2020 3/29/2020 3/29/2020    SBP (mmHg) 166 149 165 163 159    DBP (mmHg) 87 74 82 77 69    Pulse 65 71 72 68 68             Hypertension Medications             amLODIPine (NORVASC) 5 MG tablet TAKE 2 TABLETS BY MOUTH ONCE DAILY    furosemide (LASIX) 40 MG tablet Take 1 tablet (40 mg total) by mouth once daily.    metoprolol succinate (TOPROL-XL) 100 MG 24 hr tablet Take 1 tablet by mouth twice daily    valsartan-hydrochlorothiazide (DIOVAN-HCT) 320-12.5 mg per tablet Take 1 tablet by mouth once daily.                 Screenings

## 2020-05-21 ENCOUNTER — PATIENT MESSAGE (OUTPATIENT)
Dept: HEMATOLOGY/ONCOLOGY | Facility: CLINIC | Age: 75
End: 2020-05-21

## 2020-05-21 DIAGNOSIS — C61 PROSTATE CANCER METASTATIC TO MULTIPLE SITES: ICD-10-CM

## 2020-05-21 RX ORDER — BICALUTAMIDE 50 MG/1
TABLET, FILM COATED ORAL
Qty: 30 TABLET | Refills: 0 | Status: SHIPPED | OUTPATIENT
Start: 2020-05-21 | End: 2020-05-21 | Stop reason: SDUPTHER

## 2020-05-21 RX ORDER — BICALUTAMIDE 50 MG/1
50 TABLET, FILM COATED ORAL DAILY
Qty: 30 TABLET | Refills: 0 | Status: SHIPPED | OUTPATIENT
Start: 2020-05-21 | End: 2020-06-19 | Stop reason: SDUPTHER

## 2020-05-22 ENCOUNTER — TELEPHONE (OUTPATIENT)
Dept: PHARMACY | Facility: CLINIC | Age: 75
End: 2020-05-22

## 2020-05-22 NOTE — TELEPHONE ENCOUNTER
Refill call regarding Zytiga and predisone at [facility]. Patient is in need of a refill, will ship  to arrive  with patient consent. atient has not had any medication/ dose or instruction changes. No new allergies or side effects reported with this shipment. Medication is being taken as prescribed by physician and properly stored. Two patient identifiers:  and Address verified. Patient spoke to pharmacist regarding new furosemide prescription. Patient has 6 days on hand. ALFREDO

## 2020-05-22 NOTE — TELEPHONE ENCOUNTER
Patient transferred to me regarding a new medication started while on Zytiga. Patient states he was started on furosemide due to swelling in hands that made it difficulty to wear his rings - no DDI expected with Zytiga. No other changes to medications. Patient has no questions or concerns at this time. He is aware to contact [facility] if he needs anything.

## 2020-05-28 ENCOUNTER — PATIENT OUTREACH (OUTPATIENT)
Dept: OTHER | Facility: OTHER | Age: 75
End: 2020-05-28

## 2020-06-01 ENCOUNTER — DOCUMENTATION ONLY (OUTPATIENT)
Dept: HEMATOLOGY/ONCOLOGY | Facility: CLINIC | Age: 75
End: 2020-06-01

## 2020-06-01 NOTE — NURSING
Pt called with question regarding his hydrocodone.  He is asking whether he needs to keep taking it every 4 hours or can he slack off?  He is reporting less pain and sometimes even no pain.  I reviewed the rx instructions for his hydrocodone which involves taking one tablet every 4 hours as needed for pain.  The pt states understanding and will not take every 4 hours around the clock unless he needs to do so for pain going forward. He stated understanding and will call with any further questions.   Oncology Navigation   Intake  Cancer Type:  (metastatic prostate cancer)  MD Assigned: luh  Initial Nurse Navigator Contact: 20  Contact Method: In person  Date Worked: 20  First Appointment Available: 20  Multiple appointments: Yes  Reason if booked > 7 days after scheduling: Other  Other reason booked > 7 days: waiting for zytiga to arrive and ins approval  Start of Treatment: 20 (lupron and zometa )  Reason for Treatment Delay: Further work-up  Further Work-Up: BMBX , lab today 20 MRI BRAin and Ct bone biopsy if BMBX is not sufficient      Treatment  Treatment Type(s): Chemotherapy;Oral therapy;Other (Comment)  Other Treatment: Zytiga, Casodex, Lupron and Zometa   Chemotherapy Regimen: Zytiga  Lupron Zometa  Oral Therapy: Casodex   Start Date: 20          General Referrals: Social work    Support Systems: Spouse/significant other  Barriers of Care: Financial concerns  Financial Concerns: Financial hardship;Other  Concerns: 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

## 2020-06-15 ENCOUNTER — OFFICE VISIT (OUTPATIENT)
Dept: HEMATOLOGY/ONCOLOGY | Facility: CLINIC | Age: 75
End: 2020-06-15
Payer: MEDICARE

## 2020-06-15 ENCOUNTER — INFUSION (OUTPATIENT)
Dept: INFUSION THERAPY | Facility: HOSPITAL | Age: 75
End: 2020-06-15
Attending: INTERNAL MEDICINE
Payer: MEDICARE

## 2020-06-15 VITALS
BODY MASS INDEX: 38.74 KG/M2 | HEIGHT: 72 IN | SYSTOLIC BLOOD PRESSURE: 187 MMHG | WEIGHT: 286 LBS | HEIGHT: 72 IN | HEART RATE: 65 BPM | WEIGHT: 286.81 LBS | BODY MASS INDEX: 38.85 KG/M2 | DIASTOLIC BLOOD PRESSURE: 87 MMHG | OXYGEN SATURATION: 95 % | RESPIRATION RATE: 18 BRPM | TEMPERATURE: 99 F

## 2020-06-15 DIAGNOSIS — Z71.89 ACP (ADVANCE CARE PLANNING): ICD-10-CM

## 2020-06-15 DIAGNOSIS — C41.9 MALIGNANT NEOPLASM OF BONE AND ARTICULAR CARTILAGE, UNSPECIFIED: ICD-10-CM

## 2020-06-15 DIAGNOSIS — C61 PROSTATE CANCER METASTATIC TO MULTIPLE SITES: Primary | ICD-10-CM

## 2020-06-15 DIAGNOSIS — C61 PROSTATE CANCER METASTATIC TO MULTIPLE SITES: ICD-10-CM

## 2020-06-15 DIAGNOSIS — E66.01 MORBID OBESITY WITH BMI OF 40.0-44.9, ADULT: ICD-10-CM

## 2020-06-15 DIAGNOSIS — C41.9 MALIGNANT NEOPLASM OF BONE AND ARTICULAR CARTILAGE, UNSPECIFIED: Primary | ICD-10-CM

## 2020-06-15 DIAGNOSIS — C79.51 SECONDARY CANCER OF BONE: ICD-10-CM

## 2020-06-15 PROCEDURE — 3079F PR MOST RECENT DIASTOLIC BLOOD PRESSURE 80-89 MM HG: ICD-10-PCS | Mod: CPTII,S$GLB,, | Performed by: INTERNAL MEDICINE

## 2020-06-15 PROCEDURE — 25000003 PHARM REV CODE 250: Performed by: INTERNAL MEDICINE

## 2020-06-15 PROCEDURE — 99215 PR OFFICE/OUTPT VISIT, EST, LEVL V, 40-54 MIN: ICD-10-PCS | Mod: 25,S$GLB,, | Performed by: INTERNAL MEDICINE

## 2020-06-15 PROCEDURE — 1159F MED LIST DOCD IN RCRD: CPT | Mod: S$GLB,,, | Performed by: INTERNAL MEDICINE

## 2020-06-15 PROCEDURE — 99999 PR PBB SHADOW E&M-EST. PATIENT-LVL V: ICD-10-PCS | Mod: PBBFAC,,, | Performed by: INTERNAL MEDICINE

## 2020-06-15 PROCEDURE — 3077F PR MOST RECENT SYSTOLIC BLOOD PRESSURE >= 140 MM HG: ICD-10-PCS | Mod: CPTII,S$GLB,, | Performed by: INTERNAL MEDICINE

## 2020-06-15 PROCEDURE — 63600175 PHARM REV CODE 636 W HCPCS: Mod: JG | Performed by: INTERNAL MEDICINE

## 2020-06-15 PROCEDURE — 99215 OFFICE O/P EST HI 40 MIN: CPT | Mod: 25,S$GLB,, | Performed by: INTERNAL MEDICINE

## 2020-06-15 PROCEDURE — 96365 THER/PROPH/DIAG IV INF INIT: CPT

## 2020-06-15 PROCEDURE — 1159F PR MEDICATION LIST DOCUMENTED IN MEDICAL RECORD: ICD-10-PCS | Mod: S$GLB,,, | Performed by: INTERNAL MEDICINE

## 2020-06-15 PROCEDURE — 99999 PR PBB SHADOW E&M-EST. PATIENT-LVL V: CPT | Mod: PBBFAC,,, | Performed by: INTERNAL MEDICINE

## 2020-06-15 PROCEDURE — 3077F SYST BP >= 140 MM HG: CPT | Mod: CPTII,S$GLB,, | Performed by: INTERNAL MEDICINE

## 2020-06-15 PROCEDURE — 1125F PR PAIN SEVERITY QUANTIFIED, PAIN PRESENT: ICD-10-PCS | Mod: S$GLB,,, | Performed by: INTERNAL MEDICINE

## 2020-06-15 PROCEDURE — 96402 CHEMO HORMON ANTINEOPL SQ/IM: CPT

## 2020-06-15 PROCEDURE — 3079F DIAST BP 80-89 MM HG: CPT | Mod: CPTII,S$GLB,, | Performed by: INTERNAL MEDICINE

## 2020-06-15 PROCEDURE — 1101F PT FALLS ASSESS-DOCD LE1/YR: CPT | Mod: CPTII,S$GLB,, | Performed by: INTERNAL MEDICINE

## 2020-06-15 PROCEDURE — 1125F AMNT PAIN NOTED PAIN PRSNT: CPT | Mod: S$GLB,,, | Performed by: INTERNAL MEDICINE

## 2020-06-15 PROCEDURE — 1101F PR PT FALLS ASSESS DOC 0-1 FALLS W/OUT INJ PAST YR: ICD-10-PCS | Mod: CPTII,S$GLB,, | Performed by: INTERNAL MEDICINE

## 2020-06-15 RX ORDER — ABIRATERONE ACETATE 250 MG/1
1000 TABLET ORAL DAILY
Qty: 120 TABLET | Refills: 11 | Status: SHIPPED | OUTPATIENT
Start: 2020-06-15 | End: 2020-11-13

## 2020-06-15 RX ORDER — SODIUM CHLORIDE 0.9 % (FLUSH) 0.9 %
10 SYRINGE (ML) INJECTION
Status: CANCELLED | OUTPATIENT
Start: 2020-06-15

## 2020-06-15 RX ORDER — HEPARIN 100 UNIT/ML
500 SYRINGE INTRAVENOUS
Status: CANCELLED | OUTPATIENT
Start: 2020-06-15

## 2020-06-15 RX ADMIN — ZOLEDRONIC ACID 3 MG: 4 INJECTION, SOLUTION, CONCENTRATE INTRAVENOUS at 09:06

## 2020-06-15 RX ADMIN — LEUPROLIDE ACETATE 22.5 MG: KIT at 08:06

## 2020-06-15 NOTE — PROGRESS NOTES
Subjective:       Patient ID: Sherif Ragland is a 74 y.o. male.    Chief Complaint: Follow-up, Results, and Prostate Cancer    HPI 74-year-old male metastatic prostate carcinoma currently on Lupron Zytiga and Zometa patient tolerating therapy well no nausea vomiting fevers chills night sweats patient is considering going back to work does report some increased fatigue ECOG status 1    Past Medical History:   Diagnosis Date    Diabetes mellitus, type 2 1997    Hyperlipidemia     Hypertension     Hypogonadism in male     Renal insufficiency     Tubular adenoma 10/2017     Family History   Problem Relation Age of Onset    Stroke Mother     Heart disease Father     Stroke Brother      Social History     Socioeconomic History    Marital status:      Spouse name: Not on file    Number of children: 4    Years of education: Not on file    Highest education level: Not on file   Occupational History    Occupation: [employer]   Social Needs    Financial resource strain: Not on file    Food insecurity     Worry: Not on file     Inability: Not on file    Transportation needs     Medical: Not on file     Non-medical: Not on file   Tobacco Use    Smoking status: Never Smoker    Smokeless tobacco: Never Used   Substance and Sexual Activity    Alcohol use: No    Drug use: No    Sexual activity: Yes     Partners: Female   Lifestyle    Physical activity     Days per week: Not on file     Minutes per session: Not on file    Stress: Not on file   Relationships    Social connections     Talks on phone: Not on file     Gets together: Not on file     Attends Mu-ism service: Not on file     Active member of club or organization: Not on file     Attends meetings of clubs or organizations: Not on file     Relationship status: Not on file   Other Topics Concern    Not on file   Social History Narrative    Surrogate decision maker, Wife, Mita Ragland [phone]     Past Surgical History:   Procedure  Laterality Date    CATARACT EXTRACTION Right 01/31/2018    CATARACT EXTRACTION W/  INTRAOCULAR LENS IMPLANT Left 03/13/2019    COLONOSCOPY N/A 11/2/2017    Procedure: COLONOSCOPY;  Surgeon: Alek Francois MD;  Location: [facility] ENDO;  Service: Endoscopy;  Laterality: N/A;    TIBIA FRACTURE SURGERY      right leg x2     TONSILLECTOMY      TRANSURETHRAL RESECTION OF PROSTATE N/A 10/17/2019    Procedure: TURP (TRANSURETHRAL RESECTION OF PROSTATE);  Surgeon: Mir Hale IV, MD;  Location: [facility] OR;  Service: Urology;  Laterality: N/A;       Labs:  Lab Results   Component Value Date    WBC 7.68 06/15/2020    HGB 12.8 (L) 06/15/2020    HCT 39.0 (L) 06/15/2020    MCV 88 06/15/2020     06/15/2020     BMP  Lab Results   Component Value Date     05/06/2020    K 3.3 (L) 05/06/2020    CL 98 05/06/2020    CO2 28 05/06/2020    BUN 29 (H) 05/06/2020    CREATININE 1.7 (H) 05/06/2020    CALCIUM 8.3 (L) 05/06/2020    ANIONGAP 14 05/06/2020    ESTGFRAFRICA 45 (A) 05/06/2020    EGFRNONAA 39 (A) 05/06/2020     Lab Results   Component Value Date    ALT 15 05/06/2020    AST 13 05/06/2020    GGT 38 02/17/2020    ALKPHOS 1,337 (H) 05/06/2020    BILITOT 0.4 05/06/2020       No results found for: IRON, TIBC, FERRITIN, SATURATEDIRO  No results found for: CXIJONHZ63  No results found for: FOLATE  Lab Results   Component Value Date    TSH 2.302 01/04/2019         Review of Systems   Constitutional: Positive for fatigue. Negative for activity change, appetite change, chills, diaphoresis, fever and unexpected weight change.   HENT: Negative for congestion, dental problem, drooling, ear discharge, ear pain, facial swelling, hearing loss, mouth sores, nosebleeds, postnasal drip, rhinorrhea, sinus pressure, sneezing, sore throat, tinnitus, trouble swallowing and voice change.    Eyes: Negative for photophobia, pain, discharge, redness, itching and visual disturbance.   Respiratory: Negative for apnea, cough, choking, chest  tightness, shortness of breath, wheezing and stridor.    Cardiovascular: Negative for chest pain, palpitations and leg swelling.   Gastrointestinal: Negative for abdominal distention, abdominal pain, anal bleeding, blood in stool, constipation, diarrhea, nausea, rectal pain and vomiting.   Endocrine: Negative for cold intolerance, heat intolerance, polydipsia, polyphagia and polyuria.   Genitourinary: Negative for decreased urine volume, difficulty urinating, discharge, dysuria, enuresis, flank pain, frequency, genital sores, hematuria, penile pain, penile swelling, scrotal swelling, testicular pain and urgency.   Musculoskeletal: Negative for arthralgias, back pain, gait problem, joint swelling, myalgias, neck pain and neck stiffness.   Skin: Negative for color change, pallor, rash and wound.   Allergic/Immunologic: Negative for environmental allergies, food allergies and immunocompromised state.   Neurological: Positive for weakness. Negative for dizziness, tremors, seizures, syncope, facial asymmetry, speech difficulty, light-headedness, numbness and headaches.   Hematological: Negative for adenopathy. Does not bruise/bleed easily.   Psychiatric/Behavioral: Positive for dysphoric mood. Negative for agitation, behavioral problems, confusion, decreased concentration, hallucinations, self-injury, sleep disturbance and suicidal ideas. The patient is nervous/anxious. The patient is not hyperactive.        Objective:      Physical Exam  Vitals signs reviewed.   Constitutional:       General: He is not in acute distress.     Appearance: He is well-developed. He is not diaphoretic.   HENT:      Head: Normocephalic.      Right Ear: External ear normal.      Left Ear: External ear normal.      Nose: Nose normal.      Right Sinus: No maxillary sinus tenderness or frontal sinus tenderness.      Left Sinus: No maxillary sinus tenderness or frontal sinus tenderness.      Mouth/Throat:      Pharynx: No oropharyngeal exudate.    Eyes:      General: Lids are normal. No scleral icterus.        Right eye: No discharge.         Left eye: No discharge.      Extraocular Movements:      Right eye: Normal extraocular motion.      Left eye: Normal extraocular motion.      Conjunctiva/sclera:      Right eye: Right conjunctiva is not injected. No hemorrhage.     Left eye: Left conjunctiva is not injected. No hemorrhage.     Pupils: Pupils are equal, round, and reactive to light.   Neck:      Musculoskeletal: Normal range of motion and neck supple.      Thyroid: No thyromegaly.      Vascular: No JVD.      Trachea: No tracheal deviation.   Cardiovascular:      Rate and Rhythm: Normal rate and regular rhythm.   Pulmonary:      Effort: Pulmonary effort is normal. No respiratory distress.      Breath sounds: No stridor.   Abdominal:      Palpations: Abdomen is soft. There is no hepatomegaly, splenomegaly or mass.      Tenderness: There is no abdominal tenderness.   Musculoskeletal: Normal range of motion.         General: No tenderness.   Lymphadenopathy:      Head:      Right side of head: No posterior auricular or occipital adenopathy.      Left side of head: No posterior auricular or occipital adenopathy.      Cervical: No cervical adenopathy.      Right cervical: No superficial, deep or posterior cervical adenopathy.     Left cervical: No superficial, deep or posterior cervical adenopathy.      Upper Body:      Right upper body: No supraclavicular adenopathy.      Left upper body: No supraclavicular adenopathy.   Skin:     General: Skin is dry.      Findings: No erythema or rash.      Nails: There is no clubbing.     Neurological:      Mental Status: He is alert and oriented to person, place, and time.      Cranial Nerves: No cranial nerve deficit.      Coordination: Coordination normal.   Psychiatric:         Behavior: Behavior normal.         Thought Content: Thought content normal.         Judgment: Judgment normal.             Assessment:      1.  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 Prostate cancer metastatic to multiple sites    3. Secondary cancer of bone    4. Morbid obesity with BMI of 40.0-44.9, adult    5. ACP (advance care planning)           Plan:     Patient is doing remarkably well continuing with current dosing his last PSA a month ago demonstrates dramatic fall from initial.  Did not get labs done prior will return in 3 months.  Dosing with Lupron Zometa today patient will return in 3 months with labs prior continue with Lupron as well as Zytiga discussed implications with patient fatigue is associated with decreased testosterone most patients become more accustomed to it I have told him is for as I am concerned he can return to work discussed implications with him orders written reviewed        Felix Irvin Jr, MD FACP

## 2020-06-15 NOTE — NURSING
Dr. Irvin gave okay to proceed with Lupron and Zometa today with dosing being 2 weeks earlier than scheduled dose.

## 2020-06-15 NOTE — DISCHARGE INSTRUCTIONS
.[facility]  [address] [address]  [address]  [phone] phone     [phone] fax  Hours of Operation: Monday- Friday 8:00am- 5:00pm  After hours phone  [phone]  Hematology / Oncology Physicians on call      STEPHANIE Olivo Dr., Dr., Dr., Dr., NP Sydney Prescott, NP Tyesha Taylor, NP    Please call with any concerns regarding your appointment today.

## 2020-06-17 DIAGNOSIS — C61 PROSTATE CANCER METASTATIC TO MULTIPLE SITES: ICD-10-CM

## 2020-06-17 RX ORDER — BICALUTAMIDE 50 MG/1
50 TABLET, FILM COATED ORAL DAILY
Qty: 30 TABLET | Refills: 0 | OUTPATIENT
Start: 2020-06-17

## 2020-06-19 ENCOUNTER — TELEPHONE (OUTPATIENT)
Dept: PHARMACY | Facility: CLINIC | Age: 75
End: 2020-06-19

## 2020-06-19 NOTE — TELEPHONE ENCOUNTER
Refill call regarding Zytiga at [facility].  Will prepare for shipment with patient consent on  to arrive .  Copay 0.00.  Patient states he has about one week on hand.     Patient has not started any new medications including OTC drugs. Patient has not had any medication/ dose or instruction changes. No new allergies or side effects reported with this shipment. Medication is being taken as prescribed by physician and properly stored. Two patient identifiers:  and Address verified. Patient has no questions or concerns for [facility].

## 2020-06-24 ENCOUNTER — PATIENT OUTREACH (OUTPATIENT)
Dept: OTHER | Facility: OTHER | Age: 75
End: 2020-06-24

## 2020-06-30 ENCOUNTER — TELEPHONE (OUTPATIENT)
Dept: PHARMACY | Facility: CLINIC | Age: 75
End: 2020-06-30

## 2020-06-30 RX ORDER — BISACODYL 5 MG
5 TABLET, DELAYED RELEASE (ENTERIC COATED) ORAL DAILY PRN
COMMUNITY

## 2020-06-30 NOTE — TELEPHONE ENCOUNTER
"Called patient for initial clinical follow up for Zytiga for prostate cancer. Name/ verified. Patient started therapy 4/3/20, along with prednisone 5 mg daily. He is also receiving Lupron and Zometa.     Dosing, how taking Zytiga: Takes 4 tablets (1000 mg) every morning, 1 hour before breakfast. No missed doses.  Storage: inside a dresser next to his chair in the living room. He has pets at home but they are unable to reach the dresser.   Handling: pours into bottle cap  Side effects: fatigue, constipation (4 bowel movements/day compared to 7 bowel movements/week) He uses colace and dulcolax and states that they help. When he takes them, he has to stay close to the restroom because he knows they are effective. Patient continues checking his BP daily. His reading yesterday was ~ 140/60 and he takes his HTN meds daily.   Recent infections: no  Pain: 0/10. He takes Norco BID to prevent any pain.   Appetite: Not what it used to be. Eats 3 meals/day still, but no longer craves foods. He used to be able to eat 12 inch sandwich [employer] and now is full after eating 3 inches. Discussed trying to eat smaller meals more frequently or using nutritional supplements such as boost or ensure to confirm that patient is receiving all the required nutrients and he verbalized understanding  Energy, fatigue: "one day, I'm exhausted, tired, and have no strength, and the next day I'm fine." He is currently not working and states that he would be unable to return to work with this level of fatigue. 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 He naps 30 mins-1 hour daily but is still tired when he wakes up. He drinks 5 bottles of water/day and reports walking 20-25 mins daily. His walks help but he feels exhausted after them. Encouraged patient to reduce the amount that he's walking and try to walk 10-15 mins/day when the sun is not out. Also discussed taking rest breaks in between " activities.  Health, mood, QOL: Has anxiety and takes xanax bid. Reports improvement. He states it helps to hear from us as well and it makes him feel better when we review the medication in detail.  ED/UC visits: no  Next clinical follow up: due around 9/15. Last office visit was 6/15. Provider notes patient is doing remarkably well on Zytiga with dramatic fall in PSA. Provider also discussed fatigue with patient and that it is associated with decreased testosterone.     Medication list reviewed and updated in EPIC. No new allergies or health conditions. Patient previously counseled on Cat D: Abiraterone Acetate may increase the serum concentration of CYP2D6 Substrates (metoprolol). Recommendations: Avoid concurrent use of abiraterone with CYP2D6 substrates that have a narrow therapeutic index whenever possible. When concurrent use is not avoidable, monitor patients closely for signs/symptoms of toxicity. No signs of toxicity present.    Labs reviewed from 6/15/20: CBC, CMP stable. PSA 0.14. Testosterone < 4 ng/dL.     Charlotte Lopez, Pharm.D.  Pharmacy Resident, PGY-1   [facility]  [phone]

## 2020-07-06 DIAGNOSIS — F41.0 PANIC ATTACK: ICD-10-CM

## 2020-07-06 RX ORDER — ALPRAZOLAM 0.25 MG/1
TABLET ORAL
Qty: 60 TABLET | Refills: 1 | Status: SHIPPED | OUTPATIENT
Start: 2020-07-06 | End: 2020-07-29 | Stop reason: SDUPTHER

## 2020-07-08 NOTE — PROGRESS NOTES
"Digital Medicine: Health  Follow-Up    The history is provided by the patient.   Follow Up  Follow-up reason(s): reading review and goal follow-up    Pt continues to undergo tx for bone cancer. He states that "things are going in the right direction" with regards to this, but that medications have many unpleasant s/e. He attributes his lack of BP readings to stress, but states that he is going to start checking more often. Pt was not willing to discuss his health and wellness goals at this time.    INTERVENTION(S)  encouragement/support    PLAN  continue monitoring        Topic    Hemoglobin A1C     Eye Exam     Lipid (Cholesterol) Test        Last 5 Patient Entered Readings                                      Current 30 Day Average:      Recent Readings 6/2/2020 6/1/2020 6/1/2020 6/1/2020 4/15/2020    SBP (mmHg) 178 192 175 183 166    DBP (mmHg) 83 85 77 85 87    Pulse 71 66 67 66 65        Screenings: Deferred.     SDOH: Deferred.  "

## 2020-07-12 DIAGNOSIS — N18.30 TYPE 2 DIABETES MELLITUS WITH STAGE 3 CHRONIC KIDNEY DISEASE, WITH LONG-TERM CURRENT USE OF INSULIN: ICD-10-CM

## 2020-07-12 DIAGNOSIS — E11.22 TYPE 2 DIABETES MELLITUS WITH STAGE 3 CHRONIC KIDNEY DISEASE, WITH LONG-TERM CURRENT USE OF INSULIN: ICD-10-CM

## 2020-07-12 DIAGNOSIS — Z79.4 TYPE 2 DIABETES MELLITUS WITH STAGE 3 CHRONIC KIDNEY DISEASE, WITH LONG-TERM CURRENT USE OF INSULIN: ICD-10-CM

## 2020-07-13 DIAGNOSIS — N18.30 TYPE 2 DIABETES MELLITUS WITH STAGE 3 CHRONIC KIDNEY DISEASE, WITH LONG-TERM CURRENT USE OF INSULIN: ICD-10-CM

## 2020-07-13 DIAGNOSIS — E11.22 TYPE 2 DIABETES MELLITUS WITH STAGE 3 CHRONIC KIDNEY DISEASE, WITH LONG-TERM CURRENT USE OF INSULIN: ICD-10-CM

## 2020-07-13 DIAGNOSIS — Z79.4 TYPE 2 DIABETES MELLITUS WITH STAGE 3 CHRONIC KIDNEY DISEASE, WITH LONG-TERM CURRENT USE OF INSULIN: ICD-10-CM

## 2020-07-13 RX ORDER — INSULIN DETEMIR 100 [IU]/ML
INJECTION, SOLUTION SUBCUTANEOUS
Qty: 15 ML | Refills: 0 | Status: SHIPPED | OUTPATIENT
Start: 2020-07-13 | End: 2020-07-23 | Stop reason: SDUPTHER

## 2020-07-13 RX ORDER — INSULIN DETEMIR 100 [IU]/ML
40 INJECTION, SOLUTION SUBCUTANEOUS 2 TIMES DAILY
Qty: 72 ML | Refills: 0 | Status: SHIPPED | OUTPATIENT
Start: 2020-07-13 | End: 2020-08-12

## 2020-07-13 NOTE — TELEPHONE ENCOUNTER
Patient overdue for follow up.  Refill is given but the patient needs an appointment with Dr. Fox or Yael Youssef for follow up.

## 2020-07-14 ENCOUNTER — TELEPHONE (OUTPATIENT)
Dept: PHARMACY | Facility: CLINIC | Age: 75
End: 2020-07-14

## 2020-07-14 ENCOUNTER — HOSPITAL ENCOUNTER (OUTPATIENT)
Dept: RADIOLOGY | Facility: HOSPITAL | Age: 75
Discharge: HOME | End: 2020-07-14
Attending: FAMILY MEDICINE
Payer: MEDICARE

## 2020-07-14 ENCOUNTER — OFFICE VISIT (OUTPATIENT)
Dept: ORTHOPEDICS | Facility: CLINIC | Age: 75
End: 2020-07-14
Payer: MEDICARE

## 2020-07-14 VITALS
DIASTOLIC BLOOD PRESSURE: 74 MMHG | HEART RATE: 72 BPM | WEIGHT: 285.94 LBS | SYSTOLIC BLOOD PRESSURE: 154 MMHG | BODY MASS INDEX: 38.73 KG/M2 | HEIGHT: 72 IN

## 2020-07-14 DIAGNOSIS — S80.02XA CONTUSION OF LEFT KNEE, INITIAL ENCOUNTER: ICD-10-CM

## 2020-07-14 DIAGNOSIS — E11.59 HYPERTENSION ASSOCIATED WITH DIABETES: Primary | ICD-10-CM

## 2020-07-14 DIAGNOSIS — S83.412A TEAR OF MCL (MEDIAL COLLATERAL LIGAMENT) OF KNEE, LEFT, INITIAL ENCOUNTER: ICD-10-CM

## 2020-07-14 DIAGNOSIS — M25.562 LEFT KNEE PAIN, UNSPECIFIED CHRONICITY: ICD-10-CM

## 2020-07-14 DIAGNOSIS — M25.562 LEFT KNEE PAIN, UNSPECIFIED CHRONICITY: Primary | ICD-10-CM

## 2020-07-14 DIAGNOSIS — I15.2 HYPERTENSION ASSOCIATED WITH DIABETES: Primary | ICD-10-CM

## 2020-07-14 PROCEDURE — 3077F SYST BP >= 140 MM HG: CPT | Mod: CPTII,S$GLB,, | Performed by: FAMILY MEDICINE

## 2020-07-14 PROCEDURE — 3078F PR MOST RECENT DIASTOLIC BLOOD PRESSURE < 80 MM HG: ICD-10-PCS | Mod: CPTII,S$GLB,, | Performed by: FAMILY MEDICINE

## 2020-07-14 PROCEDURE — 3078F DIAST BP <80 MM HG: CPT | Mod: CPTII,S$GLB,, | Performed by: FAMILY MEDICINE

## 2020-07-14 PROCEDURE — 3008F PR BODY MASS INDEX (BMI) DOCUMENTED: ICD-10-PCS | Mod: CPTII,S$GLB,, | Performed by: FAMILY MEDICINE

## 2020-07-14 PROCEDURE — 3077F PR MOST RECENT SYSTOLIC BLOOD PRESSURE >= 140 MM HG: ICD-10-PCS | Mod: CPTII,S$GLB,, | Performed by: FAMILY MEDICINE

## 2020-07-14 PROCEDURE — 99214 OFFICE O/P EST MOD 30 MIN: CPT | Mod: S$GLB,,, | Performed by: FAMILY MEDICINE

## 2020-07-14 PROCEDURE — 1125F AMNT PAIN NOTED PAIN PRSNT: CPT | Mod: S$GLB,,, | Performed by: FAMILY MEDICINE

## 2020-07-14 PROCEDURE — 73562 X-RAY EXAM OF KNEE 3: CPT | Mod: TC,59,RT

## 2020-07-14 PROCEDURE — 73564 XR KNEE ORTHO LEFT WITH FLEXION: ICD-10-PCS | Mod: 26,LT,, | Performed by: RADIOLOGY

## 2020-07-14 PROCEDURE — 1125F PR PAIN SEVERITY QUANTIFIED, PAIN PRESENT: ICD-10-PCS | Mod: S$GLB,,, | Performed by: FAMILY MEDICINE

## 2020-07-14 PROCEDURE — 1100F PR PT FALLS ASSESS DOC 2+ FALLS/FALL W/INJURY/YR: ICD-10-PCS | Mod: CPTII,S$GLB,, | Performed by: FAMILY MEDICINE

## 2020-07-14 PROCEDURE — 73562 XR KNEE ORTHO LEFT WITH FLEXION: ICD-10-PCS | Mod: 26,RT,, | Performed by: RADIOLOGY

## 2020-07-14 PROCEDURE — 1159F PR MEDICATION LIST DOCUMENTED IN MEDICAL RECORD: ICD-10-PCS | Mod: S$GLB,,, | Performed by: FAMILY MEDICINE

## 2020-07-14 PROCEDURE — 99999 PR PBB SHADOW E&M-EST. PATIENT-LVL V: ICD-10-PCS | Mod: PBBFAC,,, | Performed by: FAMILY MEDICINE

## 2020-07-14 PROCEDURE — 3288F PR FALLS RISK ASSESSMENT DOCUMENTED: ICD-10-PCS | Mod: CPTII,S$GLB,, | Performed by: FAMILY MEDICINE

## 2020-07-14 PROCEDURE — 3008F BODY MASS INDEX DOCD: CPT | Mod: CPTII,S$GLB,, | Performed by: FAMILY MEDICINE

## 2020-07-14 PROCEDURE — 3288F FALL RISK ASSESSMENT DOCD: CPT | Mod: CPTII,S$GLB,, | Performed by: FAMILY MEDICINE

## 2020-07-14 PROCEDURE — 73564 X-RAY EXAM KNEE 4 OR MORE: CPT | Mod: 26,LT,, | Performed by: RADIOLOGY

## 2020-07-14 PROCEDURE — 99214 PR OFFICE/OUTPT VISIT, EST, LEVL IV, 30-39 MIN: ICD-10-PCS | Mod: S$GLB,,, | Performed by: FAMILY MEDICINE

## 2020-07-14 PROCEDURE — 1100F PTFALLS ASSESS-DOCD GE2>/YR: CPT | Mod: CPTII,S$GLB,, | Performed by: FAMILY MEDICINE

## 2020-07-14 PROCEDURE — 1159F MED LIST DOCD IN RCRD: CPT | Mod: S$GLB,,, | Performed by: FAMILY MEDICINE

## 2020-07-14 PROCEDURE — 99999 PR PBB SHADOW E&M-EST. PATIENT-LVL V: CPT | Mod: PBBFAC,,, | Performed by: FAMILY MEDICINE

## 2020-07-14 PROCEDURE — 73562 X-RAY EXAM OF KNEE 3: CPT | Mod: 26,RT,, | Performed by: RADIOLOGY

## 2020-07-14 NOTE — PATIENT INSTRUCTIONS
Ice knee 3 to 4 times a day for 15 min    Voltaren gel 3 times a day for 1 month    Knee brace    Recheck here 3-4 weeks

## 2020-07-14 NOTE — PROGRESS NOTES
Subjective:     Patient ID: Sherif Ragland is a 74 y.o. male.    Chief Complaint: Pain of the Left Knee      HPI:  This gentleman who takes chemotherapy for metastatic prostate cancer states that he slipped on a slippery surface last night and jammed his left knee into the door frame.  Since that time he has had pain and limping in the left leg.  No visible swelling and no bruising evident.  It does not lock in place or totally give out on him.  He does not feel that it shifts out of place when he is walking.  He did wear brace today which he states helped a little which was actually 1 his wife had used previously.    He states his chemotherapy seems to be working well for the prostate cancer but makes him feel bad part of the day.    No recent illness such is fever shortness of breath chest pain.    Past Medical History:   Diagnosis Date    Diabetes mellitus, type 2 1997    Hyperlipidemia     Hypertension     Hypogonadism in male     Renal insufficiency     Tubular adenoma 10/2017     Past Surgical History:   Procedure Laterality Date    CATARACT EXTRACTION Right 01/31/2018    CATARACT EXTRACTION W/  INTRAOCULAR LENS IMPLANT Left 03/13/2019    COLONOSCOPY N/A 11/2/2017    Procedure: COLONOSCOPY;  Surgeon: Alek Francois MD;  Location: [facility] ENDO;  Service: Endoscopy;  Laterality: N/A;    TIBIA FRACTURE SURGERY      right leg x2     TONSILLECTOMY      TRANSURETHRAL RESECTION OF PROSTATE N/A 10/17/2019    Procedure: TURP (TRANSURETHRAL RESECTION OF PROSTATE);  Surgeon: Mir Hale IV, MD;  Location: [facility] OR;  Service: Urology;  Laterality: N/A;     Family History   Problem Relation Age of Onset    Stroke Mother     Heart disease Father     Stroke Brother      Social History     Socioeconomic History    Marital status:      Spouse name: Not on file    Number of children: 4    Years of education: Not on file    Highest education level: Not on file   Occupational History     Occupation: [employer]   Social Needs    Financial resource strain: Not on file    Food insecurity     Worry: Not on file     Inability: Not on file    Transportation needs     Medical: Not on file     Non-medical: Not on file   Tobacco Use    Smoking status: Never Smoker    Smokeless tobacco: Never Used   Substance and Sexual Activity    Alcohol use: No    Drug use: No    Sexual activity: Yes     Partners: Female   Lifestyle    Physical activity     Days per week: Not on file     Minutes per session: Not on file    Stress: Not on file   Relationships    Social connections     Talks on phone: Not on file     Gets together: Not on file     Attends [employer]ism service: Not on file     Active member of club or organization: Not on file     Attends meetings of clubs or organizations: Not on file     Relationship status: Not on file   Other Topics Concern    Not on file   Social History Narrative    Surrogate decision maker, Wife, Mita Ragland [phone]       Current Outpatient Medications:     abiraterone (ZYTIGA) 250 mg Tab, Take 4 tablets (1,000 mg total) by mouth once daily., Disp: 120 tablet, Rfl: 11    ALPRAZolam (XANAX) 0.25 MG tablet, TAKE 1 TO 2 TABLETS BY MOUTH ONCE DAILY AS NEEDED FOR ANXIETY, Disp: 60 tablet, Rfl: 1    amLODIPine (NORVASC) 5 MG tablet, TAKE 2 TABLETS BY MOUTH ONCE DAILY, Disp: 60 tablet, Rfl: 11    bicalutamide (CASODEX) 50 MG Tab, Take 1 tablet (50 mg total) by mouth once daily., Disp: 30 tablet, Rfl: 0    bisacodyL (DULCOLAX) 5 mg EC tablet, Take 5 mg by mouth daily as needed., Disp: , Rfl:     blood sugar diagnostic Strp, To check BG 2 times daily, to use with insurance preferred meter, Disp: 100 each, Rfl: 11    blood-glucose meter kit, To check BG two times daily, to use with insurance preferred meter, Disp: 1 each, Rfl: 0    docusate sodium (COLACE) 100 MG capsule, Take 1 capsule (100 mg total) by mouth 2 (two) times daily., Disp: 60 capsule, Rfl: 0     dulaglutide (TRULICITY) 0.75 mg/0.5 mL PnIj, Inject 0.5 mLs (0.75 mg total) into the skin every 7 days., Disp: 2 mL, Rfl: 11    furosemide (LASIX) 40 MG tablet, Take 1 tablet (40 mg total) by mouth once daily., Disp: 60 tablet, Rfl: 11    HYDROcodone-acetaminophen (NORCO)  mg per tablet, Take 1 tablet by mouth every 4 (four) hours as needed., Disp: 60 tablet, Rfl: 0    insulin (LANTUS SOLOSTAR U-100 INSULIN) glargine 100 units/mL (3mL) SubQ pen, Inject 25 Units into the skin., Disp: , Rfl:     insulin detemir U-100 (LEVEMIR FLEXTOUCH U-100 INSULN) 100 unit/mL (3 mL) InPn pen, Inject 40 Units into the skin 2 (two) times daily., Disp: 72 mL, Rfl: 0    lancets Misc, To check BG 2 times daily, to use with insurance preferred meter, Disp: 100 each, Rfl: 11    LEVEMIR FLEXTOUCH U-100 INSULN 100 unit/mL (3 mL) InPn pen, INJECT 40 UNITS SUBCUTANEOUSLY TWICE DAILY, Disp: 15 mL, Rfl: 0    metoprolol succinate (TOPROL-XL) 100 MG 24 hr tablet, Take 1 tablet by mouth twice daily, Disp: 60 tablet, Rfl: 11    ondansetron (ZOFRAN) 8 MG tablet, Take 1 tablet (8 mg total) by mouth every 12 (twelve) hours as needed for Nausea., Disp: 30 tablet, Rfl: 2    potassium chloride SA (K-DUR,KLOR-CON) 20 MEQ tablet, Take 1 tablet (20 mEq total) by mouth 2 (two) times daily., Disp: 30 tablet, Rfl: 11    predniSONE (DELTASONE) 5 MG tablet, Take 1 tablet (5 mg total) by mouth once daily., Disp: 60 tablet, Rfl: 11    predniSONE (DELTASONE) 5 MG tablet, Take 1 tablet (5 mg total) by mouth once daily., Disp: 60 tablet, Rfl: 11    valsartan-hydrochlorothiazide (DIOVAN-HCT) 320-12.5 mg per tablet, Take 1 tablet by mouth once daily., Disp: 90 tablet, Rfl: 3    zolpidem (AMBIEN) 5 MG Tab, Take 1 tablet (5 mg total) by mouth nightly as needed., Disp: 30 tablet, Rfl: 3  Review of patient's allergies indicates:  No Known Allergies  Review of Systems   Constitutional: Negative for chills, fever and weight loss.   Respiratory: Negative for  shortness of breath.    Cardiovascular: Negative for chest pain and palpitations.   Musculoskeletal: Positive for joint pain.       Objective:   Body mass index is 38.78 kg/m².  Vitals:    07/14/20 1407   BP: (!) 154/74   Pulse: 72           Ortho/SPM Exam-alert and oriented well-nourished well-developed ambulatory with antalgic gait in no acute distress    Respiratory effort is normal    Left knee exam no acute deformity noted    Tenderness to palpation along the medial aspect of the joint    Negative Lachman's and varus testing but patient opens up 3+ on valgus testing and has increased pain along the medial aspect of the joint.    Strength is 4/5    Neurovascular intact    Psychiatric good affect and cognition    IMAGING: X-ray Knee Ortho Left with Flexion  Narrative: EXAMINATION:  XR KNEE ORTHO LEFT WITH FLEXION    CLINICAL HISTORY:  Pain in left knee    TECHNIQUE:  AP standing views of both knees, AP flexion views of both knees, lateral view of the left knee and Merchant views of both knees    COMPARISON:  None    FINDINGS:  There is mild joint space narrowing seen involving the medial compartment of the right knee.  Minimal degenerative change involving the left patellofemoral compartment.  No joint effusion.  No acute fracture or dislocation.  Vascular calcifications are noted.  Impression: 1.  As above    Electronically signed by: Juice Honeycutt DO  Date:    07/14/2020  Time:    13:49       Radiographs / Imaging : Relevant imaging results reviewed by me and interpreted by me, discussed with the patient and / or family -radiographs reviewed with the patient that there are mild degenerative changes noted but no acute fracture or dislocation.    Discussion patient understands to apply ice to decrease swelling and to use the brace that we gave him until his recheck visit and to expect that does injury of the medial collateral ligament may take 4-6 weeks for healing.  Also he was advise that we will recheck for  any deeper injury once his MCL is healing.      Assessment:     Encounter Diagnoses   Name Primary?    Tear of MCL (medial collateral ligament) of knee, left, initial encounter     Contusion of left knee, initial encounter     Hypertension associated with diabetes Yes        Plan:   Hypertension associated with diabetes    Tear of MCL (medial collateral ligament) of knee, left, initial encounter    Contusion of left knee, initial encounter        The patient verbalized good understanding of the medical issues discussed today and expressed appreciation for the care provided.  Patient was given the opportunity to ask questions and be an active participant in their medical care. Patient had no further questions or concerns at this time.     The patient was encouraged to participate in appropriate physical activity.      Gabriel Paul M.D.  [facility]        This note was dictated using voice recognition software and may have sound a like errors.

## 2020-07-23 DIAGNOSIS — Z79.4 TYPE 2 DIABETES MELLITUS WITH STAGE 3 CHRONIC KIDNEY DISEASE, WITH LONG-TERM CURRENT USE OF INSULIN: ICD-10-CM

## 2020-07-23 DIAGNOSIS — E11.22 TYPE 2 DIABETES MELLITUS WITH STAGE 3 CHRONIC KIDNEY DISEASE, WITH LONG-TERM CURRENT USE OF INSULIN: ICD-10-CM

## 2020-07-23 DIAGNOSIS — N18.30 TYPE 2 DIABETES MELLITUS WITH STAGE 3 CHRONIC KIDNEY DISEASE, WITH LONG-TERM CURRENT USE OF INSULIN: ICD-10-CM

## 2020-07-24 RX ORDER — INSULIN DETEMIR 100 [IU]/ML
40 INJECTION, SOLUTION SUBCUTANEOUS 2 TIMES DAILY
Qty: 15 ML | Refills: 0 | Status: SHIPPED | OUTPATIENT
Start: 2020-07-24 | End: 2020-08-13

## 2020-08-01 NOTE — ASSESSMENT & PLAN NOTE
Blood pressure is under good control.  We will continue the current regimen.  Will work on regular aerobic exercise and a low salt diet.      
Continue current regimen. Will update labs. Consider changing back to oral agents if possible.  
Following with Dr. Juan Whyte, will monitor.  
Update labs, continue atorvastatin.  
Increase OOB.  Regular diet.  PO Pain protocol.  Routine Postpartum Care.

## 2020-08-05 ENCOUNTER — PATIENT OUTREACH (OUTPATIENT)
Dept: ADMINISTRATIVE | Facility: OTHER | Age: 75
End: 2020-08-05

## 2020-08-11 ENCOUNTER — DOCUMENTATION ONLY (OUTPATIENT)
Dept: HEMATOLOGY/ONCOLOGY | Facility: CLINIC | Age: 75
End: 2020-08-11

## 2020-08-11 NOTE — NURSING
Pt called in today with complaints of increased weakness and constipation which is sometimes relieved by stool softeners.  He denies any fever.  Pt states he would like to come in to see Dr. Irvin sooner that 20 as set when I offered this.  He will see Dr. Irvin tomorrow at the [place] location. Pt is in agreement with this appt and will attend.  He will wait to see dr irvin before getting any labs drawn per his wishes.   Oncology Navigation   Intake  Date Worked: 20     Treatment                  Acuity  Systemic Treatment - predicted or initiated: Chemotherapy regimen with multiple drugs (+1)  Treatment Tolerability: 1  ECO  Comorbidities in Medical History: 2   Needed: 0  Support: 0  Transportation: 0  Psychological Factors (+1 each): QOL issues  Navigation Acuity: 9     Follow Up  No follow-ups on file.

## 2020-08-12 ENCOUNTER — OFFICE VISIT (OUTPATIENT)
Dept: INTERNAL MEDICINE | Facility: CLINIC | Age: 75
End: 2020-08-12
Payer: MEDICARE

## 2020-08-12 ENCOUNTER — LAB VISIT (OUTPATIENT)
Dept: LAB | Facility: HOSPITAL | Age: 75
End: 2020-08-12
Attending: INTERNAL MEDICINE
Payer: MEDICARE

## 2020-08-12 ENCOUNTER — OFFICE VISIT (OUTPATIENT)
Dept: HEMATOLOGY/ONCOLOGY | Facility: CLINIC | Age: 75
End: 2020-08-12
Payer: MEDICARE

## 2020-08-12 VITALS
TEMPERATURE: 98 F | HEIGHT: 72 IN | HEART RATE: 67 BPM | WEIGHT: 282.88 LBS | OXYGEN SATURATION: 96 % | DIASTOLIC BLOOD PRESSURE: 75 MMHG | BODY MASS INDEX: 38.31 KG/M2 | SYSTOLIC BLOOD PRESSURE: 149 MMHG

## 2020-08-12 VITALS
WEIGHT: 283.75 LBS | DIASTOLIC BLOOD PRESSURE: 65 MMHG | TEMPERATURE: 99 F | SYSTOLIC BLOOD PRESSURE: 130 MMHG | HEART RATE: 68 BPM | HEIGHT: 72 IN | BODY MASS INDEX: 38.43 KG/M2

## 2020-08-12 DIAGNOSIS — C61 PROSTATE CANCER METASTATIC TO MULTIPLE SITES: ICD-10-CM

## 2020-08-12 DIAGNOSIS — Z51.11 ENCOUNTER FOR ANTINEOPLASTIC CHEMOTHERAPY: ICD-10-CM

## 2020-08-12 DIAGNOSIS — E11.22 TYPE 2 DIABETES MELLITUS WITH STAGE 3 CHRONIC KIDNEY DISEASE, WITH LONG-TERM CURRENT USE OF INSULIN: Primary | ICD-10-CM

## 2020-08-12 DIAGNOSIS — K59.04 CHRONIC IDIOPATHIC CONSTIPATION: Primary | ICD-10-CM

## 2020-08-12 DIAGNOSIS — C41.9 MALIGNANT NEOPLASM OF BONE AND ARTICULAR CARTILAGE, UNSPECIFIED: ICD-10-CM

## 2020-08-12 DIAGNOSIS — Z79.4 TYPE 2 DIABETES MELLITUS WITH STAGE 3 CHRONIC KIDNEY DISEASE, WITH LONG-TERM CURRENT USE OF INSULIN: Primary | ICD-10-CM

## 2020-08-12 DIAGNOSIS — K59.04 CHRONIC IDIOPATHIC CONSTIPATION: ICD-10-CM

## 2020-08-12 DIAGNOSIS — R53.1 WEAKNESS: ICD-10-CM

## 2020-08-12 DIAGNOSIS — N18.30 TYPE 2 DIABETES MELLITUS WITH STAGE 3 CHRONIC KIDNEY DISEASE, WITH LONG-TERM CURRENT USE OF INSULIN: Primary | ICD-10-CM

## 2020-08-12 DIAGNOSIS — C79.51 SECONDARY CANCER OF BONE: ICD-10-CM

## 2020-08-12 LAB
ALBUMIN SERPL BCP-MCNC: 3.2 G/DL (ref 3.5–5.2)
ALP SERPL-CCNC: 144 U/L (ref 55–135)
ALT SERPL W/O P-5'-P-CCNC: 12 U/L (ref 10–44)
ANION GAP SERPL CALC-SCNC: 13 MMOL/L (ref 8–16)
AST SERPL-CCNC: 13 U/L (ref 10–40)
BASOPHILS # BLD AUTO: 0.03 K/UL (ref 0–0.2)
BASOPHILS NFR BLD: 0.3 % (ref 0–1.9)
BILIRUB SERPL-MCNC: 0.6 MG/DL (ref 0.1–1)
BUN SERPL-MCNC: 18 MG/DL (ref 8–23)
CALCIUM SERPL-MCNC: 9 MG/DL (ref 8.7–10.5)
CHLORIDE SERPL-SCNC: 98 MMOL/L (ref 95–110)
CO2 SERPL-SCNC: 29 MMOL/L (ref 23–29)
CREAT SERPL-MCNC: 1.8 MG/DL (ref 0.5–1.4)
DIFFERENTIAL METHOD: ABNORMAL
EOSINOPHIL # BLD AUTO: 0.2 K/UL (ref 0–0.5)
EOSINOPHIL NFR BLD: 1.9 % (ref 0–8)
ERYTHROCYTE [DISTWIDTH] IN BLOOD BY AUTOMATED COUNT: 12.6 % (ref 11.5–14.5)
EST. GFR  (AFRICAN AMERICAN): 42 ML/MIN/1.73 M^2
EST. GFR  (NON AFRICAN AMERICAN): 36 ML/MIN/1.73 M^2
FERRITIN SERPL-MCNC: 164 NG/ML (ref 20–300)
GLUCOSE SERPL-MCNC: 259 MG/DL (ref 70–110)
HCT VFR BLD AUTO: 39.2 % (ref 40–54)
HGB BLD-MCNC: 13 G/DL (ref 14–18)
IMM GRANULOCYTES # BLD AUTO: 0.07 K/UL (ref 0–0.04)
IMM GRANULOCYTES NFR BLD AUTO: 0.8 % (ref 0–0.5)
IRON SERPL-MCNC: 86 UG/DL (ref 45–160)
LDH SERPL L TO P-CCNC: 210 U/L (ref 110–260)
LYMPHOCYTES # BLD AUTO: 0.8 K/UL (ref 1–4.8)
LYMPHOCYTES NFR BLD: 9.3 % (ref 18–48)
MCH RBC QN AUTO: 29 PG (ref 27–31)
MCHC RBC AUTO-ENTMCNC: 33.2 G/DL (ref 32–36)
MCV RBC AUTO: 87 FL (ref 82–98)
MONOCYTES # BLD AUTO: 1 K/UL (ref 0.3–1)
MONOCYTES NFR BLD: 10.6 % (ref 4–15)
NEUTROPHILS # BLD AUTO: 7 K/UL (ref 1.8–7.7)
NEUTROPHILS NFR BLD: 77.9 % (ref 38–73)
NRBC BLD-RTO: 0 /100 WBC
PLATELET # BLD AUTO: 295 K/UL (ref 150–350)
PMV BLD AUTO: 8.8 FL (ref 9.2–12.9)
POTASSIUM SERPL-SCNC: 3.4 MMOL/L (ref 3.5–5.1)
PROT SERPL-MCNC: 6.8 G/DL (ref 6–8.4)
RBC # BLD AUTO: 4.49 M/UL (ref 4.6–6.2)
SATURATED IRON: 24 % (ref 20–50)
SODIUM SERPL-SCNC: 140 MMOL/L (ref 136–145)
TOTAL IRON BINDING CAPACITY: 358 UG/DL (ref 250–450)
TRANSFERRIN SERPL-MCNC: 242 MG/DL (ref 200–375)
WBC # BLD AUTO: 9.02 K/UL (ref 3.9–12.7)

## 2020-08-12 PROCEDURE — 99999 PR PBB SHADOW E&M-EST. PATIENT-LVL IV: ICD-10-PCS | Mod: PBBFAC,,, | Performed by: PHYSICIAN ASSISTANT

## 2020-08-12 PROCEDURE — 85025 COMPLETE CBC W/AUTO DIFF WBC: CPT

## 2020-08-12 PROCEDURE — 1159F MED LIST DOCD IN RCRD: CPT | Mod: S$GLB,,, | Performed by: INTERNAL MEDICINE

## 2020-08-12 PROCEDURE — 99214 OFFICE O/P EST MOD 30 MIN: CPT | Mod: S$GLB,,, | Performed by: INTERNAL MEDICINE

## 2020-08-12 PROCEDURE — 3008F BODY MASS INDEX DOCD: CPT | Mod: CPTII,S$GLB,, | Performed by: INTERNAL MEDICINE

## 2020-08-12 PROCEDURE — 3077F SYST BP >= 140 MM HG: CPT | Mod: CPTII,S$GLB,, | Performed by: INTERNAL MEDICINE

## 2020-08-12 PROCEDURE — 3077F PR MOST RECENT SYSTOLIC BLOOD PRESSURE >= 140 MM HG: ICD-10-PCS | Mod: CPTII,S$GLB,, | Performed by: INTERNAL MEDICINE

## 2020-08-12 PROCEDURE — 99999 PR PBB SHADOW E&M-EST. PATIENT-LVL V: CPT | Mod: PBBFAC,,, | Performed by: INTERNAL MEDICINE

## 2020-08-12 PROCEDURE — 1101F PR PT FALLS ASSESS DOC 0-1 FALLS W/OUT INJ PAST YR: ICD-10-PCS | Mod: CPTII,S$GLB,, | Performed by: PHYSICIAN ASSISTANT

## 2020-08-12 PROCEDURE — 3008F BODY MASS INDEX DOCD: CPT | Mod: CPTII,S$GLB,, | Performed by: PHYSICIAN ASSISTANT

## 2020-08-12 PROCEDURE — 3078F PR MOST RECENT DIASTOLIC BLOOD PRESSURE < 80 MM HG: ICD-10-PCS | Mod: CPTII,S$GLB,, | Performed by: PHYSICIAN ASSISTANT

## 2020-08-12 PROCEDURE — 3008F PR BODY MASS INDEX (BMI) DOCUMENTED: ICD-10-PCS | Mod: CPTII,S$GLB,, | Performed by: INTERNAL MEDICINE

## 2020-08-12 PROCEDURE — 84153 ASSAY OF PSA TOTAL: CPT

## 2020-08-12 PROCEDURE — 80053 COMPREHEN METABOLIC PANEL: CPT

## 2020-08-12 PROCEDURE — 99214 PR OFFICE/OUTPT VISIT, EST, LEVL IV, 30-39 MIN: ICD-10-PCS | Mod: S$GLB,,, | Performed by: INTERNAL MEDICINE

## 2020-08-12 PROCEDURE — 3075F PR MOST RECENT SYSTOLIC BLOOD PRESS GE 130-139MM HG: ICD-10-PCS | Mod: CPTII,S$GLB,, | Performed by: PHYSICIAN ASSISTANT

## 2020-08-12 PROCEDURE — 1101F PT FALLS ASSESS-DOCD LE1/YR: CPT | Mod: CPTII,S$GLB,, | Performed by: PHYSICIAN ASSISTANT

## 2020-08-12 PROCEDURE — 1126F AMNT PAIN NOTED NONE PRSNT: CPT | Mod: S$GLB,,, | Performed by: INTERNAL MEDICINE

## 2020-08-12 PROCEDURE — 99999 PR PBB SHADOW E&M-EST. PATIENT-LVL V: ICD-10-PCS | Mod: PBBFAC,,, | Performed by: INTERNAL MEDICINE

## 2020-08-12 PROCEDURE — 1100F PTFALLS ASSESS-DOCD GE2>/YR: CPT | Mod: CPTII,S$GLB,, | Performed by: INTERNAL MEDICINE

## 2020-08-12 PROCEDURE — 3078F DIAST BP <80 MM HG: CPT | Mod: CPTII,S$GLB,, | Performed by: INTERNAL MEDICINE

## 2020-08-12 PROCEDURE — 1100F PR PT FALLS ASSESS DOC 2+ FALLS/FALL W/INJURY/YR: ICD-10-PCS | Mod: CPTII,S$GLB,, | Performed by: INTERNAL MEDICINE

## 2020-08-12 PROCEDURE — 3078F PR MOST RECENT DIASTOLIC BLOOD PRESSURE < 80 MM HG: ICD-10-PCS | Mod: CPTII,S$GLB,, | Performed by: INTERNAL MEDICINE

## 2020-08-12 PROCEDURE — 3288F PR FALLS RISK ASSESSMENT DOCUMENTED: ICD-10-PCS | Mod: CPTII,S$GLB,, | Performed by: INTERNAL MEDICINE

## 2020-08-12 PROCEDURE — 3008F PR BODY MASS INDEX (BMI) DOCUMENTED: ICD-10-PCS | Mod: CPTII,S$GLB,, | Performed by: PHYSICIAN ASSISTANT

## 2020-08-12 PROCEDURE — 83540 ASSAY OF IRON: CPT

## 2020-08-12 PROCEDURE — 1126F PR PAIN SEVERITY QUANTIFIED, NO PAIN PRESENT: ICD-10-PCS | Mod: S$GLB,,, | Performed by: INTERNAL MEDICINE

## 2020-08-12 PROCEDURE — 3075F SYST BP GE 130 - 139MM HG: CPT | Mod: CPTII,S$GLB,, | Performed by: PHYSICIAN ASSISTANT

## 2020-08-12 PROCEDURE — 1159F MED LIST DOCD IN RCRD: CPT | Mod: S$GLB,,, | Performed by: PHYSICIAN ASSISTANT

## 2020-08-12 PROCEDURE — 99214 OFFICE O/P EST MOD 30 MIN: CPT | Mod: S$GLB,,, | Performed by: PHYSICIAN ASSISTANT

## 2020-08-12 PROCEDURE — 36415 COLL VENOUS BLD VENIPUNCTURE: CPT

## 2020-08-12 PROCEDURE — 3288F FALL RISK ASSESSMENT DOCD: CPT | Mod: CPTII,S$GLB,, | Performed by: INTERNAL MEDICINE

## 2020-08-12 PROCEDURE — 1159F PR MEDICATION LIST DOCUMENTED IN MEDICAL RECORD: ICD-10-PCS | Mod: S$GLB,,, | Performed by: PHYSICIAN ASSISTANT

## 2020-08-12 PROCEDURE — 1126F PR PAIN SEVERITY QUANTIFIED, NO PAIN PRESENT: ICD-10-PCS | Mod: S$GLB,,, | Performed by: PHYSICIAN ASSISTANT

## 2020-08-12 PROCEDURE — 99214 PR OFFICE/OUTPT VISIT, EST, LEVL IV, 30-39 MIN: ICD-10-PCS | Mod: S$GLB,,, | Performed by: PHYSICIAN ASSISTANT

## 2020-08-12 PROCEDURE — 84403 ASSAY OF TOTAL TESTOSTERONE: CPT

## 2020-08-12 PROCEDURE — 3078F DIAST BP <80 MM HG: CPT | Mod: CPTII,S$GLB,, | Performed by: PHYSICIAN ASSISTANT

## 2020-08-12 PROCEDURE — 1126F AMNT PAIN NOTED NONE PRSNT: CPT | Mod: S$GLB,,, | Performed by: PHYSICIAN ASSISTANT

## 2020-08-12 PROCEDURE — 99999 PR PBB SHADOW E&M-EST. PATIENT-LVL IV: CPT | Mod: PBBFAC,,, | Performed by: PHYSICIAN ASSISTANT

## 2020-08-12 PROCEDURE — 83615 LACTATE (LD) (LDH) ENZYME: CPT

## 2020-08-12 PROCEDURE — 1159F PR MEDICATION LIST DOCUMENTED IN MEDICAL RECORD: ICD-10-PCS | Mod: S$GLB,,, | Performed by: INTERNAL MEDICINE

## 2020-08-12 PROCEDURE — 82728 ASSAY OF FERRITIN: CPT

## 2020-08-12 RX ORDER — LACTULOSE 10 G/15ML
10 SOLUTION ORAL; RECTAL 2 TIMES DAILY
Qty: 473 ML | Refills: 11 | Status: SHIPPED | OUTPATIENT
Start: 2020-08-12

## 2020-08-12 NOTE — PROGRESS NOTES
Subjective:       Patient ID: Sherif Ragland is a 74 y.o. male.    Chief Complaint: Results, Chemotherapy, and Prostate Cancer    HPI 74-year-old male history of metastatic prostate carcinoma currently on Zytiga/ Lupron with excellent response patient states that he is having some bowel issues as colonoscopy 2017 he states that he has difficulty in terms of movement of his bowels periodically recent medial meniscus tear from fall at home ECOG status 2    Past Medical History:   Diagnosis Date    Diabetes mellitus, type 2 1997    Hyperlipidemia     Hypertension     Hypogonadism in male     Renal insufficiency     Tubular adenoma 10/2017     Family History   Problem Relation Age of Onset    Stroke Mother     Heart disease Father     Stroke Brother      Social History     Socioeconomic History    Marital status:      Spouse name: Not on file    Number of children: 4    Years of education: Not on file    Highest education level: Not on file   Occupational History    Occupation: [employer]   Social Needs    Financial resource strain: Not on file    Food insecurity     Worry: Not on file     Inability: Not on file    Transportation needs     Medical: Not on file     Non-medical: Not on file   Tobacco Use    Smoking status: Never Smoker    Smokeless tobacco: Never Used   Substance and Sexual Activity    Alcohol use: No    Drug use: No    Sexual activity: Yes     Partners: Female   Lifestyle    Physical activity     Days per week: Not on file     Minutes per session: Not on file    Stress: Not on file   Relationships    Social connections     Talks on phone: Not on file     Gets together: Not on file     Attends [employer] service: Not on file     Active member of club or organization: Not on file     Attends meetings of clubs or organizations: Not on file     Relationship status: Not on file   Other Topics Concern    Not on file   Social History Narrative    Surrogate decision maker,  Wife, Mita Ragland [phone]     Past Surgical History:   Procedure Laterality Date    CATARACT EXTRACTION Right 01/31/2018    CATARACT EXTRACTION W/  INTRAOCULAR LENS IMPLANT Left 03/13/2019    COLONOSCOPY N/A 11/2/2017    Procedure: COLONOSCOPY;  Surgeon: Alek Francois MD;  Location: [facility];  Service: Endoscopy;  Laterality: N/A;    TIBIA FRACTURE SURGERY      right leg x2     TONSILLECTOMY      TRANSURETHRAL RESECTION OF PROSTATE N/A 10/17/2019    Procedure: TURP (TRANSURETHRAL RESECTION OF PROSTATE);  Surgeon: Mir Hale IV, MD;  Location: [facility] OR;  Service: Urology;  Laterality: N/A;       Labs:  Lab Results   Component Value Date    WBC 9.02 08/12/2020    HGB 13.0 (L) 08/12/2020    HCT 39.2 (L) 08/12/2020    MCV 87 08/12/2020     08/12/2020     BMP  Lab Results   Component Value Date     06/15/2020    K 3.3 (L) 06/15/2020    CL 97 06/15/2020    CO2 31 (H) 06/15/2020    BUN 25 (H) 06/15/2020    CREATININE 1.7 (H) 06/15/2020    CALCIUM 9.1 06/15/2020    ANIONGAP 11 06/15/2020    ESTGFRAFRICA 45 (A) 06/15/2020    EGFRNONAA 39 (A) 06/15/2020     Lab Results   Component Value Date    ALT 10 06/15/2020    AST 12 06/15/2020    GGT 38 02/17/2020    ALKPHOS 419 (H) 06/15/2020    BILITOT 0.5 06/15/2020       No results found for: IRON, TIBC, FERRITIN, SATURATEDIRO  No results found for: [id number]  No results found for: FOLATE  Lab Results   Component Value Date    TSH 2.302 01/04/2019         Review of Systems   Constitutional: Negative for activity change, appetite change, chills, diaphoresis, fatigue, fever and unexpected weight change.   HENT: Negative for congestion, dental problem, drooling, ear discharge, ear pain, facial swelling, hearing loss, mouth sores, nosebleeds, postnasal drip, rhinorrhea, sinus pressure, sneezing, sore throat, tinnitus, trouble swallowing and voice change.    Eyes: Negative for photophobia, pain, discharge, redness, itching and visual disturbance.    Respiratory: Negative for apnea, cough, choking, chest tightness, shortness of breath, wheezing and stridor.    Cardiovascular: Negative for chest pain, palpitations and leg swelling.   Gastrointestinal: Negative for abdominal distention, abdominal pain, anal bleeding, blood in stool, constipation, diarrhea, nausea, rectal pain and vomiting.   Endocrine: Negative for cold intolerance, heat intolerance, polydipsia, polyphagia and polyuria.   Genitourinary: Negative for decreased urine volume, difficulty urinating, discharge, dysuria, enuresis, flank pain, frequency, genital sores, hematuria, penile pain, penile swelling, scrotal swelling, testicular pain and urgency.   Musculoskeletal: Positive for arthralgias and gait problem. Negative for back pain, joint swelling, myalgias, neck pain and neck stiffness.   Skin: Negative for color change, pallor, rash and wound.   Allergic/Immunologic: Negative for environmental allergies, food allergies and immunocompromised state.   Neurological: Negative for dizziness, tremors, seizures, syncope, facial asymmetry, speech difficulty, weakness, light-headedness, numbness and headaches.   Hematological: Negative for adenopathy. Does not bruise/bleed easily.   Psychiatric/Behavioral: Positive for dysphoric mood. Negative for agitation, behavioral problems, confusion, decreased concentration, hallucinations, self-injury, sleep disturbance and suicidal ideas. The patient is nervous/anxious. The patient is not hyperactive.        Objective:      Physical Exam  Vitals signs reviewed.   Constitutional:       General: He is not in acute distress.     Appearance: He is well-developed. He is obese. He is not diaphoretic.   HENT:      Head: Normocephalic.      Right Ear: External ear normal.      Left Ear: External ear normal.      Nose: Nose normal.      Right Sinus: No maxillary sinus tenderness or frontal sinus tenderness.      Left Sinus: No maxillary sinus tenderness or frontal sinus  tenderness.      Mouth/Throat:      Pharynx: No oropharyngeal exudate.   Eyes:      General: Lids are normal. No scleral icterus.        Right eye: No discharge.         Left eye: No discharge.      Extraocular Movements:      Right eye: Normal extraocular motion.      Left eye: Normal extraocular motion.      Conjunctiva/sclera:      Right eye: Right conjunctiva is not injected. No hemorrhage.     Left eye: Left conjunctiva is not injected. No hemorrhage.     Pupils: Pupils are equal, round, and reactive to light.   Neck:      Musculoskeletal: Normal range of motion and neck supple.      Thyroid: No thyromegaly.      Vascular: No JVD.      Trachea: No tracheal deviation.   Cardiovascular:      Rate and Rhythm: Normal rate.   Pulmonary:      Effort: Pulmonary effort is normal. No respiratory distress.      Breath sounds: No stridor.   Abdominal:      General: Bowel sounds are normal.      Palpations: Abdomen is soft. There is no hepatomegaly, splenomegaly or mass.      Tenderness: There is no abdominal tenderness.   Genitourinary:     Rectum: Normal.   Musculoskeletal: Normal range of motion.         General: No tenderness.   Lymphadenopathy:      Head:      Right side of head: No posterior auricular or occipital adenopathy.      Left side of head: No posterior auricular or occipital adenopathy.      Cervical: No cervical adenopathy.      Right cervical: No superficial, deep or posterior cervical adenopathy.     Left cervical: No superficial, deep or posterior cervical adenopathy.      Upper Body:      Right upper body: No supraclavicular adenopathy.      Left upper body: No supraclavicular adenopathy.   Skin:     General: Skin is dry.      Findings: No erythema or rash.      Nails: There is no clubbing.     Neurological:      Mental Status: He is alert and oriented to person, place, and time.      Cranial Nerves: No cranial nerve deficit.      Coordination: Coordination abnormal.   Psychiatric:         Behavior:  Behavior normal.         Thought Content: Thought content normal.         Judgment: Judgment normal.             Assessment:      1. Chronic idiopathic constipation    2. Prostate cancer metastatic to multiple sites    3. 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 Secondary cancer of bone    5. Encounter for antineoplastic chemotherapy           Plan:     Rectal exam normal no impaction patient will be treated with chronic.  Laboratory studies ordered today return approximately 1 month with repeat labs for continuation of current treatment recommendations will review followed results to him        Felix Irvin Jr, MD FACP

## 2020-08-13 ENCOUNTER — TELEPHONE (OUTPATIENT)
Dept: INTERNAL MEDICINE | Facility: CLINIC | Age: 75
End: 2020-08-13

## 2020-08-13 DIAGNOSIS — E11.22 TYPE 2 DIABETES MELLITUS WITH STAGE 3 CHRONIC KIDNEY DISEASE, WITH LONG-TERM CURRENT USE OF INSULIN: ICD-10-CM

## 2020-08-13 DIAGNOSIS — Z79.4 TYPE 2 DIABETES MELLITUS WITH STAGE 3 CHRONIC KIDNEY DISEASE, WITH LONG-TERM CURRENT USE OF INSULIN: ICD-10-CM

## 2020-08-13 DIAGNOSIS — N18.30 TYPE 2 DIABETES MELLITUS WITH STAGE 3 CHRONIC KIDNEY DISEASE, WITH LONG-TERM CURRENT USE OF INSULIN: ICD-10-CM

## 2020-08-13 LAB
COMPLEXED PSA SERPL-MCNC: 0.2 NG/ML (ref 0–4)
TESTOST SERPL-MCNC: <4 NG/DL (ref 304–1227)

## 2020-08-13 RX ORDER — REPAGLINIDE 0.5 MG/1
0.5 TABLET ORAL
Qty: 270 TABLET | Refills: 3 | Status: SHIPPED | OUTPATIENT
Start: 2020-08-13 | End: 2022-01-01 | Stop reason: SDUPTHER

## 2020-08-13 RX ORDER — INSULIN DETEMIR 100 [IU]/ML
50 INJECTION, SOLUTION SUBCUTANEOUS 2 TIMES DAILY
Qty: 20 ML | Refills: 0 | Status: SHIPPED | OUTPATIENT
Start: 2020-08-13 | End: 2020-09-07 | Stop reason: SDUPTHER

## 2020-08-13 NOTE — PROGRESS NOTES
Subjective:       Patient ID: Sherif Ragland is a 74 y.o. male.    Chief Complaint: Diabetes    HPI  Patient comes in today for follow up DM   a1c has not been checked in a while     He is mostly seeing oncology team for his metastatic prostate CA  He has been feeling very weak as of late   Saw oncologist today, labs drawn  Health Maintenance Due   Topic Date Due    TETANUS VACCINE  10/28/1963    Shingles Vaccine (2 of 3) 04/19/2014    Foot Exam  12/26/2018    Hemoglobin A1c  01/30/2020    Eye Exam  02/25/2020    Lipid Panel  07/03/2020       Past Medical History:   Diagnosis Date    Diabetes mellitus, type 2 1997    Hyperlipidemia     Hypertension     Hypogonadism in male     Renal insufficiency     Tubular adenoma 10/2017       Current Outpatient Medications   Medication Sig Dispense Refill    abiraterone (ZYTIGA) 250 mg Tab Take 4 tablets (1,000 mg total) by mouth once daily. 120 tablet 11    ALPRAZolam (XANAX) 0.25 MG tablet TAKE 1 TO 2 TABLETS BY MOUTH ONCE DAILY AS NEEDED FOR ANXIETY 60 tablet 1    amLODIPine (NORVASC) 5 MG tablet TAKE 2 TABLETS BY MOUTH ONCE DAILY 60 tablet 11    bicalutamide (CASODEX) 50 MG Tab Take 1 tablet (50 mg total) by mouth once daily. 30 tablet 0    bisacodyL (DULCOLAX) 5 mg EC tablet Take 5 mg by mouth daily as needed.      blood sugar diagnostic Strp To check BG 2 times daily, to use with insurance preferred meter 100 each 11    blood-glucose meter kit To check BG two times daily, to use with insurance preferred meter 1 each 0    docusate sodium (COLACE) 100 MG capsule Take 1 capsule (100 mg total) by mouth 2 (two) times daily. 60 capsule 0    dulaglutide (TRULICITY) 0.75 mg/0.5 mL PnIj Inject 0.5 mLs (0.75 mg total) into the skin every 7 days. 2 mL 11    furosemide (LASIX) 40 MG tablet Take 1 tablet (40 mg total) by mouth once daily. 60 tablet 11    HYDROcodone-acetaminophen (NORCO)  mg per tablet Take 1 tablet by mouth every 4 (four) hours as needed.  60 tablet 0    insulin (LANTUS SOLOSTAR U-100 INSULIN) glargine 100 units/mL (3mL) SubQ pen Inject 25 Units into the skin.      insulin detemir U-100 (LEVEMIR FLEXTOUCH U-100 INSULN) 100 unit/mL (3 mL) InPn pen Inject 40 Units into the skin 2 (two) times daily. 15 mL 0    lactulose (CHRONULAC) 10 gram/15 mL solution Take 15 mLs (10 g total) by mouth 2 (two) times daily. 473 mL 11    lancets Misc To check BG 2 times daily, to use with insurance preferred meter 100 each 11    metoprolol succinate (TOPROL-XL) 100 MG 24 hr tablet Take 1 tablet by mouth twice daily 60 tablet 11    ondansetron (ZOFRAN) 8 MG tablet Take 1 tablet (8 mg total) by mouth every 12 (twelve) hours as needed for Nausea. 30 tablet 2    potassium chloride SA (K-DUR,KLOR-CON) 20 MEQ tablet Take 1 tablet (20 mEq total) by mouth 2 (two) times daily. 30 tablet 11    predniSONE (DELTASONE) 5 MG tablet Take 1 tablet (5 mg total) by mouth once daily. 60 tablet 11    predniSONE (DELTASONE) 5 MG tablet Take 1 tablet (5 mg total) by mouth once daily. 60 tablet 11    valsartan-hydrochlorothiazide (DIOVAN-HCT) 320-12.5 mg per tablet Take 1 tablet by mouth once daily. 90 tablet 3    zolpidem (AMBIEN) 5 MG Tab Take 1 tablet (5 mg total) by mouth nightly as needed. 30 tablet 3     No current facility-administered medications for this visit.        Review of Systems   Constitutional: Positive for activity change, appetite change and fatigue. Negative for fever and unexpected weight change.   HENT: Negative for sore throat and trouble swallowing.    Respiratory: Positive for shortness of breath. Negative for cough.    Cardiovascular: Negative for chest pain.   Gastrointestinal: Negative for abdominal pain.   Neurological: Positive for weakness.   Hematological: Negative for adenopathy. Does not bruise/bleed easily.   All other systems reviewed and are negative.      Objective:   /65   Pulse 68   Temp 99 °F (37.2 °C) (Temporal)   Ht 6' (1.829 m)    Wt 128.7 kg (283 lb 11.7 oz)   BMI 38.48 kg/m²      Physical Exam  Constitutional:       Appearance: Normal appearance. He is obese.   HENT:      Head: Normocephalic and atraumatic.      Right Ear: Tympanic membrane normal.      Left Ear: Tympanic membrane normal.   Neck:      Musculoskeletal: Normal range of motion.   Cardiovascular:      Rate and Rhythm: Normal rate and regular rhythm.      Pulses: Normal pulses.      Heart sounds: Normal heart sounds.   Pulmonary:      Effort: Pulmonary effort is normal.      Breath sounds: Normal breath sounds.   Musculoskeletal:      Right lower leg: No edema.      Left lower leg: No edema.   Neurological:      Mental Status: He is alert.           Lab Results   Component Value Date    WBC 9.02 08/12/2020    HGB 13.0 (L) 08/12/2020    HCT 39.2 (L) 08/12/2020     08/12/2020    CHOL 219 (H) 07/03/2019    TRIG 105 07/03/2019    HDL 52 07/03/2019    ALT 12 08/12/2020    AST 13 08/12/2020     08/12/2020    K 3.4 (L) 08/12/2020    CL 98 08/12/2020    CREATININE 1.8 (H) 08/12/2020    BUN 18 08/12/2020    CO2 29 08/12/2020    TSH 2.302 01/04/2019    PSA 28.9 (H) 02/13/2020    INR 1.0 02/13/2020    HGBA1C 8.9 (H) 07/03/2019       Assessment:       1. Type 2 diabetes mellitus with stage 3 chronic kidney disease, with long-term current use of insulin    2. Weakness    3. Prostate cancer metastatic to multiple sites        Plan:   Type 2 diabetes mellitus with stage 3 chronic kidney disease, with long-term current use of insulin  Comments:  needing to check a1c to see where we stand and where we go next with treatment   Orders:  -     Hemoglobin A1C; Future; Expected date: 08/12/2020  -     Brain Natriuretic Peptide; Future; Expected date: 08/12/2020    Weakness  Comments:  gets more winded easier, will check BNP. no hx of CHF    Prostate cancer metastatic to multiple sites  Comments:  on chemo, likely adding to weak feeling         No follow-ups on file.

## 2020-08-14 ENCOUNTER — DOCUMENTATION ONLY (OUTPATIENT)
Dept: HEMATOLOGY/ONCOLOGY | Facility: CLINIC | Age: 75
End: 2020-08-14

## 2020-08-15 NOTE — NURSING
Pt called in today with questions about his rx that dr arvizu supposedly sent in this am. He was unsure which pharmacy it was sent to. Pt left voice mail as I was not in the office at the time of the call.  I returned his call after researching his medication refills and it was noted that the rx in question was sent to the [facility] in [place]. When I returned the pt call he stated that he figured out which pharmacy the rx had been sent to and that he did pick this rx up and that he no longer needed my help  He does have my direct contact number to call with any further concerns.   Oncology Navigation   Intake  Date Worked: 20     Treatment                  Acuity  Systemic Treatment - predicted or initiated: Chemotherapy regimen with multiple drugs (+1)  Treatment Tolerability: 1  ECO  Comorbidities in Medical History: 2   Needed: 0  Support: 0  Transportation: 0  Psychological Factors (+1 each): QOL issues  Navigation Acuity: 9     Follow Up  No follow-ups on file.

## 2020-08-17 DIAGNOSIS — C61 PROSTATE CANCER METASTATIC TO MULTIPLE SITES: ICD-10-CM

## 2020-08-17 RX ORDER — BICALUTAMIDE 50 MG/1
50 TABLET, FILM COATED ORAL DAILY
Qty: 30 TABLET | Refills: 0 | OUTPATIENT
Start: 2020-08-17

## 2020-08-18 ENCOUNTER — TELEPHONE (OUTPATIENT)
Dept: PHARMACY | Facility: CLINIC | Age: 75
End: 2020-08-18

## 2020-08-19 ENCOUNTER — TELEPHONE (OUTPATIENT)
Dept: HEMATOLOGY/ONCOLOGY | Facility: CLINIC | Age: 75
End: 2020-08-19

## 2020-08-19 ENCOUNTER — PATIENT OUTREACH (OUTPATIENT)
Dept: OTHER | Facility: OTHER | Age: 75
End: 2020-08-19

## 2020-08-19 DIAGNOSIS — C61 PROSTATE CANCER METASTATIC TO MULTIPLE SITES: ICD-10-CM

## 2020-08-19 RX ORDER — BICALUTAMIDE 50 MG/1
50 TABLET, FILM COATED ORAL DAILY
Qty: 30 TABLET | Refills: 0 | OUTPATIENT
Start: 2020-08-19

## 2020-08-19 NOTE — TELEPHONE ENCOUNTER
Attempted to reach the patient in regards to Dr. Irvin denying his refill request for Casodex per Dr. Irvin patient no longer needs to be on this medication. I left a voicemail asking that the patient return my call.

## 2020-08-19 NOTE — TELEPHONE ENCOUNTER
Patient returned my call confirmed that he will no longer need the medication. Patient verbalized his understanding an MA did aswell.

## 2020-09-02 ENCOUNTER — LAB VISIT (OUTPATIENT)
Dept: LAB | Facility: HOSPITAL | Age: 75
End: 2020-09-02
Attending: INTERNAL MEDICINE
Payer: MEDICARE

## 2020-09-02 DIAGNOSIS — C41.9 MALIGNANT NEOPLASM OF BONE AND ARTICULAR CARTILAGE, UNSPECIFIED: ICD-10-CM

## 2020-09-02 DIAGNOSIS — C79.51 SECONDARY CANCER OF BONE: ICD-10-CM

## 2020-09-02 DIAGNOSIS — E66.01 MORBID OBESITY WITH BMI OF 40.0-44.9, ADULT: ICD-10-CM

## 2020-09-02 DIAGNOSIS — C61 PROSTATE CANCER METASTATIC TO MULTIPLE SITES: ICD-10-CM

## 2020-09-02 DIAGNOSIS — Z71.89 ACP (ADVANCE CARE PLANNING): ICD-10-CM

## 2020-09-02 LAB
ALBUMIN SERPL BCP-MCNC: 3.3 G/DL (ref 3.5–5.2)
ALP SERPL-CCNC: 129 U/L (ref 55–135)
ALT SERPL W/O P-5'-P-CCNC: 14 U/L (ref 10–44)
ANION GAP SERPL CALC-SCNC: 13 MMOL/L (ref 8–16)
AST SERPL-CCNC: 11 U/L (ref 10–40)
BASOPHILS # BLD AUTO: 0.05 K/UL (ref 0–0.2)
BASOPHILS NFR BLD: 0.5 % (ref 0–1.9)
BILIRUB SERPL-MCNC: 0.6 MG/DL (ref 0.1–1)
BUN SERPL-MCNC: 32 MG/DL (ref 8–23)
CALCIUM SERPL-MCNC: 9.2 MG/DL (ref 8.7–10.5)
CHLORIDE SERPL-SCNC: 97 MMOL/L (ref 95–110)
CO2 SERPL-SCNC: 31 MMOL/L (ref 23–29)
COMPLEXED PSA SERPL-MCNC: 0.48 NG/ML (ref 0–4)
CREAT SERPL-MCNC: 2.1 MG/DL (ref 0.5–1.4)
DIFFERENTIAL METHOD: ABNORMAL
EOSINOPHIL # BLD AUTO: 0.3 K/UL (ref 0–0.5)
EOSINOPHIL NFR BLD: 2.6 % (ref 0–8)
ERYTHROCYTE [DISTWIDTH] IN BLOOD BY AUTOMATED COUNT: 12.6 % (ref 11.5–14.5)
EST. GFR  (AFRICAN AMERICAN): 35 ML/MIN/1.73 M^2
EST. GFR  (NON AFRICAN AMERICAN): 30 ML/MIN/1.73 M^2
GLUCOSE SERPL-MCNC: 192 MG/DL (ref 70–110)
HCT VFR BLD AUTO: 37.4 % (ref 40–54)
HGB BLD-MCNC: 12.6 G/DL (ref 14–18)
IMM GRANULOCYTES # BLD AUTO: 0.07 K/UL (ref 0–0.04)
IMM GRANULOCYTES NFR BLD AUTO: 0.7 % (ref 0–0.5)
LYMPHOCYTES # BLD AUTO: 1.7 K/UL (ref 1–4.8)
LYMPHOCYTES NFR BLD: 17.2 % (ref 18–48)
MCH RBC QN AUTO: 29.6 PG (ref 27–31)
MCHC RBC AUTO-ENTMCNC: 33.7 G/DL (ref 32–36)
MCV RBC AUTO: 88 FL (ref 82–98)
MONOCYTES # BLD AUTO: 1.2 K/UL (ref 0.3–1)
MONOCYTES NFR BLD: 12.4 % (ref 4–15)
NEUTROPHILS # BLD AUTO: 6.5 K/UL (ref 1.8–7.7)
NEUTROPHILS NFR BLD: 67.3 % (ref 38–73)
NRBC BLD-RTO: 0 /100 WBC
PLATELET # BLD AUTO: 307 K/UL (ref 150–350)
PMV BLD AUTO: 8.6 FL (ref 9.2–12.9)
POTASSIUM SERPL-SCNC: 3.1 MMOL/L (ref 3.5–5.1)
PROT SERPL-MCNC: 6.9 G/DL (ref 6–8.4)
RBC # BLD AUTO: 4.25 M/UL (ref 4.6–6.2)
SODIUM SERPL-SCNC: 141 MMOL/L (ref 136–145)
TESTOST SERPL-MCNC: <4 NG/DL (ref 304–1227)
WBC # BLD AUTO: 9.62 K/UL (ref 3.9–12.7)

## 2020-09-02 PROCEDURE — 36415 COLL VENOUS BLD VENIPUNCTURE: CPT

## 2020-09-02 PROCEDURE — 84153 ASSAY OF PSA TOTAL: CPT

## 2020-09-02 PROCEDURE — 85025 COMPLETE CBC W/AUTO DIFF WBC: CPT

## 2020-09-02 PROCEDURE — 84403 ASSAY OF TOTAL TESTOSTERONE: CPT

## 2020-09-02 PROCEDURE — 80053 COMPREHEN METABOLIC PANEL: CPT

## 2020-09-02 NOTE — PROGRESS NOTES
Digital Medicine: Clinician Follow-Up    Called patient to discuss his lack of readings and hypokalemia.     The history is provided by the patient.      Review of patient's allergies indicates:  No Known Allergies  Follow-up reason(s): lab follow up.     Hypertension  Patient needs assistance troubleshooting: Patient is focusing in on his cancer treatment and forgetting to take his blood pressure..    Patient is not experiencing signs/symptoms of hypotension.  Patient is not experiencing signs/symptoms of hypertension.      Additional Follow-up details: His labs show hypokalemia due to stopping his potassium supplement. Per the patient he stated his potassium needs to be low for his cancer treatment.        Last 5 Patient Entered Readings                                      Current 30 Day Average:      Recent Readings 6/2/2020 6/1/2020 6/1/2020 6/1/2020 4/15/2020    SBP (mmHg) 178 192 175 183 166    DBP (mmHg) 83 85 77 85 87    Pulse 71 66 67 66 65                           Medication Adherence-Medication adherence was assessed.          ASSESSMENT(S)  No readings to assess his HTN state.    Hypertension Plan  Continue current therapy.  Await MD intervention. I am messaging Dr. Fox and his oncologist to assess if his hypokalemia needs to be corrected.  Patient plans to go to the [facility] and an MD visit for a cuff comparison before resuming readings.     Addressed any questions or concerns and patient has my contact information if needed prior to next outreach. Patient verbalizes understanding.                Topic    Eye Exam     Lipid (Cholesterol) Test          Hypertension Medications             amLODIPine (NORVASC) 5 MG tablet TAKE 2 TABLETS BY MOUTH ONCE DAILY    furosemide (LASIX) 40 MG tablet Take 1 tablet (40 mg total) by mouth once daily.    metoprolol succinate (TOPROL-XL) 100 MG 24 hr tablet Take 1 tablet by mouth twice daily    valsartan-hydrochlorothiazide (DIOVAN-HCT) 320-12.5 mg per tablet Take 1  tablet by mouth once daily.

## 2020-09-03 ENCOUNTER — PATIENT OUTREACH (OUTPATIENT)
Dept: OTHER | Facility: OTHER | Age: 75
End: 2020-09-03

## 2020-09-03 DIAGNOSIS — R60.0 PERIPHERAL EDEMA: ICD-10-CM

## 2020-09-03 DIAGNOSIS — R60.9 FLUID RETENTION: ICD-10-CM

## 2020-09-03 RX ORDER — POTASSIUM CHLORIDE 20 MEQ/1
20 TABLET, EXTENDED RELEASE ORAL 2 TIMES DAILY
Qty: 180 TABLET | Refills: 1 | Status: SHIPPED | OUTPATIENT
Start: 2020-09-03 | End: 2021-01-01 | Stop reason: SDUPTHER

## 2020-09-03 NOTE — PROGRESS NOTES
Digital Medicine: Clinician Follow-Up    Called patient to discuss his hypokalemia and recommend resuming his potassium supplement.    The history is provided by the patient.      Review of patient's allergies indicates:  No Known Allergies  Follow-up reason(s): routine follow up.     Additional Follow-up details: I spoke with Dr. Fox and Dr. Irvin to confirm resuming her potassium supplement is appropriate.       Last 5 Patient Entered Readings                                      Current 30 Day Average:      Recent Readings 6/2/2020 6/1/2020 6/1/2020 6/1/2020 4/15/2020    SBP (mmHg) 178 192 175 183 166    DBP (mmHg) 83 85 77 85 87    Pulse 71 66 67 66 65             Screenings    ASSESSMENT(S)  Missed readings and cannot assess HTN status.    Hypertension Plan  Additional monitoring needed.  Continue current therapy.  Provided patient education. Instructed patient to resume KCl 20mEq twice daily.   He has follow-up labs as scheduled with Oncology.      Addressed any questions or concerns and patient has my contact information if needed prior to next outreach. Patient verbalizes understanding.                Topic    Eye Exam     Lipid (Cholesterol) Test          Hypertension Medications             amLODIPine (NORVASC) 5 MG tablet TAKE 2 TABLETS BY MOUTH ONCE DAILY    furosemide (LASIX) 40 MG tablet Take 1 tablet (40 mg total) by mouth once daily.    metoprolol succinate (TOPROL-XL) 100 MG 24 hr tablet Take 1 tablet by mouth twice daily    valsartan-hydrochlorothiazide (DIOVAN-HCT) 320-12.5 mg per tablet Take 1 tablet by mouth once daily.

## 2020-09-03 NOTE — PROGRESS NOTES
JANES with both patient and wife informing him to resume his potassium supplement. Dr. Irvin and Dr. Fox agreed this is needed and has nothing to do with his cancer treatment.

## 2020-09-08 ENCOUNTER — TELEPHONE (OUTPATIENT)
Dept: INTERNAL MEDICINE | Facility: CLINIC | Age: 75
End: 2020-09-08

## 2020-09-08 NOTE — TELEPHONE ENCOUNTER
----- Message from Yahir Pedraza sent at 9/8/2020 10:01 AM CDT -----  Contact: [facility]  Type:  Pharmacy Calling to Clarify an RX    Name of Caller:[facility]  [facility]  Prescription Name: levemir flex touch  What do they need to clarify?:getting a prescription change  Best Call Back Number:[phone]  Additional Information: box cant be broken so they will like to break the box down

## 2020-09-09 ENCOUNTER — INFUSION (OUTPATIENT)
Dept: INFUSION THERAPY | Facility: HOSPITAL | Age: 75
End: 2020-09-09
Attending: INTERNAL MEDICINE
Payer: MEDICARE

## 2020-09-09 ENCOUNTER — OFFICE VISIT (OUTPATIENT)
Dept: HEMATOLOGY/ONCOLOGY | Facility: CLINIC | Age: 75
End: 2020-09-09
Payer: MEDICARE

## 2020-09-09 VITALS
OXYGEN SATURATION: 94 % | SYSTOLIC BLOOD PRESSURE: 159 MMHG | DIASTOLIC BLOOD PRESSURE: 80 MMHG | BODY MASS INDEX: 38.82 KG/M2 | TEMPERATURE: 98 F | HEIGHT: 72 IN | WEIGHT: 286.63 LBS | HEART RATE: 72 BPM

## 2020-09-09 VITALS
HEART RATE: 68 BPM | HEIGHT: 72 IN | RESPIRATION RATE: 18 BRPM | BODY MASS INDEX: 38.82 KG/M2 | DIASTOLIC BLOOD PRESSURE: 83 MMHG | WEIGHT: 286.63 LBS | TEMPERATURE: 98 F | OXYGEN SATURATION: 97 % | SYSTOLIC BLOOD PRESSURE: 176 MMHG

## 2020-09-09 DIAGNOSIS — C41.9 MALIGNANT NEOPLASM OF BONE AND ARTICULAR CARTILAGE, UNSPECIFIED: Primary | ICD-10-CM

## 2020-09-09 DIAGNOSIS — R79.89 ELEVATED SERUM CREATININE: ICD-10-CM

## 2020-09-09 DIAGNOSIS — C79.51 SECONDARY CANCER OF BONE: ICD-10-CM

## 2020-09-09 DIAGNOSIS — R97.20 ELEVATED PROSTATE SPECIFIC ANTIGEN (PSA): ICD-10-CM

## 2020-09-09 DIAGNOSIS — C61 PROSTATE CANCER METASTATIC TO MULTIPLE SITES: Primary | ICD-10-CM

## 2020-09-09 PROCEDURE — 3077F PR MOST RECENT SYSTOLIC BLOOD PRESSURE >= 140 MM HG: ICD-10-PCS | Mod: CPTII,S$GLB,, | Performed by: INTERNAL MEDICINE

## 2020-09-09 PROCEDURE — 99215 OFFICE O/P EST HI 40 MIN: CPT | Mod: 25,S$GLB,, | Performed by: INTERNAL MEDICINE

## 2020-09-09 PROCEDURE — 3008F PR BODY MASS INDEX (BMI) DOCUMENTED: ICD-10-PCS | Mod: CPTII,S$GLB,, | Performed by: INTERNAL MEDICINE

## 2020-09-09 PROCEDURE — 3008F BODY MASS INDEX DOCD: CPT | Mod: CPTII,S$GLB,, | Performed by: INTERNAL MEDICINE

## 2020-09-09 PROCEDURE — 99215 PR OFFICE/OUTPT VISIT, EST, LEVL V, 40-54 MIN: ICD-10-PCS | Mod: 25,S$GLB,, | Performed by: INTERNAL MEDICINE

## 2020-09-09 PROCEDURE — 1126F PR PAIN SEVERITY QUANTIFIED, NO PAIN PRESENT: ICD-10-PCS | Mod: S$GLB,,, | Performed by: INTERNAL MEDICINE

## 2020-09-09 PROCEDURE — 1159F PR MEDICATION LIST DOCUMENTED IN MEDICAL RECORD: ICD-10-PCS | Mod: S$GLB,,, | Performed by: INTERNAL MEDICINE

## 2020-09-09 PROCEDURE — 3077F SYST BP >= 140 MM HG: CPT | Mod: CPTII,S$GLB,, | Performed by: INTERNAL MEDICINE

## 2020-09-09 PROCEDURE — 3288F PR FALLS RISK ASSESSMENT DOCUMENTED: ICD-10-PCS | Mod: CPTII,S$GLB,, | Performed by: INTERNAL MEDICINE

## 2020-09-09 PROCEDURE — 99999 PR PBB SHADOW E&M-EST. PATIENT-LVL IV: CPT | Mod: PBBFAC,,, | Performed by: INTERNAL MEDICINE

## 2020-09-09 PROCEDURE — 1100F PTFALLS ASSESS-DOCD GE2>/YR: CPT | Mod: CPTII,S$GLB,, | Performed by: INTERNAL MEDICINE

## 2020-09-09 PROCEDURE — 1100F PR PT FALLS ASSESS DOC 2+ FALLS/FALL W/INJURY/YR: ICD-10-PCS | Mod: CPTII,S$GLB,, | Performed by: INTERNAL MEDICINE

## 2020-09-09 PROCEDURE — 99499 UNLISTED E&M SERVICE: CPT | Mod: S$GLB,,, | Performed by: INTERNAL MEDICINE

## 2020-09-09 PROCEDURE — 3288F FALL RISK ASSESSMENT DOCD: CPT | Mod: CPTII,S$GLB,, | Performed by: INTERNAL MEDICINE

## 2020-09-09 PROCEDURE — 1126F AMNT PAIN NOTED NONE PRSNT: CPT | Mod: S$GLB,,, | Performed by: INTERNAL MEDICINE

## 2020-09-09 PROCEDURE — 96402 CHEMO HORMON ANTINEOPL SQ/IM: CPT

## 2020-09-09 PROCEDURE — 99499 RISK ADDL DX/OHS AUDIT: ICD-10-PCS | Mod: S$GLB,,, | Performed by: INTERNAL MEDICINE

## 2020-09-09 PROCEDURE — 99999 PR PBB SHADOW E&M-EST. PATIENT-LVL IV: ICD-10-PCS | Mod: PBBFAC,,, | Performed by: INTERNAL MEDICINE

## 2020-09-09 PROCEDURE — 3079F PR MOST RECENT DIASTOLIC BLOOD PRESSURE 80-89 MM HG: ICD-10-PCS | Mod: CPTII,S$GLB,, | Performed by: INTERNAL MEDICINE

## 2020-09-09 PROCEDURE — 63600175 PHARM REV CODE 636 W HCPCS: Mod: JG | Performed by: INTERNAL MEDICINE

## 2020-09-09 PROCEDURE — 3079F DIAST BP 80-89 MM HG: CPT | Mod: CPTII,S$GLB,, | Performed by: INTERNAL MEDICINE

## 2020-09-09 PROCEDURE — 1159F MED LIST DOCD IN RCRD: CPT | Mod: S$GLB,,, | Performed by: INTERNAL MEDICINE

## 2020-09-09 RX ORDER — TERAZOSIN 5 MG/1
1 CAPSULE ORAL
COMMUNITY

## 2020-09-09 RX ORDER — INSULIN GLARGINE 100 [IU]/ML
INJECTION, SOLUTION SUBCUTANEOUS
COMMUNITY
End: 2020-10-12 | Stop reason: SDUPTHER

## 2020-09-09 RX ORDER — CLONIDINE HYDROCHLORIDE 0.2 MG/1
TABLET ORAL
COMMUNITY
End: 2020-12-03

## 2020-09-09 RX ORDER — VALSARTAN AND HYDROCHLOROTHIAZIDE 320; 25 MG/1; MG/1
TABLET, FILM COATED ORAL
COMMUNITY
End: 2022-01-01

## 2020-09-09 RX ADMIN — LEUPROLIDE ACETATE 22.5 MG: KIT SUBCUTANEOUS at 11:09

## 2020-09-09 NOTE — DISCHARGE INSTRUCTIONS
[facility]  [address] [address]  [address] [address]  [phone] phone     [phone] fax  Hours of Operation: Monday- Friday 8:00am- 5:00pm  After hours phone  [phone]  Hematology / Oncology Physicians on call      STEPHANIE Olivo Dr., Dr., Dr., Dr., NP Sydney Prescott, NP Tyesha Taylor, NP    Please call with any concerns regarding your appointment today.HOME CARE AFTER CHEMOTHERAPY   Meals   Many patients feel sick and lose their appetites during treatment. Eat small meals several times a day. Choose bland foods with little taste or smell if you have problems with nausea. Be sure to cook all food thoroughly. This kills bacteria and helps you avoid intestinal infection. Soft foods are easier to swallow and digest.   Activity   Exercise keeps you strong and keeps your heart and lungs active. Talk to your doctor about an appropriate exercise program for you.   Skin Care   To prevent a skin infection, bathe or shower once a day. Use a moisturizing soap and wash with warm water. Avoid very hot or cold water. Chemotherapy can make your skin dry . Apply moisturizing lotion to help relieve dry skin. Some drugs used in high doses can cause slight burns to appear (like sunburn). Ask for a special cream to help relieve the burn and protect your skin.   Prevent Mouth Sores   During chemotherapy, many people get mouth sores. Do the following to help prevent mouth sores or to ease discomfort.   Brush your teeth with a soft-bristle toothbrush after every meal.  Don't use dental floss if your platelet count is below 50,000. Your doctor or nurse will tell you if this is the case.  Use an oral swab or special soft toothbrush if your gums bleed during regular brushing.  Use mouthwash as directed. If you can't tolerate commercial mouthwash, use salt and baking soda to clean your mouth. Mix 1 teaspoon of salt and 1  teaspoon of baking soda into a glass of water. Swish and spit.  Call your doctor or return to this facility if you develop any of the following:   Sore throat   White patches in the mouth or throat   Fever of 100.4ºF (38ºC) or higher, or as directed by your healthcare provider  © 2000-2011 [facility], [address] [place] [address]. All rights reserved. This information is not intended as a substitute for professional medical care. Always follow your healthcare professional's   FALL PREVENTION   Falls often occur due to slipping, tripping or losing your balance. Here are ways to reduce your risk of falling again.   Was there anything that caused your fall that can be fixed, removed or replaced?   Make your home safe by keeping walkways clear of objects you may trip over.   Use non-slip pads under rugs.   Do not walk in poorly lit areas.   Do not stand on chairs or wobbly ladders.   Use caution when reaching overhead or looking upward. This position can cause a loss of balance.   Be sure your shoes fit properly, have non-slip bottoms and are in good condition.   Be cautious when going up and down stairs, curbs, and when walking on uneven sidewalks.   If your balance is poor, consider using a cane or walker.   If your fall was related to alcohol use, stop or limit alcohol intake.   If your fall was related to use of sleeping medicines, talk to your doctor about this. You may need to reduce your dosage at bedtime if you awaken during the night to go to the bathroom.   To reduce the need for nighttime bathroom trips:   Avoid drinking fluids for several hours before going to bed   Empty your bladder before going to bed   Men can keep a urinal at the bedside   © 2000-2011 [facility], [address] [place] [address]. All rights reserved. This information is not intended as a substitute for professional medical care. Always follow your healthcare professional's instructions.  Support  "Groups/Classes    Support groups and classes are being offered at the   [facility] and [facility]!!    "Cooking with Cancer" (Nutrition Class):  Second Wednesday of each month   at noon at the Cancer Center.  Metastatic Support Group:  Third Tuesday of each month   at noon at the Cancer Center.  Next Steps Class/Group: Second and fourth Thursday of each month at noon at the Cancer Center.  [facility] (Breast Cancer Support Group): First Tuesday of each month   at 5:30pm at the [facility] location.  [employer]: [facility]: Second and third Tuesday of each month from 7:30am - 2pm.  [facility]: First and fourth Tuesday of each month from 7:30am - 2pm    If you are interested in attending or would like more information please ask our social workers or your nurse!  "

## 2020-09-09 NOTE — NURSING
1158am: Injection given without difficulties.Bandaid applied. Patient instructed to stay in the clinic for 15 minutes. Patient verbalized understanding and will notify nurse with any complaints.

## 2020-09-09 NOTE — PROGRESS NOTES
Subjective:       Patient ID: Sherif Ragland is a 74 y.o. male.    Chief Complaint: No chief complaint on file.    HPI 74-year-old  gentleman who comes for follow up of his metastatic prostate cancer. He is known to have   metastatic prostate carcinoma currently on Lupron, Zytiga and Zometa  He feels fatigued but otherwise doing OK  He is due for Lupron today and Zometa       Past Medical History:   Diagnosis Date    Diabetes mellitus, type 2 1997    Hyperlipidemia      Hypertension      Hypogonadism in male      Renal insufficiency      Tubular adenoma 10/2017            Family History   Problem Relation Age of Onset    Stroke Mother      Heart disease Father      Stroke Brother        Social History               Socioeconomic History    Marital status:        Spouse name: Not on file    Number of children: 4    Years of education: Not on file    Highest education level: Not on file   Occupational History    Occupation: [employer]   Social Needs    Financial resource strain: Not on file    Food insecurity       Worry: Not on file       Inability: Not on file    Transportation needs       Medical: Not on file       Non-medical: Not on file   Tobacco Use    Smoking status: Never Smoker    Smokeless tobacco: Never Used   Substance and Sexual Activity    Alcohol use: No    Drug use: No    Sexual activity: Yes       Partners: Female   Lifestyle    Physical activity       Days per week: Not on file       Minutes per session: Not on file    Stress: Not on file   Relationships    Social connections       Talks on phone: Not on file       Gets together: Not on file       Attends Restoration service: Not on file       Active member of club or organization: Not on file       Attends meetings of clubs or organizations: Not on file       Relationship status: Not on file   Other Topics Concern    Not on file   Social History Narrative     Surrogate decision maker, Wife, Mita Ragland  [phone]              Past Surgical History:   Procedure Laterality Date    CATARACT EXTRACTION Right 01/31/2018    CATARACT EXTRACTION W/  INTRAOCULAR LENS IMPLANT Left 03/13/2019    COLONOSCOPY N/A 11/2/2017     Procedure: COLONOSCOPY;  Surgeon: Alek Francois MD;  [facility];  Service: Endoscopy;  Laterality: N/A;    TIBIA FRACTURE SURGERY         right leg x2     TONSILLECTOMY        TRANSURETHRAL RESECTION OF PROSTATE N/A 10/17/2019     Procedure: TURP (TRANSURETHRAL RESECTION OF PROSTATE);  Surgeon: Mir Hale IV, MD;  Location: [facility];  Service: Urology;  Laterality: N/A;        Review of Systems   Constitutional: Negative.  Negative for fatigue.   Eyes: Negative.    Cardiovascular: Negative.  Negative for chest pain.   Gastrointestinal: Negative for abdominal pain and nausea.   Genitourinary: Negative.  Negative for hematuria.   Musculoskeletal: Negative.    Integumentary:  Negative.   Neurological: Negative.    Psychiatric/Behavioral: Negative.          Objective:      Physical Exam  Constitutional:       Appearance: He is well-developed.   HENT:      Head: Normocephalic.      Mouth/Throat:      Pharynx: No oropharyngeal exudate.   Eyes:      Pupils: Pupils are equal, round, and reactive to light.   Neck:      Thyroid: No thyromegaly.   Cardiovascular:      Rate and Rhythm: Normal rate and regular rhythm.      Heart sounds: Normal heart sounds. No murmur. No gallop.    Pulmonary:      Effort: No respiratory distress.      Breath sounds: No wheezing or rales.   Abdominal:      General: Bowel sounds are normal. There is no distension.      Palpations: Abdomen is soft. There is no mass.      Tenderness: There is no guarding or rebound.   Musculoskeletal: Normal range of motion.   Lymphadenopathy:      Cervical: No cervical adenopathy.   Skin:     General: Skin is warm and dry.   Neurological:      Mental Status: He is alert and oriented to person, place, and time.    Psychiatric:         Behavior: Behavior normal.         Wt Readings from Last 3 Encounters:   09/09/20 130 kg (286 lb 9.6 oz)   08/12/20 128.7 kg (283 lb 11.7 oz)   08/12/20 128.3 kg (282 lb 13.6 oz)     Temp Readings from Last 3 Encounters:   09/09/20 98.4 °F (36.9 °C)   08/12/20 99 °F (37.2 °C) (Temporal)   08/12/20 97.5 °F (36.4 °C) (Oral)     BP Readings from Last 3 Encounters:   09/09/20 (!) 159/80   08/12/20 130/65   08/12/20 (!) 149/75     Pulse Readings from Last 3 Encounters:   09/09/20 72   08/12/20 68   08/12/20 67       Assessment:       1. 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 Elevated serum creatinine        Plan:       Lab Results   Component Value Date    WBC 9.62 09/02/2020    HGB 12.6 (L) 09/02/2020    HCT 37.4 (L) 09/02/2020    MCV 88 09/02/2020     09/02/2020       Will receive Lupron today, good response so far.  Will hold Zometa due to elevated creatinine  See dr arvizu in 4 weeks with a cbc/cmp/dx PSA    Lab Results   Component Value Date    CREATININE 2.1 (H) 09/02/2020         Lab Results   Component Value Date    ALT 14 09/02/2020    AST 11 09/02/2020    GGT 38 02/17/2020    ALKPHOS 129 09/02/2020    BILITOT 0.6 09/02/2020       PSA dx: 0.48

## 2020-09-10 NOTE — PROGRESS NOTES
Digital Medicine: Health  Follow-Up    The history is provided by the patient.                     Topics Covered on Call: monitoring frequency    Additional Follow-up details: Patient is currently undergoing cancer treatment and has BP checked often at clinic visits. He was under the impression that these readings could be seen by his digital medicine care team and does not wish to check his BP more often than he needs to and would like to use his clinic readings for review. Patient was not willing to discuss lifestyle at this encounter.        Diet-Not assessed          Physical Activity-Not assessed    Medication Adherence-Medication Adherence not addressed.      Substance, Sleep, Stress-Not assessed      Additional monitoring needed. Instructed patient on how to manually input clinic readings into [employer] jenn       Patient verbalizes understanding.              Topic    Eye Exam     Lipid (Cholesterol) Test        Last 5 Patient Entered Readings                                      Current 30 Day Average:      Recent Readings 6/2/2020 6/1/2020 6/1/2020 6/1/2020 4/15/2020    SBP (mmHg) 178 192 175 183 166    DBP (mmHg) 83 85 77 85 87    Pulse 71 66 67 66 65

## 2020-09-15 DIAGNOSIS — F51.01 PRIMARY INSOMNIA: ICD-10-CM

## 2020-09-15 RX ORDER — ZOLPIDEM TARTRATE 5 MG/1
5 TABLET ORAL NIGHTLY PRN
Qty: 30 TABLET | Refills: 3 | Status: SHIPPED | OUTPATIENT
Start: 2020-09-15 | End: 2021-01-07 | Stop reason: SDUPTHER

## 2020-09-16 ENCOUNTER — TELEPHONE (OUTPATIENT)
Dept: PHARMACY | Facility: CLINIC | Age: 75
End: 2020-09-16

## 2020-09-21 ENCOUNTER — TELEPHONE (OUTPATIENT)
Dept: PHARMACY | Facility: CLINIC | Age: 75
End: 2020-09-21

## 2020-09-21 NOTE — TELEPHONE ENCOUNTER
Patient returned call for Zytiga clinical follow up.     Patient takes Zytiga 250 mg - 4 tablets by mouth once daily around 6-7 AM on an empty stomach. He also takes prednisone 5 mg - 1 tablet daily by mouth once daily with food about 1 hr after she takes Zytiga. Denies missed doses. Patient stores medication in desk drawer at room temperature. He does not touch the medication directly - pours into the cap of the bottle.     Reviewed common SEs: bruising; cough (drink water and OTC lozenges); diarrhea (Stay well hydrated and OTC Imodium - use as directed; if still >4 loose stools, notify [facility] due to risk of dehydration. Patient is currently having issues with constipation); constipation (currently uses OTC Colace and Dulcolax PRN; also takes lactulose daily); SOB; fatigue (monitor for changes in energy levels); HTN (underlying HTN; patient checks BP daily and states it usually runs ~170s/60-70s which is well controlled); headaches and joint/muscle pain (OTC Tylenol); N/V (Rx Zofran); peripheral edema (Rx Lasix); increased risk for infection (monitor for fever, achy chills, wet persistent cough); lab abnormalities (gets labs every 4 weeks).     Side effects experienced: Fatigue - patient states this occurred after beginning Zytiga and that it has made doing just about anything exhausting. He used to work 5 days/week and cannot work at all anymore. Constipation - patient states this was present prior to Zytiga, but worsened after taking it. OTC Dulcolax usually will help him go within 24 hours after taking it. He also takes Lactulose daily. Recommended trying out OTC Miralax as an alternative to lactulose to see if it provides better relief. Patient also noted a decrease in appetite related to Zytiga therapy. He reports eating 3 meals/day, but much smaller portions than previously. Informed him that I could discuss his side effect concerns with his provider to see if he would like to make adjustments to his dose.  "    Patient denies any signs of infection. He reports 6/10 leg muscle soreness due to him being less active. She doesn't take anything as he could currently handle it. He believes that if his energy improved that he will be able to become more physically active again. He reports having anxiety and depression, which he states is well controlled on Xanax. He reports having good support from his wife and two kids - "absolutely".     Medication list reviewed in Epic. Patient is not taking Casodex and Levemir.  Patient confirmed having no drug allergies or changes to health conditions.     Labs reviewed from 9/21/2020. CBC - stable. CMP - stable except potassium decreased (will monitor). Hepatic function is stable. Renal function is stable - patient has CKD Stage 3. 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 Will message MD to determine if a dose reduction is warranted at this time would be warranted based on patient's severe fatigue, worsening constipation, and decreased appetite.     Patient has no further questions or concerns at this time. He is aware to contact [facility] if he needs anything.           "

## 2020-09-24 ENCOUNTER — TELEPHONE (OUTPATIENT)
Dept: HEMATOLOGY/ONCOLOGY | Facility: CLINIC | Age: 75
End: 2020-09-24

## 2020-09-24 NOTE — TELEPHONE ENCOUNTER
Dr. Irvin requested to see patient on tomorrow in regards to medication discussion patient has been booked and confirmed for tomorrow.

## 2020-09-25 ENCOUNTER — OFFICE VISIT (OUTPATIENT)
Dept: HEMATOLOGY/ONCOLOGY | Facility: CLINIC | Age: 75
End: 2020-09-25
Payer: MEDICARE

## 2020-09-25 ENCOUNTER — HOSPITAL ENCOUNTER (OUTPATIENT)
Dept: RADIOLOGY | Facility: HOSPITAL | Age: 75
Discharge: HOME | End: 2020-09-25
Attending: INTERNAL MEDICINE
Payer: MEDICARE

## 2020-09-25 ENCOUNTER — PATIENT MESSAGE (OUTPATIENT)
Dept: HEMATOLOGY/ONCOLOGY | Facility: CLINIC | Age: 75
End: 2020-09-25

## 2020-09-25 VITALS
OXYGEN SATURATION: 95 % | WEIGHT: 286.19 LBS | SYSTOLIC BLOOD PRESSURE: 154 MMHG | HEART RATE: 74 BPM | HEIGHT: 73 IN | TEMPERATURE: 98 F | BODY MASS INDEX: 37.93 KG/M2 | DIASTOLIC BLOOD PRESSURE: 78 MMHG

## 2020-09-25 DIAGNOSIS — C41.9 MALIGNANT NEOPLASM OF BONE AND ARTICULAR CARTILAGE, UNSPECIFIED: ICD-10-CM

## 2020-09-25 DIAGNOSIS — R97.20 ELEVATED PROSTATE SPECIFIC ANTIGEN (PSA): ICD-10-CM

## 2020-09-25 DIAGNOSIS — C41.9 MALIGNANT NEOPLASM OF BONE AND ARTICULAR CARTILAGE, UNSPECIFIED: Primary | ICD-10-CM

## 2020-09-25 PROCEDURE — 1125F PR PAIN SEVERITY QUANTIFIED, PAIN PRESENT: ICD-10-PCS | Mod: S$GLB,,, | Performed by: INTERNAL MEDICINE

## 2020-09-25 PROCEDURE — 3078F PR MOST RECENT DIASTOLIC BLOOD PRESSURE < 80 MM HG: ICD-10-PCS | Mod: CPTII,S$GLB,, | Performed by: INTERNAL MEDICINE

## 2020-09-25 PROCEDURE — 3008F BODY MASS INDEX DOCD: CPT | Mod: CPTII,S$GLB,, | Performed by: INTERNAL MEDICINE

## 2020-09-25 PROCEDURE — 1159F PR MEDICATION LIST DOCUMENTED IN MEDICAL RECORD: ICD-10-PCS | Mod: S$GLB,,, | Performed by: INTERNAL MEDICINE

## 2020-09-25 PROCEDURE — 1101F PT FALLS ASSESS-DOCD LE1/YR: CPT | Mod: CPTII,S$GLB,, | Performed by: INTERNAL MEDICINE

## 2020-09-25 PROCEDURE — 1101F PR PT FALLS ASSESS DOC 0-1 FALLS W/OUT INJ PAST YR: ICD-10-PCS | Mod: CPTII,S$GLB,, | Performed by: INTERNAL MEDICINE

## 2020-09-25 PROCEDURE — 1159F MED LIST DOCD IN RCRD: CPT | Mod: S$GLB,,, | Performed by: INTERNAL MEDICINE

## 2020-09-25 PROCEDURE — 99999 PR PBB SHADOW E&M-EST. PATIENT-LVL V: CPT | Mod: PBBFAC,,, | Performed by: INTERNAL MEDICINE

## 2020-09-25 PROCEDURE — 99499 RISK ADDL DX/OHS AUDIT: ICD-10-PCS | Mod: S$GLB,,, | Performed by: INTERNAL MEDICINE

## 2020-09-25 PROCEDURE — 99214 PR OFFICE/OUTPT VISIT, EST, LEVL IV, 30-39 MIN: ICD-10-PCS | Mod: 25,S$GLB,, | Performed by: INTERNAL MEDICINE

## 2020-09-25 PROCEDURE — 3077F SYST BP >= 140 MM HG: CPT | Mod: CPTII,S$GLB,, | Performed by: INTERNAL MEDICINE

## 2020-09-25 PROCEDURE — 1125F AMNT PAIN NOTED PAIN PRSNT: CPT | Mod: S$GLB,,, | Performed by: INTERNAL MEDICINE

## 2020-09-25 PROCEDURE — 3008F PR BODY MASS INDEX (BMI) DOCUMENTED: ICD-10-PCS | Mod: CPTII,S$GLB,, | Performed by: INTERNAL MEDICINE

## 2020-09-25 PROCEDURE — 99214 OFFICE O/P EST MOD 30 MIN: CPT | Mod: 25,S$GLB,, | Performed by: INTERNAL MEDICINE

## 2020-09-25 PROCEDURE — 3077F PR MOST RECENT SYSTOLIC BLOOD PRESSURE >= 140 MM HG: ICD-10-PCS | Mod: CPTII,S$GLB,, | Performed by: INTERNAL MEDICINE

## 2020-09-25 PROCEDURE — 99499 UNLISTED E&M SERVICE: CPT | Mod: S$GLB,,, | Performed by: INTERNAL MEDICINE

## 2020-09-25 PROCEDURE — 99999 PR PBB SHADOW E&M-EST. PATIENT-LVL V: ICD-10-PCS | Mod: PBBFAC,,, | Performed by: INTERNAL MEDICINE

## 2020-09-25 PROCEDURE — 3078F DIAST BP <80 MM HG: CPT | Mod: CPTII,S$GLB,, | Performed by: INTERNAL MEDICINE

## 2020-09-25 PROCEDURE — 71046 X-RAY EXAM CHEST 2 VIEWS: CPT | Mod: TC

## 2020-09-25 NOTE — PROGRESS NOTES
Subjective:       Patient ID: Sherif Ragland is a 74 y.o. male.    Chief Complaint: Results, Prostate Cancer, and Pain    HPI 74-year-old male currently on Lupron and are Zytiga a 1000 mg daily with prednisone 5 mg p.o. b.i.d. patient reports increasing fatigue weakness patient returns for evaluation    Past Medical History:   Diagnosis Date    Diabetes mellitus, type 2 1997    Hyperlipidemia     Hypertension     Hypogonadism in male     Renal insufficiency     Tubular adenoma 10/2017     Family History   Problem Relation Age of Onset    Stroke Mother     Heart disease Father     Stroke Brother      Social History     Socioeconomic History    Marital status:      Spouse name: Not on file    Number of children: 4    Years of education: Not on file    Highest education level: Not on file   Occupational History    Occupation: [employer]   Social Needs    Financial resource strain: Not on file    Food insecurity     Worry: Not on file     Inability: Not on file    Transportation needs     Medical: Not on file     Non-medical: Not on file   Tobacco Use    Smoking status: Never Smoker    Smokeless tobacco: Never Used   Substance and Sexual Activity    Alcohol use: No    Drug use: No    Sexual activity: Yes     Partners: Female   Lifestyle    Physical activity     Days per week: Not on file     Minutes per session: Not on file    Stress: Not on file   Relationships    Social connections     Talks on phone: Not on file     Gets together: Not on file     Attends [employer] service: Not on file     Active member of club or organization: Not on file     Attends meetings of clubs or organizations: Not on file     Relationship status: Not on file   Other Topics Concern    Not on file   Social History Narrative    Surrogate decision maker, Wife, Mita Ragland [phone]     Past Surgical History:   Procedure Laterality Date    CATARACT EXTRACTION Right 01/31/2018    CATARACT EXTRACTION W/   INTRAOCULAR LENS IMPLANT Left 03/13/2019    COLONOSCOPY N/A 11/2/2017    Procedure: COLONOSCOPY;  Surgeon: Alek Francois MD;  Location: [facility] ENDO;  Service: Endoscopy;  Laterality: N/A;    TIBIA FRACTURE SURGERY      right leg x2     TONSILLECTOMY      TRANSURETHRAL RESECTION OF PROSTATE N/A 10/17/2019    Procedure: TURP (TRANSURETHRAL RESECTION OF PROSTATE);  Surgeon: Mir Hale IV, MD;  Location: [facility] OR;  Service: Urology;  Laterality: N/A;       Labs:  Lab Results   Component Value Date    WBC 9.62 09/02/2020    HGB 12.6 (L) 09/02/2020    HCT 37.4 (L) 09/02/2020    MCV 88 09/02/2020     09/02/2020     BMP  Lab Results   Component Value Date     09/02/2020    K 3.1 (L) 09/02/2020    CL 97 09/02/2020    CO2 31 (H) 09/02/2020    BUN 32 (H) 09/02/2020    CREATININE 2.1 (H) 09/02/2020    CALCIUM 9.2 09/02/2020    ANIONGAP 13 09/02/2020    ESTGFRAFRICA 35 (A) 09/02/2020    EGFRNONAA 30 (A) 09/02/2020     Lab Results   Component Value Date    ALT 14 09/02/2020    AST 11 09/02/2020    GGT 38 02/17/2020    ALKPHOS 129 09/02/2020    BILITOT 0.6 09/02/2020       Lab Results   Component Value Date    IRON 86 08/12/2020    TIBC 358 08/12/2020    FERRITIN 164 08/12/2020     No results found for: [id number]  No results found for: FOLATE  Lab Results   Component Value Date    TSH 2.302 01/04/2019         Review of Systems   Constitutional: Positive for activity change, appetite change and fatigue. Negative for chills, diaphoresis, fever and unexpected weight change.   HENT: Negative for congestion, dental problem, drooling, ear discharge, ear pain, facial swelling, hearing loss, mouth sores, nosebleeds, postnasal drip, rhinorrhea, sinus pressure, sneezing, sore throat, tinnitus, trouble swallowing and voice change.    Eyes: Negative for photophobia, pain, discharge, redness, itching and visual disturbance.   Respiratory: Positive for shortness of breath. Negative for apnea, cough, choking, chest  tightness, wheezing and stridor.    Cardiovascular: Negative for chest pain, palpitations and leg swelling.   Gastrointestinal: Negative for abdominal distention, abdominal pain, anal bleeding, blood in stool, constipation, diarrhea, nausea, rectal pain and vomiting.   Endocrine: Negative for cold intolerance, heat intolerance, polydipsia, polyphagia and polyuria.   Genitourinary: Negative for decreased urine volume, difficulty urinating, discharge, dysuria, enuresis, flank pain, frequency, genital sores, hematuria, penile pain, penile swelling, scrotal swelling, testicular pain and urgency.   Musculoskeletal: Positive for gait problem. Negative for arthralgias, back pain, joint swelling, myalgias, neck pain and neck stiffness.   Skin: Negative for color change, pallor, rash and wound.   Allergic/Immunologic: Negative for environmental allergies, food allergies and immunocompromised state.   Neurological: Positive for weakness. Negative for dizziness, tremors, seizures, syncope, facial asymmetry, speech difficulty, light-headedness, numbness and headaches.   Hematological: Negative for adenopathy. Does not bruise/bleed easily.   Psychiatric/Behavioral: Positive for dysphoric mood. Negative for agitation, behavioral problems, confusion, decreased concentration, hallucinations, self-injury, sleep disturbance and suicidal ideas. The patient is nervous/anxious. The patient is not hyperactive.        Objective:      Physical Exam  Vitals signs reviewed.   Constitutional:       General: He is not in acute distress.     Appearance: He is well-developed. He is obese. He is ill-appearing. He is not diaphoretic.   HENT:      Head: Normocephalic.      Right Ear: External ear normal.      Left Ear: External ear normal.      Nose: Nose normal.      Right Sinus: No maxillary sinus tenderness or frontal sinus tenderness.      Left Sinus: No maxillary sinus tenderness or frontal sinus tenderness.      Mouth/Throat:      Pharynx: No  oropharyngeal exudate.   Eyes:      General: Lids are normal. No scleral icterus.        Right eye: No discharge.         Left eye: No discharge.      Extraocular Movements:      Right eye: Normal extraocular motion.      Left eye: Normal extraocular motion.      Conjunctiva/sclera:      Right eye: Right conjunctiva is not injected. No hemorrhage.     Left eye: Left conjunctiva is not injected. No hemorrhage.     Pupils: Pupils are equal, round, and reactive to light.   Neck:      Musculoskeletal: Normal range of motion and neck supple.      Thyroid: No thyromegaly.      Vascular: No JVD.      Trachea: No tracheal deviation.   Cardiovascular:      Rate and Rhythm: Normal rate.   Pulmonary:      Effort: Pulmonary effort is normal. No respiratory distress.      Breath sounds: No stridor.   Abdominal:      General: Bowel sounds are normal.      Palpations: Abdomen is soft. There is no hepatomegaly, splenomegaly or mass.      Tenderness: There is no abdominal tenderness.   Musculoskeletal: Normal range of motion.         General: No tenderness.   Lymphadenopathy:      Head:      Right side of head: No posterior auricular or occipital adenopathy.      Left side of head: No posterior auricular or occipital adenopathy.      Cervical: No cervical adenopathy.      Right cervical: No superficial, deep or posterior cervical adenopathy.     Left cervical: No superficial, deep or posterior cervical adenopathy.      Upper Body:      Right upper body: No supraclavicular adenopathy.      Left upper body: No supraclavicular adenopathy.   Skin:     General: Skin is dry.      Findings: No erythema or rash.      Nails: There is no clubbing.     Neurological:      Mental Status: He is alert and oriented to person, place, and time.      Cranial Nerves: No cranial nerve deficit.      Coordination: Coordination normal.   Psychiatric:         Behavior: Behavior normal.         Thought Content: Thought content normal.         Judgment:  Judgment normal.             Assessment:      1. 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 Elevated prostate specific antigen (PSA)            Plan:     Review of laboratory studies demonstrates patient has had good response to treatment with Lupron and Zytiga but extreme fatigue and weakness explained that this could be the relationship and because from his treatment 1st prostate carcinoma at this point answered questions will see back in next visit in early October laboratory studies drawn today communicate results through portal.  Discussed implications with patient 25 min face-to-face time coordination of care greater 50% time face-to-face with patient family        Felix Irvin Jr, MD FACP

## 2020-09-28 ENCOUNTER — TELEPHONE (OUTPATIENT)
Dept: HEMATOLOGY/ONCOLOGY | Facility: CLINIC | Age: 75
End: 2020-09-28

## 2020-09-28 ENCOUNTER — PATIENT OUTREACH (OUTPATIENT)
Dept: OTHER | Facility: OTHER | Age: 75
End: 2020-09-28

## 2020-09-28 ENCOUNTER — OFFICE VISIT (OUTPATIENT)
Dept: HEMATOLOGY/ONCOLOGY | Facility: CLINIC | Age: 75
End: 2020-09-28
Payer: MEDICARE

## 2020-09-28 VITALS
SYSTOLIC BLOOD PRESSURE: 153 MMHG | WEIGHT: 287.69 LBS | OXYGEN SATURATION: 96 % | DIASTOLIC BLOOD PRESSURE: 82 MMHG | TEMPERATURE: 98 F | BODY MASS INDEX: 38.97 KG/M2 | HEART RATE: 75 BPM | HEIGHT: 72 IN

## 2020-09-28 DIAGNOSIS — D50.0 IRON DEFICIENCY ANEMIA DUE TO CHRONIC BLOOD LOSS: Primary | ICD-10-CM

## 2020-09-28 DIAGNOSIS — C41.9 MALIGNANT NEOPLASM OF BONE AND ARTICULAR CARTILAGE, UNSPECIFIED: ICD-10-CM

## 2020-09-28 DIAGNOSIS — C61 PROSTATE CANCER METASTATIC TO MULTIPLE SITES: ICD-10-CM

## 2020-09-28 DIAGNOSIS — C79.51 SECONDARY CANCER OF BONE: ICD-10-CM

## 2020-09-28 DIAGNOSIS — C61 PROSTATE CANCER METASTATIC TO MULTIPLE SITES: Primary | ICD-10-CM

## 2020-09-28 PROCEDURE — 1159F PR MEDICATION LIST DOCUMENTED IN MEDICAL RECORD: ICD-10-PCS | Mod: S$GLB,,, | Performed by: INTERNAL MEDICINE

## 2020-09-28 PROCEDURE — 99214 OFFICE O/P EST MOD 30 MIN: CPT | Mod: S$GLB,,, | Performed by: INTERNAL MEDICINE

## 2020-09-28 PROCEDURE — 3008F PR BODY MASS INDEX (BMI) DOCUMENTED: ICD-10-PCS | Mod: CPTII,S$GLB,, | Performed by: INTERNAL MEDICINE

## 2020-09-28 PROCEDURE — 1125F AMNT PAIN NOTED PAIN PRSNT: CPT | Mod: S$GLB,,, | Performed by: INTERNAL MEDICINE

## 2020-09-28 PROCEDURE — 99999 PR PBB SHADOW E&M-EST. PATIENT-LVL IV: CPT | Mod: PBBFAC,,, | Performed by: INTERNAL MEDICINE

## 2020-09-28 PROCEDURE — 3077F PR MOST RECENT SYSTOLIC BLOOD PRESSURE >= 140 MM HG: ICD-10-PCS | Mod: CPTII,S$GLB,, | Performed by: INTERNAL MEDICINE

## 2020-09-28 PROCEDURE — 1125F PR PAIN SEVERITY QUANTIFIED, PAIN PRESENT: ICD-10-PCS | Mod: S$GLB,,, | Performed by: INTERNAL MEDICINE

## 2020-09-28 PROCEDURE — 99214 PR OFFICE/OUTPT VISIT, EST, LEVL IV, 30-39 MIN: ICD-10-PCS | Mod: S$GLB,,, | Performed by: INTERNAL MEDICINE

## 2020-09-28 PROCEDURE — 3079F DIAST BP 80-89 MM HG: CPT | Mod: CPTII,S$GLB,, | Performed by: INTERNAL MEDICINE

## 2020-09-28 PROCEDURE — 99999 PR PBB SHADOW E&M-EST. PATIENT-LVL IV: ICD-10-PCS | Mod: PBBFAC,,, | Performed by: INTERNAL MEDICINE

## 2020-09-28 PROCEDURE — 1101F PR PT FALLS ASSESS DOC 0-1 FALLS W/OUT INJ PAST YR: ICD-10-PCS | Mod: CPTII,S$GLB,, | Performed by: INTERNAL MEDICINE

## 2020-09-28 PROCEDURE — 1159F MED LIST DOCD IN RCRD: CPT | Mod: S$GLB,,, | Performed by: INTERNAL MEDICINE

## 2020-09-28 PROCEDURE — 3008F BODY MASS INDEX DOCD: CPT | Mod: CPTII,S$GLB,, | Performed by: INTERNAL MEDICINE

## 2020-09-28 PROCEDURE — 1101F PT FALLS ASSESS-DOCD LE1/YR: CPT | Mod: CPTII,S$GLB,, | Performed by: INTERNAL MEDICINE

## 2020-09-28 PROCEDURE — 3077F SYST BP >= 140 MM HG: CPT | Mod: CPTII,S$GLB,, | Performed by: INTERNAL MEDICINE

## 2020-09-28 PROCEDURE — 3079F PR MOST RECENT DIASTOLIC BLOOD PRESSURE 80-89 MM HG: ICD-10-PCS | Mod: CPTII,S$GLB,, | Performed by: INTERNAL MEDICINE

## 2020-09-28 RX ORDER — DIPHENHYDRAMINE HYDROCHLORIDE 50 MG/ML
50 INJECTION INTRAMUSCULAR; INTRAVENOUS ONCE AS NEEDED
Status: CANCELLED | OUTPATIENT
Start: 2020-09-28

## 2020-09-28 RX ORDER — HEPARIN 100 UNIT/ML
500 SYRINGE INTRAVENOUS
Status: CANCELLED | OUTPATIENT
Start: 2020-09-28

## 2020-09-28 RX ORDER — SODIUM CHLORIDE 0.9 % (FLUSH) 0.9 %
10 SYRINGE (ML) INJECTION
Status: CANCELLED | OUTPATIENT
Start: 2020-09-28

## 2020-09-28 RX ORDER — METHYLPREDNISOLONE SOD SUCC 125 MG
125 VIAL (EA) INJECTION ONCE AS NEEDED
Status: CANCELLED | OUTPATIENT
Start: 2020-09-28

## 2020-09-28 RX ORDER — EPINEPHRINE 0.3 MG/.3ML
0.3 INJECTION SUBCUTANEOUS ONCE AS NEEDED
Status: CANCELLED | OUTPATIENT
Start: 2020-09-28

## 2020-09-28 NOTE — PROGRESS NOTES
Subjective:       Patient ID: Sherif Ragland is a 74 y.o. male.    Chief Complaint: Results, Prostate Cancer, and Anemia    HPI 74-year-old male returns with progressive anemia.  Documented iron deficiency originally offered video visit but were able to connect patient is here for review increasing PSA despite castrate levels of testosterone ECOG status 1    Past Medical History:   Diagnosis Date    Diabetes mellitus, type 2 1997    Hyperlipidemia     Hypertension     Hypogonadism in male     Renal insufficiency     Tubular adenoma 10/2017     Family History   Problem Relation Age of Onset    Stroke Mother     Heart disease Father     Stroke Brother      Social History     Socioeconomic History    Marital status:      Spouse name: Not on file    Number of children: 4    Years of education: Not on file    Highest education level: Not on file   Occupational History    Occupation: [employer]   Social Needs    Financial resource strain: Not on file    Food insecurity     Worry: Not on file     Inability: Not on file    Transportation needs     Medical: Not on file     Non-medical: Not on file   Tobacco Use    Smoking status: Never Smoker    Smokeless tobacco: Never Used   Substance and Sexual Activity    Alcohol use: No    Drug use: No    Sexual activity: Yes     Partners: Female   Lifestyle    Physical activity     Days per week: Not on file     Minutes per session: Not on file    Stress: Not on file   Relationships    Social connections     Talks on phone: Not on file     Gets together: Not on file     Attends Islam service: Not on file     Active member of club or organization: Not on file     Attends meetings of clubs or organizations: Not on file     Relationship status: Not on file   Other Topics Concern    Not on file   Social History Narrative    Surrogate decision maker, Wife, Mita Ragland [phone]     Past Surgical History:   Procedure Laterality Date    CATARACT  EXTRACTION Right 01/31/2018    CATARACT EXTRACTION W/  INTRAOCULAR LENS IMPLANT Left 03/13/2019    COLONOSCOPY N/A 11/2/2017    Procedure: COLONOSCOPY;  Surgeon: Alek Francois MD;  Location: [facility] ENDO;  Service: Endoscopy;  Laterality: N/A;    TIBIA FRACTURE SURGERY      right leg x2     TONSILLECTOMY      TRANSURETHRAL RESECTION OF PROSTATE N/A 10/17/2019    Procedure: TURP (TRANSURETHRAL RESECTION OF PROSTATE);  Surgeon: Mir Hale IV, MD;  Location: [facility] OR;  Service: Urology;  Laterality: N/A;       Labs:  Lab Results   Component Value Date    WBC 9.59 09/25/2020    HGB 12.1 (L) 09/25/2020    HCT 37.3 (L) 09/25/2020    MCV 91 09/25/2020     09/25/2020     BMP  Lab Results   Component Value Date     09/25/2020    K 3.6 09/25/2020    CL 98 09/25/2020    CO2 29 09/25/2020    BUN 24 (H) 09/25/2020    CREATININE 2.4 (H) 09/25/2020    CALCIUM 8.7 09/25/2020    ANIONGAP 13 09/25/2020    ESTGFRAFRICA 30 (A) 09/25/2020    EGFRNONAA 26 (A) 09/25/2020     Lab Results   Component Value Date    ALT 12 09/25/2020    AST 14 09/25/2020    GGT 38 02/17/2020    ALKPHOS 178 (H) 09/25/2020    BILITOT 0.6 09/25/2020       Lab Results   Component Value Date    IRON 50 09/25/2020    TIBC 373 09/25/2020    FERRITIN 166 09/25/2020     No results found for: [id number]  No results found for: FOLATE  Lab Results   Component Value Date    TSH 2.302 01/04/2019         Review of Systems   Constitutional: Positive for fatigue. Negative for activity change, appetite change, chills, diaphoresis, fever and unexpected weight change.   HENT: Negative for congestion, dental problem, drooling, ear discharge, ear pain, facial swelling, hearing loss, mouth sores, nosebleeds, postnasal drip, rhinorrhea, sinus pressure, sneezing, sore throat, tinnitus, trouble swallowing and voice change.    Eyes: Negative for photophobia, pain, discharge, redness, itching and visual disturbance.   Respiratory: Negative for apnea, cough,  choking, chest tightness, shortness of breath, wheezing and stridor.    Cardiovascular: Negative for chest pain, palpitations and leg swelling.   Gastrointestinal: Negative for abdominal distention, abdominal pain, anal bleeding, blood in stool, constipation, diarrhea, nausea, rectal pain and vomiting.   Endocrine: Negative for cold intolerance, heat intolerance, polydipsia, polyphagia and polyuria.   Genitourinary: Negative for decreased urine volume, difficulty urinating, discharge, dysuria, enuresis, flank pain, frequency, genital sores, hematuria, penile pain, penile swelling, scrotal swelling, testicular pain and urgency.   Musculoskeletal: Negative for arthralgias, back pain, gait problem, joint swelling, myalgias, neck pain and neck stiffness.   Skin: Negative for color change, pallor, rash and wound.   Allergic/Immunologic: Negative for environmental allergies, food allergies and immunocompromised state.   Neurological: Positive for weakness. Negative for dizziness, tremors, seizures, syncope, facial asymmetry, speech difficulty, light-headedness, numbness and headaches.   Hematological: Negative for adenopathy. Does not bruise/bleed easily.   Psychiatric/Behavioral: Positive for dysphoric mood. Negative for agitation, behavioral problems, confusion, decreased concentration, hallucinations, self-injury, sleep disturbance and suicidal ideas. The patient is nervous/anxious. The patient is not hyperactive.        Objective:      Physical Exam  Vitals signs reviewed.   Constitutional:       General: He is not in acute distress.     Appearance: He is well-developed. He is not diaphoretic.   HENT:      Head: Normocephalic.      Right Ear: External ear normal.      Left Ear: External ear normal.      Nose: Nose normal.      Right Sinus: No maxillary sinus tenderness or frontal sinus tenderness.      Left Sinus: No maxillary sinus tenderness or frontal sinus tenderness.      Mouth/Throat:      Pharynx: No  oropharyngeal exudate.   Eyes:      General: Lids are normal. No scleral icterus.        Right eye: No discharge.         Left eye: No discharge.      Extraocular Movements:      Right eye: Normal extraocular motion.      Left eye: Normal extraocular motion.      Conjunctiva/sclera:      Right eye: Right conjunctiva is not injected. No hemorrhage.     Left eye: Left conjunctiva is not injected. No hemorrhage.     Pupils: Pupils are equal, round, and reactive to light.   Neck:      Musculoskeletal: Normal range of motion and neck supple.      Thyroid: No thyromegaly.      Vascular: No JVD.      Trachea: No tracheal deviation.   Cardiovascular:      Rate and Rhythm: Normal rate.   Pulmonary:      Effort: Pulmonary effort is normal. No respiratory distress.      Breath sounds: No stridor.   Abdominal:      General: Bowel sounds are normal.      Palpations: Abdomen is soft. There is no hepatomegaly, splenomegaly or mass.      Tenderness: There is no abdominal tenderness.   Musculoskeletal: Normal range of motion.         General: No tenderness.   Lymphadenopathy:      Head:      Right side of head: No posterior auricular or occipital adenopathy.      Left side of head: No posterior auricular or occipital adenopathy.      Cervical: No cervical adenopathy.      Right cervical: No superficial, deep or posterior cervical adenopathy.     Left cervical: No superficial, deep or posterior cervical adenopathy.      Upper Body:      Right upper body: No supraclavicular adenopathy.      Left upper body: No supraclavicular adenopathy.   Skin:     General: Skin is dry.      Findings: No erythema or rash.      Nails: There is no clubbing.     Neurological:      Mental Status: He is alert and oriented to person, place, and time.      Cranial Nerves: No cranial nerve deficit.      Coordination: Coordination normal.   Psychiatric:         Behavior: Behavior normal.         Thought Content: Thought content normal.         Judgment:  Judgment normal.             Assessment:      1. Iron deficiency anemia due to chronic blood loss    2. Prostate cancer metastatic to multiple sites    3. 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  Patient documented iron deficiency with falling hemoglobin and I will tolerant of oral iron will treat with intravenous iron and recommend EGD and colon discussed implications with patient answered questions orders written reviewed 25 minutes face-to-face time coordination care greater 50% time face-to-face with patient          Felix Irvin Jr, MD FACP

## 2020-09-28 NOTE — TELEPHONE ENCOUNTER
"Pt called in saying that he got a message that he had a 2:40 "visual" apt today.  I spoke with Dr. Irvin' nurse and she changed appt to virtual appt with Dr. Irvin and I clarified this with the pt. . He called in for visit.   "

## 2020-09-28 NOTE — PROGRESS NOTES
Patient could not talk but I informed him his potassium came back up with his supplement and is back within normal limits.    Will need to discuss his lack of HTN readings when available.

## 2020-09-30 ENCOUNTER — PATIENT MESSAGE (OUTPATIENT)
Dept: HEMATOLOGY/ONCOLOGY | Facility: CLINIC | Age: 75
End: 2020-09-30

## 2020-09-30 NOTE — PROGRESS NOTES
Subjective:       Patient ID: Sherif Ragland is a 74 y.o. male.    Chief Complaint: No chief complaint on file.    HPI    Past Medical History:   Diagnosis Date    Diabetes mellitus, type 2 1997    Hyperlipidemia     Hypertension     Hypogonadism in male     Renal insufficiency     Tubular adenoma 10/2017     Family History   Problem Relation Age of Onset    Stroke Mother     Heart disease Father     Stroke Brother      Social History     Socioeconomic History    Marital status:      Spouse name: Not on file    Number of children: 4    Years of education: Not on file    Highest education level: Not on file   Occupational History    Occupation: [employer]   Social Needs    Financial resource strain: Not on file    Food insecurity     Worry: Not on file     Inability: Not on file    Transportation needs     Medical: Not on file     Non-medical: Not on file   Tobacco Use    Smoking status: Never Smoker    Smokeless tobacco: Never Used   Substance and Sexual Activity    Alcohol use: No    Drug use: No    Sexual activity: Yes     Partners: Female   Lifestyle    Physical activity     Days per week: Not on file     Minutes per session: Not on file    Stress: Not on file   Relationships    Social connections     Talks on phone: Not on file     Gets together: Not on file     Attends Gnosticist service: Not on file     Active member of club or organization: Not on file     Attends meetings of clubs or organizations: Not on file     Relationship status: Not on file   Other Topics Concern    Not on file   Social History Narrative    Surrogate decision maker, Wife, Mita Ragland [phone]     Past Surgical History:   Procedure Laterality Date    CATARACT EXTRACTION Right 01/31/2018    CATARACT EXTRACTION W/  INTRAOCULAR LENS IMPLANT Left 03/13/2019    COLONOSCOPY N/A 11/2/2017    Procedure: COLONOSCOPY;  Surgeon: Alek Francois MD;  [facility];  Service: Endoscopy;   Laterality: N/A;    TIBIA FRACTURE SURGERY      right leg x2     TONSILLECTOMY      TRANSURETHRAL RESECTION OF PROSTATE N/A 10/17/2019    Procedure: TURP (TRANSURETHRAL RESECTION OF PROSTATE);  Surgeon: Mir Hale IV, MD;  Location: [facility];  Service: Urology;  Laterality: N/A;       Labs:  Lab Results   Component Value Date    WBC 9.59 09/25/2020    HGB 12.1 (L) 09/25/2020    HCT 37.3 (L) 09/25/2020    MCV 91 09/25/2020     09/25/2020     BMP  Lab Results   Component Value Date     09/25/2020    K 3.6 09/25/2020    CL 98 09/25/2020    CO2 29 09/25/2020    BUN 24 (H) 09/25/2020    CREATININE 2.4 (H) 09/25/2020    CALCIUM 8.7 09/25/2020    ANIONGAP 13 09/25/2020    ESTGFRAFRICA 30 (A) 09/25/2020    EGFRNONAA 26 (A) 09/25/2020     Lab Results   Component Value Date    ALT 12 09/25/2020    AST 14 09/25/2020    GGT 38 02/17/2020    ALKPHOS 178 (H) 09/25/2020    BILITOT 0.6 09/25/2020       Lab Results   Component Value Date    IRON 50 09/25/2020    TIBC 373 09/25/2020    FERRITIN 166 09/25/2020     No results found for: [id number]  No results found for: FOLATE  Lab Results   Component Value Date    TSH 2.302 01/04/2019         Review of Systems    Objective:      Physical Exam        Assessment:      1. Prostate cancer metastatic to multiple sites           Plan:             Felix Irvin Jr, MD FACP

## 2020-10-01 ENCOUNTER — TELEPHONE (OUTPATIENT)
Dept: ENDOSCOPY | Facility: HOSPITAL | Age: 75
End: 2020-10-01

## 2020-10-01 ENCOUNTER — DOCUMENTATION ONLY (OUTPATIENT)
Dept: HEMATOLOGY/ONCOLOGY | Facility: CLINIC | Age: 75
End: 2020-10-01

## 2020-10-01 DIAGNOSIS — D50.0 ANEMIA DUE TO CHRONIC BLOOD LOSS: ICD-10-CM

## 2020-10-01 DIAGNOSIS — C41.9 MALIGNANT NEOPLASM OF BONE AND ARTICULAR CARTILAGE, UNSPECIFIED: ICD-10-CM

## 2020-10-01 DIAGNOSIS — D50.0 IRON DEFICIENCY ANEMIA DUE TO CHRONIC BLOOD LOSS: Primary | ICD-10-CM

## 2020-10-01 RX ORDER — HYDROCODONE BITARTRATE AND ACETAMINOPHEN 10; 325 MG/1; MG/1
1 TABLET ORAL EVERY 4 HOURS PRN
Qty: 60 TABLET | Refills: 0 | Status: SHIPPED | OUTPATIENT
Start: 2020-10-01 | End: 2020-10-02 | Stop reason: SDUPTHER

## 2020-10-01 NOTE — NURSING
Pt called in stating that he had not heard from Dr. Irvin ' office regarging the EGD and colonoscopy appts.  After reviewing pt chart there was a message sent by dr Irvin' nurse to pt portal which he had read this morning.  Together we reviewed the phone number for him to call to set up those appts.  Pt was agreeable to this and happy that I was able to clarify with him the message.  He will call today to set those tests up.    Oncology Navigation   Intake  Date Worked: 10/01/20     Treatment                  Acuity  Systemic Treatment - predicted or initiated: Chemotherapy regimen with multiple drugs (+1)  Treatment Tolerability: 1  ECO  Comorbidities in Medical History: 2   Needed: 0  Support: 0  Transportation: 0  Psychological Factors (+1 each): QOL issues  Navigation Acuity: 9     Follow Up  No follow-ups on file.

## 2020-10-01 NOTE — TELEPHONE ENCOUNTER
Location Screening:    If answers yes to any of the following, schedule at [facility] ONLY. If No, OK for either location.    1. Is there a diagnosis of heart failure, severe heart valve disease (aortic stenosis) or mechanical valve? no  a. Is the Left Ventricle Ejection Fraction <30% ? no    2. Does the pt have pulmonary hypertension? no   a. Is pulmonary arterial pressure gradient >50mmHg? no   b. Is there evidence of right ventricular dysfunction? no    3. Does the pt have achalasia? no    4. Any history of negative reaction to anesthesia? no   a. Myasthenia gravis? no   b. Malignant hyperthermia? no   c. Other? no    5. Is procedure for esophageal banding? no      COVID Screening    1. Have you had a fever in the last 7 days or have you used fever reducing medicines for a fever in the last 7 days?  no    2. Are you experiencing shortness of breath, cough, muscle aches, loss of taste or loss of smell?  no    If answered yes to questions 1 and 2, the patient must seek medical attention with their PCP.  Do not schedule their procedure.     3. Are you residing with anyone who has tested positive for Covid?  no    If answered yes to question 3, recommend 14 day self-quarantine from the date of relative's positive test and place special needs note in the depot.  Wait to schedule.     ENDO screening    1. Have you been admitted for cardiac, kidney or pulmonary causes to the hospital in the past 3 months? no   If yes, schedule an appointment with PCP before scheduling endoscopic procedure.     2. Have you had a stent placed in the last 12 months? no   If yes, for a screening visit, cancel and message the ordering provider.  The patient will need a new order when the time is appropriate.     3. Have you had a stroke or heart attack in the past 6 months? no   If yes, cancel and refer patient to ordering provider for clearance, also message ordering provider to inform.     4. Have you had any chest pain in the past 3 months?  "no   If yes, Have you been evaluated by your PCP and/or cardiologist and it was determined to not be heart related? not applicable   If No, Pt needs to be seen by PCP or Cardiologist .  Pt can be scheduled once clearance obtained by either of those providers.     5. Do you take prescription weight loss medications?  no   If yes, must stop for 2 weeks prior to procedure.     6. Have you been diagnosed with diverticulitis within the past 3 months? no   If yes, must have been seen by GI within the last 3 months, if not schedule with GI [facility].    If pt has been seen by GI, schedule procedure 8-12 weeks post antibiotic treatment.     7. Are you on Dialysis? no  If yes, schedule procedure for the day AFTER dialysis.  Appt time should be 9am or later, patient arrival time is 2 hours prior.  Nulytely or miralax prep for all patients with kidney disease.     8. Are you diabetic?  yes   If yes, schedule morning appt. Advise pt to hold all diabetic meds day of procedure.     9. If pt is older than 80 years of age and HAS NOT been seen by GI or PCP within the last 6 months, needs appt with GI [facility].   If pt has been seen by the GI provider or PCP within the past 6  months AND meets criteria, schedule procedure AND send message to the endoscopist.     10. Is patient on a "high risk" medication (blood thinner/antiplatelet agent)?  no   If yes, has cardiac clearance been obtained within the last 60 days? N/A   If no, a new clearance needs to be obtained.     Final Questions:    1.I have reviewed the last colonoscopy for recommendations regarding next procedure bowel prep.  yes  2. I have reviewed medications and allergies.  yes  3. I have verified the pharmacy information and appropriate prep sent if needed. yes  4. Prep instructions have been mailed or sent to portal per patient request. yes        All schedulers will have ability to reach out to the ordering GI provider to clarify any issues.     10-27-20 [place]  "

## 2020-10-05 ENCOUNTER — LAB VISIT (OUTPATIENT)
Dept: LAB | Facility: HOSPITAL | Age: 75
End: 2020-10-05
Attending: INTERNAL MEDICINE
Payer: MEDICARE

## 2020-10-05 DIAGNOSIS — C41.9 MALIGNANT NEOPLASM OF BONE AND ARTICULAR CARTILAGE, UNSPECIFIED: ICD-10-CM

## 2020-10-05 DIAGNOSIS — R79.89 ELEVATED SERUM CREATININE: ICD-10-CM

## 2020-10-05 DIAGNOSIS — R97.20 ELEVATED PROSTATE SPECIFIC ANTIGEN (PSA): ICD-10-CM

## 2020-10-05 LAB
BASOPHILS # BLD AUTO: 0.06 K/UL (ref 0–0.2)
BASOPHILS NFR BLD: 0.6 % (ref 0–1.9)
DIFFERENTIAL METHOD: ABNORMAL
EOSINOPHIL # BLD AUTO: 0.2 K/UL (ref 0–0.5)
EOSINOPHIL NFR BLD: 2.1 % (ref 0–8)
ERYTHROCYTE [DISTWIDTH] IN BLOOD BY AUTOMATED COUNT: 12.5 % (ref 11.5–14.5)
HCT VFR BLD AUTO: 36.2 % (ref 40–54)
HGB BLD-MCNC: 11.9 G/DL (ref 14–18)
IMM GRANULOCYTES # BLD AUTO: 0.11 K/UL (ref 0–0.04)
IMM GRANULOCYTES NFR BLD AUTO: 1.1 % (ref 0–0.5)
LYMPHOCYTES # BLD AUTO: 0.9 K/UL (ref 1–4.8)
LYMPHOCYTES NFR BLD: 8.8 % (ref 18–48)
MCH RBC QN AUTO: 29.4 PG (ref 27–31)
MCHC RBC AUTO-ENTMCNC: 32.9 G/DL (ref 32–36)
MCV RBC AUTO: 89 FL (ref 82–98)
MONOCYTES # BLD AUTO: 0.9 K/UL (ref 0.3–1)
MONOCYTES NFR BLD: 8.2 % (ref 4–15)
NEUTROPHILS # BLD AUTO: 8.2 K/UL (ref 1.8–7.7)
NEUTROPHILS NFR BLD: 79.2 % (ref 38–73)
NRBC BLD-RTO: 0 /100 WBC
PLATELET # BLD AUTO: 332 K/UL (ref 150–350)
PMV BLD AUTO: 9.3 FL (ref 9.2–12.9)
RBC # BLD AUTO: 4.05 M/UL (ref 4.6–6.2)
WBC # BLD AUTO: 10.31 K/UL (ref 3.9–12.7)

## 2020-10-05 PROCEDURE — 36415 COLL VENOUS BLD VENIPUNCTURE: CPT | Mod: PO

## 2020-10-05 PROCEDURE — 84153 ASSAY OF PSA TOTAL: CPT

## 2020-10-05 PROCEDURE — 80053 COMPREHEN METABOLIC PANEL: CPT

## 2020-10-05 PROCEDURE — 85025 COMPLETE CBC W/AUTO DIFF WBC: CPT

## 2020-10-06 LAB
ALBUMIN SERPL BCP-MCNC: 3.2 G/DL (ref 3.5–5.2)
ALP SERPL-CCNC: 228 U/L (ref 55–135)
ALT SERPL W/O P-5'-P-CCNC: 13 U/L (ref 10–44)
ANION GAP SERPL CALC-SCNC: 14 MMOL/L (ref 8–16)
AST SERPL-CCNC: 13 U/L (ref 10–40)
BILIRUB SERPL-MCNC: 0.5 MG/DL (ref 0.1–1)
BUN SERPL-MCNC: 22 MG/DL (ref 8–23)
CALCIUM SERPL-MCNC: 8.8 MG/DL (ref 8.7–10.5)
CHLORIDE SERPL-SCNC: 99 MMOL/L (ref 95–110)
CO2 SERPL-SCNC: 25 MMOL/L (ref 23–29)
COMPLEXED PSA SERPL-MCNC: 2 NG/ML (ref 0–4)
CREAT SERPL-MCNC: 1.7 MG/DL (ref 0.5–1.4)
EST. GFR  (AFRICAN AMERICAN): 44.9 ML/MIN/1.73 M^2
EST. GFR  (NON AFRICAN AMERICAN): 38.9 ML/MIN/1.73 M^2
GLUCOSE SERPL-MCNC: 276 MG/DL (ref 70–110)
POTASSIUM SERPL-SCNC: 3.8 MMOL/L (ref 3.5–5.1)
PROT SERPL-MCNC: 6.7 G/DL (ref 6–8.4)
SODIUM SERPL-SCNC: 138 MMOL/L (ref 136–145)

## 2020-10-07 ENCOUNTER — SOCIAL WORK (OUTPATIENT)
Dept: HEMATOLOGY/ONCOLOGY | Facility: CLINIC | Age: 75
End: 2020-10-07

## 2020-10-07 ENCOUNTER — INFUSION (OUTPATIENT)
Dept: INFUSION THERAPY | Facility: HOSPITAL | Age: 75
End: 2020-10-07
Attending: INTERNAL MEDICINE
Payer: MEDICARE

## 2020-10-07 ENCOUNTER — OFFICE VISIT (OUTPATIENT)
Dept: HEMATOLOGY/ONCOLOGY | Facility: CLINIC | Age: 75
End: 2020-10-07
Payer: MEDICARE

## 2020-10-07 VITALS
OXYGEN SATURATION: 95 % | SYSTOLIC BLOOD PRESSURE: 147 MMHG | HEART RATE: 69 BPM | RESPIRATION RATE: 16 BRPM | TEMPERATURE: 98 F | DIASTOLIC BLOOD PRESSURE: 70 MMHG

## 2020-10-07 VITALS
WEIGHT: 290.81 LBS | BODY MASS INDEX: 39.39 KG/M2 | TEMPERATURE: 99 F | HEIGHT: 72 IN | HEART RATE: 76 BPM | OXYGEN SATURATION: 92 % | SYSTOLIC BLOOD PRESSURE: 142 MMHG | DIASTOLIC BLOOD PRESSURE: 74 MMHG

## 2020-10-07 DIAGNOSIS — C41.9 MALIGNANT NEOPLASM OF BONE AND ARTICULAR CARTILAGE, UNSPECIFIED: ICD-10-CM

## 2020-10-07 DIAGNOSIS — C61 PROSTATE CANCER METASTATIC TO MULTIPLE SITES: Primary | ICD-10-CM

## 2020-10-07 DIAGNOSIS — C61 PROSTATE CANCER METASTATIC TO MULTIPLE SITES: ICD-10-CM

## 2020-10-07 DIAGNOSIS — D49.89 NEOPLASM OF HEAD: ICD-10-CM

## 2020-10-07 DIAGNOSIS — D50.0 IRON DEFICIENCY ANEMIA DUE TO CHRONIC BLOOD LOSS: ICD-10-CM

## 2020-10-07 DIAGNOSIS — D50.0 IRON DEFICIENCY ANEMIA DUE TO CHRONIC BLOOD LOSS: Primary | ICD-10-CM

## 2020-10-07 DIAGNOSIS — C79.51 SECONDARY CANCER OF BONE: ICD-10-CM

## 2020-10-07 PROCEDURE — 1101F PR PT FALLS ASSESS DOC 0-1 FALLS W/OUT INJ PAST YR: ICD-10-PCS | Mod: CPTII,S$GLB,, | Performed by: INTERNAL MEDICINE

## 2020-10-07 PROCEDURE — 3008F BODY MASS INDEX DOCD: CPT | Mod: CPTII,S$GLB,, | Performed by: INTERNAL MEDICINE

## 2020-10-07 PROCEDURE — 3078F PR MOST RECENT DIASTOLIC BLOOD PRESSURE < 80 MM HG: ICD-10-PCS | Mod: CPTII,S$GLB,, | Performed by: INTERNAL MEDICINE

## 2020-10-07 PROCEDURE — 1159F PR MEDICATION LIST DOCUMENTED IN MEDICAL RECORD: ICD-10-PCS | Mod: S$GLB,,, | Performed by: INTERNAL MEDICINE

## 2020-10-07 PROCEDURE — 99214 OFFICE O/P EST MOD 30 MIN: CPT | Mod: S$GLB,,, | Performed by: INTERNAL MEDICINE

## 2020-10-07 PROCEDURE — 99999 PR PBB SHADOW E&M-EST. PATIENT-LVL V: CPT | Mod: PBBFAC,,, | Performed by: INTERNAL MEDICINE

## 2020-10-07 PROCEDURE — 1101F PT FALLS ASSESS-DOCD LE1/YR: CPT | Mod: CPTII,S$GLB,, | Performed by: INTERNAL MEDICINE

## 2020-10-07 PROCEDURE — 96367 TX/PROPH/DG ADDL SEQ IV INF: CPT

## 2020-10-07 PROCEDURE — 63600175 PHARM REV CODE 636 W HCPCS: Mod: JG | Performed by: INTERNAL MEDICINE

## 2020-10-07 PROCEDURE — 3077F PR MOST RECENT SYSTOLIC BLOOD PRESSURE >= 140 MM HG: ICD-10-PCS | Mod: CPTII,S$GLB,, | Performed by: INTERNAL MEDICINE

## 2020-10-07 PROCEDURE — 99999 PR PBB SHADOW E&M-EST. PATIENT-LVL V: ICD-10-PCS | Mod: PBBFAC,,, | Performed by: INTERNAL MEDICINE

## 2020-10-07 PROCEDURE — 1125F PR PAIN SEVERITY QUANTIFIED, PAIN PRESENT: ICD-10-PCS | Mod: S$GLB,,, | Performed by: INTERNAL MEDICINE

## 2020-10-07 PROCEDURE — 3078F DIAST BP <80 MM HG: CPT | Mod: CPTII,S$GLB,, | Performed by: INTERNAL MEDICINE

## 2020-10-07 PROCEDURE — 96365 THER/PROPH/DIAG IV INF INIT: CPT

## 2020-10-07 PROCEDURE — 3008F PR BODY MASS INDEX (BMI) DOCUMENTED: ICD-10-PCS | Mod: CPTII,S$GLB,, | Performed by: INTERNAL MEDICINE

## 2020-10-07 PROCEDURE — 3077F SYST BP >= 140 MM HG: CPT | Mod: CPTII,S$GLB,, | Performed by: INTERNAL MEDICINE

## 2020-10-07 PROCEDURE — 1159F MED LIST DOCD IN RCRD: CPT | Mod: S$GLB,,, | Performed by: INTERNAL MEDICINE

## 2020-10-07 PROCEDURE — 25000003 PHARM REV CODE 250: Performed by: INTERNAL MEDICINE

## 2020-10-07 PROCEDURE — 1125F AMNT PAIN NOTED PAIN PRSNT: CPT | Mod: S$GLB,,, | Performed by: INTERNAL MEDICINE

## 2020-10-07 PROCEDURE — 99214 PR OFFICE/OUTPT VISIT, EST, LEVL IV, 30-39 MIN: ICD-10-PCS | Mod: S$GLB,,, | Performed by: INTERNAL MEDICINE

## 2020-10-07 RX ORDER — DIPHENHYDRAMINE HYDROCHLORIDE 50 MG/ML
50 INJECTION INTRAMUSCULAR; INTRAVENOUS ONCE AS NEEDED
Status: DISCONTINUED | OUTPATIENT
Start: 2020-10-07 | End: 2020-10-07 | Stop reason: HOSPADM

## 2020-10-07 RX ORDER — SODIUM CHLORIDE 0.9 % (FLUSH) 0.9 %
10 SYRINGE (ML) INJECTION
Status: CANCELLED | OUTPATIENT
Start: 2020-10-07

## 2020-10-07 RX ORDER — EPINEPHRINE 0.3 MG/.3ML
0.3 INJECTION SUBCUTANEOUS ONCE AS NEEDED
Status: CANCELLED | OUTPATIENT
Start: 2020-10-07

## 2020-10-07 RX ORDER — SODIUM CHLORIDE 9 MG/ML
1000 INJECTION, SOLUTION INTRAVENOUS ONCE
Status: DISCONTINUED | OUTPATIENT
Start: 2020-10-07 | End: 2020-10-07 | Stop reason: HOSPADM

## 2020-10-07 RX ORDER — EPINEPHRINE 1 MG/ML
0.3 INJECTION, SOLUTION INTRACARDIAC; INTRAMUSCULAR; INTRAVENOUS; SUBCUTANEOUS ONCE AS NEEDED
Status: DISCONTINUED | OUTPATIENT
Start: 2020-10-07 | End: 2020-10-07 | Stop reason: HOSPADM

## 2020-10-07 RX ORDER — METHYLPREDNISOLONE SOD SUCC 125 MG
125 VIAL (EA) INJECTION ONCE AS NEEDED
Status: DISCONTINUED | OUTPATIENT
Start: 2020-10-07 | End: 2020-10-07 | Stop reason: HOSPADM

## 2020-10-07 RX ORDER — HEPARIN 100 UNIT/ML
500 SYRINGE INTRAVENOUS
Status: CANCELLED | OUTPATIENT
Start: 2020-10-07

## 2020-10-07 RX ORDER — METHYLPREDNISOLONE SOD SUCC 125 MG
125 VIAL (EA) INJECTION ONCE AS NEEDED
Status: CANCELLED | OUTPATIENT
Start: 2020-10-07

## 2020-10-07 RX ORDER — DIPHENHYDRAMINE HYDROCHLORIDE 50 MG/ML
50 INJECTION INTRAMUSCULAR; INTRAVENOUS ONCE AS NEEDED
Status: CANCELLED | OUTPATIENT
Start: 2020-10-07

## 2020-10-07 RX ADMIN — ZOLEDRONIC ACID 3.5 MG: 4 INJECTION, SOLUTION, CONCENTRATE INTRAVENOUS at 12:10

## 2020-10-07 RX ADMIN — FERUMOXYTOL 510 MG: 510 INJECTION INTRAVENOUS at 11:10

## 2020-10-07 RX ADMIN — SODIUM CHLORIDE: 0.9 INJECTION, SOLUTION INTRAVENOUS at 11:10

## 2020-10-07 NOTE — PROGRESS NOTES
Subjective:       Patient ID: Sherif Ragland is a 74 y.o. male.    Chief Complaint: Results and Prostate Cancer    HPI 74-year-old male with metastatic prostate carcinoma to bone patient currently is on Zytiga prednisone and Zometa patient reports increasing fatigue and weakness patient's PSA has begun to slowly rise over the last 3 PSAs of 0 last 3 months.  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    Past Medical History:   Diagnosis Date    Diabetes mellitus, type 2 1997    Hyperlipidemia     Hypertension     Hypogonadism in male     Renal insufficiency     Tubular adenoma 10/2017     Family History   Problem Relation Age of Onset    Stroke Mother     Heart disease Father     Stroke Brother      Social History     Socioeconomic History    Marital status:      Spouse name: Not on file    Number of children: 4    Years of education: Not on file    Highest education level: Not on file   Occupational History    Occupation: [employer]   Social Needs    Financial resource strain: Not on file    Food insecurity     Worry: Not on file     Inability: Not on file    Transportation needs     Medical: Not on file     Non-medical: Not on file   Tobacco Use    Smoking status: Never Smoker    Smokeless tobacco: Never Used   Substance and Sexual Activity    Alcohol use: No    Drug use: No    Sexual activity: Yes     Partners: Female   Lifestyle    Physical activity     Days per week: Not on file     Minutes per session: Not on file    Stress: Not on file   Relationships    Social connections     Talks on phone: Not on file     Gets together: Not on file     Attends Islam service: Not on file     Active member of club or organization: Not on file     Attends meetings of clubs or organizations: Not on file     Relationship status: Not on file   Other Topics Concern    Not on file   Social History Narrative    Surrogate decision maker, Wife,  Mita Ragland [phone]     Past Surgical History:   Procedure Laterality Date    CATARACT EXTRACTION Right 01/31/2018    CATARACT EXTRACTION W/  INTRAOCULAR LENS IMPLANT Left 03/13/2019    COLONOSCOPY N/A 11/2/2017    Procedure: COLONOSCOPY;  Surgeon: Alek Francois MD;  Location: [facility];  Service: Endoscopy;  Laterality: N/A;    TIBIA FRACTURE SURGERY      right leg x2     TONSILLECTOMY      TRANSURETHRAL RESECTION OF PROSTATE N/A 10/17/2019    Procedure: TURP (TRANSURETHRAL RESECTION OF PROSTATE);  Surgeon: Mir Hale IV, MD;  [facility];  Service: Urology;  Laterality: N/A;       Labs:  Lab Results   Component Value Date    WBC 10.31 10/05/2020    HGB 11.9 (L) 10/05/2020    HCT 36.2 (L) 10/05/2020    MCV 89 10/05/2020     10/05/2020     BMP  Lab Results   Component Value Date     10/05/2020    K 3.8 10/05/2020    CL 99 10/05/2020    CO2 25 10/05/2020    BUN 22 10/05/2020    CREATININE 1.7 (H) 10/05/2020    CALCIUM 8.8 10/05/2020    ANIONGAP 14 10/05/2020    ESTGFRAFRICA 44.9 (A) 10/05/2020    EGFRNONAA 38.9 (A) 10/05/2020     Lab Results   Component Value Date    ALT 13 10/05/2020    AST 13 10/05/2020    GGT 38 02/17/2020    ALKPHOS 228 (H) 10/05/2020    BILITOT 0.5 10/05/2020       Lab Results   Component Value Date    IRON 50 09/25/2020    TIBC 373 09/25/2020    FERRITIN 166 09/25/2020     No results found for: [id number]  No results found for: FOLATE  Lab Results   Component Value Date    TSH 2.302 01/04/2019         Review of Systems   Constitutional: Positive for fatigue. Negative for activity change, appetite change, chills, diaphoresis, fever and unexpected weight change.   HENT: Negative for congestion, dental problem, drooling, ear discharge, ear pain, facial swelling, hearing loss, mouth sores, nosebleeds, postnasal drip, rhinorrhea, sinus pressure, sneezing, sore throat, tinnitus, trouble swallowing and voice change.    Eyes: Negative for photophobia, pain,  discharge, redness, itching and visual disturbance.   Respiratory: Negative for apnea, cough, choking, chest tightness, shortness of breath, wheezing and stridor.    Cardiovascular: Negative for chest pain, palpitations and leg swelling.   Gastrointestinal: Negative for abdominal distention, abdominal pain, anal bleeding, blood in stool, constipation, diarrhea, nausea, rectal pain and vomiting.   Endocrine: Negative for cold intolerance, heat intolerance, polydipsia, polyphagia and polyuria.   Genitourinary: Negative for decreased urine volume, difficulty urinating, discharge, dysuria, enuresis, flank pain, frequency, genital sores, hematuria, penile pain, penile swelling, scrotal swelling, testicular pain and urgency.   Musculoskeletal: Negative for arthralgias, back pain, gait problem, joint swelling, myalgias, neck pain and neck stiffness.   Skin: Negative for color change, pallor, rash and wound.   Allergic/Immunologic: Negative for environmental allergies, food allergies and immunocompromised state.   Neurological: Positive for weakness. Negative for dizziness, tremors, seizures, syncope, facial asymmetry, speech difficulty, light-headedness, numbness and headaches.   Hematological: Negative for adenopathy. Does not bruise/bleed easily.   Psychiatric/Behavioral: Positive for dysphoric mood. Negative for agitation, behavioral problems, confusion, decreased concentration, hallucinations, self-injury, sleep disturbance and suicidal ideas. The patient is nervous/anxious. The patient is not hyperactive.        Objective:      Physical Exam  Vitals signs reviewed.   Constitutional:       General: He is not in acute distress.     Appearance: He is well-developed. He is not diaphoretic.   HENT:      Head: Normocephalic.      Right Ear: External ear normal.      Left Ear: External ear normal.      Nose: Nose normal.      Right Sinus: No maxillary sinus tenderness or frontal sinus tenderness.      Left Sinus: No  maxillary sinus tenderness or frontal sinus tenderness.      Mouth/Throat:      Pharynx: No oropharyngeal exudate.   Eyes:      General: Lids are normal. No scleral icterus.        Right eye: No discharge.         Left eye: No discharge.      Extraocular Movements:      Right eye: Normal extraocular motion.      Left eye: Normal extraocular motion.      Conjunctiva/sclera:      Right eye: Right conjunctiva is not injected. No hemorrhage.     Left eye: Left conjunctiva is not injected. No hemorrhage.     Pupils: Pupils are equal, round, and reactive to light.   Neck:      Musculoskeletal: Normal range of motion and neck supple.      Thyroid: No thyromegaly.      Vascular: No JVD.      Trachea: No tracheal deviation.   Cardiovascular:      Rate and Rhythm: Normal rate.   Pulmonary:      Effort: Pulmonary effort is normal. No respiratory distress.      Breath sounds: No stridor.   Abdominal:      General: Bowel sounds are normal.      Palpations: Abdomen is soft. There is no hepatomegaly, splenomegaly or mass.      Tenderness: There is no abdominal tenderness.   Musculoskeletal: Normal range of motion.         General: No tenderness.   Lymphadenopathy:      Head:      Right side of head: No posterior auricular or occipital adenopathy.      Left side of head: No posterior auricular or occipital adenopathy.      Cervical: No cervical adenopathy.      Right cervical: No superficial, deep or posterior cervical adenopathy.     Left cervical: No superficial, deep or posterior cervical adenopathy.      Upper Body:      Right upper body: No supraclavicular adenopathy.      Left upper body: No supraclavicular adenopathy.   Skin:     General: Skin is dry.      Findings: No erythema or rash.      Nails: There is no clubbing.     Neurological:      Mental Status: He is alert and oriented to person, place, and time.      Cranial Nerves: No cranial nerve deficit.      Coordination: Coordination normal.   Psychiatric:          Behavior: Behavior normal.         Thought Content: Thought content normal.         Judgment: Judgment normal.             Assessment:      1. Prostate cancer metastatic to multiple sites    2. Neoplasm of head    3. 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 Secondary cancer of bone    5. Iron deficiency anemia due to chronic blood loss           Plan:     Metastatic prostate carcinoma to multiple locations.  Slowly rising PSA with castrate resistant state.  At this point will proceed with Axium prostate PET scan to see whether not bone only disease if positive then radiopharmaceutical be a consideration otherwise will need to consider a different treatment recommendations discussed implications with him orders for or intravenous iron today as well as Zometa return after above 25 min face-to-face time coordination of care greater 50% time face with the patient.  Article from up-to-date followed to them for their review        Felix Irvin Jr, MD FACP

## 2020-10-07 NOTE — PROGRESS NOTES
Jacklyn intern met with pt to discuss results of distress screening and psychology referral. Pt stated that he would be interested in pursuing virtual meetings with a psychologist. Jacklyn intern reported this to supervisor who will set up an appointment for the pt. Jacklyn henson does not anticipate any further needs.

## 2020-10-07 NOTE — PROGRESS NOTES
Subjective:       Patient ID: Sherif Ragland is a 74 y.o. male.    Chief Complaint: No chief complaint on file.    HPI  Review of Systems      Objective:      Physical Exam    Assessment:       No diagnosis found.    Plan:       ***

## 2020-10-07 NOTE — DISCHARGE INSTRUCTIONS
[facility]  [address] [facility]  [address]  [phone] phone     [phone] fax  Hours of Operation: Monday- Friday 8:00am- 5:00pm  After hours phone  [phone]  Hematology / Oncology Physicians on call      Dr. Albaro Bond      Please call with any concerns regarding your appointment today.        Calcium Supplements   Calcium is a mineral that helps us make strong bones and teeth. Most of the calcium in our bodies is in our bones. We spend almost half of our lives (30 to 35 years) building to a peak bone mass. Taking in enough calcium helps younger people build strong bones. Maintaining a safe calcium level helps older people limit bone loss. Our bodies cannot make calcium, so we must get it from foods or supplements.   Why Use a Supplement?   You might need a supplement if you fall in to one or more of the following categories:   I dont eat dairy products or other foods that are high in calcium (such as kale, bok wes, and calcium-fortified foods) 2 to 3 times a day.   I am a woman past menopause.   I am pregnant or breastfeeding.   I do not get frequent weight-bearing exercise (such as walking, running, or weightlifting).  Suggested Daily Intake   The daily recommended amount of calcium depends on many factors, including your age and sex.   Your health care provider can help you choose the best amount of calcium for you.    If You Take Calcium   Here are some tips to help you get the most from a calcium supplement:   Choose calcium carbonate or calcium citrate. (The citrate form is easy to absorb.) Avoid calcium from oyster shells. Aim for calcium that is plant-based.   Check the label for elemental calcium. You need 1,000 to 1,500 mg.   If you also take an iron supplement, take it a few hours before or after the calcium.   Be aware that taking calcium interferes with the absorption of certain types of  antibiotics. Talk to your pharmacist.   Eat a balanced diet to supply all the nutrients your body needs.  Food Sources of Calcium   Milk, yogurt, and cheese   Certain green leafy vegetables, such as kale, bok wes, and imsael greens   Fish with bones, such as canned salmon, mackerel, and sardines   Tofu made with calcium carbonate (not the type called nigiri)   Drinks that have calcium added, such as some orange juice and rice and soy drinks    © 2305-8398 Elisa Flores, [address] [place] [address]. All rights reserved. This information is not intended as a substitute for professional medical care. Always follow your healthcare professional's instructions.       FALL PREVENTION   Falls often occur due to slipping, tripping or losing your balance. Here are ways to reduce your risk of falling again.   Was there anything that caused your fall that can be fixed, removed or replaced?   Make your home safe by keeping walkways clear of objects you may trip over.   Use non-slip pads under rugs.   Do not walk in poorly lit areas.   Do not stand on chairs or wobbly ladders.   Use caution when reaching overhead or looking upward. This position can cause a loss of balance.   Be sure your shoes fit properly, have non-slip bottoms and are in good condition.   Be cautious when going up and down stairs, curbs, and when walking on uneven sidewalks.   If your balance is poor, consider using a cane or walker.   If your fall was related to alcohol use, stop or limit alcohol intake.   If your fall was related to use of sleeping medicines, talk to your doctor about this. You may need to reduce your dosage at bedtime if you awaken during the night to go to the bathroom.   To reduce the need for nighttime bathroom trips:   Avoid drinking fluids for several hours before going to bed   Empty your bladder before going to bed   Men can keep a urinal at the bedside   © 0610-2157 Elisa Flores, [address] [place] [address]  [place] [address]. All rights reserved. This information is not intended as a substitute for professional medical care. Always follow your healthcare professional's instructions.      WAYS TO HELP PREVENT INFECTION         WASH YOUR HANDS OFTEN DURING THE DAY, ESPECIALLY BEFORE YOU EAT, AFTER USING THE BATHROOM, AND AFTER TOUCHING ANIMALS     STAY AWAY FROM PEOPLE WHO HAVE ILLNESSES YOU CAN CATCH; SUCH AS COLDS, FLU, CHICKEN POX     TRY TO AVOID CROWDS     STAY AWAY FROM CHILDREN WHO RECENTLY HAVE RECEIVED LIVE VIRUS VACCINES     MAINTAIN GOOD MOUTH CARE     DO NOT SQUEEZE OR SCRATCH PIMPLES     CLEAN CUTS & SCRAPES RIGHT AWAY AND DAILY UNTIL HEALED WITH WARM WATER, SOAP & AN ANTISEPTIC     AVOID CONTACT WITH LITTER BOXES, BIRD CAGES, & FISH TANKS     AVOID STANDING WATER, IE., BIRD BATHS, FLOWER POTS/VASES, OR HUMIDIFIERS     WEAR GLOVES WHEN GARDENING OR CLEANING UP AFTER OTHERS, ESPECIALLY BABIES & SMALL CHILDREN    DO NOT EAT RAW FISH, SEAFOOD, MEAT, OR EGGS    YOU HAVE STARTED ON CHEMOTHERAPY. IF YOU EXPERIENCE ANY OF THE FOLLOWING PROBLEMS, CALL THE OFFICE IMMEDIATELY.    *FEVER .0 OR GREATER    *CHILLS, ESPECIALLY SHAKING CHILLS, OR SWEATING    *A SEVERE COUGH OR SORE THROAT, OR SINUS PAIN/     PRESSURE    *REDNESS, SWELLING, OR TENDERNESS AROUND A WOUND,     SORE, PIMPLE, RECTAL AREA, OR IV SITE    *SORES OR ULCERS IN THE MOUTH    *BLISTERS ON THE LIPS OR SKIN    *FREQUENT URGENCY TO URINATE OR A BURNING FEELING   WHEN YOU URINATE    *BLOOD IN THE URINE OR STOOL    *ANY UNEXPLAINED BRUISING OR PROLONGED BLEEDING,     (NOSEBLEEDS OR BLEEDING GUMS)    *LOOSE BOWEL MOVEMENTS THAT DO NOT RESPOND TO     IMODIUM OR MORE THAN THREE TIMES A DAY    *VOMITING UNRESPONSIVE TO ANTINAUSEA MEDICINE    *ANY UNUSUAL PHYSICAL SYMPTOMS THAT BEGAN AFTER     CHEMOTHERAPY    DURING WEEKDAYS, CALL AND ASK TO SPEAK DIRECTLY TO A NURSE.  AT OTHER TIMES, CALL THE OFFICE PHONE NUMBER; THE ANSWERING SERVICE WILL CONTACT THE  ON-CALL PHYSICIAN.  SOMEONE IS AVAILABLE 24 HOURS A DAY, SEVEN DAYS A WEEK.

## 2020-10-08 ENCOUNTER — PATIENT OUTREACH (OUTPATIENT)
Dept: OTHER | Facility: OTHER | Age: 75
End: 2020-10-08

## 2020-10-08 ENCOUNTER — TELEPHONE (OUTPATIENT)
Dept: ENDOSCOPY | Facility: HOSPITAL | Age: 75
End: 2020-10-08

## 2020-10-08 ENCOUNTER — TELEPHONE (OUTPATIENT)
Dept: INTERNAL MEDICINE | Facility: CLINIC | Age: 75
End: 2020-10-08

## 2020-10-08 NOTE — PROGRESS NOTES
Digital Medicine: Health  Follow-Up    The history is provided by the patient.                   Additional Follow-up details: Patient is currently undergoing cancer treatment and is having his BP checked at clinic visits multiple times per week. He was under the impression that these readings were accessible to his digital medicine care team. When I explained that they were not and that he would need to manually enter these readings in order for us to monitor them he became frustrated and stated that with everything else that he has going on right now, his BP is the least of his worries and he feels that it is being adequately monitored at his many clinic visits. Patient elected to be removed from [facility] at this time. Encouraged patient to reach out if he would like to re-enroll at any time. Notified patients care team of his decision.            Diet-Not assessed          Physical Activity-Not assessed    Medication Adherence-Medication Adherence not addressed.      Substance, Sleep, Stress-Not assessed         Patient verbalizes understanding.                 Topic    Eye Exam     Lipid (Cholesterol) Test          Last 5 Patient Entered Readings                                      Current 30 Day Average:      Recent Readings 6/2/2020 6/1/2020 6/1/2020 6/1/2020 4/15/2020    SBP (mmHg) 178 192 175 183 166    DBP (mmHg) 83 85 77 85 87    Pulse 71 66 67 66 65

## 2020-10-08 NOTE — TELEPHONE ENCOUNTER
----- Message from Ele Fisher sent at 10/8/2020  4:40 PM CDT -----  Regarding: Notice of patient request to be removed from [facility]. Fox,    Your patient Sherif Ragland was enrolled in [facility]. Unfortunately, he has requested discharge from Digital Medicine monitoring and we wanted to make you aware.     Thanks for your support of Digital Medicine programs! Please let me know if you any questions or concerns.    Sincerely,   Ele Fisher

## 2020-10-09 ENCOUNTER — PATIENT MESSAGE (OUTPATIENT)
Dept: PALLIATIVE MEDICINE | Facility: CLINIC | Age: 75
End: 2020-10-09

## 2020-10-11 DIAGNOSIS — E11.22 TYPE 2 DIABETES MELLITUS WITH STAGE 3 CHRONIC KIDNEY DISEASE, WITH LONG-TERM CURRENT USE OF INSULIN: ICD-10-CM

## 2020-10-11 DIAGNOSIS — N18.30 TYPE 2 DIABETES MELLITUS WITH STAGE 3 CHRONIC KIDNEY DISEASE, WITH LONG-TERM CURRENT USE OF INSULIN: ICD-10-CM

## 2020-10-11 DIAGNOSIS — Z79.4 TYPE 2 DIABETES MELLITUS WITH STAGE 3 CHRONIC KIDNEY DISEASE, WITH LONG-TERM CURRENT USE OF INSULIN: ICD-10-CM

## 2020-10-12 RX ORDER — INSULIN DETEMIR 100 [IU]/ML
50 INJECTION, SOLUTION SUBCUTANEOUS 2 TIMES DAILY
Qty: 20 ML | Refills: 0 | Status: SHIPPED | OUTPATIENT
Start: 2020-10-12 | End: 2020-10-29

## 2020-10-14 ENCOUNTER — INFUSION (OUTPATIENT)
Dept: INFUSION THERAPY | Facility: HOSPITAL | Age: 75
End: 2020-10-14
Attending: INTERNAL MEDICINE
Payer: MEDICARE

## 2020-10-14 ENCOUNTER — TELEPHONE (OUTPATIENT)
Dept: PHARMACY | Facility: CLINIC | Age: 75
End: 2020-10-14

## 2020-10-14 VITALS
OXYGEN SATURATION: 95 % | HEART RATE: 71 BPM | DIASTOLIC BLOOD PRESSURE: 75 MMHG | TEMPERATURE: 98 F | SYSTOLIC BLOOD PRESSURE: 173 MMHG | RESPIRATION RATE: 16 BRPM

## 2020-10-14 DIAGNOSIS — D50.0 IRON DEFICIENCY ANEMIA DUE TO CHRONIC BLOOD LOSS: Primary | ICD-10-CM

## 2020-10-14 PROCEDURE — 25000003 PHARM REV CODE 250: Performed by: INTERNAL MEDICINE

## 2020-10-14 PROCEDURE — 63600175 PHARM REV CODE 636 W HCPCS: Mod: JG | Performed by: INTERNAL MEDICINE

## 2020-10-14 PROCEDURE — 96365 THER/PROPH/DIAG IV INF INIT: CPT

## 2020-10-14 RX ORDER — SODIUM CHLORIDE 0.9 % (FLUSH) 0.9 %
10 SYRINGE (ML) INJECTION
Status: DISCONTINUED | OUTPATIENT
Start: 2020-10-14 | End: 2020-10-14 | Stop reason: HOSPADM

## 2020-10-14 RX ORDER — EPINEPHRINE 0.3 MG/.3ML
0.3 INJECTION SUBCUTANEOUS ONCE AS NEEDED
Status: CANCELLED | OUTPATIENT
Start: 2020-10-14

## 2020-10-14 RX ORDER — METHYLPREDNISOLONE SOD SUCC 125 MG
125 VIAL (EA) INJECTION ONCE AS NEEDED
Status: CANCELLED | OUTPATIENT
Start: 2020-10-14

## 2020-10-14 RX ORDER — HEPARIN 100 UNIT/ML
500 SYRINGE INTRAVENOUS
Status: CANCELLED | OUTPATIENT
Start: 2020-10-14

## 2020-10-14 RX ORDER — DIPHENHYDRAMINE HYDROCHLORIDE 50 MG/ML
50 INJECTION INTRAMUSCULAR; INTRAVENOUS ONCE AS NEEDED
Status: CANCELLED | OUTPATIENT
Start: 2020-10-14

## 2020-10-14 RX ORDER — SODIUM CHLORIDE 0.9 % (FLUSH) 0.9 %
10 SYRINGE (ML) INJECTION
Status: CANCELLED | OUTPATIENT
Start: 2020-10-14

## 2020-10-14 RX ADMIN — FERUMOXYTOL 510 MG: 510 INJECTION INTRAVENOUS at 01:10

## 2020-10-14 NOTE — PLAN OF CARE
Patient arrived needing to know if his appt for covid test has been scheduled prior to an upcoming procedure. Confirmed appt has been scheduled and provided patient copy of appts. Feet elevated for comfort measures.

## 2020-10-14 NOTE — DISCHARGE INSTRUCTIONS
[facility]  [address] [facility] [address]  [address]  [phone] phone     [phone] fax  Hours of Operation: Monday- Friday 8:00am- 5:00pm  After hours phone  [phone]  Hematology / Oncology Physicians on call      Dr. Albaro Alaniz, STEPHANIE Vance NP Tyesha Taylor, NP    Please call with any concerns regarding your appointment today.      FALL PREVENTION   Falls often occur due to slipping, tripping or losing your balance. Here are ways to reduce your risk of falling again.    Was there anything that caused your fall that can be fixed, removed or replaced?    Make your home safe by keeping walkways clear of objects you may trip over.    Use non-slip pads under rugs.    Do not walk in poorly lit areas.    Do not stand on chairs or wobbly ladders.    Use caution when reaching overhead or looking upward. This position can cause a loss of balance.    Be sure your shoes fit properly, have non-slip bottoms and are in good condition.    Be cautious when going up and down stairs, curbs, and when walking on uneven sidewalks.    If your balance is poor, consider using a cane or walker.    If your fall was related to alcohol use, stop or limit alcohol intake.    If your fall was related to use of sleeping medicines, talk to your doctor about this. You may need to reduce your dosage at bedtime if you awaken during the night to go to the bathroom.    To reduce the need for nighttime bathroom trips:    Avoid drinking fluids for several hours before going to bed    Empty your bladder before going to bed    Men can keep a urinal at the bedside   © 9028-0826 [employer] [facility], [address] [place] [address], [place] [address]. All rights reserved. This information is not intended as a substitute for professional medical care. Always follow your healthcare professional's instructions.

## 2020-10-15 ENCOUNTER — PATIENT MESSAGE (OUTPATIENT)
Dept: INTERNAL MEDICINE | Facility: CLINIC | Age: 75
End: 2020-10-15

## 2020-10-16 ENCOUNTER — TELEPHONE (OUTPATIENT)
Dept: RADIOLOGY | Facility: HOSPITAL | Age: 75
End: 2020-10-16

## 2020-10-19 ENCOUNTER — HOSPITAL ENCOUNTER (OUTPATIENT)
Dept: RADIOLOGY | Facility: HOSPITAL | Age: 75
Discharge: HOME | End: 2020-10-19
Attending: INTERNAL MEDICINE
Payer: MEDICARE

## 2020-10-19 DIAGNOSIS — D49.89 NEOPLASM OF HEAD: ICD-10-CM

## 2020-10-19 DIAGNOSIS — C61 PROSTATE CANCER METASTATIC TO MULTIPLE SITES: ICD-10-CM

## 2020-10-19 PROCEDURE — 78815 PET IMAGE W/CT SKULL-THIGH: CPT | Mod: TC

## 2020-10-19 PROCEDURE — 78815 PET IMAGE W/CT SKULL-THIGH: CPT | Mod: 26,PS,, | Performed by: RADIOLOGY

## 2020-10-19 PROCEDURE — 78815 NM PET CT FLUCICLOVINE F18(PROSTATE CANCER RECURRENCE): ICD-10-PCS | Mod: 26,PS,, | Performed by: RADIOLOGY

## 2020-10-20 ENCOUNTER — OFFICE VISIT (OUTPATIENT)
Dept: HEMATOLOGY/ONCOLOGY | Facility: CLINIC | Age: 75
End: 2020-10-20
Payer: MEDICARE

## 2020-10-20 VITALS
SYSTOLIC BLOOD PRESSURE: 186 MMHG | RESPIRATION RATE: 16 BRPM | TEMPERATURE: 99 F | HEART RATE: 77 BPM | BODY MASS INDEX: 38.76 KG/M2 | WEIGHT: 286.19 LBS | OXYGEN SATURATION: 96 % | HEIGHT: 72 IN | DIASTOLIC BLOOD PRESSURE: 90 MMHG

## 2020-10-20 DIAGNOSIS — C41.9 MALIGNANT NEOPLASM OF BONE AND ARTICULAR CARTILAGE, UNSPECIFIED: ICD-10-CM

## 2020-10-20 DIAGNOSIS — E66.01 MORBID OBESITY WITH BMI OF 40.0-44.9, ADULT: ICD-10-CM

## 2020-10-20 DIAGNOSIS — Z71.89 ACP (ADVANCE CARE PLANNING): ICD-10-CM

## 2020-10-20 DIAGNOSIS — C79.51 SECONDARY CANCER OF BONE: ICD-10-CM

## 2020-10-20 DIAGNOSIS — C61 PROSTATE CANCER METASTATIC TO MULTIPLE SITES: Primary | ICD-10-CM

## 2020-10-20 PROCEDURE — 99214 OFFICE O/P EST MOD 30 MIN: CPT | Mod: 25,S$GLB,, | Performed by: INTERNAL MEDICINE

## 2020-10-20 PROCEDURE — 3080F DIAST BP >= 90 MM HG: CPT | Mod: CPTII,S$GLB,, | Performed by: INTERNAL MEDICINE

## 2020-10-20 PROCEDURE — 3077F SYST BP >= 140 MM HG: CPT | Mod: CPTII,S$GLB,, | Performed by: INTERNAL MEDICINE

## 2020-10-20 PROCEDURE — 1101F PT FALLS ASSESS-DOCD LE1/YR: CPT | Mod: CPTII,S$GLB,, | Performed by: INTERNAL MEDICINE

## 2020-10-20 PROCEDURE — 3008F BODY MASS INDEX DOCD: CPT | Mod: CPTII,S$GLB,, | Performed by: INTERNAL MEDICINE

## 2020-10-20 PROCEDURE — 1126F PR PAIN SEVERITY QUANTIFIED, NO PAIN PRESENT: ICD-10-PCS | Mod: S$GLB,,, | Performed by: INTERNAL MEDICINE

## 2020-10-20 PROCEDURE — 99999 PR PBB SHADOW E&M-EST. PATIENT-LVL V: ICD-10-PCS | Mod: PBBFAC,,, | Performed by: INTERNAL MEDICINE

## 2020-10-20 PROCEDURE — G0008 FLU VACCINE - QUADRIVALENT - ADJUVANTED: ICD-10-PCS | Mod: S$GLB,,, | Performed by: INTERNAL MEDICINE

## 2020-10-20 PROCEDURE — 99999 PR PBB SHADOW E&M-EST. PATIENT-LVL V: CPT | Mod: PBBFAC,,, | Performed by: INTERNAL MEDICINE

## 2020-10-20 PROCEDURE — 1159F MED LIST DOCD IN RCRD: CPT | Mod: S$GLB,,, | Performed by: INTERNAL MEDICINE

## 2020-10-20 PROCEDURE — 3080F PR MOST RECENT DIASTOLIC BLOOD PRESSURE >= 90 MM HG: ICD-10-PCS | Mod: CPTII,S$GLB,, | Performed by: INTERNAL MEDICINE

## 2020-10-20 PROCEDURE — 90694 VACC AIIV4 NO PRSRV 0.5ML IM: CPT | Mod: S$GLB,,, | Performed by: INTERNAL MEDICINE

## 2020-10-20 PROCEDURE — 3077F PR MOST RECENT SYSTOLIC BLOOD PRESSURE >= 140 MM HG: ICD-10-PCS | Mod: CPTII,S$GLB,, | Performed by: INTERNAL MEDICINE

## 2020-10-20 PROCEDURE — 3008F PR BODY MASS INDEX (BMI) DOCUMENTED: ICD-10-PCS | Mod: CPTII,S$GLB,, | Performed by: INTERNAL MEDICINE

## 2020-10-20 PROCEDURE — 1159F PR MEDICATION LIST DOCUMENTED IN MEDICAL RECORD: ICD-10-PCS | Mod: S$GLB,,, | Performed by: INTERNAL MEDICINE

## 2020-10-20 PROCEDURE — 1101F PR PT FALLS ASSESS DOC 0-1 FALLS W/OUT INJ PAST YR: ICD-10-PCS | Mod: CPTII,S$GLB,, | Performed by: INTERNAL MEDICINE

## 2020-10-20 PROCEDURE — 1126F AMNT PAIN NOTED NONE PRSNT: CPT | Mod: S$GLB,,, | Performed by: INTERNAL MEDICINE

## 2020-10-20 PROCEDURE — 99214 PR OFFICE/OUTPT VISIT, EST, LEVL IV, 30-39 MIN: ICD-10-PCS | Mod: 25,S$GLB,, | Performed by: INTERNAL MEDICINE

## 2020-10-20 PROCEDURE — 90694 FLU VACCINE - QUADRIVALENT - ADJUVANTED: ICD-10-PCS | Mod: S$GLB,,, | Performed by: INTERNAL MEDICINE

## 2020-10-20 PROCEDURE — G0008 ADMIN INFLUENZA VIRUS VAC: HCPCS | Mod: S$GLB,,, | Performed by: INTERNAL MEDICINE

## 2020-10-20 NOTE — PROGRESS NOTES
Subjective:       Patient ID: Sherif Ragland is a 74 y.o. male.    Chief Complaint: Results and Prostate Cancer    HPI 74-year-old male history of metastatic prostate carcinoma patient returns for review.  After Axium scan.    Past Medical History:   Diagnosis Date    Diabetes mellitus, type 2 1997    Hyperlipidemia     Hypertension     Hypogonadism in male     Renal insufficiency     Tubular adenoma 10/2017     Family History   Problem Relation Age of Onset    Stroke Mother     Heart disease Father     Stroke Brother      Social History     Socioeconomic History    Marital status:      Spouse name: Not on file    Number of children: 4    Years of education: Not on file    Highest education level: Not on file   Occupational History    Occupation: [employer]   Social Needs    Financial resource strain: Not on file    Food insecurity     Worry: Not on file     Inability: Not on file    Transportation needs     Medical: Not on file     Non-medical: Not on file   Tobacco Use    Smoking status: Never Smoker    Smokeless tobacco: Never Used   Substance and Sexual Activity    Alcohol use: No    Drug use: No    Sexual activity: Yes     Partners: Female   Lifestyle    Physical activity     Days per week: Not on file     Minutes per session: Not on file    Stress: Not on file   Relationships    Social connections     Talks on phone: Not on file     Gets together: Not on file     Attends [employer] service: Not on file     Active member of club or organization: Not on file     Attends meetings of clubs or organizations: Not on file     Relationship status: Not on file   Other Topics Concern    Not on file   Social History Narrative    Surrogate decision maker, Wife, Mita Ragland [phone]     Past Surgical History:   Procedure Laterality Date    CATARACT EXTRACTION Right 01/31/2018    CATARACT EXTRACTION W/  INTRAOCULAR LENS IMPLANT Left 03/13/2019    COLONOSCOPY N/A 11/2/2017     Procedure: COLONOSCOPY;  Surgeon: Alek Francois MD;  Location: [facility] END[facility];  Service: Endoscopy;  Laterality: N/A;    TIBIA FRACTURE SURGERY      right leg x2     TONSILLECTOMY      TRANSURETHRAL RESECTION OF PROSTATE N/A 10/17/2019    Procedure: TURP (TRANSURETHRAL RESECTION OF PROSTATE);  Surgeon: Mir Hale IV, MD;  Location: [facility] OR;  Service: Urology;  Laterality: N/A;       Labs:  Lab Results   Component Value Date    WBC 10.31 10/05/2020    HGB 11.9 (L) 10/05/2020    HCT 36.2 (L) 10/05/2020    MCV 89 10/05/2020     10/05/2020     BMP  Lab Results   Component Value Date     10/05/2020    K 3.8 10/05/2020    CL 99 10/05/2020    CO2 25 10/05/2020    BUN 22 10/05/2020    CREATININE 1.7 (H) 10/05/2020    CALCIUM 8.8 10/05/2020    ANIONGAP 14 10/05/2020    ESTGFRAFRICA 44.9 (A) 10/05/2020    EGFRNONAA 38.9 (A) 10/05/2020     Lab Results   Component Value Date    ALT 13 10/05/2020    AST 13 10/05/2020    GGT 38 02/17/2020    ALKPHOS 228 (H) 10/05/2020    BILITOT 0.5 10/05/2020       Lab Results   Component Value Date    IRON 50 09/25/2020    TIBC 373 09/25/2020    FERRITIN 166 09/25/2020     No results found for: [id number]  No results found for: FOLATE  Lab Results   Component Value Date    TSH 2.302 01/04/2019         Review of Systems   Constitutional: Positive for fatigue. Negative for activity change, appetite change, chills, diaphoresis, fever and unexpected weight change.   HENT: Negative for congestion, dental problem, drooling, ear discharge, ear pain, facial swelling, hearing loss, mouth sores, nosebleeds, postnasal drip, rhinorrhea, sinus pressure, sneezing, sore throat, tinnitus, trouble swallowing and voice change.    Eyes: Negative for photophobia, pain, discharge, redness, itching and visual disturbance.   Respiratory: Negative for apnea, cough, choking, chest tightness, shortness of breath, wheezing and stridor.    Cardiovascular: Negative for chest pain, palpitations and  leg swelling.   Gastrointestinal: Positive for constipation. Negative for abdominal distention, abdominal pain, anal bleeding, blood in stool, diarrhea, nausea, rectal pain and vomiting.   Endocrine: Negative for cold intolerance, heat intolerance, polydipsia, polyphagia and polyuria.   Genitourinary: Negative for decreased urine volume, difficulty urinating, discharge, dysuria, enuresis, flank pain, frequency, genital sores, hematuria, penile pain, penile swelling, scrotal swelling, testicular pain and urgency.   Musculoskeletal: Positive for arthralgias, gait problem and myalgias. Negative for back pain, joint swelling, neck pain and neck stiffness.   Skin: Negative for color change, pallor, rash and wound.   Allergic/Immunologic: Negative for environmental allergies, food allergies and immunocompromised state.   Neurological: Positive for weakness. Negative for dizziness, tremors, seizures, syncope, facial asymmetry, speech difficulty, light-headedness, numbness and headaches.   Hematological: Negative for adenopathy. Does not bruise/bleed easily.   Psychiatric/Behavioral: Positive for dysphoric mood. Negative for agitation, behavioral problems, confusion, decreased concentration, hallucinations, self-injury, sleep disturbance and suicidal ideas. The patient is nervous/anxious. The patient is not hyperactive.        Objective:      Physical Exam  Vitals signs reviewed.   Constitutional:       General: He is not in acute distress.     Appearance: He is well-developed. He is ill-appearing. He is not diaphoretic.   HENT:      Head: Normocephalic.      Right Ear: External ear normal.      Left Ear: External ear normal.      Nose: Nose normal.      Right Sinus: No maxillary sinus tenderness or frontal sinus tenderness.      Left Sinus: No maxillary sinus tenderness or frontal sinus tenderness.      Mouth/Throat:      Pharynx: No oropharyngeal exudate.   Eyes:      General: Lids are normal. No scleral icterus.         Right eye: No discharge.         Left eye: No discharge.      Extraocular Movements:      Right eye: Normal extraocular motion.      Left eye: Normal extraocular motion.      Conjunctiva/sclera:      Right eye: Right conjunctiva is not injected. No hemorrhage.     Left eye: Left conjunctiva is not injected. No hemorrhage.     Pupils: Pupils are equal, round, and reactive to light.   Neck:      Musculoskeletal: Normal range of motion and neck supple.      Thyroid: No thyromegaly.      Vascular: No JVD.      Trachea: No tracheal deviation.   Cardiovascular:      Rate and Rhythm: Normal rate.   Pulmonary:      Effort: Pulmonary effort is normal. No respiratory distress.      Breath sounds: No stridor.   Abdominal:      General: Bowel sounds are normal.      Palpations: Abdomen is soft. There is no hepatomegaly, splenomegaly or mass.      Tenderness: There is no abdominal tenderness.   Musculoskeletal: Normal range of motion.         General: No tenderness.   Lymphadenopathy:      Head:      Right side of head: No posterior auricular or occipital adenopathy.      Left side of head: No posterior auricular or occipital adenopathy.      Cervical: No cervical adenopathy.      Right cervical: No superficial, deep or posterior cervical adenopathy.     Left cervical: No superficial, deep or posterior cervical adenopathy.      Upper Body:      Right upper body: No supraclavicular adenopathy.      Left upper body: No supraclavicular adenopathy.   Skin:     General: Skin is dry.      Findings: No erythema or rash.      Nails: There is no clubbing.     Neurological:      Mental Status: He is alert and oriented to person, place, and time.      Cranial Nerves: No cranial nerve deficit.      Coordination: Coordination normal.   Psychiatric:         Behavior: Behavior normal.         Thought Content: Thought content normal.         Judgment: Judgment normal.             Assessment:      1. Prostate cancer metastatic to multiple sites     2. Secondary cancer of bone    3. 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 Morbid obesity with BMI of 40.0-44.9, adult    5. ACP (advance care planning)           Plan:     Extensive conversation with patient wife present at this point Axium scan demonstrated no evidence of visceral a soft tissue involvement.  With rising PSA castrate setting.  Options discussed will refer to Radiation Oncology for use of radioisotope possibility.  Otherwise need to discuss systemic chemotherapy.  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  25 min face-to-face time coordination of care greater 50% time face-to-face with patient and wife.  Flu shot        Felix Irvin Jr, MD FACP

## 2020-10-22 ENCOUNTER — INITIAL CONSULT (OUTPATIENT)
Dept: RADIATION ONCOLOGY | Facility: CLINIC | Age: 75
End: 2020-10-22
Payer: MEDICARE

## 2020-10-22 VITALS
DIASTOLIC BLOOD PRESSURE: 86 MMHG | OXYGEN SATURATION: 96 % | HEIGHT: 72 IN | HEART RATE: 75 BPM | BODY MASS INDEX: 38.64 KG/M2 | RESPIRATION RATE: 18 BRPM | SYSTOLIC BLOOD PRESSURE: 171 MMHG | WEIGHT: 285.31 LBS | TEMPERATURE: 98 F

## 2020-10-22 DIAGNOSIS — C79.51 SECONDARY CANCER OF BONE: ICD-10-CM

## 2020-10-22 DIAGNOSIS — C61 PROSTATE CANCER METASTATIC TO MULTIPLE SITES: ICD-10-CM

## 2020-10-22 DIAGNOSIS — C61 PROSTATE CANCER: Primary | ICD-10-CM

## 2020-10-22 PROCEDURE — 3079F DIAST BP 80-89 MM HG: CPT | Mod: CPTII,S$GLB,, | Performed by: RADIOLOGY

## 2020-10-22 PROCEDURE — 1100F PTFALLS ASSESS-DOCD GE2>/YR: CPT | Mod: CPTII,S$GLB,, | Performed by: RADIOLOGY

## 2020-10-22 PROCEDURE — 99999 PR PBB SHADOW E&M-EST. PATIENT-LVL V: CPT | Mod: PBBFAC,,, | Performed by: RADIOLOGY

## 2020-10-22 PROCEDURE — 99999 PR PBB SHADOW E&M-EST. PATIENT-LVL V: ICD-10-PCS | Mod: PBBFAC,,, | Performed by: RADIOLOGY

## 2020-10-22 PROCEDURE — 3008F BODY MASS INDEX DOCD: CPT | Mod: CPTII,S$GLB,, | Performed by: RADIOLOGY

## 2020-10-22 PROCEDURE — 1159F MED LIST DOCD IN RCRD: CPT | Mod: S$GLB,,, | Performed by: RADIOLOGY

## 2020-10-22 PROCEDURE — 3288F PR FALLS RISK ASSESSMENT DOCUMENTED: ICD-10-PCS | Mod: CPTII,S$GLB,, | Performed by: RADIOLOGY

## 2020-10-22 PROCEDURE — 3288F FALL RISK ASSESSMENT DOCD: CPT | Mod: CPTII,S$GLB,, | Performed by: RADIOLOGY

## 2020-10-22 PROCEDURE — 99499 UNLISTED E&M SERVICE: CPT | Mod: S$GLB,,, | Performed by: RADIOLOGY

## 2020-10-22 PROCEDURE — 99205 OFFICE O/P NEW HI 60 MIN: CPT | Mod: S$GLB,,, | Performed by: RADIOLOGY

## 2020-10-22 PROCEDURE — 99205 PR OFFICE/OUTPT VISIT, NEW, LEVL V, 60-74 MIN: ICD-10-PCS | Mod: S$GLB,,, | Performed by: RADIOLOGY

## 2020-10-22 PROCEDURE — 1100F PR PT FALLS ASSESS DOC 2+ FALLS/FALL W/INJURY/YR: ICD-10-PCS | Mod: CPTII,S$GLB,, | Performed by: RADIOLOGY

## 2020-10-22 PROCEDURE — 3079F PR MOST RECENT DIASTOLIC BLOOD PRESSURE 80-89 MM HG: ICD-10-PCS | Mod: CPTII,S$GLB,, | Performed by: RADIOLOGY

## 2020-10-22 PROCEDURE — 3077F SYST BP >= 140 MM HG: CPT | Mod: CPTII,S$GLB,, | Performed by: RADIOLOGY

## 2020-10-22 PROCEDURE — 1159F PR MEDICATION LIST DOCUMENTED IN MEDICAL RECORD: ICD-10-PCS | Mod: S$GLB,,, | Performed by: RADIOLOGY

## 2020-10-22 PROCEDURE — 1125F AMNT PAIN NOTED PAIN PRSNT: CPT | Mod: S$GLB,,, | Performed by: RADIOLOGY

## 2020-10-22 PROCEDURE — 3077F PR MOST RECENT SYSTOLIC BLOOD PRESSURE >= 140 MM HG: ICD-10-PCS | Mod: CPTII,S$GLB,, | Performed by: RADIOLOGY

## 2020-10-22 PROCEDURE — 99499 RISK ADDL DX/OHS AUDIT: ICD-10-PCS | Mod: S$GLB,,, | Performed by: RADIOLOGY

## 2020-10-22 PROCEDURE — 3008F PR BODY MASS INDEX (BMI) DOCUMENTED: ICD-10-PCS | Mod: CPTII,S$GLB,, | Performed by: RADIOLOGY

## 2020-10-22 PROCEDURE — 1125F PR PAIN SEVERITY QUANTIFIED, PAIN PRESENT: ICD-10-PCS | Mod: S$GLB,,, | Performed by: RADIOLOGY

## 2020-10-22 NOTE — PROGRESS NOTES
[facility]  RADIATION ONCOLOGY CONSULTATION    Name: Sherif Ragland  : 1945      Patient Referred To Radiation Oncology By:  Dr. Felix Irvin MD  [address] [place] [address]    DIAGNOSIS: metastatic prostate cancer to bone    HISTORY OF PRESENT ILLNESS:  Sherif Ragland is a 74 y.o. male who presents for consultation for the above diagnosis.   He had two year history of progressive muscle and joint pain.  Bone scan 20 showed extensive tracer uptake concerning for diffuse osseous metastatic disease.  PET 20 showed multiple areas of uptake in bones.  MRI brain 3/4/20 showed concerns for osseous metastatic disease in clivus and posterior arch of C1.  L iliac crest biopsy 3/6/20 was positive for metastatic adenocarcinoma favor prostate.  PSA was 88.3 on 3/27/20.  He is now on abiraterone and Casodex, most recent Lupron in 2020.  PSA initially decreased to 0.14 in 2020 but has now increased again to 2.0 on 10/5/20.  Axumin 10/19/20 showed mild diffuse uptake in prostate, involvement suspected, and multifocal areas of uptake within sclerotic metastatic lesions in entire axial skeleton and proximal bilateral appendicular skeleton. No avid nodes.  Today, he feels fatigued and does not like taking the medication.  Getting endoscopy next week for anemia; getting iron infusions.    REVIEW OF SYSTEMS: (Positive findings bold, otherwise negative)   Constitutional: fever, fatigue, weight change  Eyes: blurred vision in the past 3 months, double vision   ENT: ear pain, new mouth lesions, jaw pain, difficulty swallowing, sore throat  Cardiovascular: chest pain on exertion, reflux, leg swelling  Respiratory: shortness of breath, dyspnea, cough, hemoptysis.   GI: abdominal pain, diarrhea, constipation, blood in stool, painful bowel movements  : painful or burning urination, blood in urine  Musculoskeletal: new bone or joint pains  Neurologic: headache,  seizure, focal numbness or tingling, balance changes, speech changes  Lymph: new or enlarged lymph nodes  Psychiatric: depression, anxiety    PRIOR RADIATION HISTORY: none    PAST MEDICAL HISTORY:  Past Medical History:   Diagnosis Date    Diabetes mellitus, type 2 1997    Hyperlipidemia     Hypertension     Hypogonadism in male     Renal insufficiency     Tubular adenoma 10/2017       PAST SURGICAL HISTORY:  Past Surgical History:   Procedure Laterality Date    CATARACT EXTRACTION Right 01/31/2018    CATARACT EXTRACTION W/  INTRAOCULAR LENS IMPLANT Left 03/13/2019    COLONOSCOPY N/A 11/2/2017    Procedure: COLONOSCOPY;  Surgeon: Alek Francois MD;  Location: [facility] ENDO;  Service: Endoscopy;  Laterality: N/A;    TIBIA FRACTURE SURGERY      right leg x2     TONSILLECTOMY      TRANSURETHRAL RESECTION OF PROSTATE N/A 10/17/2019    Procedure: TURP (TRANSURETHRAL RESECTION OF PROSTATE);  Surgeon: Mir Hale IV, MD;  Location: [facility] OR;  Service: Urology;  Laterality: N/A;       ALLERGIES:   Review of patient's allergies indicates:  No Known Allergies    MEDICATIONS:    Current Outpatient Medications:     abiraterone (ZYTIGA) 250 mg Tab, Take 4 tablets (1,000 mg total) by mouth once daily., Disp: 120 tablet, Rfl: 11    ALPRAZolam (XANAX) 0.25 MG tablet, TAKE 1 TO 2 TABLETS BY MOUTH ONCE DAILY AS NEEDED FOR ANXIETY, Disp: 60 tablet, Rfl: 1    amLODIPine (NORVASC) 5 MG tablet, TAKE 2 TABLETS BY MOUTH ONCE DAILY, Disp: 60 tablet, Rfl: 11    bicalutamide (CASODEX) 50 MG Tab, Take 1 tablet (50 mg total) by mouth once daily., Disp: 30 tablet, Rfl: 0    bisacodyL (DULCOLAX) 5 mg EC tablet, Take 5 mg by mouth daily as needed., Disp: , Rfl:     blood-glucose meter kit, To check BG two times daily, to use with insurance preferred meter, Disp: 1 each, Rfl: 0    cloNIDine (CATAPRES) 0.2 MG tablet, Take by mouth., Disp: , Rfl:     docusate sodium (COLACE) 100 MG capsule, Take 1 capsule (100 mg total) by  mouth 2 (two) times daily. (Patient taking differently: Take 100 mg by mouth 2 (two) times daily as needed. ), Disp: 60 capsule, Rfl: 0    furosemide (LASIX) 40 MG tablet, Take 1 tablet (40 mg total) by mouth once daily., Disp: 60 tablet, Rfl: 11    HYDROcodone-acetaminophen (NORCO)  mg per tablet, Take 1 tablet by mouth every 4 (four) hours as needed., Disp: 60 tablet, Rfl: 0    insulin detemir U-100 (LEVEMIR FLEXTOUCH U-100 INSULN) 100 unit/mL (3 mL) InPn pen, Inject 50 Units into the skin 2 (two) times daily., Disp: 20 mL, Rfl: 0    lactulose (CHRONULAC) 10 gram/15 mL solution, Take 15 mLs (10 g total) by mouth 2 (two) times daily., Disp: 473 mL, Rfl: 11    metoprolol succinate (TOPROL-XL) 100 MG 24 hr tablet, Take 1 tablet by mouth twice daily, Disp: 60 tablet, Rfl: 11    ondansetron (ZOFRAN) 8 MG tablet, Take 1 tablet (8 mg total) by mouth every 12 (twelve) hours as needed for Nausea., Disp: 30 tablet, Rfl: 2    potassium chloride SA (K-DUR,KLOR-CON) 20 MEQ tablet, Take 1 tablet (20 mEq total) by mouth 2 (two) times daily., Disp: 180 tablet, Rfl: 1    predniSONE (DELTASONE) 5 MG tablet, Take 1 tablet (5 mg total) by mouth once daily., Disp: 60 tablet, Rfl: 11    predniSONE (DELTASONE) 5 MG tablet, Take 1 tablet (5 mg total) by mouth once daily., Disp: 60 tablet, Rfl: 11    repaglinide (PRANDIN) 0.5 MG tablet, Take 1 tablet (0.5 mg total) by mouth 3 (three) times daily before meals., Disp: 270 tablet, Rfl: 3    terazosin (HYTRIN) 5 MG capsule, Take 1 capsule by mouth., Disp: , Rfl:     valsartan-hydrochlorothiazide (DIOVAN-HCT) 320-25 mg per tablet, Take by mouth., Disp: , Rfl:     zolpidem (AMBIEN) 5 MG Tab, Take 1 tablet (5 mg total) by mouth nightly as needed., Disp: 30 tablet, Rfl: 3    SOCIAL HISTORY:  Social History     Socioeconomic History    Marital status:      Spouse name: Not on file    Number of children: 4    Years of education: Not on file    Highest education level:  Not on file   Occupational History    Occupation: [employer]   Social Needs    Financial resource strain: Not on file    Food insecurity     Worry: Not on file     Inability: Not on file    Transportation needs     Medical: Not on file     Non-medical: Not on file   Tobacco Use    Smoking status: Never Smoker    Smokeless tobacco: Never Used   Substance and Sexual Activity    Alcohol use: No    Drug use: No    Sexual activity: Yes     Partners: Female   Lifestyle    Physical activity     Days per week: Not on file     Minutes per session: Not on file    Stress: Not on file   Relationships    Social connections     Talks on phone: Not on file     Gets together: Not on file     Attends [employer] service: Not on file     Active member of club or organization: Not on file     Attends meetings of clubs or organizations: Not on file     Relationship status: Not on file   Other Topics Concern    Not on file   Social History Narrative    Surrogate decision maker, Wife, Mita Ragland [phone]     Lives in [place]    FAMILY HISTORY:  Family History   Problem Relation Age of Onset    Stroke Mother     Heart disease Father     Stroke Brother        PHYSICAL EXAMINATION:  Constitutional: well appearing, no acute distress, ECOG 1 - Ambulates, capable of light work  Vitals:    BP (!) 171/86   Pulse 75   Temp 97.6 °F (36.4 °C)   Resp 18   Ht 6' (1.829 m)   Wt 129.4 kg (285 lb 4.8 oz)   SpO2 96%   BMI 38.69 kg/m²   Eyes: sclera anicteric, EOMI, pupils equal, round and reactive to light  ENT: oral cavity without lesions, moist mucous membranes  Neck: trachea midline, neck supple  Lymphatic: no cervical, supraclavicular or axillary adenopathy  Cardiovascular: regular rate, no murmurs, no edema of the upper or lower extremities, radial pulse 2+  Respiratory: unlabored effort, clear to auscultation, no wheezes  Abdomen: soft, non-tender, no rigidity, no masses, no hepatomegaly  Neuro: Cranial nerves  III-XII intact, speech not slurred, gait non-ataxic, no dysdiadochokinesia, strength 5/5 upper and lower extremities  Spine: non-tender to percussion cervical, thoracic and lumbosacral spine    IMAGING AND LABORATORY FINDINGS: As per HPI; images reviewed personally.    PSA   10/5/20 - 2.0  9/25/20 - 1.5  8/12/20 - 0.2  6/15/20 - 0.15  5/6/20 - 0.46  3/27/20 - 88.3    CBC 9/25/20  Hgb 12.1  Plts 313  ANC 7.9      ASSESSMENT: 74 y.o. male with prostate cancer with bone metastases, PSA rising on abiraterone    PLAN: Mr. Ragland had initial bone scan showing widespread osseous metastases and now his PSA is rising on systemic therapy and ADT. Xofigo is a good option provided he understands that he should not be on abiraterone concurrently as it could increase fracture rates.  For initial injection, ANC >1.5, platelets >100, and Hgb >10. For subsequent injections, ANC >1 and platelets >50.   Complete course would consist of 6 total injections given 4 weeks apart with labs done one week prior to each injection with follow up appointment to review.    Mr. Ragland meets criteria and would like to proceed with Xofigo .We discussed the techniques, toxicities and indications of radiation and I answered the patient's questions to their apparent satisfaction.  Informed consent was obtained and will start authorization process with workup. He will continue on abiraterone for now until prior to starting Xofigo; but will need to stop prior.    I spent approximately 60 minutes reviewing the available records and evaluating the patient, out of which over 50% of the time was spent face to face with the patient in counseling and coordinating this patient's care.    Amber Chambers III, M.D.  Radiation Oncology  [facility] [address] [place] [address]  Ph: [phone]  [email or web address]

## 2020-10-22 NOTE — LETTER
October 23, 2020      Felix Irvin MD  [address]  [place] [address]            [facility]  [address] [facility] [address]  Phone: [phone]  Fax: [phone]          Patient: Sherif Ragland   MR Number: [id number]   YOB: 1945   Date of Visit: 10/22/2020       Dear Dr. Felix Irvin:    Thank you for referring Sherif Ragland to me for evaluation. Attached you will find relevant portions of my assessment and plan of care.    If you have questions, please do not hesitate to call me. I look forward to following Sherif Ragland along with you.    Sincerely,    Amber Chambers III, MD    Enclosure  CC:  No Recipients    If you would like to receive this communication electronically, please contact [email or web address] or [phone] to request more information on [employer] access.    For providers and/or their staff who would like to refer a patient to [facility], please contact us through our one-stop-shop provider referral line, [facility], at [phone].    If you feel you have received this communication in error or would no longer like to receive these types of communications, please e-mail [email or web address]

## 2020-10-22 NOTE — PRE-PROCEDURE INSTRUCTIONS
PAT call completed and patient educated on the bowel prep, clear liquid diet and procedure instructions. Medical history discussed and patient informed of arrival time 6:00AM and 2nd prep dose 2:00 AM. Pt will be accompanied by wife, Mita and is made aware of limited-visitor policy, and is made aware that  is to remain during entire visit. All questions and concerns addressed. Bowel prep ordered to patient's verified pharmacy and copy of instructions sent via my chart today. Informed to take AM medications listed in my chart message. NPO after 2nd bowel prep. Patient verbalizes understanding of teaching and all instructions. Pre-procedure Covid testing scheduled at the [facility] 10/24/20 at 9:00 am. Patient aware.

## 2020-10-24 ENCOUNTER — LAB VISIT (OUTPATIENT)
Dept: URGENT CARE | Facility: CLINIC | Age: 75
End: 2020-10-24
Payer: MEDICARE

## 2020-10-24 VITALS — TEMPERATURE: 98 F | OXYGEN SATURATION: 96 % | HEART RATE: 80 BPM

## 2020-10-24 DIAGNOSIS — C61 PROSTATE CANCER METASTATIC TO MULTIPLE SITES: ICD-10-CM

## 2020-10-24 PROCEDURE — U0003 INFECTIOUS AGENT DETECTION BY NUCLEIC ACID (DNA OR RNA); SEVERE ACUTE RESPIRATORY SYNDROME CORONAVIRUS 2 (SARS-COV-2) (CORONAVIRUS DISEASE [COVID-19]), AMPLIFIED PROBE TECHNIQUE, MAKING USE OF HIGH THROUGHPUT TECHNOLOGIES AS DESCRIBED BY CMS-2020-01-R: HCPCS

## 2020-10-25 LAB — SARS-COV-2 RNA RESP QL NAA+PROBE: NOT DETECTED

## 2020-10-26 ENCOUNTER — ANESTHESIA EVENT (OUTPATIENT)
Dept: ENDOSCOPY | Facility: HOSPITAL | Age: 75
End: 2020-10-26
Payer: MEDICARE

## 2020-10-26 ENCOUNTER — TELEPHONE (OUTPATIENT)
Dept: URGENT CARE | Facility: CLINIC | Age: 75
End: 2020-10-26

## 2020-10-26 NOTE — ANESTHESIA PREPROCEDURE EVALUATION
10/26/2020  Sherif Ragland is a 74 y.o., male with iron deficiency anemia scheduled for EGD and Colonoscopy.    Anesthesia Evaluation    I have reviewed the Patient Summary Reports.    I have reviewed the Nursing Notes. I have reviewed the NPO Status.   I have reviewed the Medications.     Review of Systems  Anesthesia Hx:  No problems with previous Anesthesia  History of prior surgery of interest to airway management or planning: Previous anesthesia: General Airway issues documented on chart review include laryngeal mask airway used    Social:  Non-Smoker, No Alcohol Use    Hematology/Oncology:         -- Anemia: --  Cancer in past history:  Other (see Oncology comments) Oncology Comments: Prostate Ca with bone mets     Cardiovascular:   Hypertension hyperlipidemia Cards w/u :  Stress NEG for ischemia, echo with NL EF.   Renal/:   Chronic Renal Disease, CRI    Hepatic/GI:   GERD Tubular adenoma of colon   Neurological:   Post herpetic neuralgia   Endocrine:   Diabetes, type 2, using insulin        Physical Exam  General:  Obesity    Airway/Jaw/Neck:  Airway Findings: Mouth Opening: Normal Tongue: Normal  General Airway Assessment: Adult  Mallampati: II  TM Distance: Normal, at least 6 cm      Dental:  Dental Findings: In tact, Upper front caps, Lower front caps, Molar caps         Mental Status:  Mental Status Findings:  Cooperative, Alert and Oriented         Anesthesia Plan  Type of Anesthesia, risks & benefits discussed:  Anesthesia Type:  general  Patient's Preference:   Intra-op Monitoring Plan: standard ASA monitors  Intra-op Monitoring Plan Comments:   Post Op Pain Control Plan: per primary service following discharge from PACU  Post Op Pain Control Plan Comments:   Induction:    Beta Blocker:  Patient is on a Beta-Blocker and has received one dose within the past 24 hours (No further documentation  required).       Informed Consent: Patient understands risks and agrees with Anesthesia plan.  Questions answered. Anesthesia consent signed with patient.  ASA Score: 3     Day of Surgery Review of History & Physical: I have interviewed and examined the patient. I have reviewed the patient's H&P dated:  There are no significant changes.  H&P update referred to the provider.         Ready For Surgery From Anesthesia Perspective.

## 2020-10-27 ENCOUNTER — PATIENT MESSAGE (OUTPATIENT)
Dept: RADIATION ONCOLOGY | Facility: CLINIC | Age: 75
End: 2020-10-27

## 2020-10-27 ENCOUNTER — HOSPITAL ENCOUNTER (OUTPATIENT)
Facility: HOSPITAL | Age: 75
Discharge: HOME | End: 2020-10-27
Attending: INTERNAL MEDICINE | Admitting: INTERNAL MEDICINE
Payer: MEDICARE

## 2020-10-27 ENCOUNTER — ANESTHESIA (OUTPATIENT)
Dept: ENDOSCOPY | Facility: HOSPITAL | Age: 75
End: 2020-10-27
Payer: MEDICARE

## 2020-10-27 VITALS
BODY MASS INDEX: 35.83 KG/M2 | SYSTOLIC BLOOD PRESSURE: 177 MMHG | RESPIRATION RATE: 17 BRPM | WEIGHT: 264.56 LBS | HEART RATE: 85 BPM | TEMPERATURE: 98 F | DIASTOLIC BLOOD PRESSURE: 75 MMHG | OXYGEN SATURATION: 95 % | HEIGHT: 72 IN

## 2020-10-27 DIAGNOSIS — D50.9 IDA (IRON DEFICIENCY ANEMIA): Primary | ICD-10-CM

## 2020-10-27 DIAGNOSIS — D50.0 IRON DEFICIENCY ANEMIA DUE TO CHRONIC BLOOD LOSS: ICD-10-CM

## 2020-10-27 LAB — POCT GLUCOSE: 235 MG/DL (ref 70–110)

## 2020-10-27 PROCEDURE — 88305 TISSUE EXAM BY PATHOLOGIST: CPT | Performed by: PATHOLOGY

## 2020-10-27 PROCEDURE — 88305 TISSUE EXAM BY PATHOLOGIST: CPT | Mod: 26,,, | Performed by: PATHOLOGY

## 2020-10-27 PROCEDURE — 43239 EGD BIOPSY SINGLE/MULTIPLE: CPT | Mod: 51,,, | Performed by: INTERNAL MEDICINE

## 2020-10-27 PROCEDURE — 45378 DIAGNOSTIC COLONOSCOPY: CPT | Mod: ,,, | Performed by: INTERNAL MEDICINE

## 2020-10-27 PROCEDURE — 45378 PR COLONOSCOPY,DIAGNOSTIC: ICD-10-PCS | Mod: ,,, | Performed by: INTERNAL MEDICINE

## 2020-10-27 PROCEDURE — 43239 EGD BIOPSY SINGLE/MULTIPLE: CPT | Performed by: INTERNAL MEDICINE

## 2020-10-27 PROCEDURE — 88305 TISSUE EXAM BY PATHOLOGIST: ICD-10-PCS | Mod: 26,,, | Performed by: PATHOLOGY

## 2020-10-27 PROCEDURE — 43239 PR EGD, FLEX, W/BIOPSY, SGL/MULTI: ICD-10-PCS | Mod: 51,,, | Performed by: INTERNAL MEDICINE

## 2020-10-27 PROCEDURE — 37000009 HC ANESTHESIA EA ADD 15 MINS: Performed by: INTERNAL MEDICINE

## 2020-10-27 PROCEDURE — D9220A PRA ANESTHESIA: Mod: CRNA,,, | Performed by: NURSE ANESTHETIST, CERTIFIED REGISTERED

## 2020-10-27 PROCEDURE — 25000003 PHARM REV CODE 250: Performed by: NURSE ANESTHETIST, CERTIFIED REGISTERED

## 2020-10-27 PROCEDURE — 45378 DIAGNOSTIC COLONOSCOPY: CPT | Performed by: INTERNAL MEDICINE

## 2020-10-27 PROCEDURE — D9220A PRA ANESTHESIA: ICD-10-PCS | Mod: CRNA,,, | Performed by: NURSE ANESTHETIST, CERTIFIED REGISTERED

## 2020-10-27 PROCEDURE — D9220A PRA ANESTHESIA: ICD-10-PCS | Mod: ANES,,, | Performed by: ANESTHESIOLOGY

## 2020-10-27 PROCEDURE — 27201012 HC FORCEPS, HOT/COLD, DISP: Performed by: INTERNAL MEDICINE

## 2020-10-27 PROCEDURE — 63600175 PHARM REV CODE 636 W HCPCS: Performed by: ANESTHESIOLOGY

## 2020-10-27 PROCEDURE — 37000008 HC ANESTHESIA 1ST 15 MINUTES: Performed by: INTERNAL MEDICINE

## 2020-10-27 PROCEDURE — 63600175 PHARM REV CODE 636 W HCPCS: Performed by: NURSE ANESTHETIST, CERTIFIED REGISTERED

## 2020-10-27 PROCEDURE — D9220A PRA ANESTHESIA: Mod: ANES,,, | Performed by: ANESTHESIOLOGY

## 2020-10-27 RX ORDER — SODIUM CHLORIDE, SODIUM LACTATE, POTASSIUM CHLORIDE, CALCIUM CHLORIDE 600; 310; 30; 20 MG/100ML; MG/100ML; MG/100ML; MG/100ML
INJECTION, SOLUTION INTRAVENOUS CONTINUOUS
Status: DISCONTINUED | OUTPATIENT
Start: 2020-10-27 | End: 2020-10-27 | Stop reason: HOSPADM

## 2020-10-27 RX ORDER — ONDANSETRON 2 MG/ML
4 INJECTION INTRAMUSCULAR; INTRAVENOUS DAILY PRN
Status: DISCONTINUED | OUTPATIENT
Start: 2020-10-27 | End: 2020-10-27 | Stop reason: HOSPADM

## 2020-10-27 RX ORDER — PROPOFOL 10 MG/ML
VIAL (ML) INTRAVENOUS CONTINUOUS PRN
Status: DISCONTINUED | OUTPATIENT
Start: 2020-10-27 | End: 2020-10-27

## 2020-10-27 RX ORDER — LIDOCAINE HYDROCHLORIDE 20 MG/ML
INJECTION INTRAVENOUS
Status: DISCONTINUED | OUTPATIENT
Start: 2020-10-27 | End: 2020-10-27

## 2020-10-27 RX ORDER — PANTOPRAZOLE SODIUM 40 MG/1
40 TABLET, DELAYED RELEASE ORAL DAILY
Qty: 30 TABLET | Refills: 3 | Status: SHIPPED | OUTPATIENT
Start: 2020-10-27 | End: 2021-03-31 | Stop reason: SDUPTHER

## 2020-10-27 RX ADMIN — Medication 50 MG: at 06:10

## 2020-10-27 RX ADMIN — PROPOFOL 150 MCG/KG/MIN: 10 INJECTION, EMULSION INTRAVENOUS at 06:10

## 2020-10-27 RX ADMIN — GLYCOPYRROLATE 0.1 MG: 0.2 INJECTION, SOLUTION INTRAMUSCULAR; INTRAVITREAL at 07:10

## 2020-10-27 RX ADMIN — SODIUM CHLORIDE, SODIUM LACTATE, POTASSIUM CHLORIDE, AND CALCIUM CHLORIDE: 600; 310; 30; 20 INJECTION, SOLUTION INTRAVENOUS at 06:10

## 2020-10-27 NOTE — PROVATION PATIENT INSTRUCTIONS
Discharge Summary/Instructions after an Endoscopic Procedure  Patient Name: Sherif Ragland  Patient MRN: [id number]  Patient YOB: 1945  Tuesday, October 27, 2020  Aditi Grijalva MD  RESTRICTIONS:  During your procedure today, you received medications for sedation.  These   medications may affect your judgment, balance and coordination.  Therefore,   for 24 hours, you have the following restrictions:   - DO NOT drive a car, operate machinery, make legal/financial decisions,   sign important papers or drink alcohol.    ACTIVITY:  Today: no heavy lifting, straining or running due to procedural   sedation/anesthesia.  The following day: return to full activity including work.  DIET:  Eat and drink normally unless instructed otherwise.     TREATMENT FOR COMMON SIDE EFFECTS:  - Mild abdominal pain, nausea, belching, bloating or excessive gas:  rest,   eat lightly and use a heating pad.  - Sore Throat: treat with throat lozenges and/or gargle with warm salt   water.  - Because air was used during the procedure, expelling large amounts of air   from your rectum or belching is normal.  - If a bowel prep was taken, you may not have a bowel movement for 1-3 days.    This is normal.  SYMPTOMS TO WATCH FOR AND REPORT TO YOUR PHYSICIAN:  1. Abdominal pain or bloating, other than gas cramps.  2. Chest pain.  3. Back pain.  4. Signs of infection such as: chills or fever occurring within 24 hours   after the procedure.  5. Rectal bleeding, which would show as bright red, maroon, or black stools.   (A tablespoon of blood from the rectum is not serious, especially if   hemorrhoids are present.)  6. Vomiting.  7. Weakness or dizziness.  GO DIRECTLY TO THE NEAREST EMERGENCY ROOM IF YOU HAVE ANY OF THE FOLLOWING:      Difficulty breathing              Chills and/or fever over 101 F   Persistent vomiting and/or vomiting blood   Severe abdominal pain   Severe chest pain   Black, tarry stools   Bleeding- more than one  tablespoon   Any other symptom or condition that you feel may need urgent attention  Your doctor recommends these additional instructions:  If any biopsies were taken, your doctors clinic will contact you in 1 to 2   weeks with any results.  - Patient has a contact number available for emergencies.  The signs and   symptoms of potential delayed complications were discussed with the   patient.  Return to normal activities tomorrow.  Written discharge   instructions were provided to the patient.   - Discharge patient to home (via wheelchair).   - Resume previous diet today.   - Continue present medications.   - Await pathology results.   - Use Protonix (pantoprazole) 40 mg PO once daily today.  For questions, problems or results please call your physician Aditi Grijalva MD at Work:  [phone]  If you have any questions about the above instructions, call the GI   department at [phone] or call the endoscopy unit at [phone]   from 7am until 3 pm.  [facility], EMERGENCY ROOM PHONE NUMBER:   [phone]  IF A COMPLICATION OR EMERGENCY SITUATION ARISES AND YOU ARE UNABLE TO REACH   YOUR PHYSICIAN - GO DIRECTLY TO THE EMERGENCY ROOM.  I have read or have had read to me these discharge instructions for my   procedure and have received a written copy.  I understand these   instructions and will follow-up with my physician if I have any questions.     __________________________________       _____________________________________  Nurse Signature                                          Patient/Designated   Responsible Party Signature  MD Aditi Reilly MD  10/27/2020 7:28:54 AM  This report has been verified and signed electronically.  PROVATION

## 2020-10-27 NOTE — TRANSFER OF CARE
Anesthesia Transfer of Care Note    Patient: Sherif Ragland    Procedure(s) Performed: Procedure(s) (LRB):  ESOPHAGOGASTRODUODENOSCOPY (EGD) (N/A)  COLONOSCOPY (N/A)    Patient location: PACU    Anesthesia Type: general    Transport from OR: Transported from OR on 2-3 L/min O2 by NC with adequate spontaneous ventilation    Post pain: adequate analgesia    Post assessment: no apparent anesthetic complications    Post vital signs: stable    Level of consciousness: awake    Nausea/Vomiting: no nausea/vomiting    Complications: none    Transfer of care protocol was followed      Last vitals:   Visit Vitals  BP (!) 188/84   Pulse 92   Temp 36.6 °C (97.9 °F)   Resp 18   Ht 6' (1.829 m)   Wt 120 kg (264 lb 8.8 oz)   SpO2 95%   BMI 35.88 kg/m²

## 2020-10-27 NOTE — H&P
PRE PROCEDURE H&P    Patient Name: Sherif Ragland  MRN: [id number]  : 1945  Date of Procedure:  10/27/2020  Referring Physician: Felix Irvin MD  Primary Physician: Tobias Fox MD  Procedure Physician: Aditi Grijalva MD       Planned Procedure: Colonoscopy and EGD  Diagnosis: iron deficiency anemia  Chief Complaint: Same as above    HPI: Patient is an 74 y.o. male is here for the above.     Last colonoscopy: 1 year ago   Family history: negative   Anticoagulation: none     Past Medical History:   Past Medical History:   Diagnosis Date    CKD (chronic kidney disease) stage 3, GFR 30-59 ml/min     Diabetes mellitus, type 2 1997    Hyperlipidemia     Hypertension     Hypogonadism in male     Prostate cancer metastatic to bone     Tubular adenoma 10/2017        Past Surgical History:  Past Surgical History:   Procedure Laterality Date    CATARACT EXTRACTION Right 2018    CATARACT EXTRACTION W/  INTRAOCULAR LENS IMPLANT Left 2019    COLONOSCOPY N/A 2017    Procedure: COLONOSCOPY;  Surgeon: Alek Francois MD;  Location: [facility] ENDO;  Service: Endoscopy;  Laterality: N/A;    TIBIA FRACTURE SURGERY      right leg x2     TONSILLECTOMY      TRANSURETHRAL RESECTION OF PROSTATE N/A 10/17/2019    Procedure: TURP (TRANSURETHRAL RESECTION OF PROSTATE);  Surgeon: Mir Hale IV, MD;  Location: [facility] OR;  Service: Urology;  Laterality: N/A;        Home Medications:  Prior to Admission medications    Medication Sig Start Date End Date Taking? Authorizing Provider   ALPRAZolam (XANAX) 0.25 MG tablet TAKE 1 TO 2 TABLETS BY MOUTH ONCE DAILY AS NEEDED FOR ANXIETY 10/9/20  Yes Vianey Garcia MD   amLODIPine (NORVASC) 5 MG tablet TAKE 2 TABLETS BY MOUTH ONCE DAILY 19  Yes Tobias Fox MD   bicalutamide (CASODEX) 50 MG Tab Take 1 tablet (50 mg total) by mouth once daily. 7/15/20  Yes Felix Irvin MD   bisacodyL (DULCOLAX) 5 mg EC tablet Take 5 mg by mouth daily as  needed.   Yes Historical Provider   cloNIDine (CATAPRES) 0.2 MG tablet Take by mouth.   Yes Historical Provider   docusate sodium (COLACE) 100 MG capsule Take 1 capsule (100 mg total) by mouth 2 (two) times daily.  Patient taking differently: Take 100 mg by mouth 2 (two) times daily as needed.  4/29/20  Yes Cami Alaniz NP   HYDROcodone-acetaminophen (NORCO)  mg per tablet Take 1 tablet by mouth every 4 (four) hours as needed. 10/20/20  Yes Felix Irvin MD   insulin detemir U-100 (LEVEMIR FLEXTOUCH U-100 INSULN) 100 unit/mL (3 mL) InPn pen Inject 50 Units into the skin 2 (two) times daily. 10/12/20  Yes BONG Miller   lactulose (CHRONULAC) 10 gram/15 mL solution Take 15 mLs (10 g total) by mouth 2 (two) times daily. 8/12/20  Yes Felix Irvin MD   metoprolol succinate (TOPROL-XL) 100 MG 24 hr tablet Take 1 tablet by mouth twice daily 3/6/20  Yes Tobias Fox MD   ondansetron (ZOFRAN) 8 MG tablet Take 1 tablet (8 mg total) by mouth every 12 (twelve) hours as needed for Nausea. 3/23/20 3/23/21 Yes Cami Alaniz NP   potassium chloride SA (K-DUR,KLOR-CON) 20 MEQ tablet Take 1 tablet (20 mEq total) by mouth 2 (two) times daily. 9/3/20  Yes Tobias Fox MD   predniSONE (DELTASONE) 5 MG tablet Take 1 tablet (5 mg total) by mouth once daily. 3/18/20  Yes Felix Irvin MD   repaglinide (PRANDIN) 0.5 MG tablet Take 1 tablet (0.5 mg total) by mouth 3 (three) times daily before meals. 8/13/20 8/13/21 Yes BONG Miller   terazosin (HYTRIN) 5 MG capsule Take 1 capsule by mouth.   Yes Historical Provider   valsartan-hydrochlorothiazide (DIOVAN-HCT) 320-25 mg per tablet Take by mouth.   Yes Historical Provider   zolpidem (AMBIEN) 5 MG Tab Take 1 tablet (5 mg total) by mouth nightly as needed. 9/15/20  Yes Felix Irvin MD   abiraterone (ZYTIGA) 250 mg Tab Take 4 tablets (1,000 mg total) by mouth once daily. 6/15/20 6/15/21  Felix Irvin MD   blood-glucose meter kit To check BG two  times daily, to use with insurance preferred meter 4/13/20 11/13/21  Tobias Fox MD   furosemide (LASIX) 40 MG tablet Take 1 tablet (40 mg total) by mouth once daily. 5/15/20   Tobias Fox MD   predniSONE (DELTASONE) 5 MG tablet Take 1 tablet (5 mg total) by mouth once daily. 3/30/20   Felix Irvin MD        Allergies:  Review of patient's allergies indicates:  No Known Allergies     Social History:   Social History     Socioeconomic History    Marital status:      Spouse name: Not on file    Number of children: 4    Years of education: Not on file    Highest education level: Not on file   Occupational History    Occupation: [employer]   Social Needs    Financial resource strain: Not on file    Food insecurity     Worry: Not on file     Inability: Not on file    Transportation needs     Medical: Not on file     Non-medical: Not on file   Tobacco Use    Smoking status: Never Smoker    Smokeless tobacco: Never Used   Substance and Sexual Activity    Alcohol use: No    Drug use: No    Sexual activity: Yes     Partners: Female   Lifestyle    Physical activity     Days per week: Not on file     Minutes per session: Not on file    Stress: Not on file   Relationships    Social connections     Talks on phone: Not on file     Gets together: Not on file     Attends Orthodoxy service: Not on file     Active member of club or organization: Not on file     Attends meetings of clubs or organizations: Not on file     Relationship status: Not on file   Other Topics Concern    Not on file   Social History Narrative    Surrogate decision maker, Wife, Mita Ragland [phone]       Family History:  Family History   Problem Relation Age of Onset    Stroke Mother     Heart disease Father     Stroke Brother        ROS: No acute cardiac events, no acute respiratory complaints.     Physical Exam (all patients):    BP (!) 188/84   Pulse 92   Temp 97.9 °F (36.6 °C)   Resp 18   Ht 6' (1.829  m)   Wt 120 kg (264 lb 8.8 oz)   SpO2 95%   BMI 35.88 kg/m²   Lungs: Clear to auscultation bilaterally, respirations unlabored  Heart: Regular rate and rhythm, S1 and S2 normal, no obvious murmurs  Abdomen:         Soft, non-tender, bowel sounds normal, no masses, no organomegaly    Lab Results   Component Value Date    WBC 10.31 10/05/2020    MCV 89 10/05/2020    RDW 12.5 10/05/2020     10/05/2020    INR 1.0 02/13/2020     (H) 10/05/2020    HGBA1C 10.3 (H) 08/12/2020    BUN 22 10/05/2020     10/05/2020    K 3.8 10/05/2020    CL 99 10/05/2020        SEDATION PLAN: per anesthesia      History reviewed, vital signs satisfactory, cardiopulmonary status satisfactory, sedation options, risks and plans have been discussed with the patient  All their questions were answered and the patient agrees to the sedation procedures as planned and the patient is deemed an appropriate candidate for the sedation as planned.    Procedure explained to patient, informed consent obtained and placed in chart.    Aditi Grijalva  10/27/2020  6:50 AM

## 2020-10-27 NOTE — PROVATION PATIENT INSTRUCTIONS
Discharge Summary/Instructions after an Endoscopic Procedure  Patient Name: Sherif Ragland  Patient MRN: [id number]  Patient YOB: 1945  Tuesday, October 27, 2020  Aditi Grijalva MD  RESTRICTIONS:  During your procedure today, you received medications for sedation.  These   medications may affect your judgment, balance and coordination.  Therefore,   for 24 hours, you have the following restrictions:   - DO NOT drive a car, operate machinery, make legal/financial decisions,   sign important papers or drink alcohol.    ACTIVITY:  Today: no heavy lifting, straining or running due to procedural   sedation/anesthesia.  The following day: return to full activity including work.  DIET:  Eat and drink normally unless instructed otherwise.     TREATMENT FOR COMMON SIDE EFFECTS:  - Mild abdominal pain, nausea, belching, bloating or excessive gas:  rest,   eat lightly and use a heating pad.  - Sore Throat: treat with throat lozenges and/or gargle with warm salt   water.  - Because air was used during the procedure, expelling large amounts of air   from your rectum or belching is normal.  - If a bowel prep was taken, you may not have a bowel movement for 1-3 days.    This is normal.  SYMPTOMS TO WATCH FOR AND REPORT TO YOUR PHYSICIAN:  1. Abdominal pain or bloating, other than gas cramps.  2. Chest pain.  3. Back pain.  4. Signs of infection such as: chills or fever occurring within 24 hours   after the procedure.  5. Rectal bleeding, which would show as bright red, maroon, or black stools.   (A tablespoon of blood from the rectum is not serious, especially if   hemorrhoids are present.)  6. Vomiting.  7. Weakness or dizziness.  GO DIRECTLY TO THE NEAREST EMERGENCY ROOM IF YOU HAVE ANY OF THE FOLLOWING:      Difficulty breathing              Chills and/or fever over 101 F   Persistent vomiting and/or vomiting blood   Severe abdominal pain   Severe chest pain   Black, tarry stools   Bleeding- more than one  tablespoon   Any other symptom or condition that you feel may need urgent attention  Your doctor recommends these additional instructions:  If any biopsies were taken, your doctors clinic will contact you in 1 to 2   weeks with any results.  - Patient has a contact number available for emergencies.  The signs and   symptoms of potential delayed complications were discussed with the   patient.  Return to normal activities tomorrow.  Written discharge   instructions were provided to the patient.   - Discharge patient to home (via wheelchair).   - Resume previous diet today.   - Continue present medications.   - Repeat colonoscopy is not recommended due to current age (66 years or   older) for screening purposes.  For questions, problems or results please call your physician Aditi Grijalva MD at Work:  [phone]  If you have any questions about the above instructions, call the GI   department at [phone] or call the endoscopy unit at [phone]   from 7am until 3 pm.  [facility], EMERGENCY ROOM PHONE NUMBER:   [phone]  IF A COMPLICATION OR EMERGENCY SITUATION ARISES AND YOU ARE UNABLE TO REACH   YOUR PHYSICIAN - GO DIRECTLY TO THE EMERGENCY ROOM.  I have read or have had read to me these discharge instructions for my   procedure and have received a written copy.  I understand these   instructions and will follow-up with my physician if I have any questions.     __________________________________       _____________________________________  Nurse Signature                                          Patient/Designated   Responsible Party Signature  MD Aditi Reilly MD  10/27/2020 7:32:16 AM  This report has been verified and signed electronically.  PROVATION

## 2020-10-27 NOTE — DISCHARGE INSTRUCTIONS
Diverticulosis    Diverticulosis means that small pouches have formed in the wall of your large intestine (colon). Most often, this problem causes no symptoms and is common as people age. But the pouches in the colon are at risk of becoming infected. When this happens, the condition is called diverticulitis. Although most people with diverticulosis never develop diverticulitis, it is still not uncommon. Rectal bleeding can also occur and in less common situations, a type of colon inflammation called colitis.  While most people do not have symptoms, some people with diverticulosis may have:  · Abdominal cramps and pain  · Bloating  · Constipation  · Change in bowel habits  Causes  The exact cause of diverticulosis (and diverticulitis) has not been proved, but a few things are associated with the condition:  · Low-fiber diet  · Constipation  · Lack of exercise  Your healthcare provider will talk with you about how to manage your condition. Diet changes may be all that are needed to help control diverticulosis and prevent progression to diverticulitis. If you develop diverticulitis, you will likely need other treatments.  Home care  You may be told to take fiber supplements daily. Fiber adds bulk to the stool so that it passes through the colon more easily. Stool softeners may be recommended. You may also be given medications for pain relief. Be sure to take all medications as directed.  In the past, people were told to avoid corn, nuts, and seeds. This is no longer necessary.  Follow these guidelines when caring for yourself at home:  · Eat unprocessed foods that are high in fiber. Whole grains, fruits, and vegetables are good choices.  · Drink 6 to 8 glasses of water every day unless your healthcare provider has you limit how much fluid you should have.  · Watch for changes in your bowel movements. Tell your provider if you notice any changes.  · Begin an exercise program. Ask your provider how to get started.  Generally, walking is the best.  · Get plenty of rest and sleep.  Follow-up care  Follow up with your healthcare provider, or as advised. Regular visits may be needed to check on your health. Sometimes special procedures such as colonoscopy, are needed after an episode of diverticulitis or blooding. Be sure to keep all your appointments.  If a stool sample was taken, or cultures were done, you should be told if they are positive, or if your treatment needs to be changed. You can call as directed for the results.  If X-rays were done, a radiologist will look at them. You will be told if there is a change in your treatment.  If antibiotics were prescribed, be sure to finish them all.  When to seek medical advice  Call your healthcare provider right away if any of these occur:  · Fever of 100.4°F (38°C) or higher, or as directed by your healthcare provider  · Severe cramps in the lower left side of the abdomen or pain that is getting worse  · Tenderness in the lower left side of the abdomen or worsening pain throughout the abdomen  · Diarrhea or constipation that doesn't get better within 24 hours  · Nausea and vomiting  · Bleeding from the rectum  Call 911  Call emergency services if any of the following occur:  · Trouble breathing  · Confusion  · Very drowsy or trouble awakening  · Fainting or loss of consciousness  · Rapid heart rate  · Chest pain  Date Last Reviewed: 12/30/2015 © 2000-2017 [employer]. [address] [place] [address]. All rights reserved. This information is not intended as a substitute for professional medical care. Always follow your healthcare professional's instructions.        Upper GI Endoscopy     During endoscopy, a long, flexible tube is used to view the inside of your upper GI tract.      Upper GI endoscopy allows your healthcare provider to look directly into the beginning of your gastrointestinal (GI) tract. The esophagus, stomach, and duodenum (the first part  of the small intestine) make up the upper GI tract.   Before the exam  Follow these and any other instructions you are given before your endoscopy. If you dont follow the healthcare providers instructions carefully, the test may need to be canceled or done over:  · Don't eat or drink anything after midnight the night before your exam. If your exam is in the afternoon, drink only clear liquids in the morning. Don't eat or drink anything for 8 hours before the exam. In some cases, you may be able to take medicines with sips of water until 2 hours before the procedure. Speak with your healthcare provider about this.   · Bring your X-rays and any other test results you have.  · Because you will be sedated, arrange for an adult to drive you home after the exam.  · Tell your healthcare provider before the exam if you are taking any medicines or have any medical problems.  The procedure  Here is what to expect:  · You will lie on the endoscopy table. Usually patients lie on the left side.  · You will be monitored and given oxygen.  · Your throat may be numbed with a spray or gargle. You are given medicine through an intravenous (IV) line that will help you relax and remain comfortable. You may be awake or asleep during the procedure.  · The healthcare provider will put the endoscope in your mouth and down your esophagus. It is thinner than most pieces of food that you swallow. It will not affect your breathing. The medicine helps keep you from gagging.  · Air is put into your GI tract to expand it. It can make you burp.  · During the procedure, the healthcare provider can take biopsies (tissue samples), remove abnormalities, such as polyps, or treat abnormalities through a variety of devices placed through the endoscope. You will not feel this.   · The endoscope carries images of your upper GI tract to a video screen. If you are awake, you may be able to look at the images.  · After the procedure is done, you will rest  for a time. An adult must drive you home.  When to call your healthcare provider  Contact your healthcare provider if you have:  · Black or tarry stools, or blood in your stool  · Fever  · Pain in your belly that does not go away  · Nausea and vomiting, or vomiting blood   Date Last Reviewed: 7/1/2016  © 0902-1255 [employer]. [address] [place] [address], [place] [address]. All rights reserved. This information is not intended as a substitute for professional medical care. Always follow your healthcare professional's instructions.

## 2020-10-27 NOTE — ANESTHESIA POSTPROCEDURE EVALUATION
Anesthesia Post Evaluation    Patient: Sherif Ragland    Procedure(s) Performed: Procedure(s) (LRB):  ESOPHAGOGASTRODUODENOSCOPY (EGD) (N/A)  COLONOSCOPY (N/A)    Final Anesthesia Type: general    Patient location during evaluation: PACU  Patient participation: Yes- Able to Participate  Level of consciousness: awake and alert and oriented  Post-procedure vital signs: reviewed and stable  Pain management: adequate  Airway patency: patent    PONV status at discharge: No PONV  Anesthetic complications: no      Cardiovascular status: blood pressure returned to baseline, stable and hemodynamically stable  Respiratory status: unassisted  Hydration status: euvolemic  Follow-up not needed.          Vitals Value Taken Time   /75 10/27/20 0750   Temp 36.5 °C (97.7 °F) 10/27/20 0729   Pulse 85 10/27/20 0750   Resp 17 10/27/20 0750   SpO2 95 % 10/27/20 0750         Event Time   Out of Recovery 08:03:31         Pain/Jessie Score: Ejssie Score: 10 (10/27/2020  7:50 AM)

## 2020-10-28 ENCOUNTER — PATIENT MESSAGE (OUTPATIENT)
Dept: INTERNAL MEDICINE | Facility: CLINIC | Age: 75
End: 2020-10-28

## 2020-11-02 ENCOUNTER — PATIENT MESSAGE (OUTPATIENT)
Dept: RADIATION ONCOLOGY | Facility: CLINIC | Age: 75
End: 2020-11-02

## 2020-11-03 ENCOUNTER — PATIENT MESSAGE (OUTPATIENT)
Dept: HEMATOLOGY/ONCOLOGY | Facility: CLINIC | Age: 75
End: 2020-11-03

## 2020-11-03 LAB
FINAL PATHOLOGIC DIAGNOSIS: NORMAL
GROSS: NORMAL
Lab: NORMAL

## 2020-11-04 ENCOUNTER — PATIENT MESSAGE (OUTPATIENT)
Dept: GASTROENTEROLOGY | Facility: CLINIC | Age: 75
End: 2020-11-04

## 2020-11-06 ENCOUNTER — NURSE TRIAGE (OUTPATIENT)
Dept: ADMINISTRATIVE | Facility: CLINIC | Age: 75
End: 2020-11-06

## 2020-11-06 NOTE — TELEPHONE ENCOUNTER
Pt called with post procedural symptom tracker  Pt had and EGD and Colonoscopy 10 days ago.and he said that he has been having a cough and phelgm but clear. Pt encouraged to try some mucinex and if any SOB or fever to go to the ED or f'u with PCP will route a message to PCP Reason for Disposition   Cough with no complications    Additional Information   Negative: Bluish (or gray) lips or face   Negative: Severe difficulty breathing (e.g., struggling for each breath, speaks in single words)   Negative: Rapid onset of cough and has hives   Negative: Coughing started suddenly after medicine, an allergic food or bee sting   Negative: Difficulty breathing after exposure to flames, smoke, or fumes   Negative: Sounds like a life-threatening emergency to the triager   Negative: Previous asthma attacks and this feels like asthma attack   Negative: Chest pain present when not coughing   Negative: Difficulty breathing   Negative: Passed out (i.e., fainted, collapsed and was not responding)   Negative: Patient sounds very sick or weak to the triager   Negative: Coughed up > 1 tablespoon (15 ml) blood (Exception: blood-tinged sputum)   Negative: Fever > 103 F (39.4 C)   Negative: Fever > 101 F (38.3 C) and over 60 years of age   Negative: Fever > 100.0 F (37.8 C) and has diabetes mellitus or a weak immune system (e.g., HIV positive, cancer chemotherapy, organ transplant, splenectomy, chronic steroids)   Negative: Fever > 100.0 F (37.8 C) and bedridden (e.g., nursing home patient, stroke, chronic illness, recovering from surgery)   Negative: Increasing ankle swelling   Negative: Wheezing is present   Negative: SEVERE coughing spells (e.g., whooping sound after coughing, vomiting after coughing)   Negative: Coughing up america-colored (reddish-brown) or blood-tinged sputum   Negative: Fever present > 3 days (72 hours)   Negative: Fever returns after gone for over 24 hours and symptoms worse or not improved    Negative: Using nasal washes and pain medicine > 24 hours and sinus pain persists   Negative: Known COPD or other severe lung disease (i.e., bronchiectasis, cystic fibrosis, lung surgery) and worsening symptoms (i.e., increased sputum purulence or amount, increased breathing difficulty)   Negative: Continuous (nonstop) coughing interferes with work or school and no improvement using cough treatment per Care Advice   Negative: Patient wants to be seen   Negative: Cough has been present for > 3 weeks   Negative: Allergy symptoms are also present (e.g., itchy eyes, clear nasal discharge, postnasal drip)   Negative: Nasal discharge present > 10 days   Negative: Exposure to TB (Tuberculosis)   Negative: Taking an ACE Inhibitor medication (e.g., benazepril/LOTENSIN, captopril/CAPOTEN, enalapril/VASOTEC, lisinopril/ZESTRIL)    Protocols used: COUGH-A-OH

## 2020-11-09 ENCOUNTER — LAB VISIT (OUTPATIENT)
Dept: LAB | Facility: HOSPITAL | Age: 75
End: 2020-11-09
Attending: INTERNAL MEDICINE
Payer: MEDICARE

## 2020-11-09 DIAGNOSIS — D50.0 IRON DEFICIENCY ANEMIA DUE TO CHRONIC BLOOD LOSS: ICD-10-CM

## 2020-11-09 LAB
BASOPHILS # BLD AUTO: 0.06 K/UL (ref 0–0.2)
BASOPHILS NFR BLD: 0.7 % (ref 0–1.9)
DIFFERENTIAL METHOD: ABNORMAL
EOSINOPHIL # BLD AUTO: 0.3 K/UL (ref 0–0.5)
EOSINOPHIL NFR BLD: 3.3 % (ref 0–8)
ERYTHROCYTE [DISTWIDTH] IN BLOOD BY AUTOMATED COUNT: 13.7 % (ref 11.5–14.5)
HCT VFR BLD AUTO: 37.3 % (ref 40–54)
HGB BLD-MCNC: 12.1 G/DL (ref 14–18)
IMM GRANULOCYTES # BLD AUTO: 0.06 K/UL (ref 0–0.04)
IMM GRANULOCYTES NFR BLD AUTO: 0.7 % (ref 0–0.5)
LYMPHOCYTES # BLD AUTO: 1.1 K/UL (ref 1–4.8)
LYMPHOCYTES NFR BLD: 13.3 % (ref 18–48)
MCH RBC QN AUTO: 29.2 PG (ref 27–31)
MCHC RBC AUTO-ENTMCNC: 32.4 G/DL (ref 32–36)
MCV RBC AUTO: 90 FL (ref 82–98)
MONOCYTES # BLD AUTO: 0.9 K/UL (ref 0.3–1)
MONOCYTES NFR BLD: 11.1 % (ref 4–15)
NEUTROPHILS # BLD AUTO: 5.9 K/UL (ref 1.8–7.7)
NEUTROPHILS NFR BLD: 70.9 % (ref 38–73)
NRBC BLD-RTO: 0 /100 WBC
PLATELET # BLD AUTO: 331 K/UL (ref 150–350)
PMV BLD AUTO: 9.2 FL (ref 9.2–12.9)
RBC # BLD AUTO: 4.14 M/UL (ref 4.6–6.2)
WBC # BLD AUTO: 8.27 K/UL (ref 3.9–12.7)

## 2020-11-09 PROCEDURE — 83540 ASSAY OF IRON: CPT

## 2020-11-09 PROCEDURE — 36415 COLL VENOUS BLD VENIPUNCTURE: CPT | Mod: PO

## 2020-11-09 PROCEDURE — 80053 COMPREHEN METABOLIC PANEL: CPT

## 2020-11-09 PROCEDURE — 82728 ASSAY OF FERRITIN: CPT

## 2020-11-09 PROCEDURE — 84403 ASSAY OF TOTAL TESTOSTERONE: CPT

## 2020-11-09 PROCEDURE — 85025 COMPLETE CBC W/AUTO DIFF WBC: CPT

## 2020-11-10 ENCOUNTER — TELEPHONE (OUTPATIENT)
Dept: HEMATOLOGY/ONCOLOGY | Facility: CLINIC | Age: 75
End: 2020-11-10

## 2020-11-10 ENCOUNTER — OFFICE VISIT (OUTPATIENT)
Dept: RADIATION ONCOLOGY | Facility: CLINIC | Age: 75
End: 2020-11-10
Payer: MEDICARE

## 2020-11-10 VITALS
HEART RATE: 76 BPM | OXYGEN SATURATION: 95 % | WEIGHT: 286.19 LBS | TEMPERATURE: 97 F | BODY MASS INDEX: 38.76 KG/M2 | RESPIRATION RATE: 18 BRPM | SYSTOLIC BLOOD PRESSURE: 174 MMHG | DIASTOLIC BLOOD PRESSURE: 83 MMHG | HEIGHT: 72 IN

## 2020-11-10 DIAGNOSIS — C79.51 SECONDARY CANCER OF BONE: Primary | ICD-10-CM

## 2020-11-10 DIAGNOSIS — C61 PROSTATE CANCER: ICD-10-CM

## 2020-11-10 LAB
ALBUMIN SERPL BCP-MCNC: 3 G/DL (ref 3.5–5.2)
ALP SERPL-CCNC: 501 U/L (ref 55–135)
ALT SERPL W/O P-5'-P-CCNC: 11 U/L (ref 10–44)
ANION GAP SERPL CALC-SCNC: 11 MMOL/L (ref 8–16)
AST SERPL-CCNC: 15 U/L (ref 10–40)
BILIRUB SERPL-MCNC: 0.6 MG/DL (ref 0.1–1)
BUN SERPL-MCNC: 15 MG/DL (ref 8–23)
CALCIUM SERPL-MCNC: 8.6 MG/DL (ref 8.7–10.5)
CHLORIDE SERPL-SCNC: 97 MMOL/L (ref 95–110)
CO2 SERPL-SCNC: 33 MMOL/L (ref 23–29)
CREAT SERPL-MCNC: 1.6 MG/DL (ref 0.5–1.4)
EST. GFR  (AFRICAN AMERICAN): 48 ML/MIN/1.73 M^2
EST. GFR  (NON AFRICAN AMERICAN): 41.5 ML/MIN/1.73 M^2
FERRITIN SERPL-MCNC: 772 NG/ML (ref 20–300)
GLUCOSE SERPL-MCNC: 233 MG/DL (ref 70–110)
IRON SERPL-MCNC: 69 UG/DL (ref 45–160)
POTASSIUM SERPL-SCNC: 2.8 MMOL/L (ref 3.5–5.1)
PROT SERPL-MCNC: 6.4 G/DL (ref 6–8.4)
SATURATED IRON: 23 % (ref 20–50)
SODIUM SERPL-SCNC: 141 MMOL/L (ref 136–145)
TESTOST SERPL-MCNC: <4 NG/DL (ref 304–1227)
TOTAL IRON BINDING CAPACITY: 300 UG/DL (ref 250–450)
TRANSFERRIN SERPL-MCNC: 203 MG/DL (ref 200–375)

## 2020-11-10 PROCEDURE — 1159F MED LIST DOCD IN RCRD: CPT | Mod: S$GLB,,, | Performed by: RADIOLOGY

## 2020-11-10 PROCEDURE — 1125F AMNT PAIN NOTED PAIN PRSNT: CPT | Mod: S$GLB,,, | Performed by: RADIOLOGY

## 2020-11-10 PROCEDURE — 1125F PR PAIN SEVERITY QUANTIFIED, PAIN PRESENT: ICD-10-PCS | Mod: S$GLB,,, | Performed by: RADIOLOGY

## 2020-11-10 PROCEDURE — 3077F PR MOST RECENT SYSTOLIC BLOOD PRESSURE >= 140 MM HG: ICD-10-PCS | Mod: CPTII,S$GLB,, | Performed by: RADIOLOGY

## 2020-11-10 PROCEDURE — 3077F SYST BP >= 140 MM HG: CPT | Mod: CPTII,S$GLB,, | Performed by: RADIOLOGY

## 2020-11-10 PROCEDURE — 99999 PR PBB SHADOW E&M-EST. PATIENT-LVL V: CPT | Mod: PBBFAC,,, | Performed by: RADIOLOGY

## 2020-11-10 PROCEDURE — 1159F PR MEDICATION LIST DOCUMENTED IN MEDICAL RECORD: ICD-10-PCS | Mod: S$GLB,,, | Performed by: RADIOLOGY

## 2020-11-10 PROCEDURE — 99213 PR OFFICE/OUTPT VISIT, EST, LEVL III, 20-29 MIN: ICD-10-PCS | Mod: S$GLB,,, | Performed by: RADIOLOGY

## 2020-11-10 PROCEDURE — 3079F DIAST BP 80-89 MM HG: CPT | Mod: CPTII,S$GLB,, | Performed by: RADIOLOGY

## 2020-11-10 PROCEDURE — 99213 OFFICE O/P EST LOW 20 MIN: CPT | Mod: S$GLB,,, | Performed by: RADIOLOGY

## 2020-11-10 PROCEDURE — 3079F PR MOST RECENT DIASTOLIC BLOOD PRESSURE 80-89 MM HG: ICD-10-PCS | Mod: CPTII,S$GLB,, | Performed by: RADIOLOGY

## 2020-11-10 PROCEDURE — 99999 PR PBB SHADOW E&M-EST. PATIENT-LVL V: ICD-10-PCS | Mod: PBBFAC,,, | Performed by: RADIOLOGY

## 2020-11-10 PROCEDURE — 1101F PT FALLS ASSESS-DOCD LE1/YR: CPT | Mod: CPTII,S$GLB,, | Performed by: RADIOLOGY

## 2020-11-10 PROCEDURE — 1101F PR PT FALLS ASSESS DOC 0-1 FALLS W/OUT INJ PAST YR: ICD-10-PCS | Mod: CPTII,S$GLB,, | Performed by: RADIOLOGY

## 2020-11-10 NOTE — TELEPHONE ENCOUNTER
Pt left vm today regarding cancelling his appt with Dr. Irvin for today and wondered whether he needed this follow up appt or not.  I have messaged dr Irvin' nurse to check with dr Irvin and get back with pt.  Pt stated understanding when I called him back.

## 2020-11-13 ENCOUNTER — PATIENT MESSAGE (OUTPATIENT)
Dept: INTERNAL MEDICINE | Facility: CLINIC | Age: 75
End: 2020-11-13

## 2020-11-13 ENCOUNTER — SPECIALTY PHARMACY (OUTPATIENT)
Dept: PHARMACY | Facility: CLINIC | Age: 75
End: 2020-11-13

## 2020-11-13 DIAGNOSIS — C41.9 MALIGNANT NEOPLASM OF BONE AND ARTICULAR CARTILAGE, UNSPECIFIED: Primary | ICD-10-CM

## 2020-11-13 NOTE — TELEPHONE ENCOUNTER
Patient was discontinued from Zytiga therapy. Patient transferred to me to discuss proper disposal of remaining tablets. Informed patient that medication should NOT be disposed of in the regular trash due to its hazardous nature. Provided patient with an address to a local pharmacy able to take back the medication for disposal. Patient has no questions or concerns at this time. He is aware to contact [facility] if he needs anything.

## 2020-11-16 DIAGNOSIS — C61 PROSTATE CANCER: Primary | ICD-10-CM

## 2020-11-16 DIAGNOSIS — C79.51 SECONDARY CANCER OF BONE: ICD-10-CM

## 2020-11-17 ENCOUNTER — OFFICE VISIT (OUTPATIENT)
Dept: HEMATOLOGY/ONCOLOGY | Facility: CLINIC | Age: 75
End: 2020-11-17
Payer: MEDICARE

## 2020-11-17 ENCOUNTER — HOSPITAL ENCOUNTER (OUTPATIENT)
Dept: RADIOLOGY | Facility: HOSPITAL | Age: 75
Discharge: HOME | End: 2020-11-17
Attending: RADIOLOGY
Payer: MEDICARE

## 2020-11-17 ENCOUNTER — CLINICAL SUPPORT (OUTPATIENT)
Dept: RADIATION ONCOLOGY | Facility: CLINIC | Age: 75
End: 2020-11-17
Payer: MEDICARE

## 2020-11-17 VITALS
SYSTOLIC BLOOD PRESSURE: 140 MMHG | WEIGHT: 286.63 LBS | DIASTOLIC BLOOD PRESSURE: 73 MMHG | OXYGEN SATURATION: 96 % | HEIGHT: 72 IN | BODY MASS INDEX: 38.82 KG/M2 | TEMPERATURE: 98 F | HEART RATE: 81 BPM

## 2020-11-17 DIAGNOSIS — C61 PROSTATE CANCER METASTATIC TO BONE: Primary | ICD-10-CM

## 2020-11-17 DIAGNOSIS — C79.51 SECONDARY CANCER OF BONE: ICD-10-CM

## 2020-11-17 DIAGNOSIS — C79.51 PROSTATE CANCER METASTATIC TO BONE: Primary | ICD-10-CM

## 2020-11-17 DIAGNOSIS — C61 PROSTATE CANCER: ICD-10-CM

## 2020-11-17 DIAGNOSIS — E66.01 MORBID OBESITY WITH BMI OF 40.0-44.9, ADULT: ICD-10-CM

## 2020-11-17 DIAGNOSIS — C79.51 SECONDARY CANCER OF BONE: Primary | ICD-10-CM

## 2020-11-17 DIAGNOSIS — C41.9 MALIGNANT NEOPLASM OF BONE AND ARTICULAR CARTILAGE, UNSPECIFIED: ICD-10-CM

## 2020-11-17 PROCEDURE — 99499 UNLISTED E&M SERVICE: CPT | Mod: S$GLB,,, | Performed by: RADIOLOGY

## 2020-11-17 PROCEDURE — 1159F PR MEDICATION LIST DOCUMENTED IN MEDICAL RECORD: ICD-10-PCS | Mod: S$GLB,,, | Performed by: INTERNAL MEDICINE

## 2020-11-17 PROCEDURE — 1101F PR PT FALLS ASSESS DOC 0-1 FALLS W/OUT INJ PAST YR: ICD-10-PCS | Mod: CPTII,S$GLB,, | Performed by: INTERNAL MEDICINE

## 2020-11-17 PROCEDURE — 99999 PR PBB SHADOW E&M-EST. PATIENT-LVL IV: CPT | Mod: PBBFAC,,, | Performed by: INTERNAL MEDICINE

## 2020-11-17 PROCEDURE — 3288F FALL RISK ASSESSMENT DOCD: CPT | Mod: CPTII,S$GLB,, | Performed by: INTERNAL MEDICINE

## 2020-11-17 PROCEDURE — 1157F ADVNC CARE PLAN IN RCRD: CPT | Mod: S$GLB,,, | Performed by: INTERNAL MEDICINE

## 2020-11-17 PROCEDURE — 3078F DIAST BP <80 MM HG: CPT | Mod: CPTII,S$GLB,, | Performed by: INTERNAL MEDICINE

## 2020-11-17 PROCEDURE — 3077F PR MOST RECENT SYSTOLIC BLOOD PRESSURE >= 140 MM HG: ICD-10-PCS | Mod: CPTII,S$GLB,, | Performed by: INTERNAL MEDICINE

## 2020-11-17 PROCEDURE — 79101 NUCLEAR RX IV ADMIN: CPT | Mod: S$GLB,,, | Performed by: RADIOLOGY

## 2020-11-17 PROCEDURE — 1101F PT FALLS ASSESS-DOCD LE1/YR: CPT | Mod: CPTII,S$GLB,, | Performed by: INTERNAL MEDICINE

## 2020-11-17 PROCEDURE — 79101 PR  NUCLEAR THERAPY, IV: ICD-10-PCS | Mod: S$GLB,,, | Performed by: RADIOLOGY

## 2020-11-17 PROCEDURE — 1157F PR ADVANCE CARE PLAN OR EQUIV PRESENT IN MEDICAL RECORD: ICD-10-PCS | Mod: S$GLB,,, | Performed by: INTERNAL MEDICINE

## 2020-11-17 PROCEDURE — 99499 NO LOS: ICD-10-PCS | Mod: S$GLB,,, | Performed by: RADIOLOGY

## 2020-11-17 PROCEDURE — 3077F SYST BP >= 140 MM HG: CPT | Mod: CPTII,S$GLB,, | Performed by: INTERNAL MEDICINE

## 2020-11-17 PROCEDURE — 3078F PR MOST RECENT DIASTOLIC BLOOD PRESSURE < 80 MM HG: ICD-10-PCS | Mod: CPTII,S$GLB,, | Performed by: INTERNAL MEDICINE

## 2020-11-17 PROCEDURE — 99213 OFFICE O/P EST LOW 20 MIN: CPT | Mod: S$GLB,,, | Performed by: INTERNAL MEDICINE

## 2020-11-17 PROCEDURE — 1126F PR PAIN SEVERITY QUANTIFIED, NO PAIN PRESENT: ICD-10-PCS | Mod: S$GLB,,, | Performed by: INTERNAL MEDICINE

## 2020-11-17 PROCEDURE — A9606 RADIUM RA223 DICHLORIDE THER: HCPCS | Mod: JG

## 2020-11-17 PROCEDURE — 99999 PR PBB SHADOW E&M-EST. PATIENT-LVL IV: ICD-10-PCS | Mod: PBBFAC,,, | Performed by: INTERNAL MEDICINE

## 2020-11-17 PROCEDURE — 79101 NUCLEAR RX IV ADMIN: CPT | Mod: TC

## 2020-11-17 PROCEDURE — 3288F PR FALLS RISK ASSESSMENT DOCUMENTED: ICD-10-PCS | Mod: CPTII,S$GLB,, | Performed by: INTERNAL MEDICINE

## 2020-11-17 PROCEDURE — 1126F AMNT PAIN NOTED NONE PRSNT: CPT | Mod: S$GLB,,, | Performed by: INTERNAL MEDICINE

## 2020-11-17 PROCEDURE — 99213 PR OFFICE/OUTPT VISIT, EST, LEVL III, 20-29 MIN: ICD-10-PCS | Mod: S$GLB,,, | Performed by: INTERNAL MEDICINE

## 2020-11-17 PROCEDURE — 1159F MED LIST DOCD IN RCRD: CPT | Mod: S$GLB,,, | Performed by: INTERNAL MEDICINE

## 2020-11-17 NOTE — PROGRESS NOTES
Subjective:       Patient ID: Sherif Ragland is a 75 y.o. male.    Chief Complaint: Results and Prostate Cancer    HPI 75-year-old male history of metastatic castrate resistant prostate cancer patient received radium 223 today.  Is concerned over affects of radiation as well as affects in terms of family relations        Past Medical History:   Diagnosis Date    CKD (chronic kidney disease) stage 3, GFR 30-59 ml/min     Diabetes mellitus, type 2 1997    Hyperlipidemia     Hypertension     Hypogonadism in male     Prostate cancer metastatic to bone     Tubular adenoma 10/2017     Family History   Problem Relation Age of Onset    Stroke Mother     Heart disease Father     Stroke Brother      Social History     Socioeconomic History    Marital status:      Spouse name: Not on file    Number of children: 4    Years of education: Not on file    Highest education level: Not on file   Occupational History    Occupation: [employer]   Social Needs    Financial resource strain: Not on file    Food insecurity     Worry: Not on file     Inability: Not on file    Transportation needs     Medical: Not on file     Non-medical: Not on file   Tobacco Use    Smoking status: Never Smoker    Smokeless tobacco: Never Used   Substance and Sexual Activity    Alcohol use: No    Drug use: No    Sexual activity: Yes     Partners: Female   Lifestyle    Physical activity     Days per week: Not on file     Minutes per session: Not on file    Stress: Not on file   Relationships    Social connections     Talks on phone: Not on file     Gets together: Not on file     Attends Anabaptist service: Not on file     Active member of club or organization: Not on file     Attends meetings of clubs or organizations: Not on file     Relationship status: Not on file   Other Topics Concern    Not on file   Social History Narrative    Surrogate decision maker, Wife, Mita Ragland [phone]     Past Surgical History:    Procedure Laterality Date    CATARACT EXTRACTION Right 01/31/2018    CATARACT EXTRACTION W/  INTRAOCULAR LENS IMPLANT Left 03/13/2019    COLONOSCOPY N/A 11/2/2017    Procedure: COLONOSCOPY;  Surgeon: Alek Francois MD;  Location: [facility] ENDO;  Service: Endoscopy;  Laterality: N/A;    COLONOSCOPY N/A 10/27/2020    Procedure: COLONOSCOPY;  Surgeon: Aditi Grijalva MD;  Location: [facility] ENDO;  Service: Endoscopy;  Laterality: N/A;    ESOPHAGOGASTRODUODENOSCOPY N/A 10/27/2020    Procedure: ESOPHAGOGASTRODUODENOSCOPY (EGD);  Surgeon: Aditi Grijalva MD;  Location: [facility] ENDO;  Service: Endoscopy;  Laterality: N/A;    TIBIA FRACTURE SURGERY      right leg x2     TONSILLECTOMY      TRANSURETHRAL RESECTION OF PROSTATE N/A 10/17/2019    Procedure: TURP (TRANSURETHRAL RESECTION OF PROSTATE);  Surgeon: Mir Hale IV, MD;  Location: [facility] OR;  Service: Urology;  Laterality: N/A;       Labs:  Lab Results   Component Value Date    WBC 8.27 11/09/2020    HGB 12.1 (L) 11/09/2020    HCT 37.3 (L) 11/09/2020    MCV 90 11/09/2020     11/09/2020     BMP  Lab Results   Component Value Date     11/09/2020    K 2.8 (L) 11/09/2020    CL 97 11/09/2020    CO2 33 (H) 11/09/2020    BUN 15 11/09/2020    CREATININE 1.6 (H) 11/09/2020    CALCIUM 8.6 (L) 11/09/2020    ANIONGAP 11 11/09/2020    ESTGFRAFRICA 48.0 (A) 11/09/2020    EGFRNONAA 41.5 (A) 11/09/2020     Lab Results   Component Value Date    ALT 11 11/09/2020    AST 15 11/09/2020    GGT 38 02/17/2020    ALKPHOS 501 (H) 11/09/2020    BILITOT 0.6 11/09/2020       Lab Results   Component Value Date    IRON 69 11/09/2020    TIBC 300 11/09/2020    FERRITIN 772 (H) 11/09/2020     No results found for: HZJONHDH33  No results found for: FOLATE  Lab Results   Component Value Date    TSH 2.302 01/04/2019         Review of Systems   Constitutional: Positive for fatigue. Negative for activity change, appetite change, chills, diaphoresis, fever and unexpected weight  change.   HENT: Negative for congestion, dental problem, drooling, ear discharge, ear pain, facial swelling, hearing loss, mouth sores, nosebleeds, postnasal drip, rhinorrhea, sinus pressure, sneezing, sore throat, tinnitus, trouble swallowing and voice change.    Eyes: Negative for photophobia, pain, discharge, redness, itching and visual disturbance.   Respiratory: Negative for apnea, cough, choking, chest tightness, shortness of breath, wheezing and stridor.    Cardiovascular: Negative for chest pain, palpitations and leg swelling.   Gastrointestinal: Negative for abdominal distention, abdominal pain, anal bleeding, blood in stool, constipation, diarrhea, nausea, rectal pain and vomiting.   Endocrine: Negative for cold intolerance, heat intolerance, polydipsia, polyphagia and polyuria.   Genitourinary: Negative for decreased urine volume, difficulty urinating, discharge, dysuria, enuresis, flank pain, frequency, genital sores, hematuria, penile pain, penile swelling, scrotal swelling, testicular pain and urgency.   Musculoskeletal: Positive for arthralgias, gait problem and myalgias. Negative for back pain, joint swelling, neck pain and neck stiffness.   Skin: Negative for color change, pallor, rash and wound.   Allergic/Immunologic: Negative for environmental allergies, food allergies and immunocompromised state.   Neurological: Positive for weakness. Negative for dizziness, tremors, seizures, syncope, facial asymmetry, speech difficulty, light-headedness, numbness and headaches.   Hematological: Negative for adenopathy. Does not bruise/bleed easily.   Psychiatric/Behavioral: Positive for dysphoric mood. Negative for agitation, behavioral problems, confusion, decreased concentration, hallucinations, self-injury, sleep disturbance and suicidal ideas. The patient is nervous/anxious. The patient is not hyperactive.        Objective:      Physical Exam  Vitals signs reviewed.   Constitutional:       General: He is  not in acute distress.     Appearance: He is well-developed. He is not diaphoretic.   HENT:      Head: Normocephalic.      Right Ear: External ear normal.      Left Ear: External ear normal.      Nose: Nose normal.      Right Sinus: No maxillary sinus tenderness or frontal sinus tenderness.      Left Sinus: No maxillary sinus tenderness or frontal sinus tenderness.      Mouth/Throat:      Pharynx: No oropharyngeal exudate.   Eyes:      General: Lids are normal. No scleral icterus.        Right eye: No discharge.         Left eye: No discharge.      Extraocular Movements:      Right eye: Normal extraocular motion.      Left eye: Normal extraocular motion.      Conjunctiva/sclera:      Right eye: Right conjunctiva is not injected. No hemorrhage.     Left eye: Left conjunctiva is not injected. No hemorrhage.     Pupils: Pupils are equal, round, and reactive to light.   Neck:      Musculoskeletal: Normal range of motion and neck supple.      Thyroid: No thyromegaly.      Vascular: No JVD.      Trachea: No tracheal deviation.   Cardiovascular:      Rate and Rhythm: Normal rate.   Pulmonary:      Effort: Pulmonary effort is normal. No respiratory distress.      Breath sounds: No stridor.   Abdominal:      General: Bowel sounds are normal.      Palpations: Abdomen is soft. There is no hepatomegaly, splenomegaly or mass.      Tenderness: There is no abdominal tenderness.   Musculoskeletal: Normal range of motion.         General: No tenderness.   Lymphadenopathy:      Head:      Right side of head: No posterior auricular or occipital adenopathy.      Left side of head: No posterior auricular or occipital adenopathy.      Cervical: No cervical adenopathy.      Right cervical: No superficial, deep or posterior cervical adenopathy.     Left cervical: No superficial, deep or posterior cervical adenopathy.      Upper Body:      Right upper body: No supraclavicular adenopathy.      Left upper body: No supraclavicular adenopathy.    Skin:     General: Skin is dry.      Findings: No erythema or rash.      Nails: There is no clubbing.     Neurological:      Mental Status: He is alert and oriented to person, place, and time.      Cranial Nerves: No cranial nerve deficit.      Coordination: Coordination normal.   Psychiatric:         Behavior: Behavior normal.         Thought Content: Thought content normal.         Judgment: Judgment normal.             Assessment:      1. Prostate cancer metastatic to bone    2. Secondary cancer of bone    3. 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 Morbid obesity with BMI of 40.0-44.9, adult           Plan:     History of metastatic prostate cancer to bone started on radium 223.  Concerned over side effects recommend go back to see radiation oncology to review and discussed.  Will see back in follow-up in 3 weeks        Felix Irvin Jr, MD FACP

## 2020-11-17 NOTE — PROGRESS NOTES
[facility]  XOFIGO ADMINISTRATION PROCEDURE NOTE    Patient Name: Sherif Ragland    MRN: [id number]    Date of Service: 2020    : 1945    Preoperative Diagnosis: Bone metastases from prostate cancer    Postoperative Diagnosis: Same as above    Administering Physician: Amber Chambers M.D.    Injection Number: 1    Calculated Dose: 193.4 uCi    Administered Dose: 197.5 uCi    Procedure: The patient was brought to the Nuclear Medicine department. IV access was started by Nuclear Medicine. The dose of Xofigo was verified by Nuclear Medicine. I verified the calculated and administered doses. I slowly injected the Xofigo over 1 minute; required 2 syringes due to calculated dose. The patient did not experience any acute side effects. I then flushed the Xofigo syringe with 1-3 syringes of saline. The patient tolerated the procedure well and was turned over to Nuclear Medicine for removal of the IV.    I will see him back in 3 weeks for blood work.    Amber Chambers III, M.D.  Radiation Oncology  [facility] [address] [place] [address]  Ph: [phone]  [email or web address]

## 2020-11-19 ENCOUNTER — DOCUMENTATION ONLY (OUTPATIENT)
Dept: HEMATOLOGY/ONCOLOGY | Facility: CLINIC | Age: 75
End: 2020-11-19

## 2020-11-20 ENCOUNTER — DOCUMENTATION ONLY (OUTPATIENT)
Dept: HEMATOLOGY/ONCOLOGY | Facility: CLINIC | Age: 75
End: 2020-11-20

## 2020-11-20 NOTE — NURSING
I called pt this am to check on him and he states he took a pain pill and is feeling much better from yesterday.  No pain at the present time.  I explained to him that I had talked with dr. Chambers and radiation nurses regarding his radiation injection that he had the day before his s/s started and told him that they stated it was probably not related to that at all since that particular injection does not usually cause any type of pain in that area after injection.  Dr. Chambers reviewed pts radiation plan and did not see anything that would indicate pain in the area that the pt described.  I encouraged pt to proceed to the nearest ER if the pain came back .  He did not go to the ER last evening as I instructed him to do . He states he just took a pain pill and it helped and he feels much better today. He states he will go to ER if he has the pain again and denies any other concerns at this time. He has my direct number to call with any further needs.

## 2020-11-27 ENCOUNTER — CLINICAL SUPPORT (OUTPATIENT)
Dept: URGENT CARE | Facility: CLINIC | Age: 75
End: 2020-11-27
Payer: MEDICARE

## 2020-11-27 ENCOUNTER — TELEPHONE (OUTPATIENT)
Dept: INTERNAL MEDICINE | Facility: CLINIC | Age: 75
End: 2020-11-27

## 2020-11-27 ENCOUNTER — PATIENT MESSAGE (OUTPATIENT)
Dept: HEMATOLOGY/ONCOLOGY | Facility: CLINIC | Age: 75
End: 2020-11-27

## 2020-11-27 ENCOUNTER — NURSE TRIAGE (OUTPATIENT)
Dept: ADMINISTRATIVE | Facility: CLINIC | Age: 75
End: 2020-11-27

## 2020-11-27 ENCOUNTER — HOSPITAL ENCOUNTER (EMERGENCY)
Facility: HOSPITAL | Age: 75
Discharge: HOME | End: 2020-11-27
Attending: EMERGENCY MEDICINE
Payer: MEDICARE

## 2020-11-27 ENCOUNTER — PATIENT MESSAGE (OUTPATIENT)
Dept: INTERNAL MEDICINE | Facility: CLINIC | Age: 75
End: 2020-11-27

## 2020-11-27 VITALS
DIASTOLIC BLOOD PRESSURE: 63 MMHG | SYSTOLIC BLOOD PRESSURE: 136 MMHG | HEART RATE: 74 BPM | RESPIRATION RATE: 18 BRPM | TEMPERATURE: 98 F | OXYGEN SATURATION: 97 %

## 2020-11-27 DIAGNOSIS — Z20.822 SUSPECTED COVID-19 VIRUS INFECTION: ICD-10-CM

## 2020-11-27 DIAGNOSIS — U07.1 COVID-19 IN IMMUNOCOMPROMISED PATIENT: Primary | ICD-10-CM

## 2020-11-27 DIAGNOSIS — C41.9 MALIGNANT NEOPLASM OF BONE AND ARTICULAR CARTILAGE, UNSPECIFIED: ICD-10-CM

## 2020-11-27 DIAGNOSIS — U07.1 COVID-19 VIRUS INFECTION: Primary | ICD-10-CM

## 2020-11-27 DIAGNOSIS — I16.0 HYPERTENSIVE URGENCY: ICD-10-CM

## 2020-11-27 DIAGNOSIS — Z20.822 EXPOSURE TO COVID-19 VIRUS: Primary | ICD-10-CM

## 2020-11-27 DIAGNOSIS — D84.9 COVID-19 IN IMMUNOCOMPROMISED PATIENT: Primary | ICD-10-CM

## 2020-11-27 LAB
ALBUMIN SERPL BCP-MCNC: 3.4 G/DL (ref 3.5–5.2)
ALP SERPL-CCNC: 742 U/L (ref 55–135)
ALT SERPL W/O P-5'-P-CCNC: 42 U/L (ref 10–44)
ANION GAP SERPL CALC-SCNC: 14 MMOL/L (ref 8–16)
AST SERPL-CCNC: 52 U/L (ref 10–40)
BASOPHILS # BLD AUTO: 0.02 K/UL (ref 0–0.2)
BASOPHILS NFR BLD: 0.4 % (ref 0–1.9)
BILIRUB SERPL-MCNC: 0.5 MG/DL (ref 0.1–1)
BUN SERPL-MCNC: 19 MG/DL (ref 8–23)
CALCIUM SERPL-MCNC: 8.3 MG/DL (ref 8.7–10.5)
CHLORIDE SERPL-SCNC: 98 MMOL/L (ref 95–110)
CK SERPL-CCNC: 40 U/L (ref 20–200)
CO2 SERPL-SCNC: 25 MMOL/L (ref 23–29)
CREAT SERPL-MCNC: 1.5 MG/DL (ref 0.5–1.4)
CRP SERPL-MCNC: 7.1 MG/L (ref 0–8.2)
CTP QC/QA: YES
DIFFERENTIAL METHOD: ABNORMAL
EOSINOPHIL # BLD AUTO: 0.2 K/UL (ref 0–0.5)
EOSINOPHIL NFR BLD: 3.9 % (ref 0–8)
ERYTHROCYTE [DISTWIDTH] IN BLOOD BY AUTOMATED COUNT: 13.5 % (ref 11.5–14.5)
EST. GFR  (AFRICAN AMERICAN): 52 ML/MIN/1.73 M^2
EST. GFR  (NON AFRICAN AMERICAN): 45 ML/MIN/1.73 M^2
FERRITIN SERPL-MCNC: 1073 NG/ML (ref 20–300)
GLUCOSE SERPL-MCNC: 175 MG/DL (ref 70–110)
HCT VFR BLD AUTO: 40.3 % (ref 40–54)
HGB BLD-MCNC: 13.4 G/DL (ref 14–18)
IMM GRANULOCYTES # BLD AUTO: 0.03 K/UL (ref 0–0.04)
IMM GRANULOCYTES NFR BLD AUTO: 0.6 % (ref 0–0.5)
LACTATE SERPL-SCNC: 1.5 MMOL/L (ref 0.5–2.2)
LDH SERPL L TO P-CCNC: 275 U/L (ref 110–260)
LYMPHOCYTES # BLD AUTO: 0.6 K/UL (ref 1–4.8)
LYMPHOCYTES NFR BLD: 10.6 % (ref 18–48)
MCH RBC QN AUTO: 30 PG (ref 27–31)
MCHC RBC AUTO-ENTMCNC: 33.3 G/DL (ref 32–36)
MCV RBC AUTO: 90 FL (ref 82–98)
MONOCYTES # BLD AUTO: 0.7 K/UL (ref 0.3–1)
MONOCYTES NFR BLD: 13.7 % (ref 4–15)
NEUTROPHILS # BLD AUTO: 3.7 K/UL (ref 1.8–7.7)
NEUTROPHILS NFR BLD: 70.8 % (ref 38–73)
NRBC BLD-RTO: 0 /100 WBC
PLATELET # BLD AUTO: 267 K/UL (ref 150–350)
PMV BLD AUTO: 8.8 FL (ref 9.2–12.9)
POTASSIUM SERPL-SCNC: 3.8 MMOL/L (ref 3.5–5.1)
PROT SERPL-MCNC: 7.2 G/DL (ref 6–8.4)
RBC # BLD AUTO: 4.46 M/UL (ref 4.6–6.2)
SARS-COV-2 RDRP RESP QL NAA+PROBE: POSITIVE
SODIUM SERPL-SCNC: 137 MMOL/L (ref 136–145)
TROPONIN I SERPL DL<=0.01 NG/ML-MCNC: 0.01 NG/ML (ref 0–0.03)
WBC # BLD AUTO: 5.19 K/UL (ref 3.9–12.7)

## 2020-11-27 PROCEDURE — 84484 ASSAY OF TROPONIN QUANT: CPT

## 2020-11-27 PROCEDURE — 99285 EMERGENCY DEPT VISIT HI MDM: CPT | Mod: 25

## 2020-11-27 PROCEDURE — U0002 COVID-19 LAB TEST NON-CDC: HCPCS | Mod: QW,S$GLB,, | Performed by: EMERGENCY MEDICINE

## 2020-11-27 PROCEDURE — U0002: ICD-10-PCS | Mod: QW,S$GLB,, | Performed by: EMERGENCY MEDICINE

## 2020-11-27 PROCEDURE — 83615 LACTATE (LD) (LDH) ENZYME: CPT

## 2020-11-27 PROCEDURE — 80053 COMPREHEN METABOLIC PANEL: CPT

## 2020-11-27 PROCEDURE — 82550 ASSAY OF CK (CPK): CPT

## 2020-11-27 PROCEDURE — 85025 COMPLETE CBC W/AUTO DIFF WBC: CPT

## 2020-11-27 PROCEDURE — 82728 ASSAY OF FERRITIN: CPT

## 2020-11-27 PROCEDURE — 83605 ASSAY OF LACTIC ACID: CPT

## 2020-11-27 PROCEDURE — 25000003 PHARM REV CODE 250: Performed by: EMERGENCY MEDICINE

## 2020-11-27 PROCEDURE — 93010 ELECTROCARDIOGRAM REPORT: CPT | Mod: ,,, | Performed by: INTERNAL MEDICINE

## 2020-11-27 PROCEDURE — 93010 EKG 12-LEAD: ICD-10-PCS | Mod: ,,, | Performed by: INTERNAL MEDICINE

## 2020-11-27 PROCEDURE — 86140 C-REACTIVE PROTEIN: CPT

## 2020-11-27 PROCEDURE — 93005 ELECTROCARDIOGRAM TRACING: CPT

## 2020-11-27 RX ORDER — CLONIDINE HYDROCHLORIDE 0.2 MG/1
0.2 TABLET ORAL
Status: COMPLETED | OUTPATIENT
Start: 2020-11-27 | End: 2020-11-27

## 2020-11-27 RX ORDER — HYDROCODONE BITARTRATE AND ACETAMINOPHEN 10; 325 MG/1; MG/1
1 TABLET ORAL EVERY 4 HOURS PRN
Qty: 60 TABLET | Refills: 0 | Status: SHIPPED | OUTPATIENT
Start: 2020-11-27 | End: 2020-12-27 | Stop reason: SDUPTHER

## 2020-11-27 RX ADMIN — CLONIDINE HYDROCHLORIDE 0.2 MG: 0.2 TABLET ORAL at 11:11

## 2020-11-27 NOTE — ED PROVIDER NOTES
SCRIBE #1 NOTE: I, Pascual Fuller, am scribing for, and in the presence of, Zachery Moralez Jr., MD. I have scribed the entire note.      History      Chief Complaint   Patient presents with    Shortness of Breath     shortness of breath, COVID +.  pt currently being treated for bone CA       Review of patient's allergies indicates:  No Known Allergies     HPI   HPI    11/27/2020, 11:07 AM   History obtained from the patient      History of Present Illness: Sherif Ragland is a 75 y.o. male patient with a PMHx of metastatic prostate cancer, HTN, HLD who presents to the Emergency Department for SOB, onset several days PTA. Pt tested positive for COVID-19 today at [facility], and was referred to the ED by Dr. Irvin (Hem/Onc) for further evaluation. Symptoms are constant and moderate in severity. No mitigating or exacerbating factors reported. Associated sxs include cough. Patient denies any fever, chills, n/v/d, CP, weakness, numbness, dizziness, headache, and all other sxs at this time. Pt states that he took his BP medication this morning. No further complaints or concerns at this time.     Arrival mode: Personal vehicle    PCP: Tobias Fox MD       Past Medical History:  Past Medical History:   Diagnosis Date    CKD (chronic kidney disease) stage 3, GFR 30-59 ml/min     Diabetes mellitus, type 2 1997    Hyperlipidemia     Hypertension     Hypogonadism in male     Prostate cancer metastatic to bone     Tubular adenoma 10/2017       Past Surgical History:  Past Surgical History:   Procedure Laterality Date    CATARACT EXTRACTION Right 01/31/2018    CATARACT EXTRACTION W/  INTRAOCULAR LENS IMPLANT Left 03/13/2019    COLONOSCOPY N/A 11/2/2017    Procedure: COLONOSCOPY;  Surgeon: Alek Francois MD;  Location: [facility];  Service: Endoscopy;  Laterality: N/A;    COLONOSCOPY N/A 10/27/2020    Procedure: COLONOSCOPY;  Surgeon: Aditi Grijalva MD;  Location: [facility];  Service: Endoscopy;   Laterality: N/A;    ESOPHAGOGASTRODUODENOSCOPY N/A 10/27/2020    Procedure: ESOPHAGOGASTRODUODENOSCOPY (EGD);  Surgeon: Aditi Grijalva MD;  Location: [facility];  Service: Endoscopy;  Laterality: N/A;    TIBIA FRACTURE SURGERY      right leg x2     TONSILLECTOMY      TRANSURETHRAL RESECTION OF PROSTATE N/A 10/17/2019    Procedure: TURP (TRANSURETHRAL RESECTION OF PROSTATE);  Surgeon: Mir Hale IV, MD;  Location: [facility];  Service: Urology;  Laterality: N/A;         Family History:  Family History   Problem Relation Age of Onset    Stroke Mother     Heart disease Father     Stroke Brother        Social History:  Social History     Tobacco Use    Smoking status: Never Smoker    Smokeless tobacco: Never Used   Substance and Sexual Activity    Alcohol use: No    Drug use: No    Sexual activity: Yes     Partners: Female       ROS   Review of Systems   Constitutional: Negative for chills and fever.   HENT: Negative for sore throat.    Respiratory: Positive for cough and shortness of breath.    Cardiovascular: Negative for chest pain.   Gastrointestinal: Negative for diarrhea, nausea and vomiting.   Genitourinary: Negative for dysuria.   Musculoskeletal: Negative for back pain.   Skin: Negative for rash.   Neurological: Negative for dizziness, seizures, weakness, light-headedness, numbness and headaches.   Hematological: Does not bruise/bleed easily.   All other systems reviewed and are negative.    Physical Exam      Initial Vitals   BP Pulse Resp Temp SpO2   11/27/20 1057 11/27/20 1057 11/27/20 1115 11/27/20 1100 11/27/20 1057   (!) 219/101 84 20 98.6 °F (37 °C) 97 %      MAP       --                 Physical Exam  Nursing Notes and Vital Signs Reviewed.  Constitutional: Patient is in no acute distress. Well-developed and well-nourished.  Head: Atraumatic. Normocephalic.  Eyes: PERRL. EOM intact. Conjunctivae are not pale. No scleral icterus.  ENT: Mucous membranes are moist. Oropharynx is clear and  symmetric.    Neck: Supple. Full ROM. No lymphadenopathy.  Cardiovascular: Regular rate. Regular rhythm. No murmurs, rubs, or gallops. Distal pulses are 2+ and symmetric.  Pulmonary/Chest: No respiratory distress. Clear to auscultation bilaterally. No wheezing or rales.  Abdominal: Soft and non-distended.  There is no tenderness.  No rebound, guarding, or rigidity. Good bowel sounds.  Genitourinary: No CVA tenderness  Musculoskeletal: Moves all extremities. No obvious deformities. No edema.  Skin: Warm and dry.  Neurological:  Alert, awake, and appropriate.  Normal speech.  No acute focal neurological deficits are appreciated.  Psychiatric: Normal affect. Good eye contact. Appropriate in content.    ED Course    Procedures  ED Vital Signs:  Vitals:    11/27/20 1057 11/27/20 1100 11/27/20 1115   BP: (!) 219/101     Pulse: 84     Resp:   20   Temp:  98.6 °F (37 °C)    TempSrc:  Oral    SpO2: 97%         Abnormal Lab Results:  Labs Reviewed   CBC W/ AUTO DIFFERENTIAL - Abnormal; Notable for the following components:       Result Value    RBC 4.46 (*)     Hemoglobin 13.4 (*)     MPV 8.8 (*)     Immature Granulocytes 0.6 (*)     Lymph # 0.6 (*)     Lymph % 10.6 (*)     All other components within normal limits   COMPREHENSIVE METABOLIC PANEL - Abnormal; Notable for the following components:    Glucose 175 (*)     Creatinine 1.5 (*)     Calcium 8.3 (*)     Albumin 3.4 (*)     Alkaline Phosphatase 742 (*)     AST 52 (*)     eGFR if  52 (*)     eGFR if non  45 (*)     All other components within normal limits   LACTATE DEHYDROGENASE - Abnormal; Notable for the following components:     (*)     All other components within normal limits   C-REACTIVE PROTEIN   CK   LACTIC ACID, PLASMA   TROPONIN I   FERRITIN        All Lab Results:  Results for orders placed or performed during the hospital encounter of 11/27/20   CBC auto differential   Result Value Ref Range    WBC 5.19 3.90 - 12.70 K/uL     RBC 4.46 (L) 4.60 - 6.20 M/uL    Hemoglobin 13.4 (L) 14.0 - 18.0 g/dL    Hematocrit 40.3 40.0 - 54.0 %    MCV 90 82 - 98 fL    MCH 30.0 27.0 - 31.0 pg    MCHC 33.3 32.0 - 36.0 g/dL    RDW 13.5 11.5 - 14.5 %    Platelets 267 150 - 350 K/uL    MPV 8.8 (L) 9.2 - 12.9 fL    Immature Granulocytes 0.6 (H) 0.0 - 0.5 %    Gran # (ANC) 3.7 1.8 - 7.7 K/uL    Immature Grans (Abs) 0.03 0.00 - 0.04 K/uL    Lymph # 0.6 (L) 1.0 - 4.8 K/uL    Mono # 0.7 0.3 - 1.0 K/uL    Eos # 0.2 0.0 - 0.5 K/uL    Baso # 0.02 0.00 - 0.20 K/uL    nRBC 0 0 /100 WBC    Gran % 70.8 38.0 - 73.0 %    Lymph % 10.6 (L) 18.0 - 48.0 %    Mono % 13.7 4.0 - 15.0 %    Eosinophil % 3.9 0.0 - 8.0 %    Basophil % 0.4 0.0 - 1.9 %    Differential Method Automated    Comprehensive metabolic panel   Result Value Ref Range    Sodium 137 136 - 145 mmol/L    Potassium 3.8 3.5 - 5.1 mmol/L    Chloride 98 95 - 110 mmol/L    CO2 25 23 - 29 mmol/L    Glucose 175 (H) 70 - 110 mg/dL    BUN 19 8 - 23 mg/dL    Creatinine 1.5 (H) 0.5 - 1.4 mg/dL    Calcium 8.3 (L) 8.7 - 10.5 mg/dL    Total Protein 7.2 6.0 - 8.4 g/dL    Albumin 3.4 (L) 3.5 - 5.2 g/dL    Total Bilirubin 0.5 0.1 - 1.0 mg/dL    Alkaline Phosphatase 742 (H) 55 - 135 U/L    AST 52 (H) 10 - 40 U/L    ALT 42 10 - 44 U/L    Anion Gap 14 8 - 16 mmol/L    eGFR if African American 52 (A) >60 mL/min/1.73 m^2    eGFR if non African American 45 (A) >60 mL/min/1.73 m^2   C-Reactive Protein   Result Value Ref Range    CRP 7.1 0.0 - 8.2 mg/L   Lactate Dehydrogenase   Result Value Ref Range     (H) 110 - 260 U/L   CK   Result Value Ref Range    CPK 40 20 - 200 U/L   Lactic Acid, Plasma   Result Value Ref Range    Lactate (Lactic Acid) 1.5 0.5 - 2.2 mmol/L   Troponin I   Result Value Ref Range    Troponin I 0.011 0.000 - 0.026 ng/mL     Imaging Results:  Imaging Results          X-Ray Chest AP Portable (Final result)  Result time 11/27/20 11:33:21    Final result by Nathan Barakat MD (11/27/20 11:33:21)                  Impression:      1.  Negative for acute process involving the chest.    2.  Stable findings as noted above.      Electronically signed by: Nathan Barakat MD  Date:    11/27/2020  Time:    11:33             Narrative:    EXAMINATION:  XR CHEST AP PORTABLE    CLINICAL HISTORY:  Suspected Covid-19 Virus Infection;    COMPARISON:  Studies dating back to January 4, 2019    FINDINGS:  The study is slightly rotated to the left and kyphotic in position.  The patient's chin obscures the medial left lung apex.  Stable elevation of the right hemidiaphragm with chronic adjacent scarring.  The lungs are free of new pulmonary opacities.  The cardiac silhouette size is borderline enlarged.  The trachea is midline and the mediastinal width is normal. Negative for effusion or pneumothorax.  Pulmonary vasculature is normal. Negative for osseous abnormalities. Tortuous aorta with calcifications of the aortic knob.  There are degenerative changes of the spine and both shoulder girdles.    Areas of sclerosis in the proximal right humerus are consistent with the patient's known metastatic prostate cancer.                               The EKG was ordered, reviewed, and independently interpreted by the ED provider.  Interpretation time: 11:31  Rate: 81 BPM  Rhythm: normal sinus rhythm  Interpretation: LVH. Possible inferior infarct, age undetermined. Anterior infarct, age undetermined. No STEMI.    Labs Reviewed from [facility] on 11/27/20:    Clinical Support on 11/27/2020   Component Date Value Ref Range Status    POC Rapid COVID 11/27/2020 Positive* Negative Final     Acceptable 11/27/2020 Yes   Final              The Emergency Provider reviewed the vital signs and test results, which are outlined above.    ED Discussion     12:28 PM: Bamlanivimab FDA EUA Verbal Consent  The patient and/or parent/caregiver has been provided with the Bamlanivimab Fact Sheet for Patients and Parents/Caregivers and has been  counseled that the FDA has authorized the emergency use of bamlanivimab, which is not an FDA approved drug. The significant known and potential risks and benefits are unknown, and there are limited alternatives available. The patient or parent/caregiver has been given the option to accept or refuse and has verbally agreed to receive bamlanivimab.    12:30 PM: Reassessed pt at this time. Discussed with pt all pertinent ED information and results. Discussed pt dx and plan of tx. Gave pt all f/u and return to the ED instructions. All questions and concerns were addressed at this time. Pt expresses understanding of information and instructions, and is comfortable with plan to discharge. Pt is stable for discharge.    I discussed with patient and/or family/caretaker that evaluation in the ED does not suggest any emergent or life threatening medical conditions requiring immediate intervention beyond what was provided in the ED, and I believe patient is safe for discharge.  Regardless, an unremarkable evaluation in the ED does not preclude the development or presence of a serious of life threatening condition. As such, patient was instructed to return immediately for any worsening or change in current symptoms.     12:37 PM  Patient is stable nontoxic.  He stay for discharge.  Blood pressure is now well controlled.  He is a candidate for the medications above and verbally consents after reviewing the fact sheet provided to him.  Referral has been placed to the infusion center.  He will be contacted by the infusion center with time and date.  Patient verbalizes understanding agreement with all instructions seems reliable.  Safe for discharge in my opinion.      ED Medication(s):  Medications   cloNIDine tablet 0.2 mg (0.2 mg Oral Given 11/27/20 7970)       Follow-up Information     Tobias Fox MD In 1 day.    Specialty: Internal Medicine  Contact information:  [address] [facility]  [address] [facility]  [address]  [phone]                  New Prescriptions    No medications on file         Medical Decision Making    Medical Decision Making:   Clinical Tests:   Lab Tests: Ordered and Reviewed  Radiological Study: Ordered and Reviewed  Medical Tests: Ordered and Reviewed           Scribe Attestation:   Scribe #1: I performed the above scribed service and the documentation accurately describes the services I performed. I attest to the accuracy of the note.    Attending:   Physician Attestation Statement for Scribe #1: I, Zachery Moralez Jr., MD, personally performed the services described in this documentation, as scribed by Pascual Fuller, in my presence, and it is both accurate and complete.          Clinical Impression       ICD-10-CM ICD-9-CM   1. COVID-19 virus infection  U07.1 079.89   2. Suspected COVID-19 virus infection  Z20.828 V01.79       Disposition:   Disposition: Discharged  Condition: Stable         Zachery Moralez Jr., MD  11/27/20 1237

## 2020-11-27 NOTE — PROGRESS NOTES
Patient called this morning instead that he is COVID positive along with his wife the patient is a 75 metastatic prostate carcinoma with cough.  I spoken to his primary care physician as well as emergency room physician Dr. Falcon we agree that patient should be seen and evaluated because of his age and immunocompromised state to see whether not his hypoxemic.  If so may need to be considered for admission with treatment with her Indest severe.  If not may be able to be qualified for outpatient treatment with monoclonal antibody.  Discussed implications with patient as well as emergency room staff

## 2020-11-27 NOTE — DISCHARGE INSTRUCTIONS
Continue all home medications.  He will be contacted by the infusion clinic to schedule a time and date for infusion of Bamlanivimab.  Return as needed.

## 2020-11-27 NOTE — TELEPHONE ENCOUNTER
Enrollment call #1.You were recently enrolled in the COVID-19 Home Surveillance Program. This is a home monitoring program to help you with your COVID-19 symptoms while at home.    This program provides you with a pulse oximeter device and you will be asked to enter your vital signs (heart rate, oxygen saturation, temperature) and symptoms twice a day through the [facility] MyChart Smartphone application. Certain abnormal symptoms and vital signs will alert an on-call nurse team, who will call you to triage your symptoms. Additionally, if you do not enter a response, the nurse call team will call to check in on you. If you decide to enroll in this program, you can opt out anytime if you feel that you no longer need the services provided.    You will be called notified by the pharmacy when your pulse oximeter is ready for pick-up. If you have not been contacted by the pharmacy, please notify the [facility] COVID-19 Surveillance representative during your enrollment call and they will be able to assist you further.     You can access the program consent and additional information through the MyChart [facility] portal Smartphone application. If you have any difficulty installing the application, please call [phone] Monday through Friday from 9 AM to 5 PM or email [email or web address] at any time.     If you are having a medical emergency, call 911 or go to your nearest emergency room.         Reason for Disposition   Message left on unidentified voice mail. Phone number verified.    Protocols used: NO CONTACT OR DUPLICATE CONTACT CALL-A-OH

## 2020-11-27 NOTE — TELEPHONE ENCOUNTER
Bamlanivimab FDA EUA Verbal Consent  The patient and/or parent/caregiver has been provided with the Bamlanivimab Fact Sheet for Patients and Parents/Caregivers and has been counseled that the FDA has authorized the emergency use of bamlanivimab, which is not an FDA approved drug. The significant known and potential risks and benefits are unknown, and there are limited alternatives available. The patient or parent/caregiver has been given the option to accept or refuse and has verbally agreed to receive bamlanivimab.    Discussed with patient.  He is wishing to proceed.

## 2020-11-28 ENCOUNTER — NURSE TRIAGE (OUTPATIENT)
Dept: ADMINISTRATIVE | Facility: CLINIC | Age: 75
End: 2020-11-28

## 2020-11-28 NOTE — TELEPHONE ENCOUNTER
Pt seen in ED this morning. He was diagnosed with COVID-19. Pt reports an increase in difficulty breathing. Pt does not have pulse oximeter at home. He denies blue or gray discoloration to lips or face. Pt reports feeling weak. Pt advised to go to ED, but he states he will wait and monitor himself. He states he will go to ED if he gets any worse.    Reason for Disposition   MODERATE difficulty breathing (e.g., speaks in phrases, SOB even at rest, pulse 100-120)    Additional Information   Negative: Severe difficulty breathing (e.g., struggling for each breath, speaks in single words)   Negative: Difficult to awaken or acting confused (e.g., disoriented, slurred speech)   Negative: Bluish (or gray) lips or face now   Negative: Shock suspected (e.g., cold/pale/clammy skin, too weak to stand, low BP, rapid pulse)   Negative: Sounds like a life-threatening emergency to the triager   Negative: SEVERE or constant chest pain (Exception: mild central chest pain, present only when coughing)    Protocols used: CORONAVIRUS (COVID-19) - DIAGNOSED OR [id number]

## 2020-11-30 ENCOUNTER — HOSPITAL ENCOUNTER (OUTPATIENT)
Facility: HOSPITAL | Age: 75
Discharge: HOME | End: 2020-12-01
Attending: STUDENT IN AN ORGANIZED HEALTH CARE EDUCATION/TRAINING PROGRAM | Admitting: HOSPITALIST
Payer: MEDICARE

## 2020-11-30 ENCOUNTER — INFUSION (OUTPATIENT)
Dept: INFECTIOUS DISEASES | Facility: HOSPITAL | Age: 75
End: 2020-11-30
Attending: EMERGENCY MEDICINE
Payer: MEDICARE

## 2020-11-30 ENCOUNTER — NURSE TRIAGE (OUTPATIENT)
Dept: ADMINISTRATIVE | Facility: CLINIC | Age: 75
End: 2020-11-30

## 2020-11-30 VITALS
TEMPERATURE: 98 F | HEART RATE: 75 BPM | DIASTOLIC BLOOD PRESSURE: 72 MMHG | OXYGEN SATURATION: 96 % | SYSTOLIC BLOOD PRESSURE: 159 MMHG | RESPIRATION RATE: 18 BRPM

## 2020-11-30 DIAGNOSIS — U07.1 COVID-19: ICD-10-CM

## 2020-11-30 DIAGNOSIS — R06.02 SOB (SHORTNESS OF BREATH): ICD-10-CM

## 2020-11-30 DIAGNOSIS — Z20.822 SUSPECTED COVID-19 VIRUS INFECTION: ICD-10-CM

## 2020-11-30 DIAGNOSIS — E87.6 HYPOKALEMIA: ICD-10-CM

## 2020-11-30 DIAGNOSIS — J96.01 ACUTE RESPIRATORY FAILURE WITH HYPOXIA: Primary | ICD-10-CM

## 2020-11-30 LAB
ALBUMIN SERPL BCP-MCNC: 2.9 G/DL (ref 3.5–5.2)
ANION GAP SERPL CALC-SCNC: 14 MMOL/L (ref 8–16)
BASOPHILS # BLD AUTO: 0.01 K/UL (ref 0–0.2)
BASOPHILS NFR BLD: 0.2 % (ref 0–1.9)
BNP SERPL-MCNC: 58 PG/ML (ref 0–99)
BUN SERPL-MCNC: 24 MG/DL (ref 8–23)
CALCIUM SERPL-MCNC: 6.9 MG/DL (ref 8.7–10.5)
CHLORIDE SERPL-SCNC: 94 MMOL/L (ref 95–110)
CO2 SERPL-SCNC: 24 MMOL/L (ref 23–29)
CREAT SERPL-MCNC: 1.6 MG/DL (ref 0.5–1.4)
DIFFERENTIAL METHOD: ABNORMAL
EOSINOPHIL # BLD AUTO: 0 K/UL (ref 0–0.5)
EOSINOPHIL NFR BLD: 0 % (ref 0–8)
ERYTHROCYTE [DISTWIDTH] IN BLOOD BY AUTOMATED COUNT: 13.2 % (ref 11.5–14.5)
EST. GFR  (AFRICAN AMERICAN): 48 ML/MIN/1.73 M^2
EST. GFR  (NON AFRICAN AMERICAN): 42 ML/MIN/1.73 M^2
GLUCOSE SERPL-MCNC: 189 MG/DL (ref 70–110)
HCT VFR BLD AUTO: 41.5 % (ref 40–54)
HGB BLD-MCNC: 14 G/DL (ref 14–18)
IMM GRANULOCYTES # BLD AUTO: 0.04 K/UL (ref 0–0.04)
IMM GRANULOCYTES NFR BLD AUTO: 0.7 % (ref 0–0.5)
LYMPHOCYTES # BLD AUTO: 0.4 K/UL (ref 1–4.8)
LYMPHOCYTES NFR BLD: 7.1 % (ref 18–48)
MAGNESIUM SERPL-MCNC: 1.5 MG/DL (ref 1.6–2.6)
MCH RBC QN AUTO: 29.8 PG (ref 27–31)
MCHC RBC AUTO-ENTMCNC: 33.7 G/DL (ref 32–36)
MCV RBC AUTO: 88 FL (ref 82–98)
MONOCYTES # BLD AUTO: 0.6 K/UL (ref 0.3–1)
MONOCYTES NFR BLD: 10.7 % (ref 4–15)
NEUTROPHILS # BLD AUTO: 4.6 K/UL (ref 1.8–7.7)
NEUTROPHILS NFR BLD: 81.3 % (ref 38–73)
NRBC BLD-RTO: 0 /100 WBC
PLATELET # BLD AUTO: 219 K/UL (ref 150–350)
PMV BLD AUTO: 9 FL (ref 9.2–12.9)
POTASSIUM SERPL-SCNC: 3 MMOL/L (ref 3.5–5.1)
RBC # BLD AUTO: 4.7 M/UL (ref 4.6–6.2)
SODIUM SERPL-SCNC: 132 MMOL/L (ref 136–145)
TROPONIN I SERPL DL<=0.01 NG/ML-MCNC: 0.03 NG/ML (ref 0–0.03)
WBC # BLD AUTO: 5.63 K/UL (ref 3.9–12.7)

## 2020-11-30 PROCEDURE — 80048 BASIC METABOLIC PNL TOTAL CA: CPT

## 2020-11-30 PROCEDURE — G0378 HOSPITAL OBSERVATION PER HR: HCPCS

## 2020-11-30 PROCEDURE — 84484 ASSAY OF TROPONIN QUANT: CPT

## 2020-11-30 PROCEDURE — 36415 COLL VENOUS BLD VENIPUNCTURE: CPT

## 2020-11-30 PROCEDURE — 93005 ELECTROCARDIOGRAM TRACING: CPT

## 2020-11-30 PROCEDURE — 83735 ASSAY OF MAGNESIUM: CPT

## 2020-11-30 PROCEDURE — 99291 CRITICAL CARE FIRST HOUR: CPT | Mod: 25

## 2020-11-30 PROCEDURE — 93010 ELECTROCARDIOGRAM REPORT: CPT | Mod: ,,, | Performed by: INTERNAL MEDICINE

## 2020-11-30 PROCEDURE — 85025 COMPLETE CBC W/AUTO DIFF WBC: CPT

## 2020-11-30 PROCEDURE — 63600175 PHARM REV CODE 636 W HCPCS: Performed by: EMERGENCY MEDICINE

## 2020-11-30 PROCEDURE — M0239 BAMLANIVIMAB-XXXX INFUSION: HCPCS | Performed by: EMERGENCY MEDICINE

## 2020-11-30 PROCEDURE — 96374 THER/PROPH/DIAG INJ IV PUSH: CPT

## 2020-11-30 PROCEDURE — 25000003 PHARM REV CODE 250: Performed by: EMERGENCY MEDICINE

## 2020-11-30 PROCEDURE — 82040 ASSAY OF SERUM ALBUMIN: CPT

## 2020-11-30 PROCEDURE — 93010 EKG 12-LEAD: ICD-10-PCS | Mod: ,,, | Performed by: INTERNAL MEDICINE

## 2020-11-30 PROCEDURE — 25000003 PHARM REV CODE 250: Performed by: INTERNAL MEDICINE

## 2020-11-30 PROCEDURE — 96375 TX/PRO/DX INJ NEW DRUG ADDON: CPT

## 2020-11-30 PROCEDURE — 83880 ASSAY OF NATRIURETIC PEPTIDE: CPT

## 2020-11-30 RX ORDER — ONDANSETRON 4 MG/1
4 TABLET, ORALLY DISINTEGRATING ORAL
Status: DISCONTINUED | OUTPATIENT
Start: 2020-11-30 | End: 2020-12-03

## 2020-11-30 RX ORDER — METOPROLOL SUCCINATE 50 MG/1
100 TABLET, EXTENDED RELEASE ORAL 2 TIMES DAILY
Status: DISCONTINUED | OUTPATIENT
Start: 2020-12-01 | End: 2020-12-01 | Stop reason: HOSPADM

## 2020-11-30 RX ORDER — TERAZOSIN 5 MG/1
5 CAPSULE ORAL DAILY
Status: DISCONTINUED | OUTPATIENT
Start: 2020-12-01 | End: 2020-12-01 | Stop reason: HOSPADM

## 2020-11-30 RX ORDER — ACETAMINOPHEN 325 MG/1
650 TABLET ORAL
Status: DISCONTINUED | OUTPATIENT
Start: 2020-11-30 | End: 2020-12-03

## 2020-11-30 RX ORDER — AMLODIPINE BESYLATE 5 MG/1
10 TABLET ORAL DAILY
Status: DISCONTINUED | OUTPATIENT
Start: 2020-12-01 | End: 2020-12-01 | Stop reason: HOSPADM

## 2020-11-30 RX ORDER — IBUPROFEN 200 MG
16 TABLET ORAL
Status: DISCONTINUED | OUTPATIENT
Start: 2020-12-01 | End: 2020-12-01 | Stop reason: HOSPADM

## 2020-11-30 RX ORDER — DEXAMETHASONE SODIUM PHOSPHATE 4 MG/ML
6 INJECTION, SOLUTION INTRA-ARTICULAR; INTRALESIONAL; INTRAMUSCULAR; INTRAVENOUS; SOFT TISSUE
Status: COMPLETED | OUTPATIENT
Start: 2020-11-30 | End: 2020-11-30

## 2020-11-30 RX ORDER — PANTOPRAZOLE SODIUM 40 MG/1
40 TABLET, DELAYED RELEASE ORAL DAILY
Status: DISCONTINUED | OUTPATIENT
Start: 2020-12-01 | End: 2020-12-01 | Stop reason: HOSPADM

## 2020-11-30 RX ORDER — SPIRONOLACTONE 25 MG/1
25 TABLET ORAL DAILY
Status: DISCONTINUED | OUTPATIENT
Start: 2020-11-30 | End: 2020-11-30

## 2020-11-30 RX ORDER — ALBUTEROL SULFATE 90 UG/1
2 AEROSOL, METERED RESPIRATORY (INHALATION)
Status: DISCONTINUED | OUTPATIENT
Start: 2020-11-30 | End: 2020-12-03

## 2020-11-30 RX ORDER — SODIUM CHLORIDE 0.9 % (FLUSH) 0.9 %
10 SYRINGE (ML) INJECTION
Status: DISCONTINUED | OUTPATIENT
Start: 2020-12-01 | End: 2020-12-01 | Stop reason: HOSPADM

## 2020-11-30 RX ORDER — VALSARTAN AND HYDROCHLOROTHIAZIDE 320; 25 MG/1; MG/1
1 TABLET, FILM COATED ORAL DAILY
Status: DISCONTINUED | OUTPATIENT
Start: 2020-11-30 | End: 2020-12-01 | Stop reason: CLARIF

## 2020-11-30 RX ORDER — HEPARIN SODIUM 5000 [USP'U]/ML
5000 INJECTION, SOLUTION INTRAVENOUS; SUBCUTANEOUS EVERY 8 HOURS
Status: DISCONTINUED | OUTPATIENT
Start: 2020-12-01 | End: 2020-12-01 | Stop reason: HOSPADM

## 2020-11-30 RX ORDER — INSULIN ASPART 100 [IU]/ML
1-10 INJECTION, SOLUTION INTRAVENOUS; SUBCUTANEOUS
Status: DISCONTINUED | OUTPATIENT
Start: 2020-12-01 | End: 2020-12-01 | Stop reason: HOSPADM

## 2020-11-30 RX ORDER — LANOLIN ALCOHOL/MO/W.PET/CERES
400 CREAM (GRAM) TOPICAL ONCE
Status: DISCONTINUED | OUTPATIENT
Start: 2020-12-01 | End: 2020-11-30

## 2020-11-30 RX ORDER — CLONIDINE HYDROCHLORIDE 0.2 MG/1
0.2 TABLET ORAL 2 TIMES DAILY
Status: DISCONTINUED | OUTPATIENT
Start: 2020-11-30 | End: 2020-12-01 | Stop reason: HOSPADM

## 2020-11-30 RX ORDER — GLUCAGON 1 MG
1 KIT INJECTION
Status: DISCONTINUED | OUTPATIENT
Start: 2020-12-01 | End: 2020-12-01 | Stop reason: HOSPADM

## 2020-11-30 RX ORDER — POTASSIUM CHLORIDE 20 MEQ/1
40 TABLET, EXTENDED RELEASE ORAL
Status: COMPLETED | OUTPATIENT
Start: 2020-11-30 | End: 2020-11-30

## 2020-11-30 RX ORDER — SODIUM CHLORIDE 0.9 % (FLUSH) 0.9 %
10 SYRINGE (ML) INJECTION
Status: DISCONTINUED | OUTPATIENT
Start: 2020-11-30 | End: 2020-12-03

## 2020-11-30 RX ORDER — CLONIDINE HYDROCHLORIDE 0.2 MG/1
0.2 TABLET ORAL ONCE
Status: DISCONTINUED | OUTPATIENT
Start: 2020-11-30 | End: 2020-11-30 | Stop reason: HOSPADM

## 2020-11-30 RX ORDER — ACETAMINOPHEN 325 MG/1
650 TABLET ORAL
Status: DISCONTINUED | OUTPATIENT
Start: 2020-12-01 | End: 2020-12-01 | Stop reason: HOSPADM

## 2020-11-30 RX ORDER — EPINEPHRINE 0.3 MG/.3ML
0.3 INJECTION SUBCUTANEOUS
Status: DISCONTINUED | OUTPATIENT
Start: 2020-11-30 | End: 2020-12-03

## 2020-11-30 RX ORDER — DIPHENHYDRAMINE HYDROCHLORIDE 50 MG/ML
25 INJECTION INTRAMUSCULAR; INTRAVENOUS
Status: DISCONTINUED | OUTPATIENT
Start: 2020-11-30 | End: 2020-12-03

## 2020-11-30 RX ORDER — MAGNESIUM SULFATE 1 G/100ML
1 INJECTION INTRAVENOUS
Status: COMPLETED | OUTPATIENT
Start: 2020-11-30 | End: 2020-12-01

## 2020-11-30 RX ORDER — IBUPROFEN 200 MG
24 TABLET ORAL
Status: DISCONTINUED | OUTPATIENT
Start: 2020-12-01 | End: 2020-12-01 | Stop reason: HOSPADM

## 2020-11-30 RX ADMIN — CLONIDINE HYDROCHLORIDE 0.2 MG: 0.2 TABLET ORAL at 10:11

## 2020-11-30 RX ADMIN — DEXAMETHASONE SODIUM PHOSPHATE 6 MG: 4 INJECTION INTRA-ARTICULAR; INTRALESIONAL; INTRAMUSCULAR; INTRAVENOUS; SOFT TISSUE at 11:11

## 2020-11-30 RX ADMIN — SODIUM CHLORIDE 700 MG: 0.9 INJECTION, SOLUTION INTRAVENOUS at 09:11

## 2020-11-30 RX ADMIN — MAGNESIUM SULFATE HEPTAHYDRATE 1 G: 1 INJECTION, SOLUTION INTRAVENOUS at 11:11

## 2020-11-30 RX ADMIN — POTASSIUM CHLORIDE 40 MEQ: 1500 TABLET, EXTENDED RELEASE ORAL at 09:11

## 2020-11-30 NOTE — PROGRESS NOTES
IV DCed. Questions regarding POC answered; patient verbalized understanding. Vital signs stable and in flowsheet. Patient DCed in stable condition via WC; belongings with patient.

## 2020-11-30 NOTE — PROGRESS NOTES
Subjective:       Patient ID: Sherif Ragland is a 75 y.o. male.    Chief Complaint: No chief complaint on file.    HPI  Review of Systems    Objective:   BP (!) 199/88 (BP Location: Right arm, Patient Position: Sitting)   Pulse 75   Temp 99.3 °F (37.4 °C) (Tympanic)   Resp 20   SpO2 95%      Physical Exam    Admission on 11/27/2020, Discharged on 11/27/2020   Component Date Value    WBC 11/27/2020 5.19     RBC 11/27/2020 4.46*    Hemoglobin 11/27/2020 13.4*    Hematocrit 11/27/2020 40.3     MCV 11/27/2020 90     MCH 11/27/2020 30.0     MCHC 11/27/2020 33.3     RDW 11/27/2020 13.5     Platelets 11/27/2020 267     MPV 11/27/2020 8.8*    Immature Granulocytes 11/27/2020 0.6*    Gran # (ANC) 11/27/2020 3.7     Immature Grans (Abs) 11/27/2020 0.03     Lymph # 11/27/2020 0.6*    Mono # 11/27/2020 0.7     Eos # 11/27/2020 0.2     Baso # 11/27/2020 0.02     nRBC 11/27/2020 0     Gran % 11/27/2020 70.8     Lymph % 11/27/2020 10.6*    Mono % 11/27/2020 13.7     Eosinophil % 11/27/2020 3.9     Basophil % 11/27/2020 0.4     Differential Method 11/27/2020 Automated     Sodium 11/27/2020 137     Potassium 11/27/2020 3.8     Chloride 11/27/2020 98     CO2 11/27/2020 25     Glucose 11/27/2020 175*    BUN 11/27/2020 19     Creatinine 11/27/2020 1.5*    Calcium 11/27/2020 8.3*    Total Protein 11/27/2020 7.2     Albumin 11/27/2020 3.4*    Total Bilirubin 11/27/2020 0.5     Alkaline Phosphatase 11/27/2020 742*    AST 11/27/2020 52*    ALT 11/27/2020 42     Anion Gap 11/27/2020 14     eGFR if  11/27/2020 52*    eGFR if non African Amer* 11/27/2020 45*    CRP 11/27/2020 7.1     Ferritin 11/27/2020 1,073*    LD 11/27/2020 275*    CPK 11/27/2020 40     Lactate (Lactic Acid) 11/27/2020 1.5     Troponin I 11/27/2020 0.011    Clinical Support on 11/27/2020   Component Date Value    POC Rapid COVID 11/27/2020 Positive*     Acceptab* 11/27/2020 Yes         Assessment:       1. Suspected COVID-19 virus infection        Plan:   No problem-specific Assessment & Plan notes found for this encounter.    Suspected COVID-19 virus infection  -     Ambulatory referral/consult to EUA Infusion  -     bamlanivimab 700 mg in sodium chloride 0.9% 200 mL infusion  -     diphenhydrAMINE injection 25 mg  -     methylPREDNISolone sodium succinate injection 40 mg  -     EPINEPHrine (EPIPEN) 0.3 mg/0.3 mL pen injection 0.3 mg  -     ondansetron disintegrating tablet 4 mg  -     acetaminophen tablet 650 mg  -     albuterol inhaler 2 puff  -     sodium chloride 0.9% 500 mL flush bag  -     sodium chloride 0.9% flush 10 mL  -     Oxygen PRN; Standing          No follow-ups on file.

## 2020-12-01 ENCOUNTER — TELEPHONE (OUTPATIENT)
Dept: HEMATOLOGY/ONCOLOGY | Facility: CLINIC | Age: 75
End: 2020-12-01

## 2020-12-01 ENCOUNTER — DOCUMENTATION ONLY (OUTPATIENT)
Dept: HEMATOLOGY/ONCOLOGY | Facility: CLINIC | Age: 75
End: 2020-12-01

## 2020-12-01 VITALS
HEIGHT: 72 IN | HEART RATE: 79 BPM | SYSTOLIC BLOOD PRESSURE: 130 MMHG | RESPIRATION RATE: 16 BRPM | OXYGEN SATURATION: 92 % | TEMPERATURE: 98 F | BODY MASS INDEX: 37.03 KG/M2 | DIASTOLIC BLOOD PRESSURE: 78 MMHG | WEIGHT: 273.38 LBS

## 2020-12-01 PROBLEM — J96.01 ACUTE RESPIRATORY FAILURE WITH HYPOXIA: Status: RESOLVED | Noted: 2020-11-30 | Resolved: 2020-12-01

## 2020-12-01 PROBLEM — E87.6 HYPOKALEMIA: Status: ACTIVE | Noted: 2020-12-01

## 2020-12-01 PROBLEM — E87.6 HYPOKALEMIA: Status: RESOLVED | Noted: 2020-12-01 | Resolved: 2020-12-01

## 2020-12-01 LAB
ALBUMIN SERPL BCP-MCNC: 2.7 G/DL (ref 3.5–5.2)
ALP SERPL-CCNC: 623 U/L (ref 55–135)
ALT SERPL W/O P-5'-P-CCNC: 79 U/L (ref 10–44)
ANION GAP SERPL CALC-SCNC: 17 MMOL/L (ref 8–16)
AST SERPL-CCNC: 74 U/L (ref 10–40)
BASOPHILS # BLD AUTO: 0 K/UL (ref 0–0.2)
BASOPHILS NFR BLD: 0 % (ref 0–1.9)
BILIRUB SERPL-MCNC: 0.7 MG/DL (ref 0.1–1)
BUN SERPL-MCNC: 32 MG/DL (ref 8–23)
CALCIUM SERPL-MCNC: 6.6 MG/DL (ref 8.7–10.5)
CHLORIDE SERPL-SCNC: 93 MMOL/L (ref 95–110)
CK SERPL-CCNC: 61 U/L (ref 20–200)
CO2 SERPL-SCNC: 22 MMOL/L (ref 23–29)
CREAT SERPL-MCNC: 1.8 MG/DL (ref 0.5–1.4)
CRP SERPL-MCNC: 52.1 MG/L (ref 0–8.2)
D DIMER PPP IA.FEU-MCNC: 2.71 MG/L FEU
DIFFERENTIAL METHOD: ABNORMAL
EOSINOPHIL # BLD AUTO: 0 K/UL (ref 0–0.5)
EOSINOPHIL NFR BLD: 0 % (ref 0–8)
ERYTHROCYTE [DISTWIDTH] IN BLOOD BY AUTOMATED COUNT: 13.2 % (ref 11.5–14.5)
ERYTHROCYTE [SEDIMENTATION RATE] IN BLOOD BY WESTERGREN METHOD: 55 MM/HR (ref 0–10)
EST. GFR  (AFRICAN AMERICAN): 42 ML/MIN/1.73 M^2
EST. GFR  (NON AFRICAN AMERICAN): 36 ML/MIN/1.73 M^2
GLUCOSE SERPL-MCNC: 306 MG/DL (ref 70–110)
HCT VFR BLD AUTO: 40.5 % (ref 40–54)
HGB BLD-MCNC: 13.5 G/DL (ref 14–18)
IMM GRANULOCYTES # BLD AUTO: 0.04 K/UL (ref 0–0.04)
IMM GRANULOCYTES NFR BLD AUTO: 0.8 % (ref 0–0.5)
LACTATE SERPL-SCNC: 1.6 MMOL/L (ref 0.5–2.2)
LDH SERPL L TO P-CCNC: 467 U/L (ref 110–260)
LYMPHOCYTES # BLD AUTO: 0.4 K/UL (ref 1–4.8)
LYMPHOCYTES NFR BLD: 9 % (ref 18–48)
MAGNESIUM SERPL-MCNC: 1.8 MG/DL (ref 1.6–2.6)
MCH RBC QN AUTO: 29.7 PG (ref 27–31)
MCHC RBC AUTO-ENTMCNC: 33.3 G/DL (ref 32–36)
MCV RBC AUTO: 89 FL (ref 82–98)
MONOCYTES # BLD AUTO: 0.3 K/UL (ref 0.3–1)
MONOCYTES NFR BLD: 6.5 % (ref 4–15)
NEUTROPHILS # BLD AUTO: 4 K/UL (ref 1.8–7.7)
NEUTROPHILS NFR BLD: 83.7 % (ref 38–73)
NRBC BLD-RTO: 0 /100 WBC
PHOSPHATE SERPL-MCNC: 4 MG/DL (ref 2.7–4.5)
PLATELET # BLD AUTO: 212 K/UL (ref 150–350)
PMV BLD AUTO: 8.8 FL (ref 9.2–12.9)
POCT GLUCOSE: 277 MG/DL (ref 70–110)
POCT GLUCOSE: 290 MG/DL (ref 70–110)
POCT GLUCOSE: 396 MG/DL (ref 70–110)
POTASSIUM SERPL-SCNC: 3.1 MMOL/L (ref 3.5–5.1)
PROCALCITONIN SERPL IA-MCNC: 0.79 NG/ML
PROT SERPL-MCNC: 6.6 G/DL (ref 6–8.4)
RBC # BLD AUTO: 4.54 M/UL (ref 4.6–6.2)
SODIUM SERPL-SCNC: 132 MMOL/L (ref 136–145)
WBC # BLD AUTO: 4.78 K/UL (ref 3.9–12.7)

## 2020-12-01 PROCEDURE — 36415 COLL VENOUS BLD VENIPUNCTURE: CPT

## 2020-12-01 PROCEDURE — 84145 PROCALCITONIN (PCT): CPT

## 2020-12-01 PROCEDURE — 96372 THER/PROPH/DIAG INJ SC/IM: CPT

## 2020-12-01 PROCEDURE — 83605 ASSAY OF LACTIC ACID: CPT

## 2020-12-01 PROCEDURE — 82728 ASSAY OF FERRITIN: CPT

## 2020-12-01 PROCEDURE — 25000003 PHARM REV CODE 250: Performed by: HOSPITALIST

## 2020-12-01 PROCEDURE — 82550 ASSAY OF CK (CPK): CPT

## 2020-12-01 PROCEDURE — 25000003 PHARM REV CODE 250: Performed by: INTERNAL MEDICINE

## 2020-12-01 PROCEDURE — 86140 C-REACTIVE PROTEIN: CPT

## 2020-12-01 PROCEDURE — G0378 HOSPITAL OBSERVATION PER HR: HCPCS

## 2020-12-01 PROCEDURE — 83615 LACTATE (LD) (LDH) ENZYME: CPT

## 2020-12-01 PROCEDURE — 80053 COMPREHEN METABOLIC PANEL: CPT

## 2020-12-01 PROCEDURE — 84100 ASSAY OF PHOSPHORUS: CPT

## 2020-12-01 PROCEDURE — 63600175 PHARM REV CODE 636 W HCPCS: Performed by: HOSPITALIST

## 2020-12-01 PROCEDURE — C9399 UNCLASSIFIED DRUGS OR BIOLOG: HCPCS | Performed by: HOSPITALIST

## 2020-12-01 PROCEDURE — 83735 ASSAY OF MAGNESIUM: CPT

## 2020-12-01 PROCEDURE — 85025 COMPLETE CBC W/AUTO DIFF WBC: CPT

## 2020-12-01 PROCEDURE — 85651 RBC SED RATE NONAUTOMATED: CPT

## 2020-12-01 PROCEDURE — 85379 FIBRIN DEGRADATION QUANT: CPT

## 2020-12-01 PROCEDURE — 87040 BLOOD CULTURE FOR BACTERIA: CPT | Mod: 59

## 2020-12-01 RX ORDER — HYDROCHLOROTHIAZIDE 25 MG/1
25 TABLET ORAL DAILY
Status: DISCONTINUED | OUTPATIENT
Start: 2020-12-01 | End: 2020-12-01 | Stop reason: HOSPADM

## 2020-12-01 RX ORDER — POTASSIUM CHLORIDE 20 MEQ/1
40 TABLET, EXTENDED RELEASE ORAL
Status: COMPLETED | OUTPATIENT
Start: 2020-12-01 | End: 2020-12-01

## 2020-12-01 RX ORDER — LOSARTAN POTASSIUM 50 MG/1
50 TABLET ORAL 2 TIMES DAILY
Status: DISCONTINUED | OUTPATIENT
Start: 2020-12-01 | End: 2020-12-01 | Stop reason: HOSPADM

## 2020-12-01 RX ADMIN — METOPROLOL SUCCINATE 100 MG: 50 TABLET, EXTENDED RELEASE ORAL at 09:12

## 2020-12-01 RX ADMIN — LOSARTAN POTASSIUM 50 MG: 50 TABLET, FILM COATED ORAL at 11:12

## 2020-12-01 RX ADMIN — CLONIDINE HYDROCHLORIDE 0.2 MG: 0.2 TABLET ORAL at 12:12

## 2020-12-01 RX ADMIN — INSULIN DETEMIR 50 UNITS: 100 INJECTION, SOLUTION SUBCUTANEOUS at 09:12

## 2020-12-01 RX ADMIN — LOSARTAN POTASSIUM 50 MG: 50 TABLET, FILM COATED ORAL at 12:12

## 2020-12-01 RX ADMIN — DEXAMETHASONE 6 MG: 4 TABLET ORAL at 09:12

## 2020-12-01 RX ADMIN — HYDROCHLOROTHIAZIDE 25 MG: 25 TABLET ORAL at 09:12

## 2020-12-01 RX ADMIN — HEPARIN SODIUM 5000 UNITS: 5000 INJECTION INTRAVENOUS; SUBCUTANEOUS at 03:12

## 2020-12-01 RX ADMIN — INSULIN ASPART 6 UNITS: 100 INJECTION, SOLUTION INTRAVENOUS; SUBCUTANEOUS at 06:12

## 2020-12-01 RX ADMIN — PANTOPRAZOLE SODIUM 40 MG: 40 TABLET, DELAYED RELEASE ORAL at 09:12

## 2020-12-01 RX ADMIN — INSULIN ASPART 10 UNITS: 100 INJECTION, SOLUTION INTRAVENOUS; SUBCUTANEOUS at 12:12

## 2020-12-01 RX ADMIN — TERAZOSIN HYDROCHLORIDE ANHYDROUS 5 MG: 5 CAPSULE ORAL at 09:12

## 2020-12-01 RX ADMIN — HEPARIN SODIUM 5000 UNITS: 5000 INJECTION INTRAVENOUS; SUBCUTANEOUS at 06:12

## 2020-12-01 RX ADMIN — AMLODIPINE BESYLATE 10 MG: 5 TABLET ORAL at 11:12

## 2020-12-01 RX ADMIN — POTASSIUM CHLORIDE 40 MEQ: 1500 TABLET, EXTENDED RELEASE ORAL at 11:12

## 2020-12-01 RX ADMIN — POTASSIUM CHLORIDE 40 MEQ: 1500 TABLET, EXTENDED RELEASE ORAL at 12:12

## 2020-12-01 NOTE — NURSING
"Received voicemail from pt wife from 11/30/20 at 5:00PM stating that she was upset about her  and for me to please call her back in the morning as soon as I could.  She requested me to talk with her confidentially about her  as she stated he is severely depressed and nauseated all the time and not doing well .  She states that she is covid positive at present and is wondering how she will "deal " with this for the next "however long it takes"  She was crying and stated that pt has expressed thoughts of "killing himself" because he" just doesn't want to go on like this" She wanted to know how to cope with this and asked me to call her back tomorrow morning at my earliest convenience.  I researched pt chart this am upon receiving voicemail and noted that pt was admitted to [facility] at present .  Pt wife had message in chart speaking to triage nurse last evening at 6:35PM (so after she left me a voicemail) I have communicated this information to  Charisse Broussard and together we tried to call pt wife this am at 9:02AM. Voicemail left with Charisse's direct call back number for wife to call back for support.  Pt remains in hospital at present.  Will follow up with  after she speaks with wife for supportive options.   "

## 2020-12-01 NOTE — TELEPHONE ENCOUNTER
Wife states that she thinks that the pt needs to go to the hospital. Pt has slept all day. Incontinent diarrhea. States that he is now sitting in the living room naked. Seems to be disoriented and confused at times. Reports that the pt is extremely weak and is not able to ambulate. Pt does have bone cancer and had radiation 2 weeks ago.   Care advice provided per protocol, with recommendation to call EMS. Wife verbalizes understanding.     Reason for Disposition   Shock suspected (e.g., cold/pale/clammy skin, too weak to stand, low BP, rapid pulse)    Additional Information   Negative: Severe difficulty breathing (e.g., struggling for each breath, speaks in single words)   Negative: Difficult to awaken or acting confused (e.g., disoriented, slurred speech)   Negative: Bluish (or gray) lips or face now    Protocols used: CORONAVIRUS (COVID-19) - DIAGNOSED OR [id number]

## 2020-12-01 NOTE — PROGRESS NOTES
Pt referred to SW by nurse navigator because wife called about pt expressing suicidal ideations. She requested a call back from nurse navigator to talk about things confidentially. SW attempted to reach Ms. Ragland together with nurse navigator to discuss her concerns; no answer rec'd. Let voicemail requesting call back. MARIA TERESA will f/u when call is returned.    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

## 2020-12-02 ENCOUNTER — PATIENT MESSAGE (OUTPATIENT)
Dept: RADIATION ONCOLOGY | Facility: CLINIC | Age: 75
End: 2020-12-02

## 2020-12-02 ENCOUNTER — PATIENT MESSAGE (OUTPATIENT)
Dept: ADMINISTRATIVE | Facility: OTHER | Age: 75
End: 2020-12-02

## 2020-12-02 ENCOUNTER — TELEPHONE (OUTPATIENT)
Dept: HEMATOLOGY/ONCOLOGY | Facility: CLINIC | Age: 75
End: 2020-12-02

## 2020-12-02 ENCOUNTER — TELEPHONE (OUTPATIENT)
Dept: INTERNAL MEDICINE | Facility: CLINIC | Age: 75
End: 2020-12-02

## 2020-12-02 ENCOUNTER — NURSE TRIAGE (OUTPATIENT)
Dept: ADMINISTRATIVE | Facility: CLINIC | Age: 75
End: 2020-12-02

## 2020-12-02 ENCOUNTER — PATIENT MESSAGE (OUTPATIENT)
Dept: INTERNAL MEDICINE | Facility: CLINIC | Age: 75
End: 2020-12-02

## 2020-12-02 DIAGNOSIS — E11.22 TYPE 2 DIABETES MELLITUS WITH STAGE 3 CHRONIC KIDNEY DISEASE, WITH LONG-TERM CURRENT USE OF INSULIN: ICD-10-CM

## 2020-12-02 DIAGNOSIS — Z79.4 TYPE 2 DIABETES MELLITUS WITH STAGE 3 CHRONIC KIDNEY DISEASE, WITH LONG-TERM CURRENT USE OF INSULIN: ICD-10-CM

## 2020-12-02 DIAGNOSIS — N18.30 TYPE 2 DIABETES MELLITUS WITH STAGE 3 CHRONIC KIDNEY DISEASE, WITH LONG-TERM CURRENT USE OF INSULIN: ICD-10-CM

## 2020-12-02 LAB
FERRITIN SERPL-MCNC: 3656 NG/ML (ref 20–300)
FERRITIN SERPL-MCNC: 3656 NG/ML (ref 20–300)

## 2020-12-02 RX ORDER — INSULIN DETEMIR 100 [IU]/ML
50 INJECTION, SOLUTION SUBCUTANEOUS 2 TIMES DAILY
Qty: 15 ML | Refills: 3 | Status: SHIPPED | OUTPATIENT
Start: 2020-12-02 | End: 2021-04-07 | Stop reason: SDUPTHER

## 2020-12-02 NOTE — TELEPHONE ENCOUNTER
Pt tested positive for covid and was told by the hospital upon discharge to follow up with PCP on Friday. Informed pt that because he recently tested positive for covid we will not be able to schedule him an in office visit however I can schedule him a virtual visit. Pt verbalized understanding and appt has been scheduled.

## 2020-12-02 NOTE — TELEPHONE ENCOUNTER
Called pt for enrollment in Covid Surveillance program. Originally spoke with pt who verified name and . Pt did not receive pulse ox before discharge because it was sent to the wrong pharmacy. Had JENN Ava Trevino PA-C transfer to correct pharmacy. Pt stated he could not go pick it up. Called pharmacy to see if it could be delivered and they are going to overnight pulse ox to pt. Called pt back and spoke to wife, Mita, who verified pt name and . Explained that pulse ox would be delivered overnight to pt. Explained how to use pulse ox. Advised if SpO2 falls below 94 and does not increase after a few deep breaths, to return to ED for evaluation. Wife verbalized understanding. Pt denies any SOB, fever, or CP. Wife states he is just exhausted but is getting a little better this morning. Transferred to [facility] agent, AS to finish enrollment.    Called patient to review COVID-19 Surveillance Program enrollment process.    Smartphone: yes    [facility]: yes    Program consent: pending    Pulse oximeter status: pending delivery    Verified emergency contacts: yes    Program Overview: Reviewed , no response process, and importance of correct emergency contacts in event that well-being check is warranted. Patient will call [phone]  to speak with an OnCall nurse, if needed. Pt aware that if Sp02 <94% or if they have any worsening symptoms, they need to go to the emergency department. If they are having a medical emergency, they will call 911. Otherwise, patient will continue to submit data as scheduled. Reviewed importance of wearing mask if self or family members leave the house.     Patient had no further questions.    Reason for Disposition   Information only question and nurse able to answer    Protocols used: INFORMATION ONLY CALL - NO TRIAGE-A-OH

## 2020-12-02 NOTE — TELEPHONE ENCOUNTER
----- Message from Yajaira Ware sent at 12/2/2020  9:12 AM CST -----  Contact: Pt spouse  Pt was adv to come to hospital follow up with Dr Fox on Friday, 12/04/2020 and he is positive for Covid 19, please call .[phone] (home)     Thanks    Yajaira Ware

## 2020-12-02 NOTE — TELEPHONE ENCOUNTER
SW intern called pt to f/u after pt's recent hospital visit. SW intern spoke with both pt and pt's wife. Pt's wife had previously expressed concern to SW about pt feeling depressed and expressing some suicidal ideation. Pt was assessed for depression and suicidal ideation during the hospital visit. SW intern inquired about how pt and pt's wife are doing and if they needed any SW assistance. Pt's wife stating that they are doing fine and that they are just trying to rest up and recover from Covid-19. Charisse will f/u in person at pt's next appointment.

## 2020-12-03 ENCOUNTER — NURSE TRIAGE (OUTPATIENT)
Dept: ADMINISTRATIVE | Facility: CLINIC | Age: 75
End: 2020-12-03

## 2020-12-03 ENCOUNTER — PATIENT MESSAGE (OUTPATIENT)
Dept: ADMINISTRATIVE | Facility: OTHER | Age: 75
End: 2020-12-03

## 2020-12-03 ENCOUNTER — OFFICE VISIT (OUTPATIENT)
Dept: INTERNAL MEDICINE | Facility: CLINIC | Age: 75
End: 2020-12-03
Payer: MEDICARE

## 2020-12-03 DIAGNOSIS — U07.1 COVID-19 VIRUS INFECTION: Primary | ICD-10-CM

## 2020-12-03 DIAGNOSIS — I10 ESSENTIAL HYPERTENSION: ICD-10-CM

## 2020-12-03 PROCEDURE — 99213 PR OFFICE/OUTPT VISIT, EST, LEVL III, 20-29 MIN: ICD-10-PCS | Mod: 95,,, | Performed by: INTERNAL MEDICINE

## 2020-12-03 PROCEDURE — 1159F PR MEDICATION LIST DOCUMENTED IN MEDICAL RECORD: ICD-10-PCS | Mod: 95,,, | Performed by: INTERNAL MEDICINE

## 2020-12-03 PROCEDURE — 1157F ADVNC CARE PLAN IN RCRD: CPT | Mod: 95,,, | Performed by: INTERNAL MEDICINE

## 2020-12-03 PROCEDURE — 1159F MED LIST DOCD IN RCRD: CPT | Mod: 95,,, | Performed by: INTERNAL MEDICINE

## 2020-12-03 PROCEDURE — 99213 OFFICE O/P EST LOW 20 MIN: CPT | Mod: 95,,, | Performed by: INTERNAL MEDICINE

## 2020-12-03 PROCEDURE — 1157F PR ADVANCE CARE PLAN OR EQUIV PRESENT IN MEDICAL RECORD: ICD-10-PCS | Mod: 95,,, | Performed by: INTERNAL MEDICINE

## 2020-12-03 RX ORDER — CLONIDINE 0.2 MG/24H
1 PATCH, EXTENDED RELEASE TRANSDERMAL
Qty: 4 PATCH | Refills: 11 | Status: SHIPPED | OUTPATIENT
Start: 2020-12-03 | End: 2021-01-01 | Stop reason: SDUPTHER

## 2020-12-03 NOTE — TELEPHONE ENCOUNTER
Surveillance program patient, escalated for no response.  Pt has not received pulse oximeter yet. Pt will not receive it until sometime tomorrow.  Pt denies any symptoms at this time.      Reason for Disposition   Information only question and nurse able to answer    Additional Information   Negative: Nursing judgment   Negative: Nursing judgment   Negative: Nursing judgment   Negative: Nursing judgment    Protocols used: INFORMATION ONLY CALL - NO TRIAGE-A-OH

## 2020-12-03 NOTE — PROGRESS NOTES
Subjective:       Patient ID: Sherif Ragland is a 75 y.o. male.    Chief Complaint: No chief complaint on file.    HPI Patient is a 75-year-old male presenting today following up on his COVID-19 infection.  The patient was diagnosed with COVID Friday of last week.  He met criteria for monoclonal antibody infusion and he had that on Monday of this week.  He states through the weekend in early part of this week he felt pretty poorly.  He did actually get admitted to the hospital for observation overnight.  He has not been hypoxic but he had severe muscle pain and just felt very poorly.  He was monitored overnight the hospital and everything was stable so he was discharged home.  He reports that the last 2 days he has had significant improvement in his symptoms.  He remains without any shortness of breath.  No nausea vomiting or diarrhea.  His muscle symptoms have improved as well.    The patient has been noted in the even prior to COVID his blood pressures been running high.  He is on maximum dose of valsartan hydrochlorothiazide, Toprol-XL, and amlodipine.  In spite of these maximize therapies he continues to run blood pressures in the 160s 170s.    The patient location is: [place]  The chief complaint leading to consultation is: covid/htn  Visit type: Audiovisual  Each patient to whom he or she provides medical services by telemedicine is:  (1) informed of the relationship between the physician and patient and the respective role of any other health care provider with respect to management of the patient; and (2) notified that he or she may decline to receive medical services by telemedicine and may withdraw from such care at any time.          Review of Systems   Constitutional: Positive for activity change. Negative for unexpected weight change.   HENT: Negative for hearing loss, rhinorrhea and trouble swallowing.    Eyes: Negative for discharge and visual disturbance.   Respiratory: Negative for chest tightness  and wheezing.    Cardiovascular: Negative for chest pain and palpitations.   Gastrointestinal: Negative for blood in stool, constipation, diarrhea and vomiting.   Endocrine: Negative for polydipsia and polyuria.   Genitourinary: Negative for difficulty urinating, hematuria and urgency.   Musculoskeletal: Negative for arthralgias, joint swelling and neck pain.   Neurological: Positive for weakness. Negative for headaches.   Psychiatric/Behavioral: Negative for confusion and dysphoric mood.       Objective:   There were no vitals taken for this visit.     Physical Exam  Constitutional:       General: He is not in acute distress.     Appearance: He is well-developed.   HENT:      Head: Normocephalic and atraumatic.   Neck:      Musculoskeletal: Normal range of motion.   Pulmonary:      Effort: Pulmonary effort is normal. No respiratory distress.      Comments: Patient is speaking in full sentences.  No accessory muscle use.  Skin:     Findings: No rash.   Neurological:      Mental Status: He is alert and oriented to person, place, and time.      Cranial Nerves: No cranial nerve deficit.   Psychiatric:         Behavior: Behavior normal.         Thought Content: Thought content normal.             Assessment:       1. COVID-19 virus infection    2. Essential hypertension        Plan:   No problem-specific Assessment & Plan notes found for this encounter.    COVID-19 virus infection  Comments:  Extreme myalgias improving.  No issues with sob.  stable at this time.  Continue monitoring    Essential hypertension  Comments:  continue amlodipine, metoprolol and valsartan hctz.  Add clonidine patch 0.2mg daily.    Orders:  -     cloNIDine 0.2 mg/24 hr td ptwk (CATAPRES) 0.2 mg/24 hr; Place 1 patch onto the skin every 7 days.  Dispense: 4 patch; Refill: 11          10 days

## 2020-12-04 ENCOUNTER — PATIENT MESSAGE (OUTPATIENT)
Dept: ADMINISTRATIVE | Facility: OTHER | Age: 75
End: 2020-12-04

## 2020-12-04 ENCOUNTER — NURSE TRIAGE (OUTPATIENT)
Dept: ADMINISTRATIVE | Facility: CLINIC | Age: 75
End: 2020-12-04

## 2020-12-04 DIAGNOSIS — E11.9 TYPE 2 DIABETES MELLITUS WITHOUT COMPLICATION: ICD-10-CM

## 2020-12-05 ENCOUNTER — PATIENT MESSAGE (OUTPATIENT)
Dept: ADMINISTRATIVE | Facility: OTHER | Age: 75
End: 2020-12-05

## 2020-12-05 ENCOUNTER — PATIENT MESSAGE (OUTPATIENT)
Dept: INTERNAL MEDICINE | Facility: CLINIC | Age: 75
End: 2020-12-05

## 2020-12-05 ENCOUNTER — NURSE TRIAGE (OUTPATIENT)
Dept: ADMINISTRATIVE | Facility: CLINIC | Age: 75
End: 2020-12-05

## 2020-12-05 NOTE — TELEPHONE ENCOUNTER
Patient initially escalated due to no response, however patient entered vitals prior to phone call. Vital signs and symptoms did not trigger a second escalation; therefore, no follow up call is needed at this time.    Sp02: 95  Pulse: 75  Temperature: 98.1  SOB at rest (0-5): 0  SOB with activity (0-5): 1  Worsening symptoms: no  Fever symptoms: no  Increased home oxygen: n/a      Reason for Disposition   Caller has cancelled the call before the first contact    Protocols used: NO CONTACT OR DUPLICATE CONTACT CALL-A-OH

## 2020-12-06 ENCOUNTER — NURSE TRIAGE (OUTPATIENT)
Dept: ADMINISTRATIVE | Facility: CLINIC | Age: 75
End: 2020-12-06

## 2020-12-06 ENCOUNTER — PATIENT MESSAGE (OUTPATIENT)
Dept: ADMINISTRATIVE | Facility: OTHER | Age: 75
End: 2020-12-06

## 2020-12-06 LAB
BACTERIA BLD CULT: NORMAL
BACTERIA BLD CULT: NORMAL

## 2020-12-06 NOTE — TELEPHONE ENCOUNTER
Pt escalated for no response to 3 pm push. Spoke to pt to advise we had not gotten afternoon VS. Pt stated they put them in. Advised it did not come through, pt stated he did not care, he is tired of fooling with us and ended the call. Will continue to monitor    Reason for Disposition   Caller hangs up   Caller hangs up    Protocols used: NO CONTACT OR DUPLICATE CONTACT CALL-A-AH, DIFFICULT CALLER-A-AH

## 2020-12-07 ENCOUNTER — OFFICE VISIT (OUTPATIENT)
Dept: INTERNAL MEDICINE | Facility: CLINIC | Age: 75
End: 2020-12-07
Payer: MEDICARE

## 2020-12-07 ENCOUNTER — NURSE TRIAGE (OUTPATIENT)
Dept: ADMINISTRATIVE | Facility: CLINIC | Age: 75
End: 2020-12-07

## 2020-12-07 ENCOUNTER — PATIENT MESSAGE (OUTPATIENT)
Dept: ADMINISTRATIVE | Facility: OTHER | Age: 75
End: 2020-12-07

## 2020-12-07 DIAGNOSIS — C79.51 PROSTATE CANCER METASTATIC TO BONE: ICD-10-CM

## 2020-12-07 DIAGNOSIS — N18.30 TYPE 2 DIABETES MELLITUS WITH STAGE 3 CHRONIC KIDNEY DISEASE, WITH LONG-TERM CURRENT USE OF INSULIN, UNSPECIFIED WHETHER STAGE 3A OR 3B CKD: Primary | ICD-10-CM

## 2020-12-07 DIAGNOSIS — Z79.4 TYPE 2 DIABETES MELLITUS WITH STAGE 3 CHRONIC KIDNEY DISEASE, WITH LONG-TERM CURRENT USE OF INSULIN, UNSPECIFIED WHETHER STAGE 3A OR 3B CKD: Primary | ICD-10-CM

## 2020-12-07 DIAGNOSIS — E11.22 TYPE 2 DIABETES MELLITUS WITH STAGE 3 CHRONIC KIDNEY DISEASE, WITH LONG-TERM CURRENT USE OF INSULIN, UNSPECIFIED WHETHER STAGE 3A OR 3B CKD: Primary | ICD-10-CM

## 2020-12-07 DIAGNOSIS — C61 PROSTATE CANCER METASTATIC TO BONE: ICD-10-CM

## 2020-12-07 DIAGNOSIS — U07.1 COVID-19: ICD-10-CM

## 2020-12-07 PROCEDURE — 3046F PR MOST RECENT HEMOGLOBIN A1C LEVEL > 9.0%: ICD-10-PCS | Mod: CPTII,95,, | Performed by: INTERNAL MEDICINE

## 2020-12-07 PROCEDURE — 99214 OFFICE O/P EST MOD 30 MIN: CPT | Mod: 95,,, | Performed by: INTERNAL MEDICINE

## 2020-12-07 PROCEDURE — 3046F HEMOGLOBIN A1C LEVEL >9.0%: CPT | Mod: CPTII,95,, | Performed by: INTERNAL MEDICINE

## 2020-12-07 PROCEDURE — 1157F ADVNC CARE PLAN IN RCRD: CPT | Mod: 95,,, | Performed by: INTERNAL MEDICINE

## 2020-12-07 PROCEDURE — 1159F MED LIST DOCD IN RCRD: CPT | Mod: 95,,, | Performed by: INTERNAL MEDICINE

## 2020-12-07 PROCEDURE — 1157F PR ADVANCE CARE PLAN OR EQUIV PRESENT IN MEDICAL RECORD: ICD-10-PCS | Mod: 95,,, | Performed by: INTERNAL MEDICINE

## 2020-12-07 PROCEDURE — 99214 PR OFFICE/OUTPT VISIT, EST, LEVL IV, 30-39 MIN: ICD-10-PCS | Mod: 95,,, | Performed by: INTERNAL MEDICINE

## 2020-12-07 PROCEDURE — 1159F PR MEDICATION LIST DOCUMENTED IN MEDICAL RECORD: ICD-10-PCS | Mod: 95,,, | Performed by: INTERNAL MEDICINE

## 2020-12-07 NOTE — TELEPHONE ENCOUNTER
Patient initially escalated due to no response, however patient entered vitals prior to phone call. Vital signs and symptoms did not trigger a second escalation; therefore, no follow up call is needed at this time.    Reason for Disposition   Caller has cancelled the call before the first contact    Additional Information   Negative: Caller has already spoken with the PCP (or office), and has no further questions   Negative: Caller has already spoken with another triager and has no further questions   Negative: Caller has already spoken with another triager or PCP (or office), and has further questions and triager able to answer questions.   Negative: Busy signal.  First attempt to contact caller.  Follow-up call scheduled within 15 minutes.   Negative: No answer.  First attempt to contact caller.  Follow-up call scheduled within 15 minutes.   Negative: Message left on identified voicemail   Negative: Message left on unidentified voice mail. Phone number verified.   Negative: Message left with person in household   Negative: Wrong number reached. Phone number verified.   Negative: Second attempt to contact family AND no contact made. Phone number verified.   Negative: Cell phone out of range. Phone number verified.   Negative: Patient already left for the hospital/clinic   Negative: Unable to complete triage due to phone connection issues    Protocols used: NO CONTACT OR DUPLICATE CONTACT CALL-A-OH

## 2020-12-07 NOTE — PROGRESS NOTES
Subjective:       Patient ID: Sherif Ragland is a 75 y.o. male.    Chief Complaint: No chief complaint on file.    HPI.  He had Patient is a 75-year-old male presenting today accompanied by family member the video visit in regards to some sugar issues.  Patient is currently diagnosed with COVID-19 and also has history of diabetes and metastatic prostate cancer.  Over the weekend he had some issues with some low sugars.  In discussion today was noted the patient is not eating as well as he typically does and they have not been checking his sugars real consistently.  They had stopped his Prandin but he was still taking his Levemir but they did decrease the dose on it a bit.  The low sugar had gotten out of the middle the night around 50 it came up nicely with some orange juice and mms.  He had not had any further significant to decrease his such as that.  We discussed is Levemir dosing today.    Patient is doing well from a COVID standpoint.  He got the monoclonal antibody.  He is not running fevers he is oxygenating well he is not experiencing any shortness of breath.  He is little bit tired and lethargic but otherwise doing quite a bit better than he was previously.    Regards to his prostate cancer he was scheduled to start radiation therapy in the very near future.  We discussed when he would be clear from a COVID standpoint to resume non quarantined status and I indicated that his radiation oncologist would make the decision on resuming the radiation based upon his overall clinical status at at that time.    Review of Systems   Constitutional: Positive for fatigue.   Cardiovascular: Negative for chest pain.   Endocrine: Negative for polydipsia, polyphagia and polyuria.   Skin: Negative for pallor.   Neurological: Positive for speech difficulty and weakness. Negative for dizziness, tremors, seizures and headaches.   Psychiatric/Behavioral: Negative for confusion. The patient is not nervous/anxious.         Objective:   There were no vitals taken for this visit.     Physical Exam  Constitutional:       General: He is not in acute distress.     Appearance: He is well-developed.   HENT:      Head: Normocephalic and atraumatic.   Neck:      Musculoskeletal: Normal range of motion.   Pulmonary:      Effort: Pulmonary effort is normal. No respiratory distress.      Comments: Speaking clearly. Using full sentences  Skin:     Findings: No rash.   Neurological:      Mental Status: He is alert and oriented to person, place, and time.      Cranial Nerves: No cranial nerve deficit.   Psychiatric:         Behavior: Behavior normal.         Thought Content: Thought content normal.             Assessment:       1. Type 2 diabetes mellitus with stage 3 chronic kidney disease, with long-term current use of insulin, unspecified whether stage 3a or 3b CKD    2. COVID-19    3. Prostate cancer metastatic to bone        Plan:   No problem-specific Assessment & Plan notes found for this encounter.    Type 2 diabetes mellitus with stage 3 chronic kidney disease, with long-term current use of insulin, unspecified whether stage 3a or 3b CKD  Comments:  Decrease levemir to 50 units once daily.  Monitor sugars qid.    COVID-19  Comments:  Continues to improve. Continuing with monitoring program    Prostate cancer metastatic to bone  Comments:  Resume radiation therapy when Dr. Chambers feels status is strong enough

## 2020-12-08 ENCOUNTER — PATIENT MESSAGE (OUTPATIENT)
Dept: ADMINISTRATIVE | Facility: OTHER | Age: 75
End: 2020-12-08

## 2020-12-08 ENCOUNTER — NURSE TRIAGE (OUTPATIENT)
Dept: ADMINISTRATIVE | Facility: CLINIC | Age: 75
End: 2020-12-08

## 2020-12-08 NOTE — TELEPHONE ENCOUNTER
Reason for Disposition   Message left on identified voicemail   Message left on unidentified voice mail. Phone number verified.    Additional Information   Negative: Caller has already spoken with the PCP (or office), and has no further questions   Negative: Caller has already spoken with another triager and has no further questions   Negative: Caller has already spoken with another triager or PCP (or office), and has further questions and triager able to answer questions.    Protocols used: NO CONTACT OR DUPLICATE CONTACT CALL-A-OH

## 2020-12-08 NOTE — TELEPHONE ENCOUNTER
Covid surveillance program escalation due to no response after 3 pm push. Follow up call attempted, no contact made. Left VM message. Call to EC, no contact. Left VM message. Sent Dashhart message.     Reason for Disposition   Message left on identified voicemail    Additional Information   Negative: Caller has already spoken with the PCP (or office), and has no further questions   Negative: Caller has already spoken with another triager and has no further questions   Negative: Caller has already spoken with another triager or PCP (or office), and has further questions and triager able to answer questions.   Negative: Busy signal.  First attempt to contact caller.  Follow-up call scheduled within 15 minutes.   Negative: No answer.  First attempt to contact caller.  Follow-up call scheduled within 15 minutes.    Protocols used: NO CONTACT OR DUPLICATE CONTACT CALL-A-OH

## 2020-12-08 NOTE — TELEPHONE ENCOUNTER
Pt initially escalated due to no response. Pt x 2 and EC called. Left message as per below.     This is a registered nurse from [facility] calling from the Covid Surveillance program. I am reaching out to you today to discuss your recent vital signs and symptoms entry and  am trying to check on you. If you feel that you're are having a medical emergency, please call 911 or go to the nearest Emergency Room.  Please call us at [phone]. Thank you.    My Chart Message:     We are contacting you from [facility] Covid-19 Surveillance Program, as we need to follow up with you to discuss your recent vital sign and symptom submission on [facility]. We are calling to make sure that you are okay and to see if you need any additional care.  Please call [facility] ([phone]) to speak with one of our nurses. If you are having a medical emergency, call 911 or go to your nearest emergency room. Thank you.

## 2020-12-09 ENCOUNTER — PATIENT MESSAGE (OUTPATIENT)
Dept: ADMINISTRATIVE | Facility: OTHER | Age: 75
End: 2020-12-09

## 2020-12-09 ENCOUNTER — NURSE TRIAGE (OUTPATIENT)
Dept: ADMINISTRATIVE | Facility: CLINIC | Age: 75
End: 2020-12-09

## 2020-12-09 NOTE — TELEPHONE ENCOUNTER
Sent my chart for recheck and left message    Reason for Disposition   Message left on identified voice mail    Protocols used: NO CONTACT OR DUPLICATE CONTACT CALL-A-AH

## 2020-12-10 ENCOUNTER — PATIENT MESSAGE (OUTPATIENT)
Dept: ADMINISTRATIVE | Facility: OTHER | Age: 75
End: 2020-12-10

## 2020-12-10 ENCOUNTER — PATIENT MESSAGE (OUTPATIENT)
Dept: INTERNAL MEDICINE | Facility: CLINIC | Age: 75
End: 2020-12-10

## 2020-12-10 ENCOUNTER — NURSE TRIAGE (OUTPATIENT)
Dept: ADMINISTRATIVE | Facility: CLINIC | Age: 75
End: 2020-12-10

## 2020-12-10 NOTE — TELEPHONE ENCOUNTER
No answer left voicemail and sent my chart    Reason for Disposition   No answer.  First attempt to contact caller.  Follow-up call scheduled within 15 minutes.    Protocols used: NO CONTACT OR DUPLICATE CONTACT CALL-A-OH

## 2020-12-11 ENCOUNTER — NURSE TRIAGE (OUTPATIENT)
Dept: ADMINISTRATIVE | Facility: CLINIC | Age: 75
End: 2020-12-11

## 2020-12-11 ENCOUNTER — PATIENT MESSAGE (OUTPATIENT)
Dept: ADMINISTRATIVE | Facility: OTHER | Age: 75
End: 2020-12-11

## 2020-12-12 ENCOUNTER — PATIENT MESSAGE (OUTPATIENT)
Dept: ADMINISTRATIVE | Facility: OTHER | Age: 75
End: 2020-12-12

## 2020-12-13 ENCOUNTER — PATIENT MESSAGE (OUTPATIENT)
Dept: ADMINISTRATIVE | Facility: OTHER | Age: 75
End: 2020-12-13

## 2020-12-13 ENCOUNTER — PATIENT MESSAGE (OUTPATIENT)
Dept: RADIATION ONCOLOGY | Facility: CLINIC | Age: 75
End: 2020-12-13

## 2020-12-14 ENCOUNTER — NURSE TRIAGE (OUTPATIENT)
Dept: ADMINISTRATIVE | Facility: CLINIC | Age: 75
End: 2020-12-14

## 2020-12-14 ENCOUNTER — PATIENT MESSAGE (OUTPATIENT)
Dept: ADMINISTRATIVE | Facility: OTHER | Age: 75
End: 2020-12-14

## 2020-12-14 ENCOUNTER — PATIENT MESSAGE (OUTPATIENT)
Dept: RADIATION ONCOLOGY | Facility: CLINIC | Age: 75
End: 2020-12-14

## 2020-12-15 ENCOUNTER — PATIENT MESSAGE (OUTPATIENT)
Dept: ADMINISTRATIVE | Facility: OTHER | Age: 75
End: 2020-12-15

## 2020-12-15 ENCOUNTER — LAB VISIT (OUTPATIENT)
Dept: LAB | Facility: HOSPITAL | Age: 75
End: 2020-12-15
Attending: RADIOLOGY
Payer: MEDICARE

## 2020-12-15 ENCOUNTER — NURSE TRIAGE (OUTPATIENT)
Dept: ADMINISTRATIVE | Facility: CLINIC | Age: 75
End: 2020-12-15

## 2020-12-15 DIAGNOSIS — C61 PROSTATE CANCER: ICD-10-CM

## 2020-12-15 DIAGNOSIS — C79.51 SECONDARY CANCER OF BONE: ICD-10-CM

## 2020-12-15 LAB
ALBUMIN SERPL BCP-MCNC: 2.8 G/DL (ref 3.5–5.2)
ALP SERPL-CCNC: 548 U/L (ref 55–135)
ALT SERPL W/O P-5'-P-CCNC: 25 U/L (ref 10–44)
ANION GAP SERPL CALC-SCNC: 8 MMOL/L (ref 8–16)
AST SERPL-CCNC: 19 U/L (ref 10–40)
BASOPHILS # BLD AUTO: 0.05 K/UL (ref 0–0.2)
BASOPHILS NFR BLD: 0.9 % (ref 0–1.9)
BILIRUB SERPL-MCNC: 0.4 MG/DL (ref 0.1–1)
BUN SERPL-MCNC: 25 MG/DL (ref 8–23)
CALCIUM SERPL-MCNC: 8.3 MG/DL (ref 8.7–10.5)
CHLORIDE SERPL-SCNC: 100 MMOL/L (ref 95–110)
CO2 SERPL-SCNC: 29 MMOL/L (ref 23–29)
CREAT SERPL-MCNC: 1.8 MG/DL (ref 0.5–1.4)
DIFFERENTIAL METHOD: ABNORMAL
EOSINOPHIL # BLD AUTO: 0.1 K/UL (ref 0–0.5)
EOSINOPHIL NFR BLD: 1.6 % (ref 0–8)
ERYTHROCYTE [DISTWIDTH] IN BLOOD BY AUTOMATED COUNT: 13.2 % (ref 11.5–14.5)
EST. GFR  (AFRICAN AMERICAN): 41.6 ML/MIN/1.73 M^2
EST. GFR  (NON AFRICAN AMERICAN): 36 ML/MIN/1.73 M^2
GLUCOSE SERPL-MCNC: 306 MG/DL (ref 70–110)
HCT VFR BLD AUTO: 36.6 % (ref 40–54)
HGB BLD-MCNC: 11.6 G/DL (ref 14–18)
IMM GRANULOCYTES # BLD AUTO: 0.11 K/UL (ref 0–0.04)
IMM GRANULOCYTES NFR BLD AUTO: 1.9 % (ref 0–0.5)
LYMPHOCYTES # BLD AUTO: 0.7 K/UL (ref 1–4.8)
LYMPHOCYTES NFR BLD: 13 % (ref 18–48)
MCH RBC QN AUTO: 30.1 PG (ref 27–31)
MCHC RBC AUTO-ENTMCNC: 31.7 G/DL (ref 32–36)
MCV RBC AUTO: 95 FL (ref 82–98)
MONOCYTES # BLD AUTO: 0.6 K/UL (ref 0.3–1)
MONOCYTES NFR BLD: 9.6 % (ref 4–15)
NEUTROPHILS # BLD AUTO: 4.2 K/UL (ref 1.8–7.7)
NEUTROPHILS NFR BLD: 73 % (ref 38–73)
NRBC BLD-RTO: 0 /100 WBC
PLATELET # BLD AUTO: 342 K/UL (ref 150–350)
PMV BLD AUTO: 8.6 FL (ref 9.2–12.9)
POTASSIUM SERPL-SCNC: 4.7 MMOL/L (ref 3.5–5.1)
PROT SERPL-MCNC: 6.6 G/DL (ref 6–8.4)
RBC # BLD AUTO: 3.86 M/UL (ref 4.6–6.2)
SODIUM SERPL-SCNC: 137 MMOL/L (ref 136–145)
WBC # BLD AUTO: 5.71 K/UL (ref 3.9–12.7)

## 2020-12-15 PROCEDURE — 80053 COMPREHEN METABOLIC PANEL: CPT

## 2020-12-15 PROCEDURE — 36415 COLL VENOUS BLD VENIPUNCTURE: CPT | Mod: PO

## 2020-12-15 PROCEDURE — 85025 COMPLETE CBC W/AUTO DIFF WBC: CPT

## 2020-12-15 NOTE — TELEPHONE ENCOUNTER
O2 77%, HR 66, T 98.4, Fever N, Worse N, SOB 0/0     Covid surveillance program escalation due to abnormal vital sign or entry. Follow up call attempted, no contact made.Left VM message. Call to EC, no contact. Left VM message. Sent MyChart message.     Reason for Disposition   Message left on identified voice mail    Additional Information   Negative: Caller is angry or rude (e.g., hangs up, verbally abusive, yelling)   Negative: Caller hangs up   Negative: Caller has already spoken with the PCP and has no further questions.   Negative: Caller has already spoken with another triager and has no further questions.   Negative: Caller has already spoken with another triager or PCP AND has further questions AND triager able to answer questions.   Negative: Busy signal.  First attempt to contact caller.  Follow-up call scheduled within 15 minutes.   Negative: No answer.  First attempt to contact caller.  Follow-up call scheduled within 15 minutes.    Protocols used: NO CONTACT OR DUPLICATE CONTACT CALL-A-

## 2020-12-16 ENCOUNTER — PATIENT MESSAGE (OUTPATIENT)
Dept: ADMINISTRATIVE | Facility: OTHER | Age: 75
End: 2020-12-16

## 2020-12-16 ENCOUNTER — OFFICE VISIT (OUTPATIENT)
Dept: RADIATION ONCOLOGY | Facility: CLINIC | Age: 75
End: 2020-12-16
Payer: MEDICARE

## 2020-12-16 ENCOUNTER — DOCUMENT SCAN (OUTPATIENT)
Dept: HOME HEALTH SERVICES | Facility: HOSPITAL | Age: 75
End: 2020-12-16
Payer: MEDICARE

## 2020-12-16 ENCOUNTER — NURSE TRIAGE (OUTPATIENT)
Dept: ADMINISTRATIVE | Facility: CLINIC | Age: 75
End: 2020-12-16

## 2020-12-16 DIAGNOSIS — C61 PROSTATE CANCER: Primary | ICD-10-CM

## 2020-12-16 DIAGNOSIS — C61 PROSTATE CANCER METASTATIC TO MULTIPLE SITES: ICD-10-CM

## 2020-12-16 DIAGNOSIS — C79.51 SECONDARY CANCER OF BONE: ICD-10-CM

## 2020-12-16 PROCEDURE — 1159F MED LIST DOCD IN RCRD: CPT | Mod: 95,,, | Performed by: RADIOLOGY

## 2020-12-16 PROCEDURE — 1157F PR ADVANCE CARE PLAN OR EQUIV PRESENT IN MEDICAL RECORD: ICD-10-PCS | Mod: 95,,, | Performed by: RADIOLOGY

## 2020-12-16 PROCEDURE — 99213 PR OFFICE/OUTPT VISIT, EST, LEVL III, 20-29 MIN: ICD-10-PCS | Mod: 95,,, | Performed by: RADIOLOGY

## 2020-12-16 PROCEDURE — 1157F ADVNC CARE PLAN IN RCRD: CPT | Mod: 95,,, | Performed by: RADIOLOGY

## 2020-12-16 PROCEDURE — 99213 OFFICE O/P EST LOW 20 MIN: CPT | Mod: 95,,, | Performed by: RADIOLOGY

## 2020-12-16 PROCEDURE — 1159F PR MEDICATION LIST DOCUMENTED IN MEDICAL RECORD: ICD-10-PCS | Mod: 95,,, | Performed by: RADIOLOGY

## 2020-12-16 NOTE — PROGRESS NOTES
[facility]  RADIATION ONCOLOGY FOLLOW UP    Name: Sherif Ragland : 1945     The patient location is: home  The chief complaint leading to consultation is: prostate cancer met to bones    Visit type: audiovisual    Face to Face time with patient: 8 minutes  15 minutes of total time spent on the encounter, which includes face to face time and non-face to face time preparing to see the patient (eg, review of tests), Obtaining and/or reviewing separately obtained history, Documenting clinical information in the electronic or other health record, Independently interpreting results (not separately reported) and communicating results to the patient/family/caregiver, or Care coordination (not separately reported).     Each patient to whom he or she provides medical services by telemedicine is:  (1) informed of the relationship between the physician and patient and the respective role of any other health care provider with respect to management of the patient; and (2) notified that he or she may decline to receive medical services by telemedicine and may withdraw from such care at any time.    DIAGNOSIS: metastatic prostate cancer to bone     TREATMENT HISTORY:   1. Abiraterone/Casodex  2. Xofigo #1 on 20    INTERVAL HISTORY: Sherif Ragland is a pleasant 75 y.o. male who presents today for follow-up.  They were last seen in our clinic for 1st Xofigo injection.   Since then, he was diagnosed with Covid and received treatment. Had short hospital presentation for dyspnea/hypoxia and he has recovered from then.  Today, He feels much improved from the past few weeks. Getting stronger each day and able to do all of his ADLs unassisted.   Hemoglobin 11.6  Platelets 342  ANC 4.2  Alkaline phosphatase 548    PHYSICAL EXAM:   Constitutional: well appearing, no acute distress, ECOG 1 - Ambulates, capable of light work  Vitals:    There were no vitals taken for this visit.  Eyes: sclera  anicteric, EOMI, pupils equal, round  Remainder of exam limited    Laboratory & X-Ray Findings: Per above.      11/9/20:  Hemoglobin 12.1  Platelets 331  ANC 5.9  Alkaline phosphatase 501    ASSESSMENT: labs adequate for Xofigo injection #2    PLAN: Mr. Ragland is feeling much better and is ready for next injection.   Will follow the Hgb as that is lowest of the relevant labs and has been chronically low; not required for subsequent injection but he may need transfusion at some point.  For subsequent injections, requirements are ANC >1 and platelets >50.   After reviewing lab data, we will proceed with next Xofigo injection.     I spent approximately 15 minutes reviewing the available records and evaluating the patient, out of which over 50% of the time was spent face to face with the patient in counseling and coordinating this patient's care.    Amber Chambers III, M.D.  Radiation Oncology  [facility] [address] [place] [address]  Ph: [phone]  [email or web address]

## 2020-12-17 ENCOUNTER — PATIENT MESSAGE (OUTPATIENT)
Dept: ADMINISTRATIVE | Facility: OTHER | Age: 75
End: 2020-12-17

## 2020-12-17 ENCOUNTER — NURSE TRIAGE (OUTPATIENT)
Dept: ADMINISTRATIVE | Facility: CLINIC | Age: 75
End: 2020-12-17

## 2020-12-17 NOTE — TELEPHONE ENCOUNTER
Patient escalated for O2 94%, on retest O2 97%. No further action needed at this time.    Reason for Disposition   Information only question and nurse able to answer    Additional Information   Negative: Nursing judgment   Negative: Nursing judgment   Negative: Nursing judgment   Negative: Nursing judgment    Protocols used: INFORMATION ONLY CALL - NO TRIAGE-A-OH

## 2020-12-17 NOTE — TELEPHONE ENCOUNTER
Wife said they will complete the tracking for this evening   Reason for Disposition   Message left with person in household.    Additional Information   Negative: Caller is angry or rude (e.g., hangs up, verbally abusive, yelling)   Negative: Caller hangs up   Negative: Caller has already spoken with the PCP and has no further questions.   Negative: Caller has already spoken with another triager and has no further questions.   Negative: Caller has already spoken with another triager or PCP AND has further questions AND triager able to answer questions.   Negative: Busy signal.  First attempt to contact caller.  Follow-up call scheduled within 15 minutes.   Negative: No answer.  First attempt to contact caller.  Follow-up call scheduled within 15 minutes.   Negative: Message left on identified voice mail   Negative: Message left on unidentified voice mail.  Phone number verified.    Protocols used: NO CONTACT OR DUPLICATE CONTACT CALL-A-

## 2020-12-17 NOTE — TELEPHONE ENCOUNTER
No answer . LM on VM  On 3rd attempt contact was made via patients wife.  Patient had virtual today with PCP. She denies any complications they will input info  My chart message sent..

## 2020-12-22 ENCOUNTER — CLINICAL SUPPORT (OUTPATIENT)
Dept: RADIATION ONCOLOGY | Facility: CLINIC | Age: 75
End: 2020-12-22
Payer: MEDICARE

## 2020-12-22 ENCOUNTER — HOSPITAL ENCOUNTER (OUTPATIENT)
Dept: RADIOLOGY | Facility: HOSPITAL | Age: 75
Discharge: HOME | End: 2020-12-22
Attending: RADIOLOGY
Payer: MEDICARE

## 2020-12-22 DIAGNOSIS — C61 PROSTATE CANCER: ICD-10-CM

## 2020-12-22 DIAGNOSIS — C79.51 SECONDARY CANCER OF BONE: ICD-10-CM

## 2020-12-22 DIAGNOSIS — C61 PROSTATE CANCER: Primary | ICD-10-CM

## 2020-12-22 PROCEDURE — 79101 PR  NUCLEAR THERAPY, IV: ICD-10-PCS | Mod: S$GLB,,, | Performed by: RADIOLOGY

## 2020-12-22 PROCEDURE — 99499 NO LOS: ICD-10-PCS | Mod: S$GLB,,, | Performed by: RADIOLOGY

## 2020-12-22 PROCEDURE — A9606 RADIUM RA223 DICHLORIDE THER: HCPCS | Mod: JG

## 2020-12-22 PROCEDURE — 79101 NUCLEAR RX IV ADMIN: CPT | Mod: TC

## 2020-12-22 PROCEDURE — 79101 NUCLEAR RX IV ADMIN: CPT | Mod: S$GLB,,, | Performed by: RADIOLOGY

## 2020-12-22 PROCEDURE — 99499 UNLISTED E&M SERVICE: CPT | Mod: S$GLB,,, | Performed by: RADIOLOGY

## 2020-12-22 NOTE — PROGRESS NOTES
[facility]  XOFIGO ADMINISTRATION PROCEDURE NOTE    Patient Name: Sherif Ragland    MRN: [id number]    Date of Service: 2020    : 1945    Preoperative Diagnosis: Bone metastases from prostate cancer    Postoperative Diagnosis: Same as above    Administering Physician: Amber Chambers M.D.    Injection Number: 2    Calculated Dose: 184.8 uCi    Administered Dose: 181.7 uCi    Procedure: The patient was brought to the Nuclear Medicine department. IV access was started by Nuclear Medicine. The dose of Xofigo was verified by Nuclear Medicine. I verified the calculated and administered doses. I slowly injected the Xofigo over 1 minute. The patient did not experience any acute side effects. I then flushed the Xofigo syringe with 1-3 syringes of saline. The patient tolerated the procedure well and was turned over to Nuclear Medicine for removal of the IV.    I will see him back in 3 weeks for blood work.    Amber Chambers III, M.D.  Radiation Oncology  [facility] [address] [place] [address]  Ph: [phone]  [email or web address]

## 2020-12-23 ENCOUNTER — PATIENT MESSAGE (OUTPATIENT)
Dept: HEMATOLOGY/ONCOLOGY | Facility: CLINIC | Age: 75
End: 2020-12-23

## 2020-12-28 ENCOUNTER — PATIENT MESSAGE (OUTPATIENT)
Dept: RADIATION ONCOLOGY | Facility: CLINIC | Age: 75
End: 2020-12-28

## 2020-12-29 ENCOUNTER — PATIENT MESSAGE (OUTPATIENT)
Dept: RADIATION ONCOLOGY | Facility: CLINIC | Age: 75
End: 2020-12-29

## 2020-12-30 ENCOUNTER — PATIENT MESSAGE (OUTPATIENT)
Dept: INTERNAL MEDICINE | Facility: CLINIC | Age: 75
End: 2020-12-30

## 2020-12-30 DIAGNOSIS — R60.0 PERIPHERAL EDEMA: ICD-10-CM

## 2020-12-31 RX ORDER — FUROSEMIDE 40 MG/1
40 TABLET ORAL DAILY
Qty: 3 TABLET | Refills: 0 | Status: SHIPPED | OUTPATIENT
Start: 2020-12-31 | End: 2021-01-01

## 2021-01-01 ENCOUNTER — HOSPITAL ENCOUNTER (OUTPATIENT)
Dept: RADIOLOGY | Facility: HOSPITAL | Age: 76
Discharge: HOME | End: 2021-09-02
Attending: INTERNAL MEDICINE
Payer: MEDICARE

## 2021-01-01 ENCOUNTER — TELEPHONE (OUTPATIENT)
Dept: HEMATOLOGY/ONCOLOGY | Facility: CLINIC | Age: 76
End: 2021-01-01
Payer: MEDICARE

## 2021-01-01 ENCOUNTER — PATIENT OUTREACH (OUTPATIENT)
Dept: ADMINISTRATIVE | Facility: HOSPITAL | Age: 76
End: 2021-01-01

## 2021-01-01 ENCOUNTER — PATIENT MESSAGE (OUTPATIENT)
Dept: HEMATOLOGY/ONCOLOGY | Facility: CLINIC | Age: 76
End: 2021-01-01

## 2021-01-01 ENCOUNTER — TELEPHONE (OUTPATIENT)
Dept: HEMATOLOGY/ONCOLOGY | Facility: CLINIC | Age: 76
End: 2021-01-01

## 2021-01-01 ENCOUNTER — LAB VISIT (OUTPATIENT)
Dept: LAB | Facility: HOSPITAL | Age: 76
End: 2021-01-01
Attending: INTERNAL MEDICINE
Payer: MEDICARE

## 2021-01-01 ENCOUNTER — CLINICAL SUPPORT (OUTPATIENT)
Dept: DIABETES | Facility: CLINIC | Age: 76
End: 2021-01-01
Payer: MEDICARE

## 2021-01-01 ENCOUNTER — OFFICE VISIT (OUTPATIENT)
Dept: INTERNAL MEDICINE | Facility: CLINIC | Age: 76
End: 2021-01-01
Payer: MEDICARE

## 2021-01-01 ENCOUNTER — HOSPITAL ENCOUNTER (OUTPATIENT)
Dept: RADIATION THERAPY | Facility: HOSPITAL | Age: 76
Discharge: HOME | End: 2021-06-01
Attending: RADIOLOGY
Payer: MEDICARE

## 2021-01-01 ENCOUNTER — DOCUMENTATION ONLY (OUTPATIENT)
Dept: HEMATOLOGY/ONCOLOGY | Facility: CLINIC | Age: 76
End: 2021-01-01

## 2021-01-01 ENCOUNTER — TELEPHONE (OUTPATIENT)
Dept: INTERNAL MEDICINE | Facility: CLINIC | Age: 76
End: 2021-01-01

## 2021-01-01 ENCOUNTER — OFFICE VISIT (OUTPATIENT)
Dept: RADIATION ONCOLOGY | Facility: CLINIC | Age: 76
End: 2021-01-01
Payer: MEDICARE

## 2021-01-01 ENCOUNTER — DOCUMENTATION ONLY (OUTPATIENT)
Dept: RADIATION ONCOLOGY | Facility: CLINIC | Age: 76
End: 2021-01-01

## 2021-01-01 ENCOUNTER — INFUSION (OUTPATIENT)
Dept: INFUSION THERAPY | Facility: HOSPITAL | Age: 76
End: 2021-01-01
Attending: INTERNAL MEDICINE
Payer: MEDICARE

## 2021-01-01 ENCOUNTER — HOSPITAL ENCOUNTER (OUTPATIENT)
Dept: RADIOLOGY | Facility: HOSPITAL | Age: 76
Discharge: HOME | End: 2021-04-20
Attending: RADIOLOGY
Payer: MEDICARE

## 2021-01-01 ENCOUNTER — PATIENT MESSAGE (OUTPATIENT)
Dept: INTERNAL MEDICINE | Facility: CLINIC | Age: 76
End: 2021-01-01

## 2021-01-01 ENCOUNTER — OFFICE VISIT (OUTPATIENT)
Dept: HEMATOLOGY/ONCOLOGY | Facility: CLINIC | Age: 76
End: 2021-01-01
Payer: MEDICARE

## 2021-01-01 ENCOUNTER — HOSPITAL ENCOUNTER (OUTPATIENT)
Dept: RADIOLOGY | Facility: HOSPITAL | Age: 76
Discharge: HOME | End: 2021-05-26
Attending: RADIOLOGY
Payer: MEDICARE

## 2021-01-01 ENCOUNTER — PATIENT MESSAGE (OUTPATIENT)
Dept: HEMATOLOGY/ONCOLOGY | Facility: CLINIC | Age: 76
End: 2021-01-01
Payer: MEDICARE

## 2021-01-01 ENCOUNTER — TELEPHONE (OUTPATIENT)
Dept: PALLIATIVE MEDICINE | Facility: CLINIC | Age: 76
End: 2021-01-01
Payer: MEDICARE

## 2021-01-01 ENCOUNTER — TELEPHONE (OUTPATIENT)
Dept: ADMINISTRATIVE | Facility: HOSPITAL | Age: 76
End: 2021-01-01

## 2021-01-01 ENCOUNTER — LAB VISIT (OUTPATIENT)
Dept: LAB | Facility: HOSPITAL | Age: 76
End: 2021-01-01
Attending: RADIOLOGY
Payer: MEDICARE

## 2021-01-01 ENCOUNTER — TELEPHONE (OUTPATIENT)
Dept: RADIOLOGY | Facility: HOSPITAL | Age: 76
End: 2021-01-01

## 2021-01-01 ENCOUNTER — INFUSION (OUTPATIENT)
Dept: INFUSION THERAPY | Facility: HOSPITAL | Age: 76
End: 2021-01-01
Payer: MEDICARE

## 2021-01-01 ENCOUNTER — HOSPITAL ENCOUNTER (OUTPATIENT)
Dept: RADIOLOGY | Facility: HOSPITAL | Age: 76
Discharge: HOME | End: 2021-06-01
Attending: RADIOLOGY
Payer: MEDICARE

## 2021-01-01 ENCOUNTER — PATIENT MESSAGE (OUTPATIENT)
Dept: ADMINISTRATIVE | Facility: OTHER | Age: 76
End: 2021-01-01

## 2021-01-01 ENCOUNTER — PATIENT OUTREACH (OUTPATIENT)
Dept: ADMINISTRATIVE | Facility: OTHER | Age: 76
End: 2021-01-01
Payer: MEDICARE

## 2021-01-01 ENCOUNTER — TELEPHONE (OUTPATIENT)
Dept: DIABETES | Facility: CLINIC | Age: 76
End: 2021-01-01

## 2021-01-01 ENCOUNTER — PATIENT MESSAGE (OUTPATIENT)
Dept: PALLIATIVE MEDICINE | Facility: CLINIC | Age: 76
End: 2021-01-01
Payer: MEDICARE

## 2021-01-01 ENCOUNTER — PATIENT MESSAGE (OUTPATIENT)
Dept: RADIATION ONCOLOGY | Facility: CLINIC | Age: 76
End: 2021-01-01

## 2021-01-01 ENCOUNTER — OFFICE VISIT (OUTPATIENT)
Dept: PALLIATIVE MEDICINE | Facility: CLINIC | Age: 76
End: 2021-01-01
Payer: MEDICARE

## 2021-01-01 ENCOUNTER — PATIENT MESSAGE (OUTPATIENT)
Dept: ADMINISTRATIVE | Facility: HOSPITAL | Age: 76
End: 2021-01-01

## 2021-01-01 ENCOUNTER — PATIENT OUTREACH (OUTPATIENT)
Dept: ADMINISTRATIVE | Facility: OTHER | Age: 76
End: 2021-01-01

## 2021-01-01 VITALS
OXYGEN SATURATION: 95 % | RESPIRATION RATE: 18 BRPM | SYSTOLIC BLOOD PRESSURE: 179 MMHG | DIASTOLIC BLOOD PRESSURE: 82 MMHG | WEIGHT: 273.13 LBS | TEMPERATURE: 98 F | BODY MASS INDEX: 36.99 KG/M2 | HEIGHT: 72 IN | HEART RATE: 72 BPM

## 2021-01-01 VITALS
DIASTOLIC BLOOD PRESSURE: 87 MMHG | OXYGEN SATURATION: 98 % | BODY MASS INDEX: 36.76 KG/M2 | HEART RATE: 81 BPM | RESPIRATION RATE: 18 BRPM | TEMPERATURE: 98 F | WEIGHT: 271.38 LBS | SYSTOLIC BLOOD PRESSURE: 205 MMHG | HEIGHT: 72 IN

## 2021-01-01 VITALS
TEMPERATURE: 98 F | OXYGEN SATURATION: 96 % | HEART RATE: 76 BPM | RESPIRATION RATE: 18 BRPM | BODY MASS INDEX: 36.57 KG/M2 | SYSTOLIC BLOOD PRESSURE: 194 MMHG | WEIGHT: 270 LBS | HEIGHT: 72 IN | DIASTOLIC BLOOD PRESSURE: 74 MMHG

## 2021-01-01 VITALS
OXYGEN SATURATION: 98 % | TEMPERATURE: 98 F | HEIGHT: 72 IN | WEIGHT: 267.88 LBS | BODY MASS INDEX: 36.28 KG/M2 | SYSTOLIC BLOOD PRESSURE: 189 MMHG | DIASTOLIC BLOOD PRESSURE: 82 MMHG | HEART RATE: 80 BPM

## 2021-01-01 VITALS
WEIGHT: 267.88 LBS | DIASTOLIC BLOOD PRESSURE: 75 MMHG | HEART RATE: 61 BPM | BODY MASS INDEX: 36.28 KG/M2 | TEMPERATURE: 97 F | RESPIRATION RATE: 18 BRPM | HEIGHT: 72 IN | SYSTOLIC BLOOD PRESSURE: 191 MMHG | OXYGEN SATURATION: 98 %

## 2021-01-01 VITALS
OXYGEN SATURATION: 97 % | HEART RATE: 76 BPM | RESPIRATION RATE: 18 BRPM | DIASTOLIC BLOOD PRESSURE: 83 MMHG | TEMPERATURE: 98 F | SYSTOLIC BLOOD PRESSURE: 175 MMHG

## 2021-01-01 VITALS
HEIGHT: 72 IN | HEART RATE: 77 BPM | SYSTOLIC BLOOD PRESSURE: 176 MMHG | DIASTOLIC BLOOD PRESSURE: 83 MMHG | TEMPERATURE: 98 F | BODY MASS INDEX: 38.58 KG/M2 | WEIGHT: 284.81 LBS | OXYGEN SATURATION: 95 %

## 2021-01-01 VITALS
BODY MASS INDEX: 37.32 KG/M2 | WEIGHT: 275.56 LBS | RESPIRATION RATE: 16 BRPM | TEMPERATURE: 98 F | HEIGHT: 72 IN | OXYGEN SATURATION: 97 %

## 2021-01-01 VITALS
WEIGHT: 273.38 LBS | TEMPERATURE: 98 F | DIASTOLIC BLOOD PRESSURE: 72 MMHG | SYSTOLIC BLOOD PRESSURE: 138 MMHG | HEIGHT: 72 IN | BODY MASS INDEX: 37.03 KG/M2 | HEART RATE: 74 BPM

## 2021-01-01 VITALS
SYSTOLIC BLOOD PRESSURE: 152 MMHG | HEART RATE: 68 BPM | RESPIRATION RATE: 16 BRPM | DIASTOLIC BLOOD PRESSURE: 81 MMHG | TEMPERATURE: 98 F | OXYGEN SATURATION: 97 %

## 2021-01-01 VITALS
OXYGEN SATURATION: 97 % | HEIGHT: 72 IN | BODY MASS INDEX: 37.71 KG/M2 | WEIGHT: 278.44 LBS | DIASTOLIC BLOOD PRESSURE: 81 MMHG | SYSTOLIC BLOOD PRESSURE: 152 MMHG | HEART RATE: 77 BPM | TEMPERATURE: 98 F

## 2021-01-01 VITALS
RESPIRATION RATE: 18 BRPM | TEMPERATURE: 98 F | HEART RATE: 64 BPM | WEIGHT: 284.81 LBS | HEIGHT: 72 IN | SYSTOLIC BLOOD PRESSURE: 182 MMHG | OXYGEN SATURATION: 98 % | BODY MASS INDEX: 38.58 KG/M2 | DIASTOLIC BLOOD PRESSURE: 75 MMHG

## 2021-01-01 VITALS — HEIGHT: 72 IN | BODY MASS INDEX: 36.01 KG/M2 | WEIGHT: 265.88 LBS

## 2021-01-01 VITALS
TEMPERATURE: 99 F | BODY MASS INDEX: 36.77 KG/M2 | RESPIRATION RATE: 18 BRPM | SYSTOLIC BLOOD PRESSURE: 183 MMHG | HEIGHT: 72 IN | WEIGHT: 271.5 LBS | OXYGEN SATURATION: 97 % | HEART RATE: 81 BPM | DIASTOLIC BLOOD PRESSURE: 81 MMHG

## 2021-01-01 VITALS
BODY MASS INDEX: 37.69 KG/M2 | HEIGHT: 72 IN | WEIGHT: 278.25 LBS | OXYGEN SATURATION: 96 % | TEMPERATURE: 98 F | HEART RATE: 80 BPM | DIASTOLIC BLOOD PRESSURE: 76 MMHG | SYSTOLIC BLOOD PRESSURE: 195 MMHG | RESPIRATION RATE: 18 BRPM

## 2021-01-01 DIAGNOSIS — C41.9 MALIGNANT NEOPLASM OF BONE AND ARTICULAR CARTILAGE, UNSPECIFIED: ICD-10-CM

## 2021-01-01 DIAGNOSIS — C79.51 PROSTATE CANCER METASTATIC TO BONE: Primary | ICD-10-CM

## 2021-01-01 DIAGNOSIS — C61 PROSTATE CANCER METASTATIC TO BONE: ICD-10-CM

## 2021-01-01 DIAGNOSIS — Z79.4 TYPE 2 DIABETES MELLITUS WITH STAGE 3 CHRONIC KIDNEY DISEASE, WITH LONG-TERM CURRENT USE OF INSULIN, UNSPECIFIED WHETHER STAGE 3A OR 3B CKD: ICD-10-CM

## 2021-01-01 DIAGNOSIS — C61 MALIGNANT NEOPLASM OF PROSTATE: ICD-10-CM

## 2021-01-01 DIAGNOSIS — C61 PROSTATE CANCER METASTATIC TO BONE: Primary | ICD-10-CM

## 2021-01-01 DIAGNOSIS — D63.0 ANEMIA IN NEOPLASTIC DISEASE: ICD-10-CM

## 2021-01-01 DIAGNOSIS — E11.22 TYPE 2 DIABETES MELLITUS WITH STAGE 3B CHRONIC KIDNEY DISEASE, WITH LONG-TERM CURRENT USE OF INSULIN: Primary | ICD-10-CM

## 2021-01-01 DIAGNOSIS — C61 PROSTATE CANCER: Primary | ICD-10-CM

## 2021-01-01 DIAGNOSIS — I15.2 HYPERTENSION ASSOCIATED WITH DIABETES: ICD-10-CM

## 2021-01-01 DIAGNOSIS — E11.22 TYPE 2 DIABETES MELLITUS WITH STAGE 3 CHRONIC KIDNEY DISEASE, WITH LONG-TERM CURRENT USE OF INSULIN, UNSPECIFIED WHETHER STAGE 3A OR 3B CKD: ICD-10-CM

## 2021-01-01 DIAGNOSIS — R53.82 CHRONIC FATIGUE: ICD-10-CM

## 2021-01-01 DIAGNOSIS — Z79.899 IMMUNODEFICIENCY DUE TO CHEMOTHERAPY: ICD-10-CM

## 2021-01-01 DIAGNOSIS — R60.9 FLUID RETENTION: ICD-10-CM

## 2021-01-01 DIAGNOSIS — C79.51 PROSTATE CANCER METASTATIC TO BONE: ICD-10-CM

## 2021-01-01 DIAGNOSIS — I10 HYPERTENSION, UNSPECIFIED TYPE: ICD-10-CM

## 2021-01-01 DIAGNOSIS — C61 PROSTATE CANCER: ICD-10-CM
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
## 2021-01-01 DIAGNOSIS — R60.0 PERIPHERAL EDEMA: ICD-10-CM

## 2021-01-01 DIAGNOSIS — D84.821 IMMUNODEFICIENCY DUE TO CHEMOTHERAPY: ICD-10-CM

## 2021-01-01 DIAGNOSIS — C79.51 SECONDARY CANCER OF BONE: ICD-10-CM

## 2021-01-01 DIAGNOSIS — E66.01 MORBID OBESITY WITH BMI OF 40.0-44.9, ADULT: ICD-10-CM

## 2021-01-01 DIAGNOSIS — C61 PROSTATE CANCER METASTATIC TO MULTIPLE SITES: ICD-10-CM

## 2021-01-01 DIAGNOSIS — Z79.4 TYPE 2 DIABETES MELLITUS WITH STAGE 3B CHRONIC KIDNEY DISEASE, WITH LONG-TERM CURRENT USE OF INSULIN: Primary | ICD-10-CM

## 2021-01-01 DIAGNOSIS — R53.0 NEOPLASTIC MALIGNANT RELATED FATIGUE: ICD-10-CM

## 2021-01-01 DIAGNOSIS — Z51.5 ENCOUNTER FOR PALLIATIVE CARE: Primary | ICD-10-CM

## 2021-01-01 DIAGNOSIS — D50.0 IRON DEFICIENCY ANEMIA DUE TO CHRONIC BLOOD LOSS: ICD-10-CM

## 2021-01-01 DIAGNOSIS — N18.31 STAGE 3A CHRONIC KIDNEY DISEASE: ICD-10-CM

## 2021-01-01 DIAGNOSIS — E11.9 TYPE 2 DIABETES MELLITUS WITHOUT COMPLICATION: ICD-10-CM

## 2021-01-01 DIAGNOSIS — F51.01 PRIMARY INSOMNIA: ICD-10-CM

## 2021-01-01 DIAGNOSIS — Z12.89 ENCOUNTER FOR SCREENING FOR MALIGNANT NEOPLASM OF OTHER SITES: ICD-10-CM

## 2021-01-01 DIAGNOSIS — N18.32 TYPE 2 DIABETES MELLITUS WITH STAGE 3B CHRONIC KIDNEY DISEASE, WITH LONG-TERM CURRENT USE OF INSULIN: Primary | ICD-10-CM

## 2021-01-01 DIAGNOSIS — D84.822 IMMUNODEFICIENCY SECONDARY TO RADIATION THERAPY: ICD-10-CM

## 2021-01-01 DIAGNOSIS — T45.1X5A IMMUNODEFICIENCY DUE TO CHEMOTHERAPY: ICD-10-CM

## 2021-01-01 DIAGNOSIS — Z71.89 ACP (ADVANCE CARE PLANNING): ICD-10-CM

## 2021-01-01 DIAGNOSIS — F41.0 PANIC ATTACK: ICD-10-CM

## 2021-01-01 DIAGNOSIS — E11.59 HYPERTENSION ASSOCIATED WITH DIABETES: ICD-10-CM

## 2021-01-01 DIAGNOSIS — N18.30 TYPE 2 DIABETES MELLITUS WITH STAGE 3 CHRONIC KIDNEY DISEASE, WITH LONG-TERM CURRENT USE OF INSULIN, UNSPECIFIED WHETHER STAGE 3A OR 3B CKD: ICD-10-CM

## 2021-01-01 DIAGNOSIS — G47.00 INSOMNIA, UNSPECIFIED TYPE: ICD-10-CM

## 2021-01-01 DIAGNOSIS — R53.82 CHRONIC FATIGUE: Primary | ICD-10-CM

## 2021-01-01 DIAGNOSIS — C79.51 SECONDARY CANCER OF BONE: Primary | ICD-10-CM

## 2021-01-01 DIAGNOSIS — E11.69 HYPERLIPIDEMIA ASSOCIATED WITH TYPE 2 DIABETES MELLITUS: ICD-10-CM

## 2021-01-01 DIAGNOSIS — E78.5 HYPERLIPIDEMIA ASSOCIATED WITH TYPE 2 DIABETES MELLITUS: ICD-10-CM

## 2021-01-01 DIAGNOSIS — N18.31 CHRONIC KIDNEY DISEASE (CKD) STAGE G3A/A1, MODERATELY DECREASED GLOMERULAR FILTRATION RATE (GFR) BETWEEN 45-59 ML/MIN/1.73 SQUARE METER AND ALBUMINURIA CREATININE RATIO LESS THAN 30 MG/G: ICD-10-CM

## 2021-01-01 DIAGNOSIS — R73.9 HYPERGLYCEMIA: ICD-10-CM

## 2021-01-01 DIAGNOSIS — R80.9 MICROALBUMINURIA: ICD-10-CM

## 2021-01-01 LAB
ALBUMIN SERPL BCP-MCNC: 2.9 G/DL (ref 3.5–5.2)
ALBUMIN SERPL BCP-MCNC: 3.3 G/DL (ref 3.5–5.2)
ALBUMIN SERPL BCP-MCNC: 3.4 G/DL (ref 3.5–5.2)
ALBUMIN SERPL BCP-MCNC: 3.5 G/DL (ref 3.5–5.2)
ALBUMIN SERPL BCP-MCNC: 3.6 G/DL (ref 3.5–5.2)
ALP SERPL-CCNC: 64 U/L (ref 55–135)
ALP SERPL-CCNC: 64 U/L (ref 55–135)
ALP SERPL-CCNC: 75 U/L (ref 55–135)
ALP SERPL-CCNC: 95 U/L (ref 55–135)
ALT SERPL W/O P-5'-P-CCNC: 19 U/L (ref 10–44)
ALT SERPL W/O P-5'-P-CCNC: 20 U/L (ref 10–44)
ALT SERPL W/O P-5'-P-CCNC: 22 U/L (ref 10–44)
ALT SERPL W/O P-5'-P-CCNC: 23 U/L (ref 10–44)
ANION GAP SERPL CALC-SCNC: 10 MMOL/L (ref 8–16)
ANION GAP SERPL CALC-SCNC: 10 MMOL/L (ref 8–16)
ANION GAP SERPL CALC-SCNC: 11 MMOL/L (ref 8–16)
ANION GAP SERPL CALC-SCNC: 11 MMOL/L (ref 8–16)
ANION GAP SERPL CALC-SCNC: 12 MMOL/L (ref 8–16)
AST SERPL-CCNC: 12 U/L (ref 10–40)
AST SERPL-CCNC: 15 U/L (ref 10–40)
AST SERPL-CCNC: 17 U/L (ref 10–40)
AST SERPL-CCNC: 18 U/L (ref 10–40)
BASOPHILS # BLD AUTO: 0.03 K/UL (ref 0–0.2)
BASOPHILS # BLD AUTO: 0.03 K/UL (ref 0–0.2)
BASOPHILS # BLD AUTO: 0.04 K/UL (ref 0–0.2)
BASOPHILS # BLD AUTO: 0.04 K/UL (ref 0–0.2)
BASOPHILS NFR BLD: 0.4 % (ref 0–1.9)
BASOPHILS NFR BLD: 0.5 % (ref 0–1.9)
BASOPHILS NFR BLD: 0.5 % (ref 0–1.9)
BASOPHILS NFR BLD: 0.7 % (ref 0–1.9)
BILIRUB SERPL-MCNC: 0.4 MG/DL (ref 0.1–1)
BILIRUB SERPL-MCNC: 0.5 MG/DL (ref 0.1–1)
BUN SERPL-MCNC: 18 MG/DL (ref 8–23)
BUN SERPL-MCNC: 18 MG/DL (ref 8–23)
BUN SERPL-MCNC: 20 MG/DL (ref 8–23)
BUN SERPL-MCNC: 27 MG/DL (ref 8–23)
BUN SERPL-MCNC: 37 MG/DL (ref 8–23)
CALCIUM SERPL-MCNC: 8.3 MG/DL (ref 8.7–10.5)
CALCIUM SERPL-MCNC: 8.8 MG/DL (ref 8.7–10.5)
CALCIUM SERPL-MCNC: 9.1 MG/DL (ref 8.7–10.5)
CALCIUM SERPL-MCNC: 9.2 MG/DL (ref 8.7–10.5)
CALCIUM SERPL-MCNC: 9.2 MG/DL (ref 8.7–10.5)
CHLORIDE SERPL-SCNC: 100 MMOL/L (ref 95–110)
CHLORIDE SERPL-SCNC: 97 MMOL/L (ref 95–110)
CHLORIDE SERPL-SCNC: 97 MMOL/L (ref 95–110)
CHLORIDE SERPL-SCNC: 99 MMOL/L (ref 95–110)
CHLORIDE SERPL-SCNC: 99 MMOL/L (ref 95–110)
CO2 SERPL-SCNC: 28 MMOL/L (ref 23–29)
CO2 SERPL-SCNC: 28 MMOL/L (ref 23–29)
CO2 SERPL-SCNC: 29 MMOL/L (ref 23–29)
COMPLEXED PSA SERPL-MCNC: 0.03 NG/ML (ref 0–4)
COMPLEXED PSA SERPL-MCNC: 0.03 NG/ML (ref 0–4)
COMPLEXED PSA SERPL-MCNC: 0.1 NG/ML (ref 0–4)
CREAT SERPL-MCNC: 1.6 MG/DL (ref 0.5–1.4)
CREAT SERPL-MCNC: 1.7 MG/DL (ref 0.5–1.4)
CREAT SERPL-MCNC: 1.7 MG/DL (ref 0.5–1.4)
CREAT SERPL-MCNC: 1.8 MG/DL (ref 0.5–1.4)
CREAT SERPL-MCNC: 1.9 MG/DL (ref 0.5–1.4)
DIFFERENTIAL METHOD: ABNORMAL
EOSINOPHIL # BLD AUTO: 0 K/UL (ref 0–0.5)
EOSINOPHIL # BLD AUTO: 0.2 K/UL (ref 0–0.5)
EOSINOPHIL # BLD AUTO: 0.3 K/UL (ref 0–0.5)
EOSINOPHIL # BLD AUTO: 0.3 K/UL (ref 0–0.5)
EOSINOPHIL NFR BLD: 0.4 % (ref 0–8)
EOSINOPHIL NFR BLD: 3.4 % (ref 0–8)
EOSINOPHIL NFR BLD: 3.8 % (ref 0–8)
EOSINOPHIL NFR BLD: 5.1 % (ref 0–8)
ERYTHROCYTE [DISTWIDTH] IN BLOOD BY AUTOMATED COUNT: 12.5 % (ref 11.5–14.5)
ERYTHROCYTE [DISTWIDTH] IN BLOOD BY AUTOMATED COUNT: 12.5 % (ref 11.5–14.5)
ERYTHROCYTE [DISTWIDTH] IN BLOOD BY AUTOMATED COUNT: 12.9 % (ref 11.5–14.5)
ERYTHROCYTE [DISTWIDTH] IN BLOOD BY AUTOMATED COUNT: 13.3 % (ref 11.5–14.5)
ERYTHROCYTE [DISTWIDTH] IN BLOOD BY AUTOMATED COUNT: 13.7 % (ref 11.5–14.5)
EST. GFR  (AFRICAN AMERICAN): 39 ML/MIN/1.73 M^2
EST. GFR  (AFRICAN AMERICAN): 41 ML/MIN/1.73 M^2
EST. GFR  (AFRICAN AMERICAN): 45 ML/MIN/1.73 M^2
EST. GFR  (AFRICAN AMERICAN): 45 ML/MIN/1.73 M^2
EST. GFR  (AFRICAN AMERICAN): 48 ML/MIN/1.73 M^2
EST. GFR  (NON AFRICAN AMERICAN): 33.7 ML/MIN/1.73 M^2
EST. GFR  (NON AFRICAN AMERICAN): 36 ML/MIN/1.73 M^2
EST. GFR  (NON AFRICAN AMERICAN): 39 ML/MIN/1.73 M^2
EST. GFR  (NON AFRICAN AMERICAN): 39 ML/MIN/1.73 M^2
EST. GFR  (NON AFRICAN AMERICAN): 42 ML/MIN/1.73 M^2
FERRITIN SERPL-MCNC: 697 NG/ML (ref 20–300)
FERRITIN SERPL-MCNC: 718 NG/ML (ref 20–300)
GLUCOSE SERPL-MCNC: 212 MG/DL (ref 70–110)
GLUCOSE SERPL-MCNC: 230 MG/DL (ref 70–110)
GLUCOSE SERPL-MCNC: 265 MG/DL (ref 70–110)
GLUCOSE SERPL-MCNC: 273 MG/DL (ref 70–110)
GLUCOSE SERPL-MCNC: 307 MG/DL (ref 70–110)
HCT VFR BLD AUTO: 34.3 % (ref 40–54)
HCT VFR BLD AUTO: 34.8 % (ref 40–54)
HCT VFR BLD AUTO: 35.3 % (ref 40–54)
HCT VFR BLD AUTO: 37.4 % (ref 40–54)
HCT VFR BLD AUTO: 37.7 % (ref 40–54)
HGB BLD-MCNC: 11 G/DL (ref 14–18)
HGB BLD-MCNC: 11.8 G/DL (ref 14–18)
HGB BLD-MCNC: 12.4 G/DL (ref 14–18)
IMM GRANULOCYTES # BLD AUTO: 0.04 K/UL (ref 0–0.04)
IMM GRANULOCYTES # BLD AUTO: 0.06 K/UL (ref 0–0.04)
IMM GRANULOCYTES # BLD AUTO: 0.07 K/UL (ref 0–0.04)
IMM GRANULOCYTES # BLD AUTO: 0.26 K/UL (ref 0–0.04)
IMM GRANULOCYTES NFR BLD AUTO: 0.7 % (ref 0–0.5)
IMM GRANULOCYTES NFR BLD AUTO: 1 % (ref 0–0.5)
IMM GRANULOCYTES NFR BLD AUTO: 1 % (ref 0–0.5)
IMM GRANULOCYTES NFR BLD AUTO: 3.1 % (ref 0–0.5)
IRON SERPL-MCNC: 78 UG/DL (ref 45–160)
IRON SERPL-MCNC: 85 UG/DL (ref 45–160)
LYMPHOCYTES # BLD AUTO: 0.5 K/UL (ref 1–4.8)
LYMPHOCYTES # BLD AUTO: 0.5 K/UL (ref 1–4.8)
LYMPHOCYTES # BLD AUTO: 0.7 K/UL (ref 1–4.8)
LYMPHOCYTES # BLD AUTO: 0.7 K/UL (ref 1–4.8)
LYMPHOCYTES NFR BLD: 12 % (ref 18–48)
LYMPHOCYTES NFR BLD: 7.2 % (ref 18–48)
LYMPHOCYTES NFR BLD: 8 % (ref 18–48)
LYMPHOCYTES NFR BLD: 9.5 % (ref 18–48)
MCH RBC QN AUTO: 30.2 PG (ref 27–31)
MCH RBC QN AUTO: 30.8 PG (ref 27–31)
MCH RBC QN AUTO: 30.9 PG (ref 27–31)
MCH RBC QN AUTO: 31.2 PG (ref 27–31)
MCH RBC QN AUTO: 31.4 PG (ref 27–31)
MCHC RBC AUTO-ENTMCNC: 31.6 G/DL (ref 32–36)
MCHC RBC AUTO-ENTMCNC: 32.1 G/DL (ref 32–36)
MCHC RBC AUTO-ENTMCNC: 32.9 G/DL (ref 32–36)
MCHC RBC AUTO-ENTMCNC: 33.4 G/DL (ref 32–36)
MCHC RBC AUTO-ENTMCNC: 33.9 G/DL (ref 32–36)
MCV RBC AUTO: 92 FL (ref 82–98)
MCV RBC AUTO: 98 FL (ref 82–98)
MCV RBC AUTO: 98 FL (ref 82–98)
MONOCYTES # BLD AUTO: 0.5 K/UL (ref 0.3–1)
MONOCYTES # BLD AUTO: 0.7 K/UL (ref 0.3–1)
MONOCYTES # BLD AUTO: 0.7 K/UL (ref 0.3–1)
MONOCYTES # BLD AUTO: 0.8 K/UL (ref 0.3–1)
MONOCYTES NFR BLD: 11.5 % (ref 4–15)
MONOCYTES NFR BLD: 13.8 % (ref 4–15)
MONOCYTES NFR BLD: 6 % (ref 4–15)
MONOCYTES NFR BLD: 9.8 % (ref 4–15)
NEUTROPHILS # BLD AUTO: 4 K/UL (ref 1.8–7.7)
NEUTROPHILS # BLD AUTO: 4.2 K/UL (ref 1.8–7.7)
NEUTROPHILS # BLD AUTO: 5.6 K/UL (ref 1.8–7.7)
NEUTROPHILS # BLD AUTO: 7 K/UL (ref 1.8–7.7)
NEUTROPHILS NFR BLD: 70.4 % (ref 38–73)
NEUTROPHILS NFR BLD: 71.4 % (ref 38–73)
NEUTROPHILS NFR BLD: 77.8 % (ref 38–73)
NEUTROPHILS NFR BLD: 82 % (ref 38–73)
NRBC BLD-RTO: 0 /100 WBC
PHOSPHATE SERPL-MCNC: 3.2 MG/DL (ref 2.7–4.5)
PLATELET # BLD AUTO: 232 K/UL (ref 150–450)
PLATELET # BLD AUTO: 252 K/UL (ref 150–450)
PLATELET # BLD AUTO: 259 K/UL (ref 150–450)
PLATELET # BLD AUTO: 262 K/UL (ref 150–450)
PLATELET # BLD AUTO: 264 K/UL (ref 150–450)
PMV BLD AUTO: 8.2 FL (ref 9.2–12.9)
PMV BLD AUTO: 8.3 FL (ref 9.2–12.9)
PMV BLD AUTO: 8.6 FL (ref 9.2–12.9)
PMV BLD AUTO: 8.8 FL (ref 9.2–12.9)
PMV BLD AUTO: 9.2 FL (ref 9.2–12.9)
POTASSIUM SERPL-SCNC: 4 MMOL/L (ref 3.5–5.1)
POTASSIUM SERPL-SCNC: 4 MMOL/L (ref 3.5–5.1)
POTASSIUM SERPL-SCNC: 4.1 MMOL/L (ref 3.5–5.1)
POTASSIUM SERPL-SCNC: 4.5 MMOL/L (ref 3.5–5.1)
POTASSIUM SERPL-SCNC: 4.8 MMOL/L (ref 3.5–5.1)
PROT SERPL-MCNC: 6.3 G/DL (ref 6–8.4)
PROT SERPL-MCNC: 6.6 G/DL (ref 6–8.4)
PROT SERPL-MCNC: 6.9 G/DL (ref 6–8.4)
PROT SERPL-MCNC: 7 G/DL (ref 6–8.4)
PTH-INTACT SERPL-MCNC: 130 PG/ML (ref 9–77)
RBC # BLD AUTO: 3.5 M/UL (ref 4.6–6.2)
RBC # BLD AUTO: 3.78 M/UL (ref 4.6–6.2)
RBC # BLD AUTO: 3.82 M/UL (ref 4.6–6.2)
RBC # BLD AUTO: 3.83 M/UL (ref 4.6–6.2)
RBC # BLD AUTO: 4.1 M/UL (ref 4.6–6.2)
SATURATED IRON: 26 % (ref 20–50)
SATURATED IRON: 29 % (ref 20–50)
SODIUM SERPL-SCNC: 137 MMOL/L (ref 136–145)
SODIUM SERPL-SCNC: 137 MMOL/L (ref 136–145)
SODIUM SERPL-SCNC: 138 MMOL/L (ref 136–145)
SODIUM SERPL-SCNC: 138 MMOL/L (ref 136–145)
SODIUM SERPL-SCNC: 139 MMOL/L (ref 136–145)
TESTOST SERPL-MCNC: 5 NG/DL (ref 304–1227)
TESTOST SERPL-MCNC: 8 NG/DL (ref 304–1227)
TESTOST SERPL-MCNC: <4 NG/DL (ref 304–1227)
TOTAL IRON BINDING CAPACITY: 292 UG/DL (ref 250–450)
TOTAL IRON BINDING CAPACITY: 300 UG/DL (ref 250–450)
TRANSFERRIN SERPL-MCNC: 197 MG/DL (ref 200–375)
TRANSFERRIN SERPL-MCNC: 203 MG/DL (ref 200–375)
WBC # BLD AUTO: 5.66 K/UL (ref 3.9–12.7)
WBC # BLD AUTO: 5.91 K/UL (ref 3.9–12.7)
WBC # BLD AUTO: 7.13 K/UL (ref 3.9–12.7)
WBC # BLD AUTO: 8.3 K/UL (ref 3.9–12.7)
WBC # BLD AUTO: 8.5 K/UL (ref 3.9–12.7)

## 2021-01-01 PROCEDURE — 1159F PR MEDICATION LIST DOCUMENTED IN MEDICAL RECORD: ICD-10-PCS | Mod: CPTII,95,, | Performed by: NURSE PRACTITIONER

## 2021-01-01 PROCEDURE — 99203 PR OFFICE/OUTPT VISIT, NEW, LEVL III, 30-44 MIN: ICD-10-PCS | Mod: S$GLB,,, | Performed by: NURSE PRACTITIONER

## 2021-01-01 PROCEDURE — 1159F PR MEDICATION LIST DOCUMENTED IN MEDICAL RECORD: ICD-10-PCS | Mod: CPTII,S$GLB,, | Performed by: RADIOLOGY

## 2021-01-01 PROCEDURE — 1126F AMNT PAIN NOTED NONE PRSNT: CPT | Mod: S$GLB,,, | Performed by: INTERNAL MEDICINE

## 2021-01-01 PROCEDURE — 99213 PR OFFICE/OUTPT VISIT, EST, LEVL III, 20-29 MIN: ICD-10-PCS | Mod: S$GLB,,, | Performed by: RADIOLOGY

## 2021-01-01 PROCEDURE — 99999 PR PBB SHADOW E&M-EST. PATIENT-LVL IV: ICD-10-PCS | Mod: PBBFAC,,, | Performed by: INTERNAL MEDICINE

## 2021-01-01 PROCEDURE — 99999 PR PBB SHADOW E&M-EST. PATIENT-LVL IV: CPT | Mod: PBBFAC,,, | Performed by: INTERNAL MEDICINE

## 2021-01-01 PROCEDURE — 1159F PR MEDICATION LIST DOCUMENTED IN MEDICAL RECORD: ICD-10-PCS | Mod: S$GLB,,, | Performed by: RADIOLOGY

## 2021-01-01 PROCEDURE — 99215 PR OFFICE/OUTPT VISIT, EST, LEVL V, 40-54 MIN: ICD-10-PCS | Mod: S$GLB,,, | Performed by: INTERNAL MEDICINE

## 2021-01-01 PROCEDURE — 1101F PR PT FALLS ASSESS DOC 0-1 FALLS W/OUT INJ PAST YR: ICD-10-PCS | Mod: CPTII,S$GLB,, | Performed by: RADIOLOGY

## 2021-01-01 PROCEDURE — 1157F PR ADVANCE CARE PLAN OR EQUIV PRESENT IN MEDICAL RECORD: ICD-10-PCS | Mod: CPTII,95,, | Performed by: INTERNAL MEDICINE

## 2021-01-01 PROCEDURE — 99214 OFFICE O/P EST MOD 30 MIN: CPT | Mod: S$GLB,,, | Performed by: RADIOLOGY

## 2021-01-01 PROCEDURE — A9503 TC99M MEDRONATE: HCPCS

## 2021-01-01 PROCEDURE — 1126F PR PAIN SEVERITY QUANTIFIED, NO PAIN PRESENT: ICD-10-PCS | Mod: S$GLB,,, | Performed by: INTERNAL MEDICINE

## 2021-01-01 PROCEDURE — 74177 CT ABD & PELVIS W/CONTRAST: CPT | Mod: TC

## 2021-01-01 PROCEDURE — 77295 3-D RADIOTHERAPY PLAN: CPT | Mod: TC | Performed by: RADIOLOGY

## 2021-01-01 PROCEDURE — 3052F PR MOST RECENT HEMOGLOBIN A1C LEVEL 8.0 - < 9.0%: ICD-10-PCS | Mod: CPTII,S$GLB,, | Performed by: RADIOLOGY

## 2021-01-01 PROCEDURE — 77295 3-D RADIOTHERAPY PLAN: CPT | Mod: 26,,, | Performed by: RADIOLOGY

## 2021-01-01 PROCEDURE — 1159F PR MEDICATION LIST DOCUMENTED IN MEDICAL RECORD: ICD-10-PCS | Mod: S$GLB,,, | Performed by: INTERNAL MEDICINE

## 2021-01-01 PROCEDURE — 80053 COMPREHEN METABOLIC PANEL: CPT | Performed by: RADIOLOGY

## 2021-01-01 PROCEDURE — 99999 PR PBB SHADOW E&M-EST. PATIENT-LVL IV: CPT | Mod: PBBFAC,,, | Performed by: RADIOLOGY

## 2021-01-01 PROCEDURE — 1126F PR PAIN SEVERITY QUANTIFIED, NO PAIN PRESENT: ICD-10-PCS | Mod: CPTII,S$GLB,, | Performed by: RADIOLOGY

## 2021-01-01 PROCEDURE — 1159F MED LIST DOCD IN RCRD: CPT | Mod: CPTII,95,, | Performed by: INTERNAL MEDICINE

## 2021-01-01 PROCEDURE — 96402 CHEMO HORMON ANTINEOPL SQ/IM: CPT

## 2021-01-01 PROCEDURE — 77402 RADIATION TX DELIVERY LVL 1: CPT | Performed by: RADIOLOGY

## 2021-01-01 PROCEDURE — 3077F SYST BP >= 140 MM HG: CPT | Mod: CPTII,S$GLB,, | Performed by: INTERNAL MEDICINE

## 2021-01-01 PROCEDURE — 3052F PR MOST RECENT HEMOGLOBIN A1C LEVEL 8.0 - < 9.0%: ICD-10-PCS | Mod: CPTII,S$GLB,, | Performed by: INTERNAL MEDICINE

## 2021-01-01 PROCEDURE — 99215 OFFICE O/P EST HI 40 MIN: CPT | Mod: 25,S$GLB,, | Performed by: INTERNAL MEDICINE

## 2021-01-01 PROCEDURE — 99215 OFFICE O/P EST HI 40 MIN: CPT | Mod: 95,,, | Performed by: INTERNAL MEDICINE

## 2021-01-01 PROCEDURE — 83970 ASSAY OF PARATHORMONE: CPT | Performed by: INTERNAL MEDICINE

## 2021-01-01 PROCEDURE — 99499 NO LOS: ICD-10-PCS | Mod: S$GLB,,, | Performed by: RADIOLOGY

## 2021-01-01 PROCEDURE — 79101 PR  NUCLEAR THERAPY, IV: ICD-10-PCS | Mod: S$GLB,,, | Performed by: RADIOLOGY

## 2021-01-01 PROCEDURE — 1126F AMNT PAIN NOTED NONE PRSNT: CPT | Mod: S$GLB,,, | Performed by: RADIOLOGY

## 2021-01-01 PROCEDURE — 99499 UNLISTED E&M SERVICE: CPT | Mod: S$GLB,,, | Performed by: INTERNAL MEDICINE

## 2021-01-01 PROCEDURE — 99499 UNLISTED E&M SERVICE: CPT | Mod: S$GLB,,, | Performed by: RADIOLOGY

## 2021-01-01 PROCEDURE — 80053 COMPREHEN METABOLIC PANEL: CPT | Performed by: INTERNAL MEDICINE

## 2021-01-01 PROCEDURE — 71260 CT THORAX DX C+: CPT | Mod: 26,,, | Performed by: RADIOLOGY

## 2021-01-01 PROCEDURE — 1157F ADVNC CARE PLAN IN RCRD: CPT | Mod: S$GLB,,, | Performed by: RADIOLOGY

## 2021-01-01 PROCEDURE — 84153 ASSAY OF PSA TOTAL: CPT | Performed by: INTERNAL MEDICINE

## 2021-01-01 PROCEDURE — 1160F PR REVIEW ALL MEDS BY PRESCRIBER/CLIN PHARMACIST DOCUMENTED: ICD-10-PCS | Mod: CPTII,95,, | Performed by: INTERNAL MEDICINE

## 2021-01-01 PROCEDURE — 63600175 PHARM REV CODE 636 W HCPCS: Mod: JG | Performed by: INTERNAL MEDICINE

## 2021-01-01 PROCEDURE — 1101F PT FALLS ASSESS-DOCD LE1/YR: CPT | Mod: CPTII,S$GLB,, | Performed by: NURSE PRACTITIONER

## 2021-01-01 PROCEDURE — 1157F ADVNC CARE PLAN IN RCRD: CPT | Mod: CPTII,95,, | Performed by: INTERNAL MEDICINE

## 2021-01-01 PROCEDURE — 3046F HEMOGLOBIN A1C LEVEL >9.0%: CPT | Mod: CPTII,95,, | Performed by: INTERNAL MEDICINE

## 2021-01-01 PROCEDURE — 99999 PR PBB SHADOW E&M-EST. PATIENT-LVL II: ICD-10-PCS | Mod: PBBFAC,,, | Performed by: DIETITIAN, REGISTERED

## 2021-01-01 PROCEDURE — 99213 OFFICE O/P EST LOW 20 MIN: CPT | Mod: S$GLB,,, | Performed by: RADIOLOGY

## 2021-01-01 PROCEDURE — 84403 ASSAY OF TOTAL TESTOSTERONE: CPT | Performed by: INTERNAL MEDICINE

## 2021-01-01 PROCEDURE — 99999 PR PBB SHADOW E&M-EST. PATIENT-LVL V: CPT | Mod: PBBFAC,,, | Performed by: INTERNAL MEDICINE

## 2021-01-01 PROCEDURE — 99203 OFFICE O/P NEW LOW 30 MIN: CPT | Mod: S$GLB,,, | Performed by: NURSE PRACTITIONER

## 2021-01-01 PROCEDURE — 3077F PR MOST RECENT SYSTOLIC BLOOD PRESSURE >= 140 MM HG: ICD-10-PCS | Mod: CPTII,S$GLB,, | Performed by: INTERNAL MEDICINE

## 2021-01-01 PROCEDURE — 1125F AMNT PAIN NOTED PAIN PRSNT: CPT | Mod: S$GLB,,, | Performed by: RADIOLOGY

## 2021-01-01 PROCEDURE — 80069 RENAL FUNCTION PANEL: CPT | Performed by: INTERNAL MEDICINE

## 2021-01-01 PROCEDURE — 85025 COMPLETE CBC W/AUTO DIFF WBC: CPT | Performed by: INTERNAL MEDICINE

## 2021-01-01 PROCEDURE — 3288F FALL RISK ASSESSMENT DOCD: CPT | Mod: CPTII,S$GLB,, | Performed by: INTERNAL MEDICINE

## 2021-01-01 PROCEDURE — 77334 PR  RADN TREATMENT AID(S) COMPLX: ICD-10-PCS | Mod: 26,,, | Performed by: RADIOLOGY

## 2021-01-01 PROCEDURE — 99215 PR OFFICE/OUTPT VISIT, EST, LEVL V, 40-54 MIN: ICD-10-PCS | Mod: 25,S$GLB,, | Performed by: INTERNAL MEDICINE

## 2021-01-01 PROCEDURE — 3288F PR FALLS RISK ASSESSMENT DOCUMENTED: ICD-10-PCS | Mod: CPTII,S$GLB,, | Performed by: NURSE PRACTITIONER

## 2021-01-01 PROCEDURE — 36415 COLL VENOUS BLD VENIPUNCTURE: CPT | Mod: PO | Performed by: INTERNAL MEDICINE

## 2021-01-01 PROCEDURE — 1157F ADVNC CARE PLAN IN RCRD: CPT | Mod: S$GLB,,, | Performed by: INTERNAL MEDICINE

## 2021-01-01 PROCEDURE — 99214 OFFICE O/P EST MOD 30 MIN: CPT | Mod: S$GLB,,, | Performed by: INTERNAL MEDICINE

## 2021-01-01 PROCEDURE — 99215 OFFICE O/P EST HI 40 MIN: CPT | Mod: S$GLB,,, | Performed by: INTERNAL MEDICINE

## 2021-01-01 PROCEDURE — 1159F MED LIST DOCD IN RCRD: CPT | Mod: S$GLB,,, | Performed by: INTERNAL MEDICINE

## 2021-01-01 PROCEDURE — 3077F PR MOST RECENT SYSTOLIC BLOOD PRESSURE >= 140 MM HG: ICD-10-PCS | Mod: CPTII,S$GLB,, | Performed by: RADIOLOGY

## 2021-01-01 PROCEDURE — 3288F FALL RISK ASSESSMENT DOCD: CPT | Mod: CPTII,S$GLB,, | Performed by: RADIOLOGY

## 2021-01-01 PROCEDURE — A9698 NON-RAD CONTRAST MATERIALNOC: HCPCS | Performed by: INTERNAL MEDICINE

## 2021-01-01 PROCEDURE — 82728 ASSAY OF FERRITIN: CPT | Performed by: INTERNAL MEDICINE

## 2021-01-01 PROCEDURE — 77014 HC CT GUIDANCE RADIATION THERAPY FLDS PLACEMENT: CPT | Mod: TC | Performed by: RADIOLOGY

## 2021-01-01 PROCEDURE — G0108 PR DIAB MANAGE TRN  PER INDIV: ICD-10-PCS | Mod: S$GLB,,, | Performed by: DIETITIAN, REGISTERED

## 2021-01-01 PROCEDURE — 99215 OFFICE O/P EST HI 40 MIN: CPT | Mod: 95,,, | Performed by: NURSE PRACTITIONER

## 2021-01-01 PROCEDURE — 1157F PR ADVANCE CARE PLAN OR EQUIV PRESENT IN MEDICAL RECORD: ICD-10-PCS | Mod: CPTII,S$GLB,, | Performed by: INTERNAL MEDICINE

## 2021-01-01 PROCEDURE — 1101F PT FALLS ASSESS-DOCD LE1/YR: CPT | Mod: CPTII,S$GLB,, | Performed by: RADIOLOGY

## 2021-01-01 PROCEDURE — 1159F PR MEDICATION LIST DOCUMENTED IN MEDICAL RECORD: ICD-10-PCS | Mod: CPTII,95,, | Performed by: INTERNAL MEDICINE

## 2021-01-01 PROCEDURE — 99214 PR OFFICE/OUTPT VISIT, EST, LEVL IV, 30-39 MIN: ICD-10-PCS | Mod: S$GLB,,, | Performed by: INTERNAL MEDICINE

## 2021-01-01 PROCEDURE — 77300 RADIATION THERAPY DOSE PLAN: CPT | Mod: 26,,, | Performed by: RADIOLOGY

## 2021-01-01 PROCEDURE — G0108 DIAB MANAGE TRN  PER INDIV: HCPCS | Mod: S$GLB,,, | Performed by: DIETITIAN, REGISTERED

## 2021-01-01 PROCEDURE — 3288F PR FALLS RISK ASSESSMENT DOCUMENTED: ICD-10-PCS | Mod: CPTII,S$GLB,, | Performed by: RADIOLOGY

## 2021-01-01 PROCEDURE — 1101F PR PT FALLS ASSESS DOC 0-1 FALLS W/OUT INJ PAST YR: ICD-10-PCS | Mod: CPTII,S$GLB,, | Performed by: INTERNAL MEDICINE

## 2021-01-01 PROCEDURE — 77290 THER RAD SIMULAJ FIELD CPLX: CPT | Mod: 26,,, | Performed by: RADIOLOGY

## 2021-01-01 PROCEDURE — G6002 STEREOSCOPIC X-RAY GUIDANCE: HCPCS | Mod: 26,,, | Performed by: RADIOLOGY

## 2021-01-01 PROCEDURE — 78306 BONE IMAGING WHOLE BODY: CPT | Mod: 26,,, | Performed by: RADIOLOGY

## 2021-01-01 PROCEDURE — 99999 PR PBB SHADOW E&M-EST. PATIENT-LVL IV: ICD-10-PCS | Mod: PBBFAC,,, | Performed by: RADIOLOGY

## 2021-01-01 PROCEDURE — 77300 RADIATION THERAPY DOSE PLAN: CPT | Mod: TC | Performed by: RADIOLOGY

## 2021-01-01 PROCEDURE — 99499 UNLISTED E&M SERVICE: CPT | Mod: 95,,, | Performed by: INTERNAL MEDICINE

## 2021-01-01 PROCEDURE — 36415 COLL VENOUS BLD VENIPUNCTURE: CPT | Performed by: RADIOLOGY

## 2021-01-01 PROCEDURE — 96365 THER/PROPH/DIAG IV INF INIT: CPT

## 2021-01-01 PROCEDURE — 1157F PR ADVANCE CARE PLAN OR EQUIV PRESENT IN MEDICAL RECORD: ICD-10-PCS | Mod: S$GLB,,, | Performed by: RADIOLOGY

## 2021-01-01 PROCEDURE — 99214 PR OFFICE/OUTPT VISIT, EST, LEVL IV, 30-39 MIN: ICD-10-PCS | Mod: S$GLB,,, | Performed by: RADIOLOGY

## 2021-01-01 PROCEDURE — 79101 NUCLEAR RX IV ADMIN: CPT | Mod: TC

## 2021-01-01 PROCEDURE — 3046F HEMOGLOBIN A1C LEVEL >9.0%: CPT | Mod: CPTII,S$GLB,, | Performed by: INTERNAL MEDICINE

## 2021-01-01 PROCEDURE — 1101F PT FALLS ASSESS-DOCD LE1/YR: CPT | Mod: CPTII,S$GLB,, | Performed by: INTERNAL MEDICINE

## 2021-01-01 PROCEDURE — 99214 PR OFFICE/OUTPT VISIT, EST, LEVL IV, 30-39 MIN: ICD-10-PCS | Mod: 95,,, | Performed by: INTERNAL MEDICINE

## 2021-01-01 PROCEDURE — 1157F ADVNC CARE PLAN IN RCRD: CPT | Mod: CPTII,S$GLB,, | Performed by: INTERNAL MEDICINE

## 2021-01-01 PROCEDURE — 74177 CT CHEST ABDOMEN PELVIS WITH CONTRAST (XPD): ICD-10-PCS | Mod: 26,,, | Performed by: RADIOLOGY

## 2021-01-01 PROCEDURE — 99499 RISK ADDL DX/OHS AUDIT: ICD-10-PCS | Mod: S$GLB,,, | Performed by: INTERNAL MEDICINE

## 2021-01-01 PROCEDURE — 99499 RISK ADDL DX/OHS AUDIT: ICD-10-PCS | Mod: S$GLB,,, | Performed by: RADIOLOGY

## 2021-01-01 PROCEDURE — 1159F MED LIST DOCD IN RCRD: CPT | Mod: S$GLB,,, | Performed by: RADIOLOGY

## 2021-01-01 PROCEDURE — 85025 COMPLETE CBC W/AUTO DIFF WBC: CPT | Performed by: RADIOLOGY

## 2021-01-01 PROCEDURE — 3288F PR FALLS RISK ASSESSMENT DOCUMENTED: ICD-10-PCS | Mod: CPTII,S$GLB,, | Performed by: INTERNAL MEDICINE

## 2021-01-01 PROCEDURE — 77261 PR  RADIATION THERAPY PLAN SIMPLE: ICD-10-PCS | Mod: ,,, | Performed by: RADIOLOGY

## 2021-01-01 PROCEDURE — G6002 PR STEREOSCOPIC XRAY GUIDE FOR RADIATION TX DELIV: ICD-10-PCS | Mod: 26,,, | Performed by: RADIOLOGY

## 2021-01-01 PROCEDURE — 1159F MED LIST DOCD IN RCRD: CPT | Mod: CPTII,S$GLB,, | Performed by: RADIOLOGY

## 2021-01-01 PROCEDURE — 78306 NM BONE SCAN WHOLE BODY: ICD-10-PCS | Mod: 26,,, | Performed by: RADIOLOGY

## 2021-01-01 PROCEDURE — 77387 GUIDANCE FOR RADJ TX DLVR: CPT | Mod: TC | Performed by: RADIOLOGY

## 2021-01-01 PROCEDURE — 1157F PR ADVANCE CARE PLAN OR EQUIV PRESENT IN MEDICAL RECORD: ICD-10-PCS | Mod: CPTII,S$GLB,, | Performed by: NURSE PRACTITIONER

## 2021-01-01 PROCEDURE — 1126F AMNT PAIN NOTED NONE PRSNT: CPT | Mod: CPTII,S$GLB,, | Performed by: INTERNAL MEDICINE

## 2021-01-01 PROCEDURE — 1160F RVW MEDS BY RX/DR IN RCRD: CPT | Mod: CPTII,95,, | Performed by: INTERNAL MEDICINE

## 2021-01-01 PROCEDURE — 77290 THER RAD SIMULAJ FIELD CPLX: CPT | Mod: TC | Performed by: RADIOLOGY

## 2021-01-01 PROCEDURE — 1101F PR PT FALLS ASSESS DOC 0-1 FALLS W/OUT INJ PAST YR: ICD-10-PCS | Mod: CPTII,S$GLB,, | Performed by: NURSE PRACTITIONER

## 2021-01-01 PROCEDURE — 99999 PR PBB SHADOW E&M-EST. PATIENT-LVL V: ICD-10-PCS | Mod: PBBFAC,,, | Performed by: INTERNAL MEDICINE

## 2021-01-01 PROCEDURE — 1126F AMNT PAIN NOTED NONE PRSNT: CPT | Mod: CPTII,S$GLB,, | Performed by: NURSE PRACTITIONER

## 2021-01-01 PROCEDURE — 74177 CT ABD & PELVIS W/CONTRAST: CPT | Mod: 26,,, | Performed by: RADIOLOGY

## 2021-01-01 PROCEDURE — 85027 COMPLETE CBC AUTOMATED: CPT | Performed by: INTERNAL MEDICINE

## 2021-01-01 PROCEDURE — 3079F PR MOST RECENT DIASTOLIC BLOOD PRESSURE 80-89 MM HG: ICD-10-PCS | Mod: CPTII,S$GLB,, | Performed by: INTERNAL MEDICINE

## 2021-01-01 PROCEDURE — 25000003 PHARM REV CODE 250: Performed by: INTERNAL MEDICINE

## 2021-01-01 PROCEDURE — 99215 PR OFFICE/OUTPT VISIT, EST, LEVL V, 40-54 MIN: ICD-10-PCS | Mod: 95,,, | Performed by: INTERNAL MEDICINE

## 2021-01-01 PROCEDURE — 77334 RADIATION TREATMENT AID(S): CPT | Mod: TC | Performed by: RADIOLOGY

## 2021-01-01 PROCEDURE — 1160F RVW MEDS BY RX/DR IN RCRD: CPT | Mod: CPTII,S$GLB,, | Performed by: NURSE PRACTITIONER

## 2021-01-01 PROCEDURE — 1157F ADVNC CARE PLAN IN RCRD: CPT | Mod: CPTII,S$GLB,, | Performed by: NURSE PRACTITIONER

## 2021-01-01 PROCEDURE — 71260 CT THORAX DX C+: CPT | Mod: TC

## 2021-01-01 PROCEDURE — 83540 ASSAY OF IRON: CPT | Performed by: INTERNAL MEDICINE

## 2021-01-01 PROCEDURE — 77334 RADIATION TREATMENT AID(S): CPT | Mod: 26,,, | Performed by: RADIOLOGY

## 2021-01-01 PROCEDURE — 3079F DIAST BP 80-89 MM HG: CPT | Mod: CPTII,S$GLB,, | Performed by: INTERNAL MEDICINE

## 2021-01-01 PROCEDURE — 1159F PR MEDICATION LIST DOCUMENTED IN MEDICAL RECORD: ICD-10-PCS | Mod: CPTII,S$GLB,, | Performed by: NURSE PRACTITIONER

## 2021-01-01 PROCEDURE — 1157F ADVNC CARE PLAN IN RCRD: CPT | Mod: CPTII,S$GLB,, | Performed by: RADIOLOGY

## 2021-01-01 PROCEDURE — 3052F HG A1C>EQUAL 8.0%<EQUAL 9.0%: CPT | Mod: CPTII,S$GLB,, | Performed by: RADIOLOGY

## 2021-01-01 PROCEDURE — 3078F PR MOST RECENT DIASTOLIC BLOOD PRESSURE < 80 MM HG: ICD-10-PCS | Mod: CPTII,S$GLB,, | Performed by: RADIOLOGY

## 2021-01-01 PROCEDURE — 1160F RVW MEDS BY RX/DR IN RCRD: CPT | Mod: CPTII,95,, | Performed by: NURSE PRACTITIONER

## 2021-01-01 PROCEDURE — 79101 NUCLEAR RX IV ADMIN: CPT | Mod: S$GLB,,, | Performed by: RADIOLOGY

## 2021-01-01 PROCEDURE — 3288F FALL RISK ASSESSMENT DOCD: CPT | Mod: CPTII,S$GLB,, | Performed by: NURSE PRACTITIONER

## 2021-01-01 PROCEDURE — 99999 PR PBB SHADOW E&M-EST. PATIENT-LVL II: CPT | Mod: PBBFAC,,, | Performed by: DIETITIAN, REGISTERED

## 2021-01-01 PROCEDURE — 1157F PR ADVANCE CARE PLAN OR EQUIV PRESENT IN MEDICAL RECORD: ICD-10-PCS | Mod: S$GLB,,, | Performed by: INTERNAL MEDICINE

## 2021-01-01 PROCEDURE — 1126F PR PAIN SEVERITY QUANTIFIED, NO PAIN PRESENT: ICD-10-PCS | Mod: S$GLB,,, | Performed by: RADIOLOGY

## 2021-01-01 PROCEDURE — 99215 PR OFFICE/OUTPT VISIT, EST, LEVL V, 40-54 MIN: ICD-10-PCS | Mod: 95,,, | Performed by: NURSE PRACTITIONER

## 2021-01-01 PROCEDURE — 1157F PR ADVANCE CARE PLAN OR EQUIV PRESENT IN MEDICAL RECORD: ICD-10-PCS | Mod: CPTII,S$GLB,, | Performed by: RADIOLOGY

## 2021-01-01 PROCEDURE — 3046F PR MOST RECENT HEMOGLOBIN A1C LEVEL > 9.0%: ICD-10-PCS | Mod: CPTII,95,, | Performed by: INTERNAL MEDICINE

## 2021-01-01 PROCEDURE — 36415 COLL VENOUS BLD VENIPUNCTURE: CPT | Performed by: INTERNAL MEDICINE

## 2021-01-01 PROCEDURE — 3052F PR MOST RECENT HEMOGLOBIN A1C LEVEL 8.0 - < 9.0%: ICD-10-PCS | Mod: CPTII,S$GLB,, | Performed by: NURSE PRACTITIONER

## 2021-01-01 PROCEDURE — 77295 PR 3D RADIOTHERAPY PLAN: ICD-10-PCS | Mod: 26,,, | Performed by: RADIOLOGY

## 2021-01-01 PROCEDURE — 3052F HG A1C>EQUAL 8.0%<EQUAL 9.0%: CPT | Mod: CPTII,S$GLB,, | Performed by: INTERNAL MEDICINE

## 2021-01-01 PROCEDURE — 99999 PR PBB SHADOW E&M-EST. PATIENT-LVL V: ICD-10-PCS | Mod: PBBFAC,,, | Performed by: NURSE PRACTITIONER

## 2021-01-01 PROCEDURE — 99999 PR PBB SHADOW E&M-EST. PATIENT-LVL V: CPT | Mod: PBBFAC,,, | Performed by: NURSE PRACTITIONER

## 2021-01-01 PROCEDURE — 1126F AMNT PAIN NOTED NONE PRSNT: CPT | Mod: CPTII,S$GLB,, | Performed by: RADIOLOGY

## 2021-01-01 PROCEDURE — 99214 OFFICE O/P EST MOD 30 MIN: CPT | Mod: 95,,, | Performed by: INTERNAL MEDICINE

## 2021-01-01 PROCEDURE — 3077F SYST BP >= 140 MM HG: CPT | Mod: CPTII,S$GLB,, | Performed by: RADIOLOGY

## 2021-01-01 PROCEDURE — 77300 PR RADIATION THERAPY,DOSIMETRY PLAN: ICD-10-PCS | Mod: 26,,, | Performed by: RADIOLOGY

## 2021-01-01 PROCEDURE — 1126F PR PAIN SEVERITY QUANTIFIED, NO PAIN PRESENT: ICD-10-PCS | Mod: CPTII,S$GLB,, | Performed by: INTERNAL MEDICINE

## 2021-01-01 PROCEDURE — 78306 BONE IMAGING WHOLE BODY: CPT | Mod: TC

## 2021-01-01 PROCEDURE — 63600175 PHARM REV CODE 636 W HCPCS: Performed by: INTERNAL MEDICINE

## 2021-01-01 PROCEDURE — 3052F HG A1C>EQUAL 8.0%<EQUAL 9.0%: CPT | Mod: CPTII,S$GLB,, | Performed by: NURSE PRACTITIONER

## 2021-01-01 PROCEDURE — 99499 RISK ADDL DX/OHS AUDIT: ICD-10-PCS | Mod: 95,,, | Performed by: INTERNAL MEDICINE

## 2021-01-01 PROCEDURE — 1123F ACP DISCUSS/DSCN MKR DOCD: CPT | Mod: CPTII,95,, | Performed by: NURSE PRACTITIONER

## 2021-01-01 PROCEDURE — 1125F PR PAIN SEVERITY QUANTIFIED, PAIN PRESENT: ICD-10-PCS | Mod: S$GLB,,, | Performed by: RADIOLOGY

## 2021-01-01 PROCEDURE — 77290 PR  SET RADN THERAPY FIELD COMPLEX: ICD-10-PCS | Mod: 26,,, | Performed by: RADIOLOGY

## 2021-01-01 PROCEDURE — 3078F DIAST BP <80 MM HG: CPT | Mod: CPTII,S$GLB,, | Performed by: RADIOLOGY

## 2021-01-01 PROCEDURE — A9606 RADIUM RA223 DICHLORIDE THER: HCPCS | Mod: JG

## 2021-01-01 PROCEDURE — 1159F MED LIST DOCD IN RCRD: CPT | Mod: CPTII,95,, | Performed by: NURSE PRACTITIONER

## 2021-01-01 PROCEDURE — 71260 CT CHEST ABDOMEN PELVIS WITH CONTRAST (XPD): ICD-10-PCS | Mod: 26,,, | Performed by: RADIOLOGY

## 2021-01-01 PROCEDURE — 1123F PR ADV CARE PLAN DISCUSSED, PLAN OR SURROGATE DOCUMENTED: ICD-10-PCS | Mod: CPTII,95,, | Performed by: NURSE PRACTITIONER

## 2021-01-01 PROCEDURE — 1159F MED LIST DOCD IN RCRD: CPT | Mod: CPTII,S$GLB,, | Performed by: NURSE PRACTITIONER

## 2021-01-01 PROCEDURE — 1126F PR PAIN SEVERITY QUANTIFIED, NO PAIN PRESENT: ICD-10-PCS | Mod: CPTII,S$GLB,, | Performed by: NURSE PRACTITIONER

## 2021-01-01 PROCEDURE — 25500020 PHARM REV CODE 255: Performed by: INTERNAL MEDICINE

## 2021-01-01 PROCEDURE — 77336 RADIATION PHYSICS CONSULT: CPT | Performed by: RADIOLOGY

## 2021-01-01 PROCEDURE — 1160F PR REVIEW ALL MEDS BY PRESCRIBER/CLIN PHARMACIST DOCUMENTED: ICD-10-PCS | Mod: CPTII,95,, | Performed by: NURSE PRACTITIONER

## 2021-01-01 PROCEDURE — 3046F PR MOST RECENT HEMOGLOBIN A1C LEVEL > 9.0%: ICD-10-PCS | Mod: CPTII,S$GLB,, | Performed by: INTERNAL MEDICINE

## 2021-01-01 PROCEDURE — 77417 THER RADIOLOGY PORT IMAGE(S): CPT | Performed by: RADIOLOGY

## 2021-01-01 PROCEDURE — 1160F PR REVIEW ALL MEDS BY PRESCRIBER/CLIN PHARMACIST DOCUMENTED: ICD-10-PCS | Mod: CPTII,S$GLB,, | Performed by: NURSE PRACTITIONER

## 2021-01-01 PROCEDURE — 77261 THER RADIOLOGY TX PLNG SMPL: CPT | Mod: ,,, | Performed by: RADIOLOGY

## 2021-01-01 RX ORDER — INSULIN DETEMIR 100 [IU]/ML
60 INJECTION, SOLUTION SUBCUTANEOUS DAILY
Qty: 18 ML | Refills: 3 | Status: SHIPPED | OUTPATIENT
Start: 2021-01-01 | End: 2021-01-01

## 2021-01-01 RX ORDER — HEPARIN 100 UNIT/ML
500 SYRINGE INTRAVENOUS
Status: CANCELLED | OUTPATIENT
Start: 2021-01-01

## 2021-01-01 RX ORDER — QUETIAPINE FUMARATE 25 MG/1
25 TABLET, FILM COATED ORAL NIGHTLY PRN
Qty: 30 TABLET | Refills: 0 | Status: SHIPPED | OUTPATIENT
Start: 2021-01-01 | End: 2021-01-01 | Stop reason: SDUPTHER

## 2021-01-01 RX ORDER — HYDROCODONE BITARTRATE AND ACETAMINOPHEN 10; 325 MG/1; MG/1
1 TABLET ORAL EVERY 4 HOURS PRN
Qty: 90 TABLET | Refills: 0 | Status: SHIPPED | OUTPATIENT
Start: 2021-01-01 | End: 2021-01-01 | Stop reason: SDUPTHER

## 2021-01-01 RX ORDER — HYDROCODONE BITARTRATE AND ACETAMINOPHEN 10; 325 MG/1; MG/1
1 TABLET ORAL EVERY 4 HOURS PRN
Qty: 60 TABLET | Refills: 0 | Status: SHIPPED | OUTPATIENT
Start: 2021-01-01 | End: 2021-01-01 | Stop reason: SDUPTHER

## 2021-01-01 RX ORDER — TRAZODONE HYDROCHLORIDE 50 MG/1
50 TABLET ORAL NIGHTLY
Qty: 30 TABLET | Refills: 11 | Status: SHIPPED | OUTPATIENT
Start: 2021-01-01 | End: 2021-01-01

## 2021-01-01 RX ORDER — ATORVASTATIN CALCIUM 40 MG/1
40 TABLET, FILM COATED ORAL DAILY
Qty: 90 TABLET | Refills: 3 | Status: SHIPPED | OUTPATIENT
Start: 2021-01-01 | End: 2022-08-23

## 2021-01-01 RX ORDER — SODIUM CHLORIDE 0.9 % (FLUSH) 0.9 %
10 SYRINGE (ML) INJECTION
Status: CANCELLED | OUTPATIENT
Start: 2021-01-01

## 2021-01-01 RX ORDER — POTASSIUM CHLORIDE 20 MEQ/1
20 TABLET, EXTENDED RELEASE ORAL 2 TIMES DAILY
Qty: 180 TABLET | Refills: 1 | Status: SHIPPED | OUTPATIENT
Start: 2021-01-01 | End: 2021-01-01 | Stop reason: SDUPTHER

## 2021-01-01 RX ORDER — DEXAMETHASONE 2 MG/1
2 TABLET ORAL 2 TIMES DAILY WITH MEALS
Qty: 30 TABLET | Refills: 2 | Status: SHIPPED | OUTPATIENT
Start: 2021-01-01 | End: 2021-01-01 | Stop reason: SDUPTHER

## 2021-01-01 RX ORDER — CLONIDINE 0.3 MG/24H
1 PATCH, EXTENDED RELEASE TRANSDERMAL
Qty: 4 PATCH | Refills: 11 | Status: SHIPPED | OUTPATIENT
Start: 2021-01-01 | End: 2022-01-01 | Stop reason: SDUPTHER

## 2021-01-01 RX ORDER — ALPRAZOLAM 0.25 MG/1
TABLET ORAL
Qty: 60 TABLET | Refills: 1 | Status: SHIPPED | OUTPATIENT
Start: 2021-01-01 | End: 2022-01-01 | Stop reason: SDUPTHER

## 2021-01-01 RX ORDER — CLONIDINE HYDROCHLORIDE 0.2 MG/1
0.2 TABLET ORAL 2 TIMES DAILY
Qty: 60 TABLET | Refills: 11 | Status: SHIPPED | OUTPATIENT
Start: 2021-01-01 | End: 2021-01-01 | Stop reason: SDUPTHER

## 2021-01-01 RX ORDER — CLONIDINE 0.2 MG/24H
1 PATCH, EXTENDED RELEASE TRANSDERMAL
Qty: 4 PATCH | Refills: 11 | Status: SHIPPED | OUTPATIENT
Start: 2021-01-01 | End: 2021-01-01 | Stop reason: SDUPTHER

## 2021-01-01 RX ORDER — CLONIDINE HYDROCHLORIDE 0.2 MG/1
0.2 TABLET ORAL 2 TIMES DAILY
Qty: 60 TABLET | Refills: 11 | Status: SHIPPED | OUTPATIENT
Start: 2021-01-01 | End: 2021-01-01 | Stop reason: SINTOL

## 2021-01-01 RX ORDER — METOPROLOL SUCCINATE 100 MG/1
100 TABLET, EXTENDED RELEASE ORAL 2 TIMES DAILY
Qty: 60 TABLET | Refills: 11 | Status: SHIPPED | OUTPATIENT
Start: 2021-01-01 | End: 2022-01-01 | Stop reason: SDUPTHER

## 2021-01-01 RX ORDER — ZOLPIDEM TARTRATE 5 MG/1
5 TABLET ORAL NIGHTLY PRN
Qty: 30 TABLET | Refills: 3 | Status: SHIPPED | OUTPATIENT
Start: 2021-01-01 | End: 2021-01-01 | Stop reason: SDUPTHER

## 2021-01-01 RX ORDER — CYANOCOBALAMIN (VITAMIN B-12) 500 MCG
1 TABLET ORAL NIGHTLY PRN
Qty: 30 TABLET | Refills: 1
Start: 2021-01-01 | End: 2021-01-01

## 2021-01-01 RX ORDER — QUETIAPINE FUMARATE 25 MG/1
25 TABLET, FILM COATED ORAL NIGHTLY PRN
Qty: 30 TABLET | Refills: 0 | Status: SHIPPED | OUTPATIENT
Start: 2021-01-01 | End: 2022-01-01 | Stop reason: SDUPTHER

## 2021-01-01 RX ORDER — INSULIN DETEMIR 100 [IU]/ML
70 INJECTION, SOLUTION SUBCUTANEOUS DAILY
Qty: 18 ML | Refills: 3 | Status: SHIPPED | OUTPATIENT
Start: 2021-01-01 | End: 2021-01-01 | Stop reason: SDUPTHER

## 2021-01-01 RX ORDER — HYDROCODONE BITARTRATE AND ACETAMINOPHEN 10; 325 MG/1; MG/1
1 TABLET ORAL EVERY 4 HOURS PRN
Qty: 90 TABLET | Refills: 0 | Status: SHIPPED | OUTPATIENT
Start: 2021-01-01 | End: 2022-01-01 | Stop reason: SDUPTHER

## 2021-01-01 RX ORDER — SODIUM CHLORIDE 0.9 % (FLUSH) 0.9 %
10 SYRINGE (ML) INJECTION
Status: DISCONTINUED | OUTPATIENT
Start: 2021-01-01 | End: 2021-01-01 | Stop reason: HOSPADM

## 2021-01-01 RX ORDER — HYDROCODONE BITARTRATE AND ACETAMINOPHEN 10; 325 MG/1; MG/1
TABLET ORAL
COMMUNITY
Start: 2021-01-01 | End: 2021-01-01 | Stop reason: SDUPTHER

## 2021-01-01 RX ADMIN — ZOLEDRONIC ACID 3 MG: 4 INJECTION, SOLUTION, CONCENTRATE INTRAVENOUS at 03:09

## 2021-01-01 RX ADMIN — LEUPROLIDE ACETATE 22.5 MG: KIT at 04:09

## 2021-01-01 RX ADMIN — LEUPROLIDE ACETATE 22.5 MG: KIT at 11:06

## 2021-01-01 RX ADMIN — IOHEXOL 1000 ML: 12 SOLUTION ORAL at 10:09

## 2021-01-01 RX ADMIN — IOHEXOL 100 ML: 350 INJECTION, SOLUTION INTRAVENOUS at 11:09

## 2021-01-01 RX ADMIN — LEUPROLIDE ACETATE 22.5 MG: KIT at 04:12

## 2021-01-01 RX ADMIN — ZOLEDRONIC ACID 3.5 MG: 4 INJECTION, SOLUTION, CONCENTRATE INTRAVENOUS at 03:04

## 2021-01-01 RX ADMIN — ZOLEDRONIC ACID 3.3 MG: 4 INJECTION, SOLUTION, CONCENTRATE INTRAVENOUS at 03:12

## 2021-01-04 ENCOUNTER — TELEPHONE (OUTPATIENT)
Dept: INTERNAL MEDICINE | Facility: CLINIC | Age: 76
End: 2021-01-04

## 2021-01-10 ENCOUNTER — IMMUNIZATION (OUTPATIENT)
Dept: INTERNAL MEDICINE | Facility: CLINIC | Age: 76
End: 2021-01-10
Payer: MEDICARE

## 2021-01-10 DIAGNOSIS — Z23 NEED FOR VACCINATION: ICD-10-CM

## 2021-01-10 PROCEDURE — 91300 COVID-19, MRNA, LNP-S, PF, 30 MCG/0.3 ML DOSE VACCINE: CPT | Mod: PBBFAC | Performed by: FAMILY MEDICINE

## 2021-01-12 ENCOUNTER — OFFICE VISIT (OUTPATIENT)
Dept: RADIATION ONCOLOGY | Facility: CLINIC | Age: 76
End: 2021-01-12
Payer: MEDICARE

## 2021-01-12 ENCOUNTER — LAB VISIT (OUTPATIENT)
Dept: LAB | Facility: HOSPITAL | Age: 76
End: 2021-01-12
Attending: RADIOLOGY
Payer: MEDICARE

## 2021-01-12 DIAGNOSIS — C79.51 SECONDARY CANCER OF BONE: ICD-10-CM

## 2021-01-12 DIAGNOSIS — C61 PROSTATE CANCER: ICD-10-CM

## 2021-01-12 DIAGNOSIS — C61 PROSTATE CANCER: Primary | ICD-10-CM

## 2021-01-12 LAB
ALBUMIN SERPL BCP-MCNC: 3.3 G/DL (ref 3.5–5.2)
ALP SERPL-CCNC: 296 U/L (ref 55–135)
ALT SERPL W/O P-5'-P-CCNC: 17 U/L (ref 10–44)
ANION GAP SERPL CALC-SCNC: 10 MMOL/L (ref 8–16)
AST SERPL-CCNC: 17 U/L (ref 10–40)
BASOPHILS # BLD AUTO: 0.04 K/UL (ref 0–0.2)
BASOPHILS NFR BLD: 0.7 % (ref 0–1.9)
BILIRUB SERPL-MCNC: 0.4 MG/DL (ref 0.1–1)
CALCIUM SERPL-MCNC: 8.7 MG/DL (ref 8.7–10.5)
CHLORIDE SERPL-SCNC: 100 MMOL/L (ref 95–110)
CO2 SERPL-SCNC: 29 MMOL/L (ref 23–29)
CREAT SERPL-MCNC: 1.5 MG/DL (ref 0.5–1.4)
DIFFERENTIAL METHOD: ABNORMAL
EOSINOPHIL # BLD AUTO: 0.3 K/UL (ref 0–0.5)
EOSINOPHIL NFR BLD: 4.4 % (ref 0–8)
ERYTHROCYTE [DISTWIDTH] IN BLOOD BY AUTOMATED COUNT: 13.4 % (ref 11.5–14.5)
EST. GFR  (AFRICAN AMERICAN): 52 ML/MIN/1.73 M^2
EST. GFR  (NON AFRICAN AMERICAN): 45 ML/MIN/1.73 M^2
GLUCOSE SERPL-MCNC: 178 MG/DL (ref 70–110)
HCT VFR BLD AUTO: 38.6 % (ref 40–54)
HGB BLD-MCNC: 12.3 G/DL (ref 14–18)
IMM GRANULOCYTES # BLD AUTO: 0.06 K/UL (ref 0–0.04)
IMM GRANULOCYTES NFR BLD AUTO: 1 % (ref 0–0.5)
LYMPHOCYTES # BLD AUTO: 0.6 K/UL (ref 1–4.8)
LYMPHOCYTES NFR BLD: 9.9 % (ref 18–48)
MCH RBC QN AUTO: 30.2 PG (ref 27–31)
MCHC RBC AUTO-ENTMCNC: 31.9 G/DL (ref 32–36)
MCV RBC AUTO: 95 FL (ref 82–98)
MONOCYTES # BLD AUTO: 0.9 K/UL (ref 0.3–1)
MONOCYTES NFR BLD: 15.6 % (ref 4–15)
NEUTROPHILS # BLD AUTO: 4 K/UL (ref 1.8–7.7)
NEUTROPHILS NFR BLD: 68.4 % (ref 38–73)
NRBC BLD-RTO: 0 /100 WBC
PLATELET # BLD AUTO: 275 K/UL (ref 150–350)
PMV BLD AUTO: 9 FL (ref 9.2–12.9)
POTASSIUM SERPL-SCNC: 4.2 MMOL/L (ref 3.5–5.1)
PROT SERPL-MCNC: 6.6 G/DL (ref 6–8.4)
RBC # BLD AUTO: 4.07 M/UL (ref 4.6–6.2)
SODIUM SERPL-SCNC: 139 MMOL/L (ref 136–145)
WBC # BLD AUTO: 5.85 K/UL (ref 3.9–12.7)

## 2021-01-12 PROCEDURE — 36415 COLL VENOUS BLD VENIPUNCTURE: CPT | Mod: PO

## 2021-01-12 PROCEDURE — 1157F PR ADVANCE CARE PLAN OR EQUIV PRESENT IN MEDICAL RECORD: ICD-10-PCS | Mod: ,,, | Performed by: RADIOLOGY

## 2021-01-12 PROCEDURE — 85025 COMPLETE CBC W/AUTO DIFF WBC: CPT

## 2021-01-12 PROCEDURE — 1157F ADVNC CARE PLAN IN RCRD: CPT | Mod: ,,, | Performed by: RADIOLOGY

## 2021-01-12 PROCEDURE — 99499 UNLISTED E&M SERVICE: CPT | Mod: 95,,, | Performed by: RADIOLOGY

## 2021-01-12 PROCEDURE — 80053 COMPREHEN METABOLIC PANEL: CPT

## 2021-01-12 PROCEDURE — 99499 NO LOS: ICD-10-PCS | Mod: 95,,, | Performed by: RADIOLOGY

## 2021-01-18 ENCOUNTER — DOCUMENT SCAN (OUTPATIENT)
Dept: HOME HEALTH SERVICES | Facility: HOSPITAL | Age: 76
End: 2021-01-18
Payer: MEDICARE

## 2021-01-19 ENCOUNTER — CLINICAL SUPPORT (OUTPATIENT)
Dept: RADIATION ONCOLOGY | Facility: CLINIC | Age: 76
End: 2021-01-19
Payer: MEDICARE

## 2021-01-19 ENCOUNTER — HOSPITAL ENCOUNTER (OUTPATIENT)
Dept: RADIOLOGY | Facility: HOSPITAL | Age: 76
Discharge: HOME | End: 2021-01-19
Attending: RADIOLOGY
Payer: MEDICARE

## 2021-01-19 DIAGNOSIS — C79.51 SECONDARY CANCER OF BONE: ICD-10-CM

## 2021-01-19 DIAGNOSIS — C61 PROSTATE CANCER: Primary | ICD-10-CM

## 2021-01-19 DIAGNOSIS — C61 PROSTATE CANCER: ICD-10-CM

## 2021-01-19 PROCEDURE — 99499 UNLISTED E&M SERVICE: CPT | Mod: S$GLB,,, | Performed by: RADIOLOGY

## 2021-01-19 PROCEDURE — 79101 PR  NUCLEAR THERAPY, IV: ICD-10-PCS | Mod: S$GLB,,, | Performed by: RADIOLOGY

## 2021-01-19 PROCEDURE — A9606 RADIUM RA223 DICHLORIDE THER: HCPCS | Mod: JG

## 2021-01-19 PROCEDURE — 79101 NUCLEAR RX IV ADMIN: CPT | Mod: TC

## 2021-01-19 PROCEDURE — 99499 NO LOS: ICD-10-PCS | Mod: S$GLB,,, | Performed by: RADIOLOGY

## 2021-01-19 PROCEDURE — 79101 NUCLEAR RX IV ADMIN: CPT | Mod: S$GLB,,, | Performed by: RADIOLOGY

## 2021-01-25 DIAGNOSIS — F51.01 PRIMARY INSOMNIA: ICD-10-CM

## 2021-01-25 RX ORDER — ZOLPIDEM TARTRATE 5 MG/1
5 TABLET ORAL NIGHTLY PRN
Qty: 30 TABLET | Refills: 3 | Status: SHIPPED | OUTPATIENT
Start: 2021-01-25 | End: 2021-01-26 | Stop reason: SDUPTHER

## 2021-01-28 ENCOUNTER — PATIENT MESSAGE (OUTPATIENT)
Dept: INTERNAL MEDICINE | Facility: CLINIC | Age: 76
End: 2021-01-28

## 2021-01-31 ENCOUNTER — IMMUNIZATION (OUTPATIENT)
Dept: INTERNAL MEDICINE | Facility: CLINIC | Age: 76
End: 2021-01-31
Payer: MEDICARE

## 2021-01-31 DIAGNOSIS — Z23 NEED FOR VACCINATION: Primary | ICD-10-CM

## 2021-01-31 PROCEDURE — 91300 COVID-19, MRNA, LNP-S, PF, 30 MCG/0.3 ML DOSE VACCINE: CPT | Mod: PBBFAC | Performed by: FAMILY MEDICINE

## 2021-01-31 PROCEDURE — 0002A COVID-19, MRNA, LNP-S, PF, 30 MCG/0.3 ML DOSE VACCINE: CPT | Mod: PBBFAC | Performed by: FAMILY MEDICINE

## 2021-02-05 ENCOUNTER — PATIENT MESSAGE (OUTPATIENT)
Dept: HEMATOLOGY/ONCOLOGY | Facility: CLINIC | Age: 76
End: 2021-02-05

## 2021-02-05 DIAGNOSIS — C41.9 MALIGNANT NEOPLASM OF BONE AND ARTICULAR CARTILAGE, UNSPECIFIED: ICD-10-CM

## 2021-02-05 RX ORDER — HYDROCODONE BITARTRATE AND ACETAMINOPHEN 10; 325 MG/1; MG/1
1 TABLET ORAL EVERY 4 HOURS PRN
Qty: 60 TABLET | Refills: 0 | Status: SHIPPED | OUTPATIENT
Start: 2021-02-05 | End: 2021-02-07 | Stop reason: SDUPTHER

## 2021-02-09 ENCOUNTER — LAB VISIT (OUTPATIENT)
Dept: LAB | Facility: HOSPITAL | Age: 76
End: 2021-02-09
Attending: RADIOLOGY
Payer: MEDICARE

## 2021-02-09 ENCOUNTER — OFFICE VISIT (OUTPATIENT)
Dept: RADIATION ONCOLOGY | Facility: CLINIC | Age: 76
End: 2021-02-09
Payer: MEDICARE

## 2021-02-09 VITALS
BODY MASS INDEX: 38.42 KG/M2 | HEIGHT: 72 IN | HEART RATE: 71 BPM | TEMPERATURE: 98 F | DIASTOLIC BLOOD PRESSURE: 80 MMHG | OXYGEN SATURATION: 94 % | SYSTOLIC BLOOD PRESSURE: 165 MMHG | RESPIRATION RATE: 18 BRPM | WEIGHT: 283.69 LBS

## 2021-02-09 DIAGNOSIS — C79.51 SECONDARY CANCER OF BONE: ICD-10-CM

## 2021-02-09 DIAGNOSIS — C61 PROSTATE CANCER: ICD-10-CM

## 2021-02-09 DIAGNOSIS — C61 PROSTATE CANCER: Primary | ICD-10-CM

## 2021-02-09 LAB
ALBUMIN SERPL BCP-MCNC: 3.2 G/DL (ref 3.5–5.2)
ALP SERPL-CCNC: 132 U/L (ref 55–135)
ALT SERPL W/O P-5'-P-CCNC: 18 U/L (ref 10–44)
ANION GAP SERPL CALC-SCNC: 10 MMOL/L (ref 8–16)
AST SERPL-CCNC: 16 U/L (ref 10–40)
BASOPHILS # BLD AUTO: 0.04 K/UL (ref 0–0.2)
BASOPHILS NFR BLD: 0.7 % (ref 0–1.9)
BILIRUB SERPL-MCNC: 0.4 MG/DL (ref 0.1–1)
BUN SERPL-MCNC: 17 MG/DL (ref 8–23)
CALCIUM SERPL-MCNC: 8.9 MG/DL (ref 8.7–10.5)
CHLORIDE SERPL-SCNC: 100 MMOL/L (ref 95–110)
CO2 SERPL-SCNC: 28 MMOL/L (ref 23–29)
CREAT SERPL-MCNC: 1.5 MG/DL (ref 0.5–1.4)
DIFFERENTIAL METHOD: ABNORMAL
EOSINOPHIL # BLD AUTO: 0.4 K/UL (ref 0–0.5)
EOSINOPHIL NFR BLD: 6.3 % (ref 0–8)
ERYTHROCYTE [DISTWIDTH] IN BLOOD BY AUTOMATED COUNT: 13.2 % (ref 11.5–14.5)
EST. GFR  (AFRICAN AMERICAN): 52 ML/MIN/1.73 M^2
EST. GFR  (NON AFRICAN AMERICAN): 45 ML/MIN/1.73 M^2
GLUCOSE SERPL-MCNC: 202 MG/DL (ref 70–110)
HCT VFR BLD AUTO: 36.7 % (ref 40–54)
HGB BLD-MCNC: 12 G/DL (ref 14–18)
IMM GRANULOCYTES # BLD AUTO: 0.06 K/UL (ref 0–0.04)
IMM GRANULOCYTES NFR BLD AUTO: 1.1 % (ref 0–0.5)
LYMPHOCYTES # BLD AUTO: 0.7 K/UL (ref 1–4.8)
LYMPHOCYTES NFR BLD: 12.1 % (ref 18–48)
MCH RBC QN AUTO: 30.2 PG (ref 27–31)
MCHC RBC AUTO-ENTMCNC: 32.7 G/DL (ref 32–36)
MCV RBC AUTO: 92 FL (ref 82–98)
MONOCYTES # BLD AUTO: 0.9 K/UL (ref 0.3–1)
MONOCYTES NFR BLD: 14.9 % (ref 4–15)
NEUTROPHILS # BLD AUTO: 3.7 K/UL (ref 1.8–7.7)
NEUTROPHILS NFR BLD: 64.9 % (ref 38–73)
NRBC BLD-RTO: 0 /100 WBC
PLATELET # BLD AUTO: 270 K/UL (ref 150–350)
PMV BLD AUTO: 8.3 FL (ref 9.2–12.9)
POTASSIUM SERPL-SCNC: 4.4 MMOL/L (ref 3.5–5.1)
PROT SERPL-MCNC: 6.8 G/DL (ref 6–8.4)
RBC # BLD AUTO: 3.98 M/UL (ref 4.6–6.2)
SODIUM SERPL-SCNC: 138 MMOL/L (ref 136–145)
WBC # BLD AUTO: 5.71 K/UL (ref 3.9–12.7)

## 2021-02-09 PROCEDURE — 99213 PR OFFICE/OUTPT VISIT, EST, LEVL III, 20-29 MIN: ICD-10-PCS | Mod: S$GLB,,, | Performed by: RADIOLOGY

## 2021-02-09 PROCEDURE — 1101F PR PT FALLS ASSESS DOC 0-1 FALLS W/OUT INJ PAST YR: ICD-10-PCS | Mod: CPTII,S$GLB,, | Performed by: RADIOLOGY

## 2021-02-09 PROCEDURE — 3079F DIAST BP 80-89 MM HG: CPT | Mod: CPTII,S$GLB,, | Performed by: RADIOLOGY

## 2021-02-09 PROCEDURE — 3077F PR MOST RECENT SYSTOLIC BLOOD PRESSURE >= 140 MM HG: ICD-10-PCS | Mod: CPTII,S$GLB,, | Performed by: RADIOLOGY

## 2021-02-09 PROCEDURE — 99999 PR PBB SHADOW E&M-EST. PATIENT-LVL IV: ICD-10-PCS | Mod: PBBFAC,,, | Performed by: RADIOLOGY

## 2021-02-09 PROCEDURE — 3077F SYST BP >= 140 MM HG: CPT | Mod: CPTII,S$GLB,, | Performed by: RADIOLOGY

## 2021-02-09 PROCEDURE — 85025 COMPLETE CBC W/AUTO DIFF WBC: CPT

## 2021-02-09 PROCEDURE — 1157F ADVNC CARE PLAN IN RCRD: CPT | Mod: S$GLB,,, | Performed by: RADIOLOGY

## 2021-02-09 PROCEDURE — 1101F PT FALLS ASSESS-DOCD LE1/YR: CPT | Mod: CPTII,S$GLB,, | Performed by: RADIOLOGY

## 2021-02-09 PROCEDURE — 3288F FALL RISK ASSESSMENT DOCD: CPT | Mod: CPTII,S$GLB,, | Performed by: RADIOLOGY

## 2021-02-09 PROCEDURE — 99999 PR PBB SHADOW E&M-EST. PATIENT-LVL IV: CPT | Mod: PBBFAC,,, | Performed by: RADIOLOGY

## 2021-02-09 PROCEDURE — 3288F PR FALLS RISK ASSESSMENT DOCUMENTED: ICD-10-PCS | Mod: CPTII,S$GLB,, | Performed by: RADIOLOGY

## 2021-02-09 PROCEDURE — 80053 COMPREHEN METABOLIC PANEL: CPT

## 2021-02-09 PROCEDURE — 1159F PR MEDICATION LIST DOCUMENTED IN MEDICAL RECORD: ICD-10-PCS | Mod: S$GLB,,, | Performed by: RADIOLOGY

## 2021-02-09 PROCEDURE — 36415 COLL VENOUS BLD VENIPUNCTURE: CPT

## 2021-02-09 PROCEDURE — 1126F AMNT PAIN NOTED NONE PRSNT: CPT | Mod: S$GLB,,, | Performed by: RADIOLOGY

## 2021-02-09 PROCEDURE — 1126F PR PAIN SEVERITY QUANTIFIED, NO PAIN PRESENT: ICD-10-PCS | Mod: S$GLB,,, | Performed by: RADIOLOGY

## 2021-02-09 PROCEDURE — 1159F MED LIST DOCD IN RCRD: CPT | Mod: S$GLB,,, | Performed by: RADIOLOGY

## 2021-02-09 PROCEDURE — 1157F PR ADVANCE CARE PLAN OR EQUIV PRESENT IN MEDICAL RECORD: ICD-10-PCS | Mod: S$GLB,,, | Performed by: RADIOLOGY

## 2021-02-09 PROCEDURE — 99213 OFFICE O/P EST LOW 20 MIN: CPT | Mod: S$GLB,,, | Performed by: RADIOLOGY

## 2021-02-09 PROCEDURE — 3079F PR MOST RECENT DIASTOLIC BLOOD PRESSURE 80-89 MM HG: ICD-10-PCS | Mod: CPTII,S$GLB,, | Performed by: RADIOLOGY

## 2021-02-16 ENCOUNTER — PATIENT MESSAGE (OUTPATIENT)
Dept: RADIATION ONCOLOGY | Facility: CLINIC | Age: 76
End: 2021-02-16

## 2021-02-17 DIAGNOSIS — C79.51 PROSTATE CANCER METASTATIC TO BONE: Primary | ICD-10-CM

## 2021-02-17 DIAGNOSIS — C61 PROSTATE CANCER METASTATIC TO BONE: Primary | ICD-10-CM

## 2021-02-23 ENCOUNTER — CLINICAL SUPPORT (OUTPATIENT)
Dept: RADIATION ONCOLOGY | Facility: CLINIC | Age: 76
End: 2021-02-23
Payer: MEDICARE

## 2021-02-23 ENCOUNTER — HOSPITAL ENCOUNTER (OUTPATIENT)
Dept: RADIOLOGY | Facility: HOSPITAL | Age: 76
Discharge: HOME | End: 2021-02-23
Attending: RADIOLOGY
Payer: MEDICARE

## 2021-02-23 DIAGNOSIS — C61 PROSTATE CANCER METASTATIC TO MULTIPLE SITES: ICD-10-CM

## 2021-02-23 DIAGNOSIS — C61 PROSTATE CANCER: Primary | ICD-10-CM

## 2021-02-23 DIAGNOSIS — C79.51 SECONDARY CANCER OF BONE: ICD-10-CM

## 2021-02-23 DIAGNOSIS — C61 PROSTATE CANCER: ICD-10-CM

## 2021-02-23 PROCEDURE — A9606 RADIUM RA223 DICHLORIDE THER: HCPCS | Mod: JG

## 2021-02-23 PROCEDURE — 79101 NUCLEAR RX IV ADMIN: CPT | Mod: S$GLB,,, | Performed by: RADIOLOGY

## 2021-02-23 PROCEDURE — 99499 UNLISTED E&M SERVICE: CPT | Mod: S$GLB,,, | Performed by: RADIOLOGY

## 2021-02-23 PROCEDURE — 99499 NO LOS: ICD-10-PCS | Mod: S$GLB,,, | Performed by: RADIOLOGY

## 2021-02-23 PROCEDURE — 79101 NUCLEAR RX IV ADMIN: CPT | Mod: TC

## 2021-02-23 PROCEDURE — 79101 PR  NUCLEAR THERAPY, IV: ICD-10-PCS | Mod: S$GLB,,, | Performed by: RADIOLOGY

## 2021-02-24 ENCOUNTER — HOSPITAL ENCOUNTER (OUTPATIENT)
Dept: RADIOLOGY | Facility: HOSPITAL | Age: 76
Discharge: HOME | End: 2021-02-24
Attending: INTERNAL MEDICINE
Payer: MEDICARE

## 2021-02-24 DIAGNOSIS — E11.9 TYPE 2 DIABETES MELLITUS WITHOUT COMPLICATION: ICD-10-CM

## 2021-02-24 DIAGNOSIS — C79.51 PROSTATE CANCER METASTATIC TO BONE: ICD-10-CM

## 2021-02-24 DIAGNOSIS — C61 PROSTATE CANCER METASTATIC TO BONE: ICD-10-CM

## 2021-02-24 PROCEDURE — 73521 X-RAY EXAM HIPS BI 2 VIEWS: CPT | Mod: 26,,, | Performed by: RADIOLOGY

## 2021-02-24 PROCEDURE — 73562 X-RAY EXAM OF KNEE 3: CPT | Mod: TC,50

## 2021-02-24 PROCEDURE — 73521 XR HIPS BILATERAL 2 VIEW INCL AP PELVIS: ICD-10-PCS | Mod: 26,,, | Performed by: RADIOLOGY

## 2021-02-24 PROCEDURE — 73562 X-RAY EXAM OF KNEE 3: CPT | Mod: 26,50,, | Performed by: RADIOLOGY

## 2021-02-24 PROCEDURE — 73562 XR KNEE ORTHO BILAT: ICD-10-PCS | Mod: 26,50,, | Performed by: RADIOLOGY

## 2021-02-24 PROCEDURE — 73521 X-RAY EXAM HIPS BI 2 VIEWS: CPT | Mod: TC

## 2021-02-26 ENCOUNTER — LAB VISIT (OUTPATIENT)
Dept: LAB | Facility: HOSPITAL | Age: 76
End: 2021-02-26
Attending: INTERNAL MEDICINE
Payer: MEDICARE

## 2021-02-26 ENCOUNTER — OFFICE VISIT (OUTPATIENT)
Dept: HEMATOLOGY/ONCOLOGY | Facility: CLINIC | Age: 76
End: 2021-02-26
Payer: MEDICARE

## 2021-02-26 VITALS
DIASTOLIC BLOOD PRESSURE: 82 MMHG | TEMPERATURE: 98 F | OXYGEN SATURATION: 96 % | HEIGHT: 72 IN | SYSTOLIC BLOOD PRESSURE: 173 MMHG | RESPIRATION RATE: 16 BRPM | HEART RATE: 79 BPM | BODY MASS INDEX: 37.75 KG/M2 | WEIGHT: 278.69 LBS

## 2021-02-26 DIAGNOSIS — C61 PROSTATE CANCER METASTATIC TO BONE: Primary | ICD-10-CM

## 2021-02-26 DIAGNOSIS — C79.51 PROSTATE CANCER METASTATIC TO BONE: ICD-10-CM

## 2021-02-26 DIAGNOSIS — C79.51 PROSTATE CANCER METASTATIC TO BONE: Primary | ICD-10-CM

## 2021-02-26 DIAGNOSIS — C61 PROSTATE CANCER METASTATIC TO BONE: ICD-10-CM

## 2021-02-26 LAB
ALBUMIN SERPL BCP-MCNC: 3.5 G/DL (ref 3.5–5.2)
ALP SERPL-CCNC: 110 U/L (ref 55–135)
ALT SERPL W/O P-5'-P-CCNC: 20 U/L (ref 10–44)
ANION GAP SERPL CALC-SCNC: 12 MMOL/L (ref 8–16)
AST SERPL-CCNC: 18 U/L (ref 10–40)
BASOPHILS # BLD AUTO: 0.04 K/UL (ref 0–0.2)
BASOPHILS NFR BLD: 0.6 % (ref 0–1.9)
BILIRUB SERPL-MCNC: 0.5 MG/DL (ref 0.1–1)
BUN SERPL-MCNC: 24 MG/DL (ref 8–23)
CALCIUM SERPL-MCNC: 9.1 MG/DL (ref 8.7–10.5)
CHLORIDE SERPL-SCNC: 99 MMOL/L (ref 95–110)
CO2 SERPL-SCNC: 27 MMOL/L (ref 23–29)
COMPLEXED PSA SERPL-MCNC: 0.06 NG/ML (ref 0–4)
CREAT SERPL-MCNC: 1.7 MG/DL (ref 0.5–1.4)
DIFFERENTIAL METHOD: ABNORMAL
EOSINOPHIL # BLD AUTO: 0.3 K/UL (ref 0–0.5)
EOSINOPHIL NFR BLD: 3.8 % (ref 0–8)
ERYTHROCYTE [DISTWIDTH] IN BLOOD BY AUTOMATED COUNT: 13.2 % (ref 11.5–14.5)
EST. GFR  (AFRICAN AMERICAN): 45 ML/MIN/1.73 M^2
EST. GFR  (NON AFRICAN AMERICAN): 39 ML/MIN/1.73 M^2
GLUCOSE SERPL-MCNC: 228 MG/DL (ref 70–110)
HCT VFR BLD AUTO: 37.5 % (ref 40–54)
HGB BLD-MCNC: 12.4 G/DL (ref 14–18)
IMM GRANULOCYTES # BLD AUTO: 0.04 K/UL (ref 0–0.04)
IMM GRANULOCYTES NFR BLD AUTO: 0.6 % (ref 0–0.5)
LDH SERPL L TO P-CCNC: 263 U/L (ref 110–260)
LYMPHOCYTES # BLD AUTO: 0.6 K/UL (ref 1–4.8)
LYMPHOCYTES NFR BLD: 9.7 % (ref 18–48)
MCH RBC QN AUTO: 30.5 PG (ref 27–31)
MCHC RBC AUTO-ENTMCNC: 33.1 G/DL (ref 32–36)
MCV RBC AUTO: 92 FL (ref 82–98)
MONOCYTES # BLD AUTO: 0.9 K/UL (ref 0.3–1)
MONOCYTES NFR BLD: 13.8 % (ref 4–15)
NEUTROPHILS # BLD AUTO: 4.6 K/UL (ref 1.8–7.7)
NEUTROPHILS NFR BLD: 71.5 % (ref 38–73)
NRBC BLD-RTO: 0 /100 WBC
PLATELET # BLD AUTO: 298 K/UL (ref 150–350)
PMV BLD AUTO: 8.8 FL (ref 9.2–12.9)
POTASSIUM SERPL-SCNC: 4.3 MMOL/L (ref 3.5–5.1)
PROT SERPL-MCNC: 6.8 G/DL (ref 6–8.4)
RBC # BLD AUTO: 4.07 M/UL (ref 4.6–6.2)
SODIUM SERPL-SCNC: 138 MMOL/L (ref 136–145)
TESTOST SERPL-MCNC: 8 NG/DL (ref 304–1227)
WBC # BLD AUTO: 6.5 K/UL (ref 3.9–12.7)

## 2021-02-26 PROCEDURE — 3288F FALL RISK ASSESSMENT DOCD: CPT | Mod: CPTII,S$GLB,, | Performed by: INTERNAL MEDICINE

## 2021-02-26 PROCEDURE — 84153 ASSAY OF PSA TOTAL: CPT

## 2021-02-26 PROCEDURE — 3077F SYST BP >= 140 MM HG: CPT | Mod: CPTII,S$GLB,, | Performed by: INTERNAL MEDICINE

## 2021-02-26 PROCEDURE — 85025 COMPLETE CBC W/AUTO DIFF WBC: CPT

## 2021-02-26 PROCEDURE — 80053 COMPREHEN METABOLIC PANEL: CPT

## 2021-02-26 PROCEDURE — 1126F AMNT PAIN NOTED NONE PRSNT: CPT | Mod: S$GLB,,, | Performed by: INTERNAL MEDICINE

## 2021-02-26 PROCEDURE — 36415 COLL VENOUS BLD VENIPUNCTURE: CPT

## 2021-02-26 PROCEDURE — 1126F PR PAIN SEVERITY QUANTIFIED, NO PAIN PRESENT: ICD-10-PCS | Mod: S$GLB,,, | Performed by: INTERNAL MEDICINE

## 2021-02-26 PROCEDURE — 1101F PR PT FALLS ASSESS DOC 0-1 FALLS W/OUT INJ PAST YR: ICD-10-PCS | Mod: CPTII,S$GLB,, | Performed by: INTERNAL MEDICINE

## 2021-02-26 PROCEDURE — 1157F PR ADVANCE CARE PLAN OR EQUIV PRESENT IN MEDICAL RECORD: ICD-10-PCS | Mod: S$GLB,,, | Performed by: INTERNAL MEDICINE

## 2021-02-26 PROCEDURE — 3077F PR MOST RECENT SYSTOLIC BLOOD PRESSURE >= 140 MM HG: ICD-10-PCS | Mod: CPTII,S$GLB,, | Performed by: INTERNAL MEDICINE

## 2021-02-26 PROCEDURE — 99999 PR PBB SHADOW E&M-EST. PATIENT-LVL IV: ICD-10-PCS | Mod: PBBFAC,,, | Performed by: INTERNAL MEDICINE

## 2021-02-26 PROCEDURE — 1159F MED LIST DOCD IN RCRD: CPT | Mod: S$GLB,,, | Performed by: INTERNAL MEDICINE

## 2021-02-26 PROCEDURE — 3079F PR MOST RECENT DIASTOLIC BLOOD PRESSURE 80-89 MM HG: ICD-10-PCS | Mod: CPTII,S$GLB,, | Performed by: INTERNAL MEDICINE

## 2021-02-26 PROCEDURE — 3288F PR FALLS RISK ASSESSMENT DOCUMENTED: ICD-10-PCS | Mod: CPTII,S$GLB,, | Performed by: INTERNAL MEDICINE

## 2021-02-26 PROCEDURE — 1159F PR MEDICATION LIST DOCUMENTED IN MEDICAL RECORD: ICD-10-PCS | Mod: S$GLB,,, | Performed by: INTERNAL MEDICINE

## 2021-02-26 PROCEDURE — 99214 OFFICE O/P EST MOD 30 MIN: CPT | Mod: S$GLB,,, | Performed by: INTERNAL MEDICINE

## 2021-02-26 PROCEDURE — 1101F PT FALLS ASSESS-DOCD LE1/YR: CPT | Mod: CPTII,S$GLB,, | Performed by: INTERNAL MEDICINE

## 2021-02-26 PROCEDURE — 99999 PR PBB SHADOW E&M-EST. PATIENT-LVL IV: CPT | Mod: PBBFAC,,, | Performed by: INTERNAL MEDICINE

## 2021-02-26 PROCEDURE — 99214 PR OFFICE/OUTPT VISIT, EST, LEVL IV, 30-39 MIN: ICD-10-PCS | Mod: S$GLB,,, | Performed by: INTERNAL MEDICINE

## 2021-02-26 PROCEDURE — 83615 LACTATE (LD) (LDH) ENZYME: CPT

## 2021-02-26 PROCEDURE — 1157F ADVNC CARE PLAN IN RCRD: CPT | Mod: S$GLB,,, | Performed by: INTERNAL MEDICINE

## 2021-02-26 PROCEDURE — 3079F DIAST BP 80-89 MM HG: CPT | Mod: CPTII,S$GLB,, | Performed by: INTERNAL MEDICINE

## 2021-02-26 PROCEDURE — 84403 ASSAY OF TOTAL TESTOSTERONE: CPT

## 2021-03-14 DIAGNOSIS — I15.2 HYPERTENSION ASSOCIATED WITH DIABETES: ICD-10-CM

## 2021-03-14 DIAGNOSIS — E11.59 HYPERTENSION ASSOCIATED WITH DIABETES: ICD-10-CM

## 2021-03-15 RX ORDER — METOPROLOL SUCCINATE 100 MG/1
100 TABLET, EXTENDED RELEASE ORAL 2 TIMES DAILY
Qty: 60 TABLET | Refills: 11 | Status: SHIPPED | OUTPATIENT
Start: 2021-03-15 | End: 2021-01-01 | Stop reason: SDUPTHER

## 2021-03-16 ENCOUNTER — TELEPHONE (OUTPATIENT)
Dept: HEMATOLOGY/ONCOLOGY | Facility: CLINIC | Age: 76
End: 2021-03-16

## 2021-03-16 ENCOUNTER — OFFICE VISIT (OUTPATIENT)
Dept: RADIATION ONCOLOGY | Facility: CLINIC | Age: 76
End: 2021-03-16
Payer: MEDICARE

## 2021-03-16 ENCOUNTER — LAB VISIT (OUTPATIENT)
Dept: LAB | Facility: HOSPITAL | Age: 76
End: 2021-03-16
Attending: RADIOLOGY
Payer: MEDICARE

## 2021-03-16 VITALS
OXYGEN SATURATION: 97 % | WEIGHT: 280 LBS | BODY MASS INDEX: 37.93 KG/M2 | SYSTOLIC BLOOD PRESSURE: 198 MMHG | TEMPERATURE: 98 F | RESPIRATION RATE: 18 BRPM | DIASTOLIC BLOOD PRESSURE: 80 MMHG | HEART RATE: 80 BPM | HEIGHT: 72 IN

## 2021-03-16 DIAGNOSIS — C79.51 SECONDARY CANCER OF BONE: ICD-10-CM

## 2021-03-16 DIAGNOSIS — C61 PROSTATE CANCER METASTATIC TO MULTIPLE SITES: ICD-10-CM

## 2021-03-16 DIAGNOSIS — C61 PROSTATE CANCER: Primary | ICD-10-CM

## 2021-03-16 DIAGNOSIS — C61 PROSTATE CANCER: ICD-10-CM

## 2021-03-16 LAB
ALBUMIN SERPL BCP-MCNC: 3.5 G/DL (ref 3.5–5.2)
ALP SERPL-CCNC: 83 U/L (ref 55–135)
ALT SERPL W/O P-5'-P-CCNC: 20 U/L (ref 10–44)
ANION GAP SERPL CALC-SCNC: 13 MMOL/L (ref 8–16)
AST SERPL-CCNC: 16 U/L (ref 10–40)
BASOPHILS # BLD AUTO: 0.03 K/UL (ref 0–0.2)
BASOPHILS NFR BLD: 0.4 % (ref 0–1.9)
BILIRUB SERPL-MCNC: 0.4 MG/DL (ref 0.1–1)
BUN SERPL-MCNC: 23 MG/DL (ref 8–23)
CALCIUM SERPL-MCNC: 9.1 MG/DL (ref 8.7–10.5)
CHLORIDE SERPL-SCNC: 99 MMOL/L (ref 95–110)
CO2 SERPL-SCNC: 26 MMOL/L (ref 23–29)
CREAT SERPL-MCNC: 1.7 MG/DL (ref 0.5–1.4)
DIFFERENTIAL METHOD: ABNORMAL
EOSINOPHIL # BLD AUTO: 0.2 K/UL (ref 0–0.5)
EOSINOPHIL NFR BLD: 3.2 % (ref 0–8)
ERYTHROCYTE [DISTWIDTH] IN BLOOD BY AUTOMATED COUNT: 12.8 % (ref 11.5–14.5)
EST. GFR  (AFRICAN AMERICAN): 45 ML/MIN/1.73 M^2
EST. GFR  (NON AFRICAN AMERICAN): 39 ML/MIN/1.73 M^2
GLUCOSE SERPL-MCNC: 272 MG/DL (ref 70–110)
HCT VFR BLD AUTO: 37.6 % (ref 40–54)
HGB BLD-MCNC: 12.3 G/DL (ref 14–18)
IMM GRANULOCYTES # BLD AUTO: 0.06 K/UL (ref 0–0.04)
IMM GRANULOCYTES NFR BLD AUTO: 0.9 % (ref 0–0.5)
LYMPHOCYTES # BLD AUTO: 0.6 K/UL (ref 1–4.8)
LYMPHOCYTES NFR BLD: 8.3 % (ref 18–48)
MCH RBC QN AUTO: 30.2 PG (ref 27–31)
MCHC RBC AUTO-ENTMCNC: 32.7 G/DL (ref 32–36)
MCV RBC AUTO: 92 FL (ref 82–98)
MONOCYTES # BLD AUTO: 0.9 K/UL (ref 0.3–1)
MONOCYTES NFR BLD: 13.6 % (ref 4–15)
NEUTROPHILS # BLD AUTO: 5.1 K/UL (ref 1.8–7.7)
NEUTROPHILS NFR BLD: 73.6 % (ref 38–73)
NRBC BLD-RTO: 0 /100 WBC
PLATELET # BLD AUTO: 258 K/UL (ref 150–350)
PMV BLD AUTO: 8.2 FL (ref 9.2–12.9)
POTASSIUM SERPL-SCNC: 4.8 MMOL/L (ref 3.5–5.1)
PROT SERPL-MCNC: 6.9 G/DL (ref 6–8.4)
RBC # BLD AUTO: 4.07 M/UL (ref 4.6–6.2)
SODIUM SERPL-SCNC: 138 MMOL/L (ref 136–145)
WBC # BLD AUTO: 6.9 K/UL (ref 3.9–12.7)

## 2021-03-16 PROCEDURE — 3079F PR MOST RECENT DIASTOLIC BLOOD PRESSURE 80-89 MM HG: ICD-10-PCS | Mod: CPTII,S$GLB,, | Performed by: RADIOLOGY

## 2021-03-16 PROCEDURE — 85025 COMPLETE CBC W/AUTO DIFF WBC: CPT | Performed by: RADIOLOGY

## 2021-03-16 PROCEDURE — 1159F PR MEDICATION LIST DOCUMENTED IN MEDICAL RECORD: ICD-10-PCS | Mod: S$GLB,,, | Performed by: RADIOLOGY

## 2021-03-16 PROCEDURE — 1157F ADVNC CARE PLAN IN RCRD: CPT | Mod: S$GLB,,, | Performed by: RADIOLOGY

## 2021-03-16 PROCEDURE — 99213 PR OFFICE/OUTPT VISIT, EST, LEVL III, 20-29 MIN: ICD-10-PCS | Mod: S$GLB,,, | Performed by: RADIOLOGY

## 2021-03-16 PROCEDURE — 99213 OFFICE O/P EST LOW 20 MIN: CPT | Mod: S$GLB,,, | Performed by: RADIOLOGY

## 2021-03-16 PROCEDURE — 1159F MED LIST DOCD IN RCRD: CPT | Mod: S$GLB,,, | Performed by: RADIOLOGY

## 2021-03-16 PROCEDURE — 3288F FALL RISK ASSESSMENT DOCD: CPT | Mod: CPTII,S$GLB,, | Performed by: RADIOLOGY

## 2021-03-16 PROCEDURE — 1125F PR PAIN SEVERITY QUANTIFIED, PAIN PRESENT: ICD-10-PCS | Mod: S$GLB,,, | Performed by: RADIOLOGY

## 2021-03-16 PROCEDURE — 80053 COMPREHEN METABOLIC PANEL: CPT | Performed by: RADIOLOGY

## 2021-03-16 PROCEDURE — 99999 PR PBB SHADOW E&M-EST. PATIENT-LVL IV: ICD-10-PCS | Mod: PBBFAC,,, | Performed by: RADIOLOGY

## 2021-03-16 PROCEDURE — 3288F PR FALLS RISK ASSESSMENT DOCUMENTED: ICD-10-PCS | Mod: CPTII,S$GLB,, | Performed by: RADIOLOGY

## 2021-03-16 PROCEDURE — 3079F DIAST BP 80-89 MM HG: CPT | Mod: CPTII,S$GLB,, | Performed by: RADIOLOGY

## 2021-03-16 PROCEDURE — 3077F SYST BP >= 140 MM HG: CPT | Mod: CPTII,S$GLB,, | Performed by: RADIOLOGY

## 2021-03-16 PROCEDURE — 3077F PR MOST RECENT SYSTOLIC BLOOD PRESSURE >= 140 MM HG: ICD-10-PCS | Mod: CPTII,S$GLB,, | Performed by: RADIOLOGY

## 2021-03-16 PROCEDURE — 1101F PT FALLS ASSESS-DOCD LE1/YR: CPT | Mod: CPTII,S$GLB,, | Performed by: RADIOLOGY

## 2021-03-16 PROCEDURE — 1125F AMNT PAIN NOTED PAIN PRSNT: CPT | Mod: S$GLB,,, | Performed by: RADIOLOGY

## 2021-03-16 PROCEDURE — 36415 COLL VENOUS BLD VENIPUNCTURE: CPT | Performed by: RADIOLOGY

## 2021-03-16 PROCEDURE — 1157F PR ADVANCE CARE PLAN OR EQUIV PRESENT IN MEDICAL RECORD: ICD-10-PCS | Mod: S$GLB,,, | Performed by: RADIOLOGY

## 2021-03-16 PROCEDURE — 1101F PR PT FALLS ASSESS DOC 0-1 FALLS W/OUT INJ PAST YR: ICD-10-PCS | Mod: CPTII,S$GLB,, | Performed by: RADIOLOGY

## 2021-03-16 PROCEDURE — 99999 PR PBB SHADOW E&M-EST. PATIENT-LVL IV: CPT | Mod: PBBFAC,,, | Performed by: RADIOLOGY

## 2021-03-23 ENCOUNTER — OFFICE VISIT (OUTPATIENT)
Dept: RADIATION ONCOLOGY | Facility: CLINIC | Age: 76
End: 2021-03-23
Payer: MEDICARE

## 2021-03-23 ENCOUNTER — HOSPITAL ENCOUNTER (OUTPATIENT)
Dept: RADIOLOGY | Facility: HOSPITAL | Age: 76
Discharge: HOME | End: 2021-03-23
Attending: RADIOLOGY
Payer: MEDICARE

## 2021-03-23 ENCOUNTER — INFUSION (OUTPATIENT)
Dept: INFUSION THERAPY | Facility: HOSPITAL | Age: 76
End: 2021-03-23
Attending: INTERNAL MEDICINE
Payer: MEDICARE

## 2021-03-23 VITALS
TEMPERATURE: 98 F | DIASTOLIC BLOOD PRESSURE: 83 MMHG | OXYGEN SATURATION: 98 % | RESPIRATION RATE: 18 BRPM | HEART RATE: 71 BPM | SYSTOLIC BLOOD PRESSURE: 177 MMHG

## 2021-03-23 DIAGNOSIS — C61 PROSTATE CANCER: Primary | ICD-10-CM

## 2021-03-23 DIAGNOSIS — C61 PROSTATE CANCER METASTATIC TO BONE: Primary | ICD-10-CM

## 2021-03-23 DIAGNOSIS — C61 PROSTATE CANCER: ICD-10-CM

## 2021-03-23 DIAGNOSIS — C79.51 SECONDARY CANCER OF BONE: ICD-10-CM

## 2021-03-23 DIAGNOSIS — C79.51 PROSTATE CANCER METASTATIC TO BONE: Primary | ICD-10-CM

## 2021-03-23 DIAGNOSIS — C61 PROSTATE CANCER METASTATIC TO MULTIPLE SITES: ICD-10-CM

## 2021-03-23 PROCEDURE — 1157F ADVNC CARE PLAN IN RCRD: CPT | Mod: S$GLB,,, | Performed by: RADIOLOGY

## 2021-03-23 PROCEDURE — 79101 NUCLEAR RX IV ADMIN: CPT | Mod: S$GLB,,, | Performed by: RADIOLOGY

## 2021-03-23 PROCEDURE — 96402 CHEMO HORMON ANTINEOPL SQ/IM: CPT

## 2021-03-23 PROCEDURE — 99499 NO LOS: ICD-10-PCS | Mod: S$GLB,,, | Performed by: RADIOLOGY

## 2021-03-23 PROCEDURE — 79101 PR  NUCLEAR THERAPY, IV: ICD-10-PCS | Mod: S$GLB,,, | Performed by: RADIOLOGY

## 2021-03-23 PROCEDURE — A9606 RADIUM RA223 DICHLORIDE THER: HCPCS | Mod: JG

## 2021-03-23 PROCEDURE — 63600175 PHARM REV CODE 636 W HCPCS: Mod: JG | Performed by: INTERNAL MEDICINE

## 2021-03-23 PROCEDURE — 99499 UNLISTED E&M SERVICE: CPT | Mod: S$GLB,,, | Performed by: RADIOLOGY

## 2021-03-23 PROCEDURE — 79101 NUCLEAR RX IV ADMIN: CPT | Mod: TC

## 2021-03-23 PROCEDURE — 1157F PR ADVANCE CARE PLAN OR EQUIV PRESENT IN MEDICAL RECORD: ICD-10-PCS | Mod: S$GLB,,, | Performed by: RADIOLOGY

## 2021-03-23 RX ADMIN — LEUPROLIDE ACETATE 22.5 MG: KIT at 02:03

## 2021-04-09 PROBLEM — R79.89 ELEVATED SERUM CREATININE: Status: RESOLVED | Noted: 2020-09-09 | Resolved: 2021-01-01

## 2021-04-09 PROBLEM — R55 NEAR SYNCOPE: Status: RESOLVED | Noted: 2019-01-04 | Resolved: 2021-01-01

## 2021-04-09 PROBLEM — T68.XXXA HYPOTHERMIA: Status: RESOLVED | Noted: 2019-01-04 | Resolved: 2021-01-01
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
## 2021-06-11 PROBLEM — D84.822 IMMUNODEFICIENCY SECONDARY TO RADIATION THERAPY: Status: ACTIVE | Noted: 2021-01-01

## 2021-06-11 PROBLEM — D84.821 IMMUNODEFICIENCY DUE TO CHEMOTHERAPY: Status: ACTIVE | Noted: 2021-01-01

## 2021-06-11 PROBLEM — Z79.899 IMMUNODEFICIENCY DUE TO CHEMOTHERAPY: Status: ACTIVE | Noted: 2021-01-01

## 2021-06-11 PROBLEM — T45.1X5A IMMUNODEFICIENCY DUE TO CHEMOTHERAPY: Status: ACTIVE | Noted: 2021-01-01

## 2021-06-21 PROBLEM — U07.1 COVID-19: Status: RESOLVED | Noted: 2020-11-30 | Resolved: 2021-01-01

## 2021-10-25 PROBLEM — G47.00 INSOMNIA: Status: ACTIVE | Noted: 2021-01-01

## 2021-12-31 NOTE — TELEPHONE ENCOUNTER
No new care gaps identified.  Powered by [employer]. Reference number: [id number].   12/30/2021 8:29:54 PM CST

## 2022-01-01 ENCOUNTER — HOSPITAL ENCOUNTER (INPATIENT)
Facility: HOSPITAL | Age: 77
LOS: 5 days | Discharge: HOME HEALTH | DRG: 871 | End: 2022-02-28
Attending: EMERGENCY MEDICINE | Admitting: STUDENT IN AN ORGANIZED HEALTH CARE EDUCATION/TRAINING PROGRAM
Payer: MEDICARE

## 2022-01-01 ENCOUNTER — TELEPHONE (OUTPATIENT)
Dept: INTERNAL MEDICINE | Facility: CLINIC | Age: 77
End: 2022-01-01
Payer: MEDICARE

## 2022-01-01 ENCOUNTER — PES CALL (OUTPATIENT)
Dept: ADMINISTRATIVE | Facility: CLINIC | Age: 77
End: 2022-01-01
Payer: MEDICARE

## 2022-01-01 ENCOUNTER — PATIENT OUTREACH (OUTPATIENT)
Dept: ADMINISTRATIVE | Facility: CLINIC | Age: 77
End: 2022-01-01
Payer: MEDICARE

## 2022-01-01 ENCOUNTER — PATIENT MESSAGE (OUTPATIENT)
Dept: RESEARCH | Facility: HOSPITAL | Age: 77
End: 2022-01-01
Payer: MEDICARE

## 2022-01-01 ENCOUNTER — TELEPHONE (OUTPATIENT)
Dept: HEMATOLOGY/ONCOLOGY | Facility: CLINIC | Age: 77
End: 2022-01-01
Payer: MEDICARE

## 2022-01-01 ENCOUNTER — OFFICE VISIT (OUTPATIENT)
Dept: HEMATOLOGY/ONCOLOGY | Facility: CLINIC | Age: 77
End: 2022-01-01
Payer: MEDICARE

## 2022-01-01 ENCOUNTER — PATIENT MESSAGE (OUTPATIENT)
Dept: INTERNAL MEDICINE | Facility: CLINIC | Age: 77
End: 2022-01-01
Payer: MEDICARE

## 2022-01-01 ENCOUNTER — IMMUNIZATION (OUTPATIENT)
Dept: INTERNAL MEDICINE | Facility: CLINIC | Age: 77
End: 2022-01-01
Payer: MEDICARE

## 2022-01-01 ENCOUNTER — HOSPITAL ENCOUNTER (EMERGENCY)
Facility: HOSPITAL | Age: 77
Discharge: HOME | End: 2022-01-24
Attending: EMERGENCY MEDICINE
Payer: MEDICARE

## 2022-01-01 ENCOUNTER — PATIENT MESSAGE (OUTPATIENT)
Dept: HEMATOLOGY/ONCOLOGY | Facility: CLINIC | Age: 77
End: 2022-01-01
Payer: MEDICARE

## 2022-01-01 ENCOUNTER — HOSPITAL ENCOUNTER (EMERGENCY)
Facility: HOSPITAL | Age: 77
Discharge: HOME | End: 2022-01-08
Attending: EMERGENCY MEDICINE
Payer: MEDICARE

## 2022-01-01 ENCOUNTER — DOCUMENTATION ONLY (OUTPATIENT)
Dept: REHABILITATION | Facility: HOSPITAL | Age: 77
End: 2022-01-01
Payer: MEDICARE

## 2022-01-01 ENCOUNTER — TELEPHONE (OUTPATIENT)
Dept: PALLIATIVE MEDICINE | Facility: CLINIC | Age: 77
End: 2022-01-01
Payer: MEDICARE

## 2022-01-01 ENCOUNTER — INFUSION (OUTPATIENT)
Dept: INFUSION THERAPY | Facility: HOSPITAL | Age: 77
End: 2022-01-01
Attending: INTERNAL MEDICINE
Payer: MEDICARE

## 2022-01-01 ENCOUNTER — OFFICE VISIT (OUTPATIENT)
Dept: RADIATION ONCOLOGY | Facility: CLINIC | Age: 77
End: 2022-01-01
Payer: MEDICARE

## 2022-01-01 ENCOUNTER — EXTERNAL HOME HEALTH (OUTPATIENT)
Dept: HOME HEALTH SERVICES | Facility: HOSPITAL | Age: 77
End: 2022-01-01
Payer: MEDICARE

## 2022-01-01 ENCOUNTER — PATIENT MESSAGE (OUTPATIENT)
Dept: RADIATION ONCOLOGY | Facility: CLINIC | Age: 77
End: 2022-01-01
Payer: MEDICARE

## 2022-01-01 ENCOUNTER — LAB VISIT (OUTPATIENT)
Dept: LAB | Facility: HOSPITAL | Age: 77
End: 2022-01-01
Attending: INTERNAL MEDICINE
Payer: MEDICARE

## 2022-01-01 ENCOUNTER — TELEPHONE (OUTPATIENT)
Dept: TRANSPLANT | Facility: CLINIC | Age: 77
End: 2022-01-01
Payer: MEDICARE

## 2022-01-01 ENCOUNTER — CLINICAL SUPPORT (OUTPATIENT)
Dept: REHABILITATION | Facility: HOSPITAL | Age: 77
End: 2022-01-01
Attending: INTERNAL MEDICINE
Payer: MEDICARE

## 2022-01-01 ENCOUNTER — OFFICE VISIT (OUTPATIENT)
Dept: INTERNAL MEDICINE | Facility: CLINIC | Age: 77
End: 2022-01-01
Payer: MEDICARE

## 2022-01-01 ENCOUNTER — TELEPHONE (OUTPATIENT)
Dept: PALLIATIVE MEDICINE | Facility: HOSPITAL | Age: 77
End: 2022-01-01
Payer: MEDICARE

## 2022-01-01 ENCOUNTER — TELEPHONE (OUTPATIENT)
Dept: RADIOLOGY | Facility: HOSPITAL | Age: 77
End: 2022-01-01
Payer: MEDICARE

## 2022-01-01 ENCOUNTER — HOSPITAL ENCOUNTER (OUTPATIENT)
Dept: RADIOLOGY | Facility: HOSPITAL | Age: 77
Discharge: HOME | End: 2022-02-09
Attending: INTERNAL MEDICINE
Payer: MEDICARE

## 2022-01-01 VITALS
BODY MASS INDEX: 41.92 KG/M2 | DIASTOLIC BLOOD PRESSURE: 53 MMHG | SYSTOLIC BLOOD PRESSURE: 110 MMHG | TEMPERATURE: 98 F | RESPIRATION RATE: 18 BRPM | WEIGHT: 309.5 LBS | OXYGEN SATURATION: 95 % | HEART RATE: 71 BPM | HEIGHT: 72 IN

## 2022-01-01 VITALS
RESPIRATION RATE: 20 BRPM | HEIGHT: 72 IN | SYSTOLIC BLOOD PRESSURE: 143 MMHG | BODY MASS INDEX: 40.88 KG/M2 | DIASTOLIC BLOOD PRESSURE: 67 MMHG | OXYGEN SATURATION: 92 % | TEMPERATURE: 98 F | HEART RATE: 87 BPM | WEIGHT: 301.81 LBS

## 2022-01-01 VITALS
SYSTOLIC BLOOD PRESSURE: 200 MMHG | OXYGEN SATURATION: 94 % | HEART RATE: 83 BPM | HEIGHT: 72 IN | RESPIRATION RATE: 19 BRPM | BODY MASS INDEX: 38.38 KG/M2 | TEMPERATURE: 99 F | DIASTOLIC BLOOD PRESSURE: 85 MMHG

## 2022-01-01 VITALS
DIASTOLIC BLOOD PRESSURE: 65 MMHG | OXYGEN SATURATION: 95 % | TEMPERATURE: 98 F | HEART RATE: 80 BPM | SYSTOLIC BLOOD PRESSURE: 145 MMHG | RESPIRATION RATE: 18 BRPM

## 2022-01-01 VITALS
RESPIRATION RATE: 12 BRPM | TEMPERATURE: 98 F | BODY MASS INDEX: 38.38 KG/M2 | OXYGEN SATURATION: 100 % | SYSTOLIC BLOOD PRESSURE: 169 MMHG | HEART RATE: 100 BPM | WEIGHT: 283 LBS | DIASTOLIC BLOOD PRESSURE: 77 MMHG

## 2022-01-01 DIAGNOSIS — C61 PROSTATE CANCER METASTATIC TO MULTIPLE SITES: Primary | ICD-10-CM

## 2022-01-01 DIAGNOSIS — C61 PROSTATE CANCER METASTATIC TO BONE: ICD-10-CM

## 2022-01-01 DIAGNOSIS — D50.0 IRON DEFICIENCY ANEMIA DUE TO CHRONIC BLOOD LOSS: ICD-10-CM

## 2022-01-01 DIAGNOSIS — Z74.09 IMPAIRED FUNCTIONAL MOBILITY, BALANCE, AND ENDURANCE: ICD-10-CM

## 2022-01-01 DIAGNOSIS — D50.0 IRON DEFICIENCY ANEMIA DUE TO CHRONIC BLOOD LOSS: Primary | ICD-10-CM

## 2022-01-01 DIAGNOSIS — I15.2 HYPERTENSION ASSOCIATED WITH DIABETES: Primary | ICD-10-CM

## 2022-01-01 DIAGNOSIS — U07.1 PNEUMONIA DUE TO COVID-19 VIRUS: Primary | ICD-10-CM

## 2022-01-01 DIAGNOSIS — G47.00 INSOMNIA, UNSPECIFIED TYPE: ICD-10-CM

## 2022-01-01 DIAGNOSIS — U07.1 COVID-19 VIRUS INFECTION: ICD-10-CM

## 2022-01-01 DIAGNOSIS — F51.01 PRIMARY INSOMNIA: ICD-10-CM

## 2022-01-01 DIAGNOSIS — N18.32 TYPE 2 DIABETES MELLITUS WITH STAGE 3B CHRONIC KIDNEY DISEASE, WITH LONG-TERM CURRENT USE OF INSULIN: ICD-10-CM

## 2022-01-01 DIAGNOSIS — R05.9 COUGH: ICD-10-CM

## 2022-01-01 DIAGNOSIS — Z79.4 TYPE 2 DIABETES MELLITUS WITH STAGE 3B CHRONIC KIDNEY DISEASE, WITH LONG-TERM CURRENT USE OF INSULIN: ICD-10-CM

## 2022-01-01 DIAGNOSIS — I50.9 CHF (CONGESTIVE HEART FAILURE): ICD-10-CM

## 2022-01-01 DIAGNOSIS — R65.20 SEVERE SEPSIS: ICD-10-CM

## 2022-01-01 DIAGNOSIS — J40 BRONCHITIS: Primary | ICD-10-CM

## 2022-01-01 DIAGNOSIS — D63.0 ANEMIA IN NEOPLASTIC DISEASE: ICD-10-CM

## 2022-01-01 DIAGNOSIS — Z92.241 HISTORY OF RECENT STEROID USE: ICD-10-CM

## 2022-01-01 DIAGNOSIS — A41.9 SEVERE SEPSIS: ICD-10-CM

## 2022-01-01 DIAGNOSIS — C79.51 SECONDARY CANCER OF BONE: Primary | ICD-10-CM

## 2022-01-01 DIAGNOSIS — R53.82 CHRONIC FATIGUE: ICD-10-CM

## 2022-01-01 DIAGNOSIS — J12.82 PNEUMONIA DUE TO COVID-19 VIRUS: Primary | ICD-10-CM

## 2022-01-01 DIAGNOSIS — C79.51 PROSTATE CANCER METASTATIC TO BONE: ICD-10-CM

## 2022-01-01 DIAGNOSIS — R91.8 PULMONARY NODULES: ICD-10-CM

## 2022-01-01 DIAGNOSIS — C61 PROSTATE CANCER METASTATIC TO MULTIPLE SITES: ICD-10-CM

## 2022-01-01 DIAGNOSIS — R07.9 CHEST PAIN: ICD-10-CM

## 2022-01-01 DIAGNOSIS — E11.22 TYPE 2 DIABETES MELLITUS WITH STAGE 3B CHRONIC KIDNEY DISEASE, WITH LONG-TERM CURRENT USE OF INSULIN: ICD-10-CM

## 2022-01-01 DIAGNOSIS — E11.59 HYPERTENSION ASSOCIATED WITH DIABETES: Primary | ICD-10-CM

## 2022-01-01 DIAGNOSIS — E11.59 HYPERTENSION ASSOCIATED WITH DIABETES: ICD-10-CM

## 2022-01-01 DIAGNOSIS — I10 CHRONIC HYPERTENSION: Primary | ICD-10-CM

## 2022-01-01 DIAGNOSIS — J96.01 ACUTE HYPOXEMIC RESPIRATORY FAILURE: ICD-10-CM

## 2022-01-01 DIAGNOSIS — K21.9 GASTROESOPHAGEAL REFLUX DISEASE, UNSPECIFIED WHETHER ESOPHAGITIS PRESENT: ICD-10-CM

## 2022-01-01 DIAGNOSIS — U07.1 LAB TEST POSITIVE FOR DETECTION OF COVID-19 VIRUS: ICD-10-CM

## 2022-01-01 DIAGNOSIS — I15.2 HYPERTENSION ASSOCIATED WITH DIABETES: ICD-10-CM

## 2022-01-01 DIAGNOSIS — R29.898 WEAKNESS OF BOTH LOWER EXTREMITIES: ICD-10-CM

## 2022-01-01 DIAGNOSIS — F41.0 PANIC ATTACK: ICD-10-CM

## 2022-01-01 DIAGNOSIS — R06.02 SOB (SHORTNESS OF BREATH): ICD-10-CM

## 2022-01-01 DIAGNOSIS — Z85.46 HISTORY OF PROSTATE CANCER: ICD-10-CM

## 2022-01-01 DIAGNOSIS — I50.33 ACUTE ON CHRONIC DIASTOLIC CONGESTIVE HEART FAILURE: ICD-10-CM

## 2022-01-01 DIAGNOSIS — I48.91 A-FIB: ICD-10-CM

## 2022-01-01 DIAGNOSIS — I10 HYPERTENSION: ICD-10-CM

## 2022-01-01 DIAGNOSIS — R53.1 WEAKNESS: ICD-10-CM

## 2022-01-01 DIAGNOSIS — C79.51 SECONDARY CANCER OF BONE: ICD-10-CM

## 2022-01-01 DIAGNOSIS — R06.02 SHORTNESS OF BREATH: ICD-10-CM

## 2022-01-01 DIAGNOSIS — C61 PROSTATE CANCER: ICD-10-CM

## 2022-01-01 LAB
ABO + RH BLD: NORMAL
ALBUMIN SERPL BCP-MCNC: 2.8 G/DL (ref 3.5–5.2)
ALBUMIN SERPL BCP-MCNC: 2.8 G/DL (ref 3.5–5.2)
ALBUMIN SERPL BCP-MCNC: 3.1 G/DL (ref 3.5–5.2)
ALP SERPL-CCNC: 62 U/L (ref 55–135)
ALP SERPL-CCNC: 62 U/L (ref 55–135)
ALP SERPL-CCNC: 80 U/L (ref 55–135)
ALT SERPL W/O P-5'-P-CCNC: 10 U/L (ref 10–44)
ALT SERPL W/O P-5'-P-CCNC: 13 U/L (ref 10–44)
ALT SERPL W/O P-5'-P-CCNC: 27 U/L (ref 10–44)
ANION GAP SERPL CALC-SCNC: 12 MMOL/L (ref 8–16)
ANION GAP SERPL CALC-SCNC: 14 MMOL/L (ref 8–16)
ANION GAP SERPL CALC-SCNC: 14 MMOL/L (ref 8–16)
AORTIC ROOT ANNULUS: 3.92 CM
ASCENDING AORTA: 3.63 CM
AST SERPL-CCNC: 12 U/L (ref 10–40)
AST SERPL-CCNC: 14 U/L (ref 10–40)
AST SERPL-CCNC: 18 U/L (ref 10–40)
AV INDEX (PROSTH): 1.11
AV MEAN GRADIENT: 3 MMHG
AV PEAK GRADIENT: 5 MMHG
AV VALVE AREA: 4.24 CM2
AV VELOCITY RATIO: 0.95
BACTERIA BLD CULT: NORMAL
BACTERIA BLD CULT: NORMAL
BACTERIA SPEC AEROBE CULT: NORMAL
BASOPHILS # BLD AUTO: 0.01 K/UL (ref 0–0.2)
BASOPHILS # BLD AUTO: 0.02 K/UL (ref 0–0.2)
BASOPHILS # BLD AUTO: 0.02 K/UL (ref 0–0.2)
BASOPHILS # BLD AUTO: 0.03 K/UL (ref 0–0.2)
BASOPHILS # BLD AUTO: 0.04 K/UL (ref 0–0.2)
BASOPHILS NFR BLD: 0.1 % (ref 0–1.9)
BASOPHILS NFR BLD: 0.2 % (ref 0–1.9)
BASOPHILS NFR BLD: 0.3 % (ref 0–1.9)
BASOPHILS NFR BLD: 0.4 % (ref 0–1.9)
BASOPHILS NFR BLD: 0.7 % (ref 0–1.9)
BILIRUB SERPL-MCNC: 0.5 MG/DL (ref 0.1–1)
BILIRUB SERPL-MCNC: 0.6 MG/DL (ref 0.1–1)
BILIRUB SERPL-MCNC: 0.6 MG/DL (ref 0.1–1)
BLD GP AB SCN CELLS X3 SERPL QL: NORMAL
BLD PROD TYP BPU: NORMAL
BLOOD UNIT EXPIRATION DATE: NORMAL
BLOOD UNIT TYPE CODE: 5100
BLOOD UNIT TYPE: NORMAL
BNP SERPL-MCNC: 108 PG/ML (ref 0–99)
BNP SERPL-MCNC: 149 PG/ML (ref 0–99)
BNP SERPL-MCNC: 263 PG/ML (ref 0–99)
BNP SERPL-MCNC: 82 PG/ML (ref 0–99)
BSA FOR ECHO PROCEDURE: 2.63 M2
BUN SERPL-MCNC: 14 MG/DL (ref 8–23)
BUN SERPL-MCNC: 22 MG/DL (ref 8–23)
BUN SERPL-MCNC: 23 MG/DL (ref 8–23)
BUN SERPL-MCNC: 26 MG/DL (ref 8–23)
BUN SERPL-MCNC: 26 MG/DL (ref 8–23)
BUN SERPL-MCNC: 27 MG/DL (ref 8–23)
CALCIUM SERPL-MCNC: 7.5 MG/DL (ref 8.7–10.5)
CALCIUM SERPL-MCNC: 7.5 MG/DL (ref 8.7–10.5)
CALCIUM SERPL-MCNC: 7.8 MG/DL (ref 8.7–10.5)
CALCIUM SERPL-MCNC: 7.8 MG/DL (ref 8.7–10.5)
CALCIUM SERPL-MCNC: 8.1 MG/DL (ref 8.7–10.5)
CALCIUM SERPL-MCNC: 8.1 MG/DL (ref 8.7–10.5)
CHLORIDE SERPL-SCNC: 100 MMOL/L (ref 95–110)
CHLORIDE SERPL-SCNC: 95 MMOL/L (ref 95–110)
CHLORIDE SERPL-SCNC: 97 MMOL/L (ref 95–110)
CHLORIDE SERPL-SCNC: 99 MMOL/L (ref 95–110)
CK SERPL-CCNC: 184 U/L (ref 20–200)
CO2 SERPL-SCNC: 24 MMOL/L (ref 23–29)
CO2 SERPL-SCNC: 25 MMOL/L (ref 23–29)
CO2 SERPL-SCNC: 27 MMOL/L (ref 23–29)
CO2 SERPL-SCNC: 28 MMOL/L (ref 23–29)
CO2 SERPL-SCNC: 29 MMOL/L (ref 23–29)
CO2 SERPL-SCNC: 29 MMOL/L (ref 23–29)
CODING SYSTEM: NORMAL
COMPLEXED PSA SERPL-MCNC: 0.05 NG/ML (ref 0–4)
CREAT SERPL-MCNC: 1.6 MG/DL (ref 0.5–1.4)
CREAT SERPL-MCNC: 1.7 MG/DL (ref 0.5–1.4)
CREAT SERPL-MCNC: 1.8 MG/DL (ref 0.5–1.4)
CREAT SERPL-MCNC: 1.8 MG/DL (ref 0.5–1.4)
CREAT SERPL-MCNC: 1.9 MG/DL (ref 0.5–1.4)
CREAT SERPL-MCNC: 2 MG/DL (ref 0.5–1.4)
CRP SERPL-MCNC: 21.4 MG/L (ref 0–8.2)
CTP QC/QA: YES
CV ECHO LV RWT: 0.59 CM
DIFFERENTIAL METHOD: ABNORMAL
DISPENSE STATUS: NORMAL
DOP CALC AO PEAK VEL: 1.13 M/S
DOP CALC AO VTI: 24.4 CM
DOP CALC LVOT AREA: 3.8 CM2
DOP CALC LVOT DIAMETER: 2.21 CM
DOP CALC LVOT PEAK VEL: 1.07 M/S
DOP CALC LVOT STROKE VOLUME: 103.52 CM3
DOP CALC RVOT PEAK VEL: 0.95 M/S
DOP CALC RVOT VTI: 18.2 CM
DOP CALCLVOT PEAK VEL VTI: 27 CM
ECHO EF ESTIMATED: 66 %
ECHO LV POSTERIOR WALL: 1.38 CM (ref 0.6–1.1)
EJECTION FRACTION: 65 %
EOSINOPHIL # BLD AUTO: 0 K/UL (ref 0–0.5)
EOSINOPHIL # BLD AUTO: 0 K/UL (ref 0–0.5)
EOSINOPHIL # BLD AUTO: 0.1 K/UL (ref 0–0.5)
EOSINOPHIL # BLD AUTO: 0.1 K/UL (ref 0–0.5)
EOSINOPHIL # BLD AUTO: 0.2 K/UL (ref 0–0.5)
EOSINOPHIL # BLD AUTO: 0.3 K/UL (ref 0–0.5)
EOSINOPHIL # BLD AUTO: 0.3 K/UL (ref 0–0.5)
EOSINOPHIL NFR BLD: 0 % (ref 0–8)
EOSINOPHIL NFR BLD: 0.4 % (ref 0–8)
EOSINOPHIL NFR BLD: 1.4 % (ref 0–8)
EOSINOPHIL NFR BLD: 1.8 % (ref 0–8)
EOSINOPHIL NFR BLD: 2.7 % (ref 0–8)
EOSINOPHIL NFR BLD: 2.7 % (ref 0–8)
EOSINOPHIL NFR BLD: 3.6 % (ref 0–8)
ERYTHROCYTE [DISTWIDTH] IN BLOOD BY AUTOMATED COUNT: 13.2 % (ref 11.5–14.5)
ERYTHROCYTE [DISTWIDTH] IN BLOOD BY AUTOMATED COUNT: 13.5 % (ref 11.5–14.5)
ERYTHROCYTE [DISTWIDTH] IN BLOOD BY AUTOMATED COUNT: 14.2 % (ref 11.5–14.5)
ERYTHROCYTE [DISTWIDTH] IN BLOOD BY AUTOMATED COUNT: 14.2 % (ref 11.5–14.5)
ERYTHROCYTE [DISTWIDTH] IN BLOOD BY AUTOMATED COUNT: 14.5 % (ref 11.5–14.5)
ERYTHROCYTE [DISTWIDTH] IN BLOOD BY AUTOMATED COUNT: 14.6 % (ref 11.5–14.5)
ERYTHROCYTE [DISTWIDTH] IN BLOOD BY AUTOMATED COUNT: 14.6 % (ref 11.5–14.5)
EST. GFR  (AFRICAN AMERICAN): 36 ML/MIN/1.73 M^2
EST. GFR  (AFRICAN AMERICAN): 39 ML/MIN/1.73 M^2
EST. GFR  (AFRICAN AMERICAN): 41 ML/MIN/1.73 M^2
EST. GFR  (AFRICAN AMERICAN): 41 ML/MIN/1.73 M^2
EST. GFR  (AFRICAN AMERICAN): 44 ML/MIN/1.73 M^2
EST. GFR  (AFRICAN AMERICAN): 48 ML/MIN/1.73 M^2
EST. GFR  (NON AFRICAN AMERICAN): 31 ML/MIN/1.73 M^2
EST. GFR  (NON AFRICAN AMERICAN): 33 ML/MIN/1.73 M^2
EST. GFR  (NON AFRICAN AMERICAN): 36 ML/MIN/1.73 M^2
EST. GFR  (NON AFRICAN AMERICAN): 36 ML/MIN/1.73 M^2
EST. GFR  (NON AFRICAN AMERICAN): 38 ML/MIN/1.73 M^2
EST. GFR  (NON AFRICAN AMERICAN): 41 ML/MIN/1.73 M^2
ESTIMATED AVG GLUCOSE: 197 MG/DL (ref 68–131)
FERRITIN SERPL-MCNC: 546 NG/ML (ref 20–300)
FERRITIN SERPL-MCNC: 806 NG/ML (ref 20–300)
FOLATE SERPL-MCNC: 5.3 NG/ML (ref 4–24)
FRACTIONAL SHORTENING: 27 % (ref 28–44)
GLUCOSE SERPL-MCNC: 105 MG/DL (ref 70–110)
GLUCOSE SERPL-MCNC: 108 MG/DL (ref 70–110)
GLUCOSE SERPL-MCNC: 172 MG/DL (ref 70–110)
GLUCOSE SERPL-MCNC: 206 MG/DL (ref 70–110)
GLUCOSE SERPL-MCNC: 294 MG/DL (ref 70–110)
GLUCOSE SERPL-MCNC: 316 MG/DL (ref 70–110)
GRAM STN SPEC: NORMAL
GRAM STN SPEC: NORMAL
HBA1C MFR BLD: 8.5 % (ref 4–5.6)
HCT VFR BLD AUTO: 24.3 % (ref 40–54)
HCT VFR BLD AUTO: 26.6 % (ref 40–54)
HCT VFR BLD AUTO: 27 % (ref 40–54)
HCT VFR BLD AUTO: 27.6 % (ref 40–54)
HCT VFR BLD AUTO: 29.3 % (ref 40–54)
HCT VFR BLD AUTO: 29.9 % (ref 40–54)
HCT VFR BLD AUTO: 33.9 % (ref 40–54)
HGB BLD-MCNC: 11 G/DL (ref 14–18)
HGB BLD-MCNC: 7.6 G/DL (ref 14–18)
HGB BLD-MCNC: 8.5 G/DL (ref 14–18)
HGB BLD-MCNC: 8.7 G/DL (ref 14–18)
HGB BLD-MCNC: 8.7 G/DL (ref 14–18)
HGB BLD-MCNC: 9.4 G/DL (ref 14–18)
HGB BLD-MCNC: 9.5 G/DL (ref 14–18)
IMM GRANULOCYTES # BLD AUTO: 0.1 K/UL (ref 0–0.04)
IMM GRANULOCYTES # BLD AUTO: 0.1 K/UL (ref 0–0.04)
IMM GRANULOCYTES # BLD AUTO: 0.14 K/UL (ref 0–0.04)
IMM GRANULOCYTES # BLD AUTO: 0.24 K/UL (ref 0–0.04)
IMM GRANULOCYTES # BLD AUTO: 0.25 K/UL (ref 0–0.04)
IMM GRANULOCYTES # BLD AUTO: 0.26 K/UL (ref 0–0.04)
IMM GRANULOCYTES # BLD AUTO: 0.29 K/UL (ref 0–0.04)
IMM GRANULOCYTES NFR BLD AUTO: 1.1 % (ref 0–0.5)
IMM GRANULOCYTES NFR BLD AUTO: 1.3 % (ref 0–0.5)
IMM GRANULOCYTES NFR BLD AUTO: 1.7 % (ref 0–0.5)
IMM GRANULOCYTES NFR BLD AUTO: 2.5 % (ref 0–0.5)
IMM GRANULOCYTES NFR BLD AUTO: 2.7 % (ref 0–0.5)
IMM GRANULOCYTES NFR BLD AUTO: 3 % (ref 0–0.5)
IMM GRANULOCYTES NFR BLD AUTO: 3.6 % (ref 0–0.5)
INTERVENTRICULAR SEPTUM: 1.23 CM (ref 0.6–1.1)
IRON SERPL-MCNC: 36 UG/DL (ref 45–160)
IRON SERPL-MCNC: 56 UG/DL (ref 45–160)
IVRT: 54.23 MSEC
LA MAJOR: 4.35 CM
LA MINOR: 3.44 CM
LA WIDTH: 3.85 CM
LACTATE SERPL-SCNC: 1.9 MMOL/L (ref 0.5–2.2)
LEFT ATRIUM SIZE: 3.32 CM
LEFT ATRIUM VOLUME INDEX: 16.5 ML/M2
LEFT ATRIUM VOLUME: 41.74 CM3
LEFT INTERNAL DIMENSION IN SYSTOLE: 3.44 CM (ref 2.1–4)
LEFT VENTRICLE DIASTOLIC VOLUME INDEX: 40.43 ML/M2
LEFT VENTRICLE DIASTOLIC VOLUME: 102.28 ML
LEFT VENTRICLE MASS INDEX: 95 G/M2
LEFT VENTRICLE SYSTOLIC VOLUME INDEX: 13.6 ML/M2
LEFT VENTRICLE SYSTOLIC VOLUME: 34.42 ML
LEFT VENTRICULAR INTERNAL DIMENSION IN DIASTOLE: 4.7 CM (ref 3.5–6)
LEFT VENTRICULAR MASS: 239.21 G
LVOT MG: 3.23 MMHG
LVOT MV: 0.87 CM/S
LYMPHOCYTES # BLD AUTO: 0.4 K/UL (ref 1–4.8)
LYMPHOCYTES # BLD AUTO: 0.5 K/UL (ref 1–4.8)
LYMPHOCYTES # BLD AUTO: 0.6 K/UL (ref 1–4.8)
LYMPHOCYTES # BLD AUTO: 0.7 K/UL (ref 1–4.8)
LYMPHOCYTES # BLD AUTO: 0.9 K/UL (ref 1–4.8)
LYMPHOCYTES NFR BLD: 3.8 % (ref 18–48)
LYMPHOCYTES NFR BLD: 6.1 % (ref 18–48)
LYMPHOCYTES NFR BLD: 6.8 % (ref 18–48)
LYMPHOCYTES NFR BLD: 8.2 % (ref 18–48)
LYMPHOCYTES NFR BLD: 8.6 % (ref 18–48)
LYMPHOCYTES NFR BLD: 8.8 % (ref 18–48)
LYMPHOCYTES NFR BLD: 9.1 % (ref 18–48)
MAGNESIUM SERPL-MCNC: 1.2 MG/DL (ref 1.6–2.6)
MAGNESIUM SERPL-MCNC: 1.8 MG/DL (ref 1.6–2.6)
MAGNESIUM SERPL-MCNC: 1.8 MG/DL (ref 1.6–2.6)
MCH RBC QN AUTO: 29.8 PG (ref 27–31)
MCH RBC QN AUTO: 30 PG (ref 27–31)
MCH RBC QN AUTO: 30.1 PG (ref 27–31)
MCH RBC QN AUTO: 30.3 PG (ref 27–31)
MCH RBC QN AUTO: 30.4 PG (ref 27–31)
MCH RBC QN AUTO: 31.6 PG (ref 27–31)
MCH RBC QN AUTO: 31.7 PG (ref 27–31)
MCHC RBC AUTO-ENTMCNC: 31.3 G/DL (ref 32–36)
MCHC RBC AUTO-ENTMCNC: 31.5 G/DL (ref 32–36)
MCHC RBC AUTO-ENTMCNC: 31.8 G/DL (ref 32–36)
MCHC RBC AUTO-ENTMCNC: 32 G/DL (ref 32–36)
MCHC RBC AUTO-ENTMCNC: 32.1 G/DL (ref 32–36)
MCHC RBC AUTO-ENTMCNC: 32.2 G/DL (ref 32–36)
MCHC RBC AUTO-ENTMCNC: 32.4 G/DL (ref 32–36)
MCV RBC AUTO: 100 FL (ref 82–98)
MCV RBC AUTO: 94 FL (ref 82–98)
MCV RBC AUTO: 95 FL (ref 82–98)
MCV RBC AUTO: 97 FL (ref 82–98)
MCV RBC AUTO: 97 FL (ref 82–98)
MONOCYTES # BLD AUTO: 0.7 K/UL (ref 0.3–1)
MONOCYTES # BLD AUTO: 0.8 K/UL (ref 0.3–1)
MONOCYTES # BLD AUTO: 0.8 K/UL (ref 0.3–1)
MONOCYTES # BLD AUTO: 1 K/UL (ref 0.3–1)
MONOCYTES # BLD AUTO: 1.2 K/UL (ref 0.3–1)
MONOCYTES # BLD AUTO: 1.3 K/UL (ref 0.3–1)
MONOCYTES # BLD AUTO: 1.4 K/UL (ref 0.3–1)
MONOCYTES NFR BLD: 11.1 % (ref 4–15)
MONOCYTES NFR BLD: 11.6 % (ref 4–15)
MONOCYTES NFR BLD: 13.1 % (ref 4–15)
MONOCYTES NFR BLD: 15.2 % (ref 4–15)
MONOCYTES NFR BLD: 15.3 % (ref 4–15)
MONOCYTES NFR BLD: 7.4 % (ref 4–15)
MONOCYTES NFR BLD: 9.8 % (ref 4–15)
NEUTROPHILS # BLD AUTO: 4.4 K/UL (ref 1.8–7.7)
NEUTROPHILS # BLD AUTO: 5.5 K/UL (ref 1.8–7.7)
NEUTROPHILS # BLD AUTO: 6 K/UL (ref 1.8–7.7)
NEUTROPHILS # BLD AUTO: 6.4 K/UL (ref 1.8–7.7)
NEUTROPHILS # BLD AUTO: 7.1 K/UL (ref 1.8–7.7)
NEUTROPHILS # BLD AUTO: 8.5 K/UL (ref 1.8–7.7)
NEUTROPHILS # BLD AUTO: 9.8 K/UL (ref 1.8–7.7)
NEUTROPHILS NFR BLD: 70.5 % (ref 38–73)
NEUTROPHILS NFR BLD: 71.3 % (ref 38–73)
NEUTROPHILS NFR BLD: 74.3 % (ref 38–73)
NEUTROPHILS NFR BLD: 75.7 % (ref 38–73)
NEUTROPHILS NFR BLD: 75.9 % (ref 38–73)
NEUTROPHILS NFR BLD: 80.5 % (ref 38–73)
NEUTROPHILS NFR BLD: 86.1 % (ref 38–73)
NRBC BLD-RTO: 0 /100 WBC
NT-PROBNP SERPL-MCNC: 1896 PG/ML
NUM UNITS TRANS PACKED RBC: NORMAL
PISA TR MAX VEL: 3.42 M/S
PLATELET # BLD AUTO: 219 K/UL (ref 150–450)
PLATELET # BLD AUTO: 238 K/UL (ref 150–450)
PLATELET # BLD AUTO: 259 K/UL (ref 150–450)
PLATELET # BLD AUTO: 272 K/UL (ref 150–450)
PLATELET # BLD AUTO: 279 K/UL (ref 150–450)
PLATELET # BLD AUTO: 286 K/UL (ref 150–450)
PLATELET # BLD AUTO: 290 K/UL (ref 150–450)
PMV BLD AUTO: 8.6 FL (ref 9.2–12.9)
PMV BLD AUTO: 8.6 FL (ref 9.2–12.9)
PMV BLD AUTO: 8.9 FL (ref 9.2–12.9)
PMV BLD AUTO: 8.9 FL (ref 9.2–12.9)
PMV BLD AUTO: 9 FL (ref 9.2–12.9)
PMV BLD AUTO: 9 FL (ref 9.2–12.9)
PMV BLD AUTO: 9.1 FL (ref 9.2–12.9)
POC MOLECULAR INFLUENZA A AGN: NEGATIVE
POC MOLECULAR INFLUENZA B AGN: NEGATIVE
POCT GLUCOSE: 108 MG/DL (ref 70–110)
POCT GLUCOSE: 117 MG/DL (ref 70–110)
POCT GLUCOSE: 120 MG/DL (ref 70–110)
POCT GLUCOSE: 127 MG/DL (ref 70–110)
POCT GLUCOSE: 129 MG/DL (ref 70–110)
POCT GLUCOSE: 147 MG/DL (ref 70–110)
POCT GLUCOSE: 162 MG/DL (ref 70–110)
POCT GLUCOSE: 182 MG/DL (ref 70–110)
POCT GLUCOSE: 185 MG/DL (ref 70–110)
POCT GLUCOSE: 193 MG/DL (ref 70–110)
POCT GLUCOSE: 213 MG/DL (ref 70–110)
POCT GLUCOSE: 216 MG/DL (ref 70–110)
POCT GLUCOSE: 224 MG/DL (ref 70–110)
POCT GLUCOSE: 224 MG/DL (ref 70–110)
POCT GLUCOSE: 260 MG/DL (ref 70–110)
POCT GLUCOSE: 309 MG/DL (ref 70–110)
POCT GLUCOSE: 309 MG/DL (ref 70–110)
POCT GLUCOSE: 368 MG/DL (ref 70–110)
POCT GLUCOSE: 388 MG/DL (ref 70–110)
POCT GLUCOSE: 400 MG/DL (ref 70–110)
POCT GLUCOSE: 433 MG/DL (ref 70–110)
POCT GLUCOSE: 465 MG/DL (ref 70–110)
POCT GLUCOSE: >500 MG/DL (ref 70–110)
POTASSIUM SERPL-SCNC: 3.4 MMOL/L (ref 3.5–5.1)
POTASSIUM SERPL-SCNC: 3.4 MMOL/L (ref 3.5–5.1)
POTASSIUM SERPL-SCNC: 3.5 MMOL/L (ref 3.5–5.1)
POTASSIUM SERPL-SCNC: 3.6 MMOL/L (ref 3.5–5.1)
POTASSIUM SERPL-SCNC: 3.9 MMOL/L (ref 3.5–5.1)
POTASSIUM SERPL-SCNC: 4 MMOL/L (ref 3.5–5.1)
PROCALCITONIN SERPL IA-MCNC: 0.49 NG/ML
PROCALCITONIN SERPL IA-MCNC: 0.64 NG/ML
PROT SERPL-MCNC: 6 G/DL (ref 6–8.4)
PROT SERPL-MCNC: 6.1 G/DL (ref 6–8.4)
PROT SERPL-MCNC: 6.6 G/DL (ref 6–8.4)
PV MEAN GRADIENT: 2.25 MMHG
RA MAJOR: 4.43 CM
RA PRESSURE: 3 MMHG
RA WIDTH: 3.47 CM
RBC # BLD AUTO: 2.55 M/UL (ref 4.6–6.2)
RBC # BLD AUTO: 2.82 M/UL (ref 4.6–6.2)
RBC # BLD AUTO: 2.86 M/UL (ref 4.6–6.2)
RBC # BLD AUTO: 2.87 M/UL (ref 4.6–6.2)
RBC # BLD AUTO: 3 M/UL (ref 4.6–6.2)
RBC # BLD AUTO: 3.13 M/UL (ref 4.6–6.2)
RBC # BLD AUTO: 3.48 M/UL (ref 4.6–6.2)
SARS-COV-2 RDRP RESP QL NAA+PROBE: NEGATIVE
SARS-COV-2 RDRP RESP QL NAA+PROBE: POSITIVE
SATURATED IRON: 14 % (ref 20–50)
SATURATED IRON: 22 % (ref 20–50)
SINUS: 4.14 CM
SODIUM SERPL-SCNC: 134 MMOL/L (ref 136–145)
SODIUM SERPL-SCNC: 136 MMOL/L (ref 136–145)
SODIUM SERPL-SCNC: 136 MMOL/L (ref 136–145)
SODIUM SERPL-SCNC: 137 MMOL/L (ref 136–145)
SODIUM SERPL-SCNC: 137 MMOL/L (ref 136–145)
SODIUM SERPL-SCNC: 139 MMOL/L (ref 136–145)
STJ: 3.66 CM
TDI LATERAL: 0.09 M/S
TDI SEPTAL: 0.14 M/S
TDI: 0.12 M/S
TESTOST SERPL-MCNC: 8 NG/DL (ref 304–1227)
TOTAL IRON BINDING CAPACITY: 253 UG/DL (ref 250–450)
TOTAL IRON BINDING CAPACITY: 259 UG/DL (ref 250–450)
TR MAX PG: 47 MMHG
TRANSFERRIN SERPL-MCNC: 171 MG/DL (ref 200–375)
TRANSFERRIN SERPL-MCNC: 175 MG/DL (ref 200–375)
TRICUSPID ANNULAR PLANE SYSTOLIC EXCURSION: 2.68 CM
TROPONIN I SERPL DL<=0.01 NG/ML-MCNC: 0.01 NG/ML (ref 0–0.03)
TROPONIN I SERPL DL<=0.01 NG/ML-MCNC: 0.01 NG/ML (ref 0–0.03)
TROPONIN I SERPL DL<=0.01 NG/ML-MCNC: 0.02 NG/ML (ref 0–0.03)
TROPONIN I SERPL DL<=0.01 NG/ML-MCNC: 0.03 NG/ML (ref 0–0.03)
TROPONIN I SERPL DL<=0.01 NG/ML-MCNC: 0.06 NG/ML (ref 0–0.03)
TV REST PULMONARY ARTERY PRESSURE: 50 MMHG
VIT B12 SERPL-MCNC: 485 PG/ML (ref 210–950)
WBC # BLD AUTO: 10.52 K/UL (ref 3.9–12.7)
WBC # BLD AUTO: 11.39 K/UL (ref 3.9–12.7)
WBC # BLD AUTO: 5.9 K/UL (ref 3.9–12.7)
WBC # BLD AUTO: 7.21 K/UL (ref 3.9–12.7)
WBC # BLD AUTO: 8.46 K/UL (ref 3.9–12.7)
WBC # BLD AUTO: 8.91 K/UL (ref 3.9–12.7)
WBC # BLD AUTO: 9.39 K/UL (ref 3.9–12.7)

## 2022-01-01 PROCEDURE — 94640 AIRWAY INHALATION TREATMENT: CPT

## 2022-01-01 PROCEDURE — 87040 BLOOD CULTURE FOR BACTERIA: CPT | Performed by: NURSE PRACTITIONER

## 2022-01-01 PROCEDURE — 99213 OFFICE O/P EST LOW 20 MIN: CPT | Mod: 95,,, | Performed by: RADIOLOGY

## 2022-01-01 PROCEDURE — 71260 CT THORAX DX C+: CPT | Mod: TC

## 2022-01-01 PROCEDURE — 94761 N-INVAS EAR/PLS OXIMETRY MLT: CPT

## 2022-01-01 PROCEDURE — 27000221 HC OXYGEN, UP TO 24 HOURS

## 2022-01-01 PROCEDURE — 1101F PR PT FALLS ASSESS DOC 0-1 FALLS W/OUT INJ PAST YR: ICD-10-PCS | Mod: CPTII,S$GLB,, | Performed by: INTERNAL MEDICINE

## 2022-01-01 PROCEDURE — 1160F PR REVIEW ALL MEDS BY PRESCRIBER/CLIN PHARMACIST DOCUMENTED: ICD-10-PCS | Mod: CPTII,95,, | Performed by: INTERNAL MEDICINE

## 2022-01-01 PROCEDURE — G0180 PR HOME HEALTH MD CERTIFICATION: ICD-10-PCS | Mod: ,,, | Performed by: INTERNAL MEDICINE

## 2022-01-01 PROCEDURE — 85025 COMPLETE CBC W/AUTO DIFF WBC: CPT | Performed by: NURSE PRACTITIONER

## 2022-01-01 PROCEDURE — 1159F MED LIST DOCD IN RCRD: CPT | Mod: CPTII,95,, | Performed by: RADIOLOGY

## 2022-01-01 PROCEDURE — 25000242 PHARM REV CODE 250 ALT 637 W/ HCPCS: Performed by: NURSE PRACTITIONER

## 2022-01-01 PROCEDURE — 25000003 PHARM REV CODE 250: Performed by: INTERNAL MEDICINE

## 2022-01-01 PROCEDURE — 99285 EMERGENCY DEPT VISIT HI MDM: CPT | Mod: 25

## 2022-01-01 PROCEDURE — 1160F PR REVIEW ALL MEDS BY PRESCRIBER/CLIN PHARMACIST DOCUMENTED: ICD-10-PCS | Mod: CPTII,95,, | Performed by: RADIOLOGY

## 2022-01-01 PROCEDURE — 97162 PT EVAL MOD COMPLEX 30 MIN: CPT

## 2022-01-01 PROCEDURE — 99900035 HC TECH TIME PER 15 MIN (STAT)

## 2022-01-01 PROCEDURE — 25000003 PHARM REV CODE 250: Performed by: NURSE PRACTITIONER

## 2022-01-01 PROCEDURE — 80053 COMPREHEN METABOLIC PANEL: CPT | Performed by: NURSE PRACTITIONER

## 2022-01-01 PROCEDURE — 63600175 PHARM REV CODE 636 W HCPCS: Performed by: NURSE PRACTITIONER

## 2022-01-01 PROCEDURE — 99223 PR INITIAL HOSPITAL CARE,LEVL III: ICD-10-PCS | Mod: 25,,, | Performed by: INTERNAL MEDICINE

## 2022-01-01 PROCEDURE — 36415 COLL VENOUS BLD VENIPUNCTURE: CPT | Performed by: INTERNAL MEDICINE

## 2022-01-01 PROCEDURE — 84403 ASSAY OF TOTAL TESTOSTERONE: CPT | Performed by: INTERNAL MEDICINE

## 2022-01-01 PROCEDURE — 3077F SYST BP >= 140 MM HG: CPT | Mod: CPTII,S$GLB,, | Performed by: INTERNAL MEDICINE

## 2022-01-01 PROCEDURE — 1157F ADVNC CARE PLAN IN RCRD: CPT | Mod: CPTII,95,, | Performed by: INTERNAL MEDICINE

## 2022-01-01 PROCEDURE — 99499 UNLISTED E&M SERVICE: CPT | Mod: 95,,, | Performed by: INTERNAL MEDICINE

## 2022-01-01 PROCEDURE — 99291 CRITICAL CARE FIRST HOUR: CPT | Mod: 25

## 2022-01-01 PROCEDURE — 1157F ADVNC CARE PLAN IN RCRD: CPT | Mod: CPTII,95,, | Performed by: RADIOLOGY

## 2022-01-01 PROCEDURE — 36415 COLL VENOUS BLD VENIPUNCTURE: CPT | Performed by: NURSE PRACTITIONER

## 2022-01-01 PROCEDURE — 25500020 PHARM REV CODE 255: Performed by: INTERNAL MEDICINE

## 2022-01-01 PROCEDURE — 25000242 PHARM REV CODE 250 ALT 637 W/ HCPCS: Performed by: INTERNAL MEDICINE

## 2022-01-01 PROCEDURE — 99499 UNLISTED E&M SERVICE: CPT | Mod: S$GLB,,, | Performed by: INTERNAL MEDICINE

## 2022-01-01 PROCEDURE — 1157F PR ADVANCE CARE PLAN OR EQUIV PRESENT IN MEDICAL RECORD: ICD-10-PCS | Mod: CPTII,95,, | Performed by: RADIOLOGY

## 2022-01-01 PROCEDURE — 97530 THERAPEUTIC ACTIVITIES: CPT | Mod: CQ

## 2022-01-01 PROCEDURE — 93010 ELECTROCARDIOGRAM REPORT: CPT | Mod: ,,, | Performed by: INTERNAL MEDICINE

## 2022-01-01 PROCEDURE — 1159F MED LIST DOCD IN RCRD: CPT | Mod: CPTII,95,, | Performed by: INTERNAL MEDICINE

## 2022-01-01 PROCEDURE — 1157F PR ADVANCE CARE PLAN OR EQUIV PRESENT IN MEDICAL RECORD: ICD-10-PCS | Mod: CPTII,S$GLB,, | Performed by: INTERNAL MEDICINE

## 2022-01-01 PROCEDURE — 93005 ELECTROCARDIOGRAM TRACING: CPT

## 2022-01-01 PROCEDURE — 84484 ASSAY OF TROPONIN QUANT: CPT | Performed by: NURSE PRACTITIONER

## 2022-01-01 PROCEDURE — 63600175 PHARM REV CODE 636 W HCPCS: Performed by: INTERNAL MEDICINE

## 2022-01-01 PROCEDURE — 82728 ASSAY OF FERRITIN: CPT | Performed by: NURSE PRACTITIONER

## 2022-01-01 PROCEDURE — U0002 COVID-19 LAB TEST NON-CDC: HCPCS | Performed by: NURSE PRACTITIONER

## 2022-01-01 PROCEDURE — 83880 ASSAY OF NATRIURETIC PEPTIDE: CPT | Performed by: NURSE PRACTITIONER

## 2022-01-01 PROCEDURE — 93010 EKG 12-LEAD: ICD-10-PCS | Mod: ,,, | Performed by: INTERNAL MEDICINE

## 2022-01-01 PROCEDURE — 82550 ASSAY OF CK (CPK): CPT | Performed by: NURSE PRACTITIONER

## 2022-01-01 PROCEDURE — 99232 PR SUBSEQUENT HOSPITAL CARE,LEVL II: ICD-10-PCS | Mod: ,,, | Performed by: INTERNAL MEDICINE

## 2022-01-01 PROCEDURE — 80048 BASIC METABOLIC PNL TOTAL CA: CPT | Performed by: NURSE PRACTITIONER

## 2022-01-01 PROCEDURE — 36430 TRANSFUSION BLD/BLD COMPNT: CPT

## 2022-01-01 PROCEDURE — 63600175 PHARM REV CODE 636 W HCPCS: Performed by: STUDENT IN AN ORGANIZED HEALTH CARE EDUCATION/TRAINING PROGRAM

## 2022-01-01 PROCEDURE — G0008 FLU VACCINE - QUADRIVALENT - ADJUVANTED: ICD-10-PCS | Mod: S$GLB,,, | Performed by: FAMILY MEDICINE

## 2022-01-01 PROCEDURE — 63700000 PHARM REV CODE 250 ALT 637 W/O HCPCS: Performed by: STUDENT IN AN ORGANIZED HEALTH CARE EDUCATION/TRAINING PROGRAM

## 2022-01-01 PROCEDURE — 99215 OFFICE O/P EST HI 40 MIN: CPT | Mod: S$GLB,,, | Performed by: INTERNAL MEDICINE

## 2022-01-01 PROCEDURE — 82728 ASSAY OF FERRITIN: CPT | Performed by: INTERNAL MEDICINE

## 2022-01-01 PROCEDURE — 94799 UNLISTED PULMONARY SVC/PX: CPT

## 2022-01-01 PROCEDURE — 99215 PR OFFICE/OUTPT VISIT, EST, LEVL V, 40-54 MIN: ICD-10-PCS | Mod: S$GLB,,, | Performed by: INTERNAL MEDICINE

## 2022-01-01 PROCEDURE — 99499 RISK ADDL DX/OHS AUDIT: ICD-10-PCS | Mod: 95,,, | Performed by: INTERNAL MEDICINE

## 2022-01-01 PROCEDURE — 3078F DIAST BP <80 MM HG: CPT | Mod: CPTII,S$GLB,, | Performed by: INTERNAL MEDICINE

## 2022-01-01 PROCEDURE — 97530 THERAPEUTIC ACTIVITIES: CPT | Performed by: PHYSICAL THERAPIST

## 2022-01-01 PROCEDURE — 90694 FLU VACCINE - QUADRIVALENT - ADJUVANTED: ICD-10-PCS | Mod: S$GLB,,, | Performed by: FAMILY MEDICINE

## 2022-01-01 PROCEDURE — G0008 ADMIN INFLUENZA VIRUS VAC: HCPCS | Mod: S$GLB,,, | Performed by: FAMILY MEDICINE

## 2022-01-01 PROCEDURE — 21400001 HC TELEMETRY ROOM

## 2022-01-01 PROCEDURE — 83735 ASSAY OF MAGNESIUM: CPT | Performed by: NURSE PRACTITIONER

## 2022-01-01 PROCEDURE — 99999 PR PBB SHADOW E&M-EST. PATIENT-LVL V: ICD-10-PCS | Mod: PBBFAC,,, | Performed by: INTERNAL MEDICINE

## 2022-01-01 PROCEDURE — 99215 OFFICE O/P EST HI 40 MIN: CPT | Mod: ,,, | Performed by: INTERNAL MEDICINE

## 2022-01-01 PROCEDURE — 1157F PR ADVANCE CARE PLAN OR EQUIV PRESENT IN MEDICAL RECORD: ICD-10-PCS | Mod: CPTII,95,, | Performed by: INTERNAL MEDICINE

## 2022-01-01 PROCEDURE — 99214 OFFICE O/P EST MOD 30 MIN: CPT | Mod: 95,,, | Performed by: INTERNAL MEDICINE

## 2022-01-01 PROCEDURE — 25000003 PHARM REV CODE 250: Performed by: FAMILY MEDICINE

## 2022-01-01 PROCEDURE — 1159F PR MEDICATION LIST DOCUMENTED IN MEDICAL RECORD: ICD-10-PCS | Mod: CPTII,95,, | Performed by: INTERNAL MEDICINE

## 2022-01-01 PROCEDURE — 3288F PR FALLS RISK ASSESSMENT DOCUMENTED: ICD-10-PCS | Mod: CPTII,S$GLB,, | Performed by: INTERNAL MEDICINE

## 2022-01-01 PROCEDURE — 82607 VITAMIN B-12: CPT | Performed by: NURSE PRACTITIONER

## 2022-01-01 PROCEDURE — 84466 ASSAY OF TRANSFERRIN: CPT | Performed by: NURSE PRACTITIONER

## 2022-01-01 PROCEDURE — 87205 SMEAR GRAM STAIN: CPT | Performed by: NURSE PRACTITIONER

## 2022-01-01 PROCEDURE — 96360 HYDRATION IV INFUSION INIT: CPT

## 2022-01-01 PROCEDURE — P9016 RBC LEUKOCYTES REDUCED: HCPCS | Performed by: NURSE PRACTITIONER

## 2022-01-01 PROCEDURE — 3052F HG A1C>EQUAL 8.0%<EQUAL 9.0%: CPT | Mod: CPTII,95,, | Performed by: INTERNAL MEDICINE

## 2022-01-01 PROCEDURE — 3288F FALL RISK ASSESSMENT DOCD: CPT | Mod: CPTII,S$GLB,, | Performed by: INTERNAL MEDICINE

## 2022-01-01 PROCEDURE — 97161 PT EVAL LOW COMPLEX 20 MIN: CPT | Mod: PN

## 2022-01-01 PROCEDURE — 96372 THER/PROPH/DIAG INJ SC/IM: CPT | Performed by: INTERNAL MEDICINE

## 2022-01-01 PROCEDURE — 84145 PROCALCITONIN (PCT): CPT | Performed by: INTERNAL MEDICINE

## 2022-01-01 PROCEDURE — 74177 CT ABD & PELVIS W/CONTRAST: CPT | Mod: TC

## 2022-01-01 PROCEDURE — 1159F PR MEDICATION LIST DOCUMENTED IN MEDICAL RECORD: ICD-10-PCS | Mod: CPTII,95,, | Performed by: RADIOLOGY

## 2022-01-01 PROCEDURE — A9698 NON-RAD CONTRAST MATERIALNOC: HCPCS | Performed by: INTERNAL MEDICINE

## 2022-01-01 PROCEDURE — 87070 CULTURE OTHR SPECIMN AEROBIC: CPT | Performed by: NURSE PRACTITIONER

## 2022-01-01 PROCEDURE — U0002 COVID-19 LAB TEST NON-CDC: HCPCS | Performed by: EMERGENCY MEDICINE

## 2022-01-01 PROCEDURE — 99213 PR OFFICE/OUTPT VISIT, EST, LEVL III, 20-29 MIN: ICD-10-PCS | Mod: 95,,, | Performed by: INTERNAL MEDICINE

## 2022-01-01 PROCEDURE — 99223 1ST HOSP IP/OBS HIGH 75: CPT | Mod: 25,,, | Performed by: INTERNAL MEDICINE

## 2022-01-01 PROCEDURE — 83735 ASSAY OF MAGNESIUM: CPT | Performed by: INTERNAL MEDICINE

## 2022-01-01 PROCEDURE — 83605 ASSAY OF LACTIC ACID: CPT | Performed by: NURSE PRACTITIONER

## 2022-01-01 PROCEDURE — 63600175 PHARM REV CODE 636 W HCPCS: Performed by: FAMILY MEDICINE

## 2022-01-01 PROCEDURE — 1160F RVW MEDS BY RX/DR IN RCRD: CPT | Mod: CPTII,95,, | Performed by: INTERNAL MEDICINE

## 2022-01-01 PROCEDURE — C9399 UNCLASSIFIED DRUGS OR BIOLOG: HCPCS | Performed by: NURSE PRACTITIONER

## 2022-01-01 PROCEDURE — 63600175 PHARM REV CODE 636 W HCPCS: Performed by: EMERGENCY MEDICINE

## 2022-01-01 PROCEDURE — 82746 ASSAY OF FOLIC ACID SERUM: CPT | Performed by: NURSE PRACTITIONER

## 2022-01-01 PROCEDURE — 83880 ASSAY OF NATRIURETIC PEPTIDE: CPT | Performed by: REGISTERED NURSE

## 2022-01-01 PROCEDURE — 86920 COMPATIBILITY TEST SPIN: CPT | Performed by: NURSE PRACTITIONER

## 2022-01-01 PROCEDURE — 1126F AMNT PAIN NOTED NONE PRSNT: CPT | Mod: CPTII,S$GLB,, | Performed by: INTERNAL MEDICINE

## 2022-01-01 PROCEDURE — 85025 COMPLETE CBC W/AUTO DIFF WBC: CPT | Performed by: REGISTERED NURSE

## 2022-01-01 PROCEDURE — 80053 COMPREHEN METABOLIC PANEL: CPT | Performed by: REGISTERED NURSE

## 2022-01-01 PROCEDURE — 90694 VACC AIIV4 NO PRSRV 0.5ML IM: CPT | Mod: S$GLB,,, | Performed by: FAMILY MEDICINE

## 2022-01-01 PROCEDURE — 86140 C-REACTIVE PROTEIN: CPT | Performed by: NURSE PRACTITIONER

## 2022-01-01 PROCEDURE — 97166 OT EVAL MOD COMPLEX 45 MIN: CPT | Performed by: PHYSICAL THERAPIST

## 2022-01-01 PROCEDURE — 99215 PR OFFICE/OUTPT VISIT, EST, LEVL V, 40-54 MIN: ICD-10-PCS | Mod: ,,, | Performed by: INTERNAL MEDICINE

## 2022-01-01 PROCEDURE — 99213 OFFICE O/P EST LOW 20 MIN: CPT | Mod: 95,,, | Performed by: INTERNAL MEDICINE

## 2022-01-01 PROCEDURE — 1126F PR PAIN SEVERITY QUANTIFIED, NO PAIN PRESENT: ICD-10-PCS | Mod: CPTII,S$GLB,, | Performed by: INTERNAL MEDICINE

## 2022-01-01 PROCEDURE — 3078F PR MOST RECENT DIASTOLIC BLOOD PRESSURE < 80 MM HG: ICD-10-PCS | Mod: CPTII,S$GLB,, | Performed by: INTERNAL MEDICINE

## 2022-01-01 PROCEDURE — 83880 ASSAY OF NATRIURETIC PEPTIDE: CPT | Performed by: INTERNAL MEDICINE

## 2022-01-01 PROCEDURE — 25500020 PHARM REV CODE 255: Performed by: FAMILY MEDICINE

## 2022-01-01 PROCEDURE — 25000003 PHARM REV CODE 250: Performed by: EMERGENCY MEDICINE

## 2022-01-01 PROCEDURE — 3077F PR MOST RECENT SYSTOLIC BLOOD PRESSURE >= 140 MM HG: ICD-10-PCS | Mod: CPTII,S$GLB,, | Performed by: INTERNAL MEDICINE

## 2022-01-01 PROCEDURE — 1157F ADVNC CARE PLAN IN RCRD: CPT | Mod: CPTII,S$GLB,, | Performed by: INTERNAL MEDICINE

## 2022-01-01 PROCEDURE — 1159F PR MEDICATION LIST DOCUMENTED IN MEDICAL RECORD: ICD-10-PCS | Mod: CPTII,S$GLB,, | Performed by: INTERNAL MEDICINE

## 2022-01-01 PROCEDURE — 1159F MED LIST DOCD IN RCRD: CPT | Mod: CPTII,S$GLB,, | Performed by: INTERNAL MEDICINE

## 2022-01-01 PROCEDURE — 86901 BLOOD TYPING SEROLOGIC RH(D): CPT | Performed by: NURSE PRACTITIONER

## 2022-01-01 PROCEDURE — 99213 PR OFFICE/OUTPT VISIT, EST, LEVL III, 20-29 MIN: ICD-10-PCS | Mod: 95,,, | Performed by: RADIOLOGY

## 2022-01-01 PROCEDURE — 3052F PR MOST RECENT HEMOGLOBIN A1C LEVEL 8.0 - < 9.0%: ICD-10-PCS | Mod: CPTII,95,, | Performed by: INTERNAL MEDICINE

## 2022-01-01 PROCEDURE — 1101F PT FALLS ASSESS-DOCD LE1/YR: CPT | Mod: CPTII,S$GLB,, | Performed by: INTERNAL MEDICINE

## 2022-01-01 PROCEDURE — 99999 PR PBB SHADOW E&M-EST. PATIENT-LVL V: CPT | Mod: PBBFAC,,, | Performed by: INTERNAL MEDICINE

## 2022-01-01 PROCEDURE — 84466 ASSAY OF TRANSFERRIN: CPT | Performed by: INTERNAL MEDICINE

## 2022-01-01 PROCEDURE — 97116 GAIT TRAINING THERAPY: CPT | Performed by: PHYSICAL THERAPIST

## 2022-01-01 PROCEDURE — 84153 ASSAY OF PSA TOTAL: CPT | Performed by: INTERNAL MEDICINE

## 2022-01-01 PROCEDURE — 85025 COMPLETE CBC W/AUTO DIFF WBC: CPT | Performed by: INTERNAL MEDICINE

## 2022-01-01 PROCEDURE — 96365 THER/PROPH/DIAG IV INF INIT: CPT

## 2022-01-01 PROCEDURE — 80048 BASIC METABOLIC PNL TOTAL CA: CPT | Performed by: INTERNAL MEDICINE

## 2022-01-01 PROCEDURE — 97530 THERAPEUTIC ACTIVITIES: CPT

## 2022-01-01 PROCEDURE — G0180 MD CERTIFICATION HHA PATIENT: HCPCS | Mod: ,,, | Performed by: INTERNAL MEDICINE

## 2022-01-01 PROCEDURE — 99232 SBSQ HOSP IP/OBS MODERATE 35: CPT | Mod: ,,, | Performed by: INTERNAL MEDICINE

## 2022-01-01 PROCEDURE — 86900 BLOOD TYPING SEROLOGIC ABO: CPT | Performed by: NURSE PRACTITIONER

## 2022-01-01 PROCEDURE — 84484 ASSAY OF TROPONIN QUANT: CPT | Performed by: REGISTERED NURSE

## 2022-01-01 PROCEDURE — 25000242 PHARM REV CODE 250 ALT 637 W/ HCPCS: Performed by: EMERGENCY MEDICINE

## 2022-01-01 PROCEDURE — 1160F RVW MEDS BY RX/DR IN RCRD: CPT | Mod: CPTII,95,, | Performed by: RADIOLOGY

## 2022-01-01 PROCEDURE — 99499 RISK ADDL DX/OHS AUDIT: ICD-10-PCS | Mod: S$GLB,,, | Performed by: INTERNAL MEDICINE

## 2022-01-01 PROCEDURE — 84484 ASSAY OF TROPONIN QUANT: CPT | Mod: 91 | Performed by: NURSE PRACTITIONER

## 2022-01-01 PROCEDURE — 92610 EVALUATE SWALLOWING FUNCTION: CPT

## 2022-01-01 PROCEDURE — 99214 PR OFFICE/OUTPT VISIT, EST, LEVL IV, 30-39 MIN: ICD-10-PCS | Mod: 95,,, | Performed by: INTERNAL MEDICINE

## 2022-01-01 PROCEDURE — 83036 HEMOGLOBIN GLYCOSYLATED A1C: CPT | Performed by: NURSE PRACTITIONER

## 2022-01-01 RX ORDER — AMOXICILLIN AND CLAVULANATE POTASSIUM 875; 125 MG/1; MG/1
1 TABLET, FILM COATED ORAL EVERY 12 HOURS
Qty: 10 TABLET | Refills: 0 | Status: SHIPPED | OUTPATIENT
Start: 2022-01-01 | End: 2022-01-01

## 2022-01-01 RX ORDER — FUROSEMIDE 20 MG/1
20 TABLET ORAL 2 TIMES DAILY
Qty: 60 TABLET | Refills: 0 | Status: SHIPPED | OUTPATIENT
Start: 2022-01-01 | End: 2022-01-01 | Stop reason: HOSPADM

## 2022-01-01 RX ORDER — ATORVASTATIN CALCIUM 40 MG/1
40 TABLET, FILM COATED ORAL DAILY
Status: DISCONTINUED | OUTPATIENT
Start: 2022-01-01 | End: 2022-01-01

## 2022-01-01 RX ORDER — HYDROCODONE BITARTRATE AND ACETAMINOPHEN 500; 5 MG/1; MG/1
TABLET ORAL
Status: DISCONTINUED | OUTPATIENT
Start: 2022-01-01 | End: 2022-01-01

## 2022-01-01 RX ORDER — POLYETHYLENE GLYCOL 3350 17 G/17G
17 POWDER, FOR SOLUTION ORAL DAILY
Qty: 30 EACH | Refills: 0 | Status: SHIPPED | OUTPATIENT
Start: 2022-01-01

## 2022-01-01 RX ORDER — POTASSIUM CHLORIDE 20 MEQ/1
40 TABLET, EXTENDED RELEASE ORAL ONCE
Status: COMPLETED | OUTPATIENT
Start: 2022-01-01 | End: 2022-01-01

## 2022-01-01 RX ORDER — QUETIAPINE FUMARATE 25 MG/1
25 TABLET, FILM COATED ORAL NIGHTLY PRN
Qty: 30 TABLET | Refills: 6 | Status: SHIPPED | OUTPATIENT
Start: 2022-01-01

## 2022-01-01 RX ORDER — NAPROXEN SODIUM 220 MG/1
81 TABLET, FILM COATED ORAL DAILY
Status: DISCONTINUED | OUTPATIENT
Start: 2022-01-01 | End: 2022-01-01 | Stop reason: HOSPADM

## 2022-01-01 RX ORDER — HYDRALAZINE HYDROCHLORIDE 25 MG/1
100 TABLET, FILM COATED ORAL EVERY 8 HOURS
Status: DISCONTINUED | OUTPATIENT
Start: 2022-01-01 | End: 2022-01-01 | Stop reason: HOSPADM

## 2022-01-01 RX ORDER — AZITHROMYCIN 250 MG/1
500 TABLET, FILM COATED ORAL DAILY
Status: COMPLETED | OUTPATIENT
Start: 2022-01-01 | End: 2022-01-01

## 2022-01-01 RX ORDER — METOPROLOL SUCCINATE 50 MG/1
100 TABLET, EXTENDED RELEASE ORAL 2 TIMES DAILY
Status: DISCONTINUED | OUTPATIENT
Start: 2022-01-01 | End: 2022-01-01 | Stop reason: HOSPADM

## 2022-01-01 RX ORDER — CLONIDINE 0.2 MG/24H
1 PATCH, EXTENDED RELEASE TRANSDERMAL
Status: DISCONTINUED | OUTPATIENT
Start: 2022-01-01 | End: 2022-01-01 | Stop reason: HOSPADM

## 2022-01-01 RX ORDER — INSULIN ASPART 100 [IU]/ML
1-10 INJECTION, SOLUTION INTRAVENOUS; SUBCUTANEOUS
Status: DISCONTINUED | OUTPATIENT
Start: 2022-01-01 | End: 2022-01-01 | Stop reason: HOSPADM

## 2022-01-01 RX ORDER — IPRATROPIUM BROMIDE AND ALBUTEROL SULFATE 2.5; .5 MG/3ML; MG/3ML
3 SOLUTION RESPIRATORY (INHALATION) EVERY 6 HOURS
Status: DISCONTINUED | OUTPATIENT
Start: 2022-01-01 | End: 2022-01-01 | Stop reason: HOSPADM

## 2022-01-01 RX ORDER — REPAGLINIDE 0.5 MG/1
0.5 TABLET ORAL
Qty: 270 TABLET | Refills: 3 | Status: SHIPPED | OUTPATIENT
Start: 2022-01-01 | End: 2023-02-14

## 2022-01-01 RX ORDER — CEFDINIR 300 MG/1
300 CAPSULE ORAL 2 TIMES DAILY
Qty: 14 CAPSULE | Refills: 0 | Status: SHIPPED | OUTPATIENT
Start: 2022-01-01 | End: 2022-01-01 | Stop reason: HOSPADM

## 2022-01-01 RX ORDER — ALPRAZOLAM 0.25 MG/1
TABLET ORAL
Qty: 60 TABLET | Refills: 1 | Status: SHIPPED | OUTPATIENT
Start: 2022-01-01

## 2022-01-01 RX ORDER — GUAIFENESIN 100 MG/5ML
200 SOLUTION ORAL EVERY 4 HOURS PRN
Status: DISCONTINUED | OUTPATIENT
Start: 2022-01-01 | End: 2022-01-01 | Stop reason: HOSPADM

## 2022-01-01 RX ORDER — SODIUM CHLORIDE 0.9 % (FLUSH) 0.9 %
10 SYRINGE (ML) INJECTION
Status: DISCONTINUED | OUTPATIENT
Start: 2022-01-01 | End: 2022-01-01 | Stop reason: HOSPADM

## 2022-01-01 RX ORDER — ENOXAPARIN SODIUM 100 MG/ML
40 INJECTION SUBCUTANEOUS EVERY 12 HOURS
Status: DISCONTINUED | OUTPATIENT
Start: 2022-01-01 | End: 2022-01-01 | Stop reason: HOSPADM

## 2022-01-01 RX ORDER — HYDROCODONE BITARTRATE AND ACETAMINOPHEN 10; 325 MG/1; MG/1
1 TABLET ORAL EVERY 4 HOURS PRN
Status: DISCONTINUED | OUTPATIENT
Start: 2022-01-01 | End: 2022-01-01 | Stop reason: HOSPADM

## 2022-01-01 RX ORDER — ALPRAZOLAM 0.25 MG/1
0.25 TABLET ORAL
Status: COMPLETED | OUTPATIENT
Start: 2022-01-01 | End: 2022-01-01

## 2022-01-01 RX ORDER — QUETIAPINE FUMARATE 25 MG/1
25 TABLET, FILM COATED ORAL NIGHTLY PRN
Qty: 30 TABLET | Refills: 0 | Status: SHIPPED | OUTPATIENT
Start: 2022-01-01 | End: 2022-01-01 | Stop reason: SDUPTHER

## 2022-01-01 RX ORDER — AMLODIPINE BESYLATE 5 MG/1
10 TABLET ORAL DAILY
Qty: 60 TABLET | Refills: 0 | Status: SHIPPED | OUTPATIENT
Start: 2022-01-01

## 2022-01-01 RX ORDER — HEPARIN 100 UNIT/ML
500 SYRINGE INTRAVENOUS
Status: CANCELLED | OUTPATIENT
Start: 2022-01-01

## 2022-01-01 RX ORDER — DEXAMETHASONE 2 MG/1
2 TABLET ORAL 2 TIMES DAILY WITH MEALS
Qty: 30 TABLET | Refills: 2 | Status: ON HOLD | OUTPATIENT
Start: 2022-01-01 | End: 2022-01-01 | Stop reason: HOSPADM

## 2022-01-01 RX ORDER — TAMSULOSIN HYDROCHLORIDE 0.4 MG/1
0.4 CAPSULE ORAL DAILY
Status: DISCONTINUED | OUTPATIENT
Start: 2022-01-01 | End: 2022-01-01 | Stop reason: HOSPADM

## 2022-01-01 RX ORDER — HYDROCHLOROTHIAZIDE 12.5 MG/1
12.5 TABLET ORAL DAILY
Status: DISCONTINUED | OUTPATIENT
Start: 2022-01-01 | End: 2022-01-01

## 2022-01-01 RX ORDER — HYDRALAZINE HYDROCHLORIDE 20 MG/ML
10 INJECTION INTRAMUSCULAR; INTRAVENOUS EVERY 6 HOURS PRN
Status: DISCONTINUED | OUTPATIENT
Start: 2022-01-01 | End: 2022-01-01 | Stop reason: HOSPADM

## 2022-01-01 RX ORDER — FUROSEMIDE 10 MG/ML
20 INJECTION INTRAMUSCULAR; INTRAVENOUS
Status: DISCONTINUED | OUTPATIENT
Start: 2022-01-01 | End: 2022-01-01

## 2022-01-01 RX ORDER — IBUPROFEN 200 MG
24 TABLET ORAL
Status: DISCONTINUED | OUTPATIENT
Start: 2022-01-01 | End: 2022-01-01 | Stop reason: HOSPADM

## 2022-01-01 RX ORDER — HYDRALAZINE HYDROCHLORIDE 50 MG/1
50 TABLET, FILM COATED ORAL 3 TIMES DAILY
Qty: 90 TABLET | Refills: 11 | Status: SHIPPED | OUTPATIENT
Start: 2022-01-01 | End: 2022-01-01

## 2022-01-01 RX ORDER — ALPRAZOLAM 0.25 MG/1
0.25 TABLET ORAL ONCE
Status: COMPLETED | OUTPATIENT
Start: 2022-01-01 | End: 2022-01-01

## 2022-01-01 RX ORDER — TALC
6 POWDER (GRAM) TOPICAL NIGHTLY PRN
Status: DISCONTINUED | OUTPATIENT
Start: 2022-01-01 | End: 2022-01-01 | Stop reason: HOSPADM

## 2022-01-01 RX ORDER — DOXYCYCLINE HYCLATE 100 MG
100 TABLET ORAL EVERY 12 HOURS
Status: DISCONTINUED | OUTPATIENT
Start: 2022-01-01 | End: 2022-01-01

## 2022-01-01 RX ORDER — HYDRALAZINE HYDROCHLORIDE 100 MG/1
100 TABLET, FILM COATED ORAL EVERY 8 HOURS
Qty: 90 TABLET | Refills: 11 | Status: SHIPPED | OUTPATIENT
Start: 2022-01-01

## 2022-01-01 RX ORDER — HYDROCODONE BITARTRATE AND ACETAMINOPHEN 10; 325 MG/1; MG/1
1 TABLET ORAL EVERY 6 HOURS PRN
Status: DISCONTINUED | OUTPATIENT
Start: 2022-01-01 | End: 2022-01-01 | Stop reason: SDUPTHER

## 2022-01-01 RX ORDER — HYDROCODONE BITARTRATE AND ACETAMINOPHEN 10; 325 MG/1; MG/1
1 TABLET ORAL EVERY 4 HOURS PRN
Qty: 90 TABLET | Refills: 0 | OUTPATIENT
Start: 2022-01-01

## 2022-01-01 RX ORDER — IPRATROPIUM BROMIDE AND ALBUTEROL SULFATE 2.5; .5 MG/3ML; MG/3ML
3 SOLUTION RESPIRATORY (INHALATION) EVERY 6 HOURS
Qty: 75 ML | Refills: 0 | Status: SHIPPED | OUTPATIENT
Start: 2022-01-01 | End: 2023-02-27

## 2022-01-01 RX ORDER — AMLODIPINE BESYLATE 10 MG/1
10 TABLET ORAL DAILY
Status: DISCONTINUED | OUTPATIENT
Start: 2022-01-01 | End: 2022-01-01 | Stop reason: HOSPADM

## 2022-01-01 RX ORDER — ENOXAPARIN SODIUM 100 MG/ML
40 INJECTION SUBCUTANEOUS EVERY 24 HOURS
Status: DISCONTINUED | OUTPATIENT
Start: 2022-01-01 | End: 2022-01-01

## 2022-01-01 RX ORDER — AMOXICILLIN 250 MG
1 CAPSULE ORAL 2 TIMES DAILY
Qty: 60 TABLET | Refills: 0 | Status: SHIPPED | OUTPATIENT
Start: 2022-01-01

## 2022-01-01 RX ORDER — ALPRAZOLAM 0.25 MG/1
0.25 TABLET ORAL 3 TIMES DAILY PRN
Status: DISCONTINUED | OUTPATIENT
Start: 2022-01-01 | End: 2022-01-01 | Stop reason: HOSPADM

## 2022-01-01 RX ORDER — LACTULOSE 10 G/15ML
10 SOLUTION ORAL 2 TIMES DAILY
Refills: 11 | Status: DISCONTINUED | OUTPATIENT
Start: 2022-01-01 | End: 2022-01-01

## 2022-01-01 RX ORDER — GLUCAGON 1 MG
1 KIT INJECTION
Status: DISCONTINUED | OUTPATIENT
Start: 2022-01-01 | End: 2022-01-01 | Stop reason: HOSPADM

## 2022-01-01 RX ORDER — VALSARTAN AND HYDROCHLOROTHIAZIDE 320; 12.5 MG/1; MG/1
1 TABLET, FILM COATED ORAL DAILY
Status: DISCONTINUED | OUTPATIENT
Start: 2022-01-01 | End: 2022-01-01 | Stop reason: CLARIF

## 2022-01-01 RX ORDER — CLONIDINE 0.3 MG/24H
1 PATCH, EXTENDED RELEASE TRANSDERMAL
Status: DISCONTINUED | OUTPATIENT
Start: 2022-01-01 | End: 2022-01-01 | Stop reason: HOSPADM

## 2022-01-01 RX ORDER — ALBUTEROL SULFATE 0.83 MG/ML
2.5 SOLUTION RESPIRATORY (INHALATION)
Status: COMPLETED | OUTPATIENT
Start: 2022-01-01 | End: 2022-01-01

## 2022-01-01 RX ORDER — HYDRALAZINE HYDROCHLORIDE 100 MG/1
100 TABLET, FILM COATED ORAL EVERY 8 HOURS
Qty: 90 TABLET | Refills: 11 | Status: SHIPPED | OUTPATIENT
Start: 2022-01-01 | End: 2022-01-01 | Stop reason: SDUPTHER

## 2022-01-01 RX ORDER — FUROSEMIDE 20 MG/1
20 TABLET ORAL 2 TIMES DAILY
Qty: 60 TABLET | Refills: 0 | Status: SHIPPED | OUTPATIENT
Start: 2022-01-01 | End: 2023-02-28

## 2022-01-01 RX ORDER — SODIUM CHLORIDE 9 MG/ML
INJECTION, SOLUTION INTRAVENOUS
Status: DISCONTINUED | OUTPATIENT
Start: 2022-01-01 | End: 2022-01-01

## 2022-01-01 RX ORDER — HYDRALAZINE HYDROCHLORIDE 20 MG/ML
20 INJECTION INTRAMUSCULAR; INTRAVENOUS
Status: COMPLETED | OUTPATIENT
Start: 2022-01-01 | End: 2022-01-01

## 2022-01-01 RX ORDER — PANTOPRAZOLE SODIUM 40 MG/1
40 TABLET, DELAYED RELEASE ORAL DAILY
Status: DISCONTINUED | OUTPATIENT
Start: 2022-01-01 | End: 2022-01-01

## 2022-01-01 RX ORDER — VALSARTAN 160 MG/1
320 TABLET ORAL DAILY
Status: DISCONTINUED | OUTPATIENT
Start: 2022-01-01 | End: 2022-01-01

## 2022-01-01 RX ORDER — ACETAMINOPHEN 325 MG/1
650 TABLET ORAL EVERY 6 HOURS PRN
Status: DISCONTINUED | OUTPATIENT
Start: 2022-01-01 | End: 2022-01-01 | Stop reason: HOSPADM

## 2022-01-01 RX ORDER — BENZONATATE 100 MG/1
200 CAPSULE ORAL 3 TIMES DAILY
Status: COMPLETED | OUTPATIENT
Start: 2022-01-01 | End: 2022-01-01

## 2022-01-01 RX ORDER — POLYETHYLENE GLYCOL 3350 17 G/17G
17 POWDER, FOR SOLUTION ORAL 2 TIMES DAILY
Status: DISCONTINUED | OUTPATIENT
Start: 2022-01-01 | End: 2022-01-01 | Stop reason: HOSPADM

## 2022-01-01 RX ORDER — METOPROLOL SUCCINATE 100 MG/1
100 TABLET, EXTENDED RELEASE ORAL 2 TIMES DAILY
Qty: 60 TABLET | Refills: 11 | Status: SHIPPED | OUTPATIENT
Start: 2022-01-01

## 2022-01-01 RX ORDER — PANTOPRAZOLE SODIUM 40 MG/1
40 TABLET, DELAYED RELEASE ORAL DAILY
Status: DISCONTINUED | OUTPATIENT
Start: 2022-01-01 | End: 2022-01-01 | Stop reason: SDUPTHER

## 2022-01-01 RX ORDER — AMOXICILLIN 250 MG
1 CAPSULE ORAL 2 TIMES DAILY
Status: DISCONTINUED | OUTPATIENT
Start: 2022-01-01 | End: 2022-01-01 | Stop reason: HOSPADM

## 2022-01-01 RX ORDER — IPRATROPIUM BROMIDE AND ALBUTEROL SULFATE 2.5; .5 MG/3ML; MG/3ML
3 SOLUTION RESPIRATORY (INHALATION) EVERY 4 HOURS PRN
Status: DISCONTINUED | OUTPATIENT
Start: 2022-01-01 | End: 2022-01-01

## 2022-01-01 RX ORDER — MORPHINE SULFATE 4 MG/ML
4 INJECTION, SOLUTION INTRAMUSCULAR; INTRAVENOUS
Status: COMPLETED | OUTPATIENT
Start: 2022-01-01 | End: 2022-01-01

## 2022-01-01 RX ORDER — FUROSEMIDE 20 MG/1
20 TABLET ORAL DAILY
Qty: 3 TABLET | Refills: 0 | Status: ON HOLD | OUTPATIENT
Start: 2022-01-01 | End: 2022-01-01

## 2022-01-01 RX ORDER — LABETALOL HYDROCHLORIDE 5 MG/ML
20 INJECTION, SOLUTION INTRAVENOUS ONCE
Status: COMPLETED | OUTPATIENT
Start: 2022-01-01 | End: 2022-01-01

## 2022-01-01 RX ORDER — IPRATROPIUM BROMIDE AND ALBUTEROL SULFATE 2.5; .5 MG/3ML; MG/3ML
3 SOLUTION RESPIRATORY (INHALATION) EVERY 6 HOURS
Status: DISCONTINUED | OUTPATIENT
Start: 2022-01-01 | End: 2022-01-01

## 2022-01-01 RX ORDER — PROMETHAZINE HYDROCHLORIDE AND CODEINE PHOSPHATE 6.25; 1 MG/5ML; MG/5ML
5 SOLUTION ORAL ONCE
Status: COMPLETED | OUTPATIENT
Start: 2022-01-01 | End: 2022-01-01

## 2022-01-01 RX ORDER — DOXYCYCLINE HYCLATE 100 MG
100 TABLET ORAL EVERY 12 HOURS
Qty: 14 TABLET | Refills: 0 | Status: SHIPPED | OUTPATIENT
Start: 2022-01-01 | End: 2022-01-01 | Stop reason: HOSPADM

## 2022-01-01 RX ORDER — GUAIFENESIN/DEXTROMETHORPHAN 100-10MG/5
10 SYRUP ORAL EVERY 6 HOURS
Status: COMPLETED | OUTPATIENT
Start: 2022-01-01 | End: 2022-01-01

## 2022-01-01 RX ORDER — POTASSIUM CHLORIDE 20 MEQ/1
20 TABLET, EXTENDED RELEASE ORAL 2 TIMES DAILY
Qty: 180 TABLET | Refills: 1 | Status: SHIPPED | OUTPATIENT
Start: 2022-01-01

## 2022-01-01 RX ORDER — AMOXICILLIN AND CLAVULANATE POTASSIUM 875; 125 MG/1; MG/1
1 TABLET, FILM COATED ORAL EVERY 12 HOURS
Status: DISCONTINUED | OUTPATIENT
Start: 2022-01-01 | End: 2022-01-01 | Stop reason: HOSPADM

## 2022-01-01 RX ORDER — SODIUM CHLORIDE 0.9 % (FLUSH) 0.9 %
10 SYRINGE (ML) INJECTION
Status: CANCELLED | OUTPATIENT
Start: 2022-01-01

## 2022-01-01 RX ORDER — IBUPROFEN 200 MG
16 TABLET ORAL
Status: DISCONTINUED | OUTPATIENT
Start: 2022-01-01 | End: 2022-01-01 | Stop reason: HOSPADM

## 2022-01-01 RX ORDER — NAPROXEN SODIUM 220 MG/1
81 TABLET, FILM COATED ORAL DAILY
Qty: 30 TABLET | Refills: 0 | Status: SHIPPED | OUTPATIENT
Start: 2022-01-01 | End: 2023-02-27

## 2022-01-01 RX ORDER — INSULIN ASPART 100 [IU]/ML
0-5 INJECTION, SOLUTION INTRAVENOUS; SUBCUTANEOUS
Status: DISCONTINUED | OUTPATIENT
Start: 2022-01-01 | End: 2022-01-01

## 2022-01-01 RX ORDER — SODIUM CHLORIDE 9 MG/ML
INJECTION, SOLUTION INTRAVENOUS CONTINUOUS
Status: DISCONTINUED | OUTPATIENT
Start: 2022-01-01 | End: 2022-01-01

## 2022-01-01 RX ORDER — METOPROLOL SUCCINATE 50 MG/1
100 TABLET, EXTENDED RELEASE ORAL 2 TIMES DAILY
Status: DISCONTINUED | OUTPATIENT
Start: 2022-01-01 | End: 2022-01-01

## 2022-01-01 RX ORDER — QUETIAPINE FUMARATE 25 MG/1
25 TABLET, FILM COATED ORAL NIGHTLY PRN
Status: DISCONTINUED | OUTPATIENT
Start: 2022-01-01 | End: 2022-01-01 | Stop reason: HOSPADM

## 2022-01-01 RX ORDER — ISOSORBIDE MONONITRATE 30 MG/1
30 TABLET, EXTENDED RELEASE ORAL DAILY
Qty: 30 TABLET | Refills: 0 | Status: SHIPPED | OUTPATIENT
Start: 2022-01-01 | End: 2023-02-28

## 2022-01-01 RX ORDER — NITROGLYCERIN 0.4 MG/1
0.4 TABLET SUBLINGUAL EVERY 5 MIN PRN
Status: DISCONTINUED | OUTPATIENT
Start: 2022-01-01 | End: 2022-01-01 | Stop reason: HOSPADM

## 2022-01-01 RX ORDER — MAG HYDROX/ALUMINUM HYD/SIMETH 200-200-20
30 SUSPENSION, ORAL (FINAL DOSE FORM) ORAL EVERY 6 HOURS PRN
Status: DISCONTINUED | OUTPATIENT
Start: 2022-01-01 | End: 2022-01-01 | Stop reason: HOSPADM

## 2022-01-01 RX ORDER — MAGNESIUM SULFATE HEPTAHYDRATE 40 MG/ML
4 INJECTION, SOLUTION INTRAVENOUS ONCE
Status: COMPLETED | OUTPATIENT
Start: 2022-01-01 | End: 2022-01-01

## 2022-01-01 RX ORDER — FUROSEMIDE 20 MG/1
20 TABLET ORAL 2 TIMES DAILY
Status: DISCONTINUED | OUTPATIENT
Start: 2022-01-01 | End: 2022-01-01 | Stop reason: HOSPADM

## 2022-01-01 RX ORDER — BUDESONIDE 0.5 MG/2ML
0.5 INHALANT ORAL EVERY 12 HOURS
Status: DISCONTINUED | OUTPATIENT
Start: 2022-01-01 | End: 2022-01-01 | Stop reason: HOSPADM

## 2022-01-01 RX ORDER — ATORVASTATIN CALCIUM 40 MG/1
40 TABLET, FILM COATED ORAL NIGHTLY
Status: DISCONTINUED | OUTPATIENT
Start: 2022-01-01 | End: 2022-01-01 | Stop reason: HOSPADM

## 2022-01-01 RX ORDER — PANTOPRAZOLE SODIUM 40 MG/1
40 TABLET, DELAYED RELEASE ORAL DAILY
Qty: 30 TABLET | Refills: 11 | Status: SHIPPED | OUTPATIENT
Start: 2022-01-01 | End: 2022-01-01

## 2022-01-01 RX ORDER — LANOLIN ALCOHOL/MO/W.PET/CERES
400 CREAM (GRAM) TOPICAL DAILY
Status: DISCONTINUED | OUTPATIENT
Start: 2022-01-01 | End: 2022-01-01 | Stop reason: HOSPADM

## 2022-01-01 RX ORDER — PANTOPRAZOLE SODIUM 40 MG/1
40 TABLET, DELAYED RELEASE ORAL 2 TIMES DAILY
Status: DISCONTINUED | OUTPATIENT
Start: 2022-01-01 | End: 2022-01-01 | Stop reason: HOSPADM

## 2022-01-01 RX ORDER — ALPRAZOLAM 0.25 MG/1
0.25 TABLET ORAL 2 TIMES DAILY PRN
Status: DISCONTINUED | OUTPATIENT
Start: 2022-01-01 | End: 2022-01-01 | Stop reason: SDUPTHER

## 2022-01-01 RX ORDER — CALCIUM CARBONATE 200(500)MG
500 TABLET,CHEWABLE ORAL 2 TIMES DAILY
Status: DISCONTINUED | OUTPATIENT
Start: 2022-01-01 | End: 2022-01-01 | Stop reason: HOSPADM

## 2022-01-01 RX ORDER — ISOSORBIDE MONONITRATE 30 MG/1
30 TABLET, EXTENDED RELEASE ORAL DAILY
Status: DISCONTINUED | OUTPATIENT
Start: 2022-01-01 | End: 2022-01-01 | Stop reason: HOSPADM

## 2022-01-01 RX ORDER — ONDANSETRON 2 MG/ML
4 INJECTION INTRAMUSCULAR; INTRAVENOUS EVERY 8 HOURS PRN
Status: DISCONTINUED | OUTPATIENT
Start: 2022-01-01 | End: 2022-01-01 | Stop reason: HOSPADM

## 2022-01-01 RX ADMIN — METOPROLOL SUCCINATE 100 MG: 50 TABLET, EXTENDED RELEASE ORAL at 08:02

## 2022-01-01 RX ADMIN — CEFTRIAXONE 1 G: 1 INJECTION, SOLUTION INTRAVENOUS at 01:02

## 2022-01-01 RX ADMIN — CEFTRIAXONE 1 G: 1 INJECTION, SOLUTION INTRAVENOUS at 04:02

## 2022-01-01 RX ADMIN — ENOXAPARIN SODIUM 40 MG: 40 INJECTION SUBCUTANEOUS at 05:02

## 2022-01-01 RX ADMIN — BUDESONIDE 0.5 MG: 0.5 INHALANT ORAL at 07:02

## 2022-01-01 RX ADMIN — HYDRALAZINE HYDROCHLORIDE 100 MG: 25 TABLET ORAL at 01:02

## 2022-01-01 RX ADMIN — HYDROCODONE BITARTRATE AND ACETAMINOPHEN 1 TABLET: 10; 325 TABLET ORAL at 04:02

## 2022-01-01 RX ADMIN — CEFTRIAXONE 1 G: 1 INJECTION, SOLUTION INTRAVENOUS at 03:02

## 2022-01-01 RX ADMIN — INSULIN ASPART 4 UNITS: 100 INJECTION, SOLUTION INTRAVENOUS; SUBCUTANEOUS at 05:02

## 2022-01-01 RX ADMIN — CALCIUM CARBONATE (ANTACID) CHEW TAB 500 MG 500 MG: 500 CHEW TAB at 09:02

## 2022-01-01 RX ADMIN — HYDRALAZINE HYDROCHLORIDE 10 MG: 20 INJECTION, SOLUTION INTRAMUSCULAR; INTRAVENOUS at 09:02

## 2022-01-01 RX ADMIN — PANTOPRAZOLE SODIUM 40 MG: 40 TABLET, DELAYED RELEASE ORAL at 08:02

## 2022-01-01 RX ADMIN — POLYETHYLENE GLYCOL 3350 17 G: 17 POWDER, FOR SOLUTION ORAL at 12:02

## 2022-01-01 RX ADMIN — MULTIPLE VITAMINS W/ MINERALS TAB 1 TABLET: TAB at 08:02

## 2022-01-01 RX ADMIN — ALPRAZOLAM 0.25 MG: 0.25 TABLET ORAL at 08:02

## 2022-01-01 RX ADMIN — ISOSORBIDE MONONITRATE 30 MG: 30 TABLET, EXTENDED RELEASE ORAL at 10:02

## 2022-01-01 RX ADMIN — MAGNESIUM OXIDE TAB 400 MG (241.3 MG ELEMENTAL MG) 400 MG: 400 (241.3 MG) TAB at 08:02

## 2022-01-01 RX ADMIN — FUROSEMIDE 20 MG: 10 INJECTION, SOLUTION INTRAMUSCULAR; INTRAVENOUS at 12:02

## 2022-01-01 RX ADMIN — DOXYCYCLINE HYCLATE 100 MG: 100 TABLET, COATED ORAL at 08:02

## 2022-01-01 RX ADMIN — INSULIN ASPART 2 UNITS: 100 INJECTION, SOLUTION INTRAVENOUS; SUBCUTANEOUS at 01:02

## 2022-01-01 RX ADMIN — SODIUM CHLORIDE: 0.9 INJECTION, SOLUTION INTRAVENOUS at 03:02

## 2022-01-01 RX ADMIN — AZITHROMYCIN MONOHYDRATE 500 MG: 250 TABLET ORAL at 08:02

## 2022-01-01 RX ADMIN — AMOXICILLIN AND CLAVULANATE POTASSIUM 1 TABLET: 875; 125 TABLET, FILM COATED ORAL at 09:02

## 2022-01-01 RX ADMIN — CALCIUM CARBONATE (ANTACID) CHEW TAB 500 MG 500 MG: 500 CHEW TAB at 10:02

## 2022-01-01 RX ADMIN — IPRATROPIUM BROMIDE AND ALBUTEROL SULFATE 3 ML: 2.5; .5 SOLUTION RESPIRATORY (INHALATION) at 07:02

## 2022-01-01 RX ADMIN — INSULIN DETEMIR 35 UNITS: 100 INJECTION, SOLUTION SUBCUTANEOUS at 10:02

## 2022-01-01 RX ADMIN — INSULIN ASPART 10 UNITS: 100 INJECTION, SOLUTION INTRAVENOUS; SUBCUTANEOUS at 11:02

## 2022-01-01 RX ADMIN — HYDROCODONE BITARTRATE AND ACETAMINOPHEN 1 TABLET: 10; 325 TABLET ORAL at 10:02

## 2022-01-01 RX ADMIN — IPRATROPIUM BROMIDE AND ALBUTEROL SULFATE 3 ML: 2.5; .5 SOLUTION RESPIRATORY (INHALATION) at 01:02

## 2022-01-01 RX ADMIN — IPRATROPIUM BROMIDE AND ALBUTEROL SULFATE 3 ML: 2.5; .5 SOLUTION RESPIRATORY (INHALATION) at 08:02

## 2022-01-01 RX ADMIN — AMLODIPINE BESYLATE 10 MG: 10 TABLET ORAL at 08:02

## 2022-01-01 RX ADMIN — TAMSULOSIN HYDROCHLORIDE 0.4 MG: 0.4 CAPSULE ORAL at 10:02

## 2022-01-01 RX ADMIN — INSULIN DETEMIR 40 UNITS: 100 INJECTION, SOLUTION SUBCUTANEOUS at 08:02

## 2022-01-01 RX ADMIN — CLONIDINE 1 PATCH: 0.3 PATCH TRANSDERMAL at 11:02

## 2022-01-01 RX ADMIN — GUAIFENESIN AND DEXTROMETHORPHAN 10 ML: 100; 10 SYRUP ORAL at 11:02

## 2022-01-01 RX ADMIN — POTASSIUM CHLORIDE 40 MEQ: 1500 TABLET, EXTENDED RELEASE ORAL at 10:02

## 2022-01-01 RX ADMIN — INSULIN DETEMIR 40 UNITS: 100 INJECTION, SOLUTION SUBCUTANEOUS at 09:02

## 2022-01-01 RX ADMIN — INSULIN ASPART 5 UNITS: 100 INJECTION, SOLUTION INTRAVENOUS; SUBCUTANEOUS at 09:02

## 2022-01-01 RX ADMIN — ASPIRIN 81 MG CHEWABLE TABLET 81 MG: 81 TABLET CHEWABLE at 10:02

## 2022-01-01 RX ADMIN — FUROSEMIDE 20 MG: 10 INJECTION, SOLUTION INTRAMUSCULAR; INTRAVENOUS at 06:02

## 2022-01-01 RX ADMIN — NITROGLYCERIN 0.4 MG: 0.4 TABLET SUBLINGUAL at 11:02

## 2022-01-01 RX ADMIN — PANTOPRAZOLE SODIUM 40 MG: 40 TABLET, DELAYED RELEASE ORAL at 10:02

## 2022-01-01 RX ADMIN — LABETALOL HYDROCHLORIDE 20 MG: 5 INJECTION INTRAVENOUS at 09:02

## 2022-01-01 RX ADMIN — HYDROCHLOROTHIAZIDE 12.5 MG: 12.5 TABLET ORAL at 10:02

## 2022-01-01 RX ADMIN — ASPIRIN 81 MG CHEWABLE TABLET 81 MG: 81 TABLET CHEWABLE at 09:02

## 2022-01-01 RX ADMIN — SODIUM CHLORIDE 1000 ML: 0.9 INJECTION, SOLUTION INTRAVENOUS at 12:02

## 2022-01-01 RX ADMIN — ALPRAZOLAM 0.25 MG: 0.25 TABLET ORAL at 07:01

## 2022-01-01 RX ADMIN — POLYETHYLENE GLYCOL 3350 17 G: 17 POWDER, FOR SOLUTION ORAL at 10:02

## 2022-01-01 RX ADMIN — PANTOPRAZOLE SODIUM 40 MG: 40 TABLET, DELAYED RELEASE ORAL at 09:02

## 2022-01-01 RX ADMIN — FUROSEMIDE 20 MG: 40 INJECTION, SOLUTION INTRAMUSCULAR; INTRAVENOUS at 06:02

## 2022-01-01 RX ADMIN — BUDESONIDE 0.5 MG: 0.5 INHALANT ORAL at 08:02

## 2022-01-01 RX ADMIN — CEFTRIAXONE 1 G: 1 INJECTION, SOLUTION INTRAVENOUS at 06:02

## 2022-01-01 RX ADMIN — GUAIFENESIN 200 MG: 100 SOLUTION ORAL at 06:02

## 2022-01-01 RX ADMIN — BENZONATATE 200 MG: 100 CAPSULE ORAL at 03:02

## 2022-01-01 RX ADMIN — SODIUM CHLORIDE: 0.9 INJECTION, SOLUTION INTRAVENOUS at 01:02

## 2022-01-01 RX ADMIN — IRON SUCROSE 200 MG: 20 INJECTION, SOLUTION INTRAVENOUS at 09:02

## 2022-01-01 RX ADMIN — ATORVASTATIN CALCIUM 40 MG: 40 TABLET, FILM COATED ORAL at 09:02

## 2022-01-01 RX ADMIN — ALPRAZOLAM 0.25 MG: 0.25 TABLET ORAL at 10:02

## 2022-01-01 RX ADMIN — ATORVASTATIN CALCIUM 40 MG: 40 TABLET, FILM COATED ORAL at 08:02

## 2022-01-01 RX ADMIN — HYDRALAZINE HYDROCHLORIDE 100 MG: 25 TABLET ORAL at 06:02

## 2022-01-01 RX ADMIN — INSULIN ASPART 2 UNITS: 100 INJECTION, SOLUTION INTRAVENOUS; SUBCUTANEOUS at 09:02

## 2022-01-01 RX ADMIN — DOCUSATE SODIUM AND SENNOSIDES 1 TABLET: 8.6; 5 TABLET, FILM COATED ORAL at 10:02

## 2022-01-01 RX ADMIN — GUAIFENESIN 200 MG: 100 SOLUTION ORAL at 10:02

## 2022-01-01 RX ADMIN — DOXYCYCLINE HYCLATE 100 MG: 100 TABLET, COATED ORAL at 05:02

## 2022-01-01 RX ADMIN — INSULIN ASPART 10 UNITS: 100 INJECTION, SOLUTION INTRAVENOUS; SUBCUTANEOUS at 04:02

## 2022-01-01 RX ADMIN — CEFTRIAXONE 1 G: 1 INJECTION, SOLUTION INTRAVENOUS at 02:02

## 2022-01-01 RX ADMIN — AMOXICILLIN AND CLAVULANATE POTASSIUM 1 TABLET: 875; 125 TABLET, FILM COATED ORAL at 10:02

## 2022-01-01 RX ADMIN — BENZONATATE 200 MG: 100 CAPSULE ORAL at 04:02

## 2022-01-01 RX ADMIN — METHYLPREDNISOLONE SODIUM SUCCINATE 60 MG: 40 INJECTION, POWDER, FOR SOLUTION INTRAMUSCULAR; INTRAVENOUS at 10:02

## 2022-01-01 RX ADMIN — METHYLPREDNISOLONE SODIUM SUCCINATE 60 MG: 40 INJECTION, POWDER, FOR SOLUTION INTRAMUSCULAR; INTRAVENOUS at 05:02

## 2022-01-01 RX ADMIN — CALCIUM CARBONATE (ANTACID) CHEW TAB 500 MG 500 MG: 500 CHEW TAB at 08:02

## 2022-01-01 RX ADMIN — BENZONATATE 200 MG: 100 CAPSULE ORAL at 08:02

## 2022-01-01 RX ADMIN — TAMSULOSIN HYDROCHLORIDE 0.4 MG: 0.4 CAPSULE ORAL at 08:02

## 2022-01-01 RX ADMIN — INSULIN ASPART 4 UNITS: 100 INJECTION, SOLUTION INTRAVENOUS; SUBCUTANEOUS at 06:02

## 2022-01-01 RX ADMIN — SODIUM CHLORIDE: 0.9 INJECTION, SOLUTION INTRAVENOUS at 10:02

## 2022-01-01 RX ADMIN — HUMAN ALBUMIN MICROSPHERES AND PERFLUTREN 0.11 MG: 10; .22 INJECTION, SOLUTION INTRAVENOUS at 11:02

## 2022-01-01 RX ADMIN — METOPROLOL SUCCINATE 100 MG: 50 TABLET, EXTENDED RELEASE ORAL at 09:02

## 2022-01-01 RX ADMIN — EPOETIN ALFA-EPBX 10000 UNITS: 10000 INJECTION, SOLUTION INTRAVENOUS; SUBCUTANEOUS at 04:02

## 2022-01-01 RX ADMIN — ENOXAPARIN SODIUM 40 MG: 40 INJECTION SUBCUTANEOUS at 04:02

## 2022-01-01 RX ADMIN — POLYETHYLENE GLYCOL 3350 17 G: 17 POWDER, FOR SOLUTION ORAL at 08:02

## 2022-01-01 RX ADMIN — GUAIFENESIN AND DEXTROMETHORPHAN 10 ML: 100; 10 SYRUP ORAL at 06:02

## 2022-01-01 RX ADMIN — MULTIPLE VITAMINS W/ MINERALS TAB 1 TABLET: TAB at 10:02

## 2022-01-01 RX ADMIN — FUROSEMIDE 20 MG: 20 TABLET ORAL at 05:02

## 2022-01-01 RX ADMIN — HYDRALAZINE HYDROCHLORIDE 100 MG: 25 TABLET ORAL at 05:02

## 2022-01-01 RX ADMIN — HYDRALAZINE HYDROCHLORIDE 100 MG: 25 TABLET ORAL at 02:02

## 2022-01-01 RX ADMIN — MAGNESIUM HYDROXIDE/ALUMINUM HYDROXICE/SIMETHICONE 30 ML: 120; 1200; 1200 SUSPENSION ORAL at 11:02

## 2022-01-01 RX ADMIN — DOCUSATE SODIUM AND SENNOSIDES 1 TABLET: 8.6; 5 TABLET, FILM COATED ORAL at 08:02

## 2022-01-01 RX ADMIN — HYDRALAZINE HYDROCHLORIDE 10 MG: 20 INJECTION, SOLUTION INTRAMUSCULAR; INTRAVENOUS at 06:02

## 2022-01-01 RX ADMIN — MAGNESIUM SULFATE 4 G: 2 INJECTION INTRAVENOUS at 10:02

## 2022-01-01 RX ADMIN — INSULIN DETEMIR 35 UNITS: 100 INJECTION, SOLUTION SUBCUTANEOUS at 09:02

## 2022-01-01 RX ADMIN — HYDRALAZINE HYDROCHLORIDE 100 MG: 25 TABLET ORAL at 09:02

## 2022-01-01 RX ADMIN — PANTOPRAZOLE SODIUM 40 MG: 40 TABLET, DELAYED RELEASE ORAL at 01:02

## 2022-01-01 RX ADMIN — BENZONATATE 200 MG: 100 CAPSULE ORAL at 09:02

## 2022-01-01 RX ADMIN — Medication 6 MG: at 11:02

## 2022-01-01 RX ADMIN — ISOSORBIDE MONONITRATE 30 MG: 30 TABLET, EXTENDED RELEASE ORAL at 08:02

## 2022-01-01 RX ADMIN — MAGNESIUM OXIDE TAB 400 MG (241.3 MG ELEMENTAL MG) 400 MG: 400 (241.3 MG) TAB at 10:02

## 2022-01-01 RX ADMIN — HYDROCHLOROTHIAZIDE 12.5 MG: 12.5 TABLET ORAL at 08:02

## 2022-01-01 RX ADMIN — Medication 6 MG: at 09:02

## 2022-01-01 RX ADMIN — POTASSIUM CHLORIDE 40 MEQ: 1500 TABLET, EXTENDED RELEASE ORAL at 12:02

## 2022-01-01 RX ADMIN — VALSARTAN 320 MG: 160 TABLET, FILM COATED ORAL at 08:02

## 2022-01-01 RX ADMIN — VALSARTAN 320 MG: 160 TABLET, FILM COATED ORAL at 10:02

## 2022-01-01 RX ADMIN — INSULIN ASPART 3 UNITS: 100 INJECTION, SOLUTION INTRAVENOUS; SUBCUTANEOUS at 11:02

## 2022-01-01 RX ADMIN — MAGNESIUM HYDROXIDE/ALUMINUM HYDROXICE/SIMETHICONE 30 ML: 120; 1200; 1200 SUSPENSION ORAL at 09:02

## 2022-01-01 RX ADMIN — ALBUTEROL SULFATE 2.5 MG: 2.5 SOLUTION RESPIRATORY (INHALATION) at 02:02

## 2022-01-01 RX ADMIN — SODIUM CHLORIDE: 9 INJECTION, SOLUTION INTRAVENOUS at 09:02

## 2022-01-01 RX ADMIN — PROMETHAZINE HYDROCHLORIDE AND CODEINE PHOSPHATE 5 ML: 10; 6.25 SOLUTION ORAL at 12:02

## 2022-01-01 RX ADMIN — FUROSEMIDE 20 MG: 10 INJECTION, SOLUTION INTRAMUSCULAR; INTRAVENOUS at 01:02

## 2022-01-01 RX ADMIN — GUAIFENESIN 200 MG: 100 SOLUTION ORAL at 01:02

## 2022-01-01 RX ADMIN — AZITHROMYCIN MONOHYDRATE 500 MG: 500 INJECTION, POWDER, LYOPHILIZED, FOR SOLUTION INTRAVENOUS at 05:02

## 2022-01-01 RX ADMIN — HYDRALAZINE HYDROCHLORIDE 20 MG: 20 INJECTION, SOLUTION INTRAMUSCULAR; INTRAVENOUS at 12:01

## 2022-01-01 RX ADMIN — LACTULOSE 10 G: 10 SOLUTION ORAL at 10:02

## 2022-01-01 RX ADMIN — IOHEXOL 1000 ML: 9 SOLUTION ORAL at 03:02

## 2022-01-01 RX ADMIN — METOPROLOL SUCCINATE 100 MG: 50 TABLET, EXTENDED RELEASE ORAL at 10:02

## 2022-01-01 RX ADMIN — AMLODIPINE BESYLATE 10 MG: 10 TABLET ORAL at 01:02

## 2022-01-01 RX ADMIN — FUROSEMIDE 20 MG: 40 INJECTION, SOLUTION INTRAMUSCULAR; INTRAVENOUS at 07:02

## 2022-01-01 RX ADMIN — HYDROCODONE BITARTRATE AND ACETAMINOPHEN 1 TABLET: 10; 325 TABLET ORAL at 06:02

## 2022-01-01 RX ADMIN — AMOXICILLIN AND CLAVULANATE POTASSIUM 1 TABLET: 875; 125 TABLET, FILM COATED ORAL at 12:02

## 2022-01-01 RX ADMIN — FUROSEMIDE 20 MG: 20 TABLET ORAL at 10:02

## 2022-01-01 RX ADMIN — HYDRALAZINE HYDROCHLORIDE 10 MG: 20 INJECTION, SOLUTION INTRAMUSCULAR; INTRAVENOUS at 10:02

## 2022-01-01 RX ADMIN — IRON SUCROSE 200 MG: 20 INJECTION, SOLUTION INTRAVENOUS at 06:02

## 2022-01-01 RX ADMIN — GUAIFENESIN 200 MG: 100 SOLUTION ORAL at 08:02

## 2022-01-01 RX ADMIN — GUAIFENESIN AND DEXTROMETHORPHAN 10 ML: 100; 10 SYRUP ORAL at 01:02

## 2022-01-01 RX ADMIN — METHYLPREDNISOLONE SODIUM SUCCINATE 60 MG: 40 INJECTION, POWDER, FOR SOLUTION INTRAMUSCULAR; INTRAVENOUS at 01:02

## 2022-01-01 RX ADMIN — FUROSEMIDE 20 MG: 40 INJECTION, SOLUTION INTRAMUSCULAR; INTRAVENOUS at 05:02

## 2022-01-01 RX ADMIN — HYDRALAZINE HYDROCHLORIDE 100 MG: 25 TABLET ORAL at 03:02

## 2022-01-01 RX ADMIN — NITROGLYCERIN 0.4 MG: 0.4 TABLET SUBLINGUAL at 10:02

## 2022-01-01 RX ADMIN — INSULIN ASPART 5 UNITS: 100 INJECTION, SOLUTION INTRAVENOUS; SUBCUTANEOUS at 11:02

## 2022-01-01 RX ADMIN — AMLODIPINE BESYLATE 10 MG: 10 TABLET ORAL at 10:02

## 2022-01-01 RX ADMIN — MORPHINE SULFATE 4 MG: 4 INJECTION INTRAVENOUS at 02:02

## 2022-01-01 RX ADMIN — BENZONATATE 200 MG: 100 CAPSULE ORAL at 02:02

## 2022-01-01 RX ADMIN — INSULIN ASPART 6 UNITS: 100 INJECTION, SOLUTION INTRAVENOUS; SUBCUTANEOUS at 01:02

## 2022-01-01 RX ADMIN — DOCUSATE SODIUM AND SENNOSIDES 1 TABLET: 8.6; 5 TABLET, FILM COATED ORAL at 12:02

## 2022-01-01 RX ADMIN — HYDRALAZINE HYDROCHLORIDE 10 MG: 20 INJECTION, SOLUTION INTRAMUSCULAR; INTRAVENOUS at 07:02

## 2022-01-01 RX ADMIN — IOHEXOL 100 ML: 350 INJECTION, SOLUTION INTRAVENOUS at 03:02

## 2022-01-01 RX ADMIN — INSULIN ASPART 10 UNITS: 100 INJECTION, SOLUTION INTRAVENOUS; SUBCUTANEOUS at 10:02

## 2022-01-01 RX ADMIN — INSULIN ASPART 10 UNITS: 100 INJECTION, SOLUTION INTRAVENOUS; SUBCUTANEOUS at 01:02

## 2022-01-01 RX ADMIN — GUAIFENESIN AND DEXTROMETHORPHAN 10 ML: 100; 10 SYRUP ORAL at 04:02

## 2022-01-01 RX ADMIN — ENOXAPARIN SODIUM 40 MG: 100 INJECTION SUBCUTANEOUS at 06:02

## 2022-01-01 RX ADMIN — HYDROCODONE BITARTRATE AND ACETAMINOPHEN 1 TABLET: 10; 325 TABLET ORAL at 08:02

## 2022-01-01 RX ADMIN — INSULIN ASPART 2 UNITS: 100 INJECTION, SOLUTION INTRAVENOUS; SUBCUTANEOUS at 12:02

## 2022-01-06 PROBLEM — Z74.09 IMPAIRED FUNCTIONAL MOBILITY, BALANCE, AND ENDURANCE: Status: ACTIVE | Noted: 2022-01-01

## 2022-01-06 PROBLEM — R29.898 LOWER EXTREMITY WEAKNESS: Status: ACTIVE | Noted: 2022-01-01
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
## 2022-01-08 NOTE — FIRST PROVIDER EVALUATION
Medical screening exam completed.  I have conducted a focused provider triage encounter, findings are as follows:    Brief history of present illness:  HTN     Vitals:    01/08/22 0946 01/08/22 0947   BP:  (!) 219/101   BP Location:  Right arm   Patient Position:  Sitting   Pulse: 84    Resp: 18    Temp: 98.4 °F (36.9 °C)    TempSrc: Oral    SpO2: 96%    Weight: 128.4 kg (283 lb)        Pertinent physical exam:  NAD    Brief workup plan:  Labs and further eval    Preliminary workup initiated; this workup will be continued and followed by the physician or advanced practice provider that is assigned to the patient when roomed.

## 2022-01-08 NOTE — ED PROVIDER NOTES
SCRIBE #1 NOTE: I, Meenu Jimenez, am scribing for, and in the presence of, Anayeli Falcon MD. I have scribed the entire note.       History     Chief Complaint   Patient presents with    Hypertension     Hypertension and chest congestion      Review of patient's allergies indicates:  No Known Allergies      History of Present Illness     HPI    1/8/2022, 12:14 PM  History obtained from the patient      History of Present Illness: Sherif Ragland is a 76 y.o. male patient with a PMHx of CKD, COVID-19, HTN, Diabetes mellitus type 2, who presents to the Emergency Department for evaluation of high BP which onset gradually. Symptoms are constant and moderate in severity. No mitigating or exacerbating factors reported. Associated sxs include nausea and congestion. Patient denies any CP, SOB, fever, dysuria, abd pain, and all other sxs at this time. Pt is currently on Clonidine patch, Metoprolol, Norvasc, Diovan, Hydrochlorothiazide, and Amlodipine for his BP. No further complaints or concerns at this time.       Arrival mode: Personal vehicle    PCP: Tobias Fox MD        Past Medical History:  Past Medical History:   Diagnosis Date    CKD (chronic kidney disease) stage 3, GFR 30-59 ml/min     COVID-19 11/30/2020    Diabetes mellitus, type 2 1997    Hyperlipidemia     Hypertension     Hypogonadism in male     Prostate cancer metastatic to bone     Tubular adenoma 10/2017       Past Surgical History:  Past Surgical History:   Procedure Laterality Date    CATARACT EXTRACTION Right 01/31/2018    CATARACT EXTRACTION W/  INTRAOCULAR LENS IMPLANT Left 03/13/2019    COLONOSCOPY N/A 11/2/2017    Procedure: COLONOSCOPY;  Surgeon: Alek Francois MD;  Location: [facility];  Service: Endoscopy;  Laterality: N/A;    COLONOSCOPY N/A 10/27/2020    Procedure: COLONOSCOPY;  Surgeon: Aditi Grijalva MD;  Location[facility];  Service: Endoscopy;  Laterality: N/A;    ESOPHAGOGASTRODUODENOSCOPY N/A 10/27/2020     Procedure: ESOPHAGOGASTRODUODENOSCOPY (EGD);  Surgeon: Aditi Grijalva MD;  [facility];  Service: Endoscopy;  Laterality: N/A;    TIBIA FRACTURE SURGERY      right leg x2     TONSILLECTOMY      TRANSURETHRAL RESECTION OF PROSTATE N/A 10/17/2019    Procedure: TURP (TRANSURETHRAL RESECTION OF PROSTATE);  Surgeon: Mir Hale IV, MD;  Location: [facility];  Service: Urology;  Laterality: N/A;         Family History:  Family History   Problem Relation Age of Onset    Stroke Mother     Heart disease Father     Stroke Brother        Social History:  Social History     Tobacco Use    Smoking status: Never Smoker    Smokeless tobacco: Never Used   Substance and Sexual Activity    Alcohol use: Yes     Comment: rarely    Drug use: No    Sexual activity: Yes     Partners: Female        Review of Systems     Review of Systems   Constitutional: Negative for fever.   HENT: Positive for congestion. Negative for sore throat.    Respiratory: Negative for shortness of breath.    Cardiovascular: Negative for chest pain.   Gastrointestinal: Positive for nausea. Negative for abdominal pain.   Genitourinary: Negative for dysuria.   Musculoskeletal: Negative for back pain.   Skin: Negative for rash.   Neurological: Negative for weakness.   Hematological: Does not bruise/bleed easily.   All other systems reviewed and are negative.       Physical Exam     Initial Vitals   BP Pulse Resp Temp SpO2   01/08/22 0947 01/08/22 0946 01/08/22 0946 01/08/22 0946 01/08/22 0946   (!) 219/101 84 18 98.4 °F (36.9 °C) 96 %      MAP       --                 Physical Exam  Nursing Notes and Vital Signs Reviewed.  Constitutional: Patient is in no acute distress. Morbidly obese. Pt is pale.  Head: Atraumatic. Normocephalic.  Eyes: PERRL. EOM intact. Conjunctivae are not pale. No scleral icterus.  ENT: Mucous membranes are moist. Oropharynx is clear and symmetric.    Neck: Supple. Full ROM. No lymphadenopathy.  Cardiovascular: Regular  rate. Regular rhythm. No murmurs, rubs, or gallops. Distal pulses are 2+ and symmetric.  Pulmonary/Chest: No respiratory distress. Clear to auscultation bilaterally. No wheezing or rales.  Abdominal: Soft and non-distended.  There is no tenderness.  No rebound, guarding, or rigidity. Good bowel sounds.  Genitourinary: No CVA tenderness  Musculoskeletal: Moves all extremities. No obvious deformities. No edema. No calf tenderness.  Skin: Warm and dry.  Neurological:  Alert, awake, and appropriate.  Normal speech.  No acute focal neurological deficits are appreciated.  Psychiatric: Normal affect. Good eye contact. Appropriate in content.     ED Course   Critical Care    Date/Time: 1/8/2022 2:04 PM  Performed by: Anayeli Falcon MD  Authorized by: Anayeli Falcon MD   Direct patient critical care time: 25 minutes  Additional history critical care time: 5 minutes  Ordering / reviewing critical care time: 5 minutes  Documentation critical care time: 5 minutes  Total critical care time (exclusive of procedural time) : 40 minutes  Critical care was necessary to treat or prevent imminent or life-threatening deterioration of the following conditions: hypertensive urgency.  Critical care was time spent personally by me on the following activities: blood draw for specimens, development of treatment plan with patient or surrogate, interpretation of cardiac output measurements, evaluation of patient's response to treatment, examination of patient, obtaining history from patient or surrogate, ordering and performing treatments and interventions, ordering and review of laboratory studies, ordering and review of radiographic studies, re-evaluation of patient's condition, pulse oximetry and review of old charts.        ED Vital Signs:  Vitals:    01/08/22 0946 01/08/22 0947 01/08/22 1116 01/08/22 1203   BP:  (!) 219/101 (!) 196/91 (!) 189/89   Pulse: 84  79 81   Resp: 18  12    Temp: 98.4 °F (36.9 °C)      TempSrc: Oral      SpO2:  96%  100%    Weight: 128.4 kg (283 lb)       01/08/22 1227 01/08/22 1301 01/08/22 1356   BP: (!) 186/85 (!) 176/89 (!) 169/77   Pulse:  88 100   Resp:      Temp:      TempSrc:      SpO2:      Weight:          Abnormal Lab Results:  Labs Reviewed   CBC W/ AUTO DIFFERENTIAL - Abnormal; Notable for the following components:       Result Value    RBC 3.48 (*)     Hemoglobin 11.0 (*)     Hematocrit 33.9 (*)     MCH 31.6 (*)     MPV 8.9 (*)     Immature Granulocytes 3.6 (*)     Immature Grans (Abs) 0.26 (*)     Lymph # 0.6 (*)     Gran % 75.9 (*)     Lymph % 8.6 (*)     All other components within normal limits   COMPREHENSIVE METABOLIC PANEL - Abnormal; Notable for the following components:    Glucose 172 (*)     BUN 26 (*)     Creatinine 1.6 (*)     Calcium 8.1 (*)     Albumin 2.8 (*)     eGFR if  48 (*)     eGFR if non  41 (*)     All other components within normal limits   SARS-COV-2 RDRP GENE - Abnormal; Notable for the following components:    POC Rapid COVID Positive (*)     All other components within normal limits    Narrative:     This test utilizes isothermal nucleic acid amplification   technology to detect the SARS-CoV-2 RdRp nucleic acid segment.   The analytical sensitivity (limit of detection) is 125 genome   equivalents/mL.   A POSITIVE result implies infection with the SARS-CoV-2 virus;   the patient is presumed to be contagious.     A NEGATIVE result means that SARS-CoV-2 nucleic acids are not   present above the limit of detection. A NEGATIVE result should be   treated as presumptive. It does not rule out the possibility of   COVID-19 and should not be the sole basis for treatment decisions.   If COVID-19 is strongly suspected based on clinical and exposure   history, re-testing using an alternate molecular assay should be   considered.   This test is only for use under the Food and Drug   Administration s Emergency Use Authorization (EUA).   Commercial kits are provided  by [employer].   Performance characteristics of the EUA have been independently   verified by [facility] Department of   Pathology and Laboratory Medicine.   _________________________________________________________________   The authorized Fact Sheet for Healthcare Providers and the authorized Fact   Sheet for Patients of the ID NOW COVID-19 are available on the FDA   website:     https://www.fda.gov/media/482272/download  https://www.fda.gov/media/055393/download         B-TYPE NATRIURETIC PEPTIDE   TROPONIN I   POCT INFLUENZA A/B MOLECULAR        All Lab Results:  Results for orders placed or performed during the hospital encounter of 01/08/22   CBC auto differential   Result Value Ref Range    WBC 7.21 3.90 - 12.70 K/uL    RBC 3.48 (L) 4.60 - 6.20 M/uL    Hemoglobin 11.0 (L) 14.0 - 18.0 g/dL    Hematocrit 33.9 (L) 40.0 - 54.0 %    MCV 97 82 - 98 fL    MCH 31.6 (H) 27.0 - 31.0 pg    MCHC 32.4 32.0 - 36.0 g/dL    RDW 13.2 11.5 - 14.5 %    Platelets 219 150 - 450 K/uL    MPV 8.9 (L) 9.2 - 12.9 fL    Immature Granulocytes 3.6 (H) 0.0 - 0.5 %    Gran # (ANC) 5.5 1.8 - 7.7 K/uL    Immature Grans (Abs) 0.26 (H) 0.00 - 0.04 K/uL    Lymph # 0.6 (L) 1.0 - 4.8 K/uL    Mono # 0.7 0.3 - 1.0 K/uL    Eos # 0.1 0.0 - 0.5 K/uL    Baso # 0.02 0.00 - 0.20 K/uL    nRBC 0 0 /100 WBC    Gran % 75.9 (H) 38.0 - 73.0 %    Lymph % 8.6 (L) 18.0 - 48.0 %    Mono % 9.8 4.0 - 15.0 %    Eosinophil % 1.8 0.0 - 8.0 %    Basophil % 0.3 0.0 - 1.9 %    Differential Method Automated    Comprehensive metabolic panel   Result Value Ref Range    Sodium 137 136 - 145 mmol/L    Potassium 3.6 3.5 - 5.1 mmol/L    Chloride 97 95 - 110 mmol/L    CO2 28 23 - 29 mmol/L    Glucose 172 (H) 70 - 110 mg/dL    BUN 26 (H) 8 - 23 mg/dL    Creatinine 1.6 (H) 0.5 - 1.4 mg/dL    Calcium 8.1 (L) 8.7 - 10.5 mg/dL    Total Protein 6.0 6.0 - 8.4 g/dL    Albumin 2.8 (L) 3.5 - 5.2 g/dL    Total Bilirubin 0.5 0.1 - 1.0 mg/dL    Alkaline Phosphatase 80 55 -  135 U/L    AST 14 10 - 40 U/L    ALT 27 10 - 44 U/L    Anion Gap 12 8 - 16 mmol/L    eGFR if African American 48 (A) >60 mL/min/1.73 m^2    eGFR if non African American 41 (A) >60 mL/min/1.73 m^2   Brain natriuretic peptide   Result Value Ref Range    BNP 82 0 - 99 pg/mL   Troponin I   Result Value Ref Range    Troponin I 0.015 0.000 - 0.026 ng/mL   POCT Influenza A/B Molecular   Result Value Ref Range    POC Molecular Influenza A Ag Negative Negative, Not Reported    POC Molecular Influenza B Ag Negative Negative, Not Reported     Acceptable Yes    POCT COVID-19 Rapid Screening   Result Value Ref Range    POC Rapid COVID Positive (A) Negative     Acceptable Yes      *Note: Due to a large number of results and/or encounters for the requested time period, some results have not been displayed. A complete set of results can be found in Results Review.       Imaging Results:  Imaging Results          X-Ray Chest 1 View (Final result)  Result time 01/08/22 10:16:01    Final result by Tobias Chauhan MD (01/08/22 10:16:01)                 Impression:      See above.      Electronically signed by: Tobias Chauhan MD  Date:    01/08/2022  Time:    10:16             Narrative:    EXAMINATION:  XR CHEST 1 VIEW    CLINICAL HISTORY:  Hypertension,    COMPARISON:  11/30/2020 chest x-ray    FINDINGS:  Mild cardiomegaly.    Chronic elevation of the right diaphragm with atelectasis right lung base.                                 The EKG was ordered, reviewed, and independently interpreted by the ED provider.  Interpretation time: 9:56  Rate: 82 BPM  Rhythm: normal sinus rhythm  Interpretation: Minimal voltage criteria for LVH, may be normal variant (R in aVL). No STEMI.             The Emergency Provider reviewed the vital signs and test results, which are outlined above.     ED Discussion       1:41 PM: Reassessed pt at this time. Pt is positive for COVID-19. Referral for antibody infusion was  given but Pt declined. Pt will stop taking his Norvasc and will be prescribed Hydralazine.  Discussed with pt all pertinent ED information and results. Discussed pt dx and plan of tx. Gave pt all f/u and return to the ED instructions. All questions and concerns were addressed at this time. Pt expresses understanding of information and instructions, and is comfortable with plan to discharge. Pt is stable for discharge.    I discussed with patient and/or family/caretaker that evaluation in the ED does not suggest any emergent or life threatening medical conditions requiring immediate intervention beyond what was provided in the ED, and I believe patient is safe for discharge.  Regardless, an unremarkable evaluation in the ED does not preclude the development or presence of a serious of life threatening condition. As such, patient was instructed to return immediately for any worsening or change in current symptoms.       Medical Decision Making:   Clinical Tests:   Lab Tests: Ordered and Reviewed  Radiological Study: Ordered and Reviewed  Medical Tests: Ordered and Reviewed           ED Medication(s):  Medications   cloNIDine 0.2 mg/24 hr td ptwk 1 patch (1 patch Transdermal Not Given 1/8/22 1345)   hydrALAZINE injection 20 mg (20 mg Intravenous Given 1/8/22 1204)       New Prescriptions    HYDRALAZINE (APRESOLINE) 50 MG TABLET    Take 1 tablet (50 mg total) by mouth 3 (three) times daily.        Follow-up Information     Tobias Fox MD. Schedule an appointment as soon as possible for a visit in 2 days.    Specialty: Internal Medicine  Why: Return to the Emergency room, If symptoms worsen  Contact information:  [id number] [facility]  [address] [facility] [address] [phone]                             Scribe Attestation:   Scribe #1: I performed the above scribed service and the documentation accurately describes the services I performed. I attest to the accuracy of the note.     Attending:   Physician  Attestation Statement for Scribe #1: I, Anayeli Falcon MD, personally performed the services described in this documentation, as scribed by Meenu Jimenez, in my presence, and it is both accurate and complete.           Clinical Impression       ICD-10-CM ICD-9-CM   1. Chronic hypertension  I10 401.9   2. Hypertension  I10 401.9   3. History of recent steroid use  Z92.241 V87.45   4. Lab test positive for detection of COVID-19 virus  U07.1 079.89   5. History of prostate cancer  Z85.46 V10.46       Disposition:   Disposition: Discharged  Condition: Stable       Anayeli Falcon MD  01/08/22 4536

## 2022-01-12 NOTE — PROGRESS NOTES
Subjective:       Patient ID: Sherif Ragland is a 76 y.o. male.    Chief Complaint: No chief complaint on file.    HPI  Patient is a 76-year-old male presents today following up on hypertension.  Patient has been enrolled additional hypertension program and has been following with them but has continued to have some struggles with high readings.  Recently over the weekend he developed high readings and went to the emergency room.  His readings were 215/100.  In the ER they will give pressure down.  They switched him from Norvasc to hydralazine 50 mg 3 times a day.  He has seen improvement in his numbers since making that change.  He has continued to follow the digital program.    Incidentally when he was in the emergency room he had some congestion.  He was tested and came back positive for COVID-19.  He indicates he is doing okay at this point.  He has cough and congestion but no fever.  He has not had significant shortness of breath.  He was offered monoclonal antibody infusion when he was in the ER.  He had a monoclonal infusion a year ago give her take when he had coded previously and had a poor response to it said he did not wish to take it again.  Review of the medications that have contraindications or interactions with the Paxlovid determined that it would be best to avoid using any medication at this time as well.  As his symptoms are mildly will monitor there to home monitoring program.    The patient location is: home  The chief complaint leading to consultation is: htn/covid  Visit type: Audiovisual  Each patient to whom he or she provides medical services by telemedicine is:  (1) informed of the relationship between the physician and patient and the respective role of any other health care provider with respect to management of the patient; and (2) notified that he or she may decline to receive medical services by telemedicine and may withdraw from such care at any time.          Review of Systems    HENT: Positive for congestion.    Respiratory: Positive for cough and shortness of breath.    Cardiovascular: Negative for chest pain.       Objective:   There were no vitals taken for this visit.     Physical Exam  Constitutional:       General: He is not in acute distress.     Appearance: He is well-developed and well-nourished.   HENT:      Head: Normocephalic and atraumatic.   Eyes:      Extraocular Movements: EOM normal.   Pulmonary:      Effort: Pulmonary effort is normal. No respiratory distress.   Musculoskeletal:      Cervical back: Normal range of motion.   Skin:     Findings: No rash.   Neurological:      Mental Status: He is alert and oriented to person, place, and time.      Cranial Nerves: No cranial nerve deficit.   Psychiatric:         Mood and Affect: Mood and affect normal.         Behavior: Behavior normal.         Thought Content: Thought content normal.         Admission on 01/08/2022, Discharged on 01/08/2022   Component Date Value    WBC 01/08/2022 7.21     RBC 01/08/2022 3.48*    Hemoglobin 01/08/2022 11.0*    Hematocrit 01/08/2022 33.9*    MCV 01/08/2022 97     MCH 01/08/2022 31.6*    MCHC 01/08/2022 32.4     RDW 01/08/2022 13.2     Platelets 01/08/2022 219     MPV 01/08/2022 8.9*    Immature Granulocytes 01/08/2022 3.6*    Gran # (ANC) 01/08/2022 5.5     Immature Grans (Abs) 01/08/2022 0.26*    Lymph # 01/08/2022 0.6*    Mono # 01/08/2022 0.7     Eos # 01/08/2022 0.1     Baso # 01/08/2022 0.02     nRBC 01/08/2022 0     Gran % 01/08/2022 75.9*    Lymph % 01/08/2022 8.6*    Mono % 01/08/2022 9.8     Eosinophil % 01/08/2022 1.8     Basophil % 01/08/2022 0.3     Differential Method 01/08/2022 Automated     Sodium 01/08/2022 137     Potassium 01/08/2022 3.6     Chloride 01/08/2022 97     CO2 01/08/2022 28     Glucose 01/08/2022 172*    BUN 01/08/2022 26*    Creatinine 01/08/2022 1.6*    Calcium 01/08/2022 8.1*    Total Protein 01/08/2022 6.0     Albumin  01/08/2022 2.8*    Total Bilirubin 01/08/2022 0.5     Alkaline Phosphatase 01/08/2022 80     AST 01/08/2022 14     ALT 01/08/2022 27     Anion Gap 01/08/2022 12     eGFR if  01/08/2022 48*    eGFR if non African Amer* 01/08/2022 41*    BNP 01/08/2022 82     Troponin I 01/08/2022 0.015     POC Molecular Influenza * 01/08/2022 Negative     POC Molecular Influenza * 01/08/2022 Negative      Acceptab* 01/08/2022 Yes     POC Rapid COVID 01/08/2022 Positive*     Acceptab* 01/08/2022 Yes        Assessment:       1. Hypertension associated with diabetes    2. COVID-19 virus infection        Plan:   No problem-specific Assessment & Plan notes found for this encounter.    Hypertension associated with diabetes  Comments:  Recent change from norvasc to hydralazine.  Numbers have improved.  Continuing to follow with digital htn team    COVID-19 virus infection  Comments:  Had poor response to monoclonal infusion last year.  Does not wish to do that again.  Orders:  -     COVID-19 Home Symptom Monitoring  - Duration (days): 14    Patient continue his current medication regimen.  We will continue avoiding his blood pressure readings to the digital program and we will make adjustments as indicated.  We will see him in follow-up in clinic in 6-8 weeks.    In regards to his COVID he is enrolled in the home symptom monitoring program as of now.  If he develops worsening symptoms he will notify us for further guidance or return to the emergency room.  As noted above he is not a good candidate for the [facility] at this time.  He has had 2 of his 3 available immunizations.  Once he recovers from his illness booster dosing would be recommended.    Follow up in about 2 months (around 3/12/2022).

## 2022-01-24 NOTE — TELEPHONE ENCOUNTER
"Pt wife calling a bit franticto let Macielximena know pt diagnosed with covid 2weeks ago. Wife feels like pt has been going down hill since. "Pooping all over himself, dementia, sitting in chair naked(completly unlike him)(and didn't remember later when wife asked about it) BP has been in 200's . Completely different person, cannot control bowels, sleeping all the time, fell asleep while having poopy pants in chair, using urinal no energy to get to toilet."     Wife feels alone and doesn't have anyone to talk to . Wife is missing work. She states that something is not right.      At ER now bc pt is having a hard time breathing, eyes look weird.     Wife states that pt has pride and that he would not call and let clinic know his issues, but wife feels like that she is at a point where she needs some help.     Nurse will route to MD   "

## 2022-01-24 NOTE — TELEPHONE ENCOUNTER
Increase hydralazine to 100 mg three times daily.  Continue other meds.  Will send new rx to pharmacy    [facility] [employer]

## 2022-01-24 NOTE — FIRST PROVIDER EVALUATION
Medical screening exam completed.  I have conducted a focused provider triage encounter, findings are as follows:    Brief history of present illness:  Pt presents with c/o worsening shortness of breath and fatigue after being diagnosed with covid 8 days ago.    Vitals:    01/24/22 1722   BP: (!) 186/65   BP Location: Right arm   Patient Position: Sitting   Pulse: 84   Resp: 20   Temp: 98.6 °F (37 °C)   TempSrc: Oral   SpO2: (!) 93%   Weight: Comment: needs bed scale weight   Height: 6' (1.829 m)       Pertinent physical exam:      Brief workup plan:      Preliminary workup initiated; this workup will be continued and followed by the physician or advanced practice provider that is assigned to the patient when roomed.

## 2022-01-25 NOTE — TELEPHONE ENCOUNTER
Pt was released from hospital, Dx with pneumonia, bp still elevated. Dysuria over night (had to pee and couldn't) still using bathroom on himself (bowels)  Pt would like to consult with you sooner than 2/25. Nurse will route to MD

## 2022-01-25 NOTE — ED PROVIDER NOTES
SCRIBE #1 NOTE: I, Winston Lawler, am scribing for, and in the presence of, Eyad Corrales MD. I have scribed the entire note.      History      Chief Complaint   Patient presents with    Shortness of Breath     Dx with Covid 8 days ago, states still having shortness of breath.       Review of patient's allergies indicates:  No Known Allergies     HPI   HPI    1/24/2022, 7:02 PM   History obtained from the patient      History of Present Illness: Sherif Ragland is a 76 y.o. male patient with a PMHx of CKD, DM type II, HLD, HTN, who presents to the Emergency Department for SOB which onset gradually a couple weeks ago, worsening today. On 1/8/22, pt reports he was dx with COVID-19. Pt c/o SOB, cough, weakness, elevated BP, and congestion. Symptoms are constant and moderate in severity. No mitigating or exacerbating factors reported. Patient denies any CP, fever, N/V, dysuria, hematuria, and all other sxs at this time. No prior Tx included. No further complaints or concerns at this time.       Arrival mode: Personal vehicle     PCP: Tobias Fox MD       Past Medical History:  Past Medical History:   Diagnosis Date    CKD (chronic kidney disease) stage 3, GFR 30-59 ml/min     COVID-19 11/30/2020    Diabetes mellitus, type 2 1997    Hyperlipidemia     Hypertension     Hypogonadism in male     Prostate cancer metastatic to bone     Tubular adenoma 10/2017       Past Surgical History:  Past Surgical History:   Procedure Laterality Date    CATARACT EXTRACTION Right 01/31/2018    CATARACT EXTRACTION W/  INTRAOCULAR LENS IMPLANT Left 03/13/2019    COLONOSCOPY N/A 11/2/2017    Procedure: COLONOSCOPY;  Surgeon: Alek Francois MD;  Location: [facility];  Service: Endoscopy;  Laterality: N/A;    COLONOSCOPY N/A 10/27/2020    Procedure: COLONOSCOPY;  Surgeon: Aditi Grijalva MD;  Location: [facility];  Service: Endoscopy;  Laterality: N/A;    ESOPHAGOGASTRODUODENOSCOPY N/A 10/27/2020    Procedure:  ESOPHAGOGASTRODUODENOSCOPY (EGD);  Surgeon: Aditi Grijalva MD;  Location: [facility];  Service: Endoscopy;  Laterality: N/A;    TIBIA FRACTURE SURGERY      right leg x2     TONSILLECTOMY      TRANSURETHRAL RESECTION OF PROSTATE N/A 10/17/2019    Procedure: TURP (TRANSURETHRAL RESECTION OF PROSTATE);  Surgeon: Mir Hale IV, MD;  Location: [facility] OR;  Service: Urology;  Laterality: N/A;         Family History:  Family History   Problem Relation Age of Onset    Stroke Mother     Heart disease Father     Stroke Brother        Social History:  Social History     Tobacco Use    Smoking status: Never Smoker    Smokeless tobacco: Never Used   Substance and Sexual Activity    Alcohol use: Yes     Comment: rarely    Drug use: No    Sexual activity: Yes     Partners: Female       ROS   Review of Systems   Constitutional: Negative for fever.   HENT: Positive for congestion. Negative for sore throat.    Respiratory: Positive for cough and shortness of breath.    Cardiovascular: Negative for chest pain.   Gastrointestinal: Negative for nausea and vomiting.   Genitourinary: Negative for dysuria and hematuria.   Musculoskeletal: Negative for back pain.   Skin: Negative for rash.   Neurological: Positive for weakness.   Hematological: Does not bruise/bleed easily.   All other systems reviewed and are negative.    Physical Exam      Initial Vitals [01/24/22 1722]   BP Pulse Resp Temp SpO2   (!) 186/65 84 20 98.6 °F (37 °C) (!) 93 %      MAP       --          Physical Exam  Nursing Notes and Vital Signs Reviewed.  Constitutional: Patient is in no acute distress. Well-developed and well-nourished.  Head: Atraumatic. Normocephalic.  Eyes: PERRL. EOM intact. Conjunctivae are not pale. No scleral icterus.  ENT: Mucous membranes are moist. Oropharynx is clear and symmetric.    Neck: Supple. Full ROM. No lymphadenopathy.  Cardiovascular: Regular rate. Regular rhythm. No murmurs, rubs, or gallops. Distal pulses are 2+ and  symmetric.  Pulmonary/Chest: No respiratory distress. Coarse breath sounds on right lower lobe. No wheezing, rhonchi, or rales.  Abdominal: Soft and non-distended.  There is no tenderness.  No rebound, guarding, or rigidity. Good bowel sounds.  Genitourinary: No CVA tenderness  Musculoskeletal: Moves all extremities. No obvious deformities. No edema. No calf tenderness.  Skin: Warm and dry.  Neurological:  Alert, awake, and appropriate.  Normal speech.  No acute focal neurological deficits are appreciated.  Psychiatric: Normal affect. Good eye contact. Appropriate in content.    ED Course    Procedures  ED Vital Signs:  Vitals:    01/24/22 1722 01/24/22 1911 01/24/22 1944 01/24/22 2033   BP: (!) 186/65 (!) 200/82 (!) 193/81 (!) 200/85   Pulse: 84 82 81 83   Resp: 20 19 20 19   Temp: 98.6 °F (37 °C)      TempSrc: Oral      SpO2: (!) 93% (!) 94% 95% (!) 94%   Height: 6' (1.829 m)          Abnormal Lab Results:  Labs Reviewed   CBC W/ AUTO DIFFERENTIAL - Abnormal; Notable for the following components:       Result Value    RBC 3.00 (*)     Hemoglobin 9.5 (*)     Hematocrit 29.9 (*)      (*)     MCH 31.7 (*)     MCHC 31.8 (*)     MPV 8.6 (*)     Immature Granulocytes 1.7 (*)     Immature Grans (Abs) 0.10 (*)     Lymph # 0.5 (*)     Gran % 74.3 (*)     Lymph % 8.8 (*)     All other components within normal limits   COMPREHENSIVE METABOLIC PANEL - Abnormal; Notable for the following components:    Glucose 294 (*)     Creatinine 1.8 (*)     Calcium 7.8 (*)     Albumin 2.8 (*)     eGFR if  41 (*)     eGFR if non  36 (*)     All other components within normal limits   B-TYPE NATRIURETIC PEPTIDE - Abnormal; Notable for the following components:     (*)     All other components within normal limits   TROPONIN I        All Lab Results:  Results for orders placed or performed during the hospital encounter of 01/24/22   CBC auto differential   Result Value Ref Range    WBC 5.90 3.90 -  12.70 K/uL    RBC 3.00 (L) 4.60 - 6.20 M/uL    Hemoglobin 9.5 (L) 14.0 - 18.0 g/dL    Hematocrit 29.9 (L) 40.0 - 54.0 %     (H) 82 - 98 fL    MCH 31.7 (H) 27.0 - 31.0 pg    MCHC 31.8 (L) 32.0 - 36.0 g/dL    RDW 13.5 11.5 - 14.5 %    Platelets 238 150 - 450 K/uL    MPV 8.6 (L) 9.2 - 12.9 fL    Immature Granulocytes 1.7 (H) 0.0 - 0.5 %    Gran # (ANC) 4.4 1.8 - 7.7 K/uL    Immature Grans (Abs) 0.10 (H) 0.00 - 0.04 K/uL    Lymph # 0.5 (L) 1.0 - 4.8 K/uL    Mono # 0.8 0.3 - 1.0 K/uL    Eos # 0.1 0.0 - 0.5 K/uL    Baso # 0.04 0.00 - 0.20 K/uL    nRBC 0 0 /100 WBC    Gran % 74.3 (H) 38.0 - 73.0 %    Lymph % 8.8 (L) 18.0 - 48.0 %    Mono % 13.1 4.0 - 15.0 %    Eosinophil % 1.4 0.0 - 8.0 %    Basophil % 0.7 0.0 - 1.9 %    Differential Method Automated    Comprehensive metabolic panel   Result Value Ref Range    Sodium 139 136 - 145 mmol/L    Potassium 3.9 3.5 - 5.1 mmol/L    Chloride 100 95 - 110 mmol/L    CO2 27 23 - 29 mmol/L    Glucose 294 (H) 70 - 110 mg/dL    BUN 22 8 - 23 mg/dL    Creatinine 1.8 (H) 0.5 - 1.4 mg/dL    Calcium 7.8 (L) 8.7 - 10.5 mg/dL    Total Protein 6.1 6.0 - 8.4 g/dL    Albumin 2.8 (L) 3.5 - 5.2 g/dL    Total Bilirubin 0.6 0.1 - 1.0 mg/dL    Alkaline Phosphatase 62 55 - 135 U/L    AST 12 10 - 40 U/L    ALT 13 10 - 44 U/L    Anion Gap 12 8 - 16 mmol/L    eGFR if African American 41 (A) >60 mL/min/1.73 m^2    eGFR if non African American 36 (A) >60 mL/min/1.73 m^2   Troponin I   Result Value Ref Range    Troponin I 0.023 0.000 - 0.026 ng/mL   Brain natriuretic peptide   Result Value Ref Range     (H) 0 - 99 pg/mL     *Note: Due to a large number of results and/or encounters for the requested time period, some results have not been displayed. A complete set of results can be found in Results Review.       Imaging Results:  Imaging Results          X-Ray Chest PA And Lateral (Final result)  Result time 01/24/22 18:24:46    Final result by Latrell English MD (01/24/22 18:24:46)                  Impression:      No acute abnormality.      Electronically signed by: Latrell English  Date:    01/24/2022  Time:    18:24             Narrative:    EXAMINATION:  XR CHEST PA AND LATERAL    CLINICAL HISTORY:  Shortness of breath    TECHNIQUE:  PA and lateral views of the chest were performed.    COMPARISON:  Multiple priors.    FINDINGS:  Bandlike atelectasis in the right lung base.  Otherwise the lungs are clear.  No pleural fluid or pneumothorax.  Chronic elevation the right hemidiaphragm.    The cardiac silhouette is borderline enlarged.  The hilar and mediastinal contours are unremarkable.    Bones are intact.                               The EKG was ordered, reviewed, and independently interpreted by the ED provider.  Interpretation time: 1824  Rate: 84 BPM  Rhythm: Undetermined rhythm  Interpretation: Anterior infarct (cited on or before 13-FEB-2020). Abnormal ECG. No STEMI.         The Emergency Provider reviewed the vital signs and test results, which are outlined above.    ED Discussion     8:39 PM: Reassessed pt at this time.  Discussed with pt all pertinent ED information and results. Discussed pt dx and plan of tx. Gave pt all f/u and return to the ED instructions. All questions and concerns were addressed at this time. Pt expresses understanding of information and instructions, and is comfortable with plan to discharge. Pt is stable for discharge.    I discussed with patient and/or family/caretaker that evaluation in the ED does not suggest any emergent or life threatening medical conditions requiring immediate intervention beyond what was provided in the ED, and I believe patient is safe for discharge.  Regardless, an unremarkable evaluation in the ED does not preclude the development or presence of a serious of life threatening condition. As such, patient was instructed to return immediately for any worsening or change in current symptoms.    ED Course as of 01/25/22 0132   Mon Jan 24, 2022    2039 Troponin I: 0.023 [BA]   2039 BP(!): 200/85  No chest pain, no HTN emergency today. Saw PCP, and was increased on BP meds today. Has not taken nightly doses of meds, does not want to stay, would like to take at home. Okay for d/c.  [BA]      ED Course User Index  [BA] Eyad Corrales MD       ED Medication(s):  Medications   ALPRAZolam tablet 0.25 mg (0.25 mg Oral Given 1/24/22 1923)     Discharge Medication List as of 1/24/2022  8:39 PM           Medication List      ASK your doctor about these medications    ALPRAZolam 0.25 MG tablet  Commonly known as: XANAX  TAKE 1 TO 2 TABLETS BY MOUTH ONCE DAILY AS NEEDED FOR ANXIETY     amLODIPine 5 MG tablet  Commonly known as: NORVASC  Take 2 tablets (10 mg total) by mouth once daily.     atorvastatin 40 MG tablet  Commonly known as: LIPITOR  Take 1 tablet (40 mg total) by mouth once daily.     bisacodyL 5 mg EC tablet  Commonly known as: DULCOLAX     cloNIDine 0.3 mg/24 hr td ptwk 0.3 mg/24 hr  Commonly known as: CATAPRES  Place 1 patch onto the skin every 7 days.     dexAMETHasone 2 MG tablet  Commonly known as: DECADRON  Take 1 tablet (2 mg total) by mouth 2 (two) times daily with meals.     furosemide 20 MG tablet  Commonly known as: LASIX  Take 1 tablet (20 mg total) by mouth once daily. for 3 days     hydrALAZINE 100 MG tablet  Commonly known as: APRESOLINE  Take 1 tablet (100 mg total) by mouth every 8 (eight) hours.     HYDROcodone-acetaminophen  mg per tablet  Commonly known as: NORCO  Take 1 tablet by mouth every 4 (four) hours as needed for Pain. for pain.     lactulose 10 gram/15 mL solution  Commonly known as: CHRONULAC  Take 15 mLs (10 g total) by mouth 2 (two) times daily.     LEVEMIR FLEXTOUCH U-100 INSULN 100 unit/mL (3 mL) Inpn pen  Generic drug: insulin detemir U-100  Inject 70 Units into the skin once daily.     metoprolol succinate 100 MG 24 hr tablet  Commonly known as: TOPROL-XL  Take 1 tablet (100 mg total) by mouth 2 (two) times daily.      pantoprazole 40 MG tablet  Commonly known as: PROTONIX  Take 1 tablet (40 mg total) by mouth once daily.     potassium chloride SA 20 MEQ tablet  Commonly known as: K-DUR,KLOR-CON  Take 1 tablet (20 mEq total) by mouth 2 (two) times daily.     QUEtiapine 25 MG Tab  Commonly known as: SEROQUEL  Take 1 tablet (25 mg total) by mouth nightly as needed (insomnia).     repaglinide 0.5 MG tablet  Commonly known as: PRANDIN  Take 1 tablet (0.5 mg total) by mouth 3 (three) times daily before meals.     terazosin 5 MG capsule  Commonly known as: HYTRIN     valsartan-hydrochlorothiazide 320-25 mg per tablet  Commonly known as: DIOVAN-HCT           Follow-up Information     Tobias Fox MD. Call in 1 day.    Specialty: Internal Medicine  Why: For re-evaluation and further treatment  Contact information:  [address] [facility]  [address]  [place] [address] [phone]             [facility] - Emergency Dept.. Go today.    Specialty: Emergency Medicine  Why: If symptoms worsen, For re-evaluation and further treatment, As needed  Contact information:  [address] [facility] [facility] [place] [address] [phone]                         Medical Decision Making    Medical Decision Making:   Clinical Tests:   Lab Tests: Ordered and Reviewed  Radiological Study: Ordered and Reviewed  Medical Tests: Ordered and Reviewed           Scribe Attestation:   Scribe #1: I performed the above scribed service and the documentation accurately describes the services I performed. I attest to the accuracy of the note.    Attending:   Physician Attestation Statement for Scribe #1: I, Eyad Corrales MD, personally performed the services described in this documentation, as scribed by Winston Lawler, in my presence, and it is both accurate and complete.          Clinical Impression       ICD-10-CM ICD-9-CM   1. Pneumonia due to COVID-19 virus  U07.1 480.8    J12.82 079.89   2. Shortness of breath  R06.02 786.05       Disposition:    Disposition: Discharged  Condition: Stable         Eyad Corrales MD  01/25/22 0133

## 2022-01-25 NOTE — TELEPHONE ENCOUNTER
Nurse called wife to check on her this morning, also to let her know that nurse talked with Dr. Lucero who agreed that the ER was where pt needed to be due to the urgency in his issues. And to make sure that pt had a f/u scheduled for after he gets out. Nurse confirmed that f.u was scheduled for Feb 25.no answer, nurse left detailed vm

## 2022-01-26 NOTE — Clinical Note
Can you please assist in setting him up for home health as well as reaching out to wife. Not sure if you have been involved with them but wife extremely distressed over 's decline. Appreciate your help!

## 2022-01-26 NOTE — PATIENT INSTRUCTIONS
Ct c/a/p with po and iv contrast; virtual fup after   Labs today  Keep 2/25/22 appt with zometa and labs prior

## 2022-01-27 NOTE — PROGRESS NOTES
Subjective:      DATE OF VISIT: 1/26/22     Patient ID:?Sherif Ragland is a 76 y.o. male.?? MR#: [id number]   ?   REFERRING PROVIDER: No referring provider defined for this encounter.     ? Primary Care Providers:  Tobias Fox MD, MD (General)     CHIEF COMPLAINT: ?follow-up  ?   ONCOLOGIC DIAGNOSIS:  Metastatic prostate cancer to bone  ?   CURRENT TREATMENT:  Lupron    PAST TREATMENT:  Abiraterone 3/2020  Xofigo x6 last 04/13/2021  Palliative radiation 8Gy/1fx, T1-5, 06/03/2021  ?   ONCOLOGIC HISTORY:   ?   Oncology History   Prostate cancer metastatic to bone   3/18/2020 Initial Diagnosis    Prostate cancer metastatic to multiple sites     3/27/2020 -  Chemotherapy    Treatment Summary   Plan Name: OP PROSTATE LEUPROLIDE Q3MO  Treatment Goal: Control  Status: Active  Start Date: 3/27/2020  End Date: 11/4/2022 (Planned)  Provider: Felix Irvin MD  Chemotherapy: leuprolide (LUPRON) injection 22.5 mg, 22.5 mg, Intramuscular, Clinic/HOD 1 time, 8 of 12 cycles  Administration: 22.5 mg (3/27/2020), 22.5 mg (6/15/2020), 22.5 mg (3/23/2021), 22.5 mg (6/18/2021), 22.5 mg (9/10/2021)  leuprolide (3 month) (ELIGARD) syringe 22.5 mg, 22.5 mg, Subcutaneous, Clinic/HOD 1 time, 1 of 1 cycle  Administration: 22.5 mg (9/9/2020)     6/11/2021 Cancer Staged    Staging form: Prostate, AJCC 8th Edition  - Clinical stage from 6/11/2021: Stage IVB (cT3, cNX, pM1b, PSA: 88, Grade Group: 3)       Cancer Staging  Prostate cancer metastatic to bone  - Clinical stage from 6/11/2021: Stage IVB (cT3, cNX, pM1b, PSA: 88, Grade Group: 3) - Signed by Felix Irvin MD on 6/11/2021        HPI    Tested positive for COVID-19 01/08/2022 but did not require hospitalization has been in quarantine at home.  He has had significant fatigue worsen from prior baseline.  He had been on low-dose steroid to help with this but did find hypertension which may be associated.  He presented to emergency room most recently 01/22/2020 to with  hypertension, dyspnea and fatigue.  Chest x-ray showing atelectasis.  He notes consistent phlegm.  No fever chills.  Over the last month he has also had some difficulty urinating and loss of bowel continence.     Review of Systems    ?   A comprehensive 14-point review of systems was reviewed with patient and was negative other than as specified above.   ?     Objective:      Physical Exam        Vitals:    01/26/22 1551   BP: (!) 143/67   Pulse: 87   Resp: 20   Temp: 98.3 °F (36.8 °C)      ?   ECOG:?3   General appearance: Generally ill appearing, in no acute distress.   Head, eyes, ears, nose, and throat: moist mucous membranes.   Respiratory:  Normal work of breathing  Abdomen: obese, nontender, nondistended.   Extremities: Warm, without edema.   Neurologic: Alert and oriented.  Skin: No rashes, ecchymoses or petechial lesion.   Psychiatric:  Normal mood and affect.    ?   Laboratory:  ?   No visits with results within 1 Day(s) from this visit.   Latest known visit with results is:   Admission on 01/24/2022, Discharged on 01/24/2022   Component Date Value Ref Range Status    WBC 01/24/2022 5.90  3.90 - 12.70 K/uL Final    RBC 01/24/2022 3.00* 4.60 - 6.20 M/uL Final    Hemoglobin 01/24/2022 9.5* 14.0 - 18.0 g/dL Final    Hematocrit 01/24/2022 29.9* 40.0 - 54.0 % Final    MCV 01/24/2022 100* 82 - 98 fL Final    MCH 01/24/2022 31.7* 27.0 - 31.0 pg Final    MCHC 01/24/2022 31.8* 32.0 - 36.0 g/dL Final    RDW 01/24/2022 13.5  11.5 - 14.5 % Final    Platelets 01/24/2022 238  150 - 450 K/uL Final    MPV 01/24/2022 8.6* 9.2 - 12.9 fL Final    Immature Granulocytes 01/24/2022 1.7* 0.0 - 0.5 % Final    Gran # (ANC) 01/24/2022 4.4  1.8 - 7.7 K/uL Final    Immature Grans (Abs) 01/24/2022 0.10* 0.00 - 0.04 K/uL Final    Lymph # 01/24/2022 0.5* 1.0 - 4.8 K/uL Final    Mono # 01/24/2022 0.8  0.3 - 1.0 K/uL Final    Eos # 01/24/2022 0.1  0.0 - 0.5 K/uL Final    Baso # 01/24/2022 0.04  0.00 - 0.20 K/uL Final     nRBC 01/24/2022 0  0 /100 WBC Final    Gran % 01/24/2022 74.3* 38.0 - 73.0 % Final    Lymph % 01/24/2022 8.8* 18.0 - 48.0 % Final    Mono % 01/24/2022 13.1  4.0 - 15.0 % Final    Eosinophil % 01/24/2022 1.4  0.0 - 8.0 % Final    Basophil % 01/24/2022 0.7  0.0 - 1.9 % Final    Differential Method 01/24/2022 Automated   Final    Sodium 01/24/2022 139  136 - 145 mmol/L Final    Potassium 01/24/2022 3.9  3.5 - 5.1 mmol/L Final    Chloride 01/24/2022 100  95 - 110 mmol/L Final    CO2 01/24/2022 27  23 - 29 mmol/L Final    Glucose 01/24/2022 294* 70 - 110 mg/dL Final    BUN 01/24/2022 22  8 - 23 mg/dL Final    Creatinine 01/24/2022 1.8* 0.5 - 1.4 mg/dL Final    Calcium 01/24/2022 7.8* 8.7 - 10.5 mg/dL Final    Total Protein 01/24/2022 6.1  6.0 - 8.4 g/dL Final    Albumin 01/24/2022 2.8* 3.5 - 5.2 g/dL Final    Total Bilirubin 01/24/2022 0.6  0.1 - 1.0 mg/dL Final    Alkaline Phosphatase 01/24/2022 62  55 - 135 U/L Final    AST 01/24/2022 12  10 - 40 U/L Final    ALT 01/24/2022 13  10 - 44 U/L Final    Anion Gap 01/24/2022 12  8 - 16 mmol/L Final    eGFR if  01/24/2022 41* >60 mL/min/1.73 m^2 Final    eGFR if non  01/24/2022 36* >60 mL/min/1.73 m^2 Final    Troponin I 01/24/2022 0.023  0.000 - 0.026 ng/mL Final    BNP 01/24/2022 108* 0 - 99 pg/mL Final      ?   Tumor markers   ?  PSA    6/11/21 0.03  ?   Imaging:  ?  5/26/21  NM BONE SCAN WHOLE BODY     CLINICAL HISTORY:  metastatic prostate ca s/p [id number];  Malignant neoplasm of prostate     TECHNIQUE:  Following the IV administration of 26.1 mCi of Tc-99m-MDP, anterior and posterior delayed whole body images were acquired.     COMPARISON:  Nuclear medicine bone scan 02/20/2020     FINDINGS:  There is physiologic distribution of the radiopharmaceutical throughout the skeleton the genitourinary system.     Persistent abnormal uptake appreciated throughout the spine, numerous bilateral ribs, both humeri, both  shoulders, the orbits, right mandible and right femur again compatible with osseous metastatic disease.  Slightly more focal uptake appreciated within the anterior right 4th rib.  Distribution and appearance is otherwise similar as compared to the prior.     Impression:     Similar appearance of osseous metastatic disease as above.    Pathology:    I have reviewed the patient's medical history in detail and updated the computerized patient record.      ?   Assessment/Plan:   Prostate cancer metastatic to multiple sites  -     Iron and TIBC; Future; Expected date: 01/26/2022  -     Ferritin; Future; Expected date: 01/26/2022  -     Prostate Specific Antigen, Diagnostic; Future; Expected date: 01/26/2022  -     Testosterone; Future; Expected date: 01/26/2022  -     CT Chest Abdomen Pelvis With Contrast; Future; Expected date: 01/26/2022  -     Ambulatory referral/consult to Oncology Social Work; Future; Expected date: 02/03/2022    Chronic fatigue  -     Ambulatory referral/consult to Oncology Social Work; Future; Expected date: 02/03/2022    Iron deficiency anemia due to chronic blood loss    Prostate cancer metastatic to bone    Secondary cancer of bone    Anemia in neoplastic disease    COVID-19 virus infection       1. Prostate cancer metastatic to multiple sites    2. Chronic fatigue    3. Iron deficiency anemia due to chronic blood loss    4. Prostate cancer metastatic to bone    5. Secondary cancer of bone    6. Anemia in neoplastic disease    7. COVID-19 virus infection          Plan:     Problem List Items Addressed This Visit        Oncology    Prostate cancer metastatic to bone    Secondary cancer of bone    Iron deficiency anemia due to chronic blood loss      Other Visit Diagnoses     Prostate cancer metastatic to multiple sites    -  Primary    Chronic fatigue        Anemia in neoplastic disease        COVID-19 virus infection            Metastatic prostate adenocarcinoma:  Bone involvement, diagnosed  03/2020, status post abiraterone/prednisone, radium-223, palliative radiation 8 Gy x1 T1-5 on 06/03/2021.  Currently on Lupron single agent.  Most recent PSA 0.07 relatively stable. Rrestaging scans bone scan and CT chest abdomen pelvis 9/2021 stable bone and no visceral disease. I did discuss natural history of prostate cancer and treatment options which may include alternative hormonal therapies although of progression with abiraterone may be less effective enzalutamide, apalutamide e.g. I did discuss chemotherapy options may be reserved unless clear progression of disease due to concern for toxicities associated with therapy particularly given his current functional status and quality of life concerns, see below.  - recommend Ca/vitD and zometa q3mo for bone ppx, next due 02/25/2020 to   - MyRisk returned with no clinically significant mutations  - STRATA results requested, although sample decalcified given bone sample may be unable to interpret.  - progressive clinical decline over the last month especially may be multifactorial with COVID infection but will be important to assess for disease status.  Pending repeat PSA and restaging scans.  We discussed Axumin/PSMA PET however given other concerns with episodic bowel incontinence, urination difficulty and lung recommended CT with contrast at this time for more comprehensive evaluation.    Insomnia:  Improved on Seroquel.  Following with palliative Care.    Fatigue:  It appeared fatigue CT improved with dexamethasone however has developed hypertension which may be contributed to by dexamethasone.  Multifactorial with recent COVID infection.  Evaluation per above.    Anemia:  Mild anemia hemoglobin 11-12 with some worsening recently may be related to recent infection COVID.  Will check iron indices.    CKD:  Recommend follow-up with primary care for chronic conditions type 2 diabetes, CKD.  Dose reduction in Zometa per renal function.    Advance Care Planning    We discussed his concerns regarding poor quality of life primarily due to severe fatigue, limited exercise tolerance and insomnia.  I recommended trial of melatonin to start.  We can try dexamethasone for energy/appetite in the daytime not to use in later afternoon as this may interfere with sleep.  Recommended reestablishing care with palliative care for further evaluation and treatment of symptom management, goals of care and advance care planning.  Referral placed.     With functional decline and greater caregiver demand related to this I did suggest home health and social work referral to assist.    Follow-Up:   Patient Instructions   Ct c/a/p with po and iv contrast; virtual fup after   Labs today  Keep 2/25/22 appt with zometa and labs prior

## 2022-01-27 NOTE — PROGRESS NOTES
Patient was called pertaining to having not attended therapy in a few weeks. Patient did not answer, but messaged was left with information to call back.     Mary Florence, PT, DPT, PPCES

## 2022-01-31 NOTE — TELEPHONE ENCOUNTER
MARIA TERESA received message from FLEX Dawson  To assist pt with home health services to aid in his care. MARIA TERESA placed order for MD to sign. MARIA TERESA received signed order and faxed over all documentation to [facility] for home health request.

## 2022-02-03 NOTE — TELEPHONE ENCOUNTER
----- Message from Bijal Ferguson sent at 2/3/2022  2:45 PM CST -----  Contact: Mita/Spouse [phone]  Spouse is very frustrated. Stated that the patient has lost control of his bowels and spouse is requesting to speak with you asap. Needs to speak with you prior to the appt on Monday.    There are concerns that needs to be discussed with you asap    Please call and advise.    Thank You

## 2022-02-03 NOTE — TELEPHONE ENCOUNTER
Nurse spoke to pt wife who expresses increased frustration. She states that pt has loose bowels and continues to use the bathroom on himself and around the house. Pt wife works and is pt caretaker. Home health did come out today. Pt canceled CT today and when wife asked him about it, pt stated that he doesn't even know why he needs one. Pt wife states that pt is sleeping and abnormal amount of time during the day. Pt wife feels burnt out and doesn't know what else to do, also feels like the loose bowels was not addressed in visit. Nurse reached out to SW to get respite options or support groups for wife. SW will also reach out to wife. Nurse r/s CT and visit with Jazmine and explained that the CT could give potential answers but we needed pt cooperation. Wife verbalized understanding. Nurse will route to MD

## 2022-02-07 NOTE — TELEPHONE ENCOUNTER
No new care gaps identified.  Powered by [employer]. Reference number: [id number].   2/07/2022 11:45:47 AM CST

## 2022-02-11 NOTE — PATIENT INSTRUCTIONS
Follow-up with palliative care, FAYE Olivo  Iv iron venofer 200mg q 3 weekly doses  2/25/22 zometa  RV in 3 with prior labs cbc, cmp, T, PSA and CT chest w/ contrast

## 2022-02-11 NOTE — PROGRESS NOTES
The patient location is:  home  The chief complaint leading to consultation is:  Follow-up after CT scan    Visit type: audiovisual    Face to Face time with patient:  10 minutes  25 minutes of total time spent on the encounter, which includes face to face time and non-face to face time preparing to see the patient (eg, review of tests), Obtaining and/or reviewing separately obtained history, Documenting clinical information in the electronic or other health record, Independently interpreting results (not separately reported) and communicating results to the patient/family/caregiver, or Care coordination (not separately reported).         Each patient to whom he or she provides medical services by telemedicine is:  (1) informed of the relationship between the physician and patient and the respective role of any other health care provider with respect to management of the patient; and (2) notified that he or she may decline to receive medical services by telemedicine and may withdraw from such care at any time.    Notes:     Subjective:      DATE OF VISIT: 2/11/22     Patient ID:Priyanka Ragland is a 76 y.o. male.?? MR#: [id number]   ?   REFERRING PROVIDER: No referring provider defined for this encounter.     ? Primary Care Providers:  Tobias Fox MD, MD (General)     CHIEF COMPLAINT: ?follow-up  ?   ONCOLOGIC DIAGNOSIS:  Metastatic prostate cancer to bone  ?   CURRENT TREATMENT:  Lupron    PAST TREATMENT:  Abiraterone 3/2020  Xofigo x6 last 04/13/2021  Palliative radiation 8Gy/1fx, T1-5, 06/03/2021  ?   ONCOLOGIC HISTORY:   ?   Oncology History   Prostate cancer metastatic to bone   3/18/2020 Initial Diagnosis    Prostate cancer metastatic to multiple sites     3/27/2020 -  Chemotherapy    Treatment Summary   Plan Name: OP PROSTATE LEUPROLIDE Q3MO  Treatment Goal: Control  Status: Active  Start Date: 3/27/2020  End Date: 11/4/2022 (Planned)  Provider: Felix Irvin MD  Chemotherapy: leuprolide (LUPRON)  injection 22.5 mg, 22.5 mg, Intramuscular, Clinic/HOD 1 time, 8 of 12 cycles  Administration: 22.5 mg (3/27/2020), 22.5 mg (6/15/2020), 22.5 mg (3/23/2021), 22.5 mg (6/18/2021), 22.5 mg (9/10/2021)  leuprolide (3 month) (ELIGARD) syringe 22.5 mg, 22.5 mg, Subcutaneous, Clinic/HOD 1 time, 1 of 1 cycle  Administration: 22.5 mg (9/9/2020)     6/11/2021 Cancer Staged    Staging form: Prostate, AJCC 8th Edition  - Clinical stage from 6/11/2021: Stage IVB (cT3, cNX, pM1b, PSA: 88, Grade Group: 3)       Cancer Staging  Prostate cancer metastatic to bone  - Clinical stage from 6/11/2021: Stage IVB (cT3, cNX, pM1b, PSA: 88, Grade Group: 3) - Signed by Felix Irvin MD on 6/11/2021        HPI    He continues have difficulty with generalized fatigue and insomnia.  CT scan prior to this visit.  Review of Systems    ?   A comprehensive 14-point review of systems was reviewed with patient and was negative other than as specified above.   ?     Objective:      Physical Exam      Limited due to virtual visit  There were no vitals filed for this visit.   ?   ECOG:?3   General appearance: Generally ill appearing, in no acute distress.     ?   Laboratory:  ?   No visits with results within 1 Day(s) from this visit.   Latest known visit with results is:   Lab Visit on 02/09/2022   Component Date Value Ref Range Status    Creatinine 02/09/2022 1.8* 0.5 - 1.4 mg/dL Final    eGFR if  02/09/2022 41* >60 mL/min/1.73 m^2 Final    eGFR if non  02/09/2022 36* >60 mL/min/1.73 m^2 Final      ?   Lab Results   Component Value Date    WBC 5.90 01/24/2022    HGB 9.5 (L) 01/24/2022    HCT 29.9 (L) 01/24/2022     (H) 01/24/2022     01/24/2022         Tumor markers   ?  PSA    6/11/21 0.03  ?   Imaging:  ?  5/26/21  NM BONE SCAN WHOLE BODY     CLINICAL HISTORY:  metastatic prostate ca s/p [id number];  Malignant neoplasm of prostate     TECHNIQUE:  Following the IV administration of 26.1 mCi of  Tc-99m-MDP, anterior and posterior delayed whole body images were acquired.     COMPARISON:  Nuclear medicine bone scan 02/20/2020     FINDINGS:  There is physiologic distribution of the radiopharmaceutical throughout the skeleton the genitourinary system.     Persistent abnormal uptake appreciated throughout the spine, numerous bilateral ribs, both humeri, both shoulders, the orbits, right mandible and right femur again compatible with osseous metastatic disease.  Slightly more focal uptake appreciated within the anterior right 4th rib.  Distribution and appearance is otherwise similar as compared to the prior.     Impression:     Similar appearance of osseous metastatic disease as above.  Results for orders placed or performed during the hospital encounter of 02/09/22 (from the past 2160 hour(s))   CT Chest Abdomen Pelvis With Contrast    Impression    Extensive osseous metastatic disease which appears overall similar when compared to the prior examination from 09/02/2021.    Mildly enlarged right paratracheal lymph node is increased in size when compared to the prior examination.    Pleural base nodule at the peripheral aspect of the right upper lobe is slightly larger when compared to the prior examination, now 8 mm.  Additionally, there are new irregular opacities in the medial aspect of the right upper lobe measuring up to 1.5 cm.  Metastatic disease not excluded.  Recommend attention on follow-up.    No evidence of metastatic disease in the abdomen or pelvis soft tissues.    Additional findings as above.      Electronically signed by: Juan Hager  Date:    02/09/2022  Time:    15:34     *Note: Due to a large number of results and/or encounters for the requested time period, some results have not been displayed. A complete set of results can be found in Results Review.       Pathology:    I have reviewed the patient's medical history in detail and updated the computerized patient record.      ?    Assessment/Plan:   Prostate cancer metastatic to multiple sites    Chronic fatigue    Iron deficiency anemia due to chronic blood loss    Primary insomnia       1. Prostate cancer metastatic to multiple sites    2. Chronic fatigue    3. Iron deficiency anemia due to chronic blood loss    4. Primary insomnia          Plan:     Problem List Items Addressed This Visit        Oncology    Iron deficiency anemia due to chronic blood loss       Other    Insomnia      Other Visit Diagnoses     Prostate cancer metastatic to multiple sites    -  Primary    Chronic fatigue            Metastatic prostate adenocarcinoma:  Bone involvement, diagnosed 03/2020, status post abiraterone/prednisone, radium-223, palliative radiation 8 Gy x1 T1-5 on 06/03/2021.  Currently on Lupron single agent.  Most recent PSA 0.07 relatively stable. Rrestaging scans bone scan and CT chest abdomen pelvis 9/2021 stable bone and no visceral disease. I did discuss natural history of prostate cancer and treatment options which may include alternative hormonal therapies although of progression with abiraterone may be less effective enzalutamide, apalutamide e.g. I did discuss chemotherapy options may be reserved unless clear progression of disease due to concern for toxicities associated with therapy particularly given his current functional status and quality of life concerns, see below.  - recommend Ca/vitD and zometa q3mo for bone ppx, next due 02/25/2020 to   - MyRisk returned with no clinically significant mutations  - STRATA results requested, although sample decalcified given bone sample may be unable to interpret.  - progressive clinical decline over the last month especially may be multifactorial with COVID infection but will be important to assess for disease status.  Pending repeat PSA and restaging scans.    Ct scan reviewed during follow-up noted pleural based nodule right upper lung opacity.  Discussed possible relation to recent COVID  pneumonia but would be unable to exclude malignancy.  Other sites of osseous metastatic disease noted to be stable.  PSA continues to be stable low/suppressed testosterone.  Recommend monitoring with repeat CT chest in 3 months along with prostate cancer markers.  Recommend follow-up with palliative care.    Insomnia:  Improved on Seroquel.  Following with palliative Care.    Fatigue:  It appeared fatigue CT improved with dexamethasone however has developed hypertension which may be contributed to by dexamethasone.  Multifactorial with recent COVID infection.  Evaluation per above.    Anemia:  Progressive anemia hgb <10. Low iron saturation, ferritin normal although likely inflammation. Trial of iv iron therapy, ordered.    CKD:  Recommend follow-up with primary care for chronic conditions type 2 diabetes, CKD.  Dose reduction in Zometa per renal function.    Advance Care Planning   We discussed his concerns regarding poor quality of life primarily due to severe fatigue, limited exercise tolerance and insomnia.  I recommended trial of melatonin to start.  We can try dexamethasone for energy/appetite in the daytime not to use in later afternoon as this may interfere with sleep.  Recommended reestablishing care with palliative care for further evaluation and treatment of symptom management, goals of care and advance care planning.  Referral placed.     With functional decline and greater caregiver demand related to this I did suggest home health and social work referral to assist.    Follow-Up:   Patient Instructions   Follow-up with palliative careFAYE  Iv iron venofer 200mg q 3 weekly doses  2/25/22 zometa  RV in 3 with prior labs cbc, cmp, T, PSA and CT chest w/ contrast

## 2022-02-14 NOTE — TELEPHONE ENCOUNTER
SWer intern called on today to discuss Distress Score of 7 due to feelings of depression and sadness. Pt states that he has no energy and no strength which is depressing for him. Pt states that he hopes the iron shot that he is getting today will help increase his strength. Shashar provided contact information to pt and will remain available.

## 2022-02-14 NOTE — TELEPHONE ENCOUNTER
Pt requesting medication refill. Correct medication, pharmacy, and any allergies verified. //mf    To be filled at: [place] [id number] [facility] [place] - [id number] [facility]

## 2022-02-15 NOTE — TELEPHONE ENCOUNTER
Called patient. Left message of infusion dates.         ----- Message from Aleshia Cortes MA sent at 2/14/2022  4:06 PM CST -----  I scheduled him 2/21, 2/28, 3/7  all at 3 pm  please let him know.   thanks  ----- Message -----  From: Tavia Raymond LPN  Sent: 2/11/2022   2:45 PM CST  To: Aleshia Cortes MA, Martha Lyon LPN    Please see Dr. Lucero's notes for 2/11/22 visit.    I copied and pasted    Instructions      Follow up in about 3 months (around 5/11/2022).  Follow-up with palliative careFAYE    Iv iron venofer 200mg q 3 weekly doses  2/25/22 zometa    RV in 3 with prior labs cbc, cmp, T, PSA and CT chest w/ contrast    Thanks

## 2022-02-16 NOTE — TELEPHONE ENCOUNTER
Nurse called pt to set up a follow up apt per dr montalvo for patient is having trouble with fatigue, limited exercise tolerance and insomnia    no answer from any of pt's contact number, voice message left to call to speak with palliative nurse.

## 2022-02-16 NOTE — PROGRESS NOTES
[facility]   Discharge Note    Name: Sherif Ragland  Clinic Number: [id number]    Therapy Diagnosis: No diagnosis found.  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 Pain: Pt will demonstrate improved pain by reports of less than or equal to 7/10 worst pain on the verbal rating scale in order to progress toward maximal functional ability and improve QOL.   2.  Mobility: Patient will improve AROM by 25% in all directions (only 50% limitation) in order to return to maximal functional potential and improve quality of life.   3.  HEP: Patient will demonstrate independence with HEP in order to progress toward functional independence.      Long Term Goals:  8 weeks   1. Pain: Pt will demonstrate improved pain by reports of less than or equal to 5/10 worst pain on the verbal rating scale in order to progress toward maximal functional ability and improve QOL.     2. Function: Patient will demonstrate improved function as indicated by a functional limitation score of 45% on the FOTO.   3. Mobility: Patient will improve AROM of lumbar spine by 50% in all directions (only 25% limitation) in order to return to maximal functional potential and improve quality of life.   4. Strength: Patient will improve strength to 5/5 in bilateral lower extremities in order to improve functional independence and quality of life.   5. Patient will return to normal ADL's, IADL's, community involvement, recreational activities, and  work-related activities with no pain and maximal function.             PLAN   This patient is discharged from Physical Therapy    Mary Florence, PT, DPT, PPCES

## 2022-02-17 NOTE — PROGRESS NOTES
[facility]  RADIATION ONCOLOGY FOLLOW UP    Name: Sherif Ragland : 1945     The patient location is: [place]  The chief complaint leading to consultation is: prostate cancer    Visit type: audiovisual    Face to Face time with patient: 7  20 minutes of total time spent on the encounter, which includes face to face time and non-face to face time preparing to see the patient (eg, review of tests), Obtaining and/or reviewing separately obtained history, Documenting clinical information in the electronic or other health record, Independently interpreting results (not separately reported) and communicating results to the patient/family/caregiver, or Care coordination (not separately reported).     Each patient to whom he or she provides medical services by telemedicine is:  (1) informed of the relationship between the physician and patient and the respective role of any other health care provider with respect to management of the patient; and (2) notified that he or she may decline to receive medical services by telemedicine and may withdraw from such care at any time.    DIAGNOSIS: metastatic prostate cancer to bone     TREATMENT HISTORY:   1. Abiraterone/Casodex  2. Xofigo #1 on 20  3. Xofigo #2 on 20  4. Xofigo #3 on 21  5. Xofigo #4 on 21  6. Xofigo #5 on 3/23/21  7. Xofigo #6 on 21  8. 8Gy/1fx to T1-5 on 6/3/21  9. Lupron ongoing    INTERVAL HISTORY: Sherif Ragland is a pleasant 76 y.o. male who presents today for follow-up.  He was last seen 2021  PSA 0.05  CT 22 showed no significant change in diffuse osseous metastatic disease compared to prior.    Still with fatigue and insomnia, on dexamethasone per medical oncology which initially worked but lost some effect recently.  Getting iron infusion soon.  Continues on Lupron.  No bony pains.    PHYSICAL EXAM:   Constitutional: well appearing, no acute distress, ECOG 2  Vitals:    There were  no vitals taken for this visit.  Eyes: sclera anicteric, EOMI, pupils equal, round  Remainder of exam limited telemedicine    Laboratory & X-Ray Findings: Per above.  Labs reviewed personally.    PSA   1/26/22 - 0.05  6/11/21 - 0.03  10/5/20 - 2.0  6/15/20 - 0.14  3/27/20 - 88.3    ASSESSMENT: PSA stable on ADT, no radiographic change in osseous disease    PLAN: Mr. Ragland continues on ADT, no symptomatic bony pain. PSA low, no progression of bony disease on imaging.  Will see if iron infusion improves his chronic fatigue, otherwise would consider discussion re intermittent ADT/drug holiday at some point.  Can follow up with me as needed, no radiation intervention needed.    Follow up with me in 6 months    I spent approximately 20 minutes reviewing the available records and evaluating the patient ,including time spent face to face with the patient in counseling and coordinating this patient's care.    Amber Chambers III, M.D.  Radiation Oncology  [facility] [address] [place] [address]  Ph: [phone]  [email or web address]

## 2022-02-21 NOTE — DISCHARGE INSTRUCTIONS
[facility]  [address]  [address]  [phone] phone     [phone] fax  Hours of Operation: Monday- Friday 8:00am- 5:00pm  After hours phone  [phone]  Hematology / Oncology Physicians on call      STEPHANIE Hargrove Dr., Dr., Dr., NP    Please call with any concerns regarding your appointment today.

## 2022-02-22 NOTE — TELEPHONE ENCOUNTER
Nurse called pt w Dr. Manriquez recommendations of staying hydrated and antinausea medications. As well as a referral to a pulmonologist. Pt states that he will continue to manage at home and call back if he feels this other Md is necessary.

## 2022-02-23 PROBLEM — J18.9 RIGHT LOWER LOBE PNEUMONIA: Status: ACTIVE | Noted: 2022-01-01

## 2022-02-23 PROBLEM — R05.3 CHRONIC COUGHING: Status: ACTIVE | Noted: 2022-01-01

## 2022-02-23 NOTE — CONSULTS
"Chief Complaint   Patient presents with    Shortness of Breath       Pt CO SOB x 2 days. Pt has non-productive cough. Pt hx CA bone. Had iron infusion yesterday         HPI:  is an elderly 76-year-old  male with PMH significant for prostate cancer on Lupron therapy, presented to the ED complaining of "incessant coughing, that wound go way, for the past few weeks.  Per spouse at the bedside, they have tried many different cough suppressants with no significant improvement.  States that something is stuck in his trachea, does not go down, does not come up.  Feels short of breath.  Denies chest pain.  Denies fever, chills, nausea vomiting.  Laboratory workup is unremarkable, at baseline.  Creatinine 1.7 close to baseline.  CXR reveals mild right lower lobe infiltrate.  Received IV Rocephin, Zithromax in the ED.  patient reports generalized weakness, decreased appetite.     Admitting diagnosis:  Acute bronchitis.  Persistent coughing.  Generalized weakness.     2/23/2022  Claimed that he has been coughing non-productive for years and complained of heartburn sometimes. Takes protonix 40 mg tab po day as needed. No fever or chills or purulent sputum or hemoptysis. Claimed that his cough has always been non- productive. Never seen a pulmonologist outpatient. Chst CT scan showed no apparent lesions or foreign bodies in the trachea and bronchial airways but showed more pulmonary congestion. Held IVF and started lasix 20 mg IV q 12 hrs. Had several home  BP meds that were not started yet. Claimed that he is breathing better.       PMHx:      Past Medical History:   Diagnosis Date    CKD (chronic kidney disease) stage 3, GFR 30-59 ml/min     COVID-19 11/30/2020    Diabetes mellitus, type 2 1997    Hyperlipidemia     Hypertension     Hypogonadism in male     Prostate cancer metastatic to bone     Tubular adenoma 10/2017        PMSx:      Past Surgical History:   Procedure Laterality Date    CATARACT " EXTRACTION Right 01/31/2018    CATARACT EXTRACTION W/  INTRAOCULAR LENS IMPLANT Left 03/13/2019    COLONOSCOPY N/A 11/2/2017    Procedure: COLONOSCOPY;  Surgeon: Alek Francois MD;  Location: [facility] ENDO;  Service: Endoscopy;  Laterality: N/A;    COLONOSCOPY N/A 10/27/2020    Procedure: COLONOSCOPY;  Surgeon: Aditi Grijalva MD;  Location[facility] ENDO;  Service: Endoscopy;  Laterality: N/A;    ESOPHAGOGASTRODUODENOSCOPY N/A 10/27/2020    Procedure: ESOPHAGOGASTRODUODENOSCOPY (EGD);  Surgeon: Aditi Grijalva MD;  [facility];  Service: Endoscopy;  Laterality: N/A;    TIBIA FRACTURE SURGERY      right leg x2     TONSILLECTOMY      TRANSURETHRAL RESECTION OF PROSTATE N/A 10/17/2019    Procedure: TURP (TRANSURETHRAL RESECTION OF PROSTATE);  Surgeon: Mir Hale IV, MD;  Location: [facility] OR;  Service: Urology;  Laterality: N/A;        Social Hx:      Social History     Socioeconomic History    Marital status:     Number of children: 4   Occupational History    Occupation: [employer]   Tobacco Use    Smoking status: Never Smoker    Smokeless tobacco: Never Used   Substance and Sexual Activity    Alcohol use: Yes     Comment: rarely    Drug use: No    Sexual activity: Yes     Partners: Female   Social History Narrative    Surrogate decision maker, Wife, Mita Ragland [phone]        Family Hx:      Family History   Problem Relation Age of Onset    Stroke Mother     Heart disease Father     Stroke Brother         Allergies:      Review of patient's allergies indicates:  No Known Allergies    Medications:      Current Facility-Administered Medications   Medication    acetaminophen tablet 650 mg    albuterol-ipratropium 2.5 mg-0.5 mg/3 mL nebulizer solution 3 mL    aluminum-magnesium hydroxide-simethicone 200-200-20 mg/5 mL suspension 30 mL    amLODIPine tablet 10 mg    [START ON 2/24/2022] azithromycin tablet 500 mg    benzonatate capsule 200 mg    [START ON 2/24/2022]  cefTRIAXone (ROCEPHIN) 1 g/50 mL D5W IVPB    dextromethorphan-guaiFENesin  mg/5 ml liquid 10 mL    enoxaparin injection 40 mg    furosemide injection 20 mg    guaiFENesin 100 mg/5 ml syrup 200 mg    hydrALAZINE injection 10 mg    methylPREDNISolone sodium succinate injection 60 mg    ondansetron injection 4 mg    pantoprazole EC tablet 40 mg     Facility-Administered Medications Ordered in Other Encounters   Medication    leuprolide (LUPRON) injection 22.5 mg            Vital Signs (Most Recent)  Temp: 97.8 °F (36.6 °C) (02/23/22 1208)  Pulse: 89 (02/23/22 1208)  Resp: 20 (02/23/22 1208)  BP: (!) 171/78 (02/23/22 1208)  SpO2: 95 % (02/23/22 1208)    Vital Signs Range (Last 24H):  Temp:  [97.7 °F (36.5 °C)-98.7 °F (37.1 °C)]   Pulse:  [82-94]   Resp:  [18-24]   BP: (134-183)/(70-93)   SpO2:  [91 %-96 %]     I & O (Last 24H):    Intake/Output Summary (Last 24 hours) at 2/23/2022 1209  Last data filed at 2/23/2022 0900  Gross per 24 hour   Intake 1000 ml   Output --   Net 1000 ml     Ventilator Data (Last 24H):     Oxygen Concentration (%):  [28] 28       Physical Exam:    ROS: As per the admitting HPI. Other systems are negative.     Physical Exam:  Awake, alert, coherent, not in acute respiratory distress.  Head/NECK:  normocephalic, atraumatic; neck supple; no neck tenderness; no jugular vein distension  EENT: anicteric sclerae; pupils symmetrical; no ear discharge; no nasal discharge or polyp.   LUNGS: no wheezing or rhonci or coarse crackles or rales..   HEART: S1, S2 distinct; normal rate; regular rhythm   ABDOMEN: Soft, positive bowel sounds, nontender, nondistended   :  no flank tenderness; urinary bladder nondistended and nontender.  NEUROLOGICAL: no facial asymmetry; no apparent motor or sensory deficit.  sedated  SKIN: No rash.  Musculoskeletal: No deformities.  Dermatological: No rashes.  Psychosomatic: Normal mood and affect.        Laboratory (Last 24H):  CBC:  Recent Labs   Lab  02/23/22  0024   WBC 9.39   HGB 8.7*   HCT 27.6*        CMP:  Recent Labs   Lab 02/23/22  0024   CALCIUM 7.8*   ALBUMIN 3.1*   PROT 6.6      K 4.0   CO2 24   CL 99   BUN 14   CREATININE 1.7*   ALKPHOS 62   ALT 10   AST 18   BILITOT 0.6       Labs within the past 24 hours have been reviewed.    Imaging:   No results found for this visit on 02/23/22.  No results found for this visit on 02/23/22.  @CTA@  Imaging Results          X-Ray Chest AP Portable (Final result)  Result time 02/23/22 07:38:08    Final result by Nathan Barakat MD (02/23/22 07:38:08)                 Impression:      1.  Cardiac silhouette size enlargement.  Mild vascular congestion noted.  Mild interstitial pulmonary edema or interstitial infectious process difficult to exclude in the right clinical setting.    2.  Stable findings as noted above.      Electronically signed by: Nathan Barakat MD  Date:    02/23/2022  Time:    07:38             Narrative:    EXAMINATION:  XR CHEST AP PORTABLE    CLINICAL HISTORY:  cough;    COMPARISON:  January 24, 2022, as well as studies dating back to January 14, 2019    FINDINGS:  Stable elevation of the right hemidiaphragm with adjacent chronic scarring or atelectasis.  The lungs are free of new pulmonary opacities.  The cardiac silhouette size is enlarged.  The trachea is midline and the mediastinal width is normal. Negative for focal infiltrate, effusion or pneumothorax. Pulmonary vasculature is congested.  Negative for osseous abnormalities. Tortuous aorta with calcifications of the aortic knob.  There are degenerative changes of the spine and both shoulder girdles.                    ED Interpretation by Jasmyn Pagan Do, MD (02/23/22 00:18:37, [facility] Emergency Medicine)    Poor Inspiratory film, ? RLL infiltrate                                    ASSESSMENT/PLAN:     1. Chronic persistent non-productive cough     -  Chest CT scan without contrast  2/23/2022 - showed no  lesions or abnormality in the trachea and bronchial airways. But increased vascular markings, suggestive of pulmonary congestion. Concomitant atypical pneumonia could not be completely excluded. WBC was normal this morning. Cough non-productive. Procalcitonin pending.      - most likely related to GERD.      - protonix 40 mg tab po daily.     - GI follow up.     - Pulmonary clinic follow up when discharged. Will need PFT as outpatient and possible methacholine challenge test. May increase protonix 40 mg tab po BID if 4 weeks of PPI therapy does not alleviate patient's cough.    2. Right paratracheal  lymphadenopathy      - about 1.2 cm. - possible reactive process to lung infection/ inflammation.      - continue treating for CAP.      - repeat chest CT scan in 6 weeks and pulmonary clinic follow.    3. Pulmonary congestion/ pulmonary edema      - based on CXR and chest CT scan      - lasix 20 mg IV q 12 hrs. May increase dose if his BP tolerates it.       - optimize BP control and CHF therapy.      - D/C IVF 0.9NS    4. Hypertension       - poorly controlled.     - resume home medications and closely watch her response.     5. Right upper lobe nodule       - pleural based.      - most likely benign.      - chest CT scan in 6 weeks. Pulmonary clinic follow up.     Counseling/Consultation:I have discussed the care of this patient in detail with the nursing staff and         Spent  40   minutes in evaluating patient , reviewing clinical history and progress in the hospital, medications, vital signs, images, labs and talking to patient, and formulating treatment plan and discussing treatment plan with patient.       Rony Currie MD  Pulmonologist Intensivist    Thank you for this consult and the pleasure of evaluating and caring for this patient      Rony Currie MD  [facility]

## 2022-02-23 NOTE — PLAN OF CARE
P.T. EVAL COMPLETE, PT CURRENTLY REQUIRES SBA FOR BED MOBILITY, CGA FOR TF'S AND GAIT WITH RW, O2 IN TOW.  P.T. RECOMMENDS HHPT

## 2022-02-23 NOTE — PT/OT/SLP PROGRESS
Occupational Therapy      Patient Name:  Sherif Ragland   MRN:  [id number]    OT Eval attempted 9:03am, pt away at CT scan. OT eval initiated through chart review, will attempt to complete later today.    Anaid Hawthorne, PT/OT  2/23/2022

## 2022-02-23 NOTE — PT/OT/SLP PROGRESS
Physical Therapy      Patient Name:  Sherif Ragland   MRN:  [id number]    PWENDY MENDOZA INITIATED THIS AM VIA CHART REVIEW, PT CURRENTLY IN CT SCAN, WILL ASSESS PT LATER THIS AM    Sindi Mills, PT  2/23/2022  0910

## 2022-02-23 NOTE — SUBJECTIVE & OBJECTIVE
Past Medical History:   Diagnosis Date    CKD (chronic kidney disease) stage 3, GFR 30-59 ml/min     COVID-19 11/30/2020    Diabetes mellitus, type 2 1997    Hyperlipidemia     Hypertension     Hypogonadism in male     Prostate cancer metastatic to bone     Tubular adenoma 10/2017       Past Surgical History:   Procedure Laterality Date    CATARACT EXTRACTION Right 01/31/2018    CATARACT EXTRACTION W/  INTRAOCULAR LENS IMPLANT Left 03/13/2019    COLONOSCOPY N/A 11/2/2017    Procedure: COLONOSCOPY;  Surgeon: Alek Francois MD;  Location: [facility] ENDO;  Service: Endoscopy;  Laterality: N/A;    COLONOSCOPY N/A 10/27/2020    Procedure: COLONOSCOPY;  Surgeon: Aditi Grijalva MD;  Location: [facility] ENDO;  Service: Endoscopy;  Laterality: N/A;    ESOPHAGOGASTRODUODENOSCOPY N/A 10/27/2020    Procedure: ESOPHAGOGASTRODUODENOSCOPY (EGD);  Surgeon: Aditi Grijalva MD;  Location: [facility];  Service: Endoscopy;  Laterality: N/A;    TIBIA FRACTURE SURGERY      right leg x2     TONSILLECTOMY      TRANSURETHRAL RESECTION OF PROSTATE N/A 10/17/2019    Procedure: TURP (TRANSURETHRAL RESECTION OF PROSTATE);  Surgeon: Mir Hale IV, MD;  Location: [facility] OR;  Service: Urology;  Laterality: N/A;       Review of patient's allergies indicates:  No Known Allergies    Current Facility-Administered Medications on File Prior to Encounter   Medication    leuprolide (LUPRON) injection 22.5 mg     Current Outpatient Medications on File Prior to Encounter   Medication Sig    ALPRAZolam (XANAX) 0.25 MG tablet TAKE 1 TO 2 TABLETS BY MOUTH ONCE DAILY AS NEEDED FOR ANXIETY    atorvastatin (LIPITOR) 40 MG tablet Take 1 tablet (40 mg total) by mouth once daily.    bisacodyL (DULCOLAX) 5 mg EC tablet Take 5 mg by mouth daily as needed.    cloNIDine 0.3 mg/24 hr td ptwk (CATAPRES) 0.3 mg/24 hr Place 1 patch onto the skin every 7 days.    dexAMETHasone (DECADRON) 2 MG tablet Take 1 tablet (2 mg total) by mouth 2 (two) times daily with meals.     furosemide (LASIX) 20 MG tablet Take 1 tablet (20 mg total) by mouth once daily. for 3 days    hydrALAZINE (APRESOLINE) 100 MG tablet Take 1 tablet (100 mg total) by mouth every 8 (eight) hours.    HYDROcodone-acetaminophen (NORCO)  mg per tablet Take 1 tablet by mouth every 4 (four) hours as needed for Pain. for pain.    insulin detemir U-100 (LEVEMIR FLEXTOUCH U-100 INSULN) 100 unit/mL (3 mL) InPn pen Inject 70 Units into the skin once daily.    lactulose (CHRONULAC) 10 gram/15 mL solution Take 15 mLs (10 g total) by mouth 2 (two) times daily.    metoprolol succinate (TOPROL-XL) 100 MG 24 hr tablet Take 1 tablet (100 mg total) by mouth 2 (two) times daily.    pantoprazole (PROTONIX) 40 MG tablet Take 1 tablet (40 mg total) by mouth once daily.    potassium chloride SA (K-DUR,KLOR-CON) 20 MEQ tablet Take 1 tablet (20 mEq total) by mouth 2 (two) times daily.    QUEtiapine (SEROQUEL) 25 MG Tab Take 1 tablet (25 mg total) by mouth nightly as needed (insomnia).    repaglinide (PRANDIN) 0.5 MG tablet Take 1 tablet (0.5 mg total) by mouth 3 (three) times daily before meals.    terazosin (HYTRIN) 5 MG capsule Take 1 capsule by mouth.    valsartan-hydrochlorothiazide (DIOVAN-HCT) 320-12.5 mg per tablet TAKE 1 TABLET BY MOUTH EVERY DAY    [DISCONTINUED] amLODIPine (NORVASC) 5 MG tablet Take 2 tablets (10 mg total) by mouth once daily.     Family History       Problem Relation (Age of Onset)    Heart disease Father    Stroke Mother, Brother          Tobacco Use    Smoking status: Never Smoker    Smokeless tobacco: Never Used   Substance and Sexual Activity    Alcohol use: Yes     Comment: rarely    Drug use: No    Sexual activity: Yes     Partners: Female     Review of Systems   Constitutional:  Positive for fatigue. Negative for chills and fever.   HENT: Negative.  Negative for congestion, rhinorrhea, sore throat and trouble swallowing.    Eyes: Negative.  Negative for visual disturbance.   Respiratory:  Positive for  cough and shortness of breath. Negative for wheezing.    Cardiovascular: Negative.  Negative for chest pain and palpitations.   Gastrointestinal: Negative.  Negative for abdominal pain, diarrhea, nausea and vomiting.   Endocrine: Negative.    Genitourinary: Negative.  Negative for dysuria and flank pain.   Musculoskeletal: Negative.  Negative for back pain.   Skin: Negative.  Negative for rash.   Allergic/Immunologic: Negative.    Neurological:  Positive for weakness (generalized). Negative for speech difficulty, numbness and headaches.   Hematological: Negative.    Psychiatric/Behavioral: Negative.  Negative for hallucinations.    All other systems reviewed and are negative.  Objective:     Vital Signs (Most Recent):  Temp: 98.7 °F (37.1 °C) (02/22/22 2343)  Pulse: 86 (02/23/22 0212)  Resp: 20 (02/23/22 0228)  BP: (!) 134/93 (02/23/22 0036)  SpO2: (!) 92 % (02/23/22 0212)   Vital Signs (24h Range):  Temp:  [98.7 °F (37.1 °C)] 98.7 °F (37.1 °C)  Pulse:  [82-86] 86  Resp:  [18-24] 20  SpO2:  [92 %-94 %] 92 %  BP: (134-176)/(70-93) 134/93     Weight: 129.3 kg (285 lb)  Body mass index is 38.65 kg/m².    Physical Exam  Vitals and nursing note reviewed.   Constitutional:       General: He is awake. He is not in acute distress.     Appearance: Normal appearance. He is ill-appearing.      Interventions: Nasal cannula in place.   HENT:      Head: Normocephalic and atraumatic.      Mouth/Throat:      Mouth: Mucous membranes are moist.   Eyes:      General: No scleral icterus.     Conjunctiva/sclera: Conjunctivae normal.   Cardiovascular:      Rate and Rhythm: Normal rate and regular rhythm.      Heart sounds: No murmur heard.  Pulmonary:      Effort: Pulmonary effort is normal. No respiratory distress.      Breath sounds: Wheezing and rhonchi present.   Abdominal:      Palpations: Abdomen is soft.      Tenderness: There is no abdominal tenderness.   Musculoskeletal:         General: No swelling. Normal range of motion.       Cervical back: No rigidity.   Skin:     General: Skin is warm.      Coloration: Skin is not jaundiced.   Neurological:      General: No focal deficit present.      Mental Status: He is alert and oriented to person, place, and time. Mental status is at baseline.   Psychiatric:         Attention and Perception: Attention normal.         Speech: Speech normal.         Behavior: Behavior normal. Behavior is cooperative.           Significant Labs: All pertinent labs within the past 24 hours have been reviewed.  Recent Lab Results         02/23/22  0047   02/23/22  0024        Albumin   3.1       Alkaline Phosphatase   62       ALT   10       Anion Gap   14       AST   18       Baso #   0.03       Basophil %   0.3       BILIRUBIN TOTAL   0.6  Comment: For infants and newborns, interpretation of results should be based  on gestational age, weight and in agreement with clinical  observations.    Premature Infant recommended reference ranges:  Up to 24 hours.............<8.0 mg/dL  Up to 48 hours............<12.0 mg/dL  3-5 days..................<15.0 mg/dL  6-29 days.................<15.0 mg/dL         BUN   14       Calcium   7.8       Chloride   99       CO2   24       CPK   184       Creatinine   1.7       CRP   21.4       Differential Method   Automated       eGFR if    44       eGFR if non    38  Comment: Calculation used to obtain the estimated glomerular filtration  rate (eGFR) is the CKD-EPI equation.          Eos #   0.3       Eosinophil %   2.7       Glucose   108       Gran # (ANC)   7.1       Gran %   75.7       Hematocrit   27.6       Hemoglobin   8.7       Immature Grans (Abs)   0.10  Comment: Mild elevation in immature granulocytes is non specific and   can be seen in a variety of conditions including stress response,   acute inflammation, trauma and pregnancy. Correlation with other   laboratory and clinical findings is essential.         Immature Granulocytes   1.1        Lactate, Dimas   1.9  Comment: Falsely low lactic acid results can be found in samples   containing >=13.0 mg/dL total bilirubin and/or >=3.5 mg/dL   direct bilirubin.         Lymph #   0.9       Lymph %   9.1       MCH   30.4       MCHC   31.5       MCV   97       Mono #   1.0       Mono %   11.1       MPV   8.6       nRBC   0       Platelets   279       Potassium   4.0       PROTEIN TOTAL   6.6        Acceptable Yes         RBC   2.86       RDW   14.2       SARS-CoV-2 RNA, Amplification, Qual Negative         Sodium   137       Troponin I   0.014  Comment: The reference interval for Troponin I represents the 99th percentile   cutoff   for our facility and is consistent with 3rd generation assay   performance.         WBC   9.39               Significant Imaging: I have reviewed all pertinent imaging results/findings within the past 24 hours.  I have reviewed and interpreted all pertinent imaging results/findings within the past 24 hours.  CXR: I have reviewed all pertinent results/findings within the past 24 hours and my personal findings are:       Imaging Results              X-Ray Chest AP Portable (Preliminary result)  Result time 02/23/22 00:18:37      ED Interpretation by Jasmyn Pagan Do, MD (02/23/22 00:18:37, [facility] - Emergency Dept., Emergency Medicine)    Poor Inspiratory film, ? RLL infiltrate                                    I have independently reviewed and interpreted the EKG.     I have independently reviewed all pertinent labs within the past 24 hours.    I have independently reviewed, visualized and interpreted all pertinent imaging results within the past 24 hours and discussed the findings with the ED physician, Dr. Lipscomb

## 2022-02-23 NOTE — H&P
"[facility] - [address] Medicine  History & Physical    Patient Name: Sherif Ragland  MRN: [id number]  Patient Class: OP- Observation  Admission Date: 2/23/2022  Attending Physician: Venkata Pagan MD   Primary Care Provider: Tobias Fox MD         Patient information was obtained from patient, spouse/SO, past medical records and ER records.     Subjective:     Principal Problem:Right lower lobe pneumonia    Chief Complaint:   Chief Complaint   Patient presents with    Shortness of Breath     Pt CO SOB x 2 days. Pt has non-productive cough. Pt hx CA bone. Had iron infusion yesterday        HPI:  is an elderly 76-year-old  male with PMH significant for prostate cancer on Lupron therapy, presented to the ED complaining of "incessant coughing, that wound go way, for the past few weeks.  Per spouse at the bedside, they have tried many different cough suppressants with no significant improvement.  States that something is stuck in his trachea, does not go down, does not come up.  Feels short of breath.  Denies chest pain.  Denies fever, chills, nausea vomiting.  Laboratory workup is unremarkable, at baseline.  Creatinine 1.7 close to baseline.  CXR reveals mild right lower lobe infiltrate.  Received IV Rocephin, Zithromax in the ED.  patient reports generalized weakness, decreased appetite.    Admitting diagnosis:  Acute bronchitis.  Persistent coughing.  Generalized weakness.      Past Medical History:   Diagnosis Date    CKD (chronic kidney disease) stage 3, GFR 30-59 ml/min     COVID-19 11/30/2020    Diabetes mellitus, type 2 1997    Hyperlipidemia     Hypertension     Hypogonadism in male     Prostate cancer metastatic to bone     Tubular adenoma 10/2017       Past Surgical History:   Procedure Laterality Date    CATARACT EXTRACTION Right 01/31/2018    CATARACT EXTRACTION W/  INTRAOCULAR LENS IMPLANT Left 03/13/2019    COLONOSCOPY N/A 11/2/2017    Procedure: COLONOSCOPY;  " Surgeon: Aelk Francois MD;  Location: [facility] ENDO;  Service: Endoscopy;  Laterality: N/A;    COLONOSCOPY N/A 10/27/2020    Procedure: COLONOSCOPY;  Surgeon: Aditi Grijalva MD;  Location: [facility] ENDO;  Service: Endoscopy;  Laterality: N/A;    ESOPHAGOGASTRODUODENOSCOPY N/A 10/27/2020    Procedure: ESOPHAGOGASTRODUODENOSCOPY (EGD);  Surgeon: Aditi Grijalva MD;  Location: [facility] ENDO;  Service: Endoscopy;  Laterality: N/A;    TIBIA FRACTURE SURGERY      right leg x2     TONSILLECTOMY      TRANSURETHRAL RESECTION OF PROSTATE N/A 10/17/2019    Procedure: TURP (TRANSURETHRAL RESECTION OF PROSTATE);  Surgeon: Mir Hale IV, MD;  Location: [facility] OR;  Service: Urology;  Laterality: N/A;       Review of patient's allergies indicates:  No Known Allergies    Current Facility-Administered Medications on File Prior to Encounter   Medication    leuprolide (LUPRON) injection 22.5 mg     Current Outpatient Medications on File Prior to Encounter   Medication Sig    ALPRAZolam (XANAX) 0.25 MG tablet TAKE 1 TO 2 TABLETS BY MOUTH ONCE DAILY AS NEEDED FOR ANXIETY    atorvastatin (LIPITOR) 40 MG tablet Take 1 tablet (40 mg total) by mouth once daily.    bisacodyL (DULCOLAX) 5 mg EC tablet Take 5 mg by mouth daily as needed.    cloNIDine 0.3 mg/24 hr td ptwk (CATAPRES) 0.3 mg/24 hr Place 1 patch onto the skin every 7 days.    dexAMETHasone (DECADRON) 2 MG tablet Take 1 tablet (2 mg total) by mouth 2 (two) times daily with meals.    furosemide (LASIX) 20 MG tablet Take 1 tablet (20 mg total) by mouth once daily. for 3 days    hydrALAZINE (APRESOLINE) 100 MG tablet Take 1 tablet (100 mg total) by mouth every 8 (eight) hours.    HYDROcodone-acetaminophen (NORCO)  mg per tablet Take 1 tablet by mouth every 4 (four) hours as needed for Pain. for pain.    insulin detemir U-100 (LEVEMIR FLEXTOUCH U-100 INSULN) 100 unit/mL (3 mL) InPn pen Inject 70 Units into the skin once daily.    lactulose (CHRONULAC) 10 gram/15  mL solution Take 15 mLs (10 g total) by mouth 2 (two) times daily.    metoprolol succinate (TOPROL-XL) 100 MG 24 hr tablet Take 1 tablet (100 mg total) by mouth 2 (two) times daily.    pantoprazole (PROTONIX) 40 MG tablet Take 1 tablet (40 mg total) by mouth once daily.    potassium chloride SA (K-DUR,KLOR-CON) 20 MEQ tablet Take 1 tablet (20 mEq total) by mouth 2 (two) times daily.    QUEtiapine (SEROQUEL) 25 MG Tab Take 1 tablet (25 mg total) by mouth nightly as needed (insomnia).    repaglinide (PRANDIN) 0.5 MG tablet Take 1 tablet (0.5 mg total) by mouth 3 (three) times daily before meals.    terazosin (HYTRIN) 5 MG capsule Take 1 capsule by mouth.    valsartan-hydrochlorothiazide (DIOVAN-HCT) 320-12.5 mg per tablet TAKE 1 TABLET BY MOUTH EVERY DAY    [DISCONTINUED] amLODIPine (NORVASC) 5 MG tablet Take 2 tablets (10 mg total) by mouth once daily.     Family History       Problem Relation (Age of Onset)    Heart disease Father    Stroke Mother, Brother          Tobacco Use    Smoking status: Never Smoker    Smokeless tobacco: Never Used   Substance and Sexual Activity    Alcohol use: Yes     Comment: rarely    Drug use: No    Sexual activity: Yes     Partners: Female     Review of Systems   Constitutional:  Positive for fatigue. Negative for chills and fever.   HENT: Negative.  Negative for congestion, rhinorrhea, sore throat and trouble swallowing.    Eyes: Negative.  Negative for visual disturbance.   Respiratory:  Positive for cough and shortness of breath. Negative for wheezing.    Cardiovascular: Negative.  Negative for chest pain and palpitations.   Gastrointestinal: Negative.  Negative for abdominal pain, diarrhea, nausea and vomiting.   Endocrine: Negative.    Genitourinary: Negative.  Negative for dysuria and flank pain.   Musculoskeletal: Negative.  Negative for back pain.   Skin: Negative.  Negative for rash.   Allergic/Immunologic: Negative.    Neurological:  Positive for weakness  (generalized). Negative for speech difficulty, numbness and headaches.   Hematological: Negative.    Psychiatric/Behavioral: Negative.  Negative for hallucinations.    All other systems reviewed and are negative.  Objective:     Vital Signs (Most Recent):  Temp: 98.7 °F (37.1 °C) (02/22/22 2343)  Pulse: 86 (02/23/22 0212)  Resp: 20 (02/23/22 0228)  BP: (!) 134/93 (02/23/22 0036)  SpO2: (!) 92 % (02/23/22 0212)   Vital Signs (24h Range):  Temp:  [98.7 °F (37.1 °C)] 98.7 °F (37.1 °C)  Pulse:  [82-86] 86  Resp:  [18-24] 20  SpO2:  [92 %-94 %] 92 %  BP: (134-176)/(70-93) 134/93     Weight: 129.3 kg (285 lb)  Body mass index is 38.65 kg/m².    Physical Exam  Vitals and nursing note reviewed.   Constitutional:       General: He is awake. He is not in acute distress.     Appearance: Normal appearance. He is ill-appearing.      Interventions: Nasal cannula in place.   HENT:      Head: Normocephalic and atraumatic.      Mouth/Throat:      Mouth: Mucous membranes are moist.   Eyes:      General: No scleral icterus.     Conjunctiva/sclera: Conjunctivae normal.   Cardiovascular:      Rate and Rhythm: Normal rate and regular rhythm.      Heart sounds: No murmur heard.  Pulmonary:      Effort: Pulmonary effort is normal. No respiratory distress.      Breath sounds: Wheezing and rhonchi present.   Abdominal:      Palpations: Abdomen is soft.      Tenderness: There is no abdominal tenderness.   Musculoskeletal:         General: No swelling. Normal range of motion.      Cervical back: No rigidity.   Skin:     General: Skin is warm.      Coloration: Skin is not jaundiced.   Neurological:      General: No focal deficit present.      Mental Status: He is alert and oriented to person, place, and time. Mental status is at baseline.   Psychiatric:         Attention and Perception: Attention normal.         Speech: Speech normal.         Behavior: Behavior normal. Behavior is cooperative.           Significant Labs: All pertinent labs  within the past 24 hours have been reviewed.  Recent Lab Results         02/23/22  0047   02/23/22  0024        Albumin   3.1       Alkaline Phosphatase   62       ALT   10       Anion Gap   14       AST   18       Baso #   0.03       Basophil %   0.3       BILIRUBIN TOTAL   0.6  Comment: For infants and newborns, interpretation of results should be based  on gestational age, weight and in agreement with clinical  observations.    Premature Infant recommended reference ranges:  Up to 24 hours.............<8.0 mg/dL  Up to 48 hours............<12.0 mg/dL  3-5 days..................<15.0 mg/dL  6-29 days.................<15.0 mg/dL         BUN   14       Calcium   7.8       Chloride   99       CO2   24       CPK   184       Creatinine   1.7       CRP   21.4       Differential Method   Automated       eGFR if    44       eGFR if non    38  Comment: Calculation used to obtain the estimated glomerular filtration  rate (eGFR) is the CKD-EPI equation.          Eos #   0.3       Eosinophil %   2.7       Glucose   108       Gran # (ANC)   7.1       Gran %   75.7       Hematocrit   27.6       Hemoglobin   8.7       Immature Grans (Abs)   0.10  Comment: Mild elevation in immature granulocytes is non specific and   can be seen in a variety of conditions including stress response,   acute inflammation, trauma and pregnancy. Correlation with other   laboratory and clinical findings is essential.         Immature Granulocytes   1.1       Lactate, Dimas   1.9  Comment: Falsely low lactic acid results can be found in samples   containing >=13.0 mg/dL total bilirubin and/or >=3.5 mg/dL   direct bilirubin.         Lymph #   0.9       Lymph %   9.1       MCH   30.4       MCHC   31.5       MCV   97       Mono #   1.0       Mono %   11.1       MPV   8.6       nRBC   0       Platelets   279       Potassium   4.0       PROTEIN TOTAL   6.6        Acceptable Yes         RBC   2.86       RDW    14.2       SARS-CoV-2 RNA, Amplification, Qual Negative         Sodium   137       Troponin I   0.014  Comment: The reference interval for Troponin I represents the 99th percentile   cutoff   for our facility and is consistent with 3rd generation assay   performance.         WBC   9.39               Significant Imaging: I have reviewed all pertinent imaging results/findings within the past 24 hours.  I have reviewed and interpreted all pertinent imaging results/findings within the past 24 hours.  CXR: I have reviewed all pertinent results/findings within the past 24 hours and my personal findings are:       Imaging Results              X-Ray Chest AP Portable (Preliminary result)  Result time 02/23/22 00:18:37      ED Interpretation by Jasmyn Pagan Do, MD (02/23/22 00:18:37, [facility] - Emergency Dept[facility] Emergency Medicine)    Poor Inspiratory film, ? RLL infiltrate                                    I have independently reviewed and interpreted the EKG.     I have independently reviewed all pertinent labs within the past 24 hours.    I have independently reviewed, visualized and interpreted all pertinent imaging results within the past 24 hours and discussed the findings with the ED physician, Dr. Lipscomb          Assessment/Plan:     * Right lower lobe pneumonia  CXR reveals mild right lower lobe infiltrate.  IV Rocephin, Zithromax empirically  Bronchodilators      Chronic coughing  Unclear etiology.  History of secondhand tobacco use exposure in the past.  Scheduled Tessalon and Robitussin.  Consult Pulmonary for evaluation/recommendations.  Aspiration precautions.  Consult speech therapy for swallow evaluation.      Impaired functional mobility, balance, and endurance  PT/OT evaluation      Prostate cancer metastatic to bone  Currently on Lupron therapy.  Follow-up with Oncology in the outpatient.      Hypertension associated with diabetes  Continue antihypertensive agents.  Insulin sliding scale as needed.      Stage  3 chronic kidney disease  Creatinine 1.6, close to baseline.  Avoid nephrotoxic agents.    Gentle IV fluids.        VTE Risk Mitigation (From admission, onward)         Ordered     enoxaparin injection 40 mg  Daily         02/23/22 0310     Place sequential compression device  Until discontinued         02/23/22 0310                 The patient is placed in OBSERVATION status.    Erick Bob MD  Department of Hospital Medicine   [facility] - Med Surg 3

## 2022-02-23 NOTE — PT/OT/SLP EVAL
Physical Therapy Evaluation    Patient Name:  Sherif Ragland   MRN:  [id number]    Recommendations:     Discharge Recommendations:  home health PT   Discharge Equipment Recommendations: none   Barriers to discharge: None    Assessment:     Sherif Ragland is a 76 y.o. male admitted with a medical diagnosis of Right lower lobe pneumonia.  He presents with the following impairments/functional limitations:  weakness, impaired endurance, decreased safety awareness, impaired functional mobilty, gait instability, impaired cardiopulmonary response to activity, decreased coordination.    Rehab Prognosis: Good; patient would benefit from acute skilled PT services to address these deficits and reach maximum level of function.    Recent Surgery: * No surgery found *    Plan:     During this hospitalization, patient to be seen 3 x/week to address the identified rehab impairments via gait training, therapeutic activities, therapeutic exercises and progress toward the following goals:    · Plan of Care Expires:  03/09/22    Subjective     Chief Complaint:   Patient/Family Comments/goals:   Pain/Comfort:  · Pain Rating 1: 0/10    Patients cultural, spiritual, Temple conflicts given the current situation:      Living Environment:  PT LIVES WITH WIFE 1 STORY HOUSE NO STEPS, AMB INDEP HOUSEHOLD DISTANCES, DOES NOT DRIVE, NEEDS ASSIST FOR ADL'S PER WIFE, DID NOT USE O2 AT HOME  Prior to admission, patients level of function was CHINYERE.  Equipment used at home: walker, rolling (BUILT IN SHOWER SEAT, HAND HELD SHOWER).  DME owned (not currently used): none.  Upon discharge, patient will have assistance from WIFE.    Objective:     Communicated with NURSE CORINIA prior to session.  Patient found supine with telemetry, oxygen, peripheral IV  upon PT entry to room.    General Precautions: Standard, fall, respiratory   Orthopedic Precautions:N/A   Braces: N/A  Respiratory Status: Nasal cannula, flow 2 L/min    Exams:  · Cognitive Exam:   Patient is oriented to Person, Place, Time and Situation  · Postural Exam:  Patient presented with the following abnormalities:    · -       Rounded shoulders  · Sensation:    · -       Impaired  light/touch C/O NUMBNESS TO L HAND  · Skin Integrity/Edema:      · -       Edema: Mild BLE  · RLE ROM: WFL  · RLE Strength: GROSSLY 3+/5  · LLE ROM: WFL  · LLE Strength: GROSSLY 3+/5    Functional Mobility:  · Bed Mobility:     · Rolling Left:  stand by assistance  · Scooting: stand by assistance  · Supine to Sit: stand by assistance  · Transfers:     · Sit to Stand:  contact guard assistance with rolling walker  · Bed to Chair: contact guard assistance with  rolling walker  using  Step Transfer  · Gait: PT AMB 80' WITH RW AND CGA, NO LOB OR SOB WITH O2 IN TOW  · Balance: FAIR    Therapeutic Activities and Exercises:   PT EDUCATED IN ROLE OF P.T. AND POC, PT EDUCATED IN RW USE AND SAFETY DURING TF'S AND GAIT, PT REQUIRED MAXA FOR DONNING TENNIS SHOES, PT ENCOURAGED TO INCREASE TIME OOB IN CHAIR, PT EDUCATED IN BLE THEREX TO PERFORM WHILE SEATED IN CHAIR    AM-PAC 6 CLICK MOBILITY  Total Score:18     Patient left up in chair with all lines intact, call button in reach, NURSE notified and WIFE present.    GOALS:   Multidisciplinary Problems     Physical Therapy Goals        Problem: Physical Therapy Goal    Goal Priority Disciplines Outcome Goal Variances Interventions   Physical Therapy Goal     PT, PT/OT      Description: LTG'S TO BE MET IN 14 DAYS (3-9-22)  1. PT WILL BE CHINYERE WITH BED MOBILITY  2. PT WILL REQUIRE SPV FOR TF'S  3. PT WILL ' WITH RW AND SPV, O2 IN TOW                   History:     Past Medical History:   Diagnosis Date    CKD (chronic kidney disease) stage 3, GFR 30-59 ml/min     COVID-19 11/30/2020    Diabetes mellitus, type 2 1997    Hyperlipidemia     Hypertension     Hypogonadism in male     Prostate cancer metastatic to bone     Tubular adenoma 10/2017       Past Surgical History:    Procedure Laterality Date    CATARACT EXTRACTION Right 01/31/2018    CATARACT EXTRACTION W/  INTRAOCULAR LENS IMPLANT Left 03/13/2019    COLONOSCOPY N/A 11/2/2017    Procedure: COLONOSCOPY;  Surgeon: Alek Francois MD;  Location: [facility];  Service: Endoscopy;  Laterality: N/A;    COLONOSCOPY N/A 10/27/2020    Procedure: COLONOSCOPY;  Surgeon: Aditi Grijalva MD;  Location: [facility];  Service: Endoscopy;  Laterality: N/A;    ESOPHAGOGASTRODUODENOSCOPY N/A 10/27/2020    Procedure: ESOPHAGOGASTRODUODENOSCOPY (EGD);  Surgeon: Aditi Grijalva MD;  Location: [facility];  Service: Endoscopy;  Laterality: N/A;    TIBIA FRACTURE SURGERY      right leg x2     TONSILLECTOMY      TRANSURETHRAL RESECTION OF PROSTATE N/A 10/17/2019    Procedure: TURP (TRANSURETHRAL RESECTION OF PROSTATE);  Surgeon: Mir Hale IV, MD;  Location: [facility];  Service: Urology;  Laterality: N/A;       Time Tracking:     PT Received On: 02/23/22  PT Start Time: 1125     PT Stop Time: 1148  PT Total Time (min): 23 min     Billable Minutes: Evaluation 15 and Therapeutic Activity 8    02/23/2022

## 2022-02-23 NOTE — ASSESSMENT & PLAN NOTE
Unclear etiology.  History of secondhand tobacco use exposure in the past.  Scheduled Tessalon and Robitussin.  Consult Pulmonary for evaluation/recommendations.  Aspiration precautions.  Consult speech therapy for swallow evaluation.

## 2022-02-23 NOTE — ED PROVIDER NOTES
SCRIBE #1 NOTE: I, Saadia Thornton, am scribing for, and in the presence of, Jasmyn Pagan Do, MD. I have scribed the entire note.   History     Chief Complaint   Patient presents with    Shortness of Breath     Pt CO SOB x 2 days. Pt has non-productive cough. Pt hx CA bone. Had iron infusion yesterday     Review of patient's allergies indicates:  No Known Allergies      History of Present Illness           2/23/2022, 12:16 AM  History obtained from the patient      History of Present Illness: Sherif Ragland is a 76 y.o. male patient with a PMHx of CKD stage 3, COVID, DM type II, HTN, and prostate cancer metastatic to bone who presents to the Emergency Department for evaluation of SOB which onset several weeks pta. Pt was diagnosed with prostate cancer 3 years ago which metastasized to bones in lung area. Pt had a blood iron infusion on 2/20 at around 9 am and started to vomit at around 4 pm the same day. This was the pt's first blood iron infusion. Dr. Lucero (Oncologist) saw CT scan where it showed that there could be residual COVID-19 virus showing on the scan. Pt tested positive for COVID-19 on 1/24. Pt's wife reports that pt does not have a lot of energy, so oncologist prescribed him steroids to help with the energy level, but it raised pt's BP over 200 systolic. Pt had to report to ED on 1/8 due to elevated BP. After ED visit on 1/24, pt was diagnosed with pneumonia, but oncologist stated that pt did not have pneumonia upon reading CT scans. Pt does not receive chemotherapy for cancer and gets leuprolide injections. Pt has not been able to walk since sxs started. Pt is not on home O2 Symptoms are constant and moderate in severity. No mitigating or exacerbating factors reported. Associated sxs include non-produtive cough. Patient denies any chills, fever, CP HA, fatigue, and all other sxs at this time. No prior Tx reported. No further complaints or concerns at this time.       Arrival mode: Personal vehicle      PCP: Tobias Fox MD        Past Medical History:  Past Medical History:   Diagnosis Date    CKD (chronic kidney disease) stage 3, GFR 30-59 ml/min     COVID-19 11/30/2020    Diabetes mellitus, type 2 1997    Hyperlipidemia     Hypertension     Hypogonadism in male     Prostate cancer metastatic to bone     Tubular adenoma 10/2017       Past Surgical History:  Past Surgical History:   Procedure Laterality Date    CATARACT EXTRACTION Right 01/31/2018    CATARACT EXTRACTION W/  INTRAOCULAR LENS IMPLANT Left 03/13/2019    COLONOSCOPY N/A 11/2/2017    Procedure: COLONOSCOPY;  Surgeon: Alek Francois MD;  Location: [facility];  Service: Endoscopy;  Laterality: N/A;    COLONOSCOPY N/A 10/27/2020    Procedure: COLONOSCOPY;  Surgeon: Aditi Grijalva MD;  Location: [facility];  Service: Endoscopy;  Laterality: N/A;    ESOPHAGOGASTRODUODENOSCOPY N/A 10/27/2020    Procedure: ESOPHAGOGASTRODUODENOSCOPY (EGD);  Surgeon: Aditi Grijalva MD;  Location: [facility];  Service: Endoscopy;  Laterality: N/A;    TIBIA FRACTURE SURGERY      right leg x2     TONSILLECTOMY      TRANSURETHRAL RESECTION OF PROSTATE N/A 10/17/2019    Procedure: TURP (TRANSURETHRAL RESECTION OF PROSTATE);  Surgeon: Mir Hale IV, MD;  Location[facility];  Service: Urology;  Laterality: N/A;         Family History:  Family History   Problem Relation Age of Onset    Stroke Mother     Heart disease Father     Stroke Brother        Social History:  Social History     Tobacco Use    Smoking status: Never Smoker    Smokeless tobacco: Never Used   Substance and Sexual Activity    Alcohol use: Yes     Comment: rarely    Drug use: No    Sexual activity: Yes     Partners: Female        Review of Systems     Review of Systems   Constitutional: Negative for chills, fatigue and fever.   HENT: Negative for sore throat.    Respiratory: Positive for cough and shortness of breath.    Cardiovascular: Negative for chest pain.    Gastrointestinal: Negative for nausea.   Genitourinary: Negative for dysuria.   Musculoskeletal: Negative for back pain.   Skin: Negative for rash.   Neurological: Negative for weakness and headaches.   Hematological: Does not bruise/bleed easily.   All other systems reviewed and are negative.       Physical Exam     Initial Vitals [02/22/22 2343]   BP Pulse Resp Temp SpO2   (!) 176/70 82 18 98.7 °F (37.1 °C) (!) 92 %      MAP       --          Physical Exam  Nursing Notes and Vital Signs Reviewed.  Constitutional: Patient is in no acute distress. Pt is pale and weak.   Head: Atraumatic. Normocephalic.  Eyes: PERRL. EOM intact. Conjunctivae are not pale. No scleral icterus.  ENT: Mucous membranes are moist. Oropharynx is clear and symmetric.    Neck: Supple. Full ROM. No lymphadenopathy.  Cardiovascular: Regular rate. Regular rhythm. No murmurs, rubs, or gallops. Distal pulses are 2+ and symmetric.  Pulmonary/Chest: Pt was coughing. Pt had questionable rales. Pt is wheezing. Does not have good inspiration. Slightly tachypnic.  Abdominal: Soft and non-distended.  There is no tenderness.  No rebound, guarding, or rigidity. Good bowel sounds.  Genitourinary: No CVA tenderness  Musculoskeletal: Moves all extremities. No obvious deformities. No edema. No calf tenderness.  Skin: Warm and dry.  Neurological:  Alert, awake, and appropriate.  Normal speech.  No acute focal neurological deficits are appreciated.  Psychiatric: Normal affect. Good eye contact. Appropriate in content.     ED Course   Procedures  ED Vital Signs:  Vitals:    02/22/22 2343 02/23/22 0036 02/23/22 0044 02/23/22 0053   BP: (!) 176/70 (!) 134/93     Pulse: 82 83     Resp: 18 (!) 24 (!) 24    Temp: 98.7 °F (37.1 °C)      TempSrc: Oral      SpO2: (!) 92% (!) 92%  (!) 94%   Weight: 129.3 kg (285 lb)      Height: 6' (1.829 m)          Abnormal Lab Results:  Labs Reviewed   CBC W/ AUTO DIFFERENTIAL - Abnormal; Notable for the following components:        Result Value    RBC 2.86 (*)     Hemoglobin 8.7 (*)     Hematocrit 27.6 (*)     MCHC 31.5 (*)     MPV 8.6 (*)     Immature Granulocytes 1.1 (*)     Immature Grans (Abs) 0.10 (*)     Lymph # 0.9 (*)     Gran % 75.7 (*)     Lymph % 9.1 (*)     All other components within normal limits   COMPREHENSIVE METABOLIC PANEL - Abnormal; Notable for the following components:    Creatinine 1.7 (*)     Calcium 7.8 (*)     Albumin 3.1 (*)     eGFR if  44 (*)     eGFR if non  38 (*)     All other components within normal limits   C-REACTIVE PROTEIN - Abnormal; Notable for the following components:    CRP 21.4 (*)     All other components within normal limits   CK   LACTIC ACID, PLASMA   TROPONIN I   B-TYPE NATRIURETIC PEPTIDE   SARS-COV-2 RDRP GENE    Narrative:     This test utilizes isothermal nucleic acid amplification   technology to detect the SARS-CoV-2 RdRp nucleic acid segment.   The analytical sensitivity (limit of detection) is 125 genome   equivalents/mL.   A POSITIVE result implies infection with the SARS-CoV-2 virus;   the patient is presumed to be contagious.     A NEGATIVE result means that SARS-CoV-2 nucleic acids are not   present above the limit of detection. A NEGATIVE result should be   treated as presumptive. It does not rule out the possibility of   COVID-19 and should not be the sole basis for treatment decisions.   If COVID-19 is strongly suspected based on clinical and exposure   history, re-testing using an alternate molecular assay should be   considered.   This test is only for use under the Food and Drug   Administration s Emergency Use Authorization (EUA).   Commercial kits are provided by [employer].   Performance characteristics of the EUA have been independently   verified by [facility] Department of   Pathology and Laboratory Medicine.   _________________________________________________________________   The authorized Fact Sheet for Healthcare  Providers and the authorized Fact   Sheet for Patients of the ID NOW COVID-19 are available on the FDA   website:     https://www.fda.gov/media/656884/download  https://www.fda.gov/media/079113/download                All Lab Results:  Results for orders placed or performed during the hospital encounter of 02/23/22   CBC auto differential   Result Value Ref Range    WBC 9.39 3.90 - 12.70 K/uL    RBC 2.86 (L) 4.60 - 6.20 M/uL    Hemoglobin 8.7 (L) 14.0 - 18.0 g/dL    Hematocrit 27.6 (L) 40.0 - 54.0 %    MCV 97 82 - 98 fL    MCH 30.4 27.0 - 31.0 pg    MCHC 31.5 (L) 32.0 - 36.0 g/dL    RDW 14.2 11.5 - 14.5 %    Platelets 279 150 - 450 K/uL    MPV 8.6 (L) 9.2 - 12.9 fL    Immature Granulocytes 1.1 (H) 0.0 - 0.5 %    Gran # (ANC) 7.1 1.8 - 7.7 K/uL    Immature Grans (Abs) 0.10 (H) 0.00 - 0.04 K/uL    Lymph # 0.9 (L) 1.0 - 4.8 K/uL    Mono # 1.0 0.3 - 1.0 K/uL    Eos # 0.3 0.0 - 0.5 K/uL    Baso # 0.03 0.00 - 0.20 K/uL    nRBC 0 0 /100 WBC    Gran % 75.7 (H) 38.0 - 73.0 %    Lymph % 9.1 (L) 18.0 - 48.0 %    Mono % 11.1 4.0 - 15.0 %    Eosinophil % 2.7 0.0 - 8.0 %    Basophil % 0.3 0.0 - 1.9 %    Differential Method Automated    Comprehensive metabolic panel   Result Value Ref Range    Sodium 137 136 - 145 mmol/L    Potassium 4.0 3.5 - 5.1 mmol/L    Chloride 99 95 - 110 mmol/L    CO2 24 23 - 29 mmol/L    Glucose 108 70 - 110 mg/dL    BUN 14 8 - 23 mg/dL    Creatinine 1.7 (H) 0.5 - 1.4 mg/dL    Calcium 7.8 (L) 8.7 - 10.5 mg/dL    Total Protein 6.6 6.0 - 8.4 g/dL    Albumin 3.1 (L) 3.5 - 5.2 g/dL    Total Bilirubin 0.6 0.1 - 1.0 mg/dL    Alkaline Phosphatase 62 55 - 135 U/L    AST 18 10 - 40 U/L    ALT 10 10 - 44 U/L    Anion Gap 14 8 - 16 mmol/L    eGFR if African American 44 (A) >60 mL/min/1.73 m^2    eGFR if non African American 38 (A) >60 mL/min/1.73 m^2   C-Reactive Protein   Result Value Ref Range    CRP 21.4 (H) 0.0 - 8.2 mg/L   CK   Result Value Ref Range     20 - 200 U/L   Lactic Acid, Plasma   Result Value  Ref Range    Lactate (Lactic Acid) 1.9 0.5 - 2.2 mmol/L   Troponin I   Result Value Ref Range    Troponin I 0.014 0.000 - 0.026 ng/mL   POCT COVID-19 Rapid Screening   Result Value Ref Range    POC Rapid COVID Negative Negative     Acceptable Yes      *Note: Due to a large number of results and/or encounters for the requested time period, some results have not been displayed. A complete set of results can be found in Results Review.       Imaging Results:  Imaging Results          X-Ray Chest AP Portable (Preliminary result)  Result time 02/23/22 00:18:37    ED Interpretation by Jasmyn Pagan Do, MD (02/23/22 00:18:37, [facility] - Emergency Dept., Emergency Medicine)    Poor Inspiratory film, ? RLL infiltrate                               The EKG was ordered, reviewed, and independently interpreted by the ED provider.  Interpretation time: 0025  Rate: 88 BPM  Rhythm: normal sinus rhythm  Interpretation: Low voltage QRS  Cannot rule out Anterior infarct (cited on or before 13- FEB-2020)  Abnormal ECG. No STEMI.             The Emergency Provider reviewed the vital signs and test results, which are outlined above.     ED Discussion     1:59 AM: Discussed case with Ruchi Webber NP ([facility]). Dr. Pagan agrees with current care and management of pt and accepts admission.   Admitting Service: Hospital Medicine  Admitting Physician: Dr. Pagan  Admit to: tele obs    2:00 AM: Re-evaluated pt. I have discussed test results, shared treatment plan, and the need for admission with patient and family at bedside. Pt and family express understanding at this time and agree with all information. All questions answered. Pt and family have no further questions or concerns at this time. Pt is ready for admit.           Medical Decision Making:   Clinical Tests:   Lab Tests: Ordered and Reviewed  Radiological Study: Ordered and Reviewed  Medical Tests: Ordered and Reviewed           ED  Medication(s):  Medications   cefTRIAXone (ROCEPHIN) 1 g/50 mL D5W IVPB (has no administration in time range)   albuterol nebulizer solution 2.5 mg (has no administration in time range)   morphine injection 4 mg (has no administration in time range)   sodium chloride 0.9% bolus 1,000 mL (1,000 mLs Intravenous New Bag 2/23/22 0044)   promethazine-codeine 6.25-10 mg/5 ml syrup 5 mL (5 mLs Oral Given 2/23/22 0044)       New Prescriptions    No medications on file               Scribe Attestation:   Scribe #1: I performed the above scribed service and the documentation accurately describes the services I performed. I attest to the accuracy of the note.     Attending:   Physician Attestation Statement for Scribe #1: I, Jasmyn Pagan Do, MD, personally performed the services described in this documentation, as scribed by Saadia Thornton, in my presence, and it is both accurate and complete.           Clinical Impression       ICD-10-CM ICD-9-CM   1. Bronchitis  J40 490   2. SOB (shortness of breath)  R06.02 786.05   3. Weakness  R53.1 780.79   4. Cough  R05.9 786.2       Disposition:   Disposition: Placed in Observation  Condition: Fair       Jasmyn Pagan Do, MD  02/23/22 0203

## 2022-02-23 NOTE — ASSESSMENT & PLAN NOTE
CXR reveals mild right lower lobe infiltrate.  IV Rocephin, Zithromax empirically  Bronchodilators

## 2022-02-23 NOTE — HPI
" is an elderly 76-year-old  male with PMH significant for prostate cancer on Lupron therapy, presented to the ED complaining of "incessant coughing, that wound go way, for the past few weeks.  Per spouse at the bedside, they have tried many different cough suppressants with no significant improvement.  States that something is stuck in his trachea, does not go down, does not come up.  Feels short of breath.  Denies chest pain.  Denies fever, chills, nausea vomiting.  Laboratory workup is unremarkable, at baseline.  Creatinine 1.7 close to baseline.  CXR reveals mild right lower lobe infiltrate.  Received IV Rocephin, Zithromax in the ED. Patient reports generalized weakness, decreased appetite.    Admitting diagnosis:  Acute bronchitis.  Persistent coughing.  Generalized weakness.  "

## 2022-02-23 NOTE — PT/OT/SLP EVAL
Occupational Therapy   Evaluation    Name: Sherif Ragland  MRN: [id number]  Admitting Diagnosis:  Right lower lobe pneumonia  Recent Surgery: * No surgery found *      Recommendations:     Discharge Recommendations: home with home health  Discharge Equipment Recommendations:  none  Barriers to discharge:   UNKNOWN    Assessment:     Sherif Ragland is a 76 y.o. male with a medical diagnosis of Right lower lobe pneumonia.  He presents with impaired functional mobility and ADLs. Performance deficits affecting function: weakness, impaired endurance, impaired self care skills, impaired functional mobilty, gait instability, impaired balance, decreased coordination, decreased safety awareness, impaired cardiopulmonary response to activity.      Rehab Prognosis: Fair; patient would benefit from acute skilled OT services to address these deficits and reach maximum level of function.       Plan:     Patient to be seen 2 x/week to address the above listed problems via self-care/home management, therapeutic activities, therapeutic exercises  · Plan of Care Expires: 03/09/22  · Plan of Care Reviewed with: patient, spouse    Subjective     Chief Complaint: weakness  Patient/Family Comments/goals: to increase strength and mobility     Occupational Profile:  Living Environment: Lives with wife in 1 story house with no steps  Previous level of function: (I) with ADLs and Ambulation; wife reports that pt has been requiring assistance recently; Ambulates in home without AD, wife reports pt is fall risk  Roles and Routines: unknown  Equipment Used at Home:  other (see comments) (Built in shower seat, HHS, rails on bed; has RW but does not use)  Assistance upon Discharge: wife    Pain/Comfort:  · Pain Rating 1: 0/10    Patients cultural, spiritual, Jain conflicts given the current situation:      Objective:     Communicated with: Nurse Luke and epic chart review prior to session.  Patient found HOB elevated with telemetry,  peripheral IV, oxygen upon OT entry to room.    General Precautions: Standard, fall, respiratory   Orthopedic Precautions:N/A   Braces: N/A  Respiratory Status: Nasal cannula, flow 2 L/min    Occupational Performance:    Bed Mobility:    · Patient completed Rolling/Turning to Left with  stand by assistance  · Patient completed Rolling/Turning to Right with stand by assistance  · Patient completed Scooting/Bridging with stand by assistance  · Patient completed Supine to Sit with stand by assistance    Functional Mobility/Transfers:  · Patient completed Sit <> Stand Transfer with contact guard assistance  with  rolling walker   · Patient completed Bed <> Chair Transfer using Step Transfer technique with contact guard assistance with rolling walker  · Functional Mobility: ~80ft with CGA using RW and IV pole and 2L O2 in tow    Activities of Daily Living:  · Upper Body Dressing: minimum assistance .  · Lower Body Dressing: moderate assistance .    Cognitive/Visual Perceptual:  Cognitive/Psychosocial Skills:     -       Oriented to: Person, Place, Time and Situation   -       Follows Commands/attention:Follows multistep  commands  -       Communication: clear/fluent  -       Memory: No Deficits noted  -       Safety awareness/insight to disability: impaired   -       Mood/Affect/Coping skills/emotional control: Appropriate to situation  Visual/Perceptual:      -Intact .    Physical Exam:  Balance:    -       Fair standing  Postural examination/scapula alignment:    -       Rounded shoulders  -       Forward head  Sensation:    -       Intact  Motor Planning:    -       WFL  Dominant hand:    -       right  Upper Extremity Range of Motion:     -       Right Upper Extremity: WNL  -       Left Upper Extremity: WNL  Upper Extremity Strength:    -       Right Upper Extremity: 4/5 grossly  -       Left Upper Extremity: 4/5 grossly   Strength:    -       Right Upper Extremity: WNL  -       Left Upper Extremity: WNL    [facility]  6 Click ADL:  AMPAC Total Score: 17    Treatment & Education:  OT Eval completed as listed above.  Pt educated in safety with bed mobility and t/fs, role of OT and POC. Pt encouraged to increase time OOB and to eat all meals sitting upright. Pt instructed to call for assistance when ready to return to bed. Pt verbalized understanding of all.     Education:    Patient left up in chair with all lines intact, call button in reach, Nurse Ti notified and wife present    GOALS:   Multidisciplinary Problems     Occupational Therapy Goals        Problem: Occupational Therapy Goal    Goal Priority Disciplines Outcome Interventions   Occupational Therapy Goal     OT, PT/OT     Description: Goals to be met by: 3/9/2022     Patient will increase functional independence with ADLs by performing:    UE Dressing with Modified [place].  LE Dressing with Stand-by Assistance.  Grooming while standing at sink with Supervision.  Step transfer to toilet with Supervision  Upper extremity exercise program 2x10 reps per handout, with independence.                     History:     Past Medical History:   Diagnosis Date    CKD (chronic kidney disease) stage 3, GFR 30-59 ml/min     COVID-19 11/30/2020    Diabetes mellitus, type 2 1997    Hyperlipidemia     Hypertension     Hypogonadism in male     Prostate cancer metastatic to bone     Tubular adenoma 10/2017       Past Surgical History:   Procedure Laterality Date    CATARACT EXTRACTION Right 01/31/2018    CATARACT EXTRACTION W/  INTRAOCULAR LENS IMPLANT Left 03/13/2019    COLONOSCOPY N/A 11/2/2017    Procedure: COLONOSCOPY;  Surgeon: Alek Francois MD;  Location[facility];  Service: Endoscopy;  Laterality: N/A;    COLONOSCOPY N/A 10/27/2020    Procedure: COLONOSCOPY;  Surgeon: Aditi Grijalva MD;  Location: [facility];  Service: Endoscopy;  Laterality: N/A;    ESOPHAGOGASTRODUODENOSCOPY N/A 10/27/2020    Procedure: ESOPHAGOGASTRODUODENOSCOPY (EGD);  Surgeon:  Aditi Grijalva MD;  Location: [facility];  Service: Endoscopy;  Laterality: N/A;    TIBIA FRACTURE SURGERY      right leg x2     TONSILLECTOMY      TRANSURETHRAL RESECTION OF PROSTATE N/A 10/17/2019    Procedure: TURP (TRANSURETHRAL RESECTION OF PROSTATE);  Surgeon: Mir Hale IV, MD;  Location[facility];  Service: Urology;  Laterality: N/A;       Time Tracking:     OT Date of Treatment: 02/23/22  OT Start Time: 1113  OT Stop Time: 1136  OT Total Time (min): 23 min    Billable Minutes:Evaluation 15 min  Therapeutic Activity 8 min    2/23/2022

## 2022-02-23 NOTE — PLAN OF CARE
[facility] - Med Surg 3  Initial Discharge Assessment       Primary Care Provider: Tobias Fox MD    Admission Diagnosis: Cough [R05.9]  Weakness [R53.1]  SOB (shortness of breath) [R06.02]  Bronchitis [J40]    Admission Date: 2/23/2022  Expected Discharge Date:     Discharge Barriers Identified: None    Payor: [employer]ICARE / Plan: [employer] 65 / Product Type: Medicare Advantage /     Extended Emergency Contact Information  Primary Emergency Contact: Mita Ragland  Address: [address] [place], [place] [address] [address] [place]  Mobile Phone: [phone]  Relation: Spouse    Discharge Plan A: Home with family, Home Health  Discharge Plan B: Home with family, Home Health      [facility] [address]  [place], [place] - [address]  [address]  [place] [address]  Phone: [phone] Fax: [phone]    [facility] [address][id number]  [place]  [address] AT [facility]  [address] [place] [address]  [address]  [facility] [place] [address]  Phone: [phone] Fax: [phone]    [facility] - [place], [place] - [address]  [address]  [facility] [address]  Phone: [phone] Fax: [phone]      Initial Assessment (most recent)     Adult Discharge Assessment - 02/23/22 0932        Discharge Assessment    Assessment Type Discharge Planning Assessment     Confirmed/corrected address, phone number and insurance Yes     Confirmed Demographics Correct on Facesheet     Source of Information patient     When was your last doctors appointment? 02/17/22     Communicated LUCAS with patient/caregiver Date not available/Unable to determine     Reason For Admission Shortness of breath and could not clear secretions in throat     Lives With spouse     Facility Arrived From: Home     Do you expect to return to your current living situation? Yes     Do you have help at home or someone to help you manage your care at home? Yes     Who  are your caregiver(s) and their phone number(s)? Mita Ragland [phone]     Prior to hospitilization cognitive status: Alert/Oriented     Current cognitive status: Alert/Oriented     Walking or Climbing Stairs Difficulty ambulation difficulty, requires equipment     Mobility Management Uses a rolling walker     Dressing/Bathing Difficulty none     Equipment Currently Used at Home walker, rolling     Readmission within 30 days? No     Patient currently being followed by outpatient case management? No     Do you currently have service(s) that help you manage your care at home? Yes     Name and Contact number of agency [facility] assigned Home Health     Is the pt/caregiver preference to resume services with current agency Yes     Do you take prescription medications? Yes     Do you have prescription coverage? Yes     Coverage [facility]     Do you have any problems affording any of your prescribed medications? No     Is the patient taking medications as prescribed? yes     Who is going to help you get home at discharge? Mita     How do you get to doctors appointments? family or friend will provide     Are you on dialysis? No     Do you take coumadin? No     Discharge Plan A Home with family;Home Health     Discharge Plan B Home with family;Home Health     DME Needed Upon Discharge  none     Discharge Plan discussed with: Patient;Spouse/sig other     Name(s) and Number(s) Mita at bedside     Discharge Barriers Identified None        Relationship/Environment    Name(s) of Who Lives With Patient Mita Ragland               HILARIO met with patient/Mita at the bedside to assess for discharge needs.  Patient lives at home with his wife and she is his help at home.  Patient stated that he has nursing through home health services but could not recall the name of the company.  Patient provided a phone number for his  provider.  CM will continue to follow for any additional needs.  HILARIO provided a transitional care  folder, information on advanced directives, information on pharmacy bedside delivery, and discharge planning begins on admission with contact information for any needs/questions.

## 2022-02-23 NOTE — CHAPLAIN
Initial visit with patient and his spouse.  Took time to visit with patient and his spouse to assess for spiritual and emotional needs.  Pt and his spouse currently had no needs and spiritual care remains available as needed.    Chaplain Asim Quintero M.Div., BCC

## 2022-02-24 PROBLEM — I49.9 ARRHYTHMIA: Status: ACTIVE | Noted: 2022-01-01

## 2022-02-24 PROBLEM — I48.0 PAF (PAROXYSMAL ATRIAL FIBRILLATION): Status: ACTIVE | Noted: 2022-01-01

## 2022-02-24 PROBLEM — D64.9 NORMOCYTIC ANEMIA: Status: ACTIVE | Noted: 2022-01-01

## 2022-02-24 PROBLEM — E83.42 HYPOMAGNESEMIA: Status: ACTIVE | Noted: 2022-01-01

## 2022-02-24 PROBLEM — N18.30 TYPE 2 DIABETES MELLITUS WITH STAGE 3 CHRONIC KIDNEY DISEASE: Status: ACTIVE | Noted: 2022-01-01

## 2022-02-24 PROBLEM — E87.6 HYPOKALEMIA: Status: ACTIVE | Noted: 2022-01-01

## 2022-02-24 PROBLEM — E11.22 TYPE 2 DIABETES MELLITUS WITH STAGE 3 CHRONIC KIDNEY DISEASE: Status: ACTIVE | Noted: 2022-01-01

## 2022-02-24 PROBLEM — R91.8 PULMONARY NODULES: Status: ACTIVE | Noted: 2022-01-01

## 2022-02-24 PROBLEM — E83.51 HYPOCALCEMIA: Status: ACTIVE | Noted: 2022-01-01

## 2022-02-24 PROBLEM — J81.0 ACUTE PULMONARY EDEMA: Status: ACTIVE | Noted: 2022-01-01

## 2022-02-24 PROBLEM — E88.09 HYPOALBUMINEMIA: Status: ACTIVE | Noted: 2022-01-01

## 2022-02-24 PROBLEM — R53.81 DEBILITY: Status: ACTIVE | Noted: 2022-01-01

## 2022-02-24 PROBLEM — R07.9 CHEST PAIN: Status: ACTIVE | Noted: 2022-01-01

## 2022-02-24 PROBLEM — R65.20 SEVERE SEPSIS: Status: ACTIVE | Noted: 2022-01-01

## 2022-02-24 PROBLEM — A41.9 SEVERE SEPSIS: Status: ACTIVE | Noted: 2022-01-01

## 2022-02-24 PROBLEM — E87.1 HYPONATREMIA: Status: ACTIVE | Noted: 2022-01-01

## 2022-02-24 PROBLEM — I50.9 CHF (CONGESTIVE HEART FAILURE): Status: ACTIVE | Noted: 2022-01-01

## 2022-02-24 NOTE — ASSESSMENT & PLAN NOTE
2/24 Patient is identified as having suspected acute CHF. CHF is currently uncontrolled due to Continued edema of extremities. Latest ECHO performed and demonstrates- No results found for this or any previous visit.  . Continue Furosemide and monitor clinical status closely. Monitor on telemetry.   Last BNP reviewed- and noted below   Recent Labs   Lab 02/24/22  1045   *   .  Telemetry  Check echocardiogram  Continue Iv diuretics  Consult Cardiology

## 2022-02-24 NOTE — SUBJECTIVE & OBJECTIVE
Interval History:  Patient was placed on Iv Abx for pneumonia and duonebs. Pulmonology was consulted. He was noted to have some hypertensive urgency and his home medications were restarted and titrated as needed. He was noted to have signs of acute CHF and had an EKG which showed possible afib. Cardiology was consulted. Physical therapy was consulted for debility.      Review of Systems   Constitutional:  Positive for activity change and fatigue. Negative for appetite change.   HENT:  Negative for ear discharge, ear pain and facial swelling.    Eyes:  Negative for pain and redness.   Respiratory:  Positive for cough and shortness of breath.    Cardiovascular:  Positive for chest pain and leg swelling.        Midsternal  4/10 Chest pain   Gastrointestinal:  Negative for abdominal distention, abdominal pain, blood in stool, constipation, nausea and vomiting.   Endocrine: Negative for polydipsia and polyphagia.   Genitourinary:  Negative for difficulty urinating, dysuria, flank pain and hematuria.   Musculoskeletal:  Negative for neck pain and neck stiffness.   Skin:  Negative for color change.   Allergic/Immunologic: Negative for food allergies.   Neurological:  Positive for weakness. Negative for seizures, facial asymmetry and speech difficulty.   Hematological:  Does not bruise/bleed easily.   Psychiatric/Behavioral:  Negative for agitation, behavioral problems, confusion, dysphoric mood and suicidal ideas. The patient is not nervous/anxious.    Objective:     Vital Signs (Most Recent):  Temp: 99 °F (37.2 °C) (02/24/22 1135)  Pulse: 90 (02/24/22 1353)  Resp: (!) 22 (02/24/22 1135)  BP: (!) 146/67 (02/24/22 1353)  SpO2: 95 % (02/24/22 1135)   Vital Signs (24h Range):  Temp:  [97.5 °F (36.4 °C)-99 °F (37.2 °C)] 99 °F (37.2 °C)  Pulse:  [] 90  Resp:  [16-22] 22  SpO2:  [95 %-99 %] 95 %  BP: (146-209)/(67-95) 146/67     Weight: (!) 136.5 kg (301 lb)  Body mass index is 40.82 kg/m².    Intake/Output Summary (Last  24 hours) at 2/24/2022 1419  Last data filed at 2/24/2022 0900  Gross per 24 hour   Intake 1215.19 ml   Output --   Net 1215.19 ml      Physical Exam  Constitutional:       Appearance: He is obese. He is not diaphoretic.   HENT:      Head: Normocephalic and atraumatic.      Mouth/Throat:      Mouth: Mucous membranes are moist.   Eyes:      General:         Right eye: No discharge.         Left eye: No discharge.      Extraocular Movements: Extraocular movements intact.      Conjunctiva/sclera: Conjunctivae normal.      Pupils: Pupils are equal, round, and reactive to light.   Cardiovascular:      Rate and Rhythm: Regular rhythm. Tachycardia present.      Pulses: Normal pulses.   Pulmonary:      Effort: No respiratory distress.      Comments: Dyspneic with exertion  BBS diminshed  Abdominal:      General: Bowel sounds are normal. There is no distension.      Palpations: Abdomen is soft.      Tenderness: There is no abdominal tenderness. There is no guarding.      Comments: Obese   Genitourinary:     Comments: Not examined  Musculoskeletal:         General: Normal range of motion.      Cervical back: Normal range of motion and neck supple. No rigidity or tenderness.      Right lower leg: Edema present.      Left lower leg: Edema present.      Comments: BLE 2 plus   Skin:     General: Skin is warm and dry.   Neurological:      General: No focal deficit present.      Mental Status: He is alert and oriented to person, place, and time. Mental status is at baseline.      Cranial Nerves: No cranial nerve deficit.   Psychiatric:         Mood and Affect: Mood normal.         Behavior: Behavior normal.         Thought Content: Thought content normal.         Judgment: Judgment normal.       Lab Results   Component Value Date    WBC 11.39 02/24/2022    HGB 8.5 (L) 02/24/2022    HCT 26.6 (L) 02/24/2022    MCV 94 02/24/2022     02/24/2022       BMP  Lab Results   Component Value Date     (L) 02/24/2022    K 3.4 (L)  02/24/2022    CL 95 02/24/2022    CO2 25 02/24/2022    BUN 23 02/24/2022    CREATININE 1.8 (H) 02/24/2022    CALCIUM 7.5 (L) 02/24/2022    ANIONGAP 14 02/24/2022    ESTGFRAFRICA 41 (A) 02/24/2022    EGFRNONAA 36 (A) 02/24/2022

## 2022-02-24 NOTE — ASSESSMENT & PLAN NOTE
2/24 DM diet  Accuchecks with correctional SSI  Blood sugars running [phone]- Increase SSi to moderate dose  Patient recieved prior Iv steroids yesterday now discontinued  Continue Pm detemir

## 2022-02-24 NOTE — PLAN OF CARE
POC reviewed with pt. Pt verbalizes understanding of POC. No questions at this time.  AAOx4. NADN.  NSR on cardiac monitor, with some tachycardia.  2LNC.  IV Mag today.   Echo done today.   PO lasix given today.  PRN nitro given today.   Pt remains free of falls. Patient up independently to restroom.  No complaints at this time.  Safety measures in place. Will continue to monitor.  Hourly rounding complete.   Informed pt to call for assistance before getting up. Pt verbalizes understanding.

## 2022-02-24 NOTE — ASSESSMENT & PLAN NOTE
2/23 Continue antihypertensive agents.  2/24 Monitor  Resume home medications  Titrate as needed

## 2022-02-24 NOTE — NURSING
Rapid response called for /91, heart rate holding in the 120s. Patient tingly and reporting increased chest pain. Nitroglycerin given, magnesium drip started, and labetalol administered. Patient stabilized and resting comfortably.

## 2022-02-24 NOTE — ASSESSMENT & PLAN NOTE
2/23 Unclear etiology.  History of secondhand tobacco use exposure in the past.  Scheduled Tessalon and Robitussin.  Consult Pulmonary for evaluation/recommendations.  Aspiration precautions.  Consult speech therapy for swallow evaluation.

## 2022-02-24 NOTE — HPI
Mr. Ragland is a 76 year old male patient whose current medical conditions include metastatic prostate cancer, HTN, hyperlipidemia, and DM type II who presented to [facility] ED yesterday with a chief complaint of worsening cough over the past few weeks. Associated symptoms included SOB, fatigue, and BLE edema. Patient denied any associated fever, chills, nausea, vomiting, diaphoresis, palpitations, near syncope, or syncope. Initial workup in ED revealed creatinine of 1.7. CXR revealed right lower lobe infiltrate and patient was subsequently admitted for further evaluation and treatment. This AM, patient complained of achy substernal chest pain and EKG was concerning for afib, thus cardiology consulted to assist with management. Patient seen and examined today, resting in bed. Still feels poorly. SOB with minimal exertion. States chest pain has mostly resolved. Denies any prior history of CAD or MI. Chart reviewed. Troponin negative. Echo pending. EKG and telemetry strips reviewed, patient appeared to have short run of afib, now back in SR. CT of chest reviewed, concerning for edema vs PNA/infectious process.

## 2022-02-24 NOTE — PT/OT/SLP PROGRESS
Occupational Therapy      Patient Name:  Sherif Ragland   MRN:  [id number]    OT tx attempted at 10:00am, pt on hold per Nurse Radha secondary to pt being currently hypertensive and experiencing chest pain. OT will follow up on next session.     Anaid Hawthorne, PT/OT  2/24/2022

## 2022-02-24 NOTE — ASSESSMENT & PLAN NOTE
-CT of chest concerning for edema vs underlying infection  -Agree with abx/gentle diuresis  -Continue home meds for BP control  -Echo pending

## 2022-02-24 NOTE — PT/OT/SLP PROGRESS
Physical Therapy      Patient Name:  Sherif Ragland   MRN:  [id number]    09:55 a.m.  Communicated with patient's nurse, Radha, who requested patient be placed on hold for PT today due to hypertension and chest pain. Will follow up during next scheduled PT session.

## 2022-02-24 NOTE — SUBJECTIVE & OBJECTIVE
Past Medical History:   Diagnosis Date    CKD (chronic kidney disease) stage 3, GFR 30-59 ml/min     COVID-19 11/30/2020    Diabetes mellitus, type 2 1997    Hyperlipidemia     Hypertension     Hypogonadism in male     Prostate cancer metastatic to bone     Tubular adenoma 10/2017       Past Surgical History:   Procedure Laterality Date    CATARACT EXTRACTION Right 01/31/2018    CATARACT EXTRACTION W/  INTRAOCULAR LENS IMPLANT Left 03/13/2019    COLONOSCOPY N/A 11/2/2017    Procedure: COLONOSCOPY;  Surgeon: Alek Francois MD;  Location: [facility] ENDO;  Service: Endoscopy;  Laterality: N/A;    COLONOSCOPY N/A 10/27/2020    Procedure: COLONOSCOPY;  Surgeon: Aditi Grijalva MD;  Location: [facility] ENDO;  Service: Endoscopy;  Laterality: N/A;    ESOPHAGOGASTRODUODENOSCOPY N/A 10/27/2020    Procedure: ESOPHAGOGASTRODUODENOSCOPY (EGD);  Surgeon: Aditi Grijalva MD;  Location: [facility];  Service: Endoscopy;  Laterality: N/A;    TIBIA FRACTURE SURGERY      right leg x2     TONSILLECTOMY      TRANSURETHRAL RESECTION OF PROSTATE N/A 10/17/2019    Procedure: TURP (TRANSURETHRAL RESECTION OF PROSTATE);  Surgeon: Mir Hale IV, MD;  Location: [facility] OR;  Service: Urology;  Laterality: N/A;       Review of patient's allergies indicates:  No Known Allergies    Current Facility-Administered Medications on File Prior to Encounter   Medication    leuprolide (LUPRON) injection 22.5 mg     Current Outpatient Medications on File Prior to Encounter   Medication Sig    ALPRAZolam (XANAX) 0.25 MG tablet TAKE 1 TO 2 TABLETS BY MOUTH ONCE DAILY AS NEEDED FOR ANXIETY    atorvastatin (LIPITOR) 40 MG tablet Take 1 tablet (40 mg total) by mouth once daily.    bisacodyL (DULCOLAX) 5 mg EC tablet Take 5 mg by mouth daily as needed.    cloNIDine 0.3 mg/24 hr td ptwk (CATAPRES) 0.3 mg/24 hr Place 1 patch onto the skin every 7 days.    dexAMETHasone (DECADRON) 2 MG tablet Take 1 tablet (2 mg total) by mouth 2 (two) times daily with meals.     furosemide (LASIX) 20 MG tablet Take 1 tablet (20 mg total) by mouth once daily. for 3 days    hydrALAZINE (APRESOLINE) 100 MG tablet Take 1 tablet (100 mg total) by mouth every 8 (eight) hours.    HYDROcodone-acetaminophen (NORCO)  mg per tablet Take 1 tablet by mouth every 4 (four) hours as needed for Pain. for pain.    insulin detemir U-100 (LEVEMIR FLEXTOUCH U-100 INSULN) 100 unit/mL (3 mL) InPn pen Inject 70 Units into the skin once daily.    lactulose (CHRONULAC) 10 gram/15 mL solution Take 15 mLs (10 g total) by mouth 2 (two) times daily.    metoprolol succinate (TOPROL-XL) 100 MG 24 hr tablet Take 1 tablet (100 mg total) by mouth 2 (two) times daily.    pantoprazole (PROTONIX) 40 MG tablet Take 1 tablet (40 mg total) by mouth once daily.    potassium chloride SA (K-DUR,KLOR-CON) 20 MEQ tablet Take 1 tablet (20 mEq total) by mouth 2 (two) times daily.    QUEtiapine (SEROQUEL) 25 MG Tab Take 1 tablet (25 mg total) by mouth nightly as needed (insomnia).    repaglinide (PRANDIN) 0.5 MG tablet Take 1 tablet (0.5 mg total) by mouth 3 (three) times daily before meals.    terazosin (HYTRIN) 5 MG capsule Take 1 capsule by mouth.    valsartan-hydrochlorothiazide (DIOVAN-HCT) 320-12.5 mg per tablet TAKE 1 TABLET BY MOUTH EVERY DAY    [DISCONTINUED] amLODIPine (NORVASC) 5 MG tablet Take 2 tablets (10 mg total) by mouth once daily.     Family History       Problem Relation (Age of Onset)    Heart disease Father    Stroke Mother, Brother          Tobacco Use    Smoking status: Never Smoker    Smokeless tobacco: Never Used   Substance and Sexual Activity    Alcohol use: Yes     Comment: rarely    Drug use: No    Sexual activity: Yes     Partners: Female     Review of Systems   Constitutional: Positive for malaise/fatigue.   HENT: Negative.     Eyes: Negative.    Cardiovascular:  Positive for chest pain (resolved) and dyspnea on exertion.   Respiratory:  Positive for cough and shortness of breath.    Endocrine:  Negative.    Hematologic/Lymphatic: Bruises/bleeds easily.   Skin: Negative.    Musculoskeletal:  Positive for arthritis and joint pain.   Gastrointestinal: Negative.    Genitourinary: Negative.    Neurological: Negative.    Psychiatric/Behavioral: Negative.     Allergic/Immunologic: Negative.    Objective:     Vital Signs (Most Recent):  Temp: 99 °F (37.2 °C) (02/24/22 1135)  Pulse: 90 (02/24/22 1353)  Resp: (!) 22 (02/24/22 1135)  BP: (!) 146/67 (02/24/22 1353)  SpO2: 95 % (02/24/22 1135)   Vital Signs (24h Range):  Temp:  [97.5 °F (36.4 °C)-99 °F (37.2 °C)] 99 °F (37.2 °C)  Pulse:  [] 90  Resp:  [16-22] 22  SpO2:  [95 %-99 %] 95 %  BP: (146-209)/(67-95) 146/67     Weight: (!) 136.5 kg (301 lb)  Body mass index is 40.82 kg/m².    SpO2: 95 %  O2 Device (Oxygen Therapy): nasal cannula      Intake/Output Summary (Last 24 hours) at 2/24/2022 1419  Last data filed at 2/24/2022 0900  Gross per 24 hour   Intake 1215.19 ml   Output --   Net 1215.19 ml       Lines/Drains/Airways       Peripheral Intravenous Line  Duration                  Peripheral IV - Single Lumen 02/23/22 0024 20 G Left Antecubital 1 day                    Physical Exam  Vitals and nursing note reviewed.   Constitutional:       General: He is not in acute distress.     Appearance: He is well-developed. He is ill-appearing. He is not diaphoretic.      Comments: On supplemental O2   HENT:      Head: Normocephalic and atraumatic.   Eyes:      General:         Right eye: No discharge.         Left eye: No discharge.      Pupils: Pupils are equal, round, and reactive to light.   Neck:      Thyroid: No thyromegaly.      Vascular: No JVD.      Trachea: No tracheal deviation.   Cardiovascular:      Rate and Rhythm: Normal rate and regular rhythm.      Heart sounds: Normal heart sounds, S1 normal and S2 normal. No murmur heard.  Pulmonary:      Effort: Pulmonary effort is normal. No respiratory distress.      Breath sounds: No wheezing.      Comments:  Coarse BS  Abdominal:      General: There is no distension.      Palpations: Abdomen is soft.      Tenderness: There is no rebound.   Musculoskeletal:      Cervical back: Neck supple.      Right lower leg: Edema present.      Left lower leg: Edema (dependent edemad) present.   Skin:     General: Skin is warm and dry.      Coloration: Skin is not pale.      Findings: No erythema.   Neurological:      General: No focal deficit present.      Mental Status: He is alert and oriented to person, place, and time.   Psychiatric:         Mood and Affect: Mood normal.         Behavior: Behavior normal.         Thought Content: Thought content normal.       Significant Labs: CMP   Recent Labs   Lab 02/23/22  0024 02/24/22  0557    134*   K 4.0 3.4*   CL 99 95   CO2 24 25    316*   BUN 14 23   CREATININE 1.7* 1.8*   CALCIUM 7.8* 7.5*   PROT 6.6  --    ALBUMIN 3.1*  --    BILITOT 0.6  --    ALKPHOS 62  --    AST 18  --    ALT 10  --    ANIONGAP 14 14   ESTGFRAFRICA 44* 41*   EGFRNONAA 38* 36*   , CBC   Recent Labs   Lab 02/23/22  0024 02/24/22  0557   WBC 9.39 11.39   HGB 8.7* 8.5*   HCT 27.6* 26.6*    286   , Troponin   Recent Labs   Lab 02/23/22  0024 02/24/22  1045   TROPONINI 0.014 0.025   , and All pertinent lab results from the last 24 hours have been reviewed.    Significant Imaging: Echocardiogram: Transthoracic echo (TTE) complete (Cupid Only):   Results for orders placed or performed during the hospital encounter of 02/23/22   Echo   Result Value Ref Range    IVRT [id number] msec    TDI LATERAL 0.09 m/s    Left Ventricular Outflow Tract Mean Gradient 3.23 mmHg    Left Ventricular Outflow Tract Mean Velocity 0.03469665479185 cm/s    TR Max Trent 3.42 m/s    Ao root annulus 3.92 cm    Ao peak trent 1.13 m/s    Posterior Wall 1.38 (A) 0.6 - 1.1 cm    LVOT diameter 2.21 cm    LVOT peak trent 1.07 m/s    LVOT peak VTI 27.00 cm    RA Major Axis 4.43 cm    RA Width 3.47 cm    TAPSE 2.68 cm    PV mean  gradient 2.25 mmHg    RVOT peak gato 0.95 m/s    RVOT peak VTI 18.2 cm    IVS 1.23 (A) 0.6 - 1.1 cm    Ao VTI 24.4 cm    AV mean gradient 3 mmHg    LVIDd 4.70 3.5 - 6.0 cm    LVIDs 2.98 2.1 - 4.0 cm    Left Atrium Major Axis 4.35 cm    Left Atrium Minor Axis 3.44 cm    LA WIDTH 3.85 cm    Echo EF Estimated 66 %    STJ 3.66 cm    Ascending aorta 3.63 cm    Sinus 4.14 cm    LV Systolic Volume 34.42 mL    LV Diastolic Volume 102.28 mL    TDI SEPTAL 0.14 m/s    Triscuspid Valve Regurgitation Peak Gradient 47 mmHg    LA size 3.32 cm    FS 37 %    LA volume 41.74 cm3    LV mass 239.21 g    Left Ventricle Relative Wall Thickness 0.59 cm    AV valve area 4.24 cm2    AV Velocity Ratio 0.95     AV index (prosthetic) 1.11     Mean e' 0.12 m/s    LVOT area 3.8 cm2    LVOT stroke volume 103.52 cm3    AV peak gradient 5 mmHg    BSA 2.63 m2    LV Systolic Volume Index 13.6 mL/m2    LV Diastolic Volume Index 40.43 mL/m2    LA Volume Index 16.5 mL/m2    LV Mass Index 95 g/m2   , EKG: Reviewed, and X-Ray: CXR: X-Ray Chest 1 View (CXR): No results found for this visit on 02/23/22. and X-Ray Chest PA and Lateral (CXR): No results found for this visit on 02/23/22.

## 2022-02-24 NOTE — CONSULTS
[facility]  Cardiology  Consult Note    Patient Name: Sherif Ragland  MRN: [id number]  Admission Date: 2/23/2022  Hospital Length of Stay: 1 days  Code Status: DNR   Attending Provider: Arash Capps MD   Consulting Provider: Minnie Hernandez PA-C  Primary Care Physician: Tobias Fox MD  Principal Problem:Right lower lobe pneumonia    Patient information was obtained from patient, past medical history, and ER records.     Inpatient consult to Cardiology  Consult performed by: Minnie Hernandez PA-C  Consult ordered by: ISHAN Tavares        Subjective:     Chief Complaint: SOB    HPI:   Mr. Ragland is a 76 year old male patient whose current medical conditions include metastatic prostate cancer, HTN, hyperlipidemia, and DM type II who presented to [facility] ED yesterday with a chief complaint of worsening cough over the past few weeks. Associated symptoms included SOB, fatigue, and BLE edema. Patient denied any associated fever, chills, nausea, vomiting, diaphoresis, palpitations, near syncope, or syncope. Initial workup in ED revealed creatinine of 1.7. CXR revealed right lower lobe infiltrate and patient was subsequently admitted for further evaluation and treatment. This AM, patient complained of achy substernal chest pain and EKG was concerning for afib, thus cardiology consulted to assist with management. Patient seen and examined today, resting in bed. Still feels poorly. SOB with minimal exertion. States chest pain has mostly resolved. Denies any prior history of CAD or MI. Chart reviewed. Troponin negative. Echo pending. EKG and telemetry strips reviewed, patient appeared to have short run of afib, now back in SR. CT of chest reviewed, concerning for edema vs PNA/infectious process.         Past Medical History:   Diagnosis Date    CKD (chronic kidney disease) stage 3, GFR 30-59 ml/min     COVID-19 11/30/2020    Diabetes mellitus, type 2 1997    Hyperlipidemia     Hypertension      Hypogonadism in male     Prostate cancer metastatic to bone     Tubular adenoma 10/2017       Past Surgical History:   Procedure Laterality Date    CATARACT EXTRACTION Right 01/31/2018    CATARACT EXTRACTION W/  INTRAOCULAR LENS IMPLANT Left 03/13/2019    COLONOSCOPY N/A 11/2/2017    Procedure: COLONOSCOPY;  Surgeon: Alek Francois MD;  Location: [facility] ENDO;  Service: Endoscopy;  Laterality: N/A;    COLONOSCOPY N/A 10/27/2020    Procedure: COLONOSCOPY;  Surgeon: Aditi Grijalva MD;  Location: [facility] ENDO;  Service: Endoscopy;  Laterality: N/A;    ESOPHAGOGASTRODUODENOSCOPY N/A 10/27/2020    Procedure: ESOPHAGOGASTRODUODENOSCOPY (EGD);  Surgeon: Aditi Grijalva MD;  Location: [facility] ENDO;  Service: Endoscopy;  Laterality: N/A;    TIBIA FRACTURE SURGERY      right leg x2     TONSILLECTOMY      TRANSURETHRAL RESECTION OF PROSTATE N/A 10/17/2019    Procedure: TURP (TRANSURETHRAL RESECTION OF PROSTATE);  Surgeon: Mir Hale IV, MD;  Location: [facility] OR;  Service: Urology;  Laterality: N/A;       Review of patient's allergies indicates:  No Known Allergies    Current Facility-Administered Medications on File Prior to Encounter   Medication    leuprolide (LUPRON) injection 22.5 mg     Current Outpatient Medications on File Prior to Encounter   Medication Sig    ALPRAZolam (XANAX) 0.25 MG tablet TAKE 1 TO 2 TABLETS BY MOUTH ONCE DAILY AS NEEDED FOR ANXIETY    atorvastatin (LIPITOR) 40 MG tablet Take 1 tablet (40 mg total) by mouth once daily.    bisacodyL (DULCOLAX) 5 mg EC tablet Take 5 mg by mouth daily as needed.    cloNIDine 0.3 mg/24 hr td ptwk (CATAPRES) 0.3 mg/24 hr Place 1 patch onto the skin every 7 days.    dexAMETHasone (DECADRON) 2 MG tablet Take 1 tablet (2 mg total) by mouth 2 (two) times daily with meals.    furosemide (LASIX) 20 MG tablet Take 1 tablet (20 mg total) by mouth once daily. for 3 days    hydrALAZINE (APRESOLINE) 100 MG tablet Take 1 tablet (100 mg total) by mouth every 8  (eight) hours.    HYDROcodone-acetaminophen (NORCO)  mg per tablet Take 1 tablet by mouth every 4 (four) hours as needed for Pain. for pain.    insulin detemir U-100 (LEVEMIR FLEXTOUCH U-100 INSULN) 100 unit/mL (3 mL) InPn pen Inject 70 Units into the skin once daily.    lactulose (CHRONULAC) 10 gram/15 mL solution Take 15 mLs (10 g total) by mouth 2 (two) times daily.    metoprolol succinate (TOPROL-XL) 100 MG 24 hr tablet Take 1 tablet (100 mg total) by mouth 2 (two) times daily.    pantoprazole (PROTONIX) 40 MG tablet Take 1 tablet (40 mg total) by mouth once daily.    potassium chloride SA (K-DUR,KLOR-CON) 20 MEQ tablet Take 1 tablet (20 mEq total) by mouth 2 (two) times daily.    QUEtiapine (SEROQUEL) 25 MG Tab Take 1 tablet (25 mg total) by mouth nightly as needed (insomnia).    repaglinide (PRANDIN) 0.5 MG tablet Take 1 tablet (0.5 mg total) by mouth 3 (three) times daily before meals.    terazosin (HYTRIN) 5 MG capsule Take 1 capsule by mouth.    valsartan-hydrochlorothiazide (DIOVAN-HCT) 320-12.5 mg per tablet TAKE 1 TABLET BY MOUTH EVERY DAY    [DISCONTINUED] amLODIPine (NORVASC) 5 MG tablet Take 2 tablets (10 mg total) by mouth once daily.     Family History       Problem Relation (Age of Onset)    Heart disease Father    Stroke Mother, Brother          Tobacco Use    Smoking status: Never Smoker    Smokeless tobacco: Never Used   Substance and Sexual Activity    Alcohol use: Yes     Comment: rarely    Drug use: No    Sexual activity: Yes     Partners: Female     Review of Systems   Constitutional: Positive for malaise/fatigue.   HENT: Negative.     Eyes: Negative.    Cardiovascular:  Positive for chest pain (resolved) and dyspnea on exertion.   Respiratory:  Positive for cough and shortness of breath.    Endocrine: Negative.    Hematologic/Lymphatic: Bruises/bleeds easily.   Skin: Negative.    Musculoskeletal:  Positive for arthritis and joint pain.   Gastrointestinal: Negative.     Genitourinary: Negative.    Neurological: Negative.    Psychiatric/Behavioral: Negative.     Allergic/Immunologic: Negative.    Objective:     Vital Signs (Most Recent):  Temp: 99 °F (37.2 °C) (02/24/22 1135)  Pulse: 90 (02/24/22 1353)  Resp: (!) 22 (02/24/22 1135)  BP: (!) 146/67 (02/24/22 1353)  SpO2: 95 % (02/24/22 1135)   Vital Signs (24h Range):  Temp:  [97.5 °F (36.4 °C)-99 °F (37.2 °C)] 99 °F (37.2 °C)  Pulse:  [] 90  Resp:  [16-22] 22  SpO2:  [95 %-99 %] 95 %  BP: (146-209)/(67-95) 146/67     Weight: (!) 136.5 kg (301 lb)  Body mass index is 40.82 kg/m².    SpO2: 95 %  O2 Device (Oxygen Therapy): nasal cannula      Intake/Output Summary (Last 24 hours) at 2/24/2022 1419  Last data filed at 2/24/2022 0900  Gross per 24 hour   Intake 1215.19 ml   Output --   Net 1215.19 ml       Lines/Drains/Airways       Peripheral Intravenous Line  Duration                  Peripheral IV - Single Lumen 02/23/22 0024 20 G Left Antecubital 1 day                    Physical Exam  Vitals and nursing note reviewed.   Constitutional:       General: He is not in acute distress.     Appearance: He is well-developed. He is ill-appearing. He is not diaphoretic.      Comments: On supplemental O2   HENT:      Head: Normocephalic and atraumatic.   Eyes:      General:         Right eye: No discharge.         Left eye: No discharge.      Pupils: Pupils are equal, round, and reactive to light.   Neck:      Thyroid: No thyromegaly.      Vascular: No JVD.      Trachea: No tracheal deviation.   Cardiovascular:      Rate and Rhythm: Normal rate and regular rhythm.      Heart sounds: Normal heart sounds, S1 normal and S2 normal. No murmur heard.  Pulmonary:      Effort: Pulmonary effort is normal. No respiratory distress.      Breath sounds: No wheezing.      Comments: Coarse BS  Abdominal:      General: There is no distension.      Palpations: Abdomen is soft.      Tenderness: There is no rebound.   Musculoskeletal:      Cervical back:  Neck supple.      Right lower leg: Edema present.      Left lower leg: Edema (dependent edemad) present.   Skin:     General: Skin is warm and dry.      Coloration: Skin is not pale.      Findings: No erythema.   Neurological:      General: No focal deficit present.      Mental Status: He is alert and oriented to person, place, and time.   Psychiatric:         Mood and Affect: Mood normal.         Behavior: Behavior normal.         Thought Content: Thought content normal.       Significant Labs: CMP   Recent Labs   Lab 02/23/22  0024 02/24/22  0557    134*   K 4.0 3.4*   CL 99 95   CO2 24 25    316*   BUN 14 23   CREATININE 1.7* 1.8*   CALCIUM 7.8* 7.5*   PROT 6.6  --    ALBUMIN 3.1*  --    BILITOT 0.6  --    ALKPHOS 62  --    AST 18  --    ALT 10  --    ANIONGAP 14 14   ESTGFRAFRICA 44* 41*   EGFRNONAA 38* 36*   , CBC   Recent Labs   Lab 02/23/22  0024 02/24/22  0557   WBC 9.39 11.39   HGB 8.7* 8.5*   HCT 27.6* 26.6*    286   , Troponin   Recent Labs   Lab 02/23/22  0024 02/24/22  1045   TROPONINI 0.014 0.025   , and All pertinent lab results from the last 24 hours have been reviewed.    Significant Imaging: Echocardiogram: Transthoracic echo (TTE) complete (Cupid Only):   Results for orders placed or performed during the hospital encounter of 02/23/22   Echo   Result Value Ref Range    IVRT [id number] msec    TDI LATERAL 0.09 m/s    Left Ventricular Outflow Tract Mean Gradient 3.23 mmHg    Left Ventricular Outflow Tract Mean Velocity 0.85280292371320 cm/s    TR Max Trent 3.42 m/s    Ao root annulus 3.92 cm    Ao peak trent 1.13 m/s    Posterior Wall 1.38 (A) 0.6 - 1.1 cm    LVOT diameter 2.21 cm    LVOT peak trent 1.07 m/s    LVOT peak VTI 27.00 cm    RA Major Axis 4.43 cm    RA Width 3.47 cm    TAPSE 2.68 cm    PV mean gradient 2.25 mmHg    RVOT peak trent 0.95 m/s    RVOT peak VTI 18.2 cm    IVS 1.23 (A) 0.6 - 1.1 cm    Ao VTI 24.4 cm    AV mean gradient 3 mmHg    LVIDd 4.70 3.5 - 6.0 cm     LVIDs 2.98 2.1 - 4.0 cm    Left Atrium Major Axis 4.35 cm    Left Atrium Minor Axis 3.44 cm    LA WIDTH 3.85 cm    Echo EF Estimated 66 %    STJ 3.66 cm    Ascending aorta 3.63 cm    Sinus 4.14 cm    LV Systolic Volume 34.42 mL    LV Diastolic Volume 102.28 mL    TDI SEPTAL 0.14 m/s    Triscuspid Valve Regurgitation Peak Gradient 47 mmHg    LA size 3.32 cm    FS 37 %    LA volume 41.74 cm3    LV mass 239.21 g    Left Ventricle Relative Wall Thickness 0.59 cm    AV valve area 4.24 cm2    AV Velocity Ratio 0.95     AV index (prosthetic) 1.11     Mean e' 0.12 m/s    LVOT area 3.8 cm2    LVOT stroke volume 103.52 cm3    AV peak gradient 5 mmHg    BSA 2.63 m2    LV Systolic Volume Index 13.6 mL/m2    LV Diastolic Volume Index 40.43 mL/m2    LA Volume Index 16.5 mL/m2    LV Mass Index 95 g/m2   , EKG: Reviewed, and X-Ray: CXR: X-Ray Chest 1 View (CXR): No results found for this visit on 02/23/22. and X-Ray Chest PA and Lateral (CXR): No results found for this visit on 02/23/22.    Assessment and Plan:   Patient who presents with SOB in setting of PNA/mild element of CHF. He had episode of chest pain this AM likely secondary to the above and brief episode of afib. Replete electrolytes. Continue BB. Check echo. Trend troponin. Agree with abx and gentle diuresis.     * Right lower lobe pneumonia  -On abx    Arrhythmia  -Brief episode of afib noted overnight, now back in SR  -Continue Toprol XL   -Replete electrolytes, K and Mg  -Echo pending  -Add ASA 81 mg daily; will consider AC if arrhythmia recurs    Chest pain  -Reported episode of chest pain this AM, ? Secondary to brief episode of afib and underlying PNA/mild volume overload  -Improved at time of exam  -Add ASA 81 mg daily  -Continue meds for BP control  -Troponin negative, repeat pending  -Echo pending    Type 2 diabetes mellitus with stage 3 chronic kidney disease  -Mgmt as per hospital medicine    CHF (congestive heart failure)  -CT of chest concerning for edema vs  underlying infection  -Agree with abx/gentle diuresis  -Continue home meds for BP control  -Echo pending      Severe sepsis secondary to pneumonia  -Mgmt as per hospital medicine      Hypomagnesemia  -Repletion as per primary team    Hypokalemia  -Repletion as per primary team    DEEJAY with Stage 3 chronic kidney disease  -Monitor with diuresis         VTE Risk Mitigation (From admission, onward)         Ordered     enoxaparin injection 40 mg  Every 12 hours         02/24/22 1118     Place sequential compression device  Until discontinued         02/23/22 0310                Thank you for your consult. I will follow-up with patient. Please contact us if you have any additional questions.    Minnie Hernandez PA-C  Cardiology   [facility]

## 2022-02-24 NOTE — ASSESSMENT & PLAN NOTE
2/23 Creatinine 1.6, close to baseline.  Avoid nephrotoxic agents.    Gentle IV fluids.  2/24 Monitor  Trend BMP  Renal dose all medications          Spontaneous, unlabored and symmetrical

## 2022-02-24 NOTE — PLAN OF CARE
POC reviewed with pt. Pt verbalizes understanding of POC. No questions at this time.  AAOx3. NADN.  NSR on cardiac monitor.  IV Lasix.  Glucose Monitoring.  Swallow Eval today.  CT of chest today.   Pt remains free of falls.   No complaints at this time.  Safety measures in place. Will continue to monitor.  Informed pt to call for assistance before getting up. Pt verbalizes understanding.

## 2022-02-24 NOTE — HOSPITAL COURSE
tPatient was placed on Iv Abx for pneumonia and duonebs. Pulmonology was consulted. He was noted to have some hypertensive urgency and his home medications were restarted and titrated as needed. He was noted to have signs of acute CHF and had an EKG which showed possible afib. Cardiology was consulted. Physical therapy was consulted for debility.  2/25 Patient symptomatic anemia noted. Hx MARI and scheduled for upcoming iron infusion next week. Will order dose Iv venofer and 1 unit PRBCs today. Continue treatment for pneumonia and physical therapy for debility.   2/26/22: SOB improving, ambulating in room and examined sitting up in chair. WBC normal, Afebrile. Cont IV Rocephin, IV lasix, Neb txs, add Doxycycline. Home O2 eval.   2/27/22: pt reports cough and congestion is worse. Oxygenation improving- now on 2 liters. CXR shows improvement. Abx changed to oral Augmentin. Lasix changed to po. Plan for d/c in am.   2/28/22: pt reports improvement. Home oxygen arranged. Pt will be discharged to continue oral lasix 20mg BID and oral Augmentin x 5 days. F/U with PCP, F/U with Cardiology for CHF and possible stress test, F/U with Pulmonology for pulmonary nodule, PFTs, and Sleep study. F/U with GI for GERD/possible aspiration of GERD

## 2022-02-24 NOTE — PROGRESS NOTES
Pharmacist Renal Dose Adjustment Note    Sherif Ragland is a 76 y.o. male being treated with the medication lovenox     Patient Data:    Vital Signs (Most Recent):  Temp: 97.6 °F (36.4 °C) (02/24/22 0739)  Pulse: (!) 111 (02/24/22 0938)  Resp: 18 (02/24/22 0752)  BP: (!) 185/81 (02/24/22 0938)  SpO2: 96 % (02/24/22 0938)   Vital Signs (72h Range):  Temp:  [97.5 °F (36.4 °C)-98.7 °F (37.1 °C)]   Pulse:  []   Resp:  [16-24]   BP: (134-209)/(67-95)   SpO2:  [91 %-99 %]      Recent Labs   Lab 02/23/22  0024 02/24/22  0557   CREATININE 1.7* 1.8*     Serum creatinine: 1.8 mg/dL (H) 02/24/22 0557  Estimated creatinine clearance: 50 mL/min (A)    Medication: lovenox 40mg daily will be changed to lovenox 40mg BID for BMI >40    Pharmacist's Name: Saadia Varghese  Pharmacist's Extension: [phone]

## 2022-02-24 NOTE — ASSESSMENT & PLAN NOTE
-Brief episode of afib noted overnight, now back in SR  -Continue Toprol XL   -Replete electrolytes, K and Mg  -Echo pending  -Add ASA 81 mg daily; will consider AC if arrhythmia recurs

## 2022-02-24 NOTE — ASSESSMENT & PLAN NOTE
This patient does have evidence of infective focus  My overall impression is Severe  sepsis. Vital signs were reviewed and noted in progress note.  Antibiotics given-   Antibiotics (From admission, onward)            Start     Stop Route Frequency Ordered    02/24/22 0900  azithromycin tablet 500 mg         02/26 0859 Oral Daily 02/23/22 1127    02/24/22 0230  cefTRIAXone (ROCEPHIN) 1 g/50 mL D5W IVPB         02/28 0229 IV Every 24 hours (non-standard times) 02/23/22 0310        Cultures were taken-   Microbiology Results (last 7 days)     Procedure Component Value Units Date/Time    Blood culture [[id number]] Collected: 02/24/22 1045    Order Status: Sent Specimen: Blood Updated: 02/24/22 1045    Blood culture [[id number]] Collected: 02/24/22 1045    Order Status: Sent Specimen: Blood Updated: 02/24/22 1045    Culture, Respiratory with Gram Stain [[id number]]     Order Status: No result Specimen: Respiratory from Sputum, Expectorated         Latest lactate reviewed, they are-  Recent Labs   Lab 02/23/22  0024   LACTATE 1.9       Organ dysfunction indicated by Acute kidney injury and Acute respiratory failure    Source- Pneumonia    Source control Achieved by- Iv ABx

## 2022-02-24 NOTE — NURSING
Patient assessed for diabetes educational needs following chart review  He reports was diagnosed over 20 years ago and has received diabetes education in the past  He has a home glucose monitor but does not like to use it    He had a Peng in the past, but his insurance will no longer cover it.   He is interested in a Dexcom but has been unable to obtain, even with his PCP's assistance.  He is consistent with taking his insulin at home  He does not identify any educational needs at this time  Discussed hyperglycemia and steroids

## 2022-02-24 NOTE — ASSESSMENT & PLAN NOTE
2/24 Fall and skin precautions  Turn q 2 hours  Consult Physical therapy and Occupational therapy

## 2022-02-24 NOTE — ASSESSMENT & PLAN NOTE
-Reported episode of chest pain this AM, ? Secondary to brief episode of afib and underlying PNA/mild volume overload  -Improved at time of exam  -Add ASA 81 mg daily  -Continue meds for BP control  -Troponin negative, repeat pending  -Echo pending

## 2022-02-25 PROBLEM — D50.9 IDA (IRON DEFICIENCY ANEMIA): Status: ACTIVE | Noted: 2022-01-01

## 2022-02-25 PROBLEM — I50.31 ACUTE DIASTOLIC CONGESTIVE HEART FAILURE: Status: ACTIVE | Noted: 2022-01-01

## 2022-02-25 PROBLEM — J81.0 ACUTE PULMONARY EDEMA: Status: RESOLVED | Noted: 2022-01-01 | Resolved: 2022-01-01

## 2022-02-25 PROBLEM — E87.1 HYPONATREMIA: Status: RESOLVED | Noted: 2022-01-01 | Resolved: 2022-01-01

## 2022-02-25 PROBLEM — R07.9 CHEST PAIN: Status: RESOLVED | Noted: 2022-01-01 | Resolved: 2022-01-01

## 2022-02-25 PROBLEM — I27.20 PULMONARY HYPERTENSION: Status: ACTIVE | Noted: 2022-01-01

## 2022-02-25 NOTE — ASSESSMENT & PLAN NOTE
2/23 Creatinine 1.6, close to baseline.  Avoid nephrotoxic agents.    Gentle IV fluids.  2/24 Monitor  Trend BMP  No further IVF at this time- Remains on IV diuretics  Renal dose all medications  2/25 Monitor- Patient for unit PRBC today

## 2022-02-25 NOTE — ASSESSMENT & PLAN NOTE
2/24 Patient is identified as having suspected acute CHF. CHF is currently uncontrolled due to Continued edema of extremities. Latest ECHO performed and demonstrates- No results found for this or any previous visit.  . Continue Furosemide and monitor clinical status closely. Monitor on telemetry.   Last BNP reviewed- and noted below   Recent Labs   Lab 02/24/22  1045   *   .  Telemetry  Check echocardiogram  Continue Iv diuretics  Consult Cardiology    2/25 Suspected mild Acute diastolic HF  Currently on Iv diuretics

## 2022-02-25 NOTE — HOSPITAL COURSE
2/25/22-Patient seen and examined today, wife at bedside. Feels very weak and fatigued. Unable to do much of anything without feeling wiped out. Still SOB. No chest pain. HR stable. Labs reviewed. Creatinine 2.0. H/H dipped to 7.6/24.3, would benefit from transfusion. Creatinine 2.0, K slightly low.

## 2022-02-25 NOTE — ASSESSMENT & PLAN NOTE
-CT of chest concerning for edema vs underlying infection  -Agree with abx/gentle diuresis  -Continue home meds for BP control  -Echo pending    2/25/22  -Echo showed normal EF, pulmonary HTN  -Continue current meds/mgmt

## 2022-02-25 NOTE — ASSESSMENT & PLAN NOTE
This patient does have evidence of infective focus  My overall impression is Severe  sepsis. Vital signs were reviewed and noted in progress note.  Antibiotics given-   Antibiotics (From admission, onward)            Start     Stop Route Frequency Ordered    02/24/22 0230  cefTRIAXone (ROCEPHIN) 1 g/50 mL D5W IVPB         02/28 0229 IV Every 24 hours (non-standard times) 02/23/22 0310        Cultures were taken-   Microbiology Results (last 7 days)     Procedure Component Value Units Date/Time    Culture, Respiratory with Gram Stain [[id number]] Collected: 02/25/22 1630    Order Status: Sent Specimen: Respiratory from Sputum, Expectorated Updated: 02/25/22 1705    Blood culture [[id number]] Collected: 02/24/22 1045    Order Status: Completed Specimen: Blood Updated: 02/25/22 1612     Blood Culture, Routine No Growth to date      No Growth to date    Blood culture [[id number]] Collected: 02/24/22 1045    Order Status: Completed Specimen: Blood Updated: 02/25/22 1612     Blood Culture, Routine No Growth to date      No Growth to date        Latest lactate reviewed, they are-  Recent Labs   Lab 02/23/22  0024   LACTATE 1.9       Organ dysfunction indicated by Acute kidney injury and Acute respiratory failure    Source- Pneumonia    Source control Achieved by- Iv ABx  Pulmonology consulted

## 2022-02-25 NOTE — ASSESSMENT & PLAN NOTE
2/24 DM diet  Accuchecks with correctional SSI  Blood sugars running [phone]- Increase SSi to moderate dose  Patient recieved prior Iv steroids yesterday now discontinued  Continue Pm detemir  2/25 Blood sugars running [phone]- Increase detemir to 40 units qpm

## 2022-02-25 NOTE — PLAN OF CARE
Problem: Adult Inpatient Plan of Care  Goal: Plan of Care Review  Outcome: Ongoing, Progressing  Goal: Absence of Hospital-Acquired Illness or Injury  Outcome: Ongoing, Progressing  Goal: Optimal Comfort and Wellbeing  Outcome: Ongoing, Progressing     Problem: Diabetes Comorbidity  Goal: Blood Glucose Level Within Targeted Range  Outcome: Ongoing, Progressing     Problem: Fluid Imbalance (Pneumonia)  Goal: Fluid Balance  Outcome: Ongoing, Progressing     Problem: Infection (Pneumonia)  Goal: Resolution of Infection Signs and Symptoms  Outcome: Ongoing, Progressing     Problem: Glycemic Control Impaired (Sepsis/Septic Shock)  Goal: Blood Glucose Level Within Desired Range  Outcome: Ongoing, Progressing     Problem: Infection Progression (Sepsis/Septic Shock)  Goal: Absence of Infection Signs and Symptoms  Outcome: Ongoing, Progressing

## 2022-02-25 NOTE — SUBJECTIVE & OBJECTIVE
Review of Systems   Constitutional: Positive for malaise/fatigue.   HENT: Negative.     Eyes: Negative.    Cardiovascular:  Positive for dyspnea on exertion.   Respiratory:  Positive for cough, shortness of breath and sputum production.    Endocrine: Negative.    Hematologic/Lymphatic: Bruises/bleeds easily.   Skin: Negative.    Musculoskeletal:  Positive for arthritis, back pain and joint pain.   Gastrointestinal: Negative.    Genitourinary: Negative.    Neurological: Negative.    Psychiatric/Behavioral: Negative.     Allergic/Immunologic: Negative.    Objective:     Vital Signs (Most Recent):  Temp: 98.2 °F (36.8 °C) (02/25/22 1620)  Pulse: 76 (02/25/22 1620)  Resp: 19 (02/25/22 1624)  BP: (!) 140/65 (02/25/22 1620)  SpO2: 95 % (02/25/22 1620)   Vital Signs (24h Range):  Temp:  [97.4 °F (36.3 °C)-98.2 °F (36.8 °C)] 98.2 °F (36.8 °C)  Pulse:  [71-93] 76  Resp:  [16-19] 19  SpO2:  [95 %-97 %] 95 %  BP: (119-140)/(58-65) 140/65     Weight: (!) 137.4 kg (302 lb 14.6 oz)  Body mass index is 41.08 kg/m².     SpO2: 95 %  O2 Device (Oxygen Therapy): nasal cannula      Intake/Output Summary (Last 24 hours) at 2/25/2022 1633  Last data filed at 2/25/2022 1200  Gross per 24 hour   Intake 546.84 ml   Output --   Net 546.84 ml       Lines/Drains/Airways       Peripheral Intravenous Line  Duration                  Peripheral IV - Single Lumen 02/23/22 0024 20 G Left Antecubital 2 days                    Physical Exam  Vitals and nursing note reviewed.   Constitutional:       General: He is not in acute distress.     Appearance: He is well-developed. He is ill-appearing. He is not diaphoretic.      Comments: On supplemental O2   HENT:      Head: Normocephalic and atraumatic.   Eyes:      General:         Right eye: No discharge.         Left eye: No discharge.      Pupils: Pupils are equal, round, and reactive to light.   Neck:      Thyroid: No thyromegaly.      Vascular: No JVD.      Trachea: No tracheal deviation.    Cardiovascular:      Rate and Rhythm: Normal rate and regular rhythm.      Heart sounds: Normal heart sounds, S1 normal and S2 normal. No murmur heard.  Pulmonary:      Effort: Pulmonary effort is normal. No respiratory distress.      Breath sounds: No wheezing.      Comments: Diminished BS at bases  Abdominal:      General: There is no distension.      Palpations: Abdomen is soft.      Tenderness: There is no rebound.   Musculoskeletal:      Cervical back: Neck supple.      Right lower leg: Edema (trace) present.      Left lower leg: Edema (trace) present.   Skin:     General: Skin is warm and dry.      Coloration: Skin is pale.      Findings: No erythema.   Neurological:      General: No focal deficit present.      Mental Status: He is alert and oriented to person, place, and time.   Psychiatric:         Mood and Affect: Mood normal.         Behavior: Behavior normal.         Thought Content: Thought content normal.       Significant Labs: BMP:   Recent Labs   Lab 02/24/22  0557 02/25/22  0547   * 206*   * 136   K 3.4* 3.4*   CL 95 95   CO2 25 29   BUN 23 27*   CREATININE 1.8* 2.0*   CALCIUM 7.5* 7.5*   MG 1.2* 1.8   , CMP   Recent Labs   Lab 02/24/22  0557 02/25/22  0547   * 136   K 3.4* 3.4*   CL 95 95   CO2 25 29   * 206*   BUN 23 27*   CREATININE 1.8* 2.0*   CALCIUM 7.5* 7.5*   ANIONGAP 14 12   ESTGFRAFRICA 41* 36*   EGFRNONAA 36* 31*   , INR No results for input(s): INR, PROTIME in the last 48 hours., Troponin   Recent Labs   Lab 02/24/22  1045 02/24/22  1501   TROPONINI 0.025 0.057*   , and All pertinent lab results from the last 24 hours have been reviewed.    Significant Imaging: Echocardiogram: Transthoracic echo (TTE) complete (Cupid Only):   Results for orders placed or performed during the hospital encounter of 02/23/22   Echo   Result Value Ref Range    IVRT 54.270359592 msec    TDI LATERAL 0.09 m/s    Left Ventricular Outflow Tract Mean Gradient 3.23 mmHg    Left  Ventricular Outflow Tract Mean Velocity 0.30142182027632 cm/s    TR Max Trent 3.42 m/s    Ao root annulus 3.92 cm    Ao peak trent 1.13 m/s    Posterior Wall 1.38 (A) 0.6 - 1.1 cm    LVOT diameter 2.21 cm    LVOT peak trent 1.07 m/s    LVOT peak VTI 27.00 cm    RA Major Axis 4.43 cm    RA Width 3.47 cm    TAPSE 2.68 cm    PV mean gradient 2.25 mmHg    RVOT peak trent 0.95 m/s    RVOT peak VTI 18.2 cm    IVS 1.23 (A) 0.6 - 1.1 cm    Ao VTI 24.4 cm    AV mean gradient 3 mmHg    LVIDd 4.70 3.5 - 6.0 cm    LVIDs 3.44 2.1 - 4.0 cm    Left Atrium Major Axis 4.35 cm    Left Atrium Minor Axis 3.44 cm    LA WIDTH 3.85 cm    Echo EF Estimated 66 %    STJ 3.66 cm    Ascending aorta 3.63 cm    Sinus 4.14 cm    LV Systolic Volume 34.42 mL    LV Diastolic Volume 102.28 mL    TDI SEPTAL 0.14 m/s    Triscuspid Valve Regurgitation Peak Gradient 47 mmHg    LA size 3.32 cm    FS 27 %    LA volume 41.74 cm3    LV mass 239.21 g    Left Ventricle Relative Wall Thickness 0.59 cm    AV valve area 4.24 cm2    AV Velocity Ratio 0.95     AV index (prosthetic) 1.11     Mean e' 0.12 m/s    LVOT area 3.8 cm2    LVOT stroke volume 103.52 cm3    AV peak gradient 5 mmHg    BSA 2.63 m2    LV Systolic Volume Index 13.6 mL/m2    LV Diastolic Volume Index 40.43 mL/m2    LA Volume Index 16.5 mL/m2    LV Mass Index 95 g/m2    Right Atrial Pressure (from IVC) 3 mmHg    EF 65 %    TV rest pulmonary artery pressure 50 mmHg    Narrative    · Mild concentric hypertrophy and normal systolic function.  · The estimated ejection fraction is 65%.  · Indeterminate left ventricular diastolic function.  · Normal right ventricular size with normal right ventricular systolic   function.  · Normal central venous pressure (3 mmHg).  · The estimated PA systolic pressure is 50 mmHg.  · There is pulmonary hypertension.      , EKG: d, and X-Ray: CXR: X-Ray Chest 1 View (CXR): No results found for this visit on 02/23/22. and X-Ray Chest PA and Lateral (CXR): No results found for this  visit on 02/23/22.

## 2022-02-25 NOTE — PT/OT/SLP PROGRESS
"Physical Therapy      Patient Name:  Sherif Ragland   MRN:  [id number]    Time: 7329-9830    Communicated with nurse, Linda , and completed Epic chart review prior to tx session.    S: Patient refused PT session stating "I'm doing everything already. I got up and Aishwarya been walking. I go to the toilet now and don't use the urinal. I'm just weak but that will get better."    O: patient found sitting up in chair.    Educated patient on importance of full and active participation in functional mobility training in order to prevent further loss of ROM and strength. Encouraged patient to perform AROM TE within all available planes of movement throughout the day. Re enforced importance of utilizing call light to meet needs in room and not attempt to get up without staff assistance.      Patient left sitting up in chair with call light in reach and wife present.     A: Patient would continue to benefit from skilled PT to address functional limitations in order to return to PLOF/decrease caregiver burden.    P: Cont PT POC & recommended placement    Charges: SHARON Myrick PTA        "

## 2022-02-25 NOTE — ASSESSMENT & PLAN NOTE
2/24 History MARI   Add MVI  Check iron and B levels  Add Procrit x 1 dose  2/25 Wife states he is scheduled for Iv iron infusion next week- Will give one dose IV venofer today and order  1 Unit PRBCs

## 2022-02-25 NOTE — PROGRESS NOTES
[facility]  Cardiology  Progress Note    Patient Name: Sherif Ragland  MRN: [id number]  Admission Date: 2/23/2022  Hospital Length of Stay: 2 days  Code Status: DNR   Attending Physician: Fabien Copeland MD   Primary Care Physician: Tobias Fox MD  Expected Discharge Date:   Principal Problem:Severe sepsis    Subjective:   HPI:  Mr. Ragland is a 76 year old male patient whose current medical conditions include metastatic prostate cancer, HTN, hyperlipidemia, and DM type II who presented to [facility] ED yesterday with a chief complaint of worsening cough over the past few weeks. Associated symptoms included SOB, fatigue, and BLE edema. Patient denied any associated fever, chills, nausea, vomiting, diaphoresis, palpitations, near syncope, or syncope. Initial workup in ED revealed creatinine of 1.7. CXR revealed right lower lobe infiltrate and patient was subsequently admitted for further evaluation and treatment. This AM, patient complained of achy substernal chest pain and EKG was concerning for afib, thus cardiology consulted to assist with management. Patient seen and examined today, resting in bed. Still feels poorly. SOB with minimal exertion. States chest pain has mostly resolved. Denies any prior history of CAD or MI. Chart reviewed. Troponin negative. Echo pending. EKG and telemetry strips reviewed, patient appeared to have short run of afib, now back in SR. CT of chest reviewed, concerning for edema vs PNA/infectious process.     Hospital Course:   2/25/22-Patient seen and examined today, wife at bedside. Feels very weak and fatigued. Unable to do much of anything without feeling wiped out. Still SOB. No chest pain. HR stable. Labs reviewed. Creatinine 2.0. H/H dipped to 7.6/24.3, would benefit from transfusion. Creatinine 2.0, K slightly low.           Review of Systems   Constitutional: Positive for malaise/fatigue.   HENT: Negative.     Eyes: Negative.    Cardiovascular:  Positive for dyspnea on  exertion.   Respiratory:  Positive for cough, shortness of breath and sputum production.    Endocrine: Negative.    Hematologic/Lymphatic: Bruises/bleeds easily.   Skin: Negative.    Musculoskeletal:  Positive for arthritis, back pain and joint pain.   Gastrointestinal: Negative.    Genitourinary: Negative.    Neurological: Negative.    Psychiatric/Behavioral: Negative.     Allergic/Immunologic: Negative.    Objective:     Vital Signs (Most Recent):  Temp: 98.2 °F (36.8 °C) (02/25/22 1620)  Pulse: 76 (02/25/22 1620)  Resp: 19 (02/25/22 1624)  BP: (!) 140/65 (02/25/22 1620)  SpO2: 95 % (02/25/22 1620)   Vital Signs (24h Range):  Temp:  [97.4 °F (36.3 °C)-98.2 °F (36.8 °C)] 98.2 °F (36.8 °C)  Pulse:  [71-93] 76  Resp:  [16-19] 19  SpO2:  [95 %-97 %] 95 %  BP: (119-140)/(58-65) 140/65     Weight: (!) 137.4 kg (302 lb 14.6 oz)  Body mass index is 41.08 kg/m².     SpO2: 95 %  O2 Device (Oxygen Therapy): nasal cannula      Intake/Output Summary (Last 24 hours) at 2/25/2022 1633  Last data filed at 2/25/2022 1200  Gross per 24 hour   Intake 546.84 ml   Output --   Net 546.84 ml       Lines/Drains/Airways       Peripheral Intravenous Line  Duration                  Peripheral IV - Single Lumen 02/23/22 0024 20 G Left Antecubital 2 days                    Physical Exam  Vitals and nursing note reviewed.   Constitutional:       General: He is not in acute distress.     Appearance: He is well-developed. He is ill-appearing. He is not diaphoretic.      Comments: On supplemental O2   HENT:      Head: Normocephalic and atraumatic.   Eyes:      General:         Right eye: No discharge.         Left eye: No discharge.      Pupils: Pupils are equal, round, and reactive to light.   Neck:      Thyroid: No thyromegaly.      Vascular: No JVD.      Trachea: No tracheal deviation.   Cardiovascular:      Rate and Rhythm: Normal rate and regular rhythm.      Heart sounds: Normal heart sounds, S1 normal and S2 normal. No murmur  heard.  Pulmonary:      Effort: Pulmonary effort is normal. No respiratory distress.      Breath sounds: No wheezing.      Comments: Diminished BS at bases  Abdominal:      General: There is no distension.      Palpations: Abdomen is soft.      Tenderness: There is no rebound.   Musculoskeletal:      Cervical back: Neck supple.      Right lower leg: Edema (trace) present.      Left lower leg: Edema (trace) present.   Skin:     General: Skin is warm and dry.      Coloration: Skin is pale.      Findings: No erythema.   Neurological:      General: No focal deficit present.      Mental Status: He is alert and oriented to person, place, and time.   Psychiatric:         Mood and Affect: Mood normal.         Behavior: Behavior normal.         Thought Content: Thought content normal.       Significant Labs: BMP:   Recent Labs   Lab 02/24/22  0557 02/25/22  0547   * 206*   * 136   K 3.4* 3.4*   CL 95 95   CO2 25 29   BUN 23 27*   CREATININE 1.8* 2.0*   CALCIUM 7.5* 7.5*   MG 1.2* 1.8   , CMP   Recent Labs   Lab 02/24/22  0557 02/25/22  0547   * 136   K 3.4* 3.4*   CL 95 95   CO2 25 29   * 206*   BUN 23 27*   CREATININE 1.8* 2.0*   CALCIUM 7.5* 7.5*   ANIONGAP 14 12   ESTGFRAFRICA 41* 36*   EGFRNONAA 36* 31*   , INR No results for input(s): INR, PROTIME in the last 48 hours., Troponin   Recent Labs   Lab 02/24/22  1045 02/24/22  1501   TROPONINI 0.025 0.057*   , and All pertinent lab results from the last 24 hours have been reviewed.    Significant Imaging: Echocardiogram: Transthoracic echo (TTE) complete (Cupid Only):   Results for orders placed or performed during the hospital encounter of 02/23/22   Echo   Result Value Ref Range    IVRT 54.818043640 msec    TDI LATERAL 0.09 m/s    Left Ventricular Outflow Tract Mean Gradient 3.23 mmHg    Left Ventricular Outflow Tract Mean Velocity 0.35233761531067 cm/s    TR Max Trent 3.42 m/s    Ao root annulus 3.92 cm    Ao peak trent 1.13 m/s    Posterior Wall  1.38 (A) 0.6 - 1.1 cm    LVOT diameter 2.21 cm    LVOT peak gato 1.07 m/s    LVOT peak VTI 27.00 cm    RA Major Axis 4.43 cm    RA Width 3.47 cm    TAPSE 2.68 cm    PV mean gradient 2.25 mmHg    RVOT peak gato 0.95 m/s    RVOT peak VTI 18.2 cm    IVS 1.23 (A) 0.6 - 1.1 cm    Ao VTI 24.4 cm    AV mean gradient 3 mmHg    LVIDd 4.70 3.5 - 6.0 cm    LVIDs 3.44 2.1 - 4.0 cm    Left Atrium Major Axis 4.35 cm    Left Atrium Minor Axis 3.44 cm    LA WIDTH 3.85 cm    Echo EF Estimated 66 %    STJ 3.66 cm    Ascending aorta 3.63 cm    Sinus 4.14 cm    LV Systolic Volume 34.42 mL    LV Diastolic Volume 102.28 mL    TDI SEPTAL 0.14 m/s    Triscuspid Valve Regurgitation Peak Gradient 47 mmHg    LA size 3.32 cm    FS 27 %    LA volume 41.74 cm3    LV mass 239.21 g    Left Ventricle Relative Wall Thickness 0.59 cm    AV valve area 4.24 cm2    AV Velocity Ratio 0.95     AV index (prosthetic) 1.11     Mean e' 0.12 m/s    LVOT area 3.8 cm2    LVOT stroke volume 103.52 cm3    AV peak gradient 5 mmHg    BSA 2.63 m2    LV Systolic Volume Index 13.6 mL/m2    LV Diastolic Volume Index 40.43 mL/m2    LA Volume Index 16.5 mL/m2    LV Mass Index 95 g/m2    Right Atrial Pressure (from IVC) 3 mmHg    EF 65 %    TV rest pulmonary artery pressure 50 mmHg    Narrative    · Mild concentric hypertrophy and normal systolic function.  · The estimated ejection fraction is 65%.  · Indeterminate left ventricular diastolic function.  · Normal right ventricular size with normal right ventricular systolic   function.  · Normal central venous pressure (3 mmHg).  · The estimated PA systolic pressure is 50 mmHg.  · There is pulmonary hypertension.      , EKG: d, and X-Ray: CXR: X-Ray Chest 1 View (CXR): No results found for this visit on 02/23/22. and X-Ray Chest PA and Lateral (CXR): No results found for this visit on 02/23/22.    Assessment and Plan:   Patient who presents with SOB-multifactorial in setting of PNA, worsening anemia, element of diastolic CHF.  Continue abx. Diurese prn, keep I's/O's even. Would benefit from transfusion. Would recommend ASA once anemia stabilizes. No recurrence of afib.    * Severe sepsis secondary to pneumonia  -Mgmt as per hospital medicine      PAF (paroxysmal atrial fibrillation)  -Brief episode of afib noted overnight, now back in SR  -Continue Toprol XL   -Replete electrolytes, K and Mg  -Echo pending  -Add ASA 81 mg daily; will consider AC if arrhythmia recurs    2/25/22  -Stable overnight, SR during exam  -Continue Toprol XL  -Echo showed normal EF  -Will not add ASA or AC at present time given worsening anemia, discussed with patient and family at bedside    Chest pain  -Reported episode of chest pain this AM, ? Secondary to brief episode of afib and underlying PNA/mild volume overload  -Improved at time of exam  -Add ASA 81 mg daily  -Continue meds for BP control  -Troponin negative, repeat pending  -Echo pending    Type 2 diabetes mellitus with stage 3 chronic kidney disease  -Mgmt as per hospital medicine    CHF (congestive heart failure)  -CT of chest concerning for edema vs underlying infection  -Agree with abx/gentle diuresis  -Continue home meds for BP control  -Echo pending    2/25/22  -Echo showed normal EF, pulmonary HTN  -Continue current meds/mgmt      Debility  -Recommend PT/OT evaluation    Hypomagnesemia  -Repletion as per primary team    Hypokalemia  -Repletion as per primary team    DEEJAY with Stage 3 chronic kidney disease  -Monitor with diuresis         VTE Risk Mitigation (From admission, onward)         Ordered     enoxaparin injection 40 mg  Every 12 hours         02/24/22 1118     Place sequential compression device  Until discontinued         02/23/22 0310                Minnie Hernandez PA-C  Cardiology  [facility]

## 2022-02-25 NOTE — PROGRESS NOTES
"[facility]  [facility] Medicine  Progress Note    Patient Name: Sherif Ragland  MRN: [id number]  Patient Class: IP- Inpatient   Admission Date: 2/23/2022  Length of Stay: 2 days  Attending Physician: Fabien Copeland MD  Primary Care Provider: Tobias Fox MD        Subjective:     Principal Problem:Severe sepsis        HPI:   is an elderly 76-year-old  male with PMH significant for prostate cancer on Lupron therapy, presented to the ED complaining of "incessant coughing, that wound go way, for the past few weeks.  Per spouse at the bedside, they have tried many different cough suppressants with no significant improvement.  States that something is stuck in his trachea, does not go down, does not come up.  Feels short of breath.  Denies chest pain.  Denies fever, chills, nausea vomiting.  Laboratory workup is unremarkable, at baseline.  Creatinine 1.7 close to baseline.  CXR reveals mild right lower lobe infiltrate.  Received IV Rocephin, Zithromax in the ED. Patient reports generalized weakness, decreased appetite.    Admitting diagnosis:  Acute bronchitis.  Persistent coughing.  Generalized weakness.      Overview/Hospital Course:  The patient was monitored closely during his stay. He was kept on continuous telemetry monitoring. Patient was placed on Iv Abx for pneumonia and duonebs. Pulmonology was consulted. He was noted to have some hypertensive urgency and his home medications were restarted and titrated as needed. He was noted to have signs of acute CHF and had an EKG which showed possible afib. Cardiology was consulted. Physical therapy was consulted for debility.  2/25 Patient symptomatic anemia noted. Hx MARI and scheduled for upcoming iron infusion next week. Will order dose Iv venofer and 1 unit PRBCs today. Continue treatment for pneumonia and physical therapy for debility.         Interval History:  Patient symptomatic anemia noted. Hx MARI and scheduled for upcoming iron " infusion next week. Will order dose Iv venofer and 1 unit PRBCs today. Continue treatment for pneumonia and physical therapy for debility. Wife at bedside and updated on patient's status and plan of care     Review of Systems   Constitutional:  Positive for activity change and fatigue. Negative for appetite change.   HENT:  Negative for ear discharge, ear pain and facial swelling.    Eyes:  Negative for pain and redness.   Respiratory:  Positive for cough and shortness of breath.    Cardiovascular:  Positive for leg swelling. Negative for chest pain.            Gastrointestinal:  Negative for abdominal distention, abdominal pain, blood in stool, constipation, nausea and vomiting.   Endocrine: Negative for polydipsia and polyphagia.   Genitourinary:  Negative for difficulty urinating, dysuria, flank pain and hematuria.   Musculoskeletal:  Negative for neck pain and neck stiffness.   Skin:  Negative for color change.   Allergic/Immunologic: Negative for food allergies.   Neurological:  Positive for weakness. Negative for seizures, facial asymmetry and speech difficulty.   Hematological:  Does not bruise/bleed easily.   Psychiatric/Behavioral:  Negative for agitation, behavioral problems, confusion, dysphoric mood and suicidal ideas. The patient is not nervous/anxious.    Objective:     Vital Signs (Most Recent):  Temp: 98.2 °F (36.8 °C) (02/25/22 1620)  Pulse: 76 (02/25/22 1620)  Resp: 19 (02/25/22 1624)  BP: (!) 140/65 (02/25/22 1620)  SpO2: 95 % (02/25/22 1620)   Vital Signs (24h Range):  Temp:  [97.4 °F (36.3 °C)-98.2 °F (36.8 °C)] 98.2 °F (36.8 °C)  Pulse:  [71-93] 76  Resp:  [16-19] 19  SpO2:  [95 %-97 %] 95 %  BP: (119-140)/(58-65) 140/65     Weight: (!) 137.4 kg (302 lb 14.6 oz)  Body mass index is 41.08 kg/m².    Intake/Output Summary (Last 24 hours) at 2/25/2022 1714  Last data filed at 2/25/2022 1200  Gross per 24 hour   Intake 546.84 ml   Output --   Net 546.84 ml      Physical Exam  Constitutional:        Appearance: He is obese. He is not diaphoretic.   HENT:      Head: Normocephalic and atraumatic.      Mouth/Throat:      Mouth: Mucous membranes are moist.   Eyes:      General:         Right eye: No discharge.         Left eye: No discharge.      Extraocular Movements: Extraocular movements intact.      Conjunctiva/sclera: Conjunctivae normal.      Pupils: Pupils are equal, round, and reactive to light.   Cardiovascular:      Rate and Rhythm: Normal rate and regular rhythm.      Pulses: Normal pulses.   Pulmonary:      Effort: No respiratory distress.      Comments: Dyspneic with exertion  BBS diminshed  Abdominal:      General: Bowel sounds are normal. There is no distension.      Palpations: Abdomen is soft.      Tenderness: There is no abdominal tenderness. There is no guarding.      Comments: Obese   Genitourinary:     Comments: Not examined  Musculoskeletal:         General: Normal range of motion.      Cervical back: Normal range of motion and neck supple. No rigidity or tenderness.      Right lower leg: Edema present.      Left lower leg: Edema present.      Comments: BLE 2 plus   Skin:     General: Skin is warm and dry.   Neurological:      General: No focal deficit present.      Mental Status: He is alert and oriented to person, place, and time. Mental status is at baseline.      Cranial Nerves: No cranial nerve deficit.   Psychiatric:         Mood and Affect: Mood normal.         Behavior: Behavior normal.         Thought Content: Thought content normal.         Judgment: Judgment normal.       BMP  Lab Results   Component Value Date     02/25/2022    K 3.4 (L) 02/25/2022    CL 95 02/25/2022    CO2 29 02/25/2022    BUN 27 (H) 02/25/2022    CREATININE 2.0 (H) 02/25/2022    CALCIUM 7.5 (L) 02/25/2022    ANIONGAP 12 02/25/2022    ESTGFRAFRICA 36 (A) 02/25/2022    EGFRNONAA 31 (A) 02/25/2022     Lab Results   Component Value Date    WBC 10.52 02/25/2022    HGB 7.6 (L) 02/25/2022    HCT 24.3 (L)  02/25/2022    MCV 95 02/25/2022     02/25/2022            Assessment/Plan:      * Severe sepsis secondary to pneumonia  This patient does have evidence of infective focus  My overall impression is Severe  sepsis. Vital signs were reviewed and noted in progress note.  Antibiotics given-   Antibiotics (From admission, onward)            Start     Stop Route Frequency Ordered    02/24/22 0230  cefTRIAXone (ROCEPHIN) 1 g/50 mL D5W IVPB         02/28 0229 IV Every 24 hours (non-standard times) 02/23/22 0310        Cultures were taken-   Microbiology Results (last 7 days)     Procedure Component Value Units Date/Time    Culture, Respiratory with Gram Stain [[id number]] Collected: 02/25/22 1630    Order Status: Sent Specimen: Respiratory from Sputum, Expectorated Updated: 02/25/22 1705    Blood culture [[id number]] Collected: 02/24/22 1045    Order Status: Completed Specimen: Blood Updated: 02/25/22 1612     Blood Culture, Routine No Growth to date      No Growth to date    Blood culture [[id number]] Collected: 02/24/22 1045    Order Status: Completed Specimen: Blood Updated: 02/25/22 1612     Blood Culture, Routine No Growth to date      No Growth to date        Latest lactate reviewed, they are-  Recent Labs   Lab 02/23/22  0024   LACTATE 1.9       Organ dysfunction indicated by Acute kidney injury and Acute respiratory failure    Source- Pneumonia    Source control Achieved by- Iv ABx  Pulmonology consulted    History MARI (iron deficiency anemia)  2/24 History MARI   Add MVI  Check iron and B levels  Add Procrit x 1 dose  2/25 Wife states he is scheduled for Iv iron infusion next week- Will give one dose IV venofer today and order  1 Unit PRBCs     Pulmonary hypertension  2/25 As noted on Echocardiogram      PAF (paroxysmal atrial fibrillation)  2/24 Telemetry  Cardiology consulted      Type 2 diabetes mellitus with stage 3 chronic kidney disease  2/24 DM diet  Accuchecks with correctional SSI  Blood sugars running  [phone]- Increase SSi to moderate dose  Patient recieved prior Iv steroids yesterday now discontinued  Continue Pm detemir  2/25 Blood sugars running [phone]- Increase detemir to 40 units qpm        Hypoalbuminemia  2/24 Monitor      Hypocalcemia  2/24 Corrected level is 8.2  Add po Calcium       Pulmonary nodules  2/24 Noted. Hx Prostate cancer with Mets  Pulmonology consulted      Acute diastolic congestive heart failure  2/24 Patient is identified as having suspected acute CHF. CHF is currently uncontrolled due to Continued edema of extremities. Latest ECHO performed and demonstrates- No results found for this or any previous visit.  . Continue Furosemide and monitor clinical status closely. Monitor on telemetry.   Last BNP reviewed- and noted below   Recent Labs   Lab 02/24/22  1045   *   .  Telemetry  Check echocardiogram  Continue Iv diuretics  Consult Cardiology    2/25 Suspected mild Acute diastolic HF  Currently on Iv diuretics      Debility  2/24 Fall and skin precautions  Turn Q 2 hours  2/25 Continue  PT and OT        Hypomagnesemia  2/24 Telemetry  Add Mag sulfate 4 gm IV today repeat level in am      Hypokalemia  2/24 Add 40 meq po x 1 dose  2/25 Add 40 meq po x 1 dose    Chronic coughing  2/23 Unclear etiology.  History of secondhand tobacco use exposure in the past.  Scheduled Tessalon and Robitussin.  Consult Pulmonary for evaluation/recommendations.  Aspiration precautions.  Consult speech therapy for swallow evaluation.      Prostate cancer metastatic to bone  2/23 Currently on Lupron therapy.  Follow-up with Oncology in the outpatient.      Hypertension associated with diabetes  2/23 Continue antihypertensive agents.  2/24 Monitor  Resume home medications  Titrate as needed      DEEJAY with Stage 3 chronic kidney disease  2/23 Creatinine 1.6, close to baseline.  Avoid nephrotoxic agents.    Gentle IV fluids.  2/24 Monitor  Trend BMP  No further IVF at this time- Remains on IV diuretics  Renal  dose all medications  2/25 Monitor- Patient for unit PRBC today             VTE Risk Mitigation (From admission, onward)         Ordered     enoxaparin injection 40 mg  Every 12 hours         02/24/22 1118     Place sequential compression device  Until discontinued         02/23/22 0310                Discharge Planning   LUCAS:      Code Status: DNR   Is the patient medically ready for discharge?: No    Reason for patient still in hospital (select all that apply): Treatment  Discharge Plan A: Home with family, Home Health      Time spent seeing patient( greater than 1/2 spent in direct contact) :  42 minutes      ISHAN Vann  Department of Hospital Medicine   [facility]

## 2022-02-25 NOTE — PLAN OF CARE
Pt free from fall/injury/trauma  Skin intact with no evidence of breakdown  Pt up with assistance to restroom  Rocephin given  Pt diuresing  Pt monitored on telemetry   Will continue to monitor

## 2022-02-25 NOTE — SUBJECTIVE & OBJECTIVE
Interval History:  Patient symptomatic anemia noted. Hx MARI and scheduled for upcoming iron infusion next week. Will order dose Iv venofer and 1 unit PRBCs today. Continue treatment for pneumonia and physical therapy for debility.     Review of Systems   Constitutional:  Positive for activity change and fatigue. Negative for appetite change.   HENT:  Negative for ear discharge, ear pain and facial swelling.    Eyes:  Negative for pain and redness.   Respiratory:  Positive for cough and shortness of breath.    Cardiovascular:  Positive for leg swelling. Negative for chest pain.            Gastrointestinal:  Negative for abdominal distention, abdominal pain, blood in stool, constipation, nausea and vomiting.   Endocrine: Negative for polydipsia and polyphagia.   Genitourinary:  Negative for difficulty urinating, dysuria, flank pain and hematuria.   Musculoskeletal:  Negative for neck pain and neck stiffness.   Skin:  Negative for color change.   Allergic/Immunologic: Negative for food allergies.   Neurological:  Positive for weakness. Negative for seizures, facial asymmetry and speech difficulty.   Hematological:  Does not bruise/bleed easily.   Psychiatric/Behavioral:  Negative for agitation, behavioral problems, confusion, dysphoric mood and suicidal ideas. The patient is not nervous/anxious.    Objective:     Vital Signs (Most Recent):  Temp: 98.2 °F (36.8 °C) (02/25/22 1620)  Pulse: 76 (02/25/22 1620)  Resp: 19 (02/25/22 1624)  BP: (!) 140/65 (02/25/22 1620)  SpO2: 95 % (02/25/22 1620)   Vital Signs (24h Range):  Temp:  [97.4 °F (36.3 °C)-98.2 °F (36.8 °C)] 98.2 °F (36.8 °C)  Pulse:  [71-93] 76  Resp:  [16-19] 19  SpO2:  [95 %-97 %] 95 %  BP: (119-140)/(58-65) 140/65     Weight: (!) 137.4 kg (302 lb 14.6 oz)  Body mass index is 41.08 kg/m².    Intake/Output Summary (Last 24 hours) at 2/25/2022 1714  Last data filed at 2/25/2022 1200  Gross per 24 hour   Intake 546.84 ml   Output --   Net 546.84 ml      Physical  Exam  Constitutional:       Appearance: He is obese. He is not diaphoretic.   HENT:      Head: Normocephalic and atraumatic.      Mouth/Throat:      Mouth: Mucous membranes are moist.   Eyes:      General:         Right eye: No discharge.         Left eye: No discharge.      Extraocular Movements: Extraocular movements intact.      Conjunctiva/sclera: Conjunctivae normal.      Pupils: Pupils are equal, round, and reactive to light.   Cardiovascular:      Rate and Rhythm: Normal rate and regular rhythm.      Pulses: Normal pulses.   Pulmonary:      Effort: No respiratory distress.      Comments: Dyspneic with exertion  BBS diminshed  Abdominal:      General: Bowel sounds are normal. There is no distension.      Palpations: Abdomen is soft.      Tenderness: There is no abdominal tenderness. There is no guarding.      Comments: Obese   Genitourinary:     Comments: Not examined  Musculoskeletal:         General: Normal range of motion.      Cervical back: Normal range of motion and neck supple. No rigidity or tenderness.      Right lower leg: Edema present.      Left lower leg: Edema present.      Comments: BLE 2 plus   Skin:     General: Skin is warm and dry.   Neurological:      General: No focal deficit present.      Mental Status: He is alert and oriented to person, place, and time. Mental status is at baseline.      Cranial Nerves: No cranial nerve deficit.   Psychiatric:         Mood and Affect: Mood normal.         Behavior: Behavior normal.         Thought Content: Thought content normal.         Judgment: Judgment normal.       BMP  Lab Results   Component Value Date     02/25/2022    K 3.4 (L) 02/25/2022    CL 95 02/25/2022    CO2 29 02/25/2022    BUN 27 (H) 02/25/2022    CREATININE 2.0 (H) 02/25/2022    CALCIUM 7.5 (L) 02/25/2022    ANIONGAP 12 02/25/2022    ESTGFRAFRICA 36 (A) 02/25/2022    EGFRNONAA 31 (A) 02/25/2022     Lab Results   Component Value Date    WBC 10.52 02/25/2022    HGB 7.6 (L)  02/25/2022    HCT 24.3 (L) 02/25/2022    MCV 95 02/25/2022     02/25/2022

## 2022-02-26 PROBLEM — I50.33 ACUTE ON CHRONIC DIASTOLIC CONGESTIVE HEART FAILURE: Status: ACTIVE | Noted: 2022-01-01

## 2022-02-26 PROBLEM — E83.42 HYPOMAGNESEMIA: Status: RESOLVED | Noted: 2022-01-01 | Resolved: 2022-01-01

## 2022-02-26 NOTE — PROGRESS NOTES
"[facility]  [facility] Medicine  Progress Note    Patient Name: Sherif Ragland  MRN: [id number]  Patient Class: IP- Inpatient   Admission Date: 2/23/2022  Length of Stay: 3 days  Attending Physician: Fabien Copeland MD  Primary Care Provider: Tobias Fox MD        Subjective:     Principal Problem:Severe sepsis        HPI:   is an elderly 76-year-old  male with PMH significant for prostate cancer on Lupron therapy, presented to the ED complaining of "incessant coughing, that wound go way, for the past few weeks.  Per spouse at the bedside, they have tried many different cough suppressants with no significant improvement.  States that something is stuck in his trachea, does not go down, does not come up.  Feels short of breath.  Denies chest pain.  Denies fever, chills, nausea vomiting.  Laboratory workup is unremarkable, at baseline.  Creatinine 1.7 close to baseline.  CXR reveals mild right lower lobe infiltrate.  Received IV Rocephin, Zithromax in the ED. Patient reports generalized weakness, decreased appetite.    Admitting diagnosis:  Acute bronchitis.  Persistent coughing.  Generalized weakness.      Overview/Hospital Course:  tPatient was placed on Iv Abx for pneumonia and duonebs. Pulmonology was consulted. He was noted to have some hypertensive urgency and his home medications were restarted and titrated as needed. He was noted to have signs of acute CHF and had an EKG which showed possible afib. Cardiology was consulted. Physical therapy was consulted for debility.  2/25 Patient symptomatic anemia noted. Hx MARI and scheduled for upcoming iron infusion next week. Will order dose Iv venofer and 1 unit PRBCs today. Continue treatment for pneumonia and physical therapy for debility.   2/26/22: SOB improving, ambulating in room and examined sitting up in chair. WBC normal, Afebrile. Cont IV Rocephin, IV lasix, Neb txs, add Doxycycline. Home O2 eval.       Interval History:  see " hospital course   Review of Systems   Constitutional:  Positive for activity change and fatigue. Negative for appetite change.   HENT:  Negative for ear discharge, ear pain and facial swelling.    Eyes:  Negative for pain and redness.   Respiratory:  Positive for cough and shortness of breath.    Cardiovascular:  Positive for leg swelling. Negative for chest pain.            Gastrointestinal:  Negative for abdominal distention, abdominal pain, blood in stool, constipation, nausea and vomiting.   Endocrine: Negative for polydipsia and polyphagia.   Genitourinary:  Negative for difficulty urinating, dysuria, flank pain and hematuria.   Musculoskeletal:  Negative for neck pain and neck stiffness.   Skin:  Negative for color change.   Allergic/Immunologic: Negative for food allergies.   Neurological:  Positive for weakness. Negative for seizures, facial asymmetry and speech difficulty.   Hematological:  Does not bruise/bleed easily.   Psychiatric/Behavioral:  Negative for agitation, behavioral problems, confusion, dysphoric mood and suicidal ideas. The patient is not nervous/anxious.    Objective:     Vital Signs (Most Recent):  Temp: 98.2 °F (36.8 °C) (02/26/22 1152)  Pulse: 76 (02/26/22 1152)  Resp: 18 (02/26/22 1152)  BP: (!) 111/56 (02/26/22 1152)  SpO2: 96 % (02/26/22 1152)   Vital Signs (24h Range):  Temp:  [97.4 °F (36.3 °C)-98.2 °F (36.8 °C)] 98.2 °F (36.8 °C)  Pulse:  [67-87] 76  Resp:  [16-20] 18  SpO2:  [94 %-98 %] 96 %  BP: (111-161)/(56-70) 111/56     Weight: (!) 137.4 kg (302 lb 14.6 oz)  Body mass index is 41.08 kg/m².    Intake/Output Summary (Last 24 hours) at 2/26/2022 1512  Last data filed at 2/26/2022 1200  Gross per 24 hour   Intake 924 ml   Output --   Net 924 ml        Physical Exam  Constitutional:       Appearance: He is obese. He is not diaphoretic.   HENT:      Head: Normocephalic and atraumatic.      Mouth/Throat:      Mouth: Mucous membranes are moist.   Eyes:      General:         Right  eye: No discharge.         Left eye: No discharge.      Extraocular Movements: Extraocular movements intact.      Conjunctiva/sclera: Conjunctivae normal.      Pupils: Pupils are equal, round, and reactive to light.   Cardiovascular:      Rate and Rhythm: Normal rate and regular rhythm.      Pulses: Normal pulses.   Pulmonary:      Effort: No respiratory distress.      Breath sounds: Rhonchi and rales present.      Comments: Dyspneic with exertion  BBS diminshed  Abdominal:      General: Bowel sounds are normal. There is no distension.      Palpations: Abdomen is soft.      Tenderness: There is no abdominal tenderness. There is no guarding.      Comments: Obese   Genitourinary:     Comments: Not examined  Musculoskeletal:         General: Normal range of motion.      Cervical back: Normal range of motion and neck supple. No rigidity or tenderness.      Right lower leg: Edema present.      Left lower leg: Edema present.      Comments: Trace edema BLE   Skin:     General: Skin is warm and dry.   Neurological:      General: No focal deficit present.      Mental Status: He is alert and oriented to person, place, and time. Mental status is at baseline.      Cranial Nerves: No cranial nerve deficit.   Psychiatric:         Mood and Affect: Mood normal.         Behavior: Behavior normal.         Thought Content: Thought content normal.         Judgment: Judgment normal.       BMP  Lab Results   Component Value Date     02/26/2022    K 3.5 02/26/2022    CL 95 02/26/2022    CO2 29 02/26/2022    BUN 26 (H) 02/26/2022    CREATININE 1.9 (H) 02/26/2022    CALCIUM 8.1 (L) 02/26/2022    ANIONGAP 12 02/26/2022    ESTGFRAFRICA 39 (A) 02/26/2022    EGFRNONAA 33 (A) 02/26/2022     Lab Results   Component Value Date    WBC 8.91 02/26/2022    HGB 9.4 (L) 02/26/2022    HCT 29.3 (L) 02/26/2022    MCV 94 02/26/2022     02/26/2022            Assessment/Plan:      * Severe sepsis secondary to pneumonia  This patient does have  evidence of infective focus  My overall impression is Severe  sepsis. Vital signs were reviewed and noted in progress note.  Antibiotics given-   Antibiotics (From admission, onward)            Start     Stop Route Frequency Ordered    02/26/22 1515  doxycycline tablet 100 mg         -- Oral Every 12 hours 02/26/22 1508    02/24/22 0230  cefTRIAXone (ROCEPHIN) 1 g/50 mL D5W IVPB         02/28 0229 IV Every 24 hours (non-standard times) 02/23/22 0310        Cultures were taken-   Microbiology Results (last 7 days)     Procedure Component Value Units Date/Time    Culture, Respiratory with Gram Stain [[id number]] Collected: 02/25/22 1630    Order Status: Completed Specimen: Respiratory from Sputum, Expectorated Updated: 02/26/22 0438     Respiratory Culture Specimen inadequate - culture not performed     Gram Stain (Respiratory) >10epis/lfp and <than many WBC's     Gram Stain (Respiratory) Predominance of oropharyngeal roberto. Please recollect.    Blood culture [[id number]] Collected: 02/24/22 1045    Order Status: Completed Specimen: Blood Updated: 02/25/22 1612     Blood Culture, Routine No Growth to date      No Growth to date    Blood culture [[id number]] Collected: 02/24/22 1045    Order Status: Completed Specimen: Blood Updated: 02/25/22 1612     Blood Culture, Routine No Growth to date      No Growth to date        Latest lactate reviewed, they are-  No results for input(s): LACTATE in the last 72 hours.    Organ dysfunction indicated by Acute kidney injury and Acute respiratory failure    Source- Pneumonia    Source control Achieved by- Iv Rocephin,. Po doxycycline   Pulmonology consulted    Acute on chronic diastolic congestive heart failure  2/24 Patient is identified as having suspected acute CHF. CHF is currently uncontrolled due to Continued edema of extremities. Latest ECHO performed and demonstrates- No results found for this or any previous visit.  . Continue Furosemide and monitor clinical status closely.  Monitor on telemetry.   Last BNP reviewed- and noted below   Recent Labs   Lab 02/26/22  0738   *   .  Telemetry  Check echocardiogram  Continue Iv diuretics  Consult Cardiology      cont Iv diuretics      Acute hypoxemic respiratory failure  Patient with Hypoxic Respiratory failure which is Acute.  he is not on home oxygen. Supplemental oxygen was provided and noted- Oxygen Concentration (%):  [28] 28.   Signs/symptoms of respiratory failure include- increased work of breathing. Contributing diagnoses includes - CHF and Pneumonia Labs and images were reviewed. Patient Has not had a recent ABG. Will treat underlying causes and adjust management of respiratory failure     Pulmonary nodules  2/24 Noted. Hx Prostate cancer with Mets  Pulmonology consulted    F/u with Pulmonology as OP      History MARI (iron deficiency anemia)  2/24 History MARI   Add MVI  Check iron and B levels  Add Procrit x 1 dose  2/25 Wife states he is scheduled for Iv iron infusion next week- Will give one dose IV venofer today and order  1 Unit PRBCs     PAF (paroxysmal atrial fibrillation)  2/24 Telemetry  Cardiology consulted      Type 2 diabetes mellitus with stage 3 chronic kidney disease  2/24 DM diet  Accuchecks with correctional SSI  Blood sugars running [phone]- Increase SSi to moderate dose  Patient recieved prior Iv steroids yesterday now discontinued  Continue Pm detemir  2/25 Blood sugars running 213-388- Increase detemir to 40 units qpm  2/26/22: improving         Hypocalcemia  2/24 Corrected level is 8.2  Add po Calcium       Hypokalemia  2/24 Add 40 meq po x 1 dose  2/25 Add 40 meq po x 1 dose    Monitor     Chronic coughing  2/23 Unclear etiology.  History of secondhand tobacco use exposure in the past.  Scheduled Tessalon and Robitussin.  Consult Pulmonary for evaluation/recommendations.  Aspiration precautions.  Consult speech therapy for swallow evaluation.    2/26/22: No swallowing problems- likely 2/2 to GERD per  Pulmonology   PPI BID      Prostate cancer metastatic to bone  2/23 Currently on Lupron therapy.  Follow-up with Oncology in the outpatient.      Hypertension associated with diabetes  Stable   Cont Amlodipine, Clonidine TTS patch, Hydralazine, Imdur,  Toprol   Cont IV lasix       DEEJAY with Stage 3 chronic kidney disease    Avoid nephrotoxic agents.    Monitor       VTE Risk Mitigation (From admission, onward)         Ordered     enoxaparin injection 40 mg  Every 12 hours         02/24/22 1118     Place sequential compression device  Until discontinued         02/23/22 0310                Discharge Planning   LUCAS:      Code Status: DNR   Is the patient medically ready for discharge?: No    Reason for patient still in hospital (select all that apply): Patient trending condition  Discharge Plan A: Home with family, Home Health                  Mahnaz Brandon NP  Department of Hospital Medicine   [facility]

## 2022-02-26 NOTE — ASSESSMENT & PLAN NOTE
Patient with Hypoxic Respiratory failure which is Acute.  he is not on home oxygen. Supplemental oxygen was provided and noted- Oxygen Concentration (%):  [28] 28.   Signs/symptoms of respiratory failure include- increased work of breathing. Contributing diagnoses includes - CHF and Pneumonia Labs and images were reviewed. Patient Has not had a recent ABG. Will treat underlying causes and adjust management of respiratory failure

## 2022-02-26 NOTE — PT/OT/SLP PROGRESS
Physical Therapy      Patient Name:  Sherif Ragland   MRN:  [id number]      Time: 0110-0120    Communicated with nurse, Angelica , and completed Epic chart review prior to tx session.    S: Patient again refused PT session stating he is getting up and going to the bathroom on his own and doesn't need help. Patient also refused gait training today reporting he had taken a stool softener and did not want to have an accident. Refused therapist offer to assist him to the toilet.     O: Patient found sitting up in chair.    Educated patient on importance of full and active participation in functional mobility training to prevent further loss of ROm and function. Encouraged patient to perform AROM TE to BLE within all available planes of movement throughout the day. Re enforced importance of utilizing call light to meet needs in room and not attempt to get up without staff assistance.     Patient left sitting up in chair with call light in reach.     A: Patient continues to refuse PT sessions. Confirmed with nursing that patient does move independently and will walk to and from the toilet. Plan to speak with [facility] PT about D/C from therapy services due to lack of participation and improved functional mobility outside of therapy sessions.     P: Cont PT POC & recommended placement    Charges: 1ZAKIA Myrick, PTA

## 2022-02-26 NOTE — ASSESSMENT & PLAN NOTE
2/24 DM diet  Accuchecks with correctional SSI  Blood sugars running [phone]- Increase SSi to moderate dose  Patient recieved prior Iv steroids yesterday now discontinued  Continue Pm detemir  2/25 Blood sugars running 213-388- Increase detemir to 40 units qpm  2/26/22: improving

## 2022-02-26 NOTE — SUBJECTIVE & OBJECTIVE
Interval History:  see hospital course   Review of Systems   Constitutional:  Positive for activity change and fatigue. Negative for appetite change.   HENT:  Negative for ear discharge, ear pain and facial swelling.    Eyes:  Negative for pain and redness.   Respiratory:  Positive for cough and shortness of breath.    Cardiovascular:  Positive for leg swelling. Negative for chest pain.            Gastrointestinal:  Negative for abdominal distention, abdominal pain, blood in stool, constipation, nausea and vomiting.   Endocrine: Negative for polydipsia and polyphagia.   Genitourinary:  Negative for difficulty urinating, dysuria, flank pain and hematuria.   Musculoskeletal:  Negative for neck pain and neck stiffness.   Skin:  Negative for color change.   Allergic/Immunologic: Negative for food allergies.   Neurological:  Positive for weakness. Negative for seizures, facial asymmetry and speech difficulty.   Hematological:  Does not bruise/bleed easily.   Psychiatric/Behavioral:  Negative for agitation, behavioral problems, confusion, dysphoric mood and suicidal ideas. The patient is not nervous/anxious.    Objective:     Vital Signs (Most Recent):  Temp: 98.2 °F (36.8 °C) (02/26/22 1152)  Pulse: 76 (02/26/22 1152)  Resp: 18 (02/26/22 1152)  BP: (!) 111/56 (02/26/22 1152)  SpO2: 96 % (02/26/22 1152)   Vital Signs (24h Range):  Temp:  [97.4 °F (36.3 °C)-98.2 °F (36.8 °C)] 98.2 °F (36.8 °C)  Pulse:  [67-87] 76  Resp:  [16-20] 18  SpO2:  [94 %-98 %] 96 %  BP: (111-161)/(56-70) 111/56     Weight: (!) 137.4 kg (302 lb 14.6 oz)  Body mass index is 41.08 kg/m².    Intake/Output Summary (Last 24 hours) at 2/26/2022 1512  Last data filed at 2/26/2022 1200  Gross per 24 hour   Intake 924 ml   Output --   Net 924 ml        Physical Exam  Constitutional:       Appearance: He is obese. He is not diaphoretic.   HENT:      Head: Normocephalic and atraumatic.      Mouth/Throat:      Mouth: Mucous membranes are moist.   Eyes:       General:         Right eye: No discharge.         Left eye: No discharge.      Extraocular Movements: Extraocular movements intact.      Conjunctiva/sclera: Conjunctivae normal.      Pupils: Pupils are equal, round, and reactive to light.   Cardiovascular:      Rate and Rhythm: Normal rate and regular rhythm.      Pulses: Normal pulses.   Pulmonary:      Effort: No respiratory distress.      Breath sounds: Rhonchi and rales present.      Comments: Dyspneic with exertion  BBS diminshed  Abdominal:      General: Bowel sounds are normal. There is no distension.      Palpations: Abdomen is soft.      Tenderness: There is no abdominal tenderness. There is no guarding.      Comments: Obese   Genitourinary:     Comments: Not examined  Musculoskeletal:         General: Normal range of motion.      Cervical back: Normal range of motion and neck supple. No rigidity or tenderness.      Right lower leg: Edema present.      Left lower leg: Edema present.      Comments: Trace edema BLE   Skin:     General: Skin is warm and dry.   Neurological:      General: No focal deficit present.      Mental Status: He is alert and oriented to person, place, and time. Mental status is at baseline.      Cranial Nerves: No cranial nerve deficit.   Psychiatric:         Mood and Affect: Mood normal.         Behavior: Behavior normal.         Thought Content: Thought content normal.         Judgment: Judgment normal.       BMP  Lab Results   Component Value Date     02/26/2022    K 3.5 02/26/2022    CL 95 02/26/2022    CO2 29 02/26/2022    BUN 26 (H) 02/26/2022    CREATININE 1.9 (H) 02/26/2022    CALCIUM 8.1 (L) 02/26/2022    ANIONGAP 12 02/26/2022    ESTGFRAFRICA 39 (A) 02/26/2022    EGFRNONAA 33 (A) 02/26/2022     Lab Results   Component Value Date    WBC 8.91 02/26/2022    HGB 9.4 (L) 02/26/2022    HCT 29.3 (L) 02/26/2022    MCV 94 02/26/2022     02/26/2022

## 2022-02-26 NOTE — ASSESSMENT & PLAN NOTE
2/24 Patient is identified as having suspected acute CHF. CHF is currently uncontrolled due to Continued edema of extremities. Latest ECHO performed and demonstrates- No results found for this or any previous visit.  . Continue Furosemide and monitor clinical status closely. Monitor on telemetry.   Last BNP reviewed- and noted below   Recent Labs   Lab 02/26/22  0738   *   .  Telemetry  Check echocardiogram  Continue Iv diuretics  Consult Cardiology      Currently on Iv diuretics

## 2022-02-26 NOTE — ASSESSMENT & PLAN NOTE
This patient does have evidence of infective focus  My overall impression is Severe  sepsis. Vital signs were reviewed and noted in progress note.  Antibiotics given-   Antibiotics (From admission, onward)            Start     Stop Route Frequency Ordered    02/26/22 1515  doxycycline tablet 100 mg         -- Oral Every 12 hours 02/26/22 1508    02/24/22 0230  cefTRIAXone (ROCEPHIN) 1 g/50 mL D5W IVPB         02/28 0229 IV Every 24 hours (non-standard times) 02/23/22 0310        Cultures were taken-   Microbiology Results (last 7 days)     Procedure Component Value Units Date/Time    Culture, Respiratory with Gram Stain [[id number]] Collected: 02/25/22 1630    Order Status: Completed Specimen: Respiratory from Sputum, Expectorated Updated: 02/26/22 0438     Respiratory Culture Specimen inadequate - culture not performed     Gram Stain (Respiratory) >10epis/lfp and <than many WBC's     Gram Stain (Respiratory) Predominance of oropharyngeal roberto. Please recollect.    Blood culture [[id number]] Collected: 02/24/22 1045    Order Status: Completed Specimen: Blood Updated: 02/25/22 1612     Blood Culture, Routine No Growth to date      No Growth to date    Blood culture [[id number]] Collected: 02/24/22 1045    Order Status: Completed Specimen: Blood Updated: 02/25/22 1612     Blood Culture, Routine No Growth to date      No Growth to date        Latest lactate reviewed, they are-  No results for input(s): LACTATE in the last 72 hours.    Organ dysfunction indicated by Acute kidney injury and Acute respiratory failure    Source- Pneumonia    Source control Achieved by- Iv ABx  Pulmonology consulted

## 2022-02-26 NOTE — ASSESSMENT & PLAN NOTE
2/23 Unclear etiology.  History of secondhand tobacco use exposure in the past.  Scheduled Tessalon and Robitussin.  Consult Pulmonary for evaluation/recommendations.  Aspiration precautions.  Consult speech therapy for swallow evaluation.    2/26/22: No swallowing problems- likely 2/2 to GERD per Pulmonology   PPI BID

## 2022-02-26 NOTE — RESPIRATORY THERAPY
Home Oxygen Evaluation    Date Performed: 2022    1) Patient's Home O2 Sat on room air, while at rest: 91        If O2 sats on room air at rest are 88% or below, patient qualifies. No additional testing needed. Document N/A in steps 2 and 3. If 89% or above, complete steps 2.      2) Patient's O2 Sat on room air while exercisin        If O2 sats on room air while exercising remain 89% or above patient does not qualify, no further testing needed Document N/A in step 3. If O2 sats on room air while exercising are 88% or below, continue to step 3.      3) Patient's O2 Sat while exercising on O2: 94 at 3 LPM         (Must show improvement from #2 for patients to qualify)    If O2 sats improve on oxygen, patient qualifies for portable oxygen. If not, the patient does not qualify.

## 2022-02-27 NOTE — PROGRESS NOTES
"[facility]  [facility] Medicine  Progress Note    Patient Name: Sherif Ragland  MRN: [id number]  Patient Class: IP- Inpatient   Admission Date: 2/23/2022  Length of Stay: 4 days  Attending Physician: Fabien Copeland MD  Primary Care Provider: Tobias Fox MD        Subjective:     Principal Problem:Severe sepsis        HPI:   is an elderly 76-year-old  male with PMH significant for prostate cancer on Lupron therapy, presented to the ED complaining of "incessant coughing, that wound go way, for the past few weeks.  Per spouse at the bedside, they have tried many different cough suppressants with no significant improvement.  States that something is stuck in his trachea, does not go down, does not come up.  Feels short of breath.  Denies chest pain.  Denies fever, chills, nausea vomiting.  Laboratory workup is unremarkable, at baseline.  Creatinine 1.7 close to baseline.  CXR reveals mild right lower lobe infiltrate.  Received IV Rocephin, Zithromax in the ED. Patient reports generalized weakness, decreased appetite.    Admitting diagnosis:  Acute bronchitis.  Persistent coughing.  Generalized weakness.      Overview/Hospital Course:  tPatient was placed on Iv Abx for pneumonia and duonebs. Pulmonology was consulted. He was noted to have some hypertensive urgency and his home medications were restarted and titrated as needed. He was noted to have signs of acute CHF and had an EKG which showed possible afib. Cardiology was consulted. Physical therapy was consulted for debility.  2/25 Patient symptomatic anemia noted. Hx MARI and scheduled for upcoming iron infusion next week. Will order dose Iv venofer and 1 unit PRBCs today. Continue treatment for pneumonia and physical therapy for debility.   2/26/22: SOB improving, ambulating in room and examined sitting up in chair. WBC destiny, Afebrile. Cont IV Rocephin, IV lasix, Neb txs, add Doxycycline. Home O2 eval.   2/27/22: pt reports cough and " congestion is worse. Oxygenation improving- now on 2 liters. CXR shows improvement. Abx changed to oral Augmentin. Lasix changed to po. Plan for d/c in am.       Interval History:  see hospital course   Review of Systems   Constitutional:  Positive for activity change and fatigue. Negative for appetite change.   HENT:  Negative for ear discharge, ear pain and facial swelling.    Eyes:  Negative for pain and redness.   Respiratory:  Positive for cough and shortness of breath.    Cardiovascular:  Positive for leg swelling. Negative for chest pain.            Gastrointestinal:  Negative for abdominal distention, abdominal pain, blood in stool, constipation, nausea and vomiting.   Endocrine: Negative for polydipsia and polyphagia.   Genitourinary:  Negative for difficulty urinating, dysuria, flank pain and hematuria.   Musculoskeletal:  Negative for neck pain and neck stiffness.   Skin:  Negative for color change.   Allergic/Immunologic: Negative for food allergies.   Neurological:  Positive for weakness. Negative for seizures, facial asymmetry and speech difficulty.   Hematological:  Does not bruise/bleed easily.   Psychiatric/Behavioral:  Negative for agitation, behavioral problems, confusion, dysphoric mood and suicidal ideas. The patient is not nervous/anxious.    Objective:     Vital Signs (Most Recent):  Temp: 98.2 °F (36.8 °C) (02/27/22 1442)  Pulse: 78 (02/27/22 1500)  Resp: 16 (02/27/22 1442)  BP: (!) 133/59 (02/27/22 1442)  SpO2: 95 % (02/27/22 1442)   Vital Signs (24h Range):  Temp:  [97.4 °F (36.3 °C)-98.4 °F (36.9 °C)] 98.2 °F (36.8 °C)  Pulse:  [61-80] 78  Resp:  [16-20] 16  SpO2:  [94 %-98 %] 95 %  BP: (123-139)/(59-63) 133/59     Weight: (!) 140.4 kg (309 lb 8.4 oz)  Body mass index is 41.98 kg/m².    Intake/Output Summary (Last 24 hours) at 2/27/2022 1509  Last data filed at 2/27/2022 0802  Gross per 24 hour   Intake 640.85 ml   Output 6 ml   Net 634.85 ml        Physical Exam  Constitutional:        Appearance: He is obese. He is not diaphoretic.   HENT:      Head: Normocephalic and atraumatic.      Mouth/Throat:      Mouth: Mucous membranes are moist.   Eyes:      General:         Right eye: No discharge.         Left eye: No discharge.      Extraocular Movements: Extraocular movements intact.      Conjunctiva/sclera: Conjunctivae normal.      Pupils: Pupils are equal, round, and reactive to light.   Cardiovascular:      Rate and Rhythm: Normal rate and regular rhythm.      Pulses: Normal pulses.   Pulmonary:      Effort: No respiratory distress.      Breath sounds: Rhonchi and rales present.      Comments: Dyspneic with exertion  BBS diminshed  Abdominal:      General: Bowel sounds are normal. There is no distension.      Palpations: Abdomen is soft.      Tenderness: There is no abdominal tenderness. There is no guarding.      Comments: Obese   Genitourinary:     Comments: Not examined  Musculoskeletal:         General: Normal range of motion.      Cervical back: Normal range of motion and neck supple. No rigidity or tenderness.      Right lower leg: Edema present.      Left lower leg: Edema present.      Comments: Trace edema BLE   Skin:     General: Skin is warm and dry.   Neurological:      General: No focal deficit present.      Mental Status: He is alert and oriented to person, place, and time. Mental status is at baseline.      Cranial Nerves: No cranial nerve deficit.   Psychiatric:         Mood and Affect: Mood normal.         Behavior: Behavior normal.         Thought Content: Thought content normal.         Judgment: Judgment normal.       BMP  Lab Results   Component Value Date     02/26/2022    K 3.5 02/26/2022    CL 95 02/26/2022    CO2 29 02/26/2022    BUN 26 (H) 02/26/2022    CREATININE 1.9 (H) 02/26/2022    CALCIUM 8.1 (L) 02/26/2022    ANIONGAP 12 02/26/2022    ESTGFRAFRICA 39 (A) 02/26/2022    EGFRNONAA 33 (A) 02/26/2022     Lab Results   Component Value Date    WBC 8.46 02/27/2022     HGB 8.7 (L) 02/27/2022    HCT 27.0 (L) 02/27/2022    MCV 94 02/27/2022     02/27/2022            Assessment/Plan:      * Severe sepsis secondary to pneumonia  This patient does have evidence of infective focus  My overall impression is Severe  sepsis. Vital signs were reviewed and noted in progress note.  Antibiotics given-   Antibiotics (From admission, onward)            Start     Stop Route Frequency Ordered    02/27/22 1100  amoxicillin-clavulanate 875-125mg per tablet 1 tablet         -- Oral Every 12 hours 02/27/22 1050    02/24/22 0230  cefTRIAXone (ROCEPHIN) 1 g/50 mL D5W IVPB         02/28 0229 IV Every 24 hours (non-standard times) 02/23/22 0310        Cultures were taken-   Microbiology Results (last 7 days)     Procedure Component Value Units Date/Time    Blood culture [[id number]] Collected: 02/24/22 1045    Order Status: Completed Specimen: Blood Updated: 02/26/22 1612     Blood Culture, Routine No Growth to date      No Growth to date      No Growth to date    Blood culture [[id number]] Collected: 02/24/22 1045    Order Status: Completed Specimen: Blood Updated: 02/26/22 1612     Blood Culture, Routine No Growth to date      No Growth to date      No Growth to date    Culture, Respiratory with Gram Stain [[id number]] Collected: 02/25/22 1630    Order Status: Completed Specimen: Respiratory from Sputum, Expectorated Updated: 02/26/22 0438     Respiratory Culture Specimen inadequate - culture not performed     Gram Stain (Respiratory) >10epis/lfp and <than many WBC's     Gram Stain (Respiratory) Predominance of oropharyngeal roberto. Please recollect.        Latest lactate reviewed, they are-  No results for input(s): LACTATE in the last 72 hours.    Organ dysfunction indicated by Acute kidney injury and Acute respiratory failure    Source- Pneumonia    Source control Achieved by- Iv ABx  Pulmonology consulted    2/27/22: changed to oral Augmentin     Acute on chronic diastolic congestive heart  failure  2/24 Patient is identified as having suspected acute CHF. CHF is currently uncontrolled due to Continued edema of extremities. Latest ECHO performed and demonstrates- No results found for this or any previous visit.  . Continue Furosemide and monitor clinical status closely. Monitor on telemetry.   Last BNP reviewed- and noted below   Recent Labs   Lab 02/26/22  0738   *   .  Telemetry  Check echocardiogram  Continue Iv diuretics  Consult Cardiology      Currently on Iv diuretics    2/27: changed to po lasix       Acute hypoxemic respiratory failure  Patient with Hypoxic Respiratory failure which is Acute.  he is not on home oxygen. Supplemental oxygen was provided and noted- Oxygen Concentration (%):  [28] 28.   Signs/symptoms of respiratory failure include- increased work of breathing. Contributing diagnoses includes - CHF and Pneumonia Labs and images were reviewed. Patient Has not had a recent ABG. Will treat underlying causes and adjust management of respiratory failure     Pulmonary nodules  2/24 Noted. Hx Prostate cancer with Mets  Pulmonology consulted    F/u with Pulmonology as OP      History MARI (iron deficiency anemia)  2/24 History MARI   Add MVI  Check iron and B levels  Add Procrit x 1 dose  2/25 Wife states he is scheduled for Iv iron infusion next week- Will give one dose IV venofer today and order  1 Unit PRBCs     PAF (paroxysmal atrial fibrillation)  2/24 Telemetry  Cardiology consulted  HR stable   F/u with Cards as OP       Type 2 diabetes mellitus with stage 3 chronic kidney disease  2/24 DM diet  Accuchecks with correctional SSI  Blood sugars running 309-368- Increase SSi to moderate dose  Patient recieved prior Iv steroids yesterday now discontinued  Continue Pm detemir  2/25 Blood sugars running 213-388- Increase detemir to 40 units qpm  2/26/22: improving         Hypocalcemia  2/24 Corrected level is 8.2  Add po Calcium       Hypokalemia  2/24 Add 40 meq po x 1 dose  2/25  Add 40 meq po x 1 dose    Monitor     Chronic coughing  2/23 Unclear etiology.  History of secondhand tobacco use exposure in the past.  Scheduled Tessalon and Robitussin.  Consult Pulmonary for evaluation/recommendations.  Aspiration precautions.  Consult speech therapy for swallow evaluation.    2/26/22: No swallowing problems- likely 2/2 to GERD per Pulmonology   PPI BID  F/u with GI and PUlmonology     Prostate cancer metastatic to bone  2/23 Currently on Lupron therapy.  Follow-up with Oncology in the outpatient.      Hypertension associated with diabetes  Stable   Cont Amlodipine, Clonidine TTS patch, Hydralazine, Imdur,  Toprol   Changed to po lasix       DEEJAY with Stage 3 chronic kidney disease    Avoid nephrotoxic agents.    Monitor       VTE Risk Mitigation (From admission, onward)         Ordered     enoxaparin injection 40 mg  Every 12 hours         02/24/22 1118     Place sequential compression device  Until discontinued         02/23/22 0310                Discharge Planning   LUCAS: 2/27/2022     Code Status: DNR   Is the patient medically ready for discharge?: No    Reason for patient still in hospital (select all that apply): Patient trending condition  Discharge Plan A: Home with family, Home Health                  Mahnaz Brandon NP  Department of Hospital Medicine   [facility]

## 2022-02-27 NOTE — PLAN OF CARE
Problem: Adult Inpatient Plan of Care  Goal: Patient-Specific Goal (Individualized)  Outcome: Ongoing, Progressing     Pt AAO   Blood pressure stable   SR on tele   On 2 LPm saturating well   Home 02 qualifies for home 02  SW to arrange HH   Unable to produce sputum cx this shift   Po antibx   Lasix BID   AC and HS w/ SSI   Pt up in chair with meals   IS encouraged   Fall precautions in place

## 2022-02-27 NOTE — ASSESSMENT & PLAN NOTE
2/24 Patient is identified as having suspected acute CHF. CHF is currently uncontrolled due to Continued edema of extremities. Latest ECHO performed and demonstrates- No results found for this or any previous visit.  . Continue Furosemide and monitor clinical status closely. Monitor on telemetry.   Last BNP reviewed- and noted below   Recent Labs   Lab 02/26/22  0738   *   .  Telemetry  Check echocardiogram  Continue Iv diuretics  Consult Cardiology      Currently on Iv diuretics    2/27: changed to po lasix

## 2022-02-27 NOTE — SUBJECTIVE & OBJECTIVE
Interval History:  see hospital course   Review of Systems   Constitutional:  Positive for activity change and fatigue. Negative for appetite change.   HENT:  Negative for ear discharge, ear pain and facial swelling.    Eyes:  Negative for pain and redness.   Respiratory:  Positive for cough and shortness of breath.    Cardiovascular:  Positive for leg swelling. Negative for chest pain.            Gastrointestinal:  Negative for abdominal distention, abdominal pain, blood in stool, constipation, nausea and vomiting.   Endocrine: Negative for polydipsia and polyphagia.   Genitourinary:  Negative for difficulty urinating, dysuria, flank pain and hematuria.   Musculoskeletal:  Negative for neck pain and neck stiffness.   Skin:  Negative for color change.   Allergic/Immunologic: Negative for food allergies.   Neurological:  Positive for weakness. Negative for seizures, facial asymmetry and speech difficulty.   Hematological:  Does not bruise/bleed easily.   Psychiatric/Behavioral:  Negative for agitation, behavioral problems, confusion, dysphoric mood and suicidal ideas. The patient is not nervous/anxious.    Objective:     Vital Signs (Most Recent):  Temp: 98.2 °F (36.8 °C) (02/27/22 1442)  Pulse: 78 (02/27/22 1500)  Resp: 16 (02/27/22 1442)  BP: (!) 133/59 (02/27/22 1442)  SpO2: 95 % (02/27/22 1442)   Vital Signs (24h Range):  Temp:  [97.4 °F (36.3 °C)-98.4 °F (36.9 °C)] 98.2 °F (36.8 °C)  Pulse:  [61-80] 78  Resp:  [16-20] 16  SpO2:  [94 %-98 %] 95 %  BP: (123-139)/(59-63) 133/59     Weight: (!) 140.4 kg (309 lb 8.4 oz)  Body mass index is 41.98 kg/m².    Intake/Output Summary (Last 24 hours) at 2/27/2022 1509  Last data filed at 2/27/2022 0802  Gross per 24 hour   Intake 640.85 ml   Output 6 ml   Net 634.85 ml        Physical Exam  Constitutional:       Appearance: He is obese. He is not diaphoretic.   HENT:      Head: Normocephalic and atraumatic.      Mouth/Throat:      Mouth: Mucous membranes are moist.   Eyes:       General:         Right eye: No discharge.         Left eye: No discharge.      Extraocular Movements: Extraocular movements intact.      Conjunctiva/sclera: Conjunctivae normal.      Pupils: Pupils are equal, round, and reactive to light.   Cardiovascular:      Rate and Rhythm: Normal rate and regular rhythm.      Pulses: Normal pulses.   Pulmonary:      Effort: No respiratory distress.      Breath sounds: Rhonchi and rales present.      Comments: Dyspneic with exertion  BBS diminshed  Abdominal:      General: Bowel sounds are normal. There is no distension.      Palpations: Abdomen is soft.      Tenderness: There is no abdominal tenderness. There is no guarding.      Comments: Obese   Genitourinary:     Comments: Not examined  Musculoskeletal:         General: Normal range of motion.      Cervical back: Normal range of motion and neck supple. No rigidity or tenderness.      Right lower leg: Edema present.      Left lower leg: Edema present.      Comments: Trace edema BLE   Skin:     General: Skin is warm and dry.   Neurological:      General: No focal deficit present.      Mental Status: He is alert and oriented to person, place, and time. Mental status is at baseline.      Cranial Nerves: No cranial nerve deficit.   Psychiatric:         Mood and Affect: Mood normal.         Behavior: Behavior normal.         Thought Content: Thought content normal.         Judgment: Judgment normal.       BMP  Lab Results   Component Value Date     02/26/2022    K 3.5 02/26/2022    CL 95 02/26/2022    CO2 29 02/26/2022    BUN 26 (H) 02/26/2022    CREATININE 1.9 (H) 02/26/2022    CALCIUM 8.1 (L) 02/26/2022    ANIONGAP 12 02/26/2022    ESTGFRAFRICA 39 (A) 02/26/2022    EGFRNONAA 33 (A) 02/26/2022     Lab Results   Component Value Date    WBC 8.46 02/27/2022    HGB 8.7 (L) 02/27/2022    HCT 27.0 (L) 02/27/2022    MCV 94 02/27/2022     02/27/2022

## 2022-02-27 NOTE — PLAN OF CARE
Problem: Adult Inpatient Plan of Care  Goal: Patient-Specific Goal (Individualized)  Outcome: Ongoing, Progressing     Pt AAO   Blood pressure stable   SR on tele   On 2 LPM saturating well   Home 02 qualifies SW to arrange see her previous note   SW to arrange HH - requires order at DC  Unable to produce sputum cx this shift   Po antibx   Lasix BID changed to po   AC and HS w/ SSI   Pt up in chair with meals   IS encouraged   Fall precautions in place

## 2022-02-27 NOTE — PLAN OF CARE
Request for home oxygen and nebulizer faxed to [facility] for approval and assigned DME company.  NO resume HH ordered noted, will need resume HH order at discharge.

## 2022-02-27 NOTE — PLAN OF CARE
Pt oriented x. 4 VSS.  Pt remained afebrile throughout this shift.   All meds administered per order.Pt request cough medication   Pt remained free of falls this shift.   Pt had c/o pain this shift.Pain meds administered as ordered. Norco administered  Plan of care reviewed. Patient verbalizes understanding.   Pt moving/turning independently. Has Trace edema tp BlE  Pt is on oxygen has note wheezing and will need supplemental oxygen for home  Patient NSR on monitor.   Bed low, side rails up x 2, wheels locked, call light in reach.   Patient instructed to call for assistance.   Hourly rounding completed.

## 2022-02-27 NOTE — ASSESSMENT & PLAN NOTE
This patient does have evidence of infective focus  My overall impression is Severe  sepsis. Vital signs were reviewed and noted in progress note.  Antibiotics given-   Antibiotics (From admission, onward)            Start     Stop Route Frequency Ordered    02/27/22 1100  amoxicillin-clavulanate 875-125mg per tablet 1 tablet         -- Oral Every 12 hours 02/27/22 1050    02/24/22 0230  cefTRIAXone (ROCEPHIN) 1 g/50 mL D5W IVPB         02/28 0229 IV Every 24 hours (non-standard times) 02/23/22 0310        Cultures were taken-   Microbiology Results (last 7 days)     Procedure Component Value Units Date/Time    Blood culture [[id number]] Collected: 02/24/22 1045    Order Status: Completed Specimen: Blood Updated: 02/26/22 1612     Blood Culture, Routine No Growth to date      No Growth to date      No Growth to date    Blood culture [[id number]] Collected: 02/24/22 1045    Order Status: Completed Specimen: Blood Updated: 02/26/22 1612     Blood Culture, Routine No Growth to date      No Growth to date      No Growth to date    Culture, Respiratory with Gram Stain [[id number]] Collected: 02/25/22 1630    Order Status: Completed Specimen: Respiratory from Sputum, Expectorated Updated: 02/26/22 0438     Respiratory Culture Specimen inadequate - culture not performed     Gram Stain (Respiratory) >10epis/lfp and <than many WBC's     Gram Stain (Respiratory) Predominance of oropharyngeal roberto. Please recollect.        Latest lactate reviewed, they are-  No results for input(s): LACTATE in the last 72 hours.    Organ dysfunction indicated by Acute kidney injury and Acute respiratory failure    Source- Pneumonia    Source control Achieved by- Iv ABx  Pulmonology consulted    2/27/22: changed to oral Augmentin

## 2022-02-27 NOTE — ASSESSMENT & PLAN NOTE
2/23 Unclear etiology.  History of secondhand tobacco use exposure in the past.  Scheduled Tessalon and Robitussin.  Consult Pulmonary for evaluation/recommendations.  Aspiration precautions.  Consult speech therapy for swallow evaluation.    2/26/22: No swallowing problems- likely 2/2 to GERD per Pulmonology   PPI BID  F/u with GI and PUlmonology

## 2022-02-27 NOTE — ASSESSMENT & PLAN NOTE
Stable   Cont Amlodipine, Clonidine TTS patch, Hydralazine, Imdur,  Toprol   Changed to po lasix

## 2022-02-28 NOTE — TELEPHONE ENCOUNTER
----- Message from Tavia Phillip sent at 2/28/2022 11:10 AM CST -----  Contact: Sherif  Patient would like a call back at [phone] in regard to his missed call.    Thanks

## 2022-02-28 NOTE — PLAN OF CARE
[facility]  Discharge Final Note    Primary Care Provider: Tobias Fox MD    Expected Discharge Date: 2/28/2022    Final Discharge Note (most recent)     Final Note - 02/28/22 1442        Final Note    Assessment Type Final Discharge Note     Anticipated Discharge Disposition Home-Health Care [facility]     Hospital Resources/Appts/Education Provided Appointments scheduled and added to AVS        Post-Acute Status    Post-Acute Authorization HME;Home Health     HME Status Set-up Complete/Auth obtained     Home Health Status Set-up Complete/Auth obtained                 Important Message from Medicare  Important Message from Medicare regarding Discharge Appeal Rights: Given to patient/caregiver, Explained to patient/caregiver, Signed/date by patient/caregiver     Date IMM was signed: 02/28/22  Time IMM was signed: 0942    Contact Info     [facility]   Specialty: Pulmonology    [address] [place] [address]   Phone: [phone]       Next Steps: Follow up in 2 week(s)    Instructions: f/u pulmonary nodule, f/u for PFTs, f/u for sleep study    Tobias Fox MD   Specialty: Internal Medicine   Relationship: PCP - General    [address] [facility] [address]   Phone: [phone]       Next Steps: Follow up in 3 day(s)    Instructions: Note sent to Dr Ni office to call you with date and time of hospital follow up appointment.    [facility]   Specialty: Cardiology    [id number] [place] [address]   Phone: [phone]       Next Steps: Follow up in 2 day(s)    Instructions: f/u for afib, diastolic CHF, and possible stress test    [facility]   Specialty: DME Provider    [address] [facility] [address]   Phone: [phone]       Next Steps: Follow up    Instructions: DME- oxygen and nebulizer    [facility]   Specialty: Home Health Services    [address] [facility] [address]   Phone:  [phone]       Next Steps: Follow up    Instructions: Home Health- orders faxed to [facility]        Mar4 Hospital Follow Up with BONG Rodríguez  Friday Mar 4, 2022 9:20 AM  Arrive at check-in approximately 15 minutes before your scheduled appointment time. Bring all outside medical records and imaging, along with a list of your current medications and insurance card.  Please park in surface lot and check in at main registration. [facility] Internal Medicine  [address] [facility] [id number] [phone]   Mar7 Infusion 120 Min  Monday Mar 7, 2022 11:00 AM  Arrive at check-in approximately 15 minutes before your scheduled appointment time. Bring all outside medical records and imaging, along with a list of your current medications and insurance card.  [facility]  [address] [facility] [id number] [phone]   Mar10 Established Patient Visit with Shannon Olivo NP  Thursday Mar 10, 2022 1:00 PM  Arrive at check-in approximately 15 minutes before your scheduled appointment time. Bring all outside medical records and imaging, along with a list of your current medications and insurance card.  (off [facility]) 4th floor [facility]  [address] [facility] [id number] [phone]   Mar14 Established Patient Visit with Gilbert Ulloa MD  Monday Mar 14, 2022 3:45 PM  Arrive at check-in approximately 15 minutes before your scheduled appointment time. Bring all outside medical records and imaging, along with a list of your current medications and insurance card.  Please take [address] to the Medical Office Building. Check in on the 4th floor. [facility]  [id number] [place] [id number] [phone]

## 2022-02-28 NOTE — TELEPHONE ENCOUNTER
----- Message from Linh Monique RN sent at 2/28/2022 10:18 AM CST -----  Regarding: Hospital Follow up Appointment for in 3 days  Please call patient with date and time of Hospital Follow up appointment for in 3 days.    Thank You

## 2022-02-28 NOTE — PLAN OF CARE
Pt aaox4, on 2L NC saturating well. Pt is SR on the monitor. Pt remained free of falls throughout the shift. Medications administered as ordered. POC reviewed with pt, pt has no complaints at this time. Hourly rounding complete. Safety measures in place. Call bell within reach, pt advised to call for assistance, pt verbalizes understanding,       Problem: Infection (Pneumonia)  Goal: Resolution of Infection Signs and Symptoms  Outcome: Ongoing, Progressing     Problem: Infection Progression (Sepsis/Septic Shock)  Goal: Absence of Infection Signs and Symptoms  Outcome: Ongoing, Progressing     Problem: Fall Injury Risk  Goal: Absence of Fall and Fall-Related Injury  Outcome: Ongoing, Progressing     Problem: Adult Inpatient Plan of Care  Goal: Optimal Comfort and Wellbeing  Outcome: Ongoing, Progressing

## 2022-02-28 NOTE — PT/OT/SLP PROGRESS
Physical Therapy  Treatment    Sherif Ragland   MRN: [id number]   Admitting Diagnosis: Severe sepsis    PT Received On: 02/28/22  PT Start Time: 1030     PT Stop Time: 1042    PT Total Time (min): 12 min       Billable Minutes:  Gait Training 12 min       PT/PTA: PT     PTA Visit Number: 0       General Precautions: Standard, fall, respiratory  Orthopedic Precautions: N/A   Braces: N/A  Respiratory Status: Nasal cannula, flow 2 L/min         Subjective:  Communicated with Nurse Dominguez and [facility] chart review prior to session.  Pt found sitting in chair and agreeable to tx at this time.    Pain/Comfort  Pain Rating 1: 0/10    Objective:   Patient found with: telemetry, peripheral IV, oxygen    Functional Mobility:  Therapeutic Activities and Exercises:  Pt performed forward scooting in chair with Mod I, sit>stand with Supervision, donned gown as a robe with SBA. Pt gait trained ~100ft x 2 with SBA using RW and 2L O2 in tow and required 2 standing rest breaks and verbal cues for proper upright posture. Pt returned to room and t/f to chair with SBA using RW. Pt educated in (B) UE and LE exercises and instructed to perform after resting.      AM-PAC 6 CLICK MOBILITY  How much help from another person does this patient currently need?   1 = Unable, Total/Dependent Assistance  2 = A lot, Maximum/Moderate Assistance  3 = A little, Minimum/Contact Guard/Supervision  4 = None, Modified Lawrence/Independent    Turning over in bed (including adjusting bedclothes, sheets and blankets)?: 4  Sitting down on and standing up from a chair with arms (e.g., wheelchair, bedside commode, etc.): 3  Moving from lying on back to sitting on the side of the bed?: 4  Moving to and from a bed to a chair (including a wheelchair)?: 3  Need to walk in hospital room?: 3  Climbing 3-5 steps with a railing?: 1  Basic Mobility Total Score: 18    AM-PAC Raw Score CMS G-Code Modifier Level of Impairment Assistance   6 % Total / Unable   7 - 9  CM 80 - 100% Maximal Assist   10 - 14 CL 60 - 80% Moderate Assist   15 - 19 CK 40 - 60% Moderate Assist   20 - 22 CJ 20 - 40% Minimal Assist   23 CI 1-20% SBA / CGA   24 CH 0% Independent/ Mod I     Patient left up in chair with all lines intact, call button in reach, Nurse Crystal  notified and wife present.    Assessment:  Sherif Ragland is a 76 y.o. male with a medical diagnosis of Severe sepsis and presents with impaired functional mobility. Pt will benefit from continued skilled PT in order to address the listed impairments.     Rehab identified problem list/impairments: Rehab identified problem list/impairments: weakness, impaired endurance, impaired self care skills, impaired functional mobilty, gait instability, impaired balance, decreased coordination, decreased safety awareness, impaired cardiopulmonary response to activity    Rehab potential is good.    Activity tolerance: Good    Discharge recommendations: Discharge Facility/Level of Care Needs: home with home health     Barriers to discharge: Barriers to Discharge: None    Equipment recommendations: Equipment Needed After Discharge: none     GOALS:   Multidisciplinary Problems     Physical Therapy Goals     Not on file          Multidisciplinary Problems (Resolved)        Problem: Physical Therapy Goal    Goal Priority Disciplines Outcome Goal Variances Interventions   Physical Therapy Goal   (Resolved)     PT, PT/OT Met     Description: LTG'S TO BE MET IN 14 DAYS (3-9-22)  1. PT WILL BE CHINYERE WITH BED MOBILITY  2. PT WILL REQUIRE SPV FOR TF'S  3. PT WILL ' WITH RW AND SPV, O2 IN TOW                   PLAN:    Patient to be seen 3 x/week  to address the above listed problems via gait training, therapeutic activities, therapeutic exercises  Plan of Care expires: 03/09/22  Plan of Care reviewed with: patient, spouse         02/28/2022

## 2022-02-28 NOTE — DISCHARGE SUMMARY
"[facility]  Hospital Medicine  Discharge Summary      Patient Name: Sherif Ragland  MRN: [id number]  Patient Class: IP- Inpatient  Admission Date: 2/23/2022  Hospital Length of Stay: 5 days  Discharge Date and Time:  02/28/2022 2:52 PM  Attending Physician: Fabien Copeland MD   Discharging Provider: Mahnaz Brandon NP  Primary Care Provider: Tobias Fox MD      HPI:    is an elderly 76-year-old  male with PMH significant for prostate cancer on Lupron therapy, presented to the ED complaining of "incessant coughing, that wound go way, for the past few weeks.  Per spouse at the bedside, they have tried many different cough suppressants with no significant improvement.  States that something is stuck in his trachea, does not go down, does not come up.  Feels short of breath.  Denies chest pain.  Denies fever, chills, nausea vomiting.  Laboratory workup is unremarkable, at baseline.  Creatinine 1.7 close to baseline.  CXR reveals mild right lower lobe infiltrate.  Received IV Rocephin, Zithromax in the ED. Patient reports generalized weakness, decreased appetite.    Admitting diagnosis:  Acute bronchitis.  Persistent coughing.  Generalized weakness.          Hospital Course:   tPatient was placed on Iv Abx for pneumonia and duonebs. Pulmonology was consulted. He was noted to have some hypertensive urgency and his home medications were restarted and titrated as needed. He was noted to have signs of acute CHF and had an EKG which showed possible afib. Cardiology was consulted. Physical therapy was consulted for debility.  2/25 Patient symptomatic anemia noted. Hx MARI and scheduled for upcoming iron infusion next week. Will order dose Iv venofer and 1 unit PRBCs today. Continue treatment for pneumonia and physical therapy for debility.   2/26/22: SOB improving, ambulating in room and examined sitting up in chair. WBC normal, Afebrile. Cont IV Rocephin, IV lasix, Neb txs, add Doxycycline. Home " O2 eval.   2/27/22: pt reports cough and congestion is worse. Oxygenation improving- now on 2 liters. CXR shows improvement. Abx changed to oral Augmentin. Lasix changed to po. Plan for d/c in am.   2/28/22: pt reports improvement. Home oxygen arranged. Pt will be discharged to continue oral lasix 20mg BID and oral Augmentin x 5 days. F/U with PCP, F/U with Cardiology for CHF and possible stress test, F/U with Pulmonology for pulmonary nodule, PFTs, and Sleep study. F/U with GI for GERD/possible aspiration of GERD     The patient was seen and examined today and determined to be stable for discharge.           Goals of Care Treatment Preferences:  Code Status: DNR       LaPOST: Yes           Consults:   Consults (From admission, onward)        Status Ordering Provider     Inpatient consult to Social Work  Once        Provider:  (Not yet assigned)    Completed JOSSELYN ONEAL     Inpatient consult to Cardiology  Once        Provider:  Gilbert Ulloa MD    Completed APPLE MINER     Inpatient consult to Pulmonology  Once        Provider:  Rony Currie MD    Completed ZE PAIGE              Final Active Diagnoses:    Diagnosis Date Noted POA    PRINCIPAL PROBLEM:  Severe sepsis secondary to pneumonia [A41.9, R65.20] 02/24/2022 Yes    Acute on chronic diastolic congestive heart failure [I50.33] 02/24/2022 Yes    Chronic coughing [R05.3] 02/23/2022 Yes    Acute hypoxemic respiratory failure [J96.01] 11/30/2020 Yes    Pulmonary nodules [R91.8] 02/24/2022 Yes    History MARI (iron deficiency anemia) [D50.9] 02/25/2022 Yes    Hypokalemia [E87.6] 02/24/2022 Yes    Hypocalcemia [E83.51] 02/24/2022 Yes    Type 2 diabetes mellitus with stage 3 chronic kidney disease [E11.22, N18.30] 02/24/2022 Yes    PAF (paroxysmal atrial fibrillation) [I48.0] 02/24/2022 Yes    Prostate cancer metastatic to bone [C61, C79.51] 03/18/2020 Yes    DEEJAY with Stage 3 chronic kidney disease [N18.30] 10/09/2017 Yes     Hypertension associated with diabetes [E11.59, I15.2] 10/09/2017 Yes      Problems Resolved During this Admission:    Diagnosis Date Noted Date Resolved POA    Hypomagnesemia [E83.42] 02/24/2022 02/26/2022 Yes    Hyponatremia [E87.1] 02/24/2022 02/25/2022 Yes    Acute pulmonary edema [J81.0] 02/24/2022 02/25/2022 Yes    Chest pain [R07.9] 02/24/2022 02/25/2022 Yes       Discharged Condition: stable    Disposition: Home-Health Care Svc    Follow Up:   Follow-up Information     [facility] Follow up in 2 week(s).    Specialty: Pulmonology  Why: f/u pulmonary nodule, f/u for PFTs, f/u for sleep study  Contact information:  [address] [place] [address] [phone]           Tobias Fox MD Follow up in 3 day(s).    Specialty: Internal Medicine  Why: Note sent to Dr Ni office to call you with date and time of hospital follow up appointment.  Contact information:  [address] [facility] [address] [phone]             [facility] Follow up in 2 day(s).    Specialty: Cardiology  Why: f/u for afib, diastolic CHF, and possible stress test  Contact information:  [address] [place] [address] [phone]           [facility].    Specialty: DME Provider  Why: DME- oxygen and nebulizer  Contact information:  [address]  [place] [address]  [phone]             [facility] Follow up.    Specialty: Home Health Services  Why: [facility]- [facility]  [facility] information:  [address] [facility] [id number] [phone]             [facility] Follow up in 2 week(s).    Specialty: Gastroenterology  Contact information:  [address] [place] [address] [phone]                     Patient Instructions:      OXYGEN FOR HOME USE     Order Specific Question Answer Comments   Liter Flow 3    Duration Continuous     Qualifying Test Performed at: Activity    Oxygen saturation at rest 91    Oxygen saturation with activity 87    Oxygen saturation with activity on oxygen 94    Portable mode: continuous    Route nasal cannula    Device: home concentrator with portable tanks    Length of need (in months): 99 mos    Patient condition with qualifying saturation CHF    Height: 6' (1.829 m)    Weight: 137.4 kg (302 lb 14.6 oz)    Alternative treatment measures have been tried or considered and deemed clinically ineffective. Yes      NEBULIZER FOR HOME USE     Order Specific Question Answer Comments   Height: 6' (1.829 m)    Weight: 140.4 kg (309 lb 8.4 oz)    Does patient have medical equipment at home? other (see comments) Built in shower seat, HHS, rails on bed; has RW but does not use   Length of need (1-99 months): 99      NEBULIZER KIT (SUPPLIES) FOR HOME USE     Order Specific Question Answer Comments   Height: 6' (1.829 m)    Weight: 140.4 kg (309 lb 8.4 oz)    Does patient have medical equipment at home? other (see comments) Built in shower seat, HHS, rails on bed; has RW but does not use   Length of need (1-99 months): 99    Mask or Mouthpiece? Mouthpiece      Ambulatory referral/consult to Pulmonology   Standing Status: Future   Referral Priority: Routine Referral Type: Consultation   Referral Reason: Specialty Services Required   Requested Specialty: Pulmonary Disease     Ambulatory referral/consult to [facility]   Standing Status: Future   Referral Priority: Routine Referral Type: Consultation   Referral Reason: Specialty Services Required   Requested Specialty: Cardiology   Number of Visits Requested: 1     Ambulatory referral/consult to Gastroenterology   Standing Status: Future   Referral Priority: Routine Referral Type: Consultation   Referral Reason: Specialty Services Required   Requested Specialty: Gastroenterology     Diet diabetic     Diet Cardiac   Order Comments: 2gm sodium, 1500 ml fluid  restriction     Activity as tolerated       Significant Diagnostic Studies:   Imaging Results          X-Ray Chest AP Portable (Final result)  Result time 02/23/22 07:38:08    Final result by Nathan Barakat MD (02/23/22 07:38:08)                 Impression:      1.  Cardiac silhouette size enlargement.  Mild vascular congestion noted.  Mild interstitial pulmonary edema or interstitial infectious process difficult to exclude in the right clinical setting.    2.  Stable findings as noted above.      Electronically signed by: Nathan Barakat MD  Date:    02/23/2022  Time:    07:38             Narrative:    EXAMINATION:  XR CHEST AP PORTABLE    CLINICAL HISTORY:  cough;    COMPARISON:  January 24, 2022, as well as studies dating back to January 14, 2019    FINDINGS:  Stable elevation of the right hemidiaphragm with adjacent chronic scarring or atelectasis.  The lungs are free of new pulmonary opacities.  The cardiac silhouette size is enlarged.  The trachea is midline and the mediastinal width is normal. Negative for focal infiltrate, effusion or pneumothorax. Pulmonary vasculature is congested.  Negative for osseous abnormalities. Tortuous aorta with calcifications of the aortic knob.  There are degenerative changes of the spine and both shoulder girdles.                    ED Interpretation by Jasmyn Pagan Do, MD (02/23/22 00:18:37, [facility] Dept., Emergency Medicine)    Poor Inspiratory film, ? RLL infiltrate                              Pending Diagnostic Studies:     Procedure Component Value Units Date/Time    Brain natriuretic peptide [[id number]] Collected: 02/23/22 0024    Order Status: Sent Lab Status: In process Updated: 02/23/22 0024    Specimen: Blood     CBC Auto Differential [[id number]] Collected: 02/28/22 0543    Order Status: Sent Lab Status: In process Updated: 02/28/22 0543    Specimen: Blood     Comprehensive Metabolic Panel [[id number]] Collected: 02/28/22 0543    Order Status: Sent Lab  Status: In process Updated: 02/28/22 0543    Specimen: Blood          Medications:  Reconciled Home Medications:      Medication List      START taking these medications    albuterol-ipratropium 2.5 mg-0.5 mg/3 mL nebulizer solution  Commonly known as: DUO-NEB  Take 3 mLs by nebulization every 6 (six) hours. Rescue     amoxicillin-clavulanate 875-125mg 875-125 mg per tablet  Commonly known as: AUGMENTIN  Take 1 tablet by mouth every 12 (twelve) hours. for 5 days     aspirin 81 MG Chew  Take 1 tablet (81 mg total) by mouth once daily.     isosorbide mononitrate 30 MG 24 hr tablet  Commonly known as: IMDUR  Take 1 tablet (30 mg total) by mouth once daily.     polyethylene glycol 17 gram Pwpk  Commonly known as: GLYCOLAX  Take 17 g by mouth once daily.     senna-docusate 8.6-50 mg 8.6-50 mg per tablet  Commonly known as: PERICOLACE  Take 1 tablet by mouth 2 (two) times daily.        CHANGE how you take these medications    furosemide 20 MG tablet  Commonly known as: LASIX  Take 1 tablet (20 mg total) by mouth 2 (two) times daily.  What changed: when to take this        CONTINUE taking these medications    ALPRAZolam 0.25 MG tablet  Commonly known as: XANAX  TAKE 1 TO 2 TABLETS BY MOUTH ONCE DAILY AS NEEDED FOR ANXIETY     amLODIPine 5 MG tablet  Commonly known as: NORVASC  Take 2 tablets (10 mg total) by mouth once daily.     atorvastatin 40 MG tablet  Commonly known as: LIPITOR  Take 1 tablet (40 mg total) by mouth once daily.     bisacodyL 5 mg EC tablet  Commonly known as: DULCOLAX  Take 5 mg by mouth daily as needed.     cloNIDine 0.3 mg/24 hr td ptwk 0.3 mg/24 hr  Commonly known as: CATAPRES  Place 1 patch onto the skin every 7 days.     hydrALAZINE 100 MG tablet  Commonly known as: APRESOLINE  Take 1 tablet (100 mg total) by mouth every 8 (eight) hours.     HYDROcodone-acetaminophen  mg per tablet  Commonly known as: NORCO  Take 1 tablet by mouth every 4 (four) hours as needed for Pain. for pain.      lactulose 10 gram/15 mL solution  Commonly known as: CHRONULAC  Take 15 mLs (10 g total) by mouth 2 (two) times daily.     LEVEMIR FLEXTOUCH U-100 INSULN 100 unit/mL (3 mL) Inpn pen  Generic drug: insulin detemir U-100  Inject 70 Units into the skin once daily.     metoprolol succinate 100 MG 24 hr tablet  Commonly known as: TOPROL-XL  Take 1 tablet (100 mg total) by mouth 2 (two) times daily.     pantoprazole 40 MG tablet  Commonly known as: PROTONIX  Take 1 tablet (40 mg total) by mouth once daily.     potassium chloride SA 20 MEQ tablet  Commonly known as: K-DUR,KLOR-CON  Take 1 tablet (20 mEq total) by mouth 2 (two) times daily.     QUEtiapine 25 MG Tab  Commonly known as: SEROQUEL  Take 1 tablet (25 mg total) by mouth nightly as needed (insomnia).     repaglinide 0.5 MG tablet  Commonly known as: PRANDIN  Take 1 tablet (0.5 mg total) by mouth 3 (three) times daily before meals.     terazosin 5 MG capsule  Commonly known as: HYTRIN  Take 1 capsule by mouth.        STOP taking these medications    dexAMETHasone 2 MG tablet  Commonly known as: DECADRON     valsartan-hydrochlorothiazide 320-12.5 mg per tablet  Commonly known as: DIOVAN-HCT            Indwelling Lines/Drains at time of discharge:   Lines/Drains/Airways     None                 Time spent on the discharge of patient: 46 minutes         Mahnaz Brandon NP  Department of Hospital Medicine  [facility]

## 2022-02-28 NOTE — PLAN OF CARE
Portable oxygen and nebulizer delivered to the bedside.  [facility]       02/28/22 1215   Post-Acute Status   Post-Acute Authorization HME   HME Status Set-up Complete/Auth obtained

## 2022-02-28 NOTE — PLAN OF CARE
[facility]  Discharge Reassessment    Primary Care Provider: Tobias Fox MD    Expected Discharge Date: 2/28/2022    Reassessment (most recent)     Discharge Reassessment - 02/28/22 1033        Discharge Reassessment    Assessment Type Discharge Planning Assessment     Did the patient's condition or plan change since previous assessment? Yes     Discharge Plan discussed with: Patient;Spouse/sig other     Name(s) and Number(s) Mita at bedside     Communicated LUCAS with patient/caregiver Yes     Discharge Plan A Home with family;Home Health     Discharge Plan B Home with family;Home Health     DME Needed Upon Discharge  oxygen;nebulizer     Discharge Barriers Identified None        Post-Acute Status    Post-Acute Authorization Home Health     HME Status Referrals Sent               CM met with patient/spouse at the bedside to discuss the discharge plan.  Patient will need home oxygen and a nebulizer prior to discharge.  [facility]  New home health orders obtained and [facility].

## 2022-02-28 NOTE — PROGRESS NOTES
pcp apt made   Ambulatory referrals in place   Cards apt scheduled   piv removed   Tele monitor removed and returned   Pt and spouse verbalize understanding   Oxygen teaching completed   Patient wheeled down to waiting car

## 2022-02-28 NOTE — PT/OT/SLP PROGRESS
"Occupational Therapy  Treatment    Sherif Ragland   MRN: [id number]   Admitting Diagnosis: Severe sepsis    OT Date of Treatment: 02/28/22   OT Start Time: 1042  OT Stop Time: 1053  OT Total Time (min): 11 min    Billable Minutes:  Therapeutic Activity 11 min    OT/JOSE A: OT     JOSE A Visit Number: 0    General Precautions: Standard, fall, respiratory  Orthopedic Precautions: N/A  Braces: N/A  Respiratory Status: Nasal cannula, flow 2 L/min         Subjective:  Communicated with Nurse Domingeuz and [facility] chart review prior to session.  Pt found sitting in chair and agreeable to tx at this time.   Pain/Comfort  Pain Rating 1: 0/10    Objective:  Patient found with: telemetry, peripheral IV, oxygen     Functional Mobility:  Therapeutic Activities and Exercises:  Pt performed forward scooting in chair with Mod I, sit>stand with Supervision, donned gown as a robe with SBA. Pt performed functional mobility ~100ft x 2 with SBA using RW and 2L O 2 in tow and required 2 standing rest breaks. Pt returned to room and t/f to chair with SBA using RW. Pt educated in (B) UE and LE exercises and instructed to perform after resting.     AM-PAC 6 CLICK ADL   How much help from another person does this patient currently need?   1 = Unable, Total/Dependent Assistance  2 = A lot, Maximum/Moderate Assistance  3 = A little, Minimum/Contact Guard/Supervision  4 = None, Modified [place]/Independent    Putting on and taking off regular lower body clothing? : 3  Bathing (including washing, rinsing, drying)?: 3  Toileting, which includes using toilet, bedpan, or urinal? : 3  Putting on and taking off regular upper body clothing?: 3  Taking care of personal grooming such as brushing teeth?: 4  Eating meals?: 4  Daily Activity Total Score: 20     AM-PAC Raw Score CMS "G-Code Modifier Level of Impairment Assistance   6 % Total / Unable   7 - 8 CM 80 - 100% Maximal Assist   9-13 CL 60 - 80% Moderate Assist   14 - 19 CK 40 - 60% Moderate Assist "   20 - 22 CJ 20 - 40% Minimal Assist   23 CI 1-20% SBA / CGA   24 CH 0% Independent/ Mod I       Patient left up in chair with all lines intact, call button in reach, Nurse Angelica  notified and wife present    ASSESSMENT:  Sherif Ragland is a 76 y.o. male with a medical diagnosis of Severe sepsis and presents with impaired functional mobility and ADLS. Pt will benefit from continued skilled OT in order to address the listed impairments.    Rehab identified problem list/impairments: Rehab identified problem list/impairments: weakness, impaired endurance, impaired self care skills, impaired functional mobilty, gait instability, impaired balance, decreased coordination, decreased safety awareness, impaired cardiopulmonary response to activity    Rehab potential is good.    Activity tolerance: Good    Discharge recommendations: Discharge Facility/Level of Care Needs: home with home health     Barriers to discharge: Barriers to Discharge: None    Equipment recommendations: none     GOALS:   Multidisciplinary Problems     Occupational Therapy Goals     Not on file          Multidisciplinary Problems (Resolved)        Problem: Occupational Therapy Goal    Goal Priority Disciplines Outcome Interventions   Occupational Therapy Goal   (Resolved)     OT, PT/OT Met    Description: Goals to be met by: 3/9/2022     Patient will increase functional independence with ADLs by performing:    UE Dressing with Modified [place].  LE Dressing with Stand-by Assistance.  Grooming while standing at sink with Supervision.  Step transfer to toilet with Supervision  Upper extremity exercise program 2x10 reps per handout, with independence.                     Plan:  Patient to be seen 2 x/week to address the above listed problems via self-care/home management, therapeutic activities, therapeutic exercises  Plan of Care expires: 03/09/22  Plan of Care reviewed with: patient, spouse         02/28/2022

## 2022-03-02 NOTE — TELEPHONE ENCOUNTER
[facility] PALLIATIVE MEDICINE  Medical Specialty       Patient Name: Sherif Ragland  MRN: [id number]  Primary Care Physician: Tobias Fox MD    Received call from Angelica with [facility]. Family reached out to them for an info visit. Angelica is friends with the family, but unsure if they are just wanting an info visit or ready to enroll in hospice now. She will meet with family and f/u with update.    Update received:   Patient and family elected to enroll in hospice at this time. Request all future treatments/appts be canceled. Onc team updated.      Louis Damian, MSW, LCSW  [phone]

## 2022-05-04 ENCOUNTER — DOCUMENT SCAN (OUTPATIENT)
Dept: HOME HEALTH SERVICES | Facility: HOSPITAL | Age: 77
End: 2022-05-04
Payer: MEDICARE

## 2022-06-01 ENCOUNTER — PATIENT MESSAGE (OUTPATIENT)
Dept: HEPATOLOGY | Facility: CLINIC | Age: 77
End: 2022-06-01
Payer: MEDICARE

## 2023-07-24 NOTE — ASSESSMENT & PLAN NOTE
-Brief episode of afib noted overnight, now back in SR  -Continue Toprol XL   -Replete electrolytes, K and Mg  -Echo pending  -Add ASA 81 mg daily; will consider AC if arrhythmia recurs    2/25/22  -Stable overnight, SR during exam  -Continue Toprol XL  -Echo showed normal EF  -Will not add ASA or AC at present time given worsening anemia, discussed with patient and family at bedside   Start sliding scale    Recent Labs     07/23/23  2155 07/24/23  0517   POCGLU 165* 160*       Blood Sugar Average: Last 72 hrs:  (P) 162.5   · Hold home medication Januvia  · Jardiance continued  · Continue SSI and blood glucose monitoring
